# Patient Record
Sex: FEMALE | Race: WHITE | NOT HISPANIC OR LATINO | Employment: OTHER | URBAN - METROPOLITAN AREA
[De-identification: names, ages, dates, MRNs, and addresses within clinical notes are randomized per-mention and may not be internally consistent; named-entity substitution may affect disease eponyms.]

---

## 2017-03-28 ENCOUNTER — ALLSCRIPTS OFFICE VISIT (OUTPATIENT)
Dept: OTHER | Facility: OTHER | Age: 72
End: 2017-03-28

## 2017-05-01 DIAGNOSIS — Z13.820 ENCOUNTER FOR SCREENING FOR OSTEOPOROSIS: ICD-10-CM

## 2017-05-26 ENCOUNTER — HOSPITAL ENCOUNTER (OUTPATIENT)
Dept: RADIOLOGY | Age: 72
Discharge: HOME | End: 2017-05-26
Payer: MEDICARE

## 2017-05-26 DIAGNOSIS — Z13.820 ENCOUNTER FOR SCREENING FOR OSTEOPOROSIS: ICD-10-CM

## 2017-06-02 ENCOUNTER — HOSPITAL ENCOUNTER (OUTPATIENT)
Dept: RADIOLOGY | Age: 72
Discharge: HOME | End: 2017-06-02
Payer: MEDICARE

## 2017-06-02 PROCEDURE — 77080 DXA BONE DENSITY AXIAL: CPT

## 2017-08-24 ENCOUNTER — ANESTHESIA EVENT (OUTPATIENT)
Dept: GASTROENTEROLOGY | Facility: HOSPITAL | Age: 72
End: 2017-08-24
Payer: MEDICARE

## 2017-08-25 ENCOUNTER — HOSPITAL ENCOUNTER (OUTPATIENT)
Facility: HOSPITAL | Age: 72
Setting detail: OUTPATIENT SURGERY
Discharge: HOME | End: 2017-08-25
Attending: COLON & RECTAL SURGERY | Admitting: COLON & RECTAL SURGERY
Payer: MEDICARE

## 2017-08-25 ENCOUNTER — ANESTHESIA (OUTPATIENT)
Dept: GASTROENTEROLOGY | Facility: HOSPITAL | Age: 72
End: 2017-08-25
Payer: MEDICARE

## 2017-08-25 VITALS
DIASTOLIC BLOOD PRESSURE: 60 MMHG | RESPIRATION RATE: 18 BRPM | OXYGEN SATURATION: 99 % | SYSTOLIC BLOOD PRESSURE: 119 MMHG | WEIGHT: 136 LBS | HEIGHT: 64 IN | BODY MASS INDEX: 23.22 KG/M2 | TEMPERATURE: 97.1 F | HEART RATE: 62 BPM

## 2017-08-25 DIAGNOSIS — Z86.010 HISTORY OF COLONIC POLYPS: ICD-10-CM

## 2017-08-25 PROCEDURE — 88305 TISSUE EXAM BY PATHOLOGIST: CPT | Performed by: COLON & RECTAL SURGERY

## 2017-08-25 RX ORDER — OMEPRAZOLE 20 MG/1
20 CAPSULE, DELAYED RELEASE ORAL 2 TIMES DAILY
COMMUNITY

## 2017-08-25 RX ORDER — ASPIRIN 81 MG/1
81 TABLET, CHEWABLE ORAL DAILY
COMMUNITY

## 2017-08-25 RX ORDER — SODIUM CHLORIDE, SODIUM LACTATE, POTASSIUM CHLORIDE, CALCIUM CHLORIDE 600; 310; 30; 20 MG/100ML; MG/100ML; MG/100ML; MG/100ML
100 INJECTION, SOLUTION INTRAVENOUS CONTINUOUS
Status: DISCONTINUED | OUTPATIENT
Start: 2017-08-25 | End: 2017-08-25 | Stop reason: HOSPADM

## 2017-08-25 RX ORDER — LIDOCAINE HYDROCHLORIDE 10 MG/ML
INJECTION, SOLUTION EPIDURAL; INFILTRATION; INTRACAUDAL; PERINEURAL AS NEEDED
Status: DISCONTINUED | OUTPATIENT
Start: 2017-08-25 | End: 2017-08-25 | Stop reason: SURG

## 2017-08-25 RX ORDER — PROPOFOL 10 MG/ML
INJECTION, EMULSION INTRAVENOUS AS NEEDED
Status: DISCONTINUED | OUTPATIENT
Start: 2017-08-25 | End: 2017-08-25 | Stop reason: SURG

## 2017-08-25 RX ADMIN — PROPOFOL 20 MG: 10 INJECTION, EMULSION INTRAVENOUS at 09:13

## 2017-08-25 RX ADMIN — PROPOFOL 20 MG: 10 INJECTION, EMULSION INTRAVENOUS at 09:08

## 2017-08-25 RX ADMIN — SODIUM CHLORIDE, POTASSIUM CHLORIDE, SODIUM LACTATE AND CALCIUM CHLORIDE: 600; 310; 30; 20 INJECTION, SOLUTION INTRAVENOUS at 08:58

## 2017-08-25 RX ADMIN — PROPOFOL 20 MG: 10 INJECTION, EMULSION INTRAVENOUS at 09:17

## 2017-08-25 RX ADMIN — PROPOFOL 20 MG: 10 INJECTION, EMULSION INTRAVENOUS at 09:15

## 2017-08-25 RX ADMIN — PROPOFOL 80 MG: 10 INJECTION, EMULSION INTRAVENOUS at 09:04

## 2017-08-25 RX ADMIN — LIDOCAINE HYDROCHLORIDE 50 MG: 10 INJECTION, SOLUTION EPIDURAL; INFILTRATION; INTRACAUDAL; PERINEURAL at 09:04

## 2017-08-25 RX ADMIN — PROPOFOL 20 MG: 10 INJECTION, EMULSION INTRAVENOUS at 09:06

## 2017-08-25 RX ADMIN — SODIUM CHLORIDE, POTASSIUM CHLORIDE, SODIUM LACTATE AND CALCIUM CHLORIDE 100 ML/HR: 600; 310; 30; 20 INJECTION, SOLUTION INTRAVENOUS at 08:00

## 2017-08-25 RX ADMIN — PROPOFOL 20 MG: 10 INJECTION, EMULSION INTRAVENOUS at 09:22

## 2017-08-25 RX ADMIN — PROPOFOL 20 MG: 10 INJECTION, EMULSION INTRAVENOUS at 09:20

## 2017-08-25 RX ADMIN — PROPOFOL 20 MG: 10 INJECTION, EMULSION INTRAVENOUS at 09:11

## 2017-09-29 ENCOUNTER — ALLSCRIPTS OFFICE VISIT (OUTPATIENT)
Dept: OTHER | Facility: OTHER | Age: 72
End: 2017-09-29

## 2017-09-29 DIAGNOSIS — M81.8 OTHER OSTEOPOROSIS WITHOUT CURRENT PATHOLOGICAL FRACTURE: ICD-10-CM

## 2017-09-29 DIAGNOSIS — K21.9 GASTRO-ESOPHAGEAL REFLUX DISEASE WITHOUT ESOPHAGITIS: ICD-10-CM

## 2017-10-10 ENCOUNTER — LAB REQUISITION (OUTPATIENT)
Dept: LAB | Facility: HOSPITAL | Age: 72
End: 2017-10-10
Payer: MEDICARE

## 2017-10-10 ENCOUNTER — ALLSCRIPTS OFFICE VISIT (OUTPATIENT)
Dept: OTHER | Facility: OTHER | Age: 72
End: 2017-10-10

## 2017-10-10 DIAGNOSIS — Z01.419 ENCOUNTER FOR GYNECOLOGICAL EXAMINATION WITHOUT ABNORMAL FINDING: ICD-10-CM

## 2017-10-10 PROCEDURE — G0145 SCR C/V CYTO,THINLAYER,RESCR: HCPCS | Performed by: OBSTETRICS & GYNECOLOGY

## 2017-10-11 NOTE — PROGRESS NOTES
Assessment  1  Encounter for routine gynecological examination (V72 31) (Z01 419)   2  Pap smear, as part of routine gynecological examination (V76 2) (Z01 419)   3  Encounter for screening mammogram for breast cancer (V76 12) (Z12 31)    Plan  Encounter for routine gynecological examination    · Follow-up visit in 1 year Evaluation and Treatment  Follow-up  Status: Hold For -  Scheduling  Requested for: 51UBQ1720   Ordered; For: Encounter for routine gynecological examination; Ordered By: Kathy Hoff Performed:  Due: 84MJL1475  Pap smear, as part of routine gynecological examination    · (1) THIN PREP PAP WITH IMAGING; Status: In Progress - Specimen/Data  Collected,Retrospective By Protocol Authorization;   Done: 16HKR2531   Perform:St  [address] [facility] Collection; Order Comments:endocervical, cervical; Due:75Xac8664; Last Updated Leon Malik; 10/10/2017 10:30:21 AM;Ordered; For:Pap smear, as part of routine gynecological examination; Ordered By:Meredith Jurado; Maturation index required? : No  : PM  HPV? : Not Requested    Discussion/Summary  healthy adult female Currently, she eats a healthy diet  cervical cancer screening is current Pap test was done today Breast cancer screening: monthly self breast exam was advised  Colorectal cancer screening: colorectal cancer screening is current  Osteoporosis screening: bone mineral density testing is current  Advice and education were given regarding nutrition, aerobic exercise, calcium supplements, vitamin D supplements and sunscreen use  Normal GYN Exam  Stressed  mammogram needed  SMear Done  Medical visit n 1 year  if any problems develop during the interim  The patient has the current Goals: [address]  The patent has the current Barriers: None  Patient is able to Self-Care  PATIENT EDUCATION RECORD She was given the following educational materials:  Ideas for a Healthy Life Style     The treatment plan was reviewed with the patient/guardian  The patient/guardian understands and agrees with the treatment plan     Self Referrals: No      Chief Complaint  ANNUAL EXAM has no problem or concerns      History of Present Illness  HPI: Followed for OSTEOPOROSIS    Normal Bowel & Bladder habits    Denies any pelvic or abdominal pain      GYN , Adult Female [facility]: The patient is being seen for a gynecology evaluation  The last health maintenance visit was 1 year(s) ago  General Health: The patient's health since the last visit is described as good  She has regular dental visits -She denies vision problems -She denies hearing loss  Lifestyle:  She consumes a diverse and healthy diet -She does not have any weight concerns -She exercises regularly -She does not use tobacco -She denies drug use  Reproductive health: the patient is postmenopausal    Screening: cancer screening reviewed and current  metabolic screening reviewed and current  risk screening reviewed and current  Review of Systems    Constitutional: No fever, no chills, feels well, no tiredness, no recent weight gain or loss  ENT: no ear ache, no loss of hearing, no nosebleeds or nasal discharge, no sore throat or hoarseness  Cardiovascular: no complaints of slow or fast heart rate, no chest pain, no palpitations, no leg claudication or lower extremity edema  Respiratory: no complaints of shortness of breath, no wheezing, no dyspnea on exertion, no orthopnea or PND  Breasts: no complaints of breast pain, breast lump or nipple discharge  Gastrointestinal: no complaints of abdominal pain, no constipation, no nausea or diarrhea, no vomiting, no bloody stools  Genitourinary: no complaints of dysuria, no incontinence, no pelvic pain, no dysmenorrhea, no vaginal discharge or abnormal vaginal bleeding  Musculoskeletal: OSTEOPOROSIS, but-no complaints of arthralgia, no myalgia, no joint swelling or stiffness, no limb pain or swelling     Integumentary: no complaints of skin rash or lesion, no itching or dry skin, no skin wounds  Neurological: no complaints of headache, no confusion, no numbness or tingling, no dizziness or fainting  Active Problems  1  Cataract (366 9) (H26 9)   2  Chronic pain syndrome (338 4) (G89 4)   3  Generalized osteoarthritis (715 00) (M15 9)   4  GERD without esophagitis (530 81) (K21 9)   5  Idiopathic osteoporosis of other site without pathological fracture (733 02) (M81 8)   6  Low back pain (724 2) (M54 5)   7  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) (M19 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)   8   Sacroiliitis (720 2) (M46 1)    Past Medical History   · History of Arthritis (V13 4)   · History of Birth History   · History of Encounter for cervical Pap smear with pelvic exam (V76 2,V72 31) (Z01 419)   · History of Encounter for routine gynecological examination (V72 31) (Z01 419)   · History of Encounter for screening for osteoporosis (V82 81) (Z13 820)   · History of Fibrocystic breast disease, unspecified laterality (610 1) (N60 19)   · History of cataract (V12 49) (Z86 69)   · History of osteoporosis (V13 59) (Z87 39)   · History of Lipodermatosclerosis (729 39)   · History of Moderate dysplasia of cervix (DAMARIS II) (622 12) (N87 1)   · History of Pap smear, as part of routine gynecological examination (V76 2) (Z01 419)   · History of Reported Pap Smear   · History of Superficial thrombophlebitis of leg, unspecified laterality   · History of Visit for screening mammogram (V76 12) (Z12 31)    Surgical History   · History of Breast Reconstruction With Implant Prosthesis   · History of Breast Surgery Mastectomy   · History of Cataract Surgery   · History of Tonsillectomy    Family History  Mother    · Family history of carotid artery stenosis (V17 49) (Z82 49)   · Family history of Stroke Syndrome (V17 1)  Father    · Family history of Emphysema   · Family history of Myocardial Infarction Arrhythmias  Brother    · Family history of liver cancer (V16 0) (Z80 0)   · Family history of pancreatic cancer (V16 0) (Z80 0)  Maternal Grandmother    · Family history of Osteoporosis (V17 81)  Uncle    · Family history of diabetes mellitus (V18 0) (Z83 3)  Maternal Uncle    · Family history of Diabetes Mellitus (V18 0)  Family History    · Denied: Family history of Crohn's disease   · Denied: Family history of psoriasis   · Denied: Family history of rheumatoid arthritis   · Denied: Family history of systemic lupus erythematosus   · Denied: Family history of ulcerative colitis    Social History   · Being A Social Drinker   · Rare   · Former smoker (V15 82) (S10 099)   · quit in 2012; 1-1 5 PPD on and on for many years   · No drug use   · Working Part-time    Current Meds   1  Aleve 220 MG Oral Tablet; TAKE 1 TABLET Daily PRN pain; Therapy: (Recorded:28Mar2017) to Recorded   2  Aspirin 81 MG TABS; TAKE 1 TABLET DAILY; Therapy: (Recorded:09Jun2015) to Recorded   3  Calcium Citrate + Oral Tablet; Take 1 tablet twice daily; Therapy: (Recorded:28Mar2017) to Recorded   4  Centrum Specialist Heart Oral Tablet; TAKE 1 TABLET DAILY; Therapy: (DDIKZKLN:54LBZ5444) to Recorded   5  Glucosamine Chondroitin TABS; Take 1 tablet twice daily; Therapy: (TTKVSOKQ:41TGL0753) to Recorded   6  Omeprazole 20 MG Oral Capsule Delayed Release; Take 1 capsule twice daily; Therapy: (MKGRKQFL:69VAV5222) to Recorded   7  Prolia 60 MG/ML Subcutaneous Solution; INJECT SUBCUTANEOUSLY  60 MG / 1 ML   EVERY 6 MONTHS; Therapy: 59CSU9532 to (Last Rx:02Jun2016) Ordered   8  Vitamin B Complex TABS; TAKE 1 TABLET DAILY; Therapy: (WFSWOWQO:06WPI3719) to Recorded   9  Vitamin D3 1000 UNIT Oral Tablet; TAKE 1 TABLET DAILY; Therapy: (Recorded:09Jun2015) to Recorded    Allergies  1  No Known Drug Allergies  2  No Known Environmental Allergies   3   No Known Food Allergies    Vitals   Recorded: 15NGX1855 98:60VS   Systolic 557   Diastolic 60   Height 5 ft 4 in   Weight 133 lb    BMI Calculated 22 83   BSA Calculated 1 64     Physical Exam    Constitutional   General appearance: No acute distress, well appearing and well nourished  Neck   Neck: Normal, supple, trachea midline, no masses  Thyroid: Normal, no thyromegaly  Pulmonary   Respiratory effort: No increased work of breathing or signs of respiratory distress  Auscultation of lungs: Clear to auscultation  Cardiovascular   Auscultation of heart: Normal rate and rhythm, normal S1 and S2, no murmurs  Peripheral vascular exam: Normal pulses Throughout  Genitourinary   External genitalia: Normal and no lesions appreciated  Vagina: Normal, no lesions or dryness appreciated  Urethra: Normal     Urethral meatus: Normal     Bladder: Normal, soft, non-tender and no prolapse or masses appreciated  Cervix: Normal, no palpable masses  Uterus: Normal, non-tender, not enlarged, and no palpable masses  Adnexa/parametria: Normal, non-tender and no fullness or masses appreciated  Anus, perineum, and rectum: Normal sphincter tone, no masses, and no prolapse  Chest   Breasts: Normal and no dimpling or skin changes noted  -Implants STABLE  Abdomen   Abdomen: Normal, non-tender, and no organomegaly noted  Liver and spleen: No hepatomegaly or splenomegaly  Examination for hernias: No hernias appreciated  Stool sample for occult blood: Negative  Lymphatic   Palpation of lymph nodes in neck, axillae, groin and/or other locations: No lymphadenopathy or masses noted  Skin   Skin and subcutaneous tissue: Normal skin turgor and no rashes      Palpation of skin and subcutaneous tissue: Normal     Psychiatric   Orientation to person, place, and time: Normal     Mood and affect: Normal        Provider Comments  Provider Comments:   Patient has 1 granddaughter      Future Appointments    Date/Time Provider Specialty Site   03/30/2018 10:00 AM Melissa Osullivan, [facility] [address]   Electronically signed by Newell Cranker, M D ; Oct 10 2017 10:59AM EST                       (Author)

## 2017-10-24 LAB
LAB AP GYN PRIMARY INTERPRETATION: NORMAL
Lab: NORMAL

## 2018-01-12 NOTE — MISCELLANEOUS
Message   Recorded as Task   Date: 07/01/2016 09:38 AM, Created By: Huma Junior   Task Name: Follow Up   Assigned To: [facility] [address] Procedure,Team   Regarding Patient: Candy Brown, Status: Active   Comment:    Cecily Cordero - 01 Jul 2016 9:38 AM     TASK CREATED  L/m to return call  S/ p LEFT SIJ INJ dopne 6/24/16 by Cecily Field - 08 Jul 2016 10:33 AM     TASK EDITED  Patient reports she received 50% relief zamzam her procedure  She still get [pain when she is walking  Patient is aware that she has to give it a 2wks to see if her pain improves  Her pain level today is a 4  S/p LEFT SIJ INJ done 6/24/16 by Gunjan Humphreys - 08 Jul 2016 12:45 PM     TASK REPLIED TO: Previously Assigned To Gunjan Ray  Please check in with patient next week  Cecily Cordero - 13 Jul 2016 1:52 PM     TASK EDITED  Patient reports she is still at 50% relief from her procedure  Her pain level today is a 5  She would like to discuss other treatment option with you at her f/u appt  on 7/22/16  S/p LEFT SIJ INJ done 6/24/16 by Dr Dinah Powell  [address] - 13 Jul 2016 2:21 PM     TASK REPLIED TO: Previously Assigned To [place]  Thanks  Active Problems    1  Cataract (366 9) (H26 9)   2  Chronic pain syndrome (338 4) (G89 4)   3  Encounter for routine gynecological examination (V72 31) (Z01 419)   4  Generalized osteoarthritis (715 00) (M15 9)   5  GERD without esophagitis (530 81) (K21 9)   6  Idiopathic osteoporosis of other site without pathological fracture (733 02) (M81 8)   7  Lipodermatosclerosis (729 39)   8  Low back pain (724 2) (M54 5)   9  Moderate dysplasia of cervix (DAMARIS II) (622 12) (N87 1)   10  Osteoporosis (733 00) (M81 0)   11  Pap smear, as part of routine gynecological examination (V76 2) (Z01 419)   12  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) (M19 049)   13  Sacroiliitis (720 2) (M46 1)    Current Meds   1  Aspirin 81 MG TABS; TAKE 1 TABLET DAILY;    Therapy: (VOBYVMPD:57ZHS7320) to Recorded   2  Calcium Citrate + Oral Tablet; TAKE 1 TABLET 3 times daily; Therapy: (Recorded:09Jun2015) to Recorded   3  Centrum Specialist Heart Oral Tablet; TAKE 1 TABLET DAILY; Therapy: (UKVQUYCE:02TYE3773) to Recorded   4  Glucosamine Chondroitin TABS; Take 1 tablet twice daily; Therapy: (RAEUFMBV:41ZPQ0799) to Recorded   5  Omeprazole 20 MG Oral Capsule Delayed Release; Take 1 capsule twice daily; Therapy: (WOYZFWHV:64ZAK7352) to Recorded   6  Prolia 60 MG/ML Subcutaneous Solution; INJECT SUBCUTANEOUSLY  60 MG / 1 ML   EVERY 6 MONTHS; Therapy: 13KHR5588 to (Last Rx:02Jun2016) Ordered   7  Vitamin B Complex TABS; TAKE 1 TABLET DAILY; Therapy: (FLVZMFAA:97VBR6473) to Recorded   8  Vitamin D3 1000 UNIT Oral Tablet; TAKE 1 TABLET DAILY; Therapy: (Recorded:09Jun2015) to Recorded    Allergies    1  No Known Drug Allergies    2  No Known Environmental Allergies   3   No Known Food Allergies    Signatures   Electronically signed by : Britney Guerra MA; Jul 13 2016  3:19PM EST                       (Author)

## 2018-01-12 NOTE — PROGRESS NOTES
Assessment    1  Idiopathic osteoporosis of other site without pathological fracture (733 02) (M81 8)   2  Generalized osteoarthritis (715 00) (M15 9)   3  Sacroiliitis (720 2) (M46 1)   4  GERD without esophagitis (530 81) (K21 9)    Plan    1  Follow-up visit in 6 months Evaluation and Treatment  Follow-up  Status: Hold For -   Scheduling  Requested for: 65HBG0770    2  Prolia 60 MG/ML Subcutaneous Solution   3  Call [phone] if: The pain seems worse ; Status:Complete;   Done: 23AAZ4942   4  Call [phone] if: You have questions or concerns about your problem ;   Status:Complete;   Done: 31FRT8562    Discussion/Summary    Ms Luz Gipson reports that she underwent 2 sacroiliac injections since her last evaluation which did help greatly with her left sacroiliac pain  She does report that her pain is now beginning to return but it is tolerable at this time  She does occasionally utilize Aleve at night for pain with good relief  She denies any other significant joint pain or any obvious joint swelling  She denies any morning stiffness  She does report difficulty sleeping on occasion due to pain  She denies any nonrestorative sleep or fatigue  She does complain of some numbness in her right lateral thigh and was diagnosed with meralgia paresthetica by her primary care physician  On exam, there is no active synovitis  She does have osteoarthritic changes of the bilateral hands, with squaring of the bilateral first CMC joints  There is crepitus of the bilateral knees  There is mild tenderness to palpation of the left sacroiliac joint  There is no objective muscular weakness  Review of laboratory studies revealed a normal vitamin D from February 2017  At this time, her osteoporosis appears relatively stable with the use of Prolia 60 mg every 6 months  I did administer her next dose of Prolia 60 mg subcutaneously today in the right upper extremity without any complications  She tolerated the procedure well   She will be due for an updated DEXA scan in May of this year, and she does state that she was provided with a slip to obtain this study by her primary care physician  I did request that we receive a copy of this test once it is completed  We will plan to readminister the Prolia in 6 months time  She will continue her calcium and vitamin D supplementation  She will call in the interim if there are any questions or concerns  Patient is able to Self-Care  Counseling  Rheumatology Counseling Documentation: The patient was counseled regarding diagnostic results, instructions for management and impressions  Chief Complaint  F/U OP   Patient is here today for follow up of chronic conditions described in HPI  History of Present Illness  Pt  returns for F/U for OP  Had 2 injections in L SI joint which did help greatly  Pain is now beginning to return  Pain is tolerable at this time  Occasionally takes Aleve at night for pain with good relief  No other significant joint pain  No obvious joint swelling  No AM stiffness  + difficulty sleeping on occasion due to pain  No non-restorative sleep  No fatigue  c/o numbness in R lateral thigh -> Dx'ed with meralgia paresthetica by PCP  RAPID3: 0/30      Review of Systems    Constitutional: no fever, no recent weight gain, fatigue, no chills, no recent weight loss and no anorexia  HEENT: not feeling congested and no sore throat  Cardiovascular: no chest pain or pressure, no dyspnea on exertion and no swelling in the arms or legs  Respiratory: no unusual or persistent cough, no shortness of breath and no pleurisy  Gastrointestinal: no abdominal pain, no nausea, no vomiting, no heartburn, no diarrhea, no constipation, no melena and no BRBPR  Genitourinary: no dysuria and no hematuria  Musculoskeletal: as noted in HPI  Integumentary no rash  Endocrine no polyuria and no polydipsia     Hematologic/Lymphatic: no unusual bleeding and no tendency for easy bruising  Neurological: tingling, but no headache, no vertigo or dizziness and no weakness  Psychiatric: insomnia, but no non-restorative sleep  Active Problems    1  Cataract (366 9) (H26 9)   2  Chronic pain syndrome (338 4) (G89 4)   3  Generalized osteoarthritis (715 00) (M15 9)   4  GERD without esophagitis (530 81) (K21 9)   5  Idiopathic osteoporosis of other site without pathological fracture (733 02) (M81 8)   6  Low back pain (724 2) (M54 5)   7  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) (M19 049)   8  Sacroiliitis (720 2) (M46 1)    Past Medical History    1  History of Arthritis (V13 4)   2  History of Birth History   3  History of Encounter for cervical Pap smear with pelvic exam (V76 2,V72 31) (Z01 419)   4  History of Encounter for routine gynecological examination (V72 31) (Z01 419)   5  History of Encounter for routine gynecological examination (V72 31) (Z01 419)   6  History of Encounter for screening for osteoporosis (V82 81) (Z13 820)   7  History of Fibrocystic breast disease, unspecified laterality (610 1) (N60 19)   8  History of cataract (V12 49) (Z86 69)   9  History of osteoporosis (V13 59) (Z87 39)   10  History of Lipodermatosclerosis (729 39)   11  History of Moderate dysplasia of cervix (DAMARIS II) (622 12) (N87 1)   12  History of Pap smear, as part of routine gynecological examination (V76 2) (Z01 419)   13  History of Pap smear, as part of routine gynecological examination (V76 2) (Z01 419)   14  History of Reported Pap Smear   15  History of Superficial thrombophlebitis of leg, unspecified laterality   16  History of Visit for screening mammogram (V76 12) (Z12 31)    The active problems and past medical history were reviewed and updated today  Surgical History    1  History of Breast Reconstruction With Implant Prosthesis   2  History of Breast Surgery Mastectomy   3  History of Cataract Surgery   4   History of Tonsillectomy    The surgical history was reviewed and updated today  Family History  Mother    1  Family history of carotid artery stenosis (V17 49) (Z82 49)   2  Family history of Stroke Syndrome (V17 1)  Father    3  Family history of Emphysema   4  Family history of Myocardial Infarction Arrhythmias  Brother    5  Family history of liver cancer (V16 0) (Z80 0)   6  Family history of pancreatic cancer (V16 0) (Z80 0)  Maternal Grandmother    7  Family history of Osteoporosis (V17 81)  Uncle    8  Family history of diabetes mellitus (V18 0) (Z83 3)  Maternal Uncle    9  Family history of Diabetes Mellitus (V18 0)  Family History    10  Denied: Family history of Crohn's disease   6  Denied: Family history of psoriasis   12  Denied: Family history of rheumatoid arthritis   13  Denied: Family history of systemic lupus erythematosus   14  Denied: Family history of ulcerative colitis    The family history was reviewed and updated today  Social History    · Being A Social Drinker   · Former smoker (Z11 67) (X39 352)   · No drug use   · Working Part-time  The social history was reviewed and updated today  The social history was reviewed and is unchanged  Current Meds   1  Aleve 220 MG Oral Tablet; TAKE 1 TABLET Daily PRN pain; Therapy: (Recorded:28Mar2017) to Recorded   2  Aspirin 81 MG TABS; TAKE 1 TABLET DAILY; Therapy: (Recorded:09Jun2015) to Recorded   3  Calcium Citrate + Oral Tablet; Take 1 tablet twice daily; Therapy: (Recorded:28Mar2017) to Recorded   4  Centrum Specialist Heart Oral Tablet; TAKE 1 TABLET DAILY; Therapy: (CEONYFWA:06WFO4432) to Recorded   5  Glucosamine Chondroitin TABS; Take 1 tablet twice daily; Therapy: (JVAXDGMJ:62QLX5570) to Recorded   6  Omeprazole 20 MG Oral Capsule Delayed Release; Take 1 capsule twice daily; Therapy: ([facility]YR[facility]:43FDF5826) to Recorded   7  Prolia 60 MG/ML Subcutaneous Solution; INJECT SUBCUTANEOUSLY  60 MG / 1 ML   EVERY 6 MONTHS; Therapy: 36OZF1306 to (Last Rx:02Jun2016) Ordered   8   Vitamin B Complex TABS; TAKE 1 TABLET DAILY; Therapy: (WWJKGWTP:92ORT0910) to Recorded   9  Vitamin D3 1000 UNIT Oral Tablet; TAKE 1 TABLET DAILY; Therapy: ([phone]) to Recorded    The medication list was reviewed and updated today  Allergies    1  No Known Drug Allergies    2  No Known Environmental Allergies   3  No Known Food Allergies    Vitals  Signs   Recorded: 28Mar2017 10:07AM   Heart Rate: 80  Systolic: 271  Diastolic: 78  Weight: 815 lb   BMI Calculated: 24 37  BSA Calculated: 1 69    Physical Exam    Constitutional   General appearance: No acute distress, well appearing and well nourished  Eyes   Conjunctiva and lids: No swelling, erythema or discharge  Pupils and irises: Equal, round and reactive to light  Ears, Nose, Mouth, and Throat   External inspection of ears and nose: Normal     Oropharynx: Normal with no erythema, edema, exudate lesions, or ulcers  Pulmonary   Respiratory effort: No increased work of breathing or signs of respiratory distress  Auscultation of lungs: Clear to auscultation  Cardiovascular   Auscultation of heart: Normal rate and rhythm, normal S1 and S2, without murmurs  Examination of extremities for edema and/or varicosities: Normal     Lymphatic   Palpation of lymph nodes in neck: No lymphadenopathy  Psychiatric   Orientation to person, place, and time: Normal     Mood and affect: Normal         Right Upper Extremity: Right Hand: Right Hand Appearance: Joe's node at the all PIPs digit, Heberden's nodule at the all DIPs digit and + squaring of 1st CMC  Right Wrist: Right Elbow: Right Shoulder:   Left Upper Extremity: Left Hand: Appearance: all PIPs PIP Joe's node(s), all DIPs digit(s) Heberden's Node(s) and + squaring of 1st CMC  Left Wrist: Left Elbow: Left Shoulder:   Right Lower Extremity: Right Foot:   Great toe deformities include a bunion   Right Ankle: Right Knee: Right Hip:   Left Lower Extremity: Left Foot:   Great toe deformities include a bunion  Left Ankle: Left Knee: Left Hip:   Musculoskeletal - Joints, bones, and muscles: Abnormal  Palpation - left sacral tenderness and bilateral knee crepitus  Muscle strength/tone: Normal  Motor Strength Findings: normal upper extremity strength and normal lower extremity strength  Right upper extremity: shoulder flexion 5/5, shoulder extension 5/5, biceps 5/5, triceps 5/5, but normal hand   Left upper extremity shoulder flexion 5/5, shoulder extension 5/5, biceps 5/5, triceps 5/5, but normal hand   Right lower extremity strength: hip flexion 5/5, hip abduction 5/5, hip adduction 5/5, knee flexion 5/5, knee extension 5/5, ankle dorsiflexion 5/5, ankle plantar flexion 5/5  Left lower extremity strength: hip flexion 5/5, hip abduction 5/5, hip adduction 5/5, knee flexion 5/5, knee extension 5/5, ankle dorsiflexion 5/5, ankle plantar flexion 5/5  Skin - Skin and subcutaneous tissue: Normal    Neurologic - Sensation: Normal       Future Appointments    Date/Time Provider Specialty Site   09/29/2017 10:20 AM Manuel Roe DO Rheumatology [facility]   10/10/2017 10:00 AM RADHA Seaman   Obstetrics/Gynecology [facility]     Signatures   Electronically signed by : Beverly Minaya DO; Mar 28 2017 10:49AM EST                       (Author)

## 2018-01-13 VITALS
HEART RATE: 80 BPM | WEIGHT: 142 LBS | BODY MASS INDEX: 24.37 KG/M2 | SYSTOLIC BLOOD PRESSURE: 120 MMHG | DIASTOLIC BLOOD PRESSURE: 78 MMHG

## 2018-01-13 NOTE — RESULT NOTES
Message   Recorded as Task   Date: 08/04/2016 04:14 PM, Created By: María Avina   Task Name: Follow Up   Assigned To: María Avina   Regarding Patient: Aye Larson, Status:  In Progress   Comment:    Edda Bray - 04 Aug 2016 4:14 PM     TASK CREATED  Received message from patient requesting to schedule repeat injection - patient stating her pain in back - pain is located on the bottom of her buttock and would like to schedule a repeat injection - patient requesting a return call at [phone] or [phone] (cell)    Left message for patient to call back    ***ok to schedule repeat Left SIJ inj as long as same pain, same area***   Edda Bray - 04 Aug 2016 4:14 PM     TASK IN [address] [place] [address] - 05 Aug 2016 3:12 PM     TASK EDITED  S/W patient - patient stated same pain, same area - patient scheduled for Friday, August 19 at [place] [facility] with Dr Dipika Jacksonr - patient advised nothing to eat or drink 1 hour prior to procedure but encouraged to have a light breakfast - patient stated on low dose aspirin, no need to hold for this procedure, patient denies any abx's - patient also advised to arrange for  - patient aware and agreed        Signatures   Electronically signed by : Carl Rivas, ; Aug  5 2016  3:12PM EST                       (Author)

## 2018-01-13 NOTE — RESULT NOTES
Message   Recorded as Task   Date: 07/28/2016 09:04 AM, Created By: Ke Nickerson   Task Name: Follow Up   Assigned To: Jasbir Rodrigo   Regarding Patient: Kaci Whittington, Status: Active   Comment:    GilJully - 28 Jul 2016 9:04 AM     TASK CREATED  Patient reports she received 90% relief from her procedure  She would like to cancel her appt on 7/29/16 for  LEFT SIJ INJ  I spoke with Dr Juan J Mir and her agreed to cancel her procedure and instructed the patient to call if her pain returns  Patient agreed  Her pain level today is a zero S/p LEFT SIJ INJ done 6/24/16 by Dr Juan J CorderoJully - 28 Jul 2016 9:06 AM     TASK EDITED          Can you please cancel her procedure appt for tomorrow and her f/u in August  Please look and previous task  Thanks, Gisela President     Edda Bray - 28 Jul 2016 9:34 AM     TASK EDITED  S/W patient - made patient aware cancelling procedure and f/u office visit and to call if pain returns and would like office visit or repeat injection - patient aware and agreed        Signatures   Electronically signed by : Lexy Hernandez, ; Jul 28 2016  9:34AM EST                       (Author)

## 2018-01-13 NOTE — MISCELLANEOUS
Message   Recorded as Task   Date: 08/25/2016 02:42 PM, Created By: Lori Brady   Task Name: Follow Up   Assigned To: [facility] [address] Procedure,Team   Regarding Patient: Jayden Parikh, Status: Active   Comment:    GilCecily - 25 Aug 2016 2:42 PM     TASK CREATED  L/m to return call  S/p LEFT SIJ INJ # 2 done 8/19/16 by Dr Sarika CorderoCecily - 25 Aug 2016 2:57 PM     TASK EDITED  Patient returned called and stated she received 60% relief from her procedure  Her pain level before the procedure was a 7 and after a 2  Patient is aware of the 2 wk wait  I will f/u next week to see if she gets any further relief  S/p LEFT SIJ INJ # 2 done 8/19/16  [address] - 25 Aug 2016 3:15 PM     TASK REPLIED TO: Previously Assigned To [place]  Active Problems    1  Cataract (366 9) (H26 9)   2  Chronic pain syndrome (338 4) (G89 4)   3  Encounter for routine gynecological examination (V72 31) (Z01 419)   4  Generalized osteoarthritis (715 00) (M15 9)   5  GERD without esophagitis (530 81) (K21 9)   6  Idiopathic osteoporosis of other site without pathological fracture (733 02) (M81 8)   7  Lipodermatosclerosis (729 39)   8  Low back pain (724 2) (M54 5)   9  Moderate dysplasia of cervix (DAMARIS II) (622 12) (N87 1)   10  Osteoporosis (733 00) (M81 0)   11  Pap smear, as part of routine gynecological examination (V76 2) (Z01 419)   12  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) (M19 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)   13  Sacroiliitis (720 2) (M46 1)    Current Meds   1  Aspirin 81 MG TABS; TAKE 1 TABLET DAILY; Therapy: (Recorded:09Jun2015) to Recorded   2  Calcium Citrate + Oral Tablet; TAKE 1 TABLET 3 times daily; Therapy: (Recorded:09Jun2015) to Recorded   3  Centrum Specialist Heart Oral Tablet; TAKE 1 TABLET DAILY; Therapy: (KZSTWPJA:65YJA7256) to Recorded   4  Glucosamine Chondroitin TABS; Take 1 tablet twice daily; Therapy: (ZKOSVDBT:19KCK5773) to Recorded   5  Omeprazole 20 MG Oral Capsule Delayed Release;  Take 1 capsule twice daily; Therapy: (TWCAJTPV:25JAU5396) to Recorded   6  Prolia 60 MG/ML Subcutaneous Solution; INJECT SUBCUTANEOUSLY  60 MG / 1 ML   EVERY 6 MONTHS; Therapy: 65ZNU7415 to (Last Rx:02Jun2016) Ordered   7  Vitamin B Complex TABS; TAKE 1 TABLET DAILY; Therapy: (ANXFZWYP:50IXW8697) to Recorded   8  Vitamin D3 1000 UNIT Oral Tablet; TAKE 1 TABLET DAILY; Therapy: (Recorded:09Jun2015) to Recorded    Allergies    1  No Known Drug Allergies    2  No Known Environmental Allergies   3   No Known Food Allergies    Signatures   Electronically signed by : Sharon Arevalo MA; Aug 25 2016  3:46PM EST                       (Author)

## 2018-01-14 VITALS
HEIGHT: 64 IN | WEIGHT: 133 LBS | DIASTOLIC BLOOD PRESSURE: 60 MMHG | BODY MASS INDEX: 22.71 KG/M2 | SYSTOLIC BLOOD PRESSURE: 102 MMHG

## 2018-01-14 NOTE — PROGRESS NOTES
Assessment   1  Osteoporosis (733 00) (M81 0)  2  Sacroiliitis (720 2) (M46 1)  3  Generalized osteoarthritis (715 00) (M15 9)1   4  GERD without esophagitis (530 81) (K21 9)1      1 Amended By: Senthil Erickson; Jun 02 2016 12:05 PM EST    Plan   1  Start: Prolia 60 MG/ML Subcutaneous Solution; INJECT SUBCUTANEOUSLY  60 MG / 1   ML EVERY 6 MONTHS  2  Follow-up PRN Evaluation and Treatment  Follow-up  Status: Complete  Done:   [id number]  3  Call [phone] if: You have questions or concerns about your problem ;   Status:Complete;   Done: [id number]   4  *4 - [address] [facility] Physician Referral  Consult; History of persistent L   sacroiliitis; Evaluate for fluoro-guided cortisone injection  Status: Active  Requested for:   [id number]  () Care Summary provided  : Yes    Discussion/Summary    Ms Obie Infante returns the office today for reevaluation for left hip pain and osteoporosis  She has not been seen in our office and nearly a year, and has not been on any medications for her osteoporosis since her last visit  She has been taking her calcium and vitamin D supplementation without any problems  She continues to have some left hip pain mainly localized to her buttock area  This pain will occasionally radiate to her groin  She did see Dr Lucas Floyd for evaluation of this pain, and was diagnosed with sacroiliitis  She was placed on meloxicam, which did provide her significant relief for about 2 months, then it stopped working  She also had some increased GI upset while taking the medication  She subsequently stopped it and resume taking Aleve  She denies any other significant joint pain or any obvious joint swelling  She denies any morning stiffness  She does report difficulty sleeping because of her hip pain  She denies any nonrestorative sleep or fatigue  She does report that she has been altering her gait because of the pain   She also reports that she sustained a fall last February, but she did not sustain any fractures with this incident  On exam, there is no active synovitis  She has osteoarthritic changes in the bilateral hands, as well as crepitus of the bilateral knees  There is tenderness to palpation of the left sacroiliac joint  Review of laboratory studies from June 2015 revealed a normal CBC, CMP, TSH, and vitamin D  At this time, her sacroiliitis appears active and uncontrolled despite Aleve  We did discuss several treatment options, and she is interested in pursuing a cortisone injection at this time  Therefore, I will refer her to her colleagues at the [facility] and [address] [place] [address] for a fluoroscopically guided sacroiliac injection  She declined another trial of any other oral NSAID at this time  For her osteoporosis I did recommend that she start on any treatment such as Prolia  I did provide her with information with regards to this, and we will obtain a prior authorization  She will continue the calcium and vitamin D at their current doses  I will not schedule a follow-up until the Prolia is approved and should to our office  She will call in the interim if she has any questions or concerns  Chief Complaint  F/U OP   Patient is here today for follow up of chronic conditions described in HPI  History of Present Illness  Pt  returns for F/U for OP  c/o pain in L hip  Diagnosed with sacroiliitis and treated with Meloxicam which helped for 2 months, but now not helping  Interested in having a sacroiliac injection  No other significant joint pain  Aleve also does not help with pain  + altering gait because of pain  Not currently on anything for OP  Taking Calcium and Vitamin D  Had a fall last February, but no fractures since last visit  No obvious joint swelling  No AM stiffness  + difficulty sleeping due to pain  No non-restorative sleep  No fatigue       RAPID3: 11 2/30      Review of Systems    Constitutional: no fever, no recent weight gain, fatigue, no chills, no recent weight loss and no anorexia  HEENT: not feeling congested and no sore throat  Cardiovascular: swelling in the arms or legs, but no chest pain or pressure and no dyspnea on exertion  Respiratory: no unusual or persistent cough, no shortness of breath and no pleurisy  Gastrointestinal: heartburn, but no abdominal pain, no nausea, no vomiting, no diarrhea, no constipation, no melena and no BRBPR  Genitourinary: no dysuria and no hematuria  Musculoskeletal: as noted in HPI  Integumentary no rash  Endocrine no polyuria and no polydipsia  Hematologic/Lymphatic: no unusual bleeding    The patient presents with complaints of a tendency for easy bruising (2/2 blood thinners)  Neurological: weakness, but no headache, no vertigo or dizziness and no tingling  Psychiatric: insomnia, but no non-restorative sleep  Active Problems   1  Cataract (366 9) (H26 9)  2  Encounter for routine gynecological examination (V72 31) (Z01 419)  3  Generalized osteoarthritis (715 00) (M15 9)  4  GERD (gastroesophageal reflux disease) (530 81) (K21 9)  5  Idiopathic osteoporosis of other site without pathological fracture (733 02) (M81 8)  6  Lipodermatosclerosis (729 39)  7  Low back pain (724 2) (M54 5)  8  Moderate dysplasia of cervix (DAMARIS II) (622 12) (N87 1)  9  Osteoporosis (733 00) (M81 0)  10  Pap smear, as part of routine gynecological examination (V76 2) (Z01 419)  11  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) (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)  12  Sacroiliitis (720 2) (M46 1)    Past Medical History   1  History of Arthritis (V13 4)  2  History of Birth History  3  History of Encounter for cervical Pap smear with pelvic exam (V76 2,V72 31) (Z01 419)  4  History of Encounter for routine gynecological examination (V72 31) (Z01 419)  5  History of Fibrocystic breast disease, unspecified laterality (610 1) (N60 19)  6  History of cataract (V12 49) (Z86 69)  7  History of Pap smear, as part of routine gynecological examination (V76 2) (Z01 419)  8   History of Reported Pap Smear  9  History of Superficial thrombophlebitis of leg, unspecified laterality  10  History of Visit for screening mammogram (V76 12) (Z12 31)    The active problems and past medical history were reviewed and updated today  Surgical History   1  History of Breast Reconstruction With Implant Prosthesis  2  History of Breast Surgery Mastectomy  3  History of Cataract Surgery  4  History of Tonsillectomy    The surgical history was reviewed and updated today  Family History  Mother   1  Family history of carotid artery stenosis (V17 49) (Z82 49)  2  Family history of Stroke Syndrome (V17 1)  Father   3  Family history of Emphysema  4  Family history of Myocardial Infarction Arrhythmias  Brother   5  Family history of liver cancer (V16 0) (Z80 0)  6  Family history of pancreatic cancer (V16 0) (Z80 0)  Maternal Grandmother   7  Family history of Osteoporosis (V17 81)  Uncle   8  Family history of diabetes mellitus (V18 0) (Z83 3)  Maternal Uncle   9  Family history of Diabetes Mellitus (V18 0)  Family History   10  Denied: Family history of Crohn's disease  6  Denied: Family history of psoriasis  12  Denied: Family history of rheumatoid arthritis  13  Denied: Family history of systemic lupus erythematosus  14  Denied: Family history of ulcerative colitis    The family history was reviewed and updated today  Social History    · Being A Social Drinker   · Former smoker (Q29 17) (E45 891)   · No drug use   · Working Part-time  The social history was reviewed and updated today  The social history was reviewed and is unchanged  Current Meds  1  Aspirin 81 MG TABS; TAKE 1 TABLET DAILY; Therapy: (Recorded:09Jun2015) to Recorded  2  Calcium Citrate + Oral Tablet; TAKE 1 TABLET 3 times daily; Therapy: (Recorded:09Jun2015) to Recorded  3  Centrum Specialist Heart Oral Tablet; TAKE 1 TABLET DAILY; Therapy: (GSHMFYDA:82WND7350) to Recorded  4   Glucosamine Chondroitin TABS; Take 1 tablet twice daily; Therapy: (DFHMFEGN:02TAZ9084) to Recorded  5  Omeprazole 20 MG Oral Capsule Delayed Release; Take 1 capsule twice daily; Therapy: ([id number]) to Recorded  6  Vitamin B Complex TABS; TAKE 1 TABLET DAILY; Therapy: (QZVMAFAT:57IUA2783) to Recorded  7  Vitamin D3 1000 UNIT Oral Tablet; TAKE 1 TABLET DAILY; Therapy: ([phone]) to Recorded    The medication list was reviewed and updated today  Allergies   1  No Known Drug Allergies   2  No Known Environmental Allergies  3  No Known Food Allergies    Vitals  Signs [Data Includes: Current Encounter]   Recorded: 2016 10:56AM   Heart Rate: 64  Systolic: 523  Diastolic: 70  Weight: 648 lb 2 oz  BMI Calculated: 25 25  BSA Calculated:  72    Physical Exam    Constitutional   General appearance: No acute distress, well appearing and well nourished  Eyes   Conjunctiva and lids: No swelling, erythema or discharge  Pupils and irises: Equal, round and reactive to light  Ears, Nose, Mouth, and Throat   External inspection of ears and nose: Normal     Oropharynx: Normal with no erythema, edema, exudate lesions, or ulcers  Pulmonary   Respiratory effort: No increased work of breathing or signs of respiratory distress  Auscultation of lungs: Clear to auscultation  Cardiovascular   Auscultation of heart: Normal rate and rhythm, normal S1 and S2, without murmurs  Examination of extremities for edema and/or varicosities: Normal     Lymphatic   Palpation of lymph nodes in neck: No lymphadenopathy  Psychiatric   Orientation to person, place, and time: Normal     Mood and affect: Normal         Right Upper Extremity: Right Hand: Right Hand Appearance: Joe's node at the all PIPs digit, Heberden's nodule at the all DIPs digit and + squaring of 1st [facility]   Right Wrist: Right Elbow: Right Shoulder:   Left Upper Extremity: Left Hand: Appearance: all PIPs PIP Joe's node(s), all DIPs digit(s) Heberden's Node(s) and + squaring of 1st [facility]  Left Wrist: Left Elbow: Left Shoulder:   Right Lower Extremity: Right Foot:   Great toe deformities include a bunion  Right Ankle: Right Knee: Right Hip:   Left Lower Extremity: Left Foot:   Great toe deformities include a bunion  Left Ankle: Left Knee: Left Hip:   Musculoskeletal - Joints, bones, and muscles: Abnormal  Palpation - left sacral tenderness and bilateral knee crepitus  Skin - Skin and subcutaneous tissue: Normal    Neurologic - Sensation: Normal       Future Appointments    Date/Time Provider Specialty Site   06/27/2016 09:10 AM RADHA Mcdonald   Orthopedic Surgery [facility]     Signatures   Electronically signed by : Roberto Altamirano DO; Jun 2 2016 12:04PM EST                       (Author)    Electronically signed by : Roberto Altamirano DO; Jun 2 2016 12:05PM EST                       (Author)

## 2018-01-15 NOTE — PROGRESS NOTES
Assessment    1  Idiopathic osteoporosis of other site without pathological fracture (733 02) (M81 8)    Plan    1  (1) CBC/PLT/DIFF; Status:Active; Requested for:77Gld9868;     2  Prolia 60 MG/ML Subcutaneous Solution   3  (1) COMPREHENSIVE METABOLIC PANEL; Status:Active; Requested for:56Bxn2958;    4  (1) TSH WITH FT4 REFLEX; Status:Active; Requested SEB:03UCE0532;    5  (1) VITAMIN D 25-HYDROXY; Status:Active; Requested EKZ:07JRN6845;    6  Follow-up visit in 6 months Evaluation and Treatment  Follow-up  Status: Hold For -   Scheduling  Requested for: 82KUO5765   3  Call [phone] if: New symptoms occur ; Status:Complete;   Done: 86CPG3645   8  Call [phone] if: The pain seems worse ; Status:Complete;   Done: 59GKC1778   8  Call [phone] if: You have questions or concerns about your problem ;   Status:Complete;   Done: 58QFB8810    81  Prolia 60 MG/ML Subcutaneous Solution; INJECT SUBCUTANEOUSLY  60 MG    / 1 ML EVERY 6 MONTHS    Discussion/Summary    Ms Rony Croft returns today for her next Prolia injection  She was injected with Prolia 60 mg subcutaneously in the right upper extremity today without any complications  She tolerated the procedure well  Review of a DEXA scan from May 26, 2017 revealed a T score of -2 7 at the lumbar spine and -1 8 at the left femoral neck  Her lumbar spine score was unchanged from the prior study in May 2015, her left femoral neck score did show a mild decline  She had only received 2 doses of Prolia prior to this DEXA scan, so we will continue the Prolia at this time  I would like to obtain an updated CBC, CMP, TSH, and vitamin D prior to her next injection  She will return in 6 months for her next injection  She will call in the interim if she has any questions or concerns  Counseling  Rheumatology Counseling Documentation: The patient was counseled regarding diagnostic results, instructions for management and impressions        Chief Complaint  F/U OP Patient is here today for follow up of chronic conditions described in HPI  History of Present Illness  Pt  returns for F/U for OP and next Prolia injection  Active Problems    1  Cataract (366 9) (H26 9)   2  Chronic pain syndrome (338 4) (G89 4)   3  Generalized osteoarthritis (715 00) (M15 9)   4  GERD without esophagitis (530 81) (K21 9)   5  Idiopathic osteoporosis of other site without pathological fracture (733 02) (M81 8)   6  Low back pain (724 2) (M54 5)   7  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) (M19 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)   8  Sacroiliitis (720 2) (M46 1)    Past Medical History    1  History of Arthritis (V13 4)   2  History of Birth History   3  History of Encounter for cervical Pap smear with pelvic exam (V76 2,V72 31) (Z01 419)   4  History of Encounter for routine gynecological examination (V72 31) (Z01 419)   5  History of Encounter for routine gynecological examination (V72 31) (Z01 419)   6  History of Encounter for screening for osteoporosis (V82 81) (Z13 820)   7  History of Fibrocystic breast disease, unspecified laterality (610 1) (N60 19)   8  History of cataract (V12 49) (Z86 69)   9  History of osteoporosis (V13 59) (Z87 39)   10  History of Lipodermatosclerosis (729 39)   11  History of Moderate dysplasia of cervix (DAMARIS II) (622 12) (N87 1)   12  History of Pap smear, as part of routine gynecological examination (V76 2) (Z01 419)   13  History of Pap smear, as part of routine gynecological examination (V76 2) (Z01 419)   14  History of Reported Pap Smear   15  History of Superficial thrombophlebitis of leg, unspecified laterality   16  History of Visit for screening mammogram (V76 12) (Z12 31)    Surgical History    1  History of Breast Reconstruction With Implant Prosthesis   2  History of Breast Surgery Mastectomy   3  History of Cataract Surgery   4  History of Tonsillectomy    Family History  Mother    1  Family history of carotid artery stenosis (V17 49) (Z82 49)   2   Family history of Stroke Syndrome (V17 1)  Father    3  Family history of Emphysema   4  Family history of Myocardial Infarction Arrhythmias  Brother    5  Family history of liver cancer (V16 0) (Z80 0)   6  Family history of pancreatic cancer (V16 0) (Z80 0)  Maternal Grandmother    7  Family history of Osteoporosis (V17 81)  Uncle    8  Family history of diabetes mellitus (V18 0) (Z83 3)  Maternal Uncle    9  Family history of Diabetes Mellitus (V18 0)  Family History    10  Denied: Family history of Crohn's disease   6  Denied: Family history of psoriasis   12  Denied: Family history of rheumatoid arthritis   13  Denied: Family history of systemic lupus erythematosus   14  Denied: Family history of ulcerative colitis    Social History    · Being A Social Drinker   · Former smoker (W77 71) (K16 916)   · No drug use   · Working Part-time    Current Meds   1  Aleve 220 MG Oral Tablet; TAKE 1 TABLET Daily PRN pain; Therapy: (Recorded:2017) to Recorded   2  Aspirin 81 MG TABS; TAKE 1 TABLET DAILY; Therapy: (Recorded:2015) to Recorded   3  Calcium Citrate + Oral Tablet; Take 1 tablet twice daily; Therapy: (Recorded:2017) to Recorded   4  Centrum Specialist Heart Oral Tablet; TAKE 1 TABLET DAILY; Therapy: (SXJMFQAR:95CXM9082) to Recorded   5  Glucosamine Chondroitin TABS; Take 1 tablet twice daily; Therapy: (YFKMOLPA:62QUI3939) to Recorded   6  Omeprazole 20 MG Oral Capsule Delayed Release; Take 1 capsule twice daily; Therapy: (XYMONPPZ:28UYR0802) to Recorded   7  Prolia 60 MG/ML Subcutaneous Solution; INJECT SUBCUTANEOUSLY  60 MG / 1 ML   EVERY 6 MONTHS; Therapy: 71NKM7466 to (Last Rx:2016) Ordered   8  Vitamin B Complex TABS; TAKE 1 TABLET DAILY; Therapy: (ICNHXZY70VYT1295) to Recorded   9  Vitamin D3 1000 UNIT Oral Tablet; TAKE 1 TABLET DAILY; Therapy: (Recorded:2015) to Recorded    Allergies    1  No Known Drug Allergies    2  No Known Environmental Allergies   3   No Known Food Allergies    Vitals  Signs   Recorded: 43EWA0879 10:11AM   Heart Rate: 78  Systolic: 002  Diastolic: 62  Height: 5 ft 4 in  Weight: 137 lb   BMI Calculated: 23 52  BSA Calculated: 1 67    Results/Data  * DXA BONE DENSITY SPINE HIP AND PELVIS 45USS1421 09:43AM Blanca Alvarez Order Number: [id number]    - Patient Instructions: To schedule this appointment, please contact Central Scheduling at [phone]  Test Name Result Flag Reference   DXA BONE DENSITY SPINE HIP AND PELVIS (Report)     CENTRAL DXA SCAN     CLINICAL HISTORY:  73year old post-menopausal  female risk factors include smoking, omeprazole use  Previously treated with raloxifene  TECHNIQUE: Bone densitometry was performed using a Horizon A bone densitometer  Regions of interest appear properly placed  There are no obvious fractures or other confounding variables which could limit the study  COMPARISON: 5/1/2015  RESULTS:    LUMBAR SPINE: L1-L4:   BMD 0 748 gm/cm2   T-score -2 7   Z-score -0 6     LEFT TOTAL HIP:   BMD 0 848 gm/cm2   T-score -0 8   Z-score 0 7     LEFT FEMORAL NECK:   BMD 0 652 gm/cm2   T-score -1 8   Z-score 0 0             IMPRESSION:   1  Based on the [facility] classification, the T-score of -2 7 in the lumbar spine is consistent with osteoporosis  2  Since the prior study, in the left hip, there has been a decrease in BMD of 0 040 gm/cm2 or 4 5%  This decrease does exceed our own least significant change and, therefore, is statistically significant within 95% confidence level  There has been    no significant change in mineral density in the lumbar spine  3  According to the [facility], prescription therapy is recommended with a T-score of -2 5 or less in the spine or hip after appropriate evaluation to exclude secondary causes     4  A daily intake of at least 1200 mg Calcium and 800 to 1000 IU of Vitamin D, as well as weight bearing and muscle strengthening exercise, fall prevention and avoidance of tobacco and excessive alcohol intake as basic preventive measures are    suggested  5  Repeat DXA in 18 - 24 months, on the same machine, as clinically indicated  The 10 year risk of hip fracture is 2 7%, with the 10 year risk of major osteoporotic fracture being 17%, as calculated by the [facility] fracture risk assessment tool (FRAX)  The current NOF guidelines recommend treating patients with FRAX 10 year risk score    of >3% for hip fracture and >20% for major osteoporotic fracture  Fracture risk may be overestimated due to prior raloxifene therapy  WHO CLASSIFICATION:   Normal (a T-score of -1 0 or higher)   Low bone mineral density (a T-score of less than -1 0 but higher than -2 5)   Osteoporosis (a T-score of -2 5 or less)   Severe osteoporosis (a T-score of -2 5 or less with a fragility fracture)             Workstation performed: [id number]     Signed by:   Opal Jack MD   6/2/17       Future Appointments    Date/Time Provider Specialty Site   10/10/2017 10:00 AM RADHA Emerson   Obstetrics/Gynecology [address] [facility]     Signatures   Electronically signed by : Ike Jones DO; Sep 29 2017 10:25AM EST                       (Author)

## 2018-01-17 NOTE — PROGRESS NOTES
Assessment    1  Osteoporosis (733 00) (M81 0)    Plan    1  Prolia 60 MG/ML Subcutaneous Solution    Discussion/Summary    Patient is in the office today for a Prolia injection  Patient tolerated the procedure well and left without complications  The patient was counseled regarding instructions for management, prognosis, patient and family education, risks and benefits of treatment options, importance of compliance with treatment  Chief Complaint  F/U Prolia injection   Patient is here today for follow up of chronic conditions described in HPI  History of Present Illness  Patient is in the office today for Prolia injection  Active Problems    1  Cataract (366 9) (H26 9)   2  Chronic pain syndrome (338 4) (G89 4)   3  Encounter for routine gynecological examination (V72 31) (Z01 419)   4  Generalized osteoarthritis (715 00) (M15 9)   5  GERD without esophagitis (530 81) (K21 9)   6  Idiopathic osteoporosis of other site without pathological fracture (733 02) (M81 8)   7  Lipodermatosclerosis (729 39)   8  Low back pain (724 2) (M54 5)   9  Moderate dysplasia of cervix (DAMARIS II) (622 12) (N87 1)   10  Osteoporosis (733 00) (M81 0)   11  Pap smear, as part of routine gynecological examination (V76 2) (Z01 419)   12  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) (M19 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)   13  Sacroiliitis (720 2) (M46 1)    Past Medical History    1  History of Arthritis (V13 4)   2  History of Birth History   3  History of Encounter for cervical Pap smear with pelvic exam (V76 2,V72 31) (Z01 419)   4  History of Encounter for routine gynecological examination (V72 31) (Z01 419)   5  History of Fibrocystic breast disease, unspecified laterality (610 1) (N60 19)   6  History of cataract (V12 49) (Z86 69)   7  History of Pap smear, as part of routine gynecological examination (V76 2) (Z01 419)   8  History of Reported Pap Smear   9  History of Superficial thrombophlebitis of leg, unspecified laterality   10   History of Visit for screening mammogram (V76 12) (Z12 31)    Surgical History    1  History of Breast Reconstruction With Implant Prosthesis   2  History of Breast Surgery Mastectomy   3  History of Cataract Surgery   4  History of Tonsillectomy    Family History  Mother    1  Family history of carotid artery stenosis (V17 49) (Z82 49)   2  Family history of Stroke Syndrome (V17 1)  Father    3  Family history of Emphysema   4  Family history of Myocardial Infarction Arrhythmias  Brother    5  Family history of liver cancer (V16 0) (Z80 0)   6  Family history of pancreatic cancer (V16 0) (Z80 0)  Maternal Grandmother    7  Family history of Osteoporosis (V17 81)  Uncle    8  Family history of diabetes mellitus (V18 0) (Z83 3)  Maternal Uncle    9  Family history of Diabetes Mellitus (V18 0)  Family History    10  Denied: Family history of Crohn's disease   6  Denied: Family history of psoriasis   12  Denied: Family history of rheumatoid arthritis   13  Denied: Family history of systemic lupus erythematosus   14  Denied: Family history of ulcerative colitis    Social History    · Being A Social Drinker   · Former smoker (X57 20) (S61 117)   · No drug use   · Working Part-time    Current Meds   1  Aspirin 81 MG TABS; TAKE 1 TABLET DAILY; Therapy: (Recorded:09Jun2015) to Recorded   2  Calcium Citrate + Oral Tablet; TAKE 1 TABLET 3 times daily; Therapy: (Recorded:09Jun2015) to Recorded   3  Centrum Specialist Heart Oral Tablet; TAKE 1 TABLET DAILY; Therapy: (FXVMKHGU:62BYC9612) to Recorded   4  Glucosamine Chondroitin TABS; Take 1 tablet twice daily; Therapy: (SZUXUIBC:06LRK4304) to Recorded   5  Omeprazole 20 MG Oral Capsule Delayed Release; Take 1 capsule twice daily; Therapy: (HSSTHVBL:15XET9399) to Recorded   6  Prolia 60 MG/ML Subcutaneous Solution; INJECT SUBCUTANEOUSLY  60 MG / 1 ML   EVERY 6 MONTHS; Therapy: 93FJS0875 to (Last Rx:02Jun2016) Ordered   7  Vitamin B Complex TABS; TAKE 1 TABLET DAILY;    Therapy: (OHEYAYET:75JYZ7955) to Recorded   8  Vitamin D3 1000 UNIT Oral Tablet; TAKE 1 TABLET DAILY; Therapy: (Recorded:09Jun2015) to Recorded    Allergies    1  No Known Drug Allergies    2  No Known Environmental Allergies   3  No Known Food Allergies    Vitals  Signs   Recorded: 76DEF0093 09:90ZL   Systolic: 810  Diastolic: 80  Weight: 043 lb 8 oz  BMI Calculated: 24 8  BSA Calculated: 1 7    Attending Note  Collaborating Physician: I did not interview and examine the patient, I discussed the case with the Advanced Practitioner and reviewed the note and I agree with the Advanced Practitioner note  Future Appointments    Date/Time Provider Specialty Site   03/28/2017 10:20 AM Christy Berry DO Rheumatology [facility]   10/12/2016 09:30 AM RADHA Rivera   Obstetrics/Gynecology [place] [address] [facility]     Signatures   Electronically signed by : MILAN Galaviz; Sep 27 2016 10:11AM EST                       (Author)    Electronically signed by : Justo Diaz DO; Sep 27 2016  2:47PM EST                       (Author)

## 2018-01-22 VITALS
WEIGHT: 137 LBS | DIASTOLIC BLOOD PRESSURE: 62 MMHG | HEIGHT: 64 IN | BODY MASS INDEX: 23.39 KG/M2 | HEART RATE: 78 BPM | SYSTOLIC BLOOD PRESSURE: 120 MMHG

## 2018-02-21 ENCOUNTER — TRANSCRIBE ORDERS (OUTPATIENT)
Dept: ADMINISTRATIVE | Facility: HOSPITAL | Age: 73
End: 2018-02-21

## 2018-02-21 DIAGNOSIS — Z72.0 TOBACCO USE: Primary | ICD-10-CM

## 2018-02-23 ENCOUNTER — APPOINTMENT (EMERGENCY)
Dept: RADIOLOGY | Facility: HOSPITAL | Age: 73
End: 2018-02-23
Payer: MEDICARE

## 2018-02-23 ENCOUNTER — HOSPITAL ENCOUNTER (EMERGENCY)
Facility: HOSPITAL | Age: 73
Discharge: HOME | End: 2018-02-23
Attending: EMERGENCY MEDICINE | Admitting: EMERGENCY MEDICINE
Payer: MEDICARE

## 2018-02-23 VITALS
BODY MASS INDEX: 23.23 KG/M2 | SYSTOLIC BLOOD PRESSURE: 132 MMHG | WEIGHT: 135.36 LBS | DIASTOLIC BLOOD PRESSURE: 78 MMHG | RESPIRATION RATE: 18 BRPM | OXYGEN SATURATION: 99 % | TEMPERATURE: 97.8 F | HEART RATE: 109 BPM

## 2018-02-23 DIAGNOSIS — S80.11XA CONTUSION OF RIGHT LOWER LEG, INITIAL ENCOUNTER: ICD-10-CM

## 2018-02-23 DIAGNOSIS — S80.11XA TRAUMATIC HEMATOMA OF RIGHT LOWER LEG, INITIAL ENCOUNTER: Primary | ICD-10-CM

## 2018-02-23 DIAGNOSIS — S80.811A: ICD-10-CM

## 2018-02-23 PROCEDURE — 90471 IMMUNIZATION ADMIN: CPT

## 2018-02-23 PROCEDURE — 99283 EMERGENCY DEPT VISIT LOW MDM: CPT

## 2018-02-23 PROCEDURE — 90715 TDAP VACCINE 7 YRS/> IM: CPT | Performed by: EMERGENCY MEDICINE

## 2018-02-23 PROCEDURE — 73590 X-RAY EXAM OF LOWER LEG: CPT

## 2018-02-23 RX ORDER — GINSENG 100 MG
1 CAPSULE ORAL ONCE
Status: COMPLETED | OUTPATIENT
Start: 2018-02-23 | End: 2018-02-23

## 2018-02-23 RX ADMIN — TETANUS TOXOID, REDUCED DIPHTHERIA TOXOID AND ACELLULAR PERTUSSIS VACCINE, ADSORBED 0.5 ML: 5; 2.5; 8; 8; 2.5 SUSPENSION INTRAMUSCULAR at 05:23

## 2018-02-23 RX ADMIN — BACITRACIN ZINC 1 LARGE APPLICATION: 500 OINTMENT TOPICAL at 05:23

## 2018-02-23 NOTE — ED PROVIDER NOTES
History  Chief Complaint   Patient presents with    Extremity Laceration     perm pt  she woke up to use the bathroom and she tripped in the dark falling down in the process, pt  denies any passing out or hittng her head, pt  confirms she is on baby aspirin 81mg one tablet daily, and also confirms taking some ibuprofen before coming to the ed    Fall     Patient is a 81year old female who slipped in the dark at about 0030 tonight while trying to go to the bathroom and fell and struck her R lower leg  (+) increased pain and swelling since then  Took ibuprofen for pain  Declines pain medication here  Takes low dose ASA daily  Denies head injury or neck pain and other pain or injury  ? last Td  Was last seen in this ED on 1/18/15 for fall and no Tdap given during that visit  [facility] checked on this patient and no Rx found  History provided by:  Patient and spouse   used: No        Prior to Admission Medications   Prescriptions Last Dose Informant Patient Reported? Taking? Multiple Vitamins-Minerals (CENTRUM CARDIO PO)   Yes Yes   Sig: Take 1 tablet by mouth daily  Pyridoxine HCl (VITAMIN B-6 PO)   Yes Yes   Sig: Take by mouth daily   Vitamin D, Cholecalciferol, 1000 UNITS TABS   Yes Yes   Sig: Take 1 tablet by mouth daily Indications: with aloe  aspirin 81 mg chewable tablet   Yes Yes   Sig: Chew 81 mg daily   b complex vitamins tablet   Yes Yes   Sig: Take 1 tablet by mouth daily  calcium-vitamin D 250-100 MG-UNIT per tablet   Yes Yes   Sig: Take 1 tablet by mouth 3 (three) times a day     omeprazole (PriLOSEC) 20 mg delayed release capsule   Yes Yes   Sig: Take 20 mg by mouth 2 (two) times a day      Facility-Administered Medications: None       Past Medical History:   Diagnosis Date    Cataract of right eye     GERD (gastroesophageal reflux disease)        Past Surgical History:   Procedure Laterality Date    CATARACT EXTRACTION, BILATERAL Bilateral     MASTECTOMY Bilateral     for calcium deposits    OR COLONOSCOPY FLX DX W/COLLJ SPEC WHEN PFRMD N/A 8/25/2017    Procedure: COLONOSCOPY;  Surgeon: Anand Agosto MD;  [facility]; Service: Colorectal    OR REMV CATARACT EXTRACAP,INSERT LENS Right 6/21/2016    Procedure: OD EXTRACTION EXTRACAPSULAR CATARACT PHACO INTRAOCULAR LENS (IOL); Surgeon: Santos Sweeney MD;  [facility]: [facility];  Service: Ophthalmology    TONSILLECTOMY         Family History   Problem Relation Age of Onset    Rectal cancer Brother      I have reviewed and agree with the history as documented  Social History   Substance Use Topics    Smoking status: Former Smoker    Smokeless tobacco: Never Used    Alcohol use No        Review of Systems   Musculoskeletal: Negative for neck pain  Right lower leg pain with swelling   Skin: Positive for wound (abrasions to R lower leg)  Hematological:        Hematoma of R lower leg   All other systems reviewed and are negative  Physical Exam  ED Triage Vitals [02/23/18 0507]   Temperature Pulse Respirations Blood Pressure SpO2   97 8 °F (36 6 °C) (!) 109 18 132/78 99 %      Temp Source Heart Rate Source Patient Position - Orthostatic VS BP Location FiO2 (%)   Oral Monitor Lying Right arm --      Pain Score       --           Orthostatic Vital Signs  Vitals:    02/23/18 0507   BP: 132/78   Pulse: (!) 109   Patient Position - Orthostatic VS: Lying       Physical Exam   Constitutional: She is oriented to person, place, and time  She appears well-developed and well-nourished  She appears distressed (mild)  HENT:   Head: Normocephalic and atraumatic  Mouth/Throat: Oropharynx is clear and moist    Eyes: EOM are normal  Pupils are equal, round, and reactive to light  No scleral icterus  Neck: Normal range of motion  Neck supple  Cardiovascular: Regular rhythm and normal heart sounds  No murmur heard  Tachycardia      Pulmonary/Chest: Effort normal and breath sounds normal  No stridor  No respiratory distress  Abdominal: Soft  Bowel sounds are normal  There is no tenderness  Musculoskeletal: She exhibits tenderness (R anterior proximal lower leg tenderness with swelling and ecchymosis and hematoma  NVI  Rest of R LE and other extremities nontender  )  She exhibits no edema or deformity  Neurological: She is alert and oriented to person, place, and time  Skin: Skin is warm and dry  No rash noted  Linear abrasions noted over hematoma region of R lower leg anteriorly and proximally  No suturable wounds noted  Psychiatric: She has a normal mood and affect  Nursing note and vitals reviewed  ED Medications  Medications   tetanus-diphtheria-acellular pertussis (BOOSTRIX) IM injection 0 5 mL (0 5 mL Intramuscular Given 2/23/18 0523)   bacitracin topical ointment 1 large application (1 large application Topical Given 2/23/18 0523)       Diagnostic Studies  Results Reviewed     None                 XR tibia fibula 2 views RIGHT   ED Interpretation by Arnulfo Marmolejo MD (02/23 0539)   No fx or FB read by me  Procedures  Procedures       Phone Contacts  ED Phone Contact    ED Course  ED Course                                MDM  Number of Diagnoses or Management Options  Diagnosis management comments: DDx including but not limited to: Doubt intracranial injury, cervical injury, intrathoracic injury, intraabdominal injury; extremity injury--fracture, contusion, hematoma, sprain, strain, abrasions          Amount and/or Complexity of Data Reviewed  Tests in the radiology section of CPT®: reviewed and ordered  Decide to obtain previous medical records or to obtain history from someone other than the patient: yes  Review and summarize past medical records: yes  Independent visualization of images, tracings, or specimens: yes      CritCare Time    Disposition  Final diagnoses:   Traumatic hematoma of right lower leg, initial encounter   Lower leg abrasion, right, initial encounter   Contusion of right lower leg, initial encounter     Time reflects when diagnosis was documented in both MDM as applicable and the Disposition within this note     Time User Action Codes Description Comment    2/23/2018  5:41 AM Danny Herrlich Add [S80 11XA] Traumatic hematoma of right lower leg, initial encounter     2/23/2018  5:42 AM Danny Herrlich Add [V66 744K] Lower leg abrasion, right, initial encounter     2/23/2018  5:43 AM Danny Herrlich Add [S80 11XA] Contusion of right lower leg, initial encounter       ED Disposition     ED Disposition Condition Comment    Discharge  [facility] discharge to home/self care  Condition at discharge: Stable        Follow-up Information     Follow up With Specialties Details Why [address], DO Internal Medicine Call in 1 day for recheck in 2-3 days  Ice, elevate  Stop aspirin and ibuprofen for now  Use tylenol for pain  Return sooner if increased pain, swelling, numbness, weakness, fever, redness, red streaks, pus   [facility]  [phone]          Patient's Medications   Discharge Prescriptions    No medications on file     No discharge procedures on file      ED Provider  Electronically Signed by           Gilberto Leyden, MD  02/23/18 3136

## 2018-02-23 NOTE — DISCHARGE INSTRUCTIONS
Abrasion   WHAT YOU NEED TO KNOW:   An abrasion is a scrape on your skin  It happens when your skin rubs against a rough surface  Some examples of an abrasion include rug burn, a skinned elbow, or road rash  Abrasions can be many shapes and sizes  The wound may hurt, bleed, bruise, or swell  DISCHARGE INSTRUCTIONS:   Return to the emergency department if:   · The bleeding does not stop after 10 minutes of firm pressure  · You cannot rinse one or more foreign objects out of your wound  · You have red streaks on your skin coming from your wound  Contact your healthcare provider if:   · You have a fever or chills  · Your abrasion is red, warm, swollen, or draining pus  · You have questions or concerns about your condition or care  Care for your abrasion:   · Wash your hands and dry them with a clean towel  · Press a clean cloth against your wound to stop any bleeding  · Rinse your wound with a lot of clean water  Do not use harsh soap, alcohol, or iodine solutions  · Use a clean, wet cloth to remove any objects, such as small pieces of rocks or dirt  · Rub antibiotic ointment on your wound  This may help prevent infection and help your wound heal     · Cover the wound with a non-stick bandage  Change the bandage daily, and if gets wet or dirty  Follow up with your healthcare provider as directed:  Write down your questions so you remember to ask them during your visits  © 2017 [address] [employer] Information is for End User's use only and may not be sold, redistributed or otherwise used for commercial purposes  All illustrations and images included in CareNotes® are the copyrighted property of [employer] , [employer]  or [employer]  The above information is an  only  It is not intended as medical advice for individual conditions or treatments   Talk to your doctor, nurse or pharmacist before following any medical regimen to see if it is safe and effective for you     Contusion in [facility]:   A contusion is a bruise that appears on your skin after an injury  A bruise happens when small blood vessels tear but skin does not  When blood vessels tear, blood leaks into nearby tissue, such as soft tissue or muscle  DISCHARGE INSTRUCTIONS:   Return to the emergency department if:   · You have new trouble moving the injured area  · You have tingling or numbness in or near the injured area  · Your hand or foot below the bruise gets cold or turns pale  Contact your healthcare provider if:   · You find a new lump in the injured area  · Your symptoms do not improve with treatment after 4 to 5 days  · You have questions or concerns about your condition or care  Medicines: You may need any of the following:  · NSAIDs  help decrease swelling and pain or fever  This medicine is available with or without a doctor's order  NSAIDs can cause stomach bleeding or kidney problems in certain people  If you take blood thinner medicine, always ask your healthcare provider if NSAIDs are safe for you  Always read the medicine label and follow directions  · Prescription pain medicine  may be given  Do not wait until the pain is severe before you take your medicine  · Take your medicine as directed  Contact your healthcare provider if you think your medicine is not helping or if you have side effects  Tell him of her if you are allergic to any medicine  Keep a list of the medicines, vitamins, and herbs you take  Include the amounts, and when and why you take them  Bring the list or the pill bottles to follow-up visits  Carry your medicine list with you in case of an emergency  Follow up with your healthcare provider as directed: You may need to return within a week to check your injury again  Write down your questions so you remember to ask them during your visits    Help a contusion heal:   · Rest the injured area  or use it less than usual  If you bruised your leg or foot, you may need crutches or a cane to help you walk  This will help you keep weight off your injured body part  · Apply ice  to decrease swelling and pain  Ice may also help prevent tissue damage  Use an ice pack, or put crushed ice in a plastic bag  Cover it with a towel and place it on your bruise for 15 to 20 minutes every hour or as directed  · Use compression  to support the area and decrease swelling  Wrap an elastic bandage around the area over the bruised muscle  Make sure the bandage is not too tight  You should be able to fit 1 finger between the bandage and your skin  · Elevate (raise) your injured body part  above the level of your heart to help decrease pain and swelling  Use pillows, blankets, or rolled towels to elevate the area as often as you can  · Do not drink alcohol  as directed  Alcohol may slow healing  · Do not stretch injured muscles  right after your injury  Ask your healthcare provider when and how you may safely stretch after your injury  Gentle stretches can help increase your flexibility  · Do not massage the area or put heating pads  on the bruise right after your injury  Heat and massage may slow healing  Your healthcare provider may tell you to apply heat after several days  At that time, heat will start to help the injury heal   Prevent another contusion:   · Stretch and warm up before you play sports or exercise  · Wear protective gear when you play sports  Examples are shin guards and padding  · If you begin a new physical activity, start slowly to give your body a chance to adjust   © 2017 [address] [employer] Information is for End User's use only and may not be sold, redistributed or otherwise used for commercial purposes  All illustrations and images included in CareNotes® are the copyrighted property of [employer]  or [address]  The above information is an  only   It is not intended as medical advice for individual conditions or treatments  Talk to your doctor, nurse or pharmacist before following any medical regimen to see if it is safe and effective for you  Hematoma   WHAT YOU NEED TO KNOW:   A hematoma is a collection of blood  A bruise is a type of hematoma  A hematoma may form in a muscle or in the tissues just under the skin  A hematoma that forms under the skin will feel like a bump or hard mass  Hematomas can happen anywhere in your body, including in your brain  Your body may break down and absorb a mild hematoma on its own  A more serious hematoma may need treatment  DISCHARGE INSTRUCTIONS:   Medicines: You may need any of the following:  · Prescription pain medicine  may be given  Ask how to take this medicine safely  · NSAIDs , such as ibuprofen, help decrease swelling, pain, and fever  This medicine is available with or without a doctor's order  NSAIDs can cause stomach bleeding or kidney problems in certain people  If you take blood thinner medicine, always ask your healthcare provider if NSAIDs are safe for you  Always read the medicine label and follow directions  · Antibiotics  prevent or treat a bacterial infection  · Take your medicine as directed  Contact your healthcare provider if you think your medicine is not helping or if you have side effects  Tell him of her if you are allergic to any medicine  Keep a list of the medicines, vitamins, and herbs you take  Include the amounts, and when and why you take them  Bring the list or the pill bottles to follow-up visits  Carry your medicine list with you in case of an emergency  Return to the emergency department if:   · You have new or worsening pain, or pain that does not get better with medicine  · You have a fever  · You have trouble moving the body part that has the hematoma  Contact your healthcare provider if:   · You have questions or concerns about your condition or care      Follow up with your healthcare provider as directed: You may need to have surgery if your hematoma is severe  You may also need other tests to make sure there is no other damage that needs to be treated  Write down your questions so you remember to ask them during your visits  Self-care:   · Rest the area  Rest will help your body heal and will also help prevent more damage  · Apply ice as directed  Ice helps reduce swelling  Ice may also help prevent tissue damage  Use an ice pack, or put crushed ice in a bag  Cover it with a towel  Place it on your hematoma for 20 minutes every hour, or as directed  Ask how many times each day to apply ice, and for how many days  · Compress the injury if possible  Lightly wrap the injury with an elastic or soft bandage  This may help control swelling  Ask your healthcare provider how to wrap your injury properly  · Elevate the area as directed  If possible, raise the area above the level of your heart as often as you can  This will help decrease swelling  · Keep the hematoma covered with a bandage  This will help protect the area while it heals  © 2017 [address] [facility] Information is for End User's use only and may not be sold, redistributed or otherwise used for commercial purposes  All illustrations and images included in CareNotes® are the copyrighted property of [employer]  or Christian [employer]  The above information is an  only  It is not intended as medical advice for individual conditions or treatments  Talk to your doctor, nurse or pharmacist before following any medical regimen to see if it is safe and effective for you

## 2018-03-03 ENCOUNTER — HOSPITAL ENCOUNTER (OUTPATIENT)
Dept: CT IMAGING | Facility: HOSPITAL | Age: 73
Discharge: HOME | End: 2018-03-03
Payer: COMMERCIAL

## 2018-03-03 DIAGNOSIS — Z72.0 TOBACCO USE: ICD-10-CM

## 2018-03-30 ENCOUNTER — TRANSCRIBE ORDERS (OUTPATIENT)
Dept: LAB | Facility: CLINIC | Age: 73
End: 2018-03-30

## 2018-03-30 ENCOUNTER — OFFICE VISIT (OUTPATIENT)
Dept: RHEUMATOLOGY | Facility: CLINIC | Age: 73
End: 2018-03-30
Payer: MEDICARE

## 2018-03-30 ENCOUNTER — APPOINTMENT (OUTPATIENT)
Dept: LAB | Facility: CLINIC | Age: 73
End: 2018-03-30
Payer: MEDICARE

## 2018-03-30 VITALS
WEIGHT: 135 LBS | HEIGHT: 64 IN | DIASTOLIC BLOOD PRESSURE: 82 MMHG | HEART RATE: 88 BPM | SYSTOLIC BLOOD PRESSURE: 118 MMHG | BODY MASS INDEX: 23.05 KG/M2

## 2018-03-30 DIAGNOSIS — K21.9 GASTRO-ESOPHAGEAL REFLUX DISEASE WITHOUT ESOPHAGITIS: ICD-10-CM

## 2018-03-30 DIAGNOSIS — M19.042 PRIMARY OSTEOARTHRITIS OF BOTH HANDS: ICD-10-CM

## 2018-03-30 DIAGNOSIS — M19.041 PRIMARY OSTEOARTHRITIS OF BOTH HANDS: ICD-10-CM

## 2018-03-30 DIAGNOSIS — M81.8 IDIOPATHIC OSTEOPOROSIS WITHOUT PATHOLOGICAL FRACTURE: Primary | ICD-10-CM

## 2018-03-30 DIAGNOSIS — M81.8 OTHER OSTEOPOROSIS WITHOUT CURRENT PATHOLOGICAL FRACTURE: ICD-10-CM

## 2018-03-30 DIAGNOSIS — M46.1 SACROILIITIS (HCC): ICD-10-CM

## 2018-03-30 LAB
25(OH)D3 SERPL-MCNC: 47 NG/ML (ref 30–100)
ALBUMIN SERPL BCP-MCNC: 3.8 G/DL (ref 3.5–5)
ALP SERPL-CCNC: 72 U/L (ref 46–116)
ALT SERPL W P-5'-P-CCNC: 33 U/L (ref 12–78)
ANION GAP SERPL CALCULATED.3IONS-SCNC: 10 MMOL/L (ref 4–13)
AST SERPL W P-5'-P-CCNC: 40 U/L (ref 5–45)
BASOPHILS # BLD AUTO: 0.04 THOUSANDS/ΜL (ref 0–0.1)
BASOPHILS NFR BLD AUTO: 1 % (ref 0–1)
BILIRUB SERPL-MCNC: 0.4 MG/DL (ref 0.2–1)
BUN SERPL-MCNC: 11 MG/DL (ref 5–25)
CALCIUM SERPL-MCNC: 9.6 MG/DL (ref 8.3–10.1)
CHLORIDE SERPL-SCNC: 104 MMOL/L (ref 100–108)
CO2 SERPL-SCNC: 27 MMOL/L (ref 21–32)
CREAT SERPL-MCNC: 0.79 MG/DL (ref 0.6–1.3)
EOSINOPHIL # BLD AUTO: 0.12 THOUSAND/ΜL (ref 0–0.61)
EOSINOPHIL NFR BLD AUTO: 2 % (ref 0–6)
ERYTHROCYTE [DISTWIDTH] IN BLOOD BY AUTOMATED COUNT: 13.1 % (ref 11.6–15.1)
GFR SERPL CREATININE-BSD FRML MDRD: 76 ML/MIN/1.73SQ M
GLUCOSE P FAST SERPL-MCNC: 100 MG/DL (ref 65–99)
HCT VFR BLD AUTO: 40.5 % (ref 34.8–46.1)
HGB BLD-MCNC: 13.4 G/DL (ref 11.5–15.4)
LYMPHOCYTES # BLD AUTO: 2.13 THOUSANDS/ΜL (ref 0.6–4.47)
LYMPHOCYTES NFR BLD AUTO: 27 % (ref 14–44)
MCH RBC QN AUTO: 33.8 PG (ref 26.8–34.3)
MCHC RBC AUTO-ENTMCNC: 33.1 G/DL (ref 31.4–37.4)
MCV RBC AUTO: 102 FL (ref 82–98)
MONOCYTES # BLD AUTO: 0.43 THOUSAND/ΜL (ref 0.17–1.22)
MONOCYTES NFR BLD AUTO: 5 % (ref 4–12)
NEUTROPHILS # BLD AUTO: 5.28 THOUSANDS/ΜL (ref 1.85–7.62)
NEUTS SEG NFR BLD AUTO: 65 % (ref 43–75)
PLATELET # BLD AUTO: 272 THOUSANDS/UL (ref 149–390)
PMV BLD AUTO: 9.8 FL (ref 8.9–12.7)
POTASSIUM SERPL-SCNC: 4.5 MMOL/L (ref 3.5–5.3)
PROT SERPL-MCNC: 8.2 G/DL (ref 6.4–8.2)
RBC # BLD AUTO: 3.97 MILLION/UL (ref 3.81–5.12)
SODIUM SERPL-SCNC: 141 MMOL/L (ref 136–145)
TSH SERPL DL<=0.05 MIU/L-ACNC: 1.2 UIU/ML (ref 0.36–3.74)
WBC # BLD AUTO: 8 THOUSAND/UL (ref 4.31–10.16)

## 2018-03-30 PROCEDURE — 96372 THER/PROPH/DIAG INJ SC/IM: CPT | Performed by: PHYSICIAN ASSISTANT

## 2018-03-30 PROCEDURE — 36415 COLL VENOUS BLD VENIPUNCTURE: CPT

## 2018-03-30 PROCEDURE — 82306 VITAMIN D 25 HYDROXY: CPT

## 2018-03-30 PROCEDURE — 85025 COMPLETE CBC W/AUTO DIFF WBC: CPT

## 2018-03-30 PROCEDURE — 84443 ASSAY THYROID STIM HORMONE: CPT

## 2018-03-30 PROCEDURE — 80053 COMPREHEN METABOLIC PANEL: CPT

## 2018-03-30 NOTE — PROGRESS NOTES
Assessment/Plan:    Diagnoses and all orders for this visit:    Idiopathic osteoporosis without pathological fracture  -     denosumab (PROLIA) subcutaneous injection 60 mg; Inject 1 mL (60 mg total) under the skin once     Sacroiliitis (HCC)    Primary osteoarthritis of both hands      Discussion/Summary:      Saurav Acosta is a 81 y o   female with osteoporosis returning for continuing Prolia injection  She was injected with Prolia 60 mg subcutaneously in the right upper extremity today without any complications and tolerated the procedure well  She is not due for another DEXA scan until next year  As she did not have her CBC, CMP, TSH, and Vit D drawn that was ordered 6 months ago, she will go have this done today and we will inform her of any results as appropriate  Recommended weight bearing exercise such as walking with light weights in her hands  Advised her of risks with ongoing Prolia therapy, specifically jaw osteonecrosis and atypical hip fractures, and for her to report any hip or jaw pain to our office and her PCP as well  She will remain on current doses of vitamin d and calcium daily  She will return in 6 months for her next injection  She will call in the interim if she has any questions or concerns  Subjective:     HPI:     Saurav Acosta is a 81 y o   female with osteoporosis who present to the office today for follow-up  She recently had CT scan of her chest to screen for lung cancer which was negative  Doing well since last visit  She denies any fractures, dental or jaw pain since last visit  She has had some soreness occasionally related to her SI joint, has had history of pain with this joint  She reports taking daily Vitamin 2 and Calcium  Did not have her labs that were ordered at her last visit in Sep 2017  Patient denies any other changes to her medication, past medical history and surgical history      RAPID 3 Score: 0 5/30    The following portions of the patient's history were reviewed and updated as appropriate: She  has a past medical history of Cataract of right eye and GERD (gastroesophageal reflux disease)  She   Patient Active Problem List    Diagnosis Date Noted    Cataract of right eye 06/21/2016    Sacroiliitis ([address] ) 12/04/2015    Idiopathic osteoporosis 06/09/2015    Primary osteoarthritis of hand 06/09/2015     She  has a past surgical history that includes pr remv cataract extracap,insert lens (Right, 6/21/2016); Cataract extraction, bilateral (Bilateral); Mastectomy (Bilateral); Tonsillectomy; and pr colonoscopy flx dx w/collj spec when pfrmd (N/A, 8/25/2017)  Her family history includes Rectal cancer in her brother  She  reports that she has quit smoking  She has never used smokeless tobacco  She reports that she does not drink alcohol or use drugs  Current Outpatient Prescriptions   Medication Sig Dispense Refill    aspirin 81 mg chewable tablet Chew 81 mg daily      b complex vitamins tablet Take 1 tablet by mouth daily   calcium-vitamin D 250-100 MG-UNIT per tablet Take 1 tablet by mouth 3 (three) times a day   Multiple Vitamins-Minerals (CENTRUM CARDIO PO) Take 1 tablet by mouth daily   omeprazole (PriLOSEC) 20 mg delayed release capsule Take 20 mg by mouth 2 (two) times a day      Pyridoxine HCl (VITAMIN B-6 PO) Take by mouth daily      Vitamin D, Cholecalciferol, 1000 UNITS TABS Take 1 tablet by mouth daily Indications: with aloe  Current Facility-Administered Medications   Medication Dose Route Frequency Provider Last Rate Last Dose    denosumab (PROLIA) subcutaneous injection 60 mg  60 mg Subcutaneous Once Gloria Coley PA-C         Current Outpatient Prescriptions on File Prior to Visit   Medication Sig    aspirin 81 mg chewable tablet Chew 81 mg daily    b complex vitamins tablet Take 1 tablet by mouth daily   calcium-vitamin D 250-100 MG-UNIT per tablet Take 1 tablet by mouth 3 (three) times a day      Multiple Vitamins-Minerals (CENTRUM CARDIO PO) Take 1 tablet by mouth daily   omeprazole (PriLOSEC) 20 mg delayed release capsule Take 20 mg by mouth 2 (two) times a day    Pyridoxine HCl (VITAMIN B-6 PO) Take by mouth daily    Vitamin D, Cholecalciferol, 1000 UNITS TABS Take 1 tablet by mouth daily Indications: with aloe  No current facility-administered medications on file prior to visit  She has No Known Allergies       Review of Systems   Constitutional: Negative for chills, fatigue and fever  HENT: Negative for congestion, mouth sores and sore throat  Eyes: Negative for pain, redness and visual disturbance  Respiratory: Negative for cough, chest tightness and shortness of breath  Cardiovascular: Negative for chest pain and leg swelling  Gastrointestinal: Negative for abdominal distention, blood in stool, constipation, diarrhea, nausea and vomiting  Endocrine: Negative for polydipsia and polyuria  Genitourinary: Negative for difficulty urinating, dysuria, frequency, hematuria and urgency  Musculoskeletal: Positive for arthralgias (osteoarthritis in left thumb)  Negative for back pain, myalgias and neck pain  Skin: Negative for rash  Neurological: Negative for dizziness, weakness, light-headedness, numbness and headaches  Hematological: Negative for adenopathy  Psychiatric/Behavioral: Negative for behavioral problems and dysphoric mood  The patient is not nervous/anxious  Objective:    Vitals:    03/30/18 1000   BP: 118/82   Pulse: 88       Physical Exam   Constitutional: She is oriented to person, place, and time  She appears well-developed and well-nourished  No distress  HENT:   Head: Normocephalic and atraumatic  No jaw pain  nontender to palpation over TMJ  Eyes: Conjunctivae are normal    Neck: Neck supple  Cardiovascular: Normal rate and regular rhythm  No murmur heard  Pulmonary/Chest: Effort normal and breath sounds normal  No respiratory distress   She has no wheezes  She has no rales  Musculoskeletal: Normal range of motion  She exhibits no edema, tenderness or deformity  + mild OA changes to b/l hands  - CMC grind and mild TTP over CMC joint  Negative Fabers  No pain with IR/ER of b/l hips  Good quad strength  Nontender over SI joint today  Pt states that her SI joint pain has not been bothering her  Lymphadenopathy:     She has no cervical adenopathy  Neurological: She is alert and oriented to person, place, and time  Skin: Skin is warm and dry  She is not diaphoretic  Psychiatric: She has a normal mood and affect         Physical Exam     BERMUDEZ-28 tender joint count: 0  BERMUDEZ-28 swollen joint count: 0  BERMUDEZ-28 score: 0 (Remission)

## 2018-04-02 ENCOUNTER — TRANSCRIBE ORDERS (OUTPATIENT)
Dept: ADMINISTRATIVE | Facility: HOSPITAL | Age: 73
End: 2018-04-02

## 2018-04-02 DIAGNOSIS — R20.1 HYPOESTHESIA: Primary | ICD-10-CM

## 2018-04-22 ENCOUNTER — HOSPITAL ENCOUNTER (OUTPATIENT)
Dept: MRI IMAGING | Facility: HOSPITAL | Age: 73
Discharge: HOME | End: 2018-04-22
Payer: MEDICARE

## 2018-04-22 DIAGNOSIS — R20.1 HYPOESTHESIA: ICD-10-CM

## 2018-04-22 PROCEDURE — 70553 MRI BRAIN STEM W/O & W/DYE: CPT

## 2018-04-22 PROCEDURE — A9585 GADOBUTROL INJECTION: HCPCS | Performed by: INTERNAL MEDICINE

## 2018-04-22 RX ADMIN — GADOBUTROL 6 ML: 604.72 INJECTION INTRAVENOUS at 10:49

## 2018-06-01 ENCOUNTER — TRANSCRIBE ORDERS (OUTPATIENT)
Dept: ADMINISTRATIVE | Facility: HOSPITAL | Age: 73
End: 2018-06-01

## 2018-06-01 ENCOUNTER — TELEPHONE (OUTPATIENT)
Dept: NEUROLOGY | Facility: CLINIC | Age: 73
End: 2018-06-01

## 2018-06-01 DIAGNOSIS — I61.0 INTERNAL CAPSULE HEMORRHAGE (HCC): Primary | ICD-10-CM

## 2018-06-25 ENCOUNTER — OFFICE VISIT (OUTPATIENT)
Dept: NEUROLOGY | Facility: CLINIC | Age: 73
End: 2018-06-25
Payer: MEDICARE

## 2018-06-25 VITALS
BODY MASS INDEX: 23.05 KG/M2 | HEART RATE: 62 BPM | DIASTOLIC BLOOD PRESSURE: 70 MMHG | HEIGHT: 64 IN | SYSTOLIC BLOOD PRESSURE: 122 MMHG | RESPIRATION RATE: 14 BRPM | WEIGHT: 135 LBS

## 2018-06-25 DIAGNOSIS — E78.5 HYPERLIPIDEMIA, UNSPECIFIED HYPERLIPIDEMIA TYPE: ICD-10-CM

## 2018-06-25 DIAGNOSIS — I61.9 BRAIN BLEED (HCC): Primary | ICD-10-CM

## 2018-06-25 PROCEDURE — 99204 OFFICE O/P NEW MOD 45 MIN: CPT | Performed by: PSYCHIATRY & NEUROLOGY

## 2018-06-25 RX ORDER — ATORVASTATIN CALCIUM 10 MG/1
1 TABLET, FILM COATED ORAL DAILY
COMMUNITY

## 2018-06-25 NOTE — PROGRESS NOTES
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- hypertensive etiology is suspected however she tells me no hx HTN and her BP is great today in office as well  Her other vascular risk factors include Age and HLD- which she states is well controlled- discussed bringing me this report next time  CUS, ECHO, 48 hour holter monitor pending  I discussed checking BP at home periodically and logging this in to see if hypertensive periods  MRI brain reviewed in PACS- okay to restart ASA at this time and statin for secondary stroke prevention    PT for balance and stretching  Discussed if any focal symptoms to call 911 immediately as this can be sign of stroke       Diagnoses and all orders for this visit:    Brain bleed [facility])  -     Ambulatory referral to Physical Therapy; Future    Hyperlipidemia, unspecified hyperlipidemia type  -     Lipid Panel with Direct LDL reflex; Future  -     Ambulatory referral to Physical Therapy; Future    Other orders  -     atorvastatin (LIPITOR) 10 mg tablet; Take 1 tablet by mouth Daily           Subjective:    HPI    Ms Alejo Corral is a pleasant 73 yo female, hx of two falls the last two years each time on black ice with no LOC, 2017, and first week of March 2018, with no significant symptoms afterward  States even went back to work afterward  Tells me no weakness or dysesthesias or off balance sensation or confusion afterward  Tells me the next week out of the blue woke up with left sided paresthesias  States no headache, no inciting event  States went to PCP  MRI brain done shows chronic right external capsule hemorrhage  She stopped the ASA 81 for a few weeks and this has since been restarted  Denies HTN, HLD, no blood clots  Denies tobacco use history    Second hand tobacco smoke exposure when younger with both parents  Mom with hx of stroke and b/l severe carotid stenosis and dad with hx of stroke but states both parent were smokers  States saw opthalmology mild peripheral visual field loss worse in the left eye recently and is awaiting to be retested  Denies issues with driving or other daily tasks  States hx b/l cataracts  She is scheduled for CUS, ECHO, and holter monitor July 19th  Denies light headedness, or dizziness, weakness, aphasia , headache, chest pain, heart palpitations, significant memory loss  Denies other falls    The following portions of the patient's history were reviewed and updated as appropriate: allergies, current medications, past family history, past medical history, past social history, past surgical history and problem list          Objective:    Blood pressure 122/70, pulse 62, resp  rate 14, height 5' 4" (1 626 m), weight 61 2 kg (135 lb)  Physical Exam   Constitutional: She is oriented to person, place, and time  She appears well-developed and well-nourished  HENT:   Head: Normocephalic and atraumatic  Eyes: EOM are normal  Pupils are equal, round, and reactive to light  Neck: Normal range of motion  Cardiovascular: Normal rate and regular rhythm  Pulmonary/Chest: Effort normal    Abdominal: Soft  Musculoskeletal: She exhibits no tenderness  Neurological: She is alert and oriented to person, place, and time  She has normal strength and normal reflexes  A sensory deficit is present  Gait and coordination normal        Neurological Exam    Mental Status  The patient is alert and oriented to person, place, time, and situation  Her recent and remote memory are normal  She has no dysarthria  She is able to name object, read and repeat  She has normal attention span and concentration  She follows multi-step commands  She has a normal fund of knowledge      Cranial Nerves    CN II: The patient's visual acuity and visual fields are normal   CN III, IV, VI: The patient's pupils are equally round and reactive to light and ocular movements are normal   CN V: The patient has normal facial sensation  CN VII:  The patient has symmetric facial movement  CN VIII:  The patient's hearing is normal   CN IX, X: The patient has symmetric palate movement and normal gag reflex  CN XI: The patient's shoulder shrug strength is normal   CN XII: The patient's tongue is midline without atrophy or fasciculations  Motor  The patient has normal muscle bulk throughout  Her overall muscle tone is normal throughout  Her strength is 5/5 throughout all four extremities  Sensory  The patient's sensation is normal in all four extremities to light touch, temperature and vibration  She has a dermatomal sensory deficit  She has no right-sided and no left-sided hemispatial neglect  Reduced sensation to PP/vibration/temp      Reflexes  Deep tendon reflexes are 2+ and symmetric in all four extremities with downgoing toes bilaterally  Gait and Coordination  The patient has normal gait and station  She has normal tandem gait  Romberg's sign is sways with eyes closed  She has normal coordination bilaterally  ROS:    Review of Systems   Constitutional: Negative  HENT:        Dry eyes     Eyes:        Blurred vision     Respiratory: Negative  Cardiovascular: Negative  Gastrointestinal: Negative  Endocrine: Negative  Genitourinary: Negative  Musculoskeletal: Positive for back pain  Skin: Negative  Allergic/Immunologic: Negative  Neurological: Positive for tremors and numbness  Balance problems  Falls     Hematological: Negative  Psychiatric/Behavioral: Negative

## 2018-06-25 NOTE — LETTER
June 25, 2018     Martir Conde DO  [address] [facility] [address]    Patient: Amy Felix   YOB: 1945   Date of Visit: 6/25/2018       Dear Dr Sarah Lemus:    Thank you for referring Seferino Pearson to me for evaluation  Below are my notes for this consultation  If you have questions, please do not hesitate to call me  I look forward to following your patient along with you  Sincerely,        Tonya Hyde DO        CC: No Recipients  Tonya Hyde DO  6/25/2018  2:00 PM  Sign at close encounter  Patient ID: Amy Felix is a 72 y o  female  Assessment/Plan:    No problem-specific Assessment & Plan notes found for this encounter  Ms Seferino Pearson is seen in consultation for chronic right external capsule hemorrhage given location- hypertensive etiology is suspected however she tells me no hx HTN and her BP is great today in office as well  Her other vascular risk factors include Age and HLD- which she states is well controlled- discussed bringing me this report next time  CUS, ECHO, 48 hour holter monitor pending  I discussed checking BP at home periodically and logging this in to see if hypertensive periods  MRI brain reviewed in PACS- okay to restart ASA at this time and statin for secondary stroke prevention    PT for balance and stretching  Discussed if any focal symptoms to call 911 immediately as this can be sign of stroke       Diagnoses and all orders for this visit:    Brain bleed [facility])  -     Ambulatory referral to Physical Therapy; Future    Hyperlipidemia, unspecified hyperlipidemia type  -     Lipid Panel with Direct LDL reflex; Future  -     Ambulatory referral to Physical Therapy; Future    Other orders  -     atorvastatin (LIPITOR) 10 mg tablet; Take 1 tablet by mouth Daily           Subjective:    HPI    Ms Seferino Pearson is a pleasant 73 yo female, hx of two falls the last two years each time on black ice with no LOC, 2017, and first week of March 2018, with no significant symptoms afterward  States even went back to work afterward  Tells me no weakness or dysesthesias or off balance sensation or confusion afterward  Tells me the next week out of the blue woke up with left sided paresthesias  States no headache, no inciting event  States went to PCP  MRI brain done shows chronic right external capsule hemorrhage  She stopped the ASA 81 for a few weeks and this has since been restarted  Denies HTN, HLD, no blood clots  Denies tobacco use history  Second hand tobacco smoke exposure when younger with both parents  Mom with hx of stroke and b/l severe carotid stenosis and dad with hx of stroke but states both parent were smokers  States saw opthalmology mild peripheral visual field loss worse in the left eye recently and is awaiting to be retested  Denies issues with driving or other daily tasks  States hx b/l cataracts  She is scheduled for CUS, ECHO, and holter monitor July 19th  Denies light headedness, or dizziness, weakness, aphasia , headache, chest pain, heart palpitations, significant memory loss  Denies other falls    The following portions of the patient's history were reviewed and updated as appropriate: allergies, current medications, past family history, past medical history, past social history, past surgical history and problem list          Objective:    Blood pressure 122/70, pulse 62, resp  rate 14, height 5' 4" (1 626 m), weight 61 2 kg (135 lb)  Physical Exam   Constitutional: She is oriented to person, place, and time  She appears well-developed and well-nourished  HENT:   Head: Normocephalic and atraumatic  Eyes: EOM are normal  Pupils are equal, round, and reactive to light  Neck: Normal range of motion  Cardiovascular: Normal rate and regular rhythm  Pulmonary/Chest: Effort normal    Abdominal: Soft  Musculoskeletal: She exhibits no tenderness     Neurological: She is alert and oriented to person, place, and time  She has normal strength and normal reflexes  A sensory deficit is present  Gait and coordination normal        Neurological Exam    Mental Status  The patient is alert and oriented to person, place, time, and situation  Her recent and remote memory are normal  She has no dysarthria  She is able to name object, read and repeat  She has normal attention span and concentration  She follows multi-step commands  She has a normal fund of knowledge  Cranial Nerves    CN II: The patient's visual acuity and visual fields are normal   CN III, IV, VI: The patient's pupils are equally round and reactive to light and ocular movements are normal   CN V: The patient has normal facial sensation  CN VII:  The patient has symmetric facial movement  CN VIII:  The patient's hearing is normal   CN IX, X: The patient has symmetric palate movement and normal gag reflex  CN XI: The patient's shoulder shrug strength is normal   CN XII: The patient's tongue is midline without atrophy or fasciculations  Motor  The patient has normal muscle bulk throughout  Her overall muscle tone is normal throughout  Her strength is 5/5 throughout all four extremities  Sensory  The patient's sensation is normal in all four extremities to light touch, temperature and vibration  She has a dermatomal sensory deficit  She has no right-sided and no left-sided hemispatial neglect  Reduced sensation to PP/vibration/temp      Reflexes  Deep tendon reflexes are 2+ and symmetric in all four extremities with downgoing toes bilaterally  Gait and Coordination  The patient has normal gait and station  She has normal tandem gait  Romberg's sign is sways with eyes closed  She has normal coordination bilaterally  ROS:    Review of Systems   Constitutional: Negative  HENT:        Dry eyes     Eyes:        Blurred vision     Respiratory: Negative  Cardiovascular: Negative  Gastrointestinal: Negative  Endocrine: Negative  Genitourinary: Negative  Musculoskeletal: Positive for back pain  Skin: Negative  Allergic/Immunologic: Negative  Neurological: Positive for tremors and numbness  Balance problems  Falls     Hematological: Negative  Psychiatric/Behavioral: Negative

## 2018-07-02 ENCOUNTER — TRANSCRIBE ORDERS (OUTPATIENT)
Dept: LAB | Facility: CLINIC | Age: 73
End: 2018-07-02

## 2018-07-02 ENCOUNTER — APPOINTMENT (OUTPATIENT)
Dept: LAB | Facility: CLINIC | Age: 73
End: 2018-07-02
Payer: MEDICARE

## 2018-07-02 DIAGNOSIS — E78.5 HYPERLIPIDEMIA, UNSPECIFIED HYPERLIPIDEMIA TYPE: ICD-10-CM

## 2018-07-02 LAB
CHOLEST SERPL-MCNC: 143 MG/DL (ref 50–200)
HDLC SERPL-MCNC: 71 MG/DL (ref 40–60)
LDLC SERPL CALC-MCNC: 49 MG/DL (ref 0–100)
TRIGL SERPL-MCNC: 114 MG/DL

## 2018-07-02 PROCEDURE — 80061 LIPID PANEL: CPT

## 2018-07-02 PROCEDURE — 36415 COLL VENOUS BLD VENIPUNCTURE: CPT

## 2018-07-05 ENCOUNTER — EVALUATION (OUTPATIENT)
Dept: PHYSICAL THERAPY | Facility: CLINIC | Age: 73
End: 2018-07-05
Payer: MEDICARE

## 2018-07-05 DIAGNOSIS — E78.5 HYPERLIPIDEMIA, UNSPECIFIED HYPERLIPIDEMIA TYPE: ICD-10-CM

## 2018-07-05 DIAGNOSIS — I61.9 BRAIN BLEED (HCC): ICD-10-CM

## 2018-07-05 DIAGNOSIS — R26.9 GAIT ABNORMALITY: Primary | ICD-10-CM

## 2018-07-05 PROCEDURE — G8979 MOBILITY GOAL STATUS: HCPCS | Performed by: PHYSICAL THERAPIST

## 2018-07-05 PROCEDURE — G8980 MOBILITY D/C STATUS: HCPCS | Performed by: PHYSICAL THERAPIST

## 2018-07-05 PROCEDURE — 97161 PT EVAL LOW COMPLEX 20 MIN: CPT | Performed by: PHYSICAL THERAPIST

## 2018-07-05 NOTE — PROGRESS NOTES
PT Evaluation     Today's date: 2018  Patient name: Kvng Robledo  : 1945  MRN: [id number]  Referring provider: Ximena Saldaña DO  Dx:   Encounter Diagnosis     ICD-10-CM    1  Gait abnormality R26 9    2  Hyperlipidemia, unspecified hyperlipidemia type E78 5 Ambulatory referral to Physical Therapy   3  Brain bleed ([address] ) I61 9 Ambulatory referral to Physical Therapy                  Assessment    Assessment details: Pt at this time demonstrates little to nil risk for falls per objective findings and aside from black ice falls, she does not have a history of falling  Pt at this time is likely safe 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 [facility] as not mechanical motions increased/decreased her Skin Sensation symptoms and she demonstrated no abnormalities for strength or range of motion of spine, UE and LE  Thank you very much for this kind referral and if she is to have a decline in any form she is welcome to return as needed  Goals  STG 4:   1  Assess prn  LTG 8:  1  Assess prn      Plan  Duration in weeks: 6  Treatment plan discussed with: patient  Plan details: No need for follow up at this time  Subjective Evaluation    History of Present Illness  Date of onset: 2018  Mechanism of injury: Pt is a 72 yofemale who is RHD presents today stating that she fell in February on [employer] and hit her the back of her head  Pt reports that she felt fine, she indicated that she didn't have any HA's didn't even bother going to the hospital   Pt reports that she developed numbness in her L UE and R UE  Pt reports that there has been no changes since this all began  Pt reports that she contacted her PCP first who ordered MRI which was for a brain Bleed but was able to determine how recent the bleed was  Pt reports that she then went to a Neurologist Dr Kallie eMehan who indicated need for Blood work and she goes for further testing including vascular and heart monitoring on 18    Pt reports that she was also sent for balance assessment with PT intervention which she isn't sure she needs but chose to present anyways  Pt reports that she is still working as a  and also walks 3 times a week on a TM approximately 2 miles  Pt reports that she only has had 2 falls with black ice in the last 2 years and otherwise feels very confident with her balance and mobility  Pt reports that she does have a history of back pain but never radicular symptoms and she states that it is her SI joint and she keeps it check with injections and exercises  Pt reports back to Dr Yazmin Riley for 6 months  Pt denies change in bowel or bladder  She denies pain just an awkward sensation within her L UE and LE  Quality of life: good    Pain  Current pain ratin  At best pain ratin  At worst pain ratin    Social Support  Stairs in house: yes (FF with railing)   Lives in: apartment  Lives with: significant other      Diagnostic Tests  MRI studies: abnormal (Old Bleed)  Treatments  No previous or current treatments        Objective     Ambulation     Comments   Sensation intact to light touch L3,4,5,S1,S2, C4,5,6,7,8,T1  L > sensation than R  L3-4 Patella Reflex L1+, R 2+  Genu Valgum R > L, minimal decreased stance time with L IC vs R    UE/LE Screen strong and painless as well as WFL range B/L  Repeated Motion   CS/LS Range WFL without pain  CS Flex/Ext/SB R/SB L nil increased/decrease L UE symptoms  LS  Flex/Ext/SB R/ SB L Nil Increase decrease L LE symptoms  SLS L 10" no LOBR 10" no lob  Palpation: No reproducible symptom increase/decrease t/o C2-7, L1-5  STs: (-) Sharp Angelita, Alar Lig Intact  Transfers sit <> stand independent, roll B independent, Supine <> Sit independent      TU 5"   Sit to stand x 5 times: 12 37"      Flowsheet Rows      Most Recent Value   PT/OT G-Codes   Assessment Type  Evaluation   G code set  Mobility: Walking & Moving Around   Mobility: Walking and Moving Around Current Status ()  CH   Mobility: Walking and Moving Around Goal Status ()  CH          Precautions:  Falls Hx, Osteoporosis

## 2018-07-19 ENCOUNTER — HOSPITAL ENCOUNTER (OUTPATIENT)
Dept: NON INVASIVE DIAGNOSTICS | Facility: CLINIC | Age: 73
Discharge: HOME | End: 2018-07-19
Payer: MEDICARE

## 2018-07-19 DIAGNOSIS — I61.0 INTERNAL CAPSULE HEMORRHAGE (HCC): ICD-10-CM

## 2018-07-19 PROCEDURE — 93226 XTRNL ECG REC<48 HR SCAN A/R: CPT

## 2018-07-19 PROCEDURE — 93880 EXTRACRANIAL BILAT STUDY: CPT | Performed by: SURGERY

## 2018-07-19 PROCEDURE — 93306 TTE W/DOPPLER COMPLETE: CPT | Performed by: INTERNAL MEDICINE

## 2018-07-19 PROCEDURE — 93306 TTE W/DOPPLER COMPLETE: CPT

## 2018-07-19 PROCEDURE — 93225 XTRNL ECG REC<48 HRS REC: CPT

## 2018-07-19 PROCEDURE — 93880 EXTRACRANIAL BILAT STUDY: CPT

## 2018-07-19 PROCEDURE — 93227 XTRNL ECG REC<48 HR R&I: CPT | Performed by: INTERNAL MEDICINE

## 2018-07-23 NOTE — PROGRESS NOTES
PT Discharge    Today's date: 2018  Patient name: Jason Beach  : 1945  MRN: [id number]  Referring provider: Lolita Helms DO  Dx:   Encounter Diagnosis     ICD-10-CM    1  Gait abnormality R26 9 PT plan of care cert/re-cert   2  Hyperlipidemia, unspecified hyperlipidemia type E78 5 Ambulatory referral to Physical Therapy     PT plan of care cert/re-cert   3  Brain bleed ([address] ) I61 9 Ambulatory referral to Physical Therapy     PT plan of care cert/re-cert       Start Time: 426  Stop Time:   Total time in clinic (min): 40 minutes    Assessment/Plan Pt has not been present since 18  Pt's chart will be DC in compliance of facility policy as all Charts are DC within 30 days of last scheduled visit          Subjective    Objective    Flowsheet Rows      Most Recent Value   PT/OT G-Codes   Current Score  60   Projected Score  69   Assessment Type  Evaluation   G code set  Mobility: Walking & Moving Around   Mobility: Walking and Moving Around Current Status ()  CH   Mobility: Walking and Moving Around Goal Status ()  [address] [place] [address]

## 2018-09-14 ENCOUNTER — OFFICE VISIT (OUTPATIENT)
Dept: CARDIOLOGY CLINIC | Facility: CLINIC | Age: 73
End: 2018-09-14
Payer: MEDICARE

## 2018-09-14 VITALS
DIASTOLIC BLOOD PRESSURE: 80 MMHG | HEART RATE: 84 BPM | HEIGHT: 64 IN | OXYGEN SATURATION: 97 % | WEIGHT: 130 LBS | SYSTOLIC BLOOD PRESSURE: 122 MMHG | BODY MASS INDEX: 22.2 KG/M2

## 2018-09-14 DIAGNOSIS — G45.9 TRANSIENT CEREBRAL ISCHEMIA, UNSPECIFIED TYPE: Primary | ICD-10-CM

## 2018-09-14 DIAGNOSIS — I49.1 PREMATURE ATRIAL BEATS: ICD-10-CM

## 2018-09-14 PROCEDURE — 99205 OFFICE O/P NEW HI 60 MIN: CPT | Performed by: INTERNAL MEDICINE

## 2018-09-14 PROCEDURE — 93000 ELECTROCARDIOGRAM COMPLETE: CPT | Performed by: INTERNAL MEDICINE

## 2018-09-14 NOTE — LETTER
September 14, 2018     Karthikkatarzyna Garcia, DO  [address] [facility] [address]    Patient: Jessie Artis   YOB: 1945   Date of Visit: 9/14/2018       Dear Dr Laisha Finley:    Thank you for referring Tita Vidal to me for evaluation  Below are my notes for this consultation  If you have questions, please do not hesitate to call me  I look forward to following your patient along with you  Sincerely,        Timmy Adame MD        CC: No Recipients  [facility] [address]    Outpatient New Consult    Today's Date: 09/14/18        Patient name: Jessie Artis  YOB: 1945  Sex: female         Chief Complaint: New consult to consider  Loop recoreder per Dr Laisha Finley    ASSESSMENT:  Problem List Items Addressed This Visit     None      Visit Diagnoses     Transient cerebral ischemia, unspecified type    -  Primary    Relevant Orders    POCT ECG        73 yo female  1) TIA w left leg tingling, found to have 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 and small hemorrhage  SHe has no obstruction but 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  No h/o HTN  She is now on lipitor and asa and follows w neurology  2) PAC's rare on holter  NO palpitations  NOrmal echo, normal LA          PLAN:  1  Her TIA was not likely caused by emoblic disease and unrelated to afib  SHe is overall low risk for afib w/o structural heart disease on echo and benign holter just some PAC's  (76 PAC's over 48 hours)  bascially normal holter  IF we did place loop and did find afib, would be difficult to recommend anticoagulation given h/o intracranial hemorrhage so in my opinion loop could even lead to harm and I would recommend against it  2) recommend BP monitoring at home in case she has intermittent HTN and worth treating given pattern on MRI c/w HTN brain disease       Follow up in: prn    Orders Placed This Encounter   Procedures    POCT ECG There are no discontinued medications    HPI/Subjective:   71 yo female  1) TIA w left leg tingling, found to have 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 and small hemorrhage  SHe has no obstruction but 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  No h/o HTN  She is now on lipitor and asa and follows w neurology  2) PAC's rare on holter  NO palpitations  NOrmal echo, normal LA    Please note HPI is listed by problem with with update following it, it is copied again in the assessment above and reflects medical decision making as well  Complete 12 point ROS reviewed and otherwise non pertinent or negative except as per HPI pertinent positives in Cardiovascular and Respiratory emphasized  Please see paper chart for outpatient clinic patients where the patient completed the 12 point ROS survey  Past Medical History:   Diagnosis Date    Cataract of right eye     GERD (gastroesophageal reflux disease)        No Known Allergies  I reviewed the Home Medication list and Allergies in the chart  Scheduled Meds:  Current Outpatient Prescriptions   Medication Sig Dispense Refill    aspirin 81 mg chewable tablet Chew 81 mg daily      atorvastatin (LIPITOR) 10 mg tablet Take 1 tablet by mouth Daily      b complex vitamins tablet Take 1 tablet by mouth daily   calcium-vitamin D 250-100 MG-UNIT per tablet Take 1 tablet by mouth 3 (three) times a day   Multiple Vitamins-Minerals (CENTRUM CARDIO PO) Take 1 tablet by mouth daily   omeprazole (PriLOSEC) 20 mg delayed release capsule Take 20 mg by mouth 2 (two) times a day      Pyridoxine HCl (VITAMIN B-6 PO) Take by mouth daily      Vitamin D, Cholecalciferol, 1000 UNITS TABS Take 1 tablet by mouth daily Indications: with aloe  No current facility-administered medications for this visit        PRN Meds:         Family History   Problem Relation Age of Onset    Rectal cancer Brother     Stroke Mother     Heart attack Father     Arrhythmia Father         possible    Coronary artery disease Father     Sudden death Father         scd    Anuerysm Neg Hx     Clotting disorder Neg Hx     Hypertension Neg Hx     Hyperlipidemia Neg Hx     Heart failure Neg Hx        Social History     Social History    Marital status: /Civil Union     Spouse name: N/A    Number of children: N/A    Years of education: N/A     Occupational History    Not on file  Social History Main Topics    Smoking status: Former Smoker    Smokeless tobacco: Never Used    Alcohol use No    Drug use: No    Sexual activity: Not on file     Other Topics Concern    Not on file     Social History Narrative    No narrative on file         OBJECTIVE:    /80 (BP Location: Left arm, Patient Position: Sitting, Cuff Size: Adult)   Pulse 84   Ht 5' 4" (1 626 m)   Wt 59 kg (130 lb)   SpO2 97%   BMI 22 31 kg/m²   Vitals:    09/14/18 1002   Weight: 59 kg (130 lb)     GEN: No acute distress, Alert and oriented, well appearing  HEENT:Head, neck, ears, oral pharynx: Mucus membranes moist, oral pharynx clear, nares clear  External ears normal  EYES: Pupils equal, sclera anicteric, midline, normal conjuctiva  NECK: No JVD, supple, no obvious masses or thryomegaly or goiter  CARDIOVASCULAR:  RRR, No murmur, rub, gallops S1,S2  LUNGS: Clear To auscultation bilaterally, normal effort, no rales, rhonchi, crackles  ABDOMEN:  nondistended,  without obvious organomegaly or ascites  EXTREMITIES/VASCULAR:  No edema  Radial pulses intact, pedal pulses difficult to palpate, warm an well perfused  PSYCH: Normal Affect, no overt suicidal ideation, linear speech pattern without evidence of psychosis     NEURO: Grossly intact, moving all extremiteis equal, face symmetric, alert and responsive, no obvious focal defecits  GAIT:  Ambulates normally without difficulty  HEME: No bleeding, bruising, petechia, purpura  SKIN: No significant rashes, warm, no diaphoresis or pallor  Lab Results:       LABS:      Chemistry        Component Value Date/Time     2018 1050    K 4 5 2018 1050     2018 1050    CO2 27 2018 1050    BUN 11 2018 1050    CREATININE 0 79 2018 1050        Component Value Date/Time    CALCIUM 9 6 2018 1050    ALKPHOS 72 2018 1050    AST 40 2018 1050    ALT 33 2018 1050            No results found for: CHOL  Lab Results   Component Value Date    HDL 71 (H) 2018     Lab Results   Component Value Date    LDLCALC 49 2018     Lab Results   Component Value Date    TRIG 114 2018     No components found for: CHOLHDL    IMAGING: No results found  Cardiac testing:   Results for orders placed during the hospital encounter of 18   Echo complete with contrast if indicated    Narrative [address] [place] [address], [address] [place] [address]  [phone]    Transthoracic Echocardiogram  2D, M-mode, Doppler, and Color Doppler    Study date:  2018    Patient: Faina Stock  MR number: [id number]  Account number: [de-identified]  : 1945  Age: 72 years  Gender: Female  Status: Outpatient  Location: [facility]  Height: 64 in  Weight: 135 lb  BP: 120/ 64 mmHg    Indications: Internal capsule hemmorhage    Diagnoses: G45 9 - Transient cerebral ischemic attack, unspecified    Sonographer:  Efrem Robledo BS, RDCS  Referring Physician:  Darryn Hendrickson DO  Group:  [facility]  Interpreting Physician:  Gio Sheppard MD    SUMMARY    LEFT VENTRICLE:  Size was normal   Systolic function was normal  Ejection fraction was estimated to be 55 %  Wall thickness was normal   Left ventricular diastolic function parameters were normal for the patient's age      RIGHT VENTRICLE:  The size was normal   Systolic function was normal     MITRAL VALVE:  There was trace regurgitation  TRICUSPID VALVE:  There was trace regurgitation  HISTORY: PRIOR HISTORY: Patient has no history of cardiovascular disease; Breast Implants    PROCEDURE: [facility] [place]  This was a routine study  The transthoracic approach was used  The study included complete 2D imaging, M-mode, complete spectral Doppler, and color Doppler  The  heart rate was 68 bpm, at the start of the study  Images were obtained from the parasternal, apical, subcostal, and suprasternal notch acoustic windows  Echocardiographic views were limited due to poor acoustic window availability and lung  interference  This was a technically difficult study  LEFT VENTRICLE: Size was normal  Systolic function was normal  Ejection fraction was estimated to be 55 %  There were no regional wall motion abnormalities  Wall thickness was normal  DOPPLER: Left ventricular diastolic function parameters  were normal for the patient's age  RIGHT VENTRICLE: The size was normal  Systolic function was normal  Wall thickness was normal     LEFT ATRIUM: Size was normal     RIGHT ATRIUM: Size was normal     MITRAL VALVE: Valve structure was normal  There was normal leaflet separation  DOPPLER: The transmitral velocity was within the normal range  There was no evidence for stenosis  There was trace regurgitation  AORTIC VALVE: The valve was trileaflet  Leaflets exhibited normal thickness and normal cuspal separation  DOPPLER: Transaortic velocity was within the normal range  There was no evidence for stenosis  There was no regurgitation  TRICUSPID VALVE: The valve structure was normal  There was normal leaflet separation  DOPPLER: The transtricuspid velocity was within the normal range  There was no evidence for stenosis  There was trace regurgitation  The tricuspid jet  envelope definition was inadequate for estimation of RV systolic pressure   There are no indirect findings suggestive of moderate or severe pulmonary hypertension  PULMONIC VALVE: Leaflets exhibited normal thickness, no calcification, and normal cuspal separation  DOPPLER: The transpulmonic velocity was within the normal range  There was no regurgitation  PERICARDIUM: There was no pericardial effusion  The pericardium was normal in appearance  AORTA: The root exhibited normal size  SYSTEMIC VEINS: IVC: The inferior vena cava was normal in size and course  Respirophasic changes were normal     SYSTEM MEASUREMENT TABLES    2D  %FS: 42 29 %  AV Diam: 2 7 cm  EDV(Teich): 72 7 ml  EF(Cube): 80 78 %  EF(Teich): 73 82 %  ESV(Cube): 12 9 ml  ESV(Teich): 19 03 ml  IVSd: 0 86 cm  LA Area: 11 29 cm2  LA Diam: 3 53 cm  LVEDV MOD A4C: 64 04 ml  LVEF MOD A4C: 79 03 %  LVESV MOD A4C: 13 43 ml  LVIDd: 4 06 cm  LVIDs: 2 35 cm  LVLd A4C: 6 76 cm  LVLs A4C: 5 3 cm  LVPWd: 0 68 cm  RA Area: 12 46 cm2  RV Diam: 3 cm  SV MOD A4C: 50 61 ml  SV(Cube): 54 23 ml  SV(Teich): 53 67 ml    CW  TR MaxP 66 mmHg  TR Vmax: 2 41 m/s    MM  TAPSE: 2 28 cm    PW  E': 0 07 m/s  E/E': 10 88  MV A Garry: 0 89 m/s  MV Dec Foster: 5 79 m/s2  MV DecT: 137 47 ms  MV E Garry: 0 8 m/s  MV E/A Ratio: 0 9    [facility] Accredited Echocardiography Laboratory    Prepared and electronically signed by    Ronnie Swartz MD  Signed 2018 12:52:09       No results found for this or any previous visit  No results found for this or any previous visit  No results found for this or any previous visit  I reviewed and interpreted the following LABS/EKG/TELE/IMAGING and below is summary of my interpretation (if data available):        Current EKG and Rhythm Strip:NSR, normal EKG     I personally reviewed MRI report and holter  See discussion above  Total time spent 45 min, 25 min face to face

## 2018-09-14 NOTE — PROGRESS NOTES
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  No h/o HTN  She is now on lipitor and asa and follows w neurology  2) PAC's rare on holter  NO palpitations  NOrmal echo, normal LA          PLAN:  1  Her TIA was not likely caused by emoblic disease and unrelated to afib  SHe is overall low risk for afib w/o structural heart disease on echo and benign holter just some PAC's  (76 PAC's over 48 hours)  bascially normal holter  IF we did place loop and did find afib, would be difficult to recommend anticoagulation given h/o intracranial hemorrhage so in my opinion loop could even lead to harm and I would recommend against it  2) recommend BP monitoring at home in case she has intermittent HTN and worth treating given pattern on MRI c/w HTN brain disease  Follow up in: prn    Orders Placed This Encounter   Procedures    POCT ECG     There are no discontinued medications    HPI/Subjective:   71 yo female  1) TIA w left leg tingling, found to have 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 and small hemorrhage  SHe has no obstruction but 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  No h/o HTN  She is now on lipitor and asa and follows w neurology  2) PAC's rare on holter  NO palpitations   NOrmal echo, normal LA    Please note HPI is listed by problem with with update following it, it is copied again in the assessment above and reflects medical decision making as well  Complete 12 point ROS reviewed and otherwise non pertinent or negative except as per HPI pertinent positives in Cardiovascular and Respiratory emphasized  Please see paper chart for outpatient clinic patients where the patient completed the 12 point ROS survey  Past Medical History:   Diagnosis Date    Cataract of right eye     GERD (gastroesophageal reflux disease)        No Known Allergies  I reviewed the Home Medication list and Allergies in the chart  Scheduled Meds:  Current Outpatient Prescriptions   Medication Sig Dispense Refill    aspirin 81 mg chewable tablet Chew 81 mg daily      atorvastatin (LIPITOR) 10 mg tablet Take 1 tablet by mouth Daily      b complex vitamins tablet Take 1 tablet by mouth daily   calcium-vitamin D 250-100 MG-UNIT per tablet Take 1 tablet by mouth 3 (three) times a day   Multiple Vitamins-Minerals (CENTRUM CARDIO PO) Take 1 tablet by mouth daily   omeprazole (PriLOSEC) 20 mg delayed release capsule Take 20 mg by mouth 2 (two) times a day      Pyridoxine HCl (VITAMIN B-6 PO) Take by mouth daily      Vitamin D, Cholecalciferol, 1000 UNITS TABS Take 1 tablet by mouth daily Indications: with aloe  No current facility-administered medications for this visit        PRN Meds:         Family History   Problem Relation Age of Onset    Rectal cancer Brother     Stroke Mother     Heart attack Father     Arrhythmia Father         possible    Coronary artery disease Father     Sudden death Father         scd    Anuerysm Neg Hx     Clotting disorder Neg Hx     Hypertension Neg Hx     Hyperlipidemia Neg Hx     Heart failure Neg Hx        Social History     Social History    Marital status: /Civil Union     Spouse name: N/A    Number of children: N/A    Years of education: N/A Occupational History    Not on file  Social History Main Topics    Smoking status: Former Smoker    Smokeless tobacco: Never Used    Alcohol use No    Drug use: No    Sexual activity: Not on file     Other Topics Concern    Not on file     Social History Narrative    No narrative on file         OBJECTIVE:    /80 (BP Location: Left arm, Patient Position: Sitting, Cuff Size: Adult)   Pulse 84   Ht 5' 4" (1 626 m)   Wt 59 kg (130 lb)   SpO2 97%   BMI 22 31 kg/m²   Vitals:    09/14/18 1002   Weight: 59 kg (130 lb)     GEN: No acute distress, Alert and oriented, well appearing  HEENT:Head, neck, ears, oral pharynx: Mucus membranes moist, oral pharynx clear, nares clear  External ears normal  EYES: Pupils equal, sclera anicteric, midline, normal conjuctiva  NECK: No JVD, supple, no obvious masses or thryomegaly or goiter  CARDIOVASCULAR:  RRR, No murmur, rub, gallops S1,S2  LUNGS: Clear To auscultation bilaterally, normal effort, no rales, rhonchi, crackles  ABDOMEN:  nondistended,  without obvious organomegaly or ascites  EXTREMITIES/VASCULAR:  No edema  Radial pulses intact, pedal pulses difficult to palpate, warm an well perfused  PSYCH: Normal Affect, no overt suicidal ideation, linear speech pattern without evidence of psychosis  NEURO: Grossly intact, moving all extremiteis equal, face symmetric, alert and responsive, no obvious focal defecits  GAIT:  Ambulates normally without difficulty  HEME: No bleeding, bruising, petechia, purpura  SKIN: No significant rashes, warm, no diaphoresis or pallor       Lab Results:       LABS:      Chemistry        Component Value Date/Time     03/30/2018 1050    K 4 5 03/30/2018 1050     03/30/2018 1050    CO2 27 03/30/2018 1050    BUN 11 03/30/2018 1050    CREATININE 0 79 03/30/2018 1050        Component Value Date/Time    CALCIUM 9 6 03/30/2018 1050    ALKPHOS 72 03/30/2018 1050    AST 40 03/30/2018 1050    ALT 33 03/30/2018 1050 No results found for: CHOL  Lab Results   Component Value Date    HDL 71 (H) 2018     Lab Results   Component Value Date    LDLCALC 49 2018     Lab Results   Component Value Date    TRIG 114 2018     No components found for: CHOLHDL    IMAGING: No results found  Cardiac testing:   Results for orders placed during the hospital encounter of 18   Echo complete with contrast if indicated    Narrative [address], [address] [facility]  [phone]    Transthoracic Echocardiogram  2D, M-mode, Doppler, and Color Doppler    Study date:  2018    Patient: Milana Cosby  MR number: [id number]  Account number: [de-identified]  : 1945  Age: 72 years  Gender: Female  Status: Outpatient  Location: [facility] [place]  Height: 64 in  Weight: 135 lb  BP: 120/ 64 mmHg    Indications: Internal capsule hemmorhage    Diagnoses: G45 9 - Transient cerebral ischemic attack, unspecified    Sonographer:  ANGEL Carrillo, RDCS  Referring Physician:  Magalie Joseph DO  Group:  [facility]  Interpreting Physician:  Katie Herndon MD    SUMMARY    LEFT VENTRICLE:  Size was normal   Systolic function was normal  Ejection fraction was estimated to be 55 %  Wall thickness was normal   Left ventricular diastolic function parameters were normal for the patient's age  RIGHT VENTRICLE:  The size was normal   Systolic function was normal     MITRAL VALVE:  There was trace regurgitation  TRICUSPID VALVE:  There was trace regurgitation  HISTORY: PRIOR HISTORY: Patient has no history of cardiovascular disease; Breast Implants    PROCEDURE: The study was performed in the [facility]  This was a routine study  The transthoracic approach was used  The study included complete 2D imaging, M-mode, complete spectral Doppler, and color Doppler  The  heart rate was 68 bpm, at the start of the study   Images were obtained from the parasternal, apical, subcostal, and suprasternal notch acoustic windows  Echocardiographic views were limited due to poor acoustic window availability and lung  interference  This was a technically difficult study  LEFT VENTRICLE: Size was normal  Systolic function was normal  Ejection fraction was estimated to be 55 %  There were no regional wall motion abnormalities  Wall thickness was normal  DOPPLER: Left ventricular diastolic function parameters  were normal for the patient's age  RIGHT VENTRICLE: The size was normal  Systolic function was normal  Wall thickness was normal     LEFT ATRIUM: Size was normal     RIGHT ATRIUM: Size was normal     MITRAL VALVE: Valve structure was normal  There was normal leaflet separation  DOPPLER: The transmitral velocity was within the normal range  There was no evidence for stenosis  There was trace regurgitation  AORTIC VALVE: The valve was trileaflet  Leaflets exhibited normal thickness and normal cuspal separation  DOPPLER: Transaortic velocity was within the normal range  There was no evidence for stenosis  There was no regurgitation  TRICUSPID VALVE: The valve structure was normal  There was normal leaflet separation  DOPPLER: The transtricuspid velocity was within the normal range  There was no evidence for stenosis  There was trace regurgitation  The tricuspid jet  envelope definition was inadequate for estimation of RV systolic pressure  There are no indirect findings suggestive of moderate or severe pulmonary hypertension  PULMONIC VALVE: Leaflets exhibited normal thickness, no calcification, and normal cuspal separation  DOPPLER: The transpulmonic velocity was within the normal range  There was no regurgitation  PERICARDIUM: There was no pericardial effusion  The pericardium was normal in appearance  AORTA: The root exhibited normal size  SYSTEMIC VEINS: IVC: The inferior vena cava was normal in size and course  Respirophasic changes were normal     SYSTEM MEASUREMENT TABLES    2D  %FS: 42 29 %  AV Diam: 2 7 cm  EDV(Teich): 72 7 ml  EF(Cube): 80 78 %  EF(Teich): 73 82 %  ESV(Cube): 12 9 ml  ESV(Teich): 19 03 ml  IVSd: 0 86 cm  LA Area: 11 29 cm2  LA Diam: 3 53 cm  LVEDV MOD A4C: 64 04 ml  LVEF MOD A4C: 79 03 %  LVESV MOD A4C: 13 43 ml  LVIDd: 4 06 cm  LVIDs: 2 35 cm  LVLd A4C: 6 76 cm  LVLs A4C: 5 3 cm  LVPWd: 0 68 cm  RA Area: 12 46 cm2  RV Diam: 3 cm  SV MOD A4C: 50 61 ml  SV(Cube): 54 23 ml  SV(Teich): 53 67 ml    CW  TR MaxP 66 mmHg  TR Vmax: 2 41 m/s    MM  TAPSE: 2 28 cm    PW  E': 0 07 m/s  E/E': 10 88  MV A Garry: 0 89 m/s  MV Dec [place]: 5 79 m/s2  MV DecT: 137 47 ms  MV E Garry: 0 8 m/s  MV E/A Ratio: 0 9    [facility] Accredited Echocardiography Laboratory    Prepared and electronically signed by    Lisbeth Mar MD  Signed 2018 12:52:09       No results found for this or any previous visit  No results found for this or any previous visit  No results found for this or any previous visit  I reviewed and interpreted the following LABS/EKG/TELE/IMAGING and below is summary of my interpretation (if data available):        Current EKG and Rhythm Strip:NSR, normal EKG     I personally reviewed MRI report and holter  See discussion above  Total time spent 45 min, 25 min face to face

## 2018-09-14 NOTE — PROGRESS NOTES
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 and small hemorrhage  SHe has no obstruction but 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  No h/o HTN  She is now on lipitor and asa and follows w neurology  2) PAC's rare on holter  NO palpitations  NOrmal echo, normal LA          PLAN:  1  Her TIA was not likely caused by emoblic disease and unrelated to afib  SHe is overall low risk for afib w/o structural heart disease on echo and benign holter just some PAC's  (76 PAC's over 48 hours)  bascially normal holter  IF we did place loop and did find afib, would be difficult to recommend anticoagulation given h/o intracranial hemorrhage so in my opinion loop could even lead to harm and I would recommend against it  2) recommend BP monitoring at home in case she has intermittent HTN and worth treating given pattern on MRI c/w HTN brain disease  Follow up in: prn    Orders Placed This Encounter   Procedures    POCT ECG     There are no discontinued medications    HPI/Subjective:   71 yo female  1) TIA w left leg tingling, found to have 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 and small hemorrhage  SHe has no obstruction but 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  No h/o HTN  She is now on lipitor and asa and follows w neurology  2) PAC's rare on holter  NO palpitations   NOrmal echo, normal LA    Please note HPI is listed by problem with with update following it, it is copied again in the assessment above and reflects medical decision making as well  Complete 12 point ROS reviewed and otherwise non pertinent or negative except as per HPI pertinent positives in Cardiovascular and Respiratory emphasized  Please see paper chart for outpatient clinic patients where the patient completed the 12 point ROS survey  Past Medical History:   Diagnosis Date    Cataract of right eye     GERD (gastroesophageal reflux disease)        No Known Allergies  I reviewed the Home Medication list and Allergies in the chart  Scheduled Meds:  Current Outpatient Prescriptions   Medication Sig Dispense Refill    aspirin 81 mg chewable tablet Chew 81 mg daily      atorvastatin (LIPITOR) 10 mg tablet Take 1 tablet by mouth Daily      b complex vitamins tablet Take 1 tablet by mouth daily   calcium-vitamin D 250-100 MG-UNIT per tablet Take 1 tablet by mouth 3 (three) times a day   Multiple Vitamins-Minerals (CENTRUM CARDIO PO) Take 1 tablet by mouth daily   omeprazole (PriLOSEC) 20 mg delayed release capsule Take 20 mg by mouth 2 (two) times a day      Pyridoxine HCl (VITAMIN B-6 PO) Take by mouth daily      Vitamin D, Cholecalciferol, 1000 UNITS TABS Take 1 tablet by mouth daily Indications: with aloe  No current facility-administered medications for this visit        PRN Meds:         Family History   Problem Relation Age of Onset    Rectal cancer Brother     Stroke Mother     Heart attack Father     Arrhythmia Father         possible    Coronary artery disease Father     Sudden death Father         scd    Anuerysm Neg Hx     Clotting disorder Neg Hx     Hypertension Neg Hx     Hyperlipidemia Neg Hx     Heart failure Neg Hx        Social History     Social History    Marital status: /Civil Union     Spouse name: N/A    Number of children: N/A    Years of education: N/A Occupational History    Not on file  Social History Main Topics    Smoking status: Former Smoker    Smokeless tobacco: Never Used    Alcohol use No    Drug use: No    Sexual activity: Not on file     Other Topics Concern    Not on file     Social History Narrative    No narrative on file         OBJECTIVE:    /80 (BP Location: Left arm, Patient Position: Sitting, Cuff Size: Adult)   Pulse 84   Ht 5' 4" (1 626 m)   Wt 59 kg (130 lb)   SpO2 97%   BMI 22 31 kg/m²   Vitals:    09/14/18 1002   Weight: 59 kg (130 lb)     GEN: No acute distress, Alert and oriented, well appearing  HEENT:Head, neck, ears, oral pharynx: Mucus membranes moist, oral pharynx clear, nares clear  External ears normal  EYES: Pupils equal, sclera anicteric, midline, normal conjuctiva  NECK: No JVD, supple, no obvious masses or thryomegaly or goiter  CARDIOVASCULAR:  RRR, No murmur, rub, gallops S1,S2  LUNGS: Clear To auscultation bilaterally, normal effort, no rales, rhonchi, crackles  ABDOMEN:  nondistended,  without obvious organomegaly or ascites  EXTREMITIES/VASCULAR:  No edema  Radial pulses intact, pedal pulses difficult to palpate, warm an well perfused  PSYCH: Normal Affect, no overt suicidal ideation, linear speech pattern without evidence of psychosis  NEURO: Grossly intact, moving all extremiteis equal, face symmetric, alert and responsive, no obvious focal defecits  GAIT:  Ambulates normally without difficulty  HEME: No bleeding, bruising, petechia, purpura  SKIN: No significant rashes, warm, no diaphoresis or pallor       Lab Results:       LABS:      Chemistry        Component Value Date/Time     03/30/2018 1050    K 4 5 03/30/2018 1050     03/30/2018 1050    CO2 27 03/30/2018 1050    BUN 11 03/30/2018 1050    CREATININE 0 79 03/30/2018 1050        Component Value Date/Time    CALCIUM 9 6 03/30/2018 1050    ALKPHOS 72 03/30/2018 1050    AST 40 03/30/2018 1050    ALT 33 03/30/2018 1050 No results found for: CHOL  Lab Results   Component Value Date    HDL 71 (H) 2018     Lab Results   Component Value Date    LDLCALC 49 2018     Lab Results   Component Value Date    TRIG 114 2018     No components found for: CHOLHDL    IMAGING: No results found  Cardiac testing:   Results for orders placed during the hospital encounter of 18   Echo complete with contrast if indicated    Narrative [address], [address] [facility]  [phone]    Transthoracic Echocardiogram  2D, M-mode, Doppler, and Color Doppler    Study date:  2018    Patient: Milana Cosby  MR number: [id number]  Account number: [de-identified]  : 1945  Age: 72 years  Gender: Female  Status: Outpatient  Location: [facility] [place]  Height: 64 in  Weight: 135 lb  BP: 120/ 64 mmHg    Indications: Internal capsule hemmorhage    Diagnoses: G45 9 - Transient cerebral ischemic attack, unspecified    Sonographer:  ANGEL Carrillo, RDCS  Referring Physician:  Magalie Joseph DO  Group:  [facility]  Interpreting Physician:  Katie Herndon MD    SUMMARY    LEFT VENTRICLE:  Size was normal   Systolic function was normal  Ejection fraction was estimated to be 55 %  Wall thickness was normal   Left ventricular diastolic function parameters were normal for the patient's age  RIGHT VENTRICLE:  The size was normal   Systolic function was normal     MITRAL VALVE:  There was trace regurgitation  TRICUSPID VALVE:  There was trace regurgitation  HISTORY: PRIOR HISTORY: Patient has no history of cardiovascular disease; Breast Implants    PROCEDURE: The study was performed in the [facility]  This was a routine study  The transthoracic approach was used  The study included complete 2D imaging, M-mode, complete spectral Doppler, and color Doppler  The  heart rate was 68 bpm, at the start of the study   Images were obtained from the parasternal, apical, subcostal, and suprasternal notch acoustic windows  Echocardiographic views were limited due to poor acoustic window availability and lung  interference  This was a technically difficult study  LEFT VENTRICLE: Size was normal  Systolic function was normal  Ejection fraction was estimated to be 55 %  There were no regional wall motion abnormalities  Wall thickness was normal  DOPPLER: Left ventricular diastolic function parameters  were normal for the patient's age  RIGHT VENTRICLE: The size was normal  Systolic function was normal  Wall thickness was normal     LEFT ATRIUM: Size was normal     RIGHT ATRIUM: Size was normal     MITRAL VALVE: Valve structure was normal  There was normal leaflet separation  DOPPLER: The transmitral velocity was within the normal range  There was no evidence for stenosis  There was trace regurgitation  AORTIC VALVE: The valve was trileaflet  Leaflets exhibited normal thickness and normal cuspal separation  DOPPLER: Transaortic velocity was within the normal range  There was no evidence for stenosis  There was no regurgitation  TRICUSPID VALVE: The valve structure was normal  There was normal leaflet separation  DOPPLER: The transtricuspid velocity was within the normal range  There was no evidence for stenosis  There was trace regurgitation  The tricuspid jet  envelope definition was inadequate for estimation of RV systolic pressure  There are no indirect findings suggestive of moderate or severe pulmonary hypertension  PULMONIC VALVE: Leaflets exhibited normal thickness, no calcification, and normal cuspal separation  DOPPLER: The transpulmonic velocity was within the normal range  There was no regurgitation  PERICARDIUM: There was no pericardial effusion  The pericardium was normal in appearance  AORTA: The root exhibited normal size  SYSTEMIC VEINS: IVC: The inferior vena cava was normal in size and course  Respirophasic changes were normal     SYSTEM MEASUREMENT TABLES    2D  %FS: 42 29 %  AV Diam: 2 7 cm  EDV(Teich): 72 7 ml  EF(Cube): 80 78 %  EF(Teich): 73 82 %  ESV(Cube): 12 9 ml  ESV(Teich): 19 03 ml  IVSd: 0 86 cm  LA Area: 11 29 cm2  LA Diam: 3 53 cm  LVEDV MOD A4C: 64 04 ml  LVEF MOD A4C: 79 03 %  LVESV MOD A4C: 13 43 ml  LVIDd: 4 06 cm  LVIDs: 2 35 cm  LVLd A4C: 6 76 cm  LVLs A4C: 5 3 cm  LVPWd: 0 68 cm  RA Area: 12 46 cm2  RV Diam: 3 cm  SV MOD A4C: 50 61 ml  SV(Cube): 54 23 ml  SV(Teich): 53 67 ml    CW  TR MaxP 66 mmHg  TR Vmax: 2 41 m/s    MM  TAPSE: 2 28 cm    PW  E': 0 07 m/s  E/E': 10 88  MV A Garry: 0 89 m/s  MV Dec [place]: 5 79 m/s2  MV DecT: 137 47 ms  MV E Garry: 0 8 m/s  MV E/A Ratio: 0 9    [facility] Accredited Echocardiography Laboratory    Prepared and electronically signed by    Isidoro Samuel MD  Signed 2018 12:52:09       No results found for this or any previous visit  No results found for this or any previous visit  No results found for this or any previous visit  I reviewed and interpreted the following LABS/EKG/TELE/IMAGING and below is summary of my interpretation (if data available):        Current EKG and Rhythm Strip:NSR, normal EKG     I personally reviewed MRI report and holter  See discussion above  Total time spent 45 min, 25 min face to face

## 2018-09-14 NOTE — LETTER
September 14, 2018     Mekhi Saravia   [facility] [address]    Patient: Scooter Zimmer   YOB: 1945   Date of Visit: 9/14/2018       Dear Dr Keyla Paula:    Thank you for referring Emily Barr to me for evaluation  Below are my notes for this consultation  If you have questions, please do not hesitate to call me  I look forward to following your patient along with you  Sincerely,        Mattie Sanchez MD        CC: No Recipients  Mattie Sanchez MD  9/14/2018 10:26 AM  Sign at close encounter  [facility]    Outpatient New Consult  *** Follow-up  Today's Date: 09/14/18        Patient name: Scooter Zimmer  YOB: 1945  Sex: female         Chief Complaint: ***      ASSESSMENT:  Problem List Items Addressed This Visit     None      Visit Diagnoses     Transient cerebral ischemia, unspecified type    -  Primary    Relevant Orders    POCT ECG                PLAN:        Follow up in: ***    Orders Placed This Encounter   Procedures    POCT ECG     There are no discontinued medications    HPI/Subjective: ***    Please note HPI is listed by problem with with update following it, it is copied again in the assessment above and reflects medical decision making as well  Complete 12 point ROS reviewed and otherwise non pertinent or negative except as per HPI pertinent positives in Cardiovascular and Respiratory emphasized  Please see paper chart for outpatient clinic patients where the patient completed the 12 point ROS survey  Past Medical History:   Diagnosis Date    Cataract of right eye     GERD (gastroesophageal reflux disease)        No Known Allergies  I reviewed the Home Medication list and Allergies in the chart     Scheduled Meds:  Current Outpatient Prescriptions   Medication Sig Dispense Refill    aspirin 81 mg chewable tablet Chew 81 mg daily      atorvastatin (LIPITOR) 10 mg tablet Take 1 tablet by mouth Daily      b complex vitamins tablet Take 1 tablet by mouth daily   calcium-vitamin D 250-100 MG-UNIT per tablet Take 1 tablet by mouth 3 (three) times a day   Multiple Vitamins-Minerals (CENTRUM CARDIO PO) Take 1 tablet by mouth daily   omeprazole (PriLOSEC) 20 mg delayed release capsule Take 20 mg by mouth 2 (two) times a day      Pyridoxine HCl (VITAMIN B-6 PO) Take by mouth daily      Vitamin D, Cholecalciferol, 1000 UNITS TABS Take 1 tablet by mouth daily Indications: with aloe  No current facility-administered medications for this visit  PRN Meds:         Family History   Problem Relation Age of Onset    Rectal cancer Brother     Stroke Mother     Heart attack Father     Arrhythmia Father         possible    Coronary artery disease Father     Sudden death Father         scd    Anuerysm Neg Hx     Clotting disorder Neg Hx     Hypertension Neg Hx     Hyperlipidemia Neg Hx     Heart failure Neg Hx      ***  Social History     Social History    Marital status: /Civil Union     Spouse name: N/A    Number of children: N/A    Years of education: N/A     Occupational History    Not on file       Social History Main Topics    Smoking status: Former Smoker    Smokeless tobacco: Never Used    Alcohol use No    Drug use: No    Sexual activity: Not on file     Other Topics Concern    Not on file     Social History Narrative    No narrative on file     ***    OBJECTIVE:    /80 (BP Location: Left arm, Patient Position: Sitting, Cuff Size: Adult)   Pulse 84   Ht 5' 4" (1 626 m)   Wt 59 kg (130 lb)   SpO2 97%   BMI 22 31 kg/m²    Vitals:    09/14/18 1002   Weight: 59 kg (130 lb)     GEN: No acute distress, Alert and oriented, well appearing***  HEENT:Head, neck, ears, oral pharynx: Mucus membranes moist, oral pharynx clear, nares clear  External ears normal  EYES: Pupils equal, sclera anicteric, midline, normal conjuctiva  NECK: No JVD, supple, no obvious masses or thryomegaly or goiter  CARDIOVASCULAR: *** RRR, No murmur, rub, gallops S1,S2  LUNGS: Clear To auscultation bilaterally, normal effort, no rales, rhonchi, crackles  ABDOMEN: *** nondistended,  without obvious organomegaly or ascites  EXTREMITIES/VASCULAR: *** No edema  Radial pulses intact, pedal pulses difficult to palpate, warm an well perfused  PSYCH: Normal Affect, no overt suicidal ideation, linear speech pattern without evidence of psychosis  NEURO: Grossly intact, moving all extremiteis equal, face symmetric, alert and responsive, no obvious focal defecits  GAIT: *** Ambulates normally without difficulty  HEME: No bleeding, bruising, petechia, purpura  SKIN: No significant rashes, warm, no diaphoresis or pallor  Lab Results:       LABS:      Chemistry        Component Value Date/Time     03/30/2018 1050    K 4 5 03/30/2018 1050     03/30/2018 1050    CO2 27 03/30/2018 1050    BUN 11 03/30/2018 1050    CREATININE 0 79 03/30/2018 1050        Component Value Date/Time    CALCIUM 9 6 03/30/2018 1050    ALKPHOS 72 03/30/2018 1050    AST 40 03/30/2018 1050    ALT 33 03/30/2018 1050            No results found for: CHOL  Lab Results   Component Value Date    HDL 71 (H) 07/02/2018     Lab Results   Component Value Date    LDLCALC 49 07/02/2018     Lab Results   Component Value Date    TRIG 114 07/02/2018     No components found for: CHOLHDL    IMAGING: No results found       Cardiac testing:   Results for orders placed during the hospital encounter of 07/19/18   Echo complete with contrast if indicated    Narrative [facility], [address] [facility]  [phone]    Transthoracic Echocardiogram  2D, M-mode, Doppler, and Color Doppler    Study date:  19-Jul-2018    Patient: Kellen Bailey  MR number: [id number]  Account number: [de-identified]  : 1945  Age: 72 years  Gender: Female  Status: Outpatient  Location: [facility]  Height: 64 in  Weight: 135 lb  BP: 120/ 64 mmHg    Indications: Internal capsule hemmorhage    Diagnoses: G45 9 - Transient cerebral ischemic attack, unspecified    Sonographer:  ANGEL Baumann, RDCS  Referring Physician:  Nelly Kent DO  Group:  [facility]  Interpreting Physician:  Marilee Cho MD    SUMMARY    LEFT VENTRICLE:  Size was normal   Systolic function was normal  Ejection fraction was estimated to be 55 %  Wall thickness was normal   Left ventricular diastolic function parameters were normal for the patient's age  RIGHT VENTRICLE:  The size was normal   Systolic function was normal     MITRAL VALVE:  There was trace regurgitation  TRICUSPID VALVE:  There was trace regurgitation  HISTORY: PRIOR HISTORY: Patient has no history of cardiovascular disease; Breast Implants    PROCEDURE: The study was performed in the [address] [place] [address] and [facility]  This was a routine study  The transthoracic approach was used  The study included complete 2D imaging, M-mode, complete spectral Doppler, and color Doppler  The  heart rate was 68 bpm, at the start of the study  Images were obtained from the parasternal, apical, subcostal, and suprasternal notch acoustic windows  Echocardiographic views were limited due to poor acoustic window availability and lung  interference  This was a technically difficult study  LEFT VENTRICLE: Size was normal  Systolic function was normal  Ejection fraction was estimated to be 55 %  There were no regional wall motion abnormalities  Wall thickness was normal  DOPPLER: Left ventricular diastolic function parameters  were normal for the patient's age      RIGHT VENTRICLE: The size was normal  Systolic function was normal  Wall thickness was normal     LEFT ATRIUM: Size was normal     RIGHT ATRIUM: Size was normal     MITRAL VALVE: Valve structure was normal  There was normal leaflet separation  DOPPLER: The transmitral velocity was within the normal range  There was no evidence for stenosis  There was trace regurgitation  AORTIC VALVE: The valve was trileaflet  Leaflets exhibited normal thickness and normal cuspal separation  DOPPLER: Transaortic velocity was within the normal range  There was no evidence for stenosis  There was no regurgitation  TRICUSPID VALVE: The valve structure was normal  There was normal leaflet separation  DOPPLER: The transtricuspid velocity was within the normal range  There was no evidence for stenosis  There was trace regurgitation  The tricuspid jet  envelope definition was inadequate for estimation of RV systolic pressure  There are no indirect findings suggestive of moderate or severe pulmonary hypertension  PULMONIC VALVE: Leaflets exhibited normal thickness, no calcification, and normal cuspal separation  DOPPLER: The transpulmonic velocity was within the normal range  There was no regurgitation  PERICARDIUM: There was no pericardial effusion  The pericardium was normal in appearance  AORTA: The root exhibited normal size  SYSTEMIC VEINS: IVC: The inferior vena cava was normal in size and course   Respirophasic changes were normal     SYSTEM MEASUREMENT TABLES    2D  %FS: 42 29 %  AV Diam: 2 7 cm  EDV(Teich): 72 7 ml  EF(Cube): 80 78 %  EF(Teich): 73 82 %  ESV(Cube): 12 9 ml  ESV(Teich): 19 03 ml  IVSd: 0 86 cm  LA Area: 11 29 cm2  LA Diam: 3 53 cm  LVEDV MOD A4C: 64 04 ml  LVEF MOD A4C: 79 03 %  LVESV MOD A4C: 13 43 ml  LVIDd: 4 06 cm  LVIDs: 2 35 cm  LVLd A4C: 6 76 cm  LVLs A4C: 5 3 cm  LVPWd: 0 68 cm  RA Area: 12 46 cm2  RV Diam: 3 cm  SV MOD A4C: 50 61 ml  SV(Cube): 54 23 ml  SV(Teich): 53 67 ml    CW  TR MaxP 66 mmHg  TR Vmax: 2 41 m/s    MM  TAPSE: 2 28 cm    PW  E': 0 07 m/s  E/E': 10 88  MV A Garry: 0 89 m/s  MV Dec Chaves: 5 79 m/s2  MV DecT: 137 47 ms  MV E Garry: 0 8 m/s  MV E/A Ratio: 0 9    [facility] Accredited Echocardiography Laboratory    Prepared and electronically signed by    Ulises Pena MD  Signed 19-Jul-2018 12:52:09       No results found for this or any previous visit  No results found for this or any previous visit  No results found for this or any previous visit  I reviewed and interpreted the following LABS/EKG/TELE/IMAGING and below is summary of my interpretation (if data available):    LABS:***    Current EKG and Rhythm Strip:***    Past EKGs and RHYTHM strip: ***    HOLTER/EVENT Monitor:***    Interrogation of Pacemaker/Defibrillator/Loop (prior recorded events), etc: ***    I personally did cardiac device interrogation in office today and this was my finding of prior events***    ECHO images reviewed and this is my interpretation: ***            Casey Diego MD  9/14/2018 10:44 AM  Sign at close encounter  [address] [facility]    Outpatient New Consult    Today's Date: 09/14/18        Patient name: Trudy Sigala  YOB: 1945  Sex: female         Chief Complaint: New consult to consider  Loop recoreder per Dr Rehman Reveal    ASSESSMENT:  Problem List Items Addressed This Visit     None      Visit Diagnoses     Transient cerebral ischemia, unspecified type    -  Primary    Relevant Orders    POCT ECG        73 yo female  1) TIA w left leg tingling, found to have 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 and small hemorrhage  SHe has no obstruction but 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  No h/o HTN  She is now on lipitor and asa and follows w neurology  2) PAC's rare on holter  NO palpitations  NOrmal echo, normal LA          PLAN:  1  Her TIA was not likely caused by emoblic disease and unrelated to afib   SHe is overall low risk for afib w/o structural heart disease on echo and benign holter just some PAC's  (76 PAC's over 48 hours)  bascially normal holter  IF we did place loop and did find afib, would be difficult to recommend anticoagulation given h/o intracranial hemorrhage so in my opinion loop could even lead to harm and I would recommend against it  2) recommend BP monitoring at home in case she has intermittent HTN and worth treating given pattern on MRI c/w HTN brain disease  Follow up in: prn    Orders Placed This Encounter   Procedures    POCT ECG     There are no discontinued medications    HPI/Subjective:   73 yo female  1) TIA w left leg tingling, found to have 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 and small hemorrhage  SHe has no obstruction but 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  No h/o HTN  She is now on lipitor and asa and follows w neurology  2) PAC's rare on holter  NO palpitations  NOrmal echo, normal LA    Please note HPI is listed by problem with with update following it, it is copied again in the assessment above and reflects medical decision making as well  Complete 12 point ROS reviewed and otherwise non pertinent or negative except as per HPI pertinent positives in Cardiovascular and Respiratory emphasized  Please see paper chart for outpatient clinic patients where the patient completed the 12 point ROS survey  Past Medical History:   Diagnosis Date    Cataract of right eye     GERD (gastroesophageal reflux disease)        No Known Allergies  I reviewed the Home Medication list and Allergies in the chart  Scheduled Meds:  Current Outpatient Prescriptions   Medication Sig Dispense Refill    aspirin 81 mg chewable tablet Chew 81 mg daily      atorvastatin (LIPITOR) 10 mg tablet Take 1 tablet by mouth Daily      b complex vitamins tablet Take 1 tablet by mouth daily        calcium-vitamin D 250-100 MG-UNIT per tablet Take 1 tablet by mouth 3 (three) times a day       Multiple Vitamins-Minerals (CENTRUM CARDIO PO) Take 1 tablet by mouth daily   omeprazole (PriLOSEC) 20 mg delayed release capsule Take 20 mg by mouth 2 (two) times a day      Pyridoxine HCl (VITAMIN B-6 PO) Take by mouth daily      Vitamin D, Cholecalciferol, 1000 UNITS TABS Take 1 tablet by mouth daily Indications: with aloe  No current facility-administered medications for this visit  PRN Meds:         Family History   Problem Relation Age of Onset    Rectal cancer Brother     Stroke Mother     Heart attack Father     Arrhythmia Father         possible    Coronary artery disease Father     Sudden death Father         scd    Anuerysm Neg Hx     Clotting disorder Neg Hx     Hypertension Neg Hx     Hyperlipidemia Neg Hx     Heart failure Neg Hx        Social History     Social History    Marital status: /Civil Union     Spouse name: N/A    Number of children: N/A    Years of education: N/A     Occupational History    Not on file  Social History Main Topics    Smoking status: Former Smoker    Smokeless tobacco: Never Used    Alcohol use No    Drug use: No    Sexual activity: Not on file     Other Topics Concern    Not on file     Social History Narrative    No narrative on file         OBJECTIVE:    /80 (BP Location: Left arm, Patient Position: Sitting, Cuff Size: Adult)   Pulse 84   Ht 5' 4" (1 626 m)   Wt 59 kg (130 lb)   SpO2 97%   BMI 22 31 kg/m²    Vitals:    09/14/18 1002   Weight: 59 kg (130 lb)     GEN: No acute distress, Alert and oriented, well appearing  HEENT:Head, neck, ears, oral pharynx: Mucus membranes moist, oral pharynx clear, nares clear   External ears normal  EYES: Pupils equal, sclera anicteric, midline, normal conjuctiva  NECK: No JVD, supple, no obvious masses or thryomegaly or goiter  CARDIOVASCULAR:  RRR, No murmur, rub, gallops S1,S2  LUNGS: Clear To auscultation bilaterally, normal effort, no rales, rhonchi, crackles  ABDOMEN:  nondistended,  without obvious organomegaly or ascites  EXTREMITIES/VASCULAR:  No edema  Radial pulses intact, pedal pulses difficult to palpate, warm an well perfused  PSYCH: Normal Affect, no overt suicidal ideation, linear speech pattern without evidence of psychosis  NEURO: Grossly intact, moving all extremiteis equal, face symmetric, alert and responsive, no obvious focal defecits  GAIT:  Ambulates normally without difficulty  HEME: No bleeding, bruising, petechia, purpura  SKIN: No significant rashes, warm, no diaphoresis or pallor  Lab Results:       LABS:      Chemistry        Component Value Date/Time     2018 1050    K 4 5 2018 1050     2018 1050    CO2 27 2018 1050    BUN 11 2018 1050    CREATININE 0 79 2018 1050        Component Value Date/Time    CALCIUM 9 6 2018 1050    ALKPHOS 72 2018 1050    AST 40 2018 1050    ALT 33 2018 1050            No results found for: CHOL  Lab Results   Component Value Date    HDL 71 (H) 2018     Lab Results   Component Value Date    LDLCALC 49 2018     Lab Results   Component Value Date    TRIG 114 2018     No components found for: CHOLHDL    IMAGING: No results found       Cardiac testing:   Results for orders placed during the hospital encounter of 18   Echo complete with contrast if indicated    Narrative [facility], [facility]  [phone]    Transthoracic Echocardiogram  2D, M-mode, Doppler, and Color Doppler    Study date:  2018    Patient: Corey City  MR number: [id number]  Account number: [de-identified]  : 1945  Age: 72 years  Gender: Female  Status: Outpatient  Location: [address] [place] [address] and [facility]  Height: 64 in  Weight: 135 lb  BP: 120/ 64 mmHg    Indications: Internal capsule hemmorhage    Diagnoses: G45 9 - Transient cerebral ischemic attack, unspecified    Sonographer: ANGEL Garcia, RDCS  Referring Physician:  Unruly Mayfield DO  Group:  [facility]  Interpreting Physician:  Prudence Abraham MD    SUMMARY    LEFT VENTRICLE:  Size was normal   Systolic function was normal  Ejection fraction was estimated to be 55 %  Wall thickness was normal   Left ventricular diastolic function parameters were normal for the patient's age  RIGHT VENTRICLE:  The size was normal   Systolic function was normal     MITRAL VALVE:  There was trace regurgitation  TRICUSPID VALVE:  There was trace regurgitation  HISTORY: PRIOR HISTORY: Patient has no history of cardiovascular disease; Breast Implants    PROCEDURE: The study was performed in the [facility]  This was a routine study  The transthoracic approach was used  The study included complete 2D imaging, M-mode, complete spectral Doppler, and color Doppler  The  heart rate was 68 bpm, at the start of the study  Images were obtained from the parasternal, apical, subcostal, and suprasternal notch acoustic windows  Echocardiographic views were limited due to poor acoustic window availability and lung  interference  This was a technically difficult study  LEFT VENTRICLE: Size was normal  Systolic function was normal  Ejection fraction was estimated to be 55 %  There were no regional wall motion abnormalities  Wall thickness was normal  DOPPLER: Left ventricular diastolic function parameters  were normal for the patient's age  RIGHT VENTRICLE: The size was normal  Systolic function was normal  Wall thickness was normal     LEFT ATRIUM: Size was normal     RIGHT ATRIUM: Size was normal     MITRAL VALVE: Valve structure was normal  There was normal leaflet separation  DOPPLER: The transmitral velocity was within the normal range  There was no evidence for stenosis  There was trace regurgitation  AORTIC VALVE: The valve was trileaflet   Leaflets exhibited normal thickness and normal cuspal separation  DOPPLER: Transaortic velocity was within the normal range  There was no evidence for stenosis  There was no regurgitation  TRICUSPID VALVE: The valve structure was normal  There was normal leaflet separation  DOPPLER: The transtricuspid velocity was within the normal range  There was no evidence for stenosis  There was trace regurgitation  The tricuspid jet  envelope definition was inadequate for estimation of RV systolic pressure  There are no indirect findings suggestive of moderate or severe pulmonary hypertension  PULMONIC VALVE: Leaflets exhibited normal thickness, no calcification, and normal cuspal separation  DOPPLER: The transpulmonic velocity was within the normal range  There was no regurgitation  PERICARDIUM: There was no pericardial effusion  The pericardium was normal in appearance  AORTA: The root exhibited normal size  SYSTEMIC VEINS: IVC: The inferior vena cava was normal in size and course  Respirophasic changes were normal     SYSTEM MEASUREMENT TABLES    2D  %FS: 42 29 %  AV Diam: 2 7 cm  EDV(Teich): 72 7 ml  EF(Cube): 80 78 %  EF(Teich): 73 82 %  ESV(Cube): 12 9 ml  ESV(Teich): 19 03 ml  IVSd: 0 86 cm  LA Area: 11 29 cm2  LA Diam: 3 53 cm  LVEDV MOD A4C: 64 04 ml  LVEF MOD A4C: 79 03 %  LVESV MOD A4C: 13 43 ml  LVIDd: 4 06 cm  LVIDs: 2 35 cm  LVLd A4C: 6 76 cm  LVLs A4C: 5 3 cm  LVPWd: 0 68 cm  RA Area: 12 46 cm2  RV Diam: 3 cm  SV MOD A4C: 50 61 ml  SV(Cube): 54 23 ml  SV(Teich): 53 67 ml    CW  TR MaxP 66 mmHg  TR Vmax: 2 41 m/s    MM  TAPSE: 2 28 cm    PW  E': 0 07 m/s  E/E': 10 88  MV A Garry: 0 89 m/s  MV Dec Cannon: 5 79 m/s2  MV DecT: 137 47 ms  MV E Garry: 0 8 m/s  MV E/A Ratio: 0 9    [employer] Accredited Echocardiography Laboratory    Prepared and electronically signed by    Kayli Queen MD  Signed 2018 12:52:09       No results found for this or any previous visit  No results found for this or any previous visit    No results found for this or any previous visit  I reviewed and interpreted the following LABS/EKG/TELE/IMAGING and below is summary of my interpretation (if data available):        Current EKG and Rhythm Strip:NSR, normal EKG     I personally reviewed MRI report and holter  See discussion above  Total time spent 45 min, 25 min face to face

## 2018-09-14 NOTE — LETTER
September 14, 2018     Alex Robertson, DO  [facility] [address]    Patient: Stacey Wilkes   YOB: 1945   Date of Visit: 9/14/2018       Dear Dr Nata Julien:    Thank you for referring Bina Bains to me for evaluation  Below are my notes for this consultation  If you have questions, please do not hesitate to call me  I look forward to following your patient along with you  Sincerely,        Harsha Gerardo MD        CC: No Recipients  Harsha Gerardo MD  9/14/2018 10:26 AM  Sign at close encounter  [address] [facility]    Outpatient New Consult  *** Follow-up  Today's Date: 09/14/18        Patient name: Stacey Wilkes  YOB: 1945  Sex: female         Chief Complaint: ***      ASSESSMENT:  Problem List Items Addressed This Visit     None      Visit Diagnoses     Transient cerebral ischemia, unspecified type    -  Primary    Relevant Orders    POCT ECG                PLAN:        Follow up in: ***    Orders Placed This Encounter   Procedures    POCT ECG     There are no discontinued medications    HPI/Subjective: ***    Please note HPI is listed by problem with with update following it, it is copied again in the assessment above and reflects medical decision making as well  Complete 12 point ROS reviewed and otherwise non pertinent or negative except as per HPI pertinent positives in Cardiovascular and Respiratory emphasized  Please see paper chart for outpatient clinic patients where the patient completed the 12 point ROS survey  Past Medical History:   Diagnosis Date    Cataract of right eye     GERD (gastroesophageal reflux disease)        No Known Allergies  I reviewed the Home Medication list and Allergies in the chart     Scheduled Meds:  Current Outpatient Prescriptions   Medication Sig Dispense Refill    aspirin 81 mg chewable tablet Chew 81 mg daily      atorvastatin (LIPITOR) 10 mg tablet Take 1 tablet by mouth Daily      b complex vitamins tablet Take 1 tablet by mouth daily   calcium-vitamin D 250-100 MG-UNIT per tablet Take 1 tablet by mouth 3 (three) times a day   Multiple Vitamins-Minerals (CENTRUM CARDIO PO) Take 1 tablet by mouth daily   omeprazole (PriLOSEC) 20 mg delayed release capsule Take 20 mg by mouth 2 (two) times a day      Pyridoxine HCl (VITAMIN B-6 PO) Take by mouth daily      Vitamin D, Cholecalciferol, 1000 UNITS TABS Take 1 tablet by mouth daily Indications: with aloe  No current facility-administered medications for this visit  PRN Meds:         Family History   Problem Relation Age of Onset    Rectal cancer Brother     Stroke Mother     Heart attack Father     Arrhythmia Father         possible    Coronary artery disease Father     Sudden death Father         scd    Anuerysm Neg Hx     Clotting disorder Neg Hx     Hypertension Neg Hx     Hyperlipidemia Neg Hx     Heart failure Neg Hx      ***  Social History     Social History    Marital status: /Civil Union     Spouse name: N/A    Number of children: N/A    Years of education: N/A     Occupational History    Not on file       Social History Main Topics    Smoking status: Former Smoker    Smokeless tobacco: Never Used    Alcohol use No    Drug use: No    Sexual activity: Not on file     Other Topics Concern    Not on file     Social History Narrative    No narrative on file     ***    OBJECTIVE:    /80 (BP Location: Left arm, Patient Position: Sitting, Cuff Size: Adult)   Pulse 84   Ht 5' 4" (1 626 m)   Wt 59 kg (130 lb)   SpO2 97%   BMI 22 31 kg/m²    Vitals:    09/14/18 1002   Weight: 59 kg (130 lb)     GEN: No acute distress, Alert and oriented, well appearing***  HEENT:Head, neck, ears, oral pharynx: Mucus membranes moist, oral pharynx clear, nares clear  External ears normal  EYES: Pupils equal, sclera anicteric, midline, normal conjuctiva  NECK: No JVD, supple, no obvious masses or thryomegaly or goiter  CARDIOVASCULAR: *** RRR, No murmur, rub, gallops S1,S2  LUNGS: Clear To auscultation bilaterally, normal effort, no rales, rhonchi, crackles  ABDOMEN: *** nondistended,  without obvious organomegaly or ascites  EXTREMITIES/VASCULAR: *** No edema  Radial pulses intact, pedal pulses difficult to palpate, warm an well perfused  PSYCH: Normal Affect, no overt suicidal ideation, linear speech pattern without evidence of psychosis  NEURO: Grossly intact, moving all extremiteis equal, face symmetric, alert and responsive, no obvious focal defecits  GAIT: *** Ambulates normally without difficulty  HEME: No bleeding, bruising, petechia, purpura  SKIN: No significant rashes, warm, no diaphoresis or pallor  Lab Results:       LABS:      Chemistry        Component Value Date/Time     03/30/2018 1050    K 4 5 03/30/2018 1050     03/30/2018 1050    CO2 27 03/30/2018 1050    BUN 11 03/30/2018 1050    CREATININE 0 79 03/30/2018 1050        Component Value Date/Time    CALCIUM 9 6 03/30/2018 1050    ALKPHOS 72 03/30/2018 1050    AST 40 03/30/2018 1050    ALT 33 03/30/2018 1050            No results found for: CHOL  Lab Results   Component Value Date    HDL 71 (H) 07/02/2018     Lab Results   Component Value Date    LDLCALC 49 07/02/2018     Lab Results   Component Value Date    TRIG 114 07/02/2018     No components found for: CHOLHDL    IMAGING: No results found       Cardiac testing:   Results for orders placed during the hospital encounter of 07/19/18   Echo complete with contrast if indicated    Narrative [facility], [address] [facility]  [phone]    Transthoracic Echocardiogram  2D, M-mode, Doppler, and Color Doppler    Study date:  19-Jul-2018    Patient: Emanuel Amaya  MR number: [id number]  Account number: [de-identified]  : 1945  Age: 72 years  Gender: Female  Status: Outpatient  Location: [facility] [place]  Height: 64 in  Weight: 135 lb  BP: 120/ 64 mmHg    Indications: Internal capsule hemmorhage    Diagnoses: G45 9 - Transient cerebral ischemic attack, unspecified    Sonographer:  ANGEL Phillips, RDCS  Referring Physician:  Misha Rojas DO  Group:  [facility]  Interpreting Physician:  Lu Taveras MD    SUMMARY    LEFT VENTRICLE:  Size was normal   Systolic function was normal  Ejection fraction was estimated to be 55 %  Wall thickness was normal   Left ventricular diastolic function parameters were normal for the patient's age  RIGHT VENTRICLE:  The size was normal   Systolic function was normal     MITRAL VALVE:  There was trace regurgitation  TRICUSPID VALVE:  There was trace regurgitation  HISTORY: PRIOR HISTORY: Patient has no history of cardiovascular disease; Breast Implants    PROCEDURE: The study was performed in the [facility] and [facility] [place]  This was a routine study  The transthoracic approach was used  The study included complete 2D imaging, M-mode, complete spectral Doppler, and color Doppler  The  heart rate was 68 bpm, at the start of the study  Images were obtained from the parasternal, apical, subcostal, and suprasternal notch acoustic windows  Echocardiographic views were limited due to poor acoustic window availability and lung  interference  This was a technically difficult study  LEFT VENTRICLE: Size was normal  Systolic function was normal  Ejection fraction was estimated to be 55 %  There were no regional wall motion abnormalities  Wall thickness was normal  DOPPLER: Left ventricular diastolic function parameters  were normal for the patient's age      RIGHT VENTRICLE: The size was normal  Systolic function was normal  Wall thickness was normal     LEFT ATRIUM: Size was normal     RIGHT ATRIUM: Size was normal     MITRAL VALVE: Valve structure was normal  There was normal leaflet separation  DOPPLER: The transmitral velocity was within the normal range  There was no evidence for stenosis  There was trace regurgitation  AORTIC VALVE: The valve was trileaflet  Leaflets exhibited normal thickness and normal cuspal separation  DOPPLER: Transaortic velocity was within the normal range  There was no evidence for stenosis  There was no regurgitation  TRICUSPID VALVE: The valve structure was normal  There was normal leaflet separation  DOPPLER: The transtricuspid velocity was within the normal range  There was no evidence for stenosis  There was trace regurgitation  The tricuspid jet  envelope definition was inadequate for estimation of RV systolic pressure  There are no indirect findings suggestive of moderate or severe pulmonary hypertension  PULMONIC VALVE: Leaflets exhibited normal thickness, no calcification, and normal cuspal separation  DOPPLER: The transpulmonic velocity was within the normal range  There was no regurgitation  PERICARDIUM: There was no pericardial effusion  The pericardium was normal in appearance  AORTA: The root exhibited normal size  SYSTEMIC VEINS: IVC: The inferior vena cava was normal in size and course   Respirophasic changes were normal     SYSTEM MEASUREMENT TABLES    2D  %FS: 42 29 %  AV Diam: 2 7 cm  EDV(Teich): 72 7 ml  EF(Cube): 80 78 %  EF(Teich): 73 82 %  ESV(Cube): 12 9 ml  ESV(Teich): 19 03 ml  IVSd: 0 86 cm  LA Area: 11 29 cm2  LA Diam: 3 53 cm  LVEDV MOD A4C: 64 04 ml  LVEF MOD A4C: 79 03 %  LVESV MOD A4C: 13 43 ml  LVIDd: 4 06 cm  LVIDs: 2 35 cm  LVLd A4C: 6 76 cm  LVLs A4C: 5 3 cm  LVPWd: 0 68 cm  RA Area: 12 46 cm2  RV Diam: 3 cm  SV MOD A4C: 50 61 ml  SV(Cube): 54 23 ml  SV(Teich): 53 67 ml    CW  TR MaxP 66 mmHg  TR Vmax: 2 41 m/s    MM  TAPSE: 2 28 cm    PW  E': 0 07 m/s  E/E': 10 88  MV A Garry: 0 89 m/s  MV Dec [place]: 5 79 m/s2  MV DecT: 137 47 ms  MV E Garry: 0 8 m/s  MV E/A Ratio: 0 9    [facility] Accredited Echocardiography Laboratory    Prepared and electronically signed by    Samara Santaan MD  Signed 19-Jul-2018 12:52:09       No results found for this or any previous visit  No results found for this or any previous visit  No results found for this or any previous visit  I reviewed and interpreted the following LABS/EKG/TELE/IMAGING and below is summary of my interpretation (if data available):    LABS:***    Current EKG and Rhythm Strip:***    Past EKGs and RHYTHM strip: ***    HOLTER/EVENT Monitor:***    Interrogation of Pacemaker/Defibrillator/Loop (prior recorded events), etc: ***    I personally did cardiac device interrogation in office today and this was my finding of prior events***    ECHO images reviewed and this is my interpretation: ***            Tiffany De Jesus MD  9/14/2018 10:42 AM  Sign at close encounter  [address] [facility]    Outpatient New Consult    Today's Date: 09/14/18        Patient name: Josue Martinez  YOB: 1945  Sex: female         Chief Complaint: f/u Afib      ASSESSMENT:  Problem List Items Addressed This Visit     None      Visit Diagnoses     Transient cerebral ischemia, unspecified type    -  Primary    Relevant Orders    POCT ECG        71 yo female  1) TIA w left leg tingling, found to have 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 and small hemorrhage  SHe has no obstruction but 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  No h/o HTN  She is now on lipitor and asa and follows w neurology  2) PAC's rare on holter  NO palpitations  NOrmal echo, normal LA          PLAN:  1  Her TIA was not likely caused by emoblic disease and unrelated to afib  SHe is overall low risk for afib w/o structural heart disease on echo and benign holter just some PAC's  (76 PAC's over 48 hours)   bascially normal holter  IF we did place loop and did find afib, would be difficult to recommend anticoagulation given h/o intracranial hemorrhage so in my opinion loop could even lead to harm and I would recommend against it  2) recommend BP monitoring at home in case she has intermittent HTN and worth treating given pattern on MRI c/w HTN brain disease  Follow up in: prn    Orders Placed This Encounter   Procedures    POCT ECG     There are no discontinued medications    HPI/Subjective:   71 yo female  1) TIA w left leg tingling, found to have 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 and small hemorrhage  SHe has no obstruction but 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  No h/o HTN  She is now on lipitor and asa and follows w neurology  2) PAC's rare on holter  NO palpitations  NOrmal echo, normal LA    Please note HPI is listed by problem with with update following it, it is copied again in the assessment above and reflects medical decision making as well  Complete 12 point ROS reviewed and otherwise non pertinent or negative except as per HPI pertinent positives in Cardiovascular and Respiratory emphasized  Please see paper chart for outpatient clinic patients where the patient completed the 12 point ROS survey  Past Medical History:   Diagnosis Date    Cataract of right eye     GERD (gastroesophageal reflux disease)        No Known Allergies  I reviewed the Home Medication list and Allergies in the chart  Scheduled Meds:  Current Outpatient Prescriptions   Medication Sig Dispense Refill    aspirin 81 mg chewable tablet Chew 81 mg daily      atorvastatin (LIPITOR) 10 mg tablet Take 1 tablet by mouth Daily      b complex vitamins tablet Take 1 tablet by mouth daily   calcium-vitamin D 250-100 MG-UNIT per tablet Take 1 tablet by mouth 3 (three) times a day        Multiple Vitamins-Minerals (CENTRUM CARDIO PO) Take 1 tablet by mouth daily   omeprazole (PriLOSEC) 20 mg delayed release capsule Take 20 mg by mouth 2 (two) times a day      Pyridoxine HCl (VITAMIN B-6 PO) Take by mouth daily      Vitamin D, Cholecalciferol, 1000 UNITS TABS Take 1 tablet by mouth daily Indications: with aloe  No current facility-administered medications for this visit  PRN Meds:         Family History   Problem Relation Age of Onset    Rectal cancer Brother     Stroke Mother     Heart attack Father     Arrhythmia Father         possible    Coronary artery disease Father     Sudden death Father         scd    Anuerysm Neg Hx     Clotting disorder Neg Hx     Hypertension Neg Hx     Hyperlipidemia Neg Hx     Heart failure Neg Hx        Social History     Social History    Marital status: /Civil Union     Spouse name: N/A    Number of children: N/A    Years of education: N/A     Occupational History    Not on file  Social History Main Topics    Smoking status: Former Smoker    Smokeless tobacco: Never Used    Alcohol use No    Drug use: No    Sexual activity: Not on file     Other Topics Concern    Not on file     Social History Narrative    No narrative on file         OBJECTIVE:    /80 (BP Location: Left arm, Patient Position: Sitting, Cuff Size: Adult)   Pulse 84   Ht 5' 4" (1 626 m)   Wt 59 kg (130 lb)   SpO2 97%   BMI 22 31 kg/m²    Vitals:    09/14/18 1002   Weight: 59 kg (130 lb)     GEN: No acute distress, Alert and oriented, well appearing  HEENT:Head, neck, ears, oral pharynx: Mucus membranes moist, oral pharynx clear, nares clear   External ears normal  EYES: Pupils equal, sclera anicteric, midline, normal conjuctiva  NECK: No JVD, supple, no obvious masses or thryomegaly or goiter  CARDIOVASCULAR:  RRR, No murmur, rub, gallops S1,S2  LUNGS: Clear To auscultation bilaterally, normal effort, no rales, rhonchi, crackles  ABDOMEN:  nondistended,  without obvious organomegaly or ascites  EXTREMITIES/VASCULAR:  No edema  Radial pulses intact, pedal pulses difficult to palpate, warm an well perfused  PSYCH: Normal Affect, no overt suicidal ideation, linear speech pattern without evidence of psychosis  NEURO: Grossly intact, moving all extremiteis equal, face symmetric, alert and responsive, no obvious focal defecits  GAIT:  Ambulates normally without difficulty  HEME: No bleeding, bruising, petechia, purpura  SKIN: No significant rashes, warm, no diaphoresis or pallor  Lab Results:       LABS:      Chemistry        Component Value Date/Time     2018 1050    K 4 5 2018 1050     2018 1050    CO2 27 2018 1050    BUN 11 2018 1050    CREATININE 0 79 2018 1050        Component Value Date/Time    CALCIUM 9 6 2018 1050    ALKPHOS 72 2018 1050    AST 40 2018 1050    ALT 33 2018 1050            No results found for: CHOL  Lab Results   Component Value Date    HDL 71 (H) 2018     Lab Results   Component Value Date    LDLCALC 49 2018     Lab Results   Component Value Date    TRIG 114 2018     No components found for: CHOLHDL    IMAGING: No results found       Cardiac testing:   Results for orders placed during the hospital encounter of 18   Echo complete with contrast if indicated    Narrative [place] [address], [address] [place] [address]  [phone]    Transthoracic Echocardiogram  2D, M-mode, Doppler, and Color Doppler    Study date:  2018    Patient: Tommy Amaya  MR number: [id number]  Account number: [de-identified]  : 1945  Age: 72 years  Gender: Female  Status: Outpatient  Location: [facility] [place]  Height: 64 in  Weight: 135 lb  BP: 120/ 64 mmHg    Indications: Internal capsule hemmorhage    Diagnoses: G45 9 - Transient cerebral ischemic attack, unspecified    Sonographer:  ANGEL Mauricio, RDCS  Referring Physician:  Nelly Kent DO  Group:  [facility]  Interpreting Physician:  Marilee Cho MD    SUMMARY    LEFT VENTRICLE:  Size was normal   Systolic function was normal  Ejection fraction was estimated to be 55 %  Wall thickness was normal   Left ventricular diastolic function parameters were normal for the patient's age  RIGHT VENTRICLE:  The size was normal   Systolic function was normal     MITRAL VALVE:  There was trace regurgitation  TRICUSPID VALVE:  There was trace regurgitation  HISTORY: PRIOR HISTORY: Patient has no history of cardiovascular disease; Breast Implants    PROCEDURE: The study was performed in the [address] [place] [address] and [facility]  This was a routine study  The transthoracic approach was used  The study included complete 2D imaging, M-mode, complete spectral Doppler, and color Doppler  The  heart rate was 68 bpm, at the start of the study  Images were obtained from the parasternal, apical, subcostal, and suprasternal notch acoustic windows  Echocardiographic views were limited due to poor acoustic window availability and lung  interference  This was a technically difficult study  LEFT VENTRICLE: Size was normal  Systolic function was normal  Ejection fraction was estimated to be 55 %  There were no regional wall motion abnormalities  Wall thickness was normal  DOPPLER: Left ventricular diastolic function parameters  were normal for the patient's age  RIGHT VENTRICLE: The size was normal  Systolic function was normal  Wall thickness was normal     LEFT ATRIUM: Size was normal     RIGHT ATRIUM: Size was normal     MITRAL VALVE: Valve structure was normal  There was normal leaflet separation  DOPPLER: The transmitral velocity was within the normal range  There was no evidence for stenosis  There was trace regurgitation  AORTIC VALVE: The valve was trileaflet  Leaflets exhibited normal thickness and normal cuspal separation   DOPPLER: Transaortic velocity was within the normal range  There was no evidence for stenosis  There was no regurgitation  TRICUSPID VALVE: The valve structure was normal  There was normal leaflet separation  DOPPLER: The transtricuspid velocity was within the normal range  There was no evidence for stenosis  There was trace regurgitation  The tricuspid jet  envelope definition was inadequate for estimation of RV systolic pressure  There are no indirect findings suggestive of moderate or severe pulmonary hypertension  PULMONIC VALVE: Leaflets exhibited normal thickness, no calcification, and normal cuspal separation  DOPPLER: The transpulmonic velocity was within the normal range  There was no regurgitation  PERICARDIUM: There was no pericardial effusion  The pericardium was normal in appearance  AORTA: The root exhibited normal size  SYSTEMIC VEINS: IVC: The inferior vena cava was normal in size and course  Respirophasic changes were normal     SYSTEM MEASUREMENT TABLES    2D  %FS: 42 29 %  AV Diam: 2 7 cm  EDV(Teich): 72 7 ml  EF(Cube): 80 78 %  EF(Teich): 73 82 %  ESV(Cube): 12 9 ml  ESV(Teich): 19 03 ml  IVSd: 0 86 cm  LA Area: 11 29 cm2  LA Diam: 3 53 cm  LVEDV MOD A4C: 64 04 ml  LVEF MOD A4C: 79 03 %  LVESV MOD A4C: 13 43 ml  LVIDd: 4 06 cm  LVIDs: 2 35 cm  LVLd A4C: 6 76 cm  LVLs A4C: 5 3 cm  LVPWd: 0 68 cm  RA Area: 12 46 cm2  RV Diam: 3 cm  SV MOD A4C: 50 61 ml  SV(Cube): 54 23 ml  SV(Teich): 53 67 ml    CW  TR MaxP 66 mmHg  TR Vmax: 2 41 m/s    MM  TAPSE: 2 28 cm    PW  E': 0 07 m/s  E/E': 10 88  MV A Garry: 0 89 m/s  MV Dec [place]: 5 79 m/s2  MV DecT: 137 47 ms  MV E Garry: 0 8 m/s  MV E/A Ratio: 0 9    [employer] Accredited Echocardiography Laboratory    Prepared and electronically signed by    Prudence Abraham MD  Signed 2018 12:52:09       No results found for this or any previous visit  No results found for this or any previous visit    No results found for this or any previous visit         I reviewed and interpreted the following LABS/EKG/TELE/IMAGING and below is summary of my interpretation (if data available):        Current EKG and Rhythm Strip:NSR, normal EKG     I personally reviewed MRI report and holter  See discussion above  Total time spent 45 min, 25 min face to face

## 2018-09-27 NOTE — PROGRESS NOTES
Assessment and Plan:   ***    Plan:  There are no diagnoses linked to this encounter  Rheumatic Disease Summary:       HARVEY Coy is a 72 y o   female who presents     The following portions of the patient's history were reviewed and updated as appropriate: allergies, current medications, past family history, past medical history, past social history, past surgical history and problem list     Review of Systems:   Review of Systems    Home Medications:     Current Outpatient Prescriptions:     aspirin 81 mg chewable tablet, Chew 81 mg daily, Disp: , Rfl:     atorvastatin (LIPITOR) 10 mg tablet, Take 1 tablet by mouth Daily, Disp: , Rfl:     b complex vitamins tablet, Take 1 tablet by mouth daily  , Disp: , Rfl:     calcium-vitamin D 250-100 MG-UNIT per tablet, Take 1 tablet by mouth 3 (three) times a day , Disp: , Rfl:     Multiple Vitamins-Minerals (CENTRUM CARDIO PO), Take 1 tablet by mouth daily  , Disp: , Rfl:     omeprazole (PriLOSEC) 20 mg delayed release capsule, Take 20 mg by mouth 2 (two) times a day, Disp: , Rfl:     Pyridoxine HCl (VITAMIN B-6 PO), Take by mouth daily, Disp: , Rfl:     Vitamin D, Cholecalciferol, 1000 UNITS TABS, Take 1 tablet by mouth daily Indications: with aloe  , Disp: , Rfl:     Objective: There were no vitals filed for this visit      Physical Exam  Musculoskeletal--Peripheral Joint Exam:  Physical Exam    Disease Activity Monitoring  ***    Imaging:   ***    Labs:   ***

## 2018-09-27 NOTE — PROGRESS NOTES
1  Idiopathic osteoporosis         Patient is in the office today for her next Prolia injection  Prolia 60 mg was injected subcutaneously into the left upper extremity by Salvatore Davis MA  Patient tolerated the procedure and left without complication  She will return to the office in 6 months for her next Prolia injection

## 2018-10-01 ENCOUNTER — OFFICE VISIT (OUTPATIENT)
Dept: RHEUMATOLOGY | Facility: CLINIC | Age: 73
End: 2018-10-01
Payer: MEDICARE

## 2018-10-01 VITALS
BODY MASS INDEX: 22.2 KG/M2 | HEIGHT: 64 IN | HEART RATE: 80 BPM | WEIGHT: 130 LBS | SYSTOLIC BLOOD PRESSURE: 122 MMHG | DIASTOLIC BLOOD PRESSURE: 66 MMHG

## 2018-10-01 DIAGNOSIS — M81.8 IDIOPATHIC OSTEOPOROSIS: Primary | ICD-10-CM

## 2018-10-01 PROCEDURE — 96372 THER/PROPH/DIAG INJ SC/IM: CPT | Performed by: PHYSICIAN ASSISTANT

## 2018-11-26 ENCOUNTER — APPOINTMENT (OUTPATIENT)
Dept: RADIOLOGY | Facility: CLINIC | Age: 73
End: 2018-11-26
Payer: MEDICARE

## 2018-11-26 ENCOUNTER — OFFICE VISIT (OUTPATIENT)
Dept: OBGYN CLINIC | Facility: CLINIC | Age: 73
End: 2018-11-26
Payer: MEDICARE

## 2018-11-26 VITALS
DIASTOLIC BLOOD PRESSURE: 85 MMHG | HEIGHT: 64 IN | SYSTOLIC BLOOD PRESSURE: 150 MMHG | WEIGHT: 132 LBS | HEART RATE: 79 BPM | BODY MASS INDEX: 22.53 KG/M2

## 2018-11-26 DIAGNOSIS — M18.12 ARTHRITIS OF CARPOMETACARPAL (CMC) JOINT OF LEFT THUMB: ICD-10-CM

## 2018-11-26 DIAGNOSIS — M79.641 BILATERAL HAND PAIN: ICD-10-CM

## 2018-11-26 DIAGNOSIS — M18.11 ARTHRITIS OF CARPOMETACARPAL (CMC) JOINT OF RIGHT THUMB: ICD-10-CM

## 2018-11-26 DIAGNOSIS — M79.642 BILATERAL HAND PAIN: ICD-10-CM

## 2018-11-26 DIAGNOSIS — M79.642 BILATERAL HAND PAIN: Primary | ICD-10-CM

## 2018-11-26 DIAGNOSIS — M79.641 BILATERAL HAND PAIN: Primary | ICD-10-CM

## 2018-11-26 PROCEDURE — 73130 X-RAY EXAM OF HAND: CPT

## 2018-11-26 PROCEDURE — 20600 DRAIN/INJ JOINT/BURSA W/O US: CPT | Performed by: SURGERY

## 2018-11-26 PROCEDURE — 99204 OFFICE O/P NEW MOD 45 MIN: CPT | Performed by: SURGERY

## 2018-11-26 RX ORDER — BUPIVACAINE HYDROCHLORIDE 2.5 MG/ML
1 INJECTION, SOLUTION INFILTRATION; PERINEURAL
Status: COMPLETED | OUTPATIENT
Start: 2018-11-26 | End: 2018-11-26

## 2018-11-26 RX ORDER — TRIAMCINOLONE ACETONIDE 40 MG/ML
40 INJECTION, SUSPENSION INTRA-ARTICULAR; INTRAMUSCULAR
Status: COMPLETED | OUTPATIENT
Start: 2018-11-26 | End: 2018-11-26

## 2018-11-26 RX ORDER — A/SINGAPORE/GP1908/2015 IVR-180 (H1N1) (AN A/MICHIGAN/45/2015 (H1N1)PDM09-LIKE VIRUS), A/HONG KONG/4801/2014, NYMC X-263B (H3N2) (AN A/HONG KONG/4801/2014-LIKE VIRUS), AND B/BRISBANE/60/2008, WILD TYPE (A B/BRISBANE/60/2008-LIKE VIRUS) 15; 15; 15 UG/.5ML; UG/.5ML; UG/.5ML
INJECTION, SUSPENSION INTRAMUSCULAR
Refills: 0 | COMMUNITY
Start: 2018-09-08

## 2018-11-26 RX ORDER — LIDOCAINE HYDROCHLORIDE 10 MG/ML
1 INJECTION, SOLUTION INFILTRATION; PERINEURAL
Status: COMPLETED | OUTPATIENT
Start: 2018-11-26 | End: 2018-11-26

## 2018-11-26 RX ORDER — ATORVASTATIN CALCIUM 10 MG/1
TABLET, FILM COATED ORAL
COMMUNITY
End: 2019-04-01

## 2018-11-26 RX ADMIN — BUPIVACAINE HYDROCHLORIDE 1 ML: 2.5 INJECTION, SOLUTION INFILTRATION; PERINEURAL at 08:36

## 2018-11-26 RX ADMIN — TRIAMCINOLONE ACETONIDE 40 MG: 40 INJECTION, SUSPENSION INTRA-ARTICULAR; INTRAMUSCULAR at 08:35

## 2018-11-26 RX ADMIN — LIDOCAINE HYDROCHLORIDE 1 ML: 10 INJECTION, SOLUTION INFILTRATION; PERINEURAL at 08:35

## 2018-11-26 RX ADMIN — LIDOCAINE HYDROCHLORIDE 1 ML: 10 INJECTION, SOLUTION INFILTRATION; PERINEURAL at 08:36

## 2018-11-26 RX ADMIN — BUPIVACAINE HYDROCHLORIDE 1 ML: 2.5 INJECTION, SOLUTION INFILTRATION; PERINEURAL at 08:35

## 2018-11-26 RX ADMIN — TRIAMCINOLONE ACETONIDE 40 MG: 40 INJECTION, SUSPENSION INTRA-ARTICULAR; INTRAMUSCULAR at 08:36

## 2018-11-26 NOTE — PROGRESS NOTES
Shruthi ETIENNE  Attending, Orthopaedic Surgery  Hand, Wrist, and Elbow Surgery  [facility]      ORTHOPAEDIC HAND, WRIST, AND ELBOW OFFICE  VISIT       ASSESSMENT/PLAN:      72 y o  female with bilateral thumb cmc joint arthritis  Bilateral thumb CMC joint injections performed in the office today, without complication  It was discussed that there is no limit to the amount of injections she may receive into her bilateral CMC joints, they do have to be aprox  12 weeks apart at a minimum  She should continue the use of the night time bracing  We will see her back in the office in 6 weeks or on an as needed basis  The patient verbalized understanding of exam findings and treatment plan  We engaged in the shared decision-making process and treatment options were discussed at length with the patient  Surgical and conservative management discussed today along with risks and benefits  Diagnoses and all orders for this visit:    Bilateral hand pain  -     XR hand 3+ vw left; Future  -     XR hand 3+ vw right; Future    Arthritis of carpometacarpal [place]) joint of right thumb    Arthritis of carpometacarpal (CMC) joint of left thumb    Other orders  -     atorvastatin (LIPITOR) 10 mg tablet; atorvastatin 10 mg tablet  -     FLUAD 0 5 ML LÓPEZ; inject 0 5 milliliter intramuscularly  -     Small joint arthrocentesis  -     Small joint arthrocentesis        Follow Up:  Return in about 6 weeks (around 1/7/2019) for Recheck  To Do Next Visit:  Re-evaluation of current issue      General Discussions:  [facility] Arthritis: The anatomy and physiology of carpometacarpal joint arthritis was discussed with the patient today in the office  Deterioration of the articular cartilage eventually leads to hypermobility at the thumb [facility] joint, resulting in joint subluxation, osteophyte formation, cystic changes within the trapezium and base of the first metacarpal, as well as subchondral sclerosis  Eventually, pain, limited mobility, and compensatory hyperextension at the metacarpophalangeal joint may develop  While normal activity and usage of the thumb joint may provide a painful experience to the patient, this typically does not result in damage to the thumb or hand  Treatment options include resting thumb spica splints to decreased joint edema, pain, and inflammation  Therapy exercises to strengthen the thenar musculature may relieve pain, but do not alter the overall continued development of osteoarthritis  Oral medications, topical medications, corticosteroid injections may decrease pain and increase overall function  Eventually, approximately 5% of patients may require surgical intervention  Operative Discussions:  Thumb CMC suspensionplasty:   The anatomy and physiology of carpometacarpal joint arthritis was discussed with the patient today in the office  Deterioration of the articular cartilage eventually leads to hypermobility at the thumb [facility] joint, resulting in joint subluxation, osteophyte formation, cystic changes within the trapezium and base of the first metacarpal, as well as subchondral sclerosis  Eventually, pain, limited mobility, and compensatory hyperextension at the metacarpophalangeal joint may develop  While normal activity and usage of the thumb joint may provide a painful experience to the patient, this typically does not result in damage to the thumb or hand  Treatment options include resting thumb spica splints to decreased joint edema, pain, and inflammation  Therapy exercises to strengthen the thenar musculature may relieve pain, but do not alter the overall continued development of osteoarthritis    Oral medications, topical medications, corticosteroid injections may decrease pain and increase overall function  Eventually, approximately 5% of patients may require surgical intervention  The patient has elected to undergo thumb reconstruction (suspensionplasty)  The operation was explained to the patient and the patient expressed verbal understanding  The risks and benefit of surgery were discussed with the patient  After the procedure, the pain will be decreased, the function improved, and the strength improved  The potential for numbness around the incision site was also discussed  The patient is aware that rehabilitation is a 3 month process divided into 3 stages  The first 4weeks consists of rest to the surgical site - the bulky dressing applied after surgery will be replaced by a removable splint for 4 weeks  This removable splint may be removed for showering, bathing, and daily therapy exercises  At 4 weeks postoperatively, the splint will be discontinued and thumb motion exercises will continue for the next 4 weeks  The following 4 weeks the patient will begin strengthening exercises  The patient is aware that the average total recovery time is approximately 3-4 months  Success rate is approximately 90-95%  The risks and benefits of the procedure were explained to the patient, which include, but are not limited to: Bleeding, infection, recurrence, pain, scar, damage to tendons, damage to nerves, and damage to blood vessels, failure to give desired results and complications related to anesthesia  These risks, along with alternative conservative treatment options, and postoperative protocols were voiced back and understood by the patient  All questions were answered to the patient's satisfaction  The patient agrees to comply with a standard postoperative protocol, and is willing to proceed  Education was provided via written and auditory forms  There were no barriers to learning   Written handouts regarding wound care, incision and scar care, and general preoperative information was provided to the patient  Prior to surgery, the patient may be requested to stop all anti-inflammatory medications  Prophylactic aspirin, Plavix, and Coumadin may be allowed to be continued  Medications including vitamin E , ginkgo, and fish oil are requested to be stopped approximately one week prior to surgery  Hypertensive medications and beta blockers, if taken, should be continued  ____________________________________________________________________________________________________________________________________________      CHIEF COMPLAINT:  Chief Complaint   Patient presents with    Left Thumb - Pain    Right Thumb - Pain       SUBJECTIVE:  Noy Hidalgo is a 72y o  year old RHD female who presents to the office today for bilateral thumb pain  The patient was referred to the office by her family doctor, Dr Tarsha Gould  The patient states that she has been experiencing intermittent thumb pain for aprox  1 year ago, left is worse than right  She states that she is experiencing intermittent pain over her thumb cmc joints bilaterally  Her pain is worse  with use of her thumbs and is better at rest  The patient does wear bilateral thumb spica braces at night, she notes improvement of her symptoms when using the braces  She is not taking anything for pain control  She denies associated numbness and tingling          Pain/symptom timing:  Worse during the day when active  Pain/symptom context:  Worse with activites and work  Pain/symptom modifying factors:  Rest makes better, activities make worse  Pain/symptom associated signs/symptoms: none    Prior treatment   · NSAIDsNo   · Injections No   · Bracing/Orthotics Yes    Physical Therapy No     I have personally reviewed all the relevant PMH, PSH, SH, FH, Medications and allergies      PAST MEDICAL HISTORY:  Past Medical History:   Diagnosis Date    Cataract of right eye     GERD (gastroesophageal reflux disease)        PAST SURGICAL HISTORY:  Past Surgical History:   Procedure Laterality Date    CATARACT EXTRACTION, BILATERAL Bilateral     MASTECTOMY Bilateral     for calcium deposits    IL COLONOSCOPY FLX DX W/COLLJ SPEC WHEN PFRMD N/A 8/25/2017    Procedure: COLONOSCOPY;  Surgeon: Juancho Lobo MD;  Location: [facility]; Service: Colorectal    IL REMV CATARACT EXTRACAP,INSERT LENS Right 6/21/2016    Procedure: OD EXTRACTION EXTRACAPSULAR CATARACT PHACO INTRAOCULAR LENS (IOL); Surgeon: Nicho Frias MD;  Location: [facility];  Service: Ophthalmology    TONSILLECTOMY         FAMILY HISTORY:  Family History   Problem Relation Age of Onset    Rectal cancer Brother     Stroke Mother     Heart attack Father     Arrhythmia Father         possible    Coronary artery disease Father     Sudden death Father         scd    Anuerysm Neg Hx     Clotting disorder Neg Hx     Hypertension Neg Hx     Hyperlipidemia Neg Hx     Heart failure Neg Hx        SOCIAL HISTORY:  Social History   Substance Use Topics    Smoking status: Former Smoker    Smokeless tobacco: Never Used    Alcohol use No       MEDICATIONS:    Current Outpatient Prescriptions:     aspirin 81 mg chewable tablet, Chew 81 mg daily, Disp: , Rfl:     atorvastatin (LIPITOR) 10 mg tablet, Take 1 tablet by mouth Daily, Disp: , Rfl:     atorvastatin (LIPITOR) 10 mg tablet, atorvastatin 10 mg tablet, Disp: , Rfl:     b complex vitamins tablet, Take 1 tablet by mouth daily  , Disp: , Rfl:     calcium-vitamin D 250-100 MG-UNIT per tablet, Take 1 tablet by mouth 3 (three) times a day , Disp: , Rfl:     FLUAD 0 5 ML LÓPEZ, inject 0 5 milliliter intramuscularly, Disp: , Rfl: 0    Multiple Vitamins-Minerals (CENTRUM CARDIO PO), Take 1 tablet by mouth daily  , Disp: , Rfl:     omeprazole (PriLOSEC) 20 mg delayed release capsule, Take 20 mg by mouth 2 (two) times a day, Disp: , Rfl:     Pyridoxine HCl (VITAMIN B-6 PO), Take by mouth daily, Disp: , Rfl:     Vitamin D, Cholecalciferol, 1000 UNITS TABS, Take 1 tablet by mouth daily Indications: with aloe  , Disp: , Rfl:     ALLERGIES:  No Known Allergies        REVIEW OF SYSTEMS:  Review of Systems   Constitutional: Negative for chills, fever and unexpected weight change  HENT: Negative for hearing loss, nosebleeds and sore throat  Eyes: Negative for pain, redness and visual disturbance  Respiratory: Negative for cough, shortness of breath and wheezing  Cardiovascular: Negative for chest pain, palpitations and leg swelling  Gastrointestinal: Negative for abdominal pain, nausea and vomiting  Endocrine: Negative for polydipsia and polyuria  Genitourinary: Negative for difficulty urinating and hematuria  Musculoskeletal: Positive for arthralgias  Negative for joint swelling and myalgias  Skin: Negative for rash and wound  Neurological: Negative for dizziness, numbness and headaches  Psychiatric/Behavioral: Negative for decreased concentration, dysphoric mood and suicidal ideas  The patient is not nervous/anxious          VITALS:  Vitals:    11/26/18 0804   BP: 150/85   Pulse: 79       LABS:  HgA1c: No results found for: HGBA1C  BMP:   Lab Results   Component Value Date    CALCIUM 9 6 03/30/2018    K 4 5 03/30/2018    CO2 27 03/30/2018     03/30/2018    BUN 11 03/30/2018    CREATININE 0 79 03/30/2018       _____________________________________________________  PHYSICAL EXAMINATION:  General: well developed and well nourished, alert, oriented times 3 and appears comfortable  Psychiatric: Normal  HEENT: Normocephalic, Atraumatic Trachea Midline, No torticollis  Pulmonary: No audible wheezing or respiratory distress   Cardiovascular: No pitting edema, 2+ radial pulse   Skin: No masses, erthema, lacerations, fluctation, ulcerations  Neurovascular: Sensation Intact to the Median, Ulnar, Radial Nerve, Motor Intact to the Median, Ulnar, Radial Nerve and Pulses Intact  Musculoskeletal: Normal, except as noted in detailed exam and in HPI  MUSCULOSKELETAL EXAMINATION:    left CMC Exam:  No adduction contracture  45 degree's of hyperextension deformity of MCP joint  Positive localized tenderness over radial and dorsal aspect of thumb (CMC joint)  Grind test is Positive for pain and Positive for crepitus  Metacarpal load shift test positive   No triggering or tenderness over the A1 pulley    right CMC Exam:  No adduction contracture  45 degree's of hyperextension deformity of MCP joint  Positive localized tenderness over radial and dorsal aspect of thumb (CMC joint)  Grind test is Positive for pain and Positive for crepitus  Metacarpal load shift test positive  No triggering or tenderness over the A1 pulley    Able to make a full composite fist bilaterally   Good capillary refill bilaterally   ___________________________________________________  STUDIES REVIEWED:  I have personally reviewed AP lateral and oblique radiographs of the right thumb which demonstrate stage III/IVhe CMC arthritis, noted joint hyperextension MP joint   I have personally reviewed AP lateral oblique radiographs of the left thumb which also demonstrates stage III CMC arthritis with hyperextension of the MP joint        PROCEDURES PERFORMED:  Small joint arthrocentesis  Date/Time: 11/26/2018 8:35 AM  Consent given by: patient  Site marked: site marked  Timeout: Immediately prior to procedure a time out was called to verify the correct patient, procedure, equipment, support staff and site/side marked as required   Supporting Documentation  Indications: pain   Procedure Details  Location: thumb - L thumb CMC  Preparation: Patient was prepped and draped in the usual sterile fashion  Needle size: 25 G  Ultrasound guidance: no  Medications administered: 1 mL bupivacaine 0 25 %; 1 mL lidocaine 1 %; 40 mg triamcinolone acetonide 40 mg/mL    Patient tolerance: patient tolerated the procedure well with no immediate complications  Dressing:  Sterile dressing applied  Small joint arthrocentesis  Date/Time: 11/26/2018 8:36 AM  Consent given by: patient  Site marked: site marked  Timeout: Immediately prior to procedure a time out was called to verify the correct patient, procedure, equipment, support staff and site/side marked as required   Supporting Documentation  Indications: pain   Procedure Details  Location: thumb - R thumb CMC  Needle size: 25 G  Ultrasound guidance: no  Medications administered: 1 mL bupivacaine 0 25 %; 1 mL lidocaine 1 %; 40 mg triamcinolone acetonide 40 mg/mL    Patient tolerance: patient tolerated the procedure well with no immediate complications  Dressing:  Sterile dressing applied         _____________________________________________________      Scribe Attestation    I,:   Bulmaro Agarwal am acting as a scribe while in the presence of the attending physician :        I,:   Raul Adler MD personally performed the services described in this documentation    as scribed in my presence :

## 2018-11-26 NOTE — LETTER
November 26, 2018     Rajani Meraz DO  [address] [facility] [address]    Patient: Tayler Scott   YOB: 1945   Date of Visit: 11/26/2018       Dear Dr Ashley Srivastava:    Thank you for referring Maryann Mcgee to me for evaluation  Below are my notes for this consultation  If you have questions, please do not hesitate to call me  I look forward to following your patient along with you  Sincerely,        Glynn Quintana MD        CC: No Recipients    Glynn ETIENNE  Attending, Orthopaedic Surgery  Hand, Wrist, and Elbow Surgery  [facility]      ORTHOPAEDIC HAND, WRIST, AND ELBOW OFFICE  VISIT       ASSESSMENT/PLAN:      72 y o  female with bilateral thumb cmc joint arthritis  Bilateral thumb CMC joint injections performed in the office today, without complication  It was discussed that there is no limit to the amount of injections she may receive into her bilateral CMC joints, they do have to be aprox  12 weeks apart at a minimum  She should continue the use of the night time bracing  We will see her back in the office in 6 weeks or on an as needed basis  The patient verbalized understanding of exam findings and treatment plan  We engaged in the shared decision-making process and treatment options were discussed at length with the patient  Surgical and conservative management discussed today along with risks and benefits  Diagnoses and all orders for this visit:    Bilateral hand pain  -     XR hand 3+ vw left;  Future  -     XR hand 3+ vw right; Future    Arthritis of carpometacarpal [place]) joint of right thumb    Arthritis of carpometacarpal (CMC) joint of left thumb    Other orders  -     atorvastatin (LIPITOR) 10 mg tablet; atorvastatin 10 mg tablet  -     FLUAD 0 5 ML LÓPEZ; inject 0 5 milliliter intramuscularly  -     Small joint arthrocentesis  -     Small joint arthrocentesis        Follow Up:  Return in about 6 weeks (around 1/7/2019) for Recheck  To Do Next Visit:  Re-evaluation of current issue      General Discussions:  [facility] Arthritis: The anatomy and physiology of carpometacarpal joint arthritis was discussed with the patient today in the office  Deterioration of the articular cartilage eventually leads to hypermobility at the thumb [facility] joint, resulting in joint subluxation, osteophyte formation, cystic changes within the trapezium and base of the first metacarpal, as well as subchondral sclerosis  Eventually, pain, limited mobility, and compensatory hyperextension at the metacarpophalangeal joint may develop  While normal activity and usage of the thumb joint may provide a painful experience to the patient, this typically does not result in damage to the thumb or hand  Treatment options include resting thumb spica splints to decreased joint edema, pain, and inflammation  Therapy exercises to strengthen the thenar musculature may relieve pain, but do not alter the overall continued development of osteoarthritis  Oral medications, topical medications, corticosteroid injections may decrease pain and increase overall function  Eventually, approximately 5% of patients may require surgical intervention  Operative Discussions:  Thumb CMC suspensionplasty:   The anatomy and physiology of carpometacarpal joint arthritis was discussed with the patient today in the office  Deterioration of the articular cartilage eventually leads to hypermobility at the thumb [facility] joint, resulting in joint subluxation, osteophyte formation, cystic changes within the trapezium and base of the first metacarpal, as well as subchondral sclerosis  Eventually, pain, limited mobility, and compensatory hyperextension at the metacarpophalangeal joint may develop    While normal activity and usage of the thumb joint may provide a painful experience to the patient, this typically does not result in damage to the thumb or hand  Treatment options include resting thumb spica splints to decreased joint edema, pain, and inflammation  Therapy exercises to strengthen the thenar musculature may relieve pain, but do not alter the overall continued development of osteoarthritis  Oral medications, topical medications, corticosteroid injections may decrease pain and increase overall function  Eventually, approximately 5% of patients may require surgical intervention  The patient has elected to undergo thumb reconstruction (suspensionplasty)  The operation was explained to the patient and the patient expressed verbal understanding  The risks and benefit of surgery were discussed with the patient  After the procedure, the pain will be decreased, the function improved, and the strength improved  The potential for numbness around the incision site was also discussed  The patient is aware that rehabilitation is a 3 month process divided into 3 stages  The first 4weeks consists of rest to the surgical site - the bulky dressing applied after surgery will be replaced by a removable splint for 4 weeks  This removable splint may be removed for showering, bathing, and daily therapy exercises  At 4 weeks postoperatively, the splint will be discontinued and thumb motion exercises will continue for the next 4 weeks  The following 4 weeks the patient will begin strengthening exercises  The patient is aware that the average total recovery time is approximately 3-4 months  Success rate is approximately 90-95%  The risks and benefits of the procedure were explained to the patient, which include, but are not limited to: Bleeding, infection, recurrence, pain, scar, damage to tendons, damage to nerves, and damage to blood vessels, failure to give desired results and complications related to anesthesia    These risks, along with alternative conservative treatment options, and postoperative protocols were voiced back and understood by the patient  All questions were answered to the patient's satisfaction  The patient agrees to comply with a standard postoperative protocol, and is willing to proceed  Education was provided via written and auditory forms  There were no barriers to learning  Written handouts regarding wound care, incision and scar care, and general preoperative information was provided to the patient  Prior to surgery, the patient may be requested to stop all anti-inflammatory medications  Prophylactic aspirin, Plavix, and Coumadin may be allowed to be continued  Medications including vitamin E , ginkgo, and fish oil are requested to be stopped approximately one week prior to surgery  Hypertensive medications and beta blockers, if taken, should be continued  ____________________________________________________________________________________________________________________________________________      CHIEF COMPLAINT:  Chief Complaint   Patient presents with    Left Thumb - Pain    Right Thumb - Pain       SUBJECTIVE:  Jose Marroquin is a 72y o  year old RHD female who presents to the office today for bilateral thumb pain  The patient was referred to the office by her family doctor, Dr Adrien Cho  The patient states that she has been experiencing intermittent thumb pain for aprox  1 year ago, left is worse than right  She states that she is experiencing intermittent pain over her thumb cmc joints bilaterally  Her pain is worse  with use of her thumbs and is better at rest  The patient does wear bilateral thumb spica braces at night, she notes improvement of her symptoms when using the braces  She is not taking anything for pain control  She denies associated numbness and tingling          Pain/symptom timing:  Worse during the day when active  Pain/symptom context:  Worse with activites and work  Pain/symptom modifying factors:  Rest makes better, activities make worse  Pain/symptom associated signs/symptoms: none    Prior treatment   · NSAIDsNo   · Injections No   · Bracing/Orthotics Yes    Physical Therapy No     I have personally reviewed all the relevant PMH, PSH, SH, FH, Medications and allergies      PAST MEDICAL HISTORY:  Past Medical History:   Diagnosis Date    Cataract of right eye     GERD (gastroesophageal reflux disease)        PAST SURGICAL HISTORY:  Past Surgical History:   Procedure Laterality Date    CATARACT EXTRACTION, BILATERAL Bilateral     MASTECTOMY Bilateral     for calcium deposits    ME COLONOSCOPY FLX DX W/COLLJ SPEC WHEN PFRMD N/A 8/25/2017    Procedure: COLONOSCOPY;  Surgeon: Nancy Lange MD;  [facility]; Service: Colorectal    ME REMV CATARACT EXTRACAP,INSERT LENS Right 6/21/2016    Procedure: OD EXTRACTION EXTRACAPSULAR CATARACT PHACO INTRAOCULAR LENS (IOL); Surgeon: Olvin Delaney MD;  Location: [facility];  Service: Ophthalmology    TONSILLECTOMY         FAMILY HISTORY:  Family History   Problem Relation Age of Onset    Rectal cancer Brother     Stroke Mother     Heart attack Father     Arrhythmia Father         possible    Coronary artery disease Father     Sudden death Father         scd    Anuerysm Neg Hx     Clotting disorder Neg Hx     Hypertension Neg Hx     Hyperlipidemia Neg Hx     Heart failure Neg Hx        SOCIAL HISTORY:  Social History   Substance Use Topics    Smoking status: Former Smoker    Smokeless tobacco: Never Used    Alcohol use No       MEDICATIONS:    Current Outpatient Prescriptions:     aspirin 81 mg chewable tablet, Chew 81 mg daily, Disp: , Rfl:     atorvastatin (LIPITOR) 10 mg tablet, Take 1 tablet by mouth Daily, Disp: , Rfl:     atorvastatin (LIPITOR) 10 mg tablet, atorvastatin 10 mg tablet, Disp: , Rfl:     b complex vitamins tablet, Take 1 tablet by mouth daily  , Disp: , Rfl:    calcium-vitamin D 250-100 MG-UNIT per tablet, Take 1 tablet by mouth 3 (three) times a day , Disp: , Rfl:     FLUAD 0 5 ML LÓPEZ, inject 0 5 milliliter intramuscularly, Disp: , Rfl: 0    Multiple Vitamins-Minerals (CENTRUM CARDIO PO), Take 1 tablet by mouth daily  , Disp: , Rfl:     omeprazole (PriLOSEC) 20 mg delayed release capsule, Take 20 mg by mouth 2 (two) times a day, Disp: , Rfl:     Pyridoxine HCl (VITAMIN B-6 PO), Take by mouth daily, Disp: , Rfl:     Vitamin D, Cholecalciferol, 1000 UNITS TABS, Take 1 tablet by mouth daily Indications: with aloe  , Disp: , Rfl:     ALLERGIES:  No Known Allergies        REVIEW OF SYSTEMS:  Review of Systems   Constitutional: Negative for chills, fever and unexpected weight change  HENT: Negative for hearing loss, nosebleeds and sore throat  Eyes: Negative for pain, redness and visual disturbance  Respiratory: Negative for cough, shortness of breath and wheezing  Cardiovascular: Negative for chest pain, palpitations and leg swelling  Gastrointestinal: Negative for abdominal pain, nausea and vomiting  Endocrine: Negative for polydipsia and polyuria  Genitourinary: Negative for difficulty urinating and hematuria  Musculoskeletal: Positive for arthralgias  Negative for joint swelling and myalgias  Skin: Negative for rash and wound  Neurological: Negative for dizziness, numbness and headaches  Psychiatric/Behavioral: Negative for decreased concentration, dysphoric mood and suicidal ideas  The patient is not nervous/anxious          VITALS:  Vitals:    11/26/18 0804   BP: 150/85   Pulse: 79       LABS:  HgA1c: No results found for: HGBA1C  BMP:   Lab Results   Component Value Date    CALCIUM 9 6 03/30/2018    K 4 5 03/30/2018    CO2 27 03/30/2018     03/30/2018    BUN 11 03/30/2018    CREATININE 0 79 03/30/2018       _____________________________________________________  PHYSICAL EXAMINATION:  General: well developed and well nourished, alert, oriented times 3 and appears comfortable  Psychiatric: Normal  HEENT: Normocephalic, Atraumatic Trachea Midline, No torticollis  Pulmonary: No audible wheezing or respiratory distress   Cardiovascular: No pitting edema, 2+ radial pulse   Skin: No masses, erthema, lacerations, fluctation, ulcerations  Neurovascular: Sensation Intact to the Median, Ulnar, Radial Nerve, Motor Intact to the Median, Ulnar, Radial Nerve and Pulses Intact  Musculoskeletal: Normal, except as noted in detailed exam and in HPI  MUSCULOSKELETAL EXAMINATION:    left CMC Exam:  No adduction contracture  45 degree's of hyperextension deformity of MCP joint  Positive localized tenderness over radial and dorsal aspect of thumb (CMC joint)  Grind test is Positive for pain and Positive for crepitus  Metacarpal load shift test positive   No triggering or tenderness over the A1 pulley    right CMC Exam:  No adduction contracture  45 degree's of hyperextension deformity of MCP joint  Positive localized tenderness over radial and dorsal aspect of thumb (CMC joint)  Grind test is Positive for pain and Positive for crepitus  Metacarpal load shift test positive  No triggering or tenderness over the A1 pulley    Able to make a full composite fist bilaterally   Good capillary refill bilaterally   ___________________________________________________  STUDIES REVIEWED:  I have personally reviewed AP lateral and oblique radiographs of the right thumb which demonstrate stage III/IVhe CMC arthritis, noted joint hyperextension MP joint   I have personally reviewed AP lateral oblique radiographs of the left thumb which also demonstrates stage III CMC arthritis with hyperextension of the MP joint        PROCEDURES PERFORMED:  Small joint arthrocentesis  Date/Time: 11/26/2018 8:35 AM  Consent given by: patient  Site marked: site marked  Timeout: Immediately prior to procedure a time out was called to verify the correct patient, procedure, equipment, support staff and site/side marked as required   Supporting Documentation  Indications: pain   Procedure Details  Location: thumb - L thumb CMC  Preparation: Patient was prepped and draped in the usual sterile fashion  Needle size: 25 G  Ultrasound guidance: no  Medications administered: 1 mL bupivacaine 0 25 %; 1 mL lidocaine 1 %; 40 mg triamcinolone acetonide 40 mg/mL    Patient tolerance: patient tolerated the procedure well with no immediate complications  Dressing:  Sterile dressing applied  Small joint arthrocentesis  Date/Time: 11/26/2018 8:36 AM  Consent given by: patient  Site marked: site marked  Timeout: Immediately prior to procedure a time out was called to verify the correct patient, procedure, equipment, support staff and site/side marked as required   Supporting Documentation  Indications: pain   Procedure Details  Location: thumb - R thumb CMC  Needle size: 25 G  Ultrasound guidance: no  Medications administered: 1 mL bupivacaine 0 25 %; 1 mL lidocaine 1 %; 40 mg triamcinolone acetonide 40 mg/mL    Patient tolerance: patient tolerated the procedure well with no immediate complications  Dressing:  Sterile dressing applied         _____________________________________________________      Scribe Attestation    I,:   Luis Agarwal am acting as a scribe while in the presence of the attending physician :        I,:   Oliva Verdin MD personally performed the services described in this documentation    as scribed in my presence :                    Attestation signed by Oliva Verdin MD at 11/26/2018  8:59 AM:  I saw and examined the patient personally and agree with my physician extender's note and plan   I formulated  the plan and gave  explicit instructions to the patient

## 2018-12-26 ENCOUNTER — OFFICE VISIT (OUTPATIENT)
Dept: NEUROLOGY | Facility: CLINIC | Age: 73
End: 2018-12-26
Payer: MEDICARE

## 2018-12-26 VITALS
SYSTOLIC BLOOD PRESSURE: 122 MMHG | DIASTOLIC BLOOD PRESSURE: 70 MMHG | HEIGHT: 64 IN | RESPIRATION RATE: 14 BRPM | BODY MASS INDEX: 22.53 KG/M2 | HEART RATE: 70 BPM | WEIGHT: 132 LBS

## 2018-12-26 DIAGNOSIS — Z13.1 DIABETES MELLITUS SCREENING: ICD-10-CM

## 2018-12-26 DIAGNOSIS — I61.9 HEMORRHAGIC STROKE (HCC): Primary | ICD-10-CM

## 2018-12-26 DIAGNOSIS — R79.9 ABNORMAL FINDING OF BLOOD CHEMISTRY: ICD-10-CM

## 2018-12-26 PROCEDURE — 99214 OFFICE O/P EST MOD 30 MIN: CPT | Performed by: PSYCHIATRY & NEUROLOGY

## 2018-12-26 NOTE — PROGRESS NOTES
Patient ID: Hammad Davies is a 72 y o  female  Assessment/Plan:    No problem-specific Assessment & Plan notes found for this encounter  Diagnoses and all orders for this visit:    Hemorrhagic stroke [facility])- right external capsule- likely etiology small vessel disease- discussed with her- fall would typically not cause stroke there  Discussed with her this was not a TIA as there are permanent changes on her brain  She has about a 45 pack yr hx of smoking and chronic microangiopathic changes- moderate on MRI that can certainly cause lipohyalinosis and lacunar infarcts or hemorrhage of arterioles  Discussed stroke RF modification  Okay to c/w ASA 81 and statin daily  Comments:    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  Orders:  -     HEMOGLOBIN A1C W/ EAG ESTIMATION; Future  -     VAS carotid complete study; Future    Diabetes mellitus screening  -     HEMOGLOBIN A1C W/ EAG ESTIMATION; Future    Abnormal finding of blood chemistry   -     HEMOGLOBIN A1C W/ EAG ESTIMATION; Future       If sudden onset of one sided weakness, facial droop, speech changes call 911/go to ED as these can be potential signs of acute stroke  She is doing well and can follow up with me in one year's time  She knows to call if any clinical concerns from a neurologic standpoint  Subjective:    HPI    Ms Susanna Barraza is a pleasant  Emmanuel Earl female seen in follow up for right external capsule hemorrhage noted incidentally after work up for fall on black ice with no immediate symptoms 3/2018 but with left sided paresthesias abrupt onset a week later    This resolved with stopping ASA And this has since been restarted with no bleeding/bruising  States her left side is numb/tingly but no new sensory motor speech or vision deficitis    Was a significant smoker for at least 40-45 years- other than second hand smoke exposure from both parents in the past   Does not smoke any longer  Other pertinent work up  ECHO wnl  CUS <50% stenosis b/l  Holter wnl  Lipid panel wnl 7/2018- repeat pending March 2019    The following portions of the patient's history were reviewed and updated as appropriate: allergies, current medications, past family history, past medical history, past social history, past surgical history and problem list          Objective:    Blood pressure 122/70, pulse 70, resp  rate 14, height 5' 4" (1 626 m), weight 59 9 kg (132 lb)  Physical Exam   Constitutional: She is oriented to person, place, and time  She appears well-developed and well-nourished  HENT:   Head: Normocephalic and atraumatic  Eyes: Pupils are equal, round, and reactive to light  Neck: Normal range of motion  Cardiovascular: Normal rate and regular rhythm  Pulmonary/Chest: Effort normal    Abdominal: Soft  Musculoskeletal: Normal range of motion  She exhibits no tenderness  Neurological: She is alert and oriented to person, place, and time  She has normal strength and normal reflexes  Coordination normal    Nursing note and vitals reviewed  Neurological Exam  Mental Status  Alert  Oriented to person, place, time and situation  Recent and remote memory are intact  no dysarthria present  Language is fluent with no aphasia  Attention and concentration are normal     Cranial Nerves  CN II: Visual fields full to confrontation  CN III, IV, VI: Extraocular movements intact bilaterally  Pupils equal round and reactive to light bilaterally  CN V: Facial sensation is normal   CN VII: Full and symmetric facial movement  CN VIII: Hearing is normal   CN IX, X: Palate elevates symmetrically  Normal gag reflex  CN XI: Shoulder shrug strength is normal   CN XII: Tongue midline without atrophy or fasciculations  Motor   Normal muscle tone  The following abnormal movements were seen: Strength is 5/5 throughout all four extremities  Strength is 5/5 in all four extremities except as noted      Sensory  Pinprick abnormality: Temperature abnormality:   Reduced sensation left arm and leg  Reflexes  Deep tendon reflexes are 2+ and symmetric in all four extremities with downgoing toes bilaterally  Coordination  Finger-to-nose, rapid alternating movements and heel-to-shin normal bilaterally without dysmetria  Gait  Casual gait is normal including stance, stride, and arm swing  Tandem gait abnormality: Romberg is absent  ROS:    Review of Systems   Constitutional: Negative  Negative for appetite change and fever  HENT: Negative  Negative for hearing loss, tinnitus, trouble swallowing and voice change  Eyes: Negative  Negative for photophobia and pain  Respiratory: Negative  Negative for shortness of breath  Cardiovascular: Negative  Negative for palpitations  Gastrointestinal: Negative  Negative for nausea and vomiting  Endocrine: Negative  Negative for cold intolerance and heat intolerance  Genitourinary: Negative  Negative for dysuria, frequency and urgency  Musculoskeletal: Negative  Negative for myalgias and neck pain  Skin: Negative  Negative for rash  Neurological: Positive for numbness  Negative for dizziness, tremors, seizures, syncope, facial asymmetry, speech difficulty, weakness, light-headedness and headaches  Tingling     Hematological: Negative  Does not bruise/bleed easily  Psychiatric/Behavioral: Negative  Negative for confusion, hallucinations and sleep disturbance

## 2018-12-26 NOTE — PATIENT INSTRUCTIONS
Neurology    If sudden onset of one sided weakness, facial droop, speech changes call 911/go to ED as these can be potential signs of acute stroke

## 2018-12-26 NOTE — LETTER
January 21, 2019     Marta Rodrigues DO  [facility] [address]    Patient: Kvng Whatley   YOB: 1945   Date of Visit: 12/26/2018       Dear Dr Bert Lane:    Thank you for referring Odette Fernandez to me for evaluation  Below are my notes for this consultation  If you have questions, please do not hesitate to call me  I look forward to following your patient along with you  Sincerely,        Tonya Hyde DO        CC: No Recipients  Tonya Hyde DO  1/21/2019  7:15 PM  Sign at close encounter  Patient ID: Kvng Whatley is a 72 y o  female  Assessment/Plan:    No problem-specific Assessment & Plan notes found for this encounter  Diagnoses and all orders for this visit:    Hemorrhagic stroke [facility])- right external capsule- likely etiology small vessel disease- discussed with her- fall would typically not cause stroke there  Discussed with her this was not a TIA as there are permanent changes on her brain  She has about a 45 pack yr hx of smoking and chronic microangiopathic changes- moderate on MRI that can certainly cause lipohyalinosis and lacunar infarcts or hemorrhage of arterioles  Discussed stroke RF modification  Okay to c/w ASA 81 and statin daily  Comments:    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  Orders:  -     HEMOGLOBIN A1C W/ EAG ESTIMATION; Future  -     VAS carotid complete study; Future    Diabetes mellitus screening  -     HEMOGLOBIN A1C W/ EAG ESTIMATION; Future    Abnormal finding of blood chemistry   -     HEMOGLOBIN A1C W/ EAG ESTIMATION; Future       If sudden onset of one sided weakness, facial droop, speech changes call 911/go to ED as these can be potential signs of acute stroke  She is doing well and can follow up with me in one year's time  She knows to call if any clinical concerns from a neurologic standpoint  Subjective:    HPI    Ms Odette Fernandez is a pleasant  Jacque Cristianes female seen in follow up for right external capsule hemorrhage noted incidentally after work up for fall on black ice with no immediate symptoms 3/2018 but with left sided paresthesias abrupt onset a week later  This resolved with stopping ASA And this has since been restarted with no bleeding/bruising  States her left side is numb/tingly but no new sensory motor speech or vision deficitis    Was a significant smoker for at least 40-45 years- other than second hand smoke exposure from both parents in the past   Does not smoke any longer  Other pertinent work up  ECHO wnl  CUS <50% stenosis b/l  Holter wnl  Lipid panel wnl 7/2018- repeat pending March 2019    The following portions of the patient's history were reviewed and updated as appropriate: allergies, current medications, past family history, past medical history, past social history, past surgical history and problem list          Objective:    Blood pressure 122/70, pulse 70, resp  rate 14, height 5' 4" (1 626 m), weight 59 9 kg (132 lb)  Physical Exam   Constitutional: She is oriented to person, place, and time  She appears well-developed and well-nourished  HENT:   Head: Normocephalic and atraumatic  Eyes: Pupils are equal, round, and reactive to light  Neck: Normal range of motion  Cardiovascular: Normal rate and regular rhythm  Pulmonary/Chest: Effort normal    Abdominal: Soft  Musculoskeletal: Normal range of motion  She exhibits no tenderness  Neurological: She is alert and oriented to person, place, and time  She has normal strength and normal reflexes  Coordination normal    Nursing note and vitals reviewed  Neurological Exam  Mental Status  Alert  Oriented to person, place, time and situation  Recent and remote memory are intact  no dysarthria present  Language is fluent with no aphasia  Attention and concentration are normal     Cranial Nerves  CN II: Visual fields full to confrontation  CN III, IV, VI: Extraocular movements intact bilaterally   Pupils equal round and reactive to light bilaterally  CN V: Facial sensation is normal   CN VII: Full and symmetric facial movement  CN VIII: Hearing is normal   CN IX, X: Palate elevates symmetrically  Normal gag reflex  CN XI: Shoulder shrug strength is normal   CN XII: Tongue midline without atrophy or fasciculations  Motor   Normal muscle tone  The following abnormal movements were seen: Strength is 5/5 throughout all four extremities  Strength is 5/5 in all four extremities except as noted  Sensory  Pinprick abnormality: Temperature abnormality:   Reduced sensation left arm and leg  Reflexes  Deep tendon reflexes are 2+ and symmetric in all four extremities with downgoing toes bilaterally  Coordination  Finger-to-nose, rapid alternating movements and heel-to-shin normal bilaterally without dysmetria  Gait  Casual gait is normal including stance, stride, and arm swing  Tandem gait abnormality: Romberg is absent  ROS:    Review of Systems   Constitutional: Negative  Negative for appetite change and fever  HENT: Negative  Negative for hearing loss, tinnitus, trouble swallowing and voice change  Eyes: Negative  Negative for photophobia and pain  Respiratory: Negative  Negative for shortness of breath  Cardiovascular: Negative  Negative for palpitations  Gastrointestinal: Negative  Negative for nausea and vomiting  Endocrine: Negative  Negative for cold intolerance and heat intolerance  Genitourinary: Negative  Negative for dysuria, frequency and urgency  Musculoskeletal: Negative  Negative for myalgias and neck pain  Skin: Negative  Negative for rash  Neurological: Positive for numbness  Negative for dizziness, tremors, seizures, syncope, facial asymmetry, speech difficulty, weakness, light-headedness and headaches  Tingling     Hematological: Negative  Does not bruise/bleed easily  Psychiatric/Behavioral: Negative    Negative for confusion, hallucinations and sleep disturbance

## 2019-01-10 ENCOUNTER — TELEPHONE (OUTPATIENT)
Dept: NEUROLOGY | Facility: CLINIC | Age: 74
End: 2019-01-10

## 2019-01-23 ENCOUNTER — OFFICE VISIT (OUTPATIENT)
Dept: OBGYN CLINIC | Facility: CLINIC | Age: 74
End: 2019-01-23
Payer: MEDICARE

## 2019-01-23 VITALS
BODY MASS INDEX: 23.9 KG/M2 | WEIGHT: 140 LBS | SYSTOLIC BLOOD PRESSURE: 150 MMHG | DIASTOLIC BLOOD PRESSURE: 81 MMHG | HEART RATE: 73 BPM | HEIGHT: 64 IN

## 2019-01-23 DIAGNOSIS — M18.11 ARTHRITIS OF CARPOMETACARPAL (CMC) JOINT OF RIGHT THUMB: Primary | ICD-10-CM

## 2019-01-23 DIAGNOSIS — M18.12 ARTHRITIS OF CARPOMETACARPAL (CMC) JOINT OF LEFT THUMB: ICD-10-CM

## 2019-01-23 PROCEDURE — 99213 OFFICE O/P EST LOW 20 MIN: CPT | Performed by: SURGERY

## 2019-01-23 PROCEDURE — 20600 DRAIN/INJ JOINT/BURSA W/O US: CPT | Performed by: SURGERY

## 2019-01-23 RX ORDER — BUPIVACAINE HYDROCHLORIDE 2.5 MG/ML
1 INJECTION, SOLUTION INFILTRATION; PERINEURAL
Status: COMPLETED | OUTPATIENT
Start: 2019-01-23 | End: 2019-01-23

## 2019-01-23 RX ORDER — TRIAMCINOLONE ACETONIDE 40 MG/ML
40 INJECTION, SUSPENSION INTRA-ARTICULAR; INTRAMUSCULAR
Status: COMPLETED | OUTPATIENT
Start: 2019-01-23 | End: 2019-01-23

## 2019-01-23 RX ORDER — LIDOCAINE HYDROCHLORIDE 10 MG/ML
1 INJECTION, SOLUTION INFILTRATION; PERINEURAL
Status: COMPLETED | OUTPATIENT
Start: 2019-01-23 | End: 2019-01-23

## 2019-01-23 RX ORDER — BUPIVACAINE HYDROCHLORIDE 2.5 MG/ML
1 INJECTION, SOLUTION EPIDURAL; INFILTRATION; INTRACAUDAL
Status: COMPLETED | OUTPATIENT
Start: 2019-01-23 | End: 2019-01-23

## 2019-01-23 RX ADMIN — TRIAMCINOLONE ACETONIDE 40 MG: 40 INJECTION, SUSPENSION INTRA-ARTICULAR; INTRAMUSCULAR at 10:31

## 2019-01-23 RX ADMIN — TRIAMCINOLONE ACETONIDE 40 MG: 40 INJECTION, SUSPENSION INTRA-ARTICULAR; INTRAMUSCULAR at 10:32

## 2019-01-23 RX ADMIN — BUPIVACAINE HYDROCHLORIDE 1 ML: 2.5 INJECTION, SOLUTION EPIDURAL; INFILTRATION; INTRACAUDAL at 10:31

## 2019-01-23 RX ADMIN — LIDOCAINE HYDROCHLORIDE 1 ML: 10 INJECTION, SOLUTION INFILTRATION; PERINEURAL at 10:32

## 2019-01-23 RX ADMIN — BUPIVACAINE HYDROCHLORIDE 1 ML: 2.5 INJECTION, SOLUTION INFILTRATION; PERINEURAL at 10:32

## 2019-01-23 RX ADMIN — LIDOCAINE HYDROCHLORIDE 1 ML: 10 INJECTION, SOLUTION INFILTRATION; PERINEURAL at 10:31

## 2019-01-23 NOTE — PROGRESS NOTES
ASSESSMENT/PLAN:    Bilateral thumb CMC arthritis     A discussion was had with the patient regarding her bilateral CMC arthritis  Due to the relief she got from the first injections, I think a second injection would benefit her  The patient has agreed to proceed with the injections  We also discussed wearing comfort cool braces during the day  An order for these was placed in the office today  She may follow up with me in 3 months  The patient verbalized understanding of exam findings and treatment plan  We engaged in the shared decision-making process and treatment options were discussed at length with the patient  Surgical and conservative management discussed today along with risks and benefits  Diagnoses and all orders for this visit:    Arthritis of carpometacarpal (CMC) joint of right thumb  -     Thumb Cude comf/Cool  -     Small joint arthrocentesis    Arthritis of carpometacarpal (CMC) joint of left thumb  -     Thumb Cude comf/Cool    Other orders  -     Small joint arthrocentesis        Follow Up:  Return in about 3 months (around 4/23/2019)  To Do Next Visit:  Re-evaluation of current issue    ____________________________________________________________________________________________________________________________________________      CHIEF COMPLAINT:  Chief Complaint   Patient presents with    Right Hand - Follow-up    Left Hand - Follow-up       SUBJECTIVE:  Saurav Acosta is a 72y o  year old RHD female who presents for a follow up regarding her bilateral thumb CMC arthritis  At her last visit she received bilateral CSI which provided her with about 40 % relief  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  She reports the left pain is worse than right  She has been trying to keep her hands warm in the colder months  I have personally reviewed all the relevant PMH, PSH, SH, FH, Medications and allergies       PAST MEDICAL HISTORY:  Past Medical History:   Diagnosis Date    Cataract of right eye     GERD (gastroesophageal reflux disease)        PAST SURGICAL HISTORY:  Past Surgical History:   Procedure Laterality Date    CATARACT EXTRACTION, BILATERAL Bilateral     MASTECTOMY Bilateral     for calcium deposits    [place] COLONOSCOPY FLX DX W/COLLJ SPEC WHEN PFRMD N/A 8/25/2017    Procedure: COLONOSCOPY;  Surgeon: Tony Aleman MD;  [facility]; Service: Colorectal    NH REMV CATARACT EXTRACAP,INSERT LENS Right 6/21/2016    Procedure: OD EXTRACTION EXTRACAPSULAR CATARACT PHACO INTRAOCULAR LENS (IOL); Surgeon: Carrol Sacks, MD;  [facility];  Service: Ophthalmology    TONSILLECTOMY         FAMILY HISTORY:  Family History   Problem Relation Age of Onset    Rectal cancer Brother     Stroke Mother     Heart attack Father     Arrhythmia Father         possible    Coronary artery disease Father     Sudden death Father         scd    Anuerysm Neg Hx     Clotting disorder Neg Hx     Hypertension Neg Hx     Hyperlipidemia Neg Hx     Heart failure Neg Hx        SOCIAL HISTORY:  Social History   Substance Use Topics    Smoking status: Former Smoker    Smokeless tobacco: Never Used    Alcohol use No       MEDICATIONS:    Current Outpatient Prescriptions:     aspirin 81 mg chewable tablet, Chew 81 mg daily, Disp: , Rfl:     atorvastatin (LIPITOR) 10 mg tablet, Take 1 tablet by mouth Daily, Disp: , Rfl:     atorvastatin (LIPITOR) 10 mg tablet, atorvastatin 10 mg tablet, Disp: , Rfl:     b complex vitamins tablet, Take 1 tablet by mouth daily  , Disp: , Rfl:     calcium-vitamin D 250-100 MG-UNIT per tablet, Take 1 tablet by mouth 3 (three) times a day , Disp: , Rfl:     FLUAD 0 5 ML LÓPEZ, inject 0 5 milliliter intramuscularly, Disp: , Rfl: 0    Multiple Vitamins-Minerals (CENTRUM CARDIO PO), Take 1 tablet by mouth daily  , Disp: , Rfl:     omeprazole (PriLOSEC) 20 mg delayed release capsule, Take 20 mg by mouth 2 (two) times a day, Disp: , Rfl:     Pyridoxine HCl (VITAMIN B-6 PO), Take by mouth daily, Disp: , Rfl:     Vitamin D, Cholecalciferol, 1000 UNITS TABS, Take 1 tablet by mouth daily Indications: with aloe  , Disp: , Rfl:     ALLERGIES:  No Known Allergies    REVIEW OF SYSTEMS:  Review of Systems   Constitutional: Negative for chills, fever and unexpected weight change  HENT: Negative for hearing loss, nosebleeds and sore throat  Eyes: Negative for pain, redness and visual disturbance  Respiratory: Negative for cough, shortness of breath and wheezing  Cardiovascular: Negative for chest pain, palpitations and leg swelling  Gastrointestinal: Negative for abdominal pain, nausea and vomiting  Endocrine: Negative for polydipsia and polyuria  Genitourinary: Negative for dyspareunia and hematuria  Musculoskeletal: Positive for arthralgias  Negative for joint swelling and myalgias  Skin: Negative for rash and wound  Neurological: Negative for dizziness, numbness and headaches  Psychiatric/Behavioral: Negative for decreased concentration and suicidal ideas  The patient is not nervous/anxious          VITALS:  Vitals:    01/23/19 1006   BP: 150/81   Pulse: 73       LABS:  HgA1c: No results found for: HGBA1C  BMP:   Lab Results   Component Value Date    CALCIUM 9 6 03/30/2018    K 4 5 03/30/2018    CO2 27 03/30/2018     03/30/2018    BUN 11 03/30/2018    CREATININE 0 79 03/30/2018       _____________________________________________________  PHYSICAL EXAMINATION:  General: well developed and well nourished, alert, oriented times 3 and appears comfortable  Psychiatric: Normal  HEENT: Normocephalic, Atraumatic Trachea Midline, No torticollis  Pulmonary: No audible wheezing or respiratory distress   Cardiovascular: No pitting edema, 2+ radial pulse   Skin: No masses, erthema, lacerations, fluctation, ulcerations  Neurovascular: Sensation Intact to the Median, Ulnar, Radial Nerve, Motor Intact to the Median, Ulnar, Radial Nerve and Pulses Intact  Musculoskeletal: Normal, except as noted in detailed exam and in HPI        MUSCULOSKELETAL EXAMINATION:    left CMC Exam:  No adduction contracture  45 degree's of hyperextension deformity of MCP joint  CMC tenderness   Grind test is Positive for pain and Positive for crepitus  Metacarpal load shift test positive   No triggering or tenderness over the A1 pulley     right CMC Exam:  No adduction contracture  45 degree's of hyperextension deformity of MCP joint  CMC tenderness   Grind test is Positive for pain and Positive for crepitus  Metacarpal load shift test positive  No triggering or tenderness over the A1 pulley    Able to make a full composite fist bilaterally   Good capillary refill bilaterally   ___________________________________________________      ___________________________________________________  STUDIES REVIEWED:  No new images to review       PROCEDURES PERFORMED:  Small joint arthrocentesis  Date/Time: 1/23/2019 10:31 AM  Consent given by: patient  Site marked: site marked  Timeout: Immediately prior to procedure a time out was called to verify the correct patient, procedure, equipment, support staff and site/side marked as required   Supporting Documentation  Indications: pain   Procedure Details  Location: thumb - R thumb CMC  Preparation: Patient was prepped and draped in the usual sterile fashion  Ultrasound guidance: no  Medications administered: 1 mL bupivacaine (PF) 0 25 %; 1 mL lidocaine 1 %; 40 mg triamcinolone acetonide 40 mg/mL    Patient tolerance: patient tolerated the procedure well with no immediate complications  Dressing:  Sterile dressing applied  Small joint arthrocentesis  Date/Time: 1/23/2019 10:32 AM  Consent given by: patient  Site marked: site marked  Timeout: Immediately prior to procedure a time out was called to verify the correct patient, procedure, equipment, support staff and site/side marked as required   Supporting Documentation  Indications: pain   Procedure Details  Location: thumb - L thumb CMC  Preparation: Patient was prepped and draped in the usual sterile fashion  Ultrasound guidance: no  Medications administered: 1 mL bupivacaine 0 25 %; 1 mL lidocaine 1 %; 40 mg triamcinolone acetonide 40 mg/mL    Patient tolerance: patient tolerated the procedure well with no immediate complications  Dressing:  Sterile dressing applied           _____________________________________________________      Scribe Attestation    I,:   Carissa Olmstead MA am acting as a scribe while in the presence of the attending physician :        I,:   Terra Kaur MD personally performed the services described in this documentation    as scribed in my presence :

## 2019-01-29 ENCOUNTER — TRANSCRIBE ORDERS (OUTPATIENT)
Dept: OBGYN CLINIC | Facility: CLINIC | Age: 74
End: 2019-01-29

## 2019-01-29 DIAGNOSIS — Z12.11 SCREEN FOR COLON CANCER: Primary | ICD-10-CM

## 2019-04-01 ENCOUNTER — OFFICE VISIT (OUTPATIENT)
Dept: RHEUMATOLOGY | Facility: CLINIC | Age: 74
End: 2019-04-01
Payer: MEDICARE

## 2019-04-01 VITALS
SYSTOLIC BLOOD PRESSURE: 130 MMHG | WEIGHT: 140 LBS | BODY MASS INDEX: 23.9 KG/M2 | HEIGHT: 64 IN | DIASTOLIC BLOOD PRESSURE: 74 MMHG | HEART RATE: 70 BPM

## 2019-04-01 DIAGNOSIS — M81.8 IDIOPATHIC OSTEOPOROSIS: Primary | ICD-10-CM

## 2019-04-01 PROCEDURE — 96372 THER/PROPH/DIAG INJ SC/IM: CPT | Performed by: PHYSICIAN ASSISTANT

## 2019-04-22 ENCOUNTER — OFFICE VISIT (OUTPATIENT)
Dept: OBGYN CLINIC | Facility: CLINIC | Age: 74
End: 2019-04-22
Payer: MEDICARE

## 2019-04-22 VITALS
HEART RATE: 71 BPM | SYSTOLIC BLOOD PRESSURE: 155 MMHG | BODY MASS INDEX: 23.9 KG/M2 | DIASTOLIC BLOOD PRESSURE: 85 MMHG | HEIGHT: 64 IN | WEIGHT: 140 LBS

## 2019-04-22 DIAGNOSIS — M18.11 ARTHRITIS OF CARPOMETACARPAL (CMC) JOINT OF RIGHT THUMB: Primary | ICD-10-CM

## 2019-04-22 DIAGNOSIS — M18.12 ARTHRITIS OF CARPOMETACARPAL (CMC) JOINT OF LEFT THUMB: ICD-10-CM

## 2019-04-22 PROCEDURE — 99213 OFFICE O/P EST LOW 20 MIN: CPT | Performed by: SURGERY

## 2019-05-03 ENCOUNTER — TELEPHONE (OUTPATIENT)
Dept: NEUROLOGY | Facility: CLINIC | Age: 74
End: 2019-05-03

## 2019-06-11 ENCOUNTER — HOSPITAL ENCOUNTER (OUTPATIENT)
Dept: RADIOLOGY | Age: 74
Discharge: HOME | End: 2019-06-11
Payer: MEDICARE

## 2019-06-11 DIAGNOSIS — Z12.11 SCREEN FOR COLON CANCER: ICD-10-CM

## 2019-06-11 PROCEDURE — 77080 DXA BONE DENSITY AXIAL: CPT

## 2019-06-14 ENCOUNTER — TELEPHONE (OUTPATIENT)
Dept: OBGYN CLINIC | Facility: CLINIC | Age: 74
End: 2019-06-14

## 2019-07-24 ENCOUNTER — OFFICE VISIT (OUTPATIENT)
Dept: OBGYN CLINIC | Facility: CLINIC | Age: 74
End: 2019-07-24
Payer: MEDICARE

## 2019-07-24 VITALS
HEART RATE: 76 BPM | WEIGHT: 140 LBS | HEIGHT: 64 IN | BODY MASS INDEX: 23.9 KG/M2 | DIASTOLIC BLOOD PRESSURE: 85 MMHG | SYSTOLIC BLOOD PRESSURE: 136 MMHG

## 2019-07-24 DIAGNOSIS — M18.12 ARTHRITIS OF CARPOMETACARPAL (CMC) JOINT OF LEFT THUMB: Primary | ICD-10-CM

## 2019-07-24 PROCEDURE — 20600 DRAIN/INJ JOINT/BURSA W/O US: CPT | Performed by: SURGERY

## 2019-07-24 PROCEDURE — 99213 OFFICE O/P EST LOW 20 MIN: CPT | Performed by: SURGERY

## 2019-07-24 RX ORDER — LIDOCAINE HYDROCHLORIDE 10 MG/ML
1 INJECTION, SOLUTION INFILTRATION; PERINEURAL
Status: COMPLETED | OUTPATIENT
Start: 2019-07-24 | End: 2019-07-24

## 2019-07-24 RX ORDER — TRIAMCINOLONE ACETONIDE 40 MG/ML
40 INJECTION, SUSPENSION INTRA-ARTICULAR; INTRAMUSCULAR
Status: COMPLETED | OUTPATIENT
Start: 2019-07-24 | End: 2019-07-24

## 2019-07-24 RX ADMIN — TRIAMCINOLONE ACETONIDE 40 MG: 40 INJECTION, SUSPENSION INTRA-ARTICULAR; INTRAMUSCULAR at 10:13

## 2019-07-24 RX ADMIN — LIDOCAINE HYDROCHLORIDE 1 ML: 10 INJECTION, SOLUTION INFILTRATION; PERINEURAL at 10:13

## 2019-07-24 NOTE — PROGRESS NOTES
ASSESSMENT/PLAN:      72-year-old female with bilateral CMC arthrosis  Right is doing fine largely asymptomatic  Left injected today    Carlos Patten was given a CSI of the left thumb cmc joint  She can continue activity to tolerance once post CSI protocol has been met  She may take NSAIDS for pain control as needed  The patient verbalized understanding of exam findings and treatment plan  We engaged in the shared decision-making process and treatment options were discussed at length with the patient  Surgical and conservative management discussed today along with risks and benefits  Diagnoses and all orders for this visit:    Arthritis of carpometacarpal [place]) joint of left thumb    Other orders  -     Hand/upper extremity injection  -     Small joint arthrocentesis            Follow Up:  Return if symptoms worsen or fail to improve  To Do Next Visit:      ____________________________________________________________________________________________________________________________________________      CHIEF COMPLAINT:  Chief Complaint   Patient presents with    Left Thumb - Follow-up    Right Thumb - Follow-up       SUBJECTIVE:  Jessie Artis is a 69y o  year old RHD female who presents in the office for follow up on bilateral thumb CMC arthritis  She received CSI of bilateral CMC joint thumb 1/23/19 which provided relief of symptoms up until one month ago of the left thumb CMC joint, she states pain level at worse 5/10 and at best 0/10  She complains of no discomfort from the right thumb CMC  I have personally reviewed all the relevant PMH, PSH, SH, FH, Medications and allergies       PAST MEDICAL HISTORY:  Past Medical History:   Diagnosis Date    Cataract of right eye     GERD (gastroesophageal reflux disease)        PAST SURGICAL HISTORY:  Past Surgical History:   Procedure Laterality Date    CATARACT EXTRACTION, BILATERAL Bilateral     MASTECTOMY Bilateral     for calcium deposits    [facility] COLONOSCOPY FLX DX W/COLLJ SPEC WHEN PFRMD N/A 8/25/2017    Procedure: COLONOSCOPY;  Surgeon: Preeti Booth MD;  [facility]; Service: Colorectal    CA REMV CATARACT EXTRACAP,INSERT LENS Right 6/21/2016    Procedure: OD EXTRACTION EXTRACAPSULAR CATARACT PHACO INTRAOCULAR LENS (IOL); Surgeon: Keerthi John MD;  [facility];  Service: Ophthalmology    TONSILLECTOMY         FAMILY HISTORY:  Family History   Problem Relation Age of Onset    Rectal cancer Brother     Stroke Mother     Heart attack Father     Arrhythmia Father         possible    Coronary artery disease Father     Sudden death Father         scd    Anuerysm Neg Hx     Clotting disorder Neg Hx     Hypertension Neg Hx     Hyperlipidemia Neg Hx     Heart failure Neg Hx        SOCIAL HISTORY:  Social History     Tobacco Use    Smoking status: Former Smoker    Smokeless tobacco: Never Used   Substance Use Topics    Alcohol use: No    Drug use: No       MEDICATIONS:    Current Outpatient Medications:     aspirin 81 mg chewable tablet, Chew 81 mg daily, Disp: , Rfl:     atorvastatin (LIPITOR) 10 mg tablet, Take 1 tablet by mouth Daily, Disp: , Rfl:     b complex vitamins tablet, Take 1 tablet by mouth daily  , Disp: , Rfl:     calcium-vitamin D 250-100 MG-UNIT per tablet, Take 1 tablet by mouth 3 (three) times a day , Disp: , Rfl:     FLUAD 0 5 ML LÓPEZ, inject 0 5 milliliter intramuscularly, Disp: , Rfl: 0    Multiple Vitamins-Minerals (CENTRUM CARDIO PO), Take 1 tablet by mouth daily  , Disp: , Rfl:     omeprazole (PriLOSEC) 20 mg delayed release capsule, Take 20 mg by mouth 2 (two) times a day, Disp: , Rfl:     Pyridoxine HCl (VITAMIN B-6 PO), Take by mouth daily, Disp: , Rfl:     Vitamin D, Cholecalciferol, 1000 UNITS TABS, Take 1 tablet by mouth daily Indications: with aloe  , Disp: , Rfl:     ALLERGIES:  No Known Allergies    REVIEW OF SYSTEMS:  Review of Systems   Constitutional: Negative      HENT: Negative  Eyes: Negative  Respiratory: Negative  Cardiovascular: Negative  Gastrointestinal: Negative  Endocrine: Negative  Genitourinary: Negative  Musculoskeletal: Positive for arthralgias  Skin: Negative  Allergic/Immunologic: Negative  Neurological: Negative  Hematological: Negative  Psychiatric/Behavioral: Negative  VITALS:  Vitals:    07/24/19 0930   BP: 136/85   Pulse: 76       LABS:  HgA1c: No results found for: HGBA1C  BMP:   Lab Results   Component Value Date    CALCIUM 9 6 03/30/2018    K 4 5 03/30/2018    CO2 27 03/30/2018     03/30/2018    BUN 11 03/30/2018    CREATININE 0 79 03/30/2018       _____________________________________________________  PHYSICAL EXAMINATION:  General: well developed and well nourished, alert, oriented times 3 and appears comfortable  Psychiatric: Normal  HEENT: Normocephalic, Atraumatic Trachea Midline, No torticollis  Pulmonary: No audible wheezing or respiratory distress   Cardiovascular: No pitting edema, 2+ radial pulse   Skin: No masses, erythema, lacerations, fluctation, ulcerations  Neurovascular: Sensation Intact to the Median, Ulnar, Radial Nerve, Motor Intact to the Median, Ulnar, Radial Nerve and Pulses Intact  Musculoskeletal: Normal, except as noted in detailed exam and in HPI  MUSCULOSKELETAL EXAMINATION:    left CMC Exam:  No adduction contracture  No hyperextension deformity of MCP joint  Positive localized tenderness over radial and dorsal aspect of thumb (CMC joint)  Grind test is Positive for pain and Positive for crepitus  Metacarpal load shift test positive  No triggering or tenderness over the A1 pulley  no pain with Finkelsteins maneuver           ___________________________________________________  STUDIES REVIEWED:  No new studies performed            PROCEDURES PERFORMED:  Small joint arthrocentesis: L thumb CMC  Date/Time: 7/24/2019 10:13 AM  Consent given by: patient  Site marked: site marked  Supporting Documentation  Indications: pain   Procedure Details  Location: thumb - L thumb CMC  Preparation: Patient was prepped and draped in the usual sterile fashion  Needle size: 25 G  Ultrasound guidance: no  Approach: medial  Medications administered: 1 mL lidocaine 1 %; 40 mg triamcinolone acetonide 40 mg/mL    Patient tolerance: patient tolerated the procedure well with no immediate complications  Dressing:  Sterile dressing applied             _____________________________________________________      Scribe Attestation    I,:   Vanessa Cooper am acting as a scribe while in the presence of the attending physician :        I,:   Freddie Becerril MD personally performed the services described in this documentation    as scribed in my presence :

## 2019-10-02 NOTE — PROGRESS NOTES
Assessment and Plan: This is a 51-year-old female presenting today for follow-up for osteoporosis currently utilizing Prolia  Patient did undergo a DEXA scan since her last appointment which was stable and revealed no change in bone density  The patient does continue to supplement with calcium and vitamin-D and does consume at least 2 dairy products a day  In addition, she does continue with weight-bearing exercise on a daily basis  She has no joint complaints today and her exam is not reveal any evidence of an inflammatory arthritis  Given the stability of her DEXA scan and no recent fractures, we will continue with Prolia  Prolia 60 mg was injected subcutaneously into the patient's upper extremity by Mike Garza MA  She tolerated the procedure well and left without complication  She wwas advised that if she continues to routinely consume at least 2 dairy product today, she does not need to supplement with calcium but I would advise that she continue to utilize vitamin-D supplements  She will plan to return to the office in 6 months time for re-evaluation and her next Prolia injection but contact the office if she has any further questions or concerns  Plan:  Diagnoses and all orders for this visit:    Idiopathic osteoporosis  -     denosumab (PROLIA) subcutaneous injection 60 mg        Follow-up plan: F/U in 6 months       Rheumatic Disease Summary:  1  OP  Established care 6/2016- started on Prolia  No previous medication tried for OP  Last DEXA: 6/2019  Next DEXA: 6/2021         HARVEY  Allyson Garcia is a 69 y o   female who presents today for follow up for OP  No new fractures  Taking Calcium 600mg TID  And vitamin D 2000 IUs daily  Consumes at least 2 dairy products daily  Recent DEXA scan 6/2019  [facility] with [facility]  Performs weight bearing exercise           The following portions of the patient's history were reviewed and updated as appropriate: allergies, current medications, past family history, past medical history, past social history, past surgical history and problem list     Review of Systems:   Review of Systems    Home Medications:     Current Outpatient Medications:     aspirin 81 mg chewable tablet, Chew 81 mg daily, Disp: , Rfl:     atorvastatin (LIPITOR) 10 mg tablet, Take 1 tablet by mouth Daily, Disp: , Rfl:     b complex vitamins tablet, Take 1 tablet by mouth daily  , Disp: , Rfl:     calcium-vitamin D 250-100 MG-UNIT per tablet, Take 1 tablet by mouth 3 (three) times a day , Disp: , Rfl:     FLUAD 0 5 ML LÓPEZ, inject 0 5 milliliter intramuscularly, Disp: , Rfl: 0    Multiple Vitamins-Minerals (CENTRUM CARDIO PO), Take 1 tablet by mouth daily  , Disp: , Rfl:     omeprazole (PriLOSEC) 20 mg delayed release capsule, Take 20 mg by mouth 2 (two) times a day, Disp: , Rfl:     Pyridoxine HCl (VITAMIN B-6 PO), Take by mouth daily, Disp: , Rfl:     Vitamin D, Cholecalciferol, 1000 UNITS TABS, Take 1 tablet by mouth daily Indications: with aloe  , Disp: , Rfl:     Objective:    Vitals:    10/03/19 1315   Pulse: 70   Weight: 63 8 kg (140 lb 9 6 oz)   Height: 5' 4" (1 626 m)       Physical Exam   Constitutional: She is oriented to person, place, and time  She appears well-developed and well-nourished  HENT:   Head: Normocephalic  Mouth/Throat: Oropharynx is clear and moist    Eyes: Pupils are equal, round, and reactive to light  Conjunctivae are normal    Neck: Normal range of motion  Neck supple  Cardiovascular: Normal rate, regular rhythm and normal heart sounds  Pulmonary/Chest: Effort normal and breath sounds normal    Musculoskeletal:   No synovitis or joint swelling  Normal passive ROM of the BUE and BLE  Crepitus of the knees  Neurological: She is alert and oriented to person, place, and time  Skin: Skin is warm and dry  Psychiatric: She has a normal mood and affect   Her behavior is normal      Musculoskeletal--Peripheral Joint Exam:  Physical Exam      Imaging:   DEXA Scan 6/11/2019  IMPRESSION:  1  Based on the [facility] classification, the T-score of -2 7 in the lumbar spine is consistent with OSTEOPOROSIS  2  When compared to the prior examination, there has been NO SIGNIFICANT CHANGE in the total bone mineral density of the lumbar spine, when allowing for the least significant change  There has however been a 3% DECREASE in the total bone mineral density   of the left hip        Labs:   Component      Latest Ref Rng & Units 3/30/2018   WBC      4 31 - 10 16 Thousand/uL 8 00   Red Blood Cell Count      3 81 - 5 12 Million/uL 3 97   Hemoglobin      11 5 - 15 4 g/dL 13 4   HCT      34 8 - 46 1 % 40 5   MCV      82 - 98 fL 102 (H)   MCH      26 8 - 34 3 pg 33 8   MCHC      31 4 - 37 4 g/dL 33 1   RDW      11 6 - 15 1 % 13 1   MPV      8 9 - 12 7 fL 9 8   Platelet Count      072 - 390 Thousands/uL 272   Neutrophils %      43 - 75 % 65   Lymphocytes Relative      14 - 44 % 27   Monocytes Relative      4 - 12 % 5   Eosinophils      0 - 6 % 2   Basophils Relative      0 - 1 % 1   Absolute Neutrophils      1 85 - 7 62 Thousands/µL 5 28   Lymphocytes Absolute      0 60 - 4 47 Thousands/µL 2 13   Absolute Monocytes      0 17 - 1 22 Thousand/µL 0 43   Absolute Eosinophils      0 00 - 0 61 Thousand/µL 0 12   Basophils Absolute      0 00 - 0 10 Thousands/µL 0 04   Sodium      136 - 145 mmol/L 141   Potassium      3 5 - 5 3 mmol/L 4 5   Chloride      100 - 108 mmol/L 104   CO2      21 - 32 mmol/L 27   Anion Gap      4 - 13 mmol/L 10   BUN      5 - 25 mg/dL 11   Creatinine      0 60 - 1 30 mg/dL 0 79   GLUCOSE FASTING      65 - 99 mg/dL 100 (H)   Calcium      8 3 - 10 1 mg/dL 9 6   AST      5 - 45 U/L 40   ALT      12 - 78 U/L 33   Alkaline Phosphatase      46 - 116 U/L 72   Total Protein      6 4 - 8 2 g/dL 8 2   Albumin      3 5 - 5 0 g/dL 3 8   TOTAL BILIRUBIN      0 20 - 1 00 mg/dL 0 40   eGFR      ml/min/1 73sq m 76   TSH 3RD GENERATON      0 358 - 3 740 uIU/mL 1 203   Vit D, 25-Hydroxy      30 0 - 100 0 ng/mL 47 0

## 2019-10-03 ENCOUNTER — OFFICE VISIT (OUTPATIENT)
Dept: RHEUMATOLOGY | Facility: CLINIC | Age: 74
End: 2019-10-03
Payer: MEDICARE

## 2019-10-03 VITALS — BODY MASS INDEX: 24.01 KG/M2 | HEIGHT: 64 IN | WEIGHT: 140.6 LBS | HEART RATE: 70 BPM

## 2019-10-03 DIAGNOSIS — M81.8 IDIOPATHIC OSTEOPOROSIS: Primary | ICD-10-CM

## 2019-10-03 PROCEDURE — 96372 THER/PROPH/DIAG INJ SC/IM: CPT | Performed by: PHYSICIAN ASSISTANT

## 2019-10-03 PROCEDURE — 99214 OFFICE O/P EST MOD 30 MIN: CPT | Performed by: PHYSICIAN ASSISTANT

## 2019-10-31 ENCOUNTER — ANNUAL EXAM (OUTPATIENT)
Dept: OBGYN CLINIC | Facility: CLINIC | Age: 74
End: 2019-10-31
Payer: MEDICARE

## 2019-10-31 VITALS — SYSTOLIC BLOOD PRESSURE: 132 MMHG | BODY MASS INDEX: 23 KG/M2 | WEIGHT: 134 LBS | DIASTOLIC BLOOD PRESSURE: 76 MMHG

## 2019-10-31 DIAGNOSIS — Z12.39 BREAST CANCER SCREENING: ICD-10-CM

## 2019-10-31 DIAGNOSIS — Z01.419 ENCOUNTER FOR GYNECOLOGICAL EXAMINATION WITHOUT ABNORMAL FINDING: Primary | ICD-10-CM

## 2019-10-31 PROCEDURE — G0101 CA SCREEN;PELVIC/BREAST EXAM: HCPCS | Performed by: OBSTETRICS & GYNECOLOGY

## 2019-10-31 RX ORDER — ATORVASTATIN CALCIUM 10 MG/1
TABLET, FILM COATED ORAL
COMMUNITY
End: 2020-09-04 | Stop reason: ALTCHOICE

## 2019-10-31 NOTE — PROGRESS NOTES
Assessment/Plan:    No problem-specific Assessment & Plan notes found for this encounter  Normal gynecological physical examination  Self-breast examination stressed  Mammogram ordered  Discussed regular exercise, healthy diet, importance of vitamin D and calcium supplements  Discussed importance of sun block use during periods of prolonged sun exposure  Patient will be seen in 1 year for routine gynecologic and medical examination  Patient will call office for any problems, concerns, or issues which may arise during the interim  Diagnoses and all orders for this visit:    Encounter for gynecological examination without abnormal finding    Breast cancer screening    Other orders  -     atorvastatin (LIPITOR) 10 mg tablet; atorvastatin 10 mg tablet        Subjective:      Patient ID: Shanae Morrow is a 69 y o  female  Patient is a 72-year-old female who presents today for her annual gynecologic and medical examination    Patient denies any vaginal bleeding at this time    She also denies any vasomotor symptoms    Patient reports normal bowel and bladder habits    She denies any significant pelvic or abdominal pain    Patient also denies any chest pain, shortness of breath, fever, shakes or chills    Patient is not being followed for any significant medical problems at this time    Patient's past medical history however was 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 well    Patient is up-to-date with colonoscopy screening    Patient does regular self-breast examinations and is up-to-date with mammography screening as well  Arrangements were made for her annual mammogram in the EMR system at today's visit            The following portions of the patient's history were reviewed and updated as appropriate: allergies, current medications, past family history, past medical history, past social history, past surgical history and problem list     Review of Systems   Constitutional: Negative  Negative for appetite change, diaphoresis, fatigue, fever and unexpected weight change  HENT: Negative  Eyes: Negative  Respiratory: Negative  Cardiovascular: Negative  Followed for cholesterol   Gastrointestinal: Negative  Negative for abdominal pain, blood in stool, constipation, diarrhea, nausea and vomiting  Endocrine: Negative  Negative for cold intolerance and heat intolerance  Genitourinary: Negative  Negative for dysuria, frequency, hematuria, urgency, vaginal bleeding, vaginal discharge and vaginal pain  Musculoskeletal: Negative  Skin: Negative  Allergic/Immunologic: Negative  Neurological: Negative  Hematological: Negative  Negative for adenopathy  Psychiatric/Behavioral: Negative  Objective:      /76   Wt 60 8 kg (134 lb)   BMI 23 00 kg/m²          Physical Exam   Constitutional: She is oriented to person, place, and time  She appears well-developed and well-nourished  HENT:   Head: Normocephalic  Eyes: Pupils are equal, round, and reactive to light  Neck: Normal range of motion  Neck supple  Cardiovascular: Normal rate and regular rhythm  Pulmonary/Chest: Effort normal and breath sounds normal  Right breast exhibits no inverted nipple, no mass, no nipple discharge, no skin change and no tenderness  Left breast exhibits no inverted nipple, no mass, no nipple discharge, no skin change and no tenderness  Breasts are symmetrical    Abdominal: Soft  Normal appearance and bowel sounds are normal    Genitourinary: Rectum normal, vagina normal and uterus normal  Rectal exam shows guaiac negative stool  Pelvic exam was performed with patient supine  There is no rash or lesion on the right labia  There is no rash or lesion on the left labia  Uterus is not enlarged and not tender  Cervix exhibits no discharge and no friability  Right adnexum displays no mass, no tenderness and no fullness   Left adnexum displays no mass, no tenderness and no fullness  No erythema, tenderness or bleeding in the vagina  No vaginal discharge found  Musculoskeletal: Normal range of motion  Lymphadenopathy:     She has no cervical adenopathy  She has no axillary adenopathy  Right: No inguinal and no supraclavicular adenopathy present  Left: No inguinal and no supraclavicular adenopathy present  Neurological: She is alert and oriented to person, place, and time  Skin: Skin is warm, dry and intact  No rash noted  Psychiatric: She has a normal mood and affect   Her speech is normal and behavior is normal  Judgment and thought content normal  Cognition and memory are normal

## 2020-04-06 ENCOUNTER — TELEPHONE (OUTPATIENT)
Dept: NEUROLOGY | Facility: CLINIC | Age: 75
End: 2020-04-06

## 2020-05-06 ENCOUNTER — OFFICE VISIT (OUTPATIENT)
Dept: RHEUMATOLOGY | Facility: CLINIC | Age: 75
End: 2020-05-06
Payer: MEDICARE

## 2020-05-06 DIAGNOSIS — M81.0 AGE-RELATED OSTEOPOROSIS WITHOUT CURRENT PATHOLOGICAL FRACTURE: Primary | ICD-10-CM

## 2020-05-06 PROCEDURE — 96372 THER/PROPH/DIAG INJ SC/IM: CPT | Performed by: INTERNAL MEDICINE

## 2020-05-06 PROCEDURE — 99211 OFF/OP EST MAY X REQ PHY/QHP: CPT | Performed by: INTERNAL MEDICINE

## 2020-08-20 ENCOUNTER — OFFICE VISIT (OUTPATIENT)
Dept: OBGYN CLINIC | Facility: CLINIC | Age: 75
End: 2020-08-20
Payer: MEDICARE

## 2020-08-20 VITALS
HEIGHT: 64 IN | DIASTOLIC BLOOD PRESSURE: 78 MMHG | SYSTOLIC BLOOD PRESSURE: 125 MMHG | BODY MASS INDEX: 22.2 KG/M2 | WEIGHT: 130 LBS | HEART RATE: 90 BPM

## 2020-08-20 DIAGNOSIS — M18.11 ARTHRITIS OF CARPOMETACARPAL (CMC) JOINT OF RIGHT THUMB: ICD-10-CM

## 2020-08-20 DIAGNOSIS — M18.12 ARTHRITIS OF CARPOMETACARPAL (CMC) JOINT OF LEFT THUMB: Primary | ICD-10-CM

## 2020-08-20 PROCEDURE — 20600 DRAIN/INJ JOINT/BURSA W/O US: CPT | Performed by: SURGERY

## 2020-08-20 PROCEDURE — 99213 OFFICE O/P EST LOW 20 MIN: CPT | Performed by: SURGERY

## 2020-08-20 RX ORDER — BUPIVACAINE HYDROCHLORIDE 2.5 MG/ML
0.5 INJECTION, SOLUTION INFILTRATION; PERINEURAL
Status: COMPLETED | OUTPATIENT
Start: 2020-08-20 | End: 2020-08-20

## 2020-08-20 RX ORDER — TRIAMCINOLONE ACETONIDE 40 MG/ML
20 INJECTION, SUSPENSION INTRA-ARTICULAR; INTRAMUSCULAR
Status: COMPLETED | OUTPATIENT
Start: 2020-08-20 | End: 2020-08-20

## 2020-08-20 RX ADMIN — BUPIVACAINE HYDROCHLORIDE 0.5 ML: 2.5 INJECTION, SOLUTION INFILTRATION; PERINEURAL at 11:55

## 2020-08-20 RX ADMIN — TRIAMCINOLONE ACETONIDE 20 MG: 40 INJECTION, SUSPENSION INTRA-ARTICULAR; INTRAMUSCULAR at 11:55

## 2020-08-20 NOTE — PROGRESS NOTES
ASSESSMENT/PLAN:      69 yo female with BL CMC arthritis, left is symptomatic, right is not  CSI done in the left Aia 16 joint, she may get these every 3 months for as long as they continue to help her  As the right is asymptomatic at this time will hold off on any interventions  Continue bracing  Follow-up in 3 months    The patient verbalized understanding of exam findings and treatment plan  We engaged in the shared decision-making process and treatment options were discussed at length with the patient  Surgical and conservative management discussed today along with risks and benefits  Diagnoses and all orders for this visit:    Arthritis of carpometacarpal Greer) joint of left thumb  -     Small joint arthrocentesis    Arthritis of carpometacarpal Greer) joint of right thumb        Follow Up:  Return in about 3 months (around 11/20/2020) for Recheck  To Do Next Visit:  Re-evaluation of current issue    ____________________________________________________________________________________________________________________________________________      CHIEF COMPLAINT:  Chief Complaint   Patient presents with    Left Thumb - Follow-up       SUBJECTIVE:  Chris Krishnamurthy is a 70y o  year old RHD female who presents for evaluation of BL CMC arthritis  Right is largely asymptomatic, left has been bothersome lately  She last received a CSI into the left Aia 16 over a year ago and the joint has been worsening over the last few months  She denies numbness/tingling  She does wear a brace on that side which helps significantly  I have personally reviewed all the relevant PMH, PSH, SH, FH, Medications and allergies       PAST MEDICAL HISTORY:  Past Medical History:   Diagnosis Date    Cataract of right eye     GERD (gastroesophageal reflux disease)        PAST SURGICAL HISTORY:  Past Surgical History:   Procedure Laterality Date    CATARACT EXTRACTION, BILATERAL Bilateral     MASTECTOMY Bilateral     for calcium deposits    AK COLONOSCOPY FLX DX W/COLLJ SPEC WHEN PFRMD N/A 8/25/2017    Procedure: COLONOSCOPY;  Surgeon: Miryam Archibald MD;  Location: [facility]; Service: Colorectal    AK XCAPSL CTRC RMVL INSJ IO LENS PROSTH W/O ECP Right 6/21/2016    Procedure: OD EXTRACTION EXTRACAPSULAR CATARACT PHACO INTRAOCULAR LENS (IOL); Surgeon: Veena Aviles MD;  Location: [facility];  Service: Ophthalmology    TONSILLECTOMY         FAMILY HISTORY:  Family History   Problem Relation Age of Onset    Rectal cancer Brother     Stroke Mother     Heart attack Father     Arrhythmia Father         possible    Coronary artery disease Father     Sudden death Father         scd    Anuerysm Neg Hx     Clotting disorder Neg Hx     Hypertension Neg Hx     Hyperlipidemia Neg Hx     Heart failure Neg Hx        SOCIAL HISTORY:  Social History     Tobacco Use    Smoking status: Former Smoker    Smokeless tobacco: Never Used   Substance Use Topics    Alcohol use: No    Drug use: No       MEDICATIONS:    Current Outpatient Medications:     aspirin 81 mg chewable tablet, Chew 81 mg daily, Disp: , Rfl:     atorvastatin (LIPITOR) 10 mg tablet, Take 1 tablet by mouth Daily, Disp: , Rfl:     atorvastatin (LIPITOR) 10 mg tablet, atorvastatin 10 mg tablet, Disp: , Rfl:     b complex vitamins tablet, Take 1 tablet by mouth daily  , Disp: , Rfl:     calcium-vitamin D 250-100 MG-UNIT per tablet, Take 1 tablet by mouth 3 (three) times a day , Disp: , Rfl:     FLUAD 0 5 ML LÓPEZ, inject 0 5 milliliter intramuscularly, Disp: , Rfl: 0    Multiple Vitamins-Minerals (CENTRUM CARDIO PO), Take 1 tablet by mouth daily  , Disp: , Rfl:     omeprazole (PriLOSEC) 20 mg delayed release capsule, Take 20 mg by mouth 2 (two) times a day, Disp: , Rfl:     Pyridoxine HCl (VITAMIN B-6 PO), Take by mouth daily, Disp: , Rfl:     Vitamin D, Cholecalciferol, 1000 UNITS TABS, Take 1 tablet by mouth daily Indications: with aloe  , Disp: , Rfl: ALLERGIES:  No Known Allergies    REVIEW OF SYSTEMS:  Review of Systems   Constitutional: Negative for chills, diaphoresis, fatigue and fever  HENT: Negative for congestion, facial swelling, hearing loss, sore throat, trouble swallowing and voice change  Respiratory: Negative for apnea, cough, shortness of breath, wheezing and stridor  Cardiovascular: Negative for chest pain, palpitations and leg swelling  Gastrointestinal: Negative for abdominal pain, constipation, nausea and vomiting  Endocrine: Negative for cold intolerance and heat intolerance  Musculoskeletal: Positive for arthralgias  Negative for gait problem, joint swelling and myalgias  Skin: Negative for color change, pallor, rash and wound  Neurological: Negative for tremors, speech difficulty, weakness and numbness  Psychiatric/Behavioral: Negative for agitation, confusion, decreased concentration and hallucinations  The patient is not nervous/anxious  VITALS:  Vitals:    08/20/20 1142   BP: 125/78   Pulse: 90       LABS:  HgA1c: No results found for: HGBA1C  BMP:   Lab Results   Component Value Date    CALCIUM 9 6 03/30/2018    K 4 5 03/30/2018    CO2 27 03/30/2018     03/30/2018    BUN 11 03/30/2018    CREATININE 0 79 03/30/2018       _____________________________________________________  PHYSICAL EXAMINATION:  General: well developed and well nourished, alert, oriented times 3 and appears comfortable  Psychiatric: Normal  HEENT: Normocephalic, Atraumatic Trachea Midline, No torticollis  Pulmonary: No audible wheezing or respiratory distress   Cardiovascular: No pitting edema, 2+ radial pulse   Skin: No masses, erythema, lacerations, fluctation, ulcerations  Neurovascular: Sensation Intact to the Median, Ulnar, Radial Nerve, Motor Intact to the Median, Ulnar, Radial Nerve and Pulses Intact  Musculoskeletal: Normal, except as noted in detailed exam and in HPI        MUSCULOSKELETAL EXAMINATION:  left CMC Exam:  No adduction contracture  No hyperextension deformity of MCP joint  Positive localized tenderness over radial and dorsal aspect of thumb (CMC joint)  Grind test is Positive for pain and Positive for crepitus  Metacarpal load shift test positive  No triggering or tenderness over the A1 pulley  No pain with Finkelsteins maneuver               ___________________________________________________  STUDIES REVIEWED:  No studies to review      PROCEDURES PERFORMED:  Small joint arthrocentesis: L thumb CMC  Date/Time: 8/20/2020 11:55 AM  Consent given by: patient  Site marked: site marked  Timeout: Immediately prior to procedure a time out was called to verify the correct patient, procedure, equipment, support staff and site/side marked as required   Supporting Documentation  Indications: pain   Procedure Details  Location: thumb - L thumb CMC  Needle size: 25 G  Ultrasound guidance: no  Approach: dorsal  Medications administered: 0 5 mL bupivacaine 0 25 %; 20 mg triamcinolone acetonide 40 mg/mL    Patient tolerance: patient tolerated the procedure well with no immediate complications  Dressing:  Sterile dressing applied             _____________________________________________________    NICOLE Yusuf Cosign: I supervised the physician assistant and discussed the case with them  I personally performed history and physical exam  I agree with the assessment and plan of care as documented by the physician assistant

## 2020-08-27 NOTE — TELEPHONE ENCOUNTER
FYI: Pt states she spoke with insurance company who told her they could not guarantee that the A1c would be covered and because of this she will not be getting it drawn 
Patient states she will be stopping into the [facility] office tomorrow to  the A1c lab that Dr Liz Paez ordered for her      (she does not use a  facility for blood work) 
Printed and at the    Thank you
noted
No risk alerts present

## 2020-09-04 ENCOUNTER — OFFICE VISIT (OUTPATIENT)
Dept: NEUROLOGY | Facility: CLINIC | Age: 75
End: 2020-09-04
Payer: MEDICARE

## 2020-09-04 VITALS
DIASTOLIC BLOOD PRESSURE: 63 MMHG | BODY MASS INDEX: 22.02 KG/M2 | SYSTOLIC BLOOD PRESSURE: 137 MMHG | TEMPERATURE: 98 F | HEART RATE: 74 BPM | WEIGHT: 129 LBS | HEIGHT: 64 IN

## 2020-09-04 DIAGNOSIS — G60.9 HEREDITARY AND IDIOPATHIC NEUROPATHY, UNSPECIFIED: ICD-10-CM

## 2020-09-04 DIAGNOSIS — I63.81 LACUNAR STROKE (HCC): ICD-10-CM

## 2020-09-04 DIAGNOSIS — M54.50 CHRONIC LEFT-SIDED LOW BACK PAIN, UNSPECIFIED WHETHER SCIATICA PRESENT: ICD-10-CM

## 2020-09-04 DIAGNOSIS — G31.84 MILD COGNITIVE IMPAIRMENT: ICD-10-CM

## 2020-09-04 DIAGNOSIS — G89.29 CHRONIC LEFT-SIDED LOW BACK PAIN, UNSPECIFIED WHETHER SCIATICA PRESENT: ICD-10-CM

## 2020-09-04 DIAGNOSIS — E53.8 DEFICIENCY OF OTHER SPECIFIED B GROUP VITAMINS: ICD-10-CM

## 2020-09-04 DIAGNOSIS — M21.372 LEFT FOOT DROP: Primary | ICD-10-CM

## 2020-09-04 PROCEDURE — 99214 OFFICE O/P EST MOD 30 MIN: CPT | Performed by: PSYCHIATRY & NEUROLOGY

## 2020-09-04 NOTE — PROGRESS NOTES
Patient ID: Patel Vasquez is a 70 y o  female  Assessment/Plan:    No problem-specific Assessment & Plan notes found for this encounter  Diagnoses and all orders for this visit:    Left foot drop  -     TSH, 3rd generation with Free T4 reflex; Future  -     CBC and differential; Future  -     Vitamin B12; Future  -     Vitamin B6; Future  -     Folate; Future  -     EMG 1 Limb; Future  -     MRI lumbar spine without contrast; Future    Lacunar stroke (HCC)  -     VAS carotid complete study; Future    Chronic left-sided low back pain, unspecified whether sciatica present  -     EMG 1 Limb; Future  -     MRI lumbar spine without contrast; Future    Mild cognitive impairment  -     TSH, 3rd generation with Free T4 reflex; Future  -     CBC and differential; Future  -     Vitamin B12; Future  -     Vitamin B6; Future  -     Folate; Future    Deficiency of other specified B group vitamins   -     Vitamin B12; Future  -     Folate; Future    Hereditary and idiopathic neuropathy, unspecified   -     Vitamin B6; Future       Further recommendations pending above  Follow up in three months time    Subjective:    HPI    Ms Addie Becerra is a pleasant 69 yo female seen in follow up for right external capsule hemorrhagic stroke    States doing well other than new left foot drop  She tells me this is going on for a few months with no inciting event, denies other weakness  States no falls  No tripping  States worse numbness in the left foot superimposed on numb and tingling    Driving with no trouble    Sister reports worse short term memory and not "where she should quite be"   did pass away recently she reports waves of grief but no significant depression or anxiety  States staying active and walking a lot sister agrees  Take care of bills finances and ADLs with no trouble    Needs MOCA next visit    The following portions of the patient's history were reviewed and updated as appropriate: allergies, current medications, past family history, past medical history, past social history, past surgical history and problem list and ROS         Objective:    Blood pressure 137/63, pulse 74, temperature 98 °F (36 7 °C), height 5' 4" (1 626 m), weight 58 5 kg (129 lb)  Physical Exam  Vitals signs and nursing note reviewed  Constitutional:       Appearance: She is well-developed  HENT:      Head: Normocephalic and atraumatic  Eyes:      Extraocular Movements: Extraocular movements intact  Pupils: Pupils are equal, round, and reactive to light  Neck:      Musculoskeletal: Normal range of motion  Cardiovascular:      Rate and Rhythm: Normal rate and regular rhythm  Pulmonary:      Effort: Pulmonary effort is normal    Abdominal:      Palpations: Abdomen is soft  Musculoskeletal:         General: No tenderness  Neurological:      Mental Status: She is alert and oriented to person, place, and time  Cranial Nerves: No dysarthria  Gait: Gait is intact  Deep Tendon Reflexes:      Reflex Scores:       Patellar reflexes are 1+ on the right side and 1+ on the left side  Achilles reflexes are 0 on the left side  Neurological Exam  Mental Status  Alert  Oriented to person, place, time and situation  no dysarthria present  Language is fluent with no aphasia  Attention and concentration are normal     Cranial Nerves  CN II: Visual fields full to confrontation  CN III, IV, VI: Extraocular movements intact bilaterally  Pupils equal round and reactive to light bilaterally  CN V: Facial sensation is normal   CN VII: Full and symmetric facial movement  CN VIII: Hearing is normal   CN IX, X: Palate elevates symmetrically  CN XI: Shoulder shrug strength is normal   CN XII: Tongue midline without atrophy or fasciculations  Motor   Normal muscle tone  Strength is 5/5 in all four extremities except as noted  Left foot dorsiflexion 3-/5      Sensory  Light touch is normal in upper and lower extremities  Pinprick abnormality:  No right-sided hemispatial neglect  No left-sided hemispatial neglect  Reduced sensation left LE distal > proximal      Reflexes                                           Right                      Left  Patellar                                1+                         1+  Achilles                                Tr                         0    Coordination  Right: Finger-to-nose normal   Left: Finger-to-nose normal     Gait  Casual gait: Wide stance  Normal stride length  Normal gait  ROS:    Review of Systems   Constitutional: Negative  Negative for appetite change and fever  HENT: Negative  Negative for hearing loss, tinnitus, trouble swallowing and voice change  Eyes: Negative  Negative for photophobia and pain  Respiratory: Negative  Negative for shortness of breath  Cardiovascular: Negative  Negative for palpitations  Gastrointestinal: Negative  Negative for nausea and vomiting  Endocrine: Negative  Negative for cold intolerance  Genitourinary: Negative  Negative for dysuria, frequency and urgency  Musculoskeletal: Negative for myalgias and neck pain  Since the past couple of months -left foot drop with a sensation of heaviness     Skin: Negative  Negative for rash  Allergic/Immunologic: Negative  Neurological: Negative  Negative for dizziness, tremors, seizures, syncope, facial asymmetry, speech difficulty, weakness, light-headedness, numbness and headaches  Hematological: Negative  Does not bruise/bleed easily  Psychiatric/Behavioral: Negative  Negative for confusion, hallucinations and sleep disturbance

## 2020-09-16 ENCOUNTER — HOSPITAL ENCOUNTER (OUTPATIENT)
Dept: RADIOLOGY | Facility: HOSPITAL | Age: 75
Discharge: HOME | End: 2020-09-16
Attending: PSYCHIATRY & NEUROLOGY
Payer: MEDICARE

## 2020-09-16 DIAGNOSIS — G89.29 CHRONIC LEFT-SIDED LOW BACK PAIN, UNSPECIFIED WHETHER SCIATICA PRESENT: ICD-10-CM

## 2020-09-16 DIAGNOSIS — M54.50 CHRONIC LEFT-SIDED LOW BACK PAIN, UNSPECIFIED WHETHER SCIATICA PRESENT: ICD-10-CM

## 2020-09-16 DIAGNOSIS — M21.372 LEFT FOOT DROP: ICD-10-CM

## 2020-09-16 PROCEDURE — 72148 MRI LUMBAR SPINE W/O DYE: CPT

## 2020-09-16 PROCEDURE — G1004 CDSM NDSC: HCPCS

## 2020-10-01 ENCOUNTER — HOSPITAL ENCOUNTER (OUTPATIENT)
Dept: NON INVASIVE DIAGNOSTICS | Facility: CLINIC | Age: 75
Discharge: HOME | End: 2020-10-01
Payer: MEDICARE

## 2020-10-01 DIAGNOSIS — I63.81 LACUNAR STROKE (HCC): ICD-10-CM

## 2020-10-01 PROCEDURE — 93880 EXTRACRANIAL BILAT STUDY: CPT

## 2020-10-01 PROCEDURE — 93880 EXTRACRANIAL BILAT STUDY: CPT | Performed by: SURGERY

## 2020-10-06 ENCOUNTER — TELEPHONE (OUTPATIENT)
Dept: NEUROLOGY | Facility: CLINIC | Age: 75
End: 2020-10-06

## 2020-10-07 ENCOUNTER — TELEPHONE (OUTPATIENT)
Dept: NEUROLOGY | Facility: CLINIC | Age: 75
End: 2020-10-07

## 2020-10-19 ENCOUNTER — LAB REQUISITION (OUTPATIENT)
Dept: LAB | Facility: HOSPITAL | Age: 75
End: 2020-10-19
Payer: MEDICARE

## 2020-10-19 DIAGNOSIS — K57.30 DIVERTICULOSIS OF LARGE INTESTINE WITHOUT PERFORATION OR ABSCESS WITHOUT BLEEDING: ICD-10-CM

## 2020-10-19 DIAGNOSIS — K63.5 POLYP OF COLON: ICD-10-CM

## 2020-10-19 DIAGNOSIS — Z86.010 PERSONAL HISTORY OF COLONIC POLYPS: ICD-10-CM

## 2020-10-19 PROCEDURE — 88305 TISSUE EXAM BY PATHOLOGIST: CPT | Performed by: PATHOLOGY

## 2020-11-09 ENCOUNTER — OFFICE VISIT (OUTPATIENT)
Dept: RHEUMATOLOGY | Facility: CLINIC | Age: 75
End: 2020-11-09
Payer: MEDICARE

## 2020-11-09 VITALS — WEIGHT: 129 LBS | HEART RATE: 80 BPM | HEIGHT: 64 IN | TEMPERATURE: 98.2 F | BODY MASS INDEX: 22.02 KG/M2

## 2020-11-09 DIAGNOSIS — E55.9 VITAMIN D DEFICIENCY: ICD-10-CM

## 2020-11-09 DIAGNOSIS — Z79.899 LONG TERM USE OF DRUG: ICD-10-CM

## 2020-11-09 DIAGNOSIS — M81.0 AGE-RELATED OSTEOPOROSIS WITHOUT CURRENT PATHOLOGICAL FRACTURE: Primary | ICD-10-CM

## 2020-11-09 PROCEDURE — 99214 OFFICE O/P EST MOD 30 MIN: CPT | Performed by: INTERNAL MEDICINE

## 2020-11-09 PROCEDURE — 96372 THER/PROPH/DIAG INJ SC/IM: CPT | Performed by: INTERNAL MEDICINE

## 2020-11-23 ENCOUNTER — OFFICE VISIT (OUTPATIENT)
Dept: OBGYN CLINIC | Facility: CLINIC | Age: 75
End: 2020-11-23
Payer: MEDICARE

## 2020-11-23 VITALS — HEART RATE: 81 BPM | SYSTOLIC BLOOD PRESSURE: 138 MMHG | DIASTOLIC BLOOD PRESSURE: 78 MMHG

## 2020-11-23 DIAGNOSIS — M18.12 ARTHRITIS OF CARPOMETACARPAL (CMC) JOINT OF LEFT THUMB: Primary | ICD-10-CM

## 2020-11-23 PROCEDURE — 99213 OFFICE O/P EST LOW 20 MIN: CPT | Performed by: SURGERY

## 2020-11-23 PROCEDURE — 20600 DRAIN/INJ JOINT/BURSA W/O US: CPT | Performed by: SURGERY

## 2020-11-23 RX ORDER — LIDOCAINE HYDROCHLORIDE 10 MG/ML
1 INJECTION, SOLUTION INFILTRATION; PERINEURAL
Status: COMPLETED | OUTPATIENT
Start: 2020-11-23 | End: 2020-11-23

## 2020-11-23 RX ORDER — TRIAMCINOLONE ACETONIDE 40 MG/ML
20 INJECTION, SUSPENSION INTRA-ARTICULAR; INTRAMUSCULAR
Status: COMPLETED | OUTPATIENT
Start: 2020-11-23 | End: 2020-11-23

## 2020-11-23 RX ADMIN — TRIAMCINOLONE ACETONIDE 20 MG: 40 INJECTION, SUSPENSION INTRA-ARTICULAR; INTRAMUSCULAR at 11:07

## 2020-11-23 RX ADMIN — LIDOCAINE HYDROCHLORIDE 1 ML: 10 INJECTION, SOLUTION INFILTRATION; PERINEURAL at 11:07

## 2021-01-08 ENCOUNTER — TRANSCRIBE ORDERS (OUTPATIENT)
Dept: ADMINISTRATIVE | Facility: HOSPITAL | Age: 76
End: 2021-01-08

## 2021-01-08 DIAGNOSIS — Z87.891 PERSONAL HISTORY OF TOBACCO USE, PRESENTING HAZARDS TO HEALTH: Primary | ICD-10-CM

## 2021-01-18 ENCOUNTER — OFFICE VISIT (OUTPATIENT)
Dept: NEUROLOGY | Facility: CLINIC | Age: 76
End: 2021-01-18
Payer: MEDICARE

## 2021-01-18 VITALS
DIASTOLIC BLOOD PRESSURE: 60 MMHG | BODY MASS INDEX: 21.65 KG/M2 | HEART RATE: 92 BPM | SYSTOLIC BLOOD PRESSURE: 120 MMHG | WEIGHT: 126.8 LBS | HEIGHT: 64 IN

## 2021-01-18 DIAGNOSIS — I61.9 CEREBRAL HEMORRHAGE (HCC): ICD-10-CM

## 2021-01-18 DIAGNOSIS — M21.372 FOOT DROP, LEFT: Primary | ICD-10-CM

## 2021-01-18 DIAGNOSIS — G31.84 MINIMAL COGNITIVE IMPAIRMENT: ICD-10-CM

## 2021-01-18 PROCEDURE — 99214 OFFICE O/P EST MOD 30 MIN: CPT | Performed by: PSYCHIATRY & NEUROLOGY

## 2021-01-18 NOTE — PROGRESS NOTES
Patient ID: Porfirio Hurst is a 70 y o  female  Assessment/Plan:    No problem-specific Assessment & Plan notes found for this encounter  Diagnoses and all orders for this visit:    Foot drop, left  Comments:  significantly improved  - he is active states she is no longer noting that that foot drives is much  Commended her on her regular exercise  I do not notice a significant weakness today as I did last time  MRI of the lumbar spine showed mild degenerative changes likely noncontributory  EMG pending will follow up  Minimal cognitive impairment     - not suspect neuro degenerative etiology given patient's exam today  She has no trouble with ADLs and driving    Cerebral hemorrhage  - Hx of external capsule hemorrhage  - Suspect HTN etiology which is now well controlled  - Neurologic exam stable    Follow-up EMG and if is unremarkable will see patient on an as-needed basis forward  Patient agreeable  Subjective:    HPI    Ms Jimmy Balderrama is a pleasant 70-year-old female with history of right external capsule hemorrhage seen in follow-up for right external capsule hemorrhagic stroke with new left footdrop noted last visit  Patient at that time a told me that this was slow and gradual with no falls  MRI of the lumbar spine September 2020 showed mild noncompressive degenerative changes at L2-L4 with no discrete nerve impingement    Patient did obtain blood work out of network: TSH, CBC, C34, B6, folic acid- will obtain this  No worse weakness or sensory loss or falls  EMG scheduled 2/2021  She denies any worse memory  States she has a tendency to go forward in her conversation and misses the main point if her girlfriends tell her what she was talking about then she can finish the conversation    No trouble with driving getting lost familiar faces        The following portions of the patient's history were reviewed and updated as appropriate: allergies, current medications, past family history, past medical history, past social history, past surgical history and problem list and ROS         Objective:    Blood pressure 120/60, pulse 92, height 5' 4" (1 626 m), weight 57 5 kg (126 lb 12 8 oz)  Physical Exam    Gen: NAD  HEENT: NCAT, EOMI  Resp: Symmetric chest rise bl  CVS: RRR  Ext: no edema    Neurological Exam    AO X 4, no aphasia or dysarthria noted  Immediate recall 3/3, delayed recall at 10 minutes 1/3  CN 2-12 grossly intact  Motor: 5/5 ext x 4  Sensation: Intact to all modalities x 4 except mildly reduced sensation left dorsum of the foot  Cerebellar: No dysmetria b/l  Gait: normal range    ROS:    Review of Systems   Constitutional: Negative  Negative for appetite change and fever  HENT: Negative  Negative for hearing loss, tinnitus, trouble swallowing and voice change  Eyes: Negative  Negative for photophobia and pain  Respiratory: Negative  Negative for shortness of breath  Cardiovascular: Negative  Negative for palpitations  Gastrointestinal: Negative  Negative for nausea and vomiting  Endocrine: Negative  Negative for cold intolerance  Genitourinary: Negative  Negative for dysuria, frequency and urgency  Musculoskeletal: Negative  Negative for myalgias and neck pain  Skin: Negative  Negative for rash  Neurological: Negative  Negative for dizziness, tremors, seizures, syncope, facial asymmetry, speech difficulty, weakness, light-headedness, numbness and headaches  Hematological: Bruises/bleeds easily (Bruises easily)  Psychiatric/Behavioral: Negative  Negative for confusion, hallucinations and sleep disturbance

## 2021-04-28 ENCOUNTER — HOSPITAL ENCOUNTER (OUTPATIENT)
Dept: RADIOLOGY | Age: 76
Discharge: HOME | End: 2021-04-28
Payer: MEDICARE

## 2021-04-28 DIAGNOSIS — Z87.891 PERSONAL HISTORY OF TOBACCO USE, PRESENTING HAZARDS TO HEALTH: ICD-10-CM

## 2021-04-28 PROCEDURE — 71271 CT THORAX LUNG CANCER SCR C-: CPT

## 2021-06-13 ENCOUNTER — TELEPHONE (OUTPATIENT)
Dept: OTHER | Facility: OTHER | Age: 76
End: 2021-06-13

## 2021-06-13 NOTE — TELEPHONE ENCOUNTER
Patient verifying the location of her 7/9/2021 appt   Informed her as per Epic it will be at the [facility]

## 2021-06-21 ENCOUNTER — HOSPITAL ENCOUNTER (OUTPATIENT)
Dept: NEUROLOGY | Facility: CLINIC | Age: 76
Discharge: HOME | End: 2021-06-21
Payer: MEDICARE

## 2021-06-21 DIAGNOSIS — G89.29 CHRONIC LEFT-SIDED LOW BACK PAIN, UNSPECIFIED WHETHER SCIATICA PRESENT: ICD-10-CM

## 2021-06-21 DIAGNOSIS — M54.50 CHRONIC LEFT-SIDED LOW BACK PAIN, UNSPECIFIED WHETHER SCIATICA PRESENT: ICD-10-CM

## 2021-06-21 DIAGNOSIS — M21.372 LEFT FOOT DROP: ICD-10-CM

## 2021-06-21 PROCEDURE — 95886 MUSC TEST DONE W/N TEST COMP: CPT | Performed by: PSYCHIATRY & NEUROLOGY

## 2021-06-21 PROCEDURE — 95909 NRV CNDJ TST 5-6 STUDIES: CPT | Performed by: PSYCHIATRY & NEUROLOGY

## 2021-07-08 ENCOUNTER — HOSPITAL ENCOUNTER (OUTPATIENT)
Dept: RADIOLOGY | Age: 76
Discharge: HOME | End: 2021-07-08
Payer: MEDICARE

## 2021-07-08 DIAGNOSIS — M81.0 AGE-RELATED OSTEOPOROSIS WITHOUT CURRENT PATHOLOGICAL FRACTURE: ICD-10-CM

## 2021-07-08 PROCEDURE — 77080 DXA BONE DENSITY AXIAL: CPT

## 2021-07-10 NOTE — PROGRESS NOTES
Assessment and Plan:   Patient is a 81-year-old female who presents for rheumatology follow-up for osteoporosis  She has been on Prolia with overall stability over the past few years  We reviewed her updated DEXA scan which shows stable bone mineral density at the hip and lumbar spine  We discussed that she is still technically in the osteoporosis range with her T-score, but given the stable BMD numbers and no significant decline, I would suggest we continue with the Prolia  She was agreeable with that plan  We discussed in the future if we do see a decline in her bone mineral density we may want to consider switching her Prolia to something different but for now we will continue  She will not be due for another DEXA until 2023  She will get routine blood work before her next Prolia injection in 6 months  Plan:  Diagnoses and all orders for this visit:    Age-related osteoporosis without current pathological fracture  -     Vitamin D 25 hydroxy  -     PTH, intact  -     Basic metabolic panel  -     denosumab (PROLIA) subcutaneous injection 60 mg    Long term use of drug  -     Vitamin D 25 hydroxy  -     PTH, intact  -     Basic metabolic panel    Vitamin D deficiency  -     Vitamin D 25 hydroxy  -     PTH, intact  -     Basic metabolic panel        Follow-up plan: 6 months        Rheumatic Disease Summary  1  Osteoporosis   -Established with Dr Ngozi Sears 6/2016 for osteoporosis  -Prior tx with alendronate x5 years, off since around 2010  -Started on Prolia 6/2016, last given 7/12/21, next due around 1/12/22  -DEXA 6/11/2019: T -2 7 lumbar, BMD stable at lumbar and decreased in hip  -Visit 11/9/20: f/u after next DEXA to determine tx plan, prolia given today   -DEXA 7/8/21: T -2 7 lumbar, stable without significant change at lumbar spine and hip   -DEXA due 7/2023  2   Comorbidities: h/o CVA, left foot drop, HLD, GERD     HPI  Jorgito Pardo is a 78 y o   female who presents for rheumatology follow-up for osteoporosis  She had an updated DEXA scan earlier this month  She is due for her Prolia injection today  She reports no significant complete or concerns today  She remains on vitamin-D and calcium  She has not had recent blood work done and so we discussed we will do some before her next Prolia  We reviewed her DEXA scan today  The following portions of the patient's history were reviewed and updated as appropriate: allergies, current medications, past family history, past medical history, past social history, past surgical history and problem list     Review of Systems:   Review of Systems   Constitutional: Negative for fatigue and unexpected weight change  HENT: Negative for mouth sores  Respiratory: Negative for cough and shortness of breath  Gastrointestinal: Negative for constipation and diarrhea  Musculoskeletal: Negative for arthralgias, back pain, joint swelling and myalgias  Skin: Negative for color change and rash  Neurological: Negative for weakness  Psychiatric/Behavioral: Negative for sleep disturbance  Home Medications:    Current Outpatient Medications:     aspirin 81 mg chewable tablet, Chew 81 mg daily, Disp: , Rfl:     atorvastatin (LIPITOR) 10 mg tablet, Take 1 tablet by mouth Daily, Disp: , Rfl:     b complex vitamins tablet, Take 1 tablet by mouth daily  , Disp: , Rfl:     calcium-vitamin D 250-100 MG-UNIT per tablet, Take 1 tablet by mouth 2 (two) times a day , Disp: , Rfl:     FLUAD 0 5 ML LÓPEZ, inject 0 5 milliliter intramuscularly, Disp: , Rfl: 0    Multiple Vitamins-Minerals (CENTRUM CARDIO PO), Take 1 tablet by mouth daily  , Disp: , Rfl:     omeprazole (PriLOSEC) 20 mg delayed release capsule, Take 20 mg by mouth 2 (two) times a day, Disp: , Rfl:     Pyridoxine HCl (VITAMIN B-6 PO), Take by mouth daily, Disp: , Rfl:     Vitamin D, Cholecalciferol, 1000 UNITS TABS, Take 1 tablet by mouth daily Indications: with aloe  , Disp: , Rfl:   No current facility-administered medications for this visit  Objective:    Vitals:    07/12/21 0919   Pulse: 84   Weight: 57 2 kg (126 lb)   Height: 5' 4" (1 626 m)       Physical Exam  Constitutional:       General: She is not in acute distress  Appearance: She is well-developed  HENT:      Head: Normocephalic and atraumatic  Eyes:      General: Lids are normal  No scleral icterus  Conjunctiva/sclera: Conjunctivae normal    Pulmonary:      Effort: Pulmonary effort is normal  No tachypnea, accessory muscle usage or respiratory distress  Musculoskeletal:      Cervical back: Neck supple  Skin:     General: Skin is dry  Findings: No rash  Neurological:      Mental Status: She is alert  Psychiatric:         Behavior: Behavior normal  Behavior is cooperative  Imaging:   DEXA 7/8/21:  COMPARISON: Numerous prior studies, most recent June 11, 2019      RESULTS:   LUMBAR SPINE L1-L4 :   BMD  0 748  gm/cm2   T-score -2 7    These values are artifactually elevated due to degenerative sclerosis and osteophytosis      LEFT  TOTAL HIP:   BMD:  0 812  gm/cm2   T-score:  -1 1     LEFT  FEMORAL NECK:   BMD:  0 648  gm/cm2   T score: -1 8      IMPRESSION:  1   OSTEOPOROSIS  [Based on the lumbar spine]     2  When compared to the prior examination, there has been NO SIGNIFICANT CHANGE in the total bone mineral density of the lumbar spine or hip, when allowing for the least significant change

## 2021-07-12 ENCOUNTER — OFFICE VISIT (OUTPATIENT)
Dept: RHEUMATOLOGY | Facility: CLINIC | Age: 76
End: 2021-07-12
Payer: MEDICARE

## 2021-07-12 VITALS — BODY MASS INDEX: 21.51 KG/M2 | HEART RATE: 84 BPM | WEIGHT: 126 LBS | HEIGHT: 64 IN

## 2021-07-12 DIAGNOSIS — Z79.899 LONG TERM USE OF DRUG: ICD-10-CM

## 2021-07-12 DIAGNOSIS — M81.0 AGE-RELATED OSTEOPOROSIS WITHOUT CURRENT PATHOLOGICAL FRACTURE: Primary | ICD-10-CM

## 2021-07-12 DIAGNOSIS — E55.9 VITAMIN D DEFICIENCY: ICD-10-CM

## 2021-07-12 PROCEDURE — 99214 OFFICE O/P EST MOD 30 MIN: CPT | Performed by: INTERNAL MEDICINE

## 2021-07-12 PROCEDURE — 96372 THER/PROPH/DIAG INJ SC/IM: CPT | Performed by: INTERNAL MEDICINE

## 2021-08-12 ENCOUNTER — OFFICE VISIT (OUTPATIENT)
Dept: OBGYN CLINIC | Facility: CLINIC | Age: 76
End: 2021-08-12
Payer: MEDICARE

## 2021-08-12 VITALS
SYSTOLIC BLOOD PRESSURE: 129 MMHG | DIASTOLIC BLOOD PRESSURE: 83 MMHG | HEIGHT: 64 IN | HEART RATE: 93 BPM | WEIGHT: 126 LBS | BODY MASS INDEX: 21.51 KG/M2

## 2021-08-12 DIAGNOSIS — M18.12 ARTHRITIS OF CARPOMETACARPAL (CMC) JOINT OF LEFT THUMB: Primary | ICD-10-CM

## 2021-08-12 PROCEDURE — 99214 OFFICE O/P EST MOD 30 MIN: CPT | Performed by: SURGERY

## 2021-08-12 PROCEDURE — 20600 DRAIN/INJ JOINT/BURSA W/O US: CPT | Performed by: SURGERY

## 2021-08-12 RX ORDER — TRIAMCINOLONE ACETONIDE 40 MG/ML
20 INJECTION, SUSPENSION INTRA-ARTICULAR; INTRAMUSCULAR
Status: COMPLETED | OUTPATIENT
Start: 2021-08-12 | End: 2021-08-12

## 2021-08-12 RX ORDER — LIDOCAINE HYDROCHLORIDE 10 MG/ML
1 INJECTION, SOLUTION INFILTRATION; PERINEURAL
Status: COMPLETED | OUTPATIENT
Start: 2021-08-12 | End: 2021-08-12

## 2021-08-12 RX ORDER — BUPIVACAINE HYDROCHLORIDE 2.5 MG/ML
0.5 INJECTION, SOLUTION INFILTRATION; PERINEURAL
Status: COMPLETED | OUTPATIENT
Start: 2021-08-12 | End: 2021-08-12

## 2021-08-12 RX ADMIN — TRIAMCINOLONE ACETONIDE 20 MG: 40 INJECTION, SUSPENSION INTRA-ARTICULAR; INTRAMUSCULAR at 11:09

## 2021-08-12 RX ADMIN — LIDOCAINE HYDROCHLORIDE 1 ML: 10 INJECTION, SOLUTION INFILTRATION; PERINEURAL at 11:09

## 2021-08-12 RX ADMIN — BUPIVACAINE HYDROCHLORIDE 0.5 ML: 2.5 INJECTION, SOLUTION INFILTRATION; PERINEURAL at 11:09

## 2021-08-12 NOTE — PROGRESS NOTES
ASSESSMENT/PLAN:      70 y o  female with bilateral thumb CMC arthritis  Due to acute exasperation of lef thumb CMC arthritis, the decision was made to proceed with a repeat CSI  Left thumb CMC CSI was performed in the office without complication  CSI can be continued to be repeated on a 3 months basis if needed  Continue the use of the comfort cool braces on an as needed basis  Follow up as needed if symptoms worsen or fail to improve  The patient verbalized understanding of exam findings and treatment plan  We engaged in the shared decision-making process and treatment options were discussed at length with the patient  Surgical and conservative management discussed today along with risks and benefits  Diagnoses and all orders for this visit:    Arthritis of carpometacarpal Moore) joint of left thumb  -     Small joint arthrocentesis: L thumb CMC      Follow Up:  Return if symptoms worsen or fail to improve  To Do Next Visit:  Re-evaluation of current issue    ____________________________________________________________________________________________________________________________________________      CHIEF COMPLAINT:  Chief Complaint   Patient presents with    Left Thumb - Pain, Follow-up       SUBJECTIVE:  Jason Neighbor is a 78y o  year old RHD female who presents to the office today for a follow up regarding thumb CMC arthritis  Overall Marip[place] Century is doing well  She notes increased pain to her left thumb CMC joint starting less then 1 month ago  She underwent bilateral thumb CMC CSI's aprox  7 months ago, which was beneficial for her  She did have a twinge of pain to the right thumb CMC joint but this has since resolved  She wears bilateral comfort cool braces on an as needed basis  I have personally reviewed all the relevant PMH, PSH, SH, FH, Medications and allergies       PAST MEDICAL HISTORY:  Past Medical History:   Diagnosis Date    Cataract of right eye     GERD (gastroesophageal reflux disease)        PAST SURGICAL HISTORY:  Past Surgical History:   Procedure Laterality Date    CATARACT EXTRACTION, BILATERAL Bilateral     MASTECTOMY Bilateral     for calcium deposits    ME COLONOSCOPY FLX DX W/COLLJ SPEC WHEN PFRMD N/A 8/25/2017    Procedure: COLONOSCOPY;  Surgeon: Macarena Griffith MD;  [facility]; Service: Colorectal    ME XCAPSL CTRC RMVL INSJ IO LENS PROSTH W/O ECP Right 6/21/2016    Procedure: OD EXTRACTION EXTRACAPSULAR CATARACT PHACO INTRAOCULAR LENS (IOL); Surgeon: Flex Huertas MD;  Location: [facility];  Service: Ophthalmology    TONSILLECTOMY         FAMILY HISTORY:  Family History   Problem Relation Age of Onset    Rectal cancer Brother     Stroke Mother     Heart attack Father     Arrhythmia Father         possible    Coronary artery disease Father     Sudden death Father         scd    Anuerysm Neg Hx     Clotting disorder Neg Hx     Hypertension Neg Hx     Hyperlipidemia Neg Hx     Heart failure Neg Hx        SOCIAL HISTORY:  Social History     Tobacco Use    Smoking status: Former Smoker    Smokeless tobacco: Never Used   Vaping Use    Vaping Use: Never used   Substance Use Topics    Alcohol use: Not Currently    Drug use: Never       MEDICATIONS:    Current Outpatient Medications:     aspirin 81 mg chewable tablet, Chew 81 mg daily, Disp: , Rfl:     atorvastatin (LIPITOR) 10 mg tablet, Take 1 tablet by mouth Daily, Disp: , Rfl:     b complex vitamins tablet, Take 1 tablet by mouth daily  , Disp: , Rfl:     calcium-vitamin D 250-100 MG-UNIT per tablet, Take 1 tablet by mouth 2 (two) times a day , Disp: , Rfl:     FLUAD 0 5 ML LÓPEZ, inject 0 5 milliliter intramuscularly, Disp: , Rfl: 0    Multiple Vitamins-Minerals (CENTRUM CARDIO PO), Take 1 tablet by mouth daily  , Disp: , Rfl:     omeprazole (PriLOSEC) 20 mg delayed release capsule, Take 20 mg by mouth 2 (two) times a day, Disp: , Rfl:     Pyridoxine HCl (VITAMIN B-6 PO), Take by mouth daily, Disp: , Rfl:     Vitamin D, Cholecalciferol, 1000 UNITS TABS, Take 1 tablet by mouth daily Indications: with aloe  , Disp: , Rfl:     ALLERGIES:  No Known Allergies    REVIEW OF SYSTEMS:  Review of Systems   Constitutional: Negative for chills, fever and unexpected weight change  HENT: Negative for hearing loss, nosebleeds and sore throat  Eyes: Negative for pain, redness and visual disturbance  Respiratory: Negative for cough, shortness of breath and wheezing  Cardiovascular: Negative for chest pain, palpitations and leg swelling  Gastrointestinal: Negative for abdominal pain, nausea and vomiting  Endocrine: Negative for polydipsia and polyuria  Genitourinary: Negative for difficulty urinating and hematuria  Musculoskeletal: Positive for arthralgias  Negative for joint swelling and myalgias  Skin: Negative for rash and wound  Neurological: Negative for dizziness, numbness and headaches  Psychiatric/Behavioral: Negative for decreased concentration, dysphoric mood and suicidal ideas  The patient is not nervous/anxious          VITALS:  Vitals:    08/12/21 1045   BP: 129/83   Pulse: 93       LABS:  HgA1c: No results found for: HGBA1C  BMP:   Lab Results   Component Value Date    CALCIUM 9 6 03/30/2018    K 4 5 03/30/2018    CO2 27 03/30/2018     03/30/2018    BUN 11 03/30/2018    CREATININE 0 79 03/30/2018       _____________________________________________________  PHYSICAL EXAMINATION:  General: well developed and well nourished, alert, oriented times 3 and appears comfortable  Psychiatric: Normal  HEENT: Normocephalic, Atraumatic Trachea Midline, No torticollis  Pulmonary: No audible wheezing or respiratory distress   Cardiovascular: No pitting edema, 2+ radial pulse   Abdominal/GI: abdomen non tender, non distended   Skin: No masses, erythema, lacerations, fluctation, ulcerations  Neurovascular: Sensation Intact to the Median, Ulnar, Radial Nerve, Motor Intact to the Median, Ulnar, Radial Nerve and Pulses Intact  Musculoskeletal: Normal, except as noted in detailed exam and in HPI  MUSCULOSKELETAL EXAMINATION:    Left CMC Exam:  No adduction contracture  No hyperextension deformity of MCP joint  Positive localized tenderness over radial and dorsal aspect of thumb (CMC joint)  Grind test is Positive for pain and Positive for crepitus  Metacarpal load shift test positive   No triggering or tenderness over the A1 pulley      ___________________________________________________  STUDIES REVIEWED:  No new imaging to review           PROCEDURES PERFORMED:  Small joint arthrocentesis: L thumb CMC  [facility] Protocol:  Consent: Verbal consent obtained  Written consent not obtained  Risks and benefits: risks, benefits and alternatives were discussed  Consent given by: patient  Time out: Immediately prior to procedure a "time out" was called to verify the correct patient, procedure, equipment, support staff and site/side marked as required    Site marked: the operative site was marked  Patient identity confirmed: verbally with patient    Supporting Documentation  Indications: pain   Procedure Details  Location: thumb - L thumb CMC  Preparation: Patient was prepped and draped in the usual sterile fashion  Needle size: 25 G  Ultrasound guidance: no  Medications administered: 0 5 mL bupivacaine 0 25 %; 1 mL lidocaine 1 %; 20 mg triamcinolone acetonide 40 mg/mL    Patient tolerance: patient tolerated the procedure well with no immediate complications  Dressing:  Sterile dressing applied           _____________________________________________________      Scribe Attestation    I,:  Kell Agarwal am acting as a scribe while in the presence of the attending physician :       I,:  Olga Man MD personally performed the services described in this documentation    as scribed in my presence :

## 2021-11-01 ENCOUNTER — ANNUAL EXAM (OUTPATIENT)
Dept: OBGYN CLINIC | Facility: CLINIC | Age: 76
End: 2021-11-01
Payer: MEDICARE

## 2021-11-01 VITALS
BODY MASS INDEX: 21.85 KG/M2 | DIASTOLIC BLOOD PRESSURE: 74 MMHG | WEIGHT: 128 LBS | HEIGHT: 64 IN | SYSTOLIC BLOOD PRESSURE: 148 MMHG

## 2021-11-01 DIAGNOSIS — Z01.419 ENCOUNTER FOR GYNECOLOGICAL EXAMINATION WITHOUT ABNORMAL FINDING: Primary | ICD-10-CM

## 2021-11-01 DIAGNOSIS — Z12.31 ENCOUNTER FOR SCREENING MAMMOGRAM FOR MALIGNANT NEOPLASM OF BREAST: ICD-10-CM

## 2021-11-01 DIAGNOSIS — N95.2 ATROPHIC VAGINITIS: ICD-10-CM

## 2021-11-01 PROCEDURE — G0101 CA SCREEN;PELVIC/BREAST EXAM: HCPCS | Performed by: OBSTETRICS & GYNECOLOGY

## 2022-01-13 ENCOUNTER — OFFICE VISIT (OUTPATIENT)
Dept: RHEUMATOLOGY | Facility: CLINIC | Age: 77
End: 2022-01-13
Payer: MEDICARE

## 2022-01-13 DIAGNOSIS — M81.0 AGE-RELATED OSTEOPOROSIS WITHOUT CURRENT PATHOLOGICAL FRACTURE: Primary | ICD-10-CM

## 2022-01-13 PROCEDURE — 96372 THER/PROPH/DIAG INJ SC/IM: CPT | Performed by: INTERNAL MEDICINE

## 2022-01-13 NOTE — PROGRESS NOTES
Pt presents today for her prolia injection today,i gave it to her in her right arm and she tolerated it well

## 2022-04-04 ENCOUNTER — HOSPITAL ENCOUNTER (OUTPATIENT)
Dept: RADIOLOGY | Facility: HOSPITAL | Age: 77
Discharge: HOME | End: 2022-04-04
Payer: MEDICARE

## 2022-04-04 DIAGNOSIS — R91.1 SOLITARY PULMONARY NODULE: ICD-10-CM

## 2022-04-04 PROCEDURE — G1004 CDSM NDSC: HCPCS

## 2022-04-04 PROCEDURE — 71250 CT THORAX DX C-: CPT

## 2022-04-11 ENCOUNTER — OFFICE VISIT (OUTPATIENT)
Dept: NEUROLOGY | Facility: CLINIC | Age: 77
End: 2022-04-11
Payer: MEDICARE

## 2022-04-11 VITALS
HEART RATE: 91 BPM | TEMPERATURE: 97.4 F | DIASTOLIC BLOOD PRESSURE: 82 MMHG | BODY MASS INDEX: 22.62 KG/M2 | SYSTOLIC BLOOD PRESSURE: 142 MMHG | WEIGHT: 132.5 LBS | HEIGHT: 64 IN

## 2022-04-11 DIAGNOSIS — G25.0 TREMOR, ESSENTIAL: Primary | ICD-10-CM

## 2022-04-11 DIAGNOSIS — G31.84 MILD COGNITIVE IMPAIRMENT: ICD-10-CM

## 2022-04-11 DIAGNOSIS — I61.9 CEREBRAL HEMORRHAGE (HCC): ICD-10-CM

## 2022-04-11 DIAGNOSIS — M62.81 PROXIMAL MUSCLE WEAKNESS: ICD-10-CM

## 2022-04-11 PROCEDURE — 99215 OFFICE O/P EST HI 40 MIN: CPT | Performed by: PSYCHIATRY & NEUROLOGY

## 2022-04-11 RX ORDER — PRIMIDONE 50 MG/1
TABLET ORAL
Qty: 30 TABLET | Refills: 4 | Status: SHIPPED | OUTPATIENT
Start: 2022-04-11

## 2022-04-11 RX ORDER — FAMOTIDINE 40 MG/1
TABLET, FILM COATED ORAL
COMMUNITY
Start: 2022-03-08

## 2022-04-11 NOTE — PROGRESS NOTES
Patient ID: April Yang is a 78 y o  female  Assessment/Plan:    No problem-specific Assessment & Plan notes found for this encounter  Diagnoses and all orders for this visit:    Tremor, essential  -     TSH, 3rd generation with Free T4 reflex; Future  -     Basic metabolic panel; Future  -     primidone (MYSOLINE) 50 mg tablet; Take half tab nightly for one week if needed increase to one tab nightly  - Discussed potential a/e    Consider being weighted utensils and pens and coffee cups for tremor  Let us know if interested in tremor therapy  Consider trial of Coq10 100 mg three times a day with food to see if this can help with muscle weakness  Mild cognitive impairment  -     MRI brain NeuroQuant wo and w contrast; Future  -     TSH, 3rd generation with Free T4 reflex; Future  -     Basic metabolic panel; Future  - recent pulmonary nodule likely benign being followed up by [facility] thus ordered mri with contrast just in case to make sure no neoplastic changes in the CNS  Cerebral hemorrhage ([facility] [address] )  -     MRI brain NeuroQuant wo and w contrast; Future    Proximal muscle weakness  Mild no other MG symptoms  Possibly secondary to DDD L spine more prominent L2-4 vs statin s/e, discussed coq10 100 TID trial  Will monitor    Follow up in six months time with Dr Cabello in [address]           Subjective:    HPI    Ms Nick Smith is pleasant 79 yo female seen in follow up for hx foot drop improved with exercise    States she wishes to discuss tremors worse with action excitement fine finger movements  No hand weakness   Drinking is challenging as her hand shakes  She walks at least 3000 steps a day does her treadmill or outside if the weather is good, no imbalance or falls  She does have right knee pain that limits to some degree  She also reports mild memory loss, she is unsure if progressive  No difficulty with ADLs        The following portions of the patient's history were reviewed and updated as appropriate: allergies, current medications, past family history, past medical history, past social history, past surgical history and problem list and ROS         Objective:    Blood pressure 142/82, pulse 91, temperature (!) 97 4 °F (36 3 °C), temperature source Tympanic, height 5' 4" (1 626 m), weight 60 1 kg (132 lb 8 oz)  Physical Exam    Gen: NAD  HEENT: NCAT, EOMI  Resp: Symmetric chest rise bl  CVS: RRR  Ext: no edema    Neurological Exam    AO X 4, Knows month year date weekday season president   World forwards and backwards  Three step commands intact  Similarities 1/3  Immediate recall 3/3  Delayed recall: 1/3  No dysarthria  Naming intact  CN 2-12 grossly intact  Motor: 5/5 ext x 4 except proximal mm 3/5 bl, mild intention tremor bl, tone wnl  Sensation: Intact to LT and temp  x 4 ext  Cerebellar: No dysmetria b/l  Gait: normal range    ROS:    Review of Systems   Constitutional: Negative  Negative for appetite change and fever  HENT: Negative  Negative for hearing loss, tinnitus, trouble swallowing and voice change  Eyes: Negative  Negative for photophobia and pain  Respiratory: Negative  Negative for shortness of breath  Cardiovascular: Negative  Negative for palpitations  Gastrointestinal: Negative  Negative for nausea and vomiting  Endocrine: Negative  Negative for cold intolerance  Genitourinary: Negative  Negative for dysuria, frequency and urgency  Musculoskeletal: Positive for myalgias (right knee pain)  Negative for neck pain  Skin: Negative  Negative for rash  Neurological: Positive for tremors  Negative for dizziness, seizures, syncope, facial asymmetry, speech difficulty, weakness, light-headedness, numbness and headaches  Hematological: Negative  Does not bruise/bleed easily  Psychiatric/Behavioral: Negative for confusion, hallucinations and sleep disturbance          Memory loss

## 2022-04-11 NOTE — PATIENT INSTRUCTIONS
Consider being weighted utensils and pens and coffee cups for tremor  Let us know if interested in tremor therapy  Consider trial of Coq10 100 mg three times a day with food to see if this can help with muscle weakness

## 2022-04-14 ENCOUNTER — OFFICE VISIT (OUTPATIENT)
Dept: PODIATRY | Facility: CLINIC | Age: 77
End: 2022-04-14
Payer: MEDICARE

## 2022-04-14 ENCOUNTER — TELEPHONE (OUTPATIENT)
Dept: PODIATRY | Facility: CLINIC | Age: 77
End: 2022-04-14

## 2022-04-14 VITALS
BODY MASS INDEX: 22.2 KG/M2 | SYSTOLIC BLOOD PRESSURE: 158 MMHG | WEIGHT: 130 LBS | HEIGHT: 64 IN | HEART RATE: 77 BPM | DIASTOLIC BLOOD PRESSURE: 86 MMHG

## 2022-04-14 DIAGNOSIS — M20.12 VALGUS DEFORMITY OF BOTH GREAT TOES: Primary | ICD-10-CM

## 2022-04-14 DIAGNOSIS — M77.42 METATARSALGIA OF LEFT FOOT: ICD-10-CM

## 2022-04-14 DIAGNOSIS — M20.11 VALGUS DEFORMITY OF BOTH GREAT TOES: Primary | ICD-10-CM

## 2022-04-14 PROCEDURE — 99202 OFFICE O/P NEW SF 15 MIN: CPT | Performed by: PODIATRIST

## 2022-04-14 RX ORDER — MELOXICAM 15 MG/1
15 TABLET ORAL DAILY
Qty: 30 TABLET | Refills: 0 | Status: SHIPPED | OUTPATIENT
Start: 2022-04-14 | End: 2022-05-14

## 2022-04-14 NOTE — PROGRESS NOTES
Assessment/Plan:    Explained the patient that she is dealing with metatarsalgia of the left foot  This is likely due to the hallux valgus deformity which stresses the lesser metatarsals with extended walking  Patient placed on meloxicam 15 mg daily and told to discontinue ibuprofen  She was advised to bring in her orthotics to see if they can be adjusted with a metatarsal bar  No problem-specific Assessment & Plan notes found for this encounter  Diagnoses and all orders for this visit:    Valgus deformity of both great toes    Metatarsalgia of left foot  -     meloxicam (Mobic) 15 mg tablet; Take 1 tablet (15 mg total) by mouth daily          Subjective:      Patient ID: Carl Ball is a 78 y o  female  HPI     Patient, a 81-year-old female presents with discomfort in the ball of her left foot  This discomfort is present with extended walking  No pain in toes related  Patient notes that she had plantar fasciitis years back and had inserts made for her shoes  She is not wearing them at this time as she does not have current plantar fascial pain  Patient has bunions both feet with left worse than right but she states that they are not painful  Patient uses ibuprofen but only infrequently for discomfort  The following portions of the patient's history were reviewed and updated as appropriate: allergies, current medications, past family history, past medical history, past social history, past surgical history and problem list     Review of Systems   Gastrointestinal: Negative  Musculoskeletal: Positive for arthralgias  Neurological: Negative  Objective:      /86   Pulse 77   Ht 5' 4" (1 626 m)   Wt 59 kg (130 lb)   LMP  (LMP Unknown)   BMI 22 31 kg/m²          Physical Exam  Constitutional:       Appearance: Normal appearance  Cardiovascular:      Pulses: Normal pulses  Musculoskeletal:         General: Deformity present  No tenderness        Comments: Hallux valgus deformity bilateral   Left great toe is deviated laterally and is beneath the 2nd toe of the left foot  No pain with palpation metatarsals left foot  Skin:     General: Skin is warm  Neurological:      General: No focal deficit present  Mental Status: She is oriented to person, place, and time  Comments: Hildy Dress sign negative for neuroma 2nd and 3rd metatarsal interspaces left foot

## 2022-04-14 NOTE — TELEPHONE ENCOUNTER
I went into Eva's chart to go over her AVS with her  She could not remember everything from the visit

## 2022-04-23 LAB
BUN SERPL-MCNC: 13 MG/DL (ref 7–25)
BUN/CREAT SERPL: ABNORMAL (CALC) (ref 6–22)
CALCIUM SERPL-MCNC: 9.9 MG/DL (ref 8.6–10.4)
CHLORIDE SERPL-SCNC: 100 MMOL/L (ref 98–110)
CK MB CFR SERPL ELPH: 0.9 NG/ML (ref 0–5)
CK SERPL-CCNC: 72 U/L (ref 29–143)
CO2 SERPL-SCNC: 29 MMOL/L (ref 20–32)
CREAT SERPL-MCNC: 0.75 MG/DL (ref 0.6–0.93)
GLUCOSE SERPL-MCNC: 108 MG/DL (ref 65–99)
POTASSIUM SERPL-SCNC: 4.4 MMOL/L (ref 3.5–5.3)
SL AMB EGFR AFRICAN AMERICAN: 91 ML/MIN/1.73M2
SL AMB EGFR NON AFRICAN AMERICAN: 79 ML/MIN/1.73M2
SODIUM SERPL-SCNC: 137 MMOL/L (ref 135–146)
TSH SERPL-ACNC: 1.53 MIU/L (ref 0.4–4.5)

## 2022-04-27 ENCOUNTER — TELEPHONE (OUTPATIENT)
Dept: NEUROLOGY | Facility: CLINIC | Age: 77
End: 2022-04-27

## 2022-04-27 NOTE — TELEPHONE ENCOUNTER
----- [facility] sent at 4/27/2022  3:01 PM EDT -----  Please let patient know blood work normal other than minimally elevated blood sugar thanks

## 2022-05-24 ENCOUNTER — HOSPITAL ENCOUNTER (OUTPATIENT)
Dept: RADIOLOGY | Age: 77
Discharge: HOME | End: 2022-05-24
Payer: MEDICARE

## 2022-05-24 DIAGNOSIS — G31.84 MILD COGNITIVE IMPAIRMENT: ICD-10-CM

## 2022-05-24 DIAGNOSIS — I61.9 CEREBRAL HEMORRHAGE (HCC): ICD-10-CM

## 2022-05-24 PROCEDURE — A9585 GADOBUTROL INJECTION: HCPCS | Performed by: PSYCHIATRY & NEUROLOGY

## 2022-05-24 PROCEDURE — G1004 CDSM NDSC: HCPCS

## 2022-05-24 PROCEDURE — 70553 MRI BRAIN STEM W/O & W/DYE: CPT

## 2022-05-24 PROCEDURE — 76377 3D RENDER W/INTRP POSTPROCES: CPT

## 2022-05-24 RX ADMIN — GADOBUTROL 5 ML: 604.72 INJECTION INTRAVENOUS at 10:33

## 2022-05-26 ENCOUNTER — OFFICE VISIT (OUTPATIENT)
Dept: PODIATRY | Facility: CLINIC | Age: 77
End: 2022-05-26
Payer: MEDICARE

## 2022-05-26 VITALS
HEIGHT: 64 IN | DIASTOLIC BLOOD PRESSURE: 85 MMHG | WEIGHT: 130 LBS | HEART RATE: 85 BPM | SYSTOLIC BLOOD PRESSURE: 162 MMHG | BODY MASS INDEX: 22.2 KG/M2

## 2022-05-26 DIAGNOSIS — M77.42 METATARSALGIA OF LEFT FOOT: Primary | ICD-10-CM

## 2022-05-26 PROCEDURE — 99212 OFFICE O/P EST SF 10 MIN: CPT | Performed by: PODIATRIST

## 2022-05-26 NOTE — PROGRESS NOTES
Patient presents for assessment of left foot  Patient relates no pain at this time  She states that she purchased a new pair of running shoes and is now comfortable  She cannot find her orthotics but they are not needed at this time  Patient to discontinue meloxicam   Matt mendez n

## 2022-07-08 ENCOUNTER — TELEPHONE (OUTPATIENT)
Dept: PAIN MEDICINE | Facility: CLINIC | Age: 77
End: 2022-07-08

## 2022-07-08 ENCOUNTER — TELEPHONE (OUTPATIENT)
Dept: OBGYN CLINIC | Facility: HOSPITAL | Age: 77
End: 2022-07-08

## 2022-07-08 ENCOUNTER — TELEPHONE (OUTPATIENT)
Dept: OBGYN CLINIC | Facility: CLINIC | Age: 77
End: 2022-07-08

## 2022-07-08 NOTE — TELEPHONE ENCOUNTER
A message was left for the patient to schedule an appointment with Dr Sheffield Leventhal for Wednesday 7/13  Practice Admin phone number left to schedule the appointment

## 2022-07-08 NOTE — TELEPHONE ENCOUNTER
Hello,  Please advise if the following patient can be forced onto the schedule:    Patient: Maine Stoll      : 1945     MRN: [id number]     Call back #: [phone]     Insurance: Medicare     Reason for appointment: Closed fracture of distal end of left tibia DOI 22  Ref PCP     Requested doctor/location: Any @ [facility]      E-mail sent to [facility]

## 2022-07-08 NOTE — TELEPHONE ENCOUNTER
Patient states 7/13 at 1:00 works for her  Patient states please confirm with appt - she could see it in [address]

## 2022-07-11 ENCOUNTER — OFFICE VISIT (OUTPATIENT)
Dept: OBGYN CLINIC | Facility: HOSPITAL | Age: 77
End: 2022-07-11
Payer: MEDICARE

## 2022-07-11 ENCOUNTER — HOSPITAL ENCOUNTER (OUTPATIENT)
Dept: RADIOLOGY | Facility: HOSPITAL | Age: 77
Discharge: HOME | End: 2022-07-11
Attending: ORTHOPAEDIC SURGERY
Payer: MEDICARE

## 2022-07-11 VITALS
DIASTOLIC BLOOD PRESSURE: 81 MMHG | HEART RATE: 96 BPM | WEIGHT: 130 LBS | HEIGHT: 64 IN | BODY MASS INDEX: 22.2 KG/M2 | SYSTOLIC BLOOD PRESSURE: 155 MMHG

## 2022-07-11 DIAGNOSIS — S82.892A CLOSED FRACTURE OF LEFT ANKLE, INITIAL ENCOUNTER: ICD-10-CM

## 2022-07-11 DIAGNOSIS — S82.892A CLOSED FRACTURE OF LEFT ANKLE, INITIAL ENCOUNTER: Primary | ICD-10-CM

## 2022-07-11 PROCEDURE — 99212 OFFICE O/P EST SF 10 MIN: CPT | Performed by: ORTHOPAEDIC SURGERY

## 2022-07-11 PROCEDURE — 73610 X-RAY EXAM OF ANKLE: CPT

## 2022-07-11 NOTE — LETTER
July 11, 2022     Alon[facility], DO  [address] [facility] [address]    Patient: Cindi Juares   YOB: 1945   Date of Visit: 7/11/2022       Dear Dr Jung Airza:    Thank you for referring Teodoropamella Benny to me for evaluation  Below are the relevant portions of my assessment and plan of care  The patient's fracture is non-displaced and progressing appropriately  She was provided with a lace-up ankle brace which can fit into a regular shoe  She can stop using the walking boot and transition into the lace-up brace at this time  She is free to bear weight on th ankle in the lace-up brace  A PT referral was provided for the patient so she may work on strengthening, range of motion, balance, and proprioception of the left ankle  We will see her back in 4 weeks  If you have questions, please do not hesitate to call me  I look forward to following Gisela Dempsey along with you           Sincerely,        Obie Munguia MD        CC: No Recipients

## 2022-07-11 NOTE — PROGRESS NOTES
Assessment:   Diagnosis ICD-10-CM Associated Orders   1  Closed fracture of left ankle, initial encounter  S82 892A XR ankle 3+ vw left     Durable Medical Equipment     Ambulatory Referral to Physical Therapy       Plan:  · The patient's X-rays look good at today's visit  · She was provided with a lace-up brace that can fit into shoe  She may start using this at this time  · A PT referral as provided so that the patient may do ROM and strengthening exercises of the ankle  She may be WBAT  She should also work on balance and proprioception  · Driving is permitted since the patient's non-driving ankle was affected  To do next visit:  Follow-up in 4 weeks to get further X-rays of the ankle and to assess pain and function of the ankle  The above stated was discussed in layman's terms and the patient expressed understanding  All questions were answered to the patient's satisfaction  Scribe Attestation    I,:  May Lomax PA-C am acting as a scribe while in the presence of the attending physician :       I,:  Jo Cardoza MD personally performed the services described in this documentation    as scribed in my presence :           Subjective:   Meghan Bullock is a 78 y o  female who presents to the office today as a new patient with a left lateral malleolus fracture fracture sustained 4 weeks ago on 6/13  At that time she lost her balance and fell and was taken by ambulance to [facility]   She was placed into a walking boot and has been bearing weight in the boot with a walker  The patient has stopped taking acetaminophen for pain and wants to know when she can stop using the walking boot        Review of systems negative unless otherwise specified in HPI    Past Medical History:   Diagnosis Date    Cataract of right eye     GERD (gastroesophageal reflux disease)        Past Surgical History:   Procedure Laterality Date    CATARACT EXTRACTION, BILATERAL Bilateral     MASTECTOMY Bilateral     for calcium deposits    [place] COLONOSCOPY FLX DX W/COLLJ SPEC WHEN PFRMD N/A 8/25/2017    Procedure: COLONOSCOPY;  Surgeon: Chely Morel MD;  [facility]; Service: Colorectal    OH XCAPSL CTRC RMVL INSJ IO LENS PROSTH W/O ECP Right 6/21/2016    Procedure: OD EXTRACTION EXTRACAPSULAR CATARACT PHACO INTRAOCULAR LENS (IOL); Surgeon: Roddy Ventura MD;  [facility];  Service: Ophthalmology    TONSILLECTOMY         Family History   Problem Relation Age of Onset    Rectal cancer Brother     Stroke Mother     Heart attack Father     Arrhythmia Father         possible    Coronary artery disease Father     Sudden death Father         scd    Anuerysm Neg Hx     Clotting disorder Neg Hx     Hypertension Neg Hx     Hyperlipidemia Neg Hx     Heart failure Neg Hx        Social History     Occupational History    Not on file   Tobacco Use    Smoking status: Former Smoker    Smokeless tobacco: Never Used   Vaping Use    Vaping Use: Never used   Substance and Sexual Activity    Alcohol use: Not Currently    Drug use: Never    Sexual activity: Not on file         Current Outpatient Medications:     famotidine (PEPCID) 40 MG tablet, , Disp: , Rfl:     aspirin 81 mg chewable tablet, Chew 81 mg daily, Disp: , Rfl:     atorvastatin (LIPITOR) 10 mg tablet, Take 1 tablet by mouth Daily, Disp: , Rfl:     b complex vitamins tablet, Take 1 tablet by mouth daily  , Disp: , Rfl:     calcium-vitamin D 250-100 MG-UNIT per tablet, Take 1 tablet by mouth 2 (two) times a day , Disp: , Rfl:     FLUAD 0 5 ML LÓPEZ, inject 0 5 milliliter intramuscularly, Disp: , Rfl: 0    meloxicam (Mobic) 15 mg tablet, Take 1 tablet (15 mg total) by mouth daily, Disp: 30 tablet, Rfl: 0    Multiple Vitamins-Minerals (CENTRUM CARDIO PO), Take 1 tablet by mouth daily  , Disp: , Rfl:     omeprazole (PriLOSEC) 20 mg delayed release capsule, Take 20 mg by mouth 2 (two) times a day (Patient not taking: Reported on 7/11/2022), Disp: , Rfl:     primidone (MYSOLINE) 50 mg tablet, Take half tab nightly for one week if needed increase to one tab nightly , Disp: 30 tablet, Rfl: 4    Vitamin D, Cholecalciferol, 1000 UNITS TABS, Take 1 tablet by mouth daily Indications: with aloe  , Disp: , Rfl:     No Known Allergies         Vitals:    07/11/22 1635   BP: 155/81   Pulse: 96       Objective:    General:  Patient is WDWN, alert and oriented, appears stated age, and is in no acute distress  Musculoskeletal:    Left Ankle: Inspection:  The ankle is without erythema, ecchymosis, or significant edema  Range of Motion:  The patient is able to achieve full active dorsiflexion  She is able to achieve 30 degrees of active plantarflexion  Palpation:  There is no tenderness over the medial malleolus  Sensation:  SILT toes  Other:  Toes WWP  Diagnostics, reviewed and taken today if performed as documented: The attending physician has personally reviewed the pertinent films in PACS and interpretation is as follows:  Left Ankle X-ray:  There is a left lateral malleolus fracture which is non-displaced  Procedures, if performed today:    None performed      Portions of the record may have been created with voice recognition software  Occasional wrong word or "sound a like" substitutions may have occurred due to the inherent limitations of voice recognition software  Read the chart carefully and recognize, using context, where substitutions have occurred

## 2022-07-13 ENCOUNTER — TELEPHONE (OUTPATIENT)
Dept: OBGYN CLINIC | Facility: OTHER | Age: 77
End: 2022-07-13

## 2022-07-13 ENCOUNTER — OFFICE VISIT (OUTPATIENT)
Dept: RHEUMATOLOGY | Facility: CLINIC | Age: 77
End: 2022-07-13
Payer: MEDICARE

## 2022-07-13 DIAGNOSIS — M81.0 AGE-RELATED OSTEOPOROSIS WITHOUT CURRENT PATHOLOGICAL FRACTURE: ICD-10-CM

## 2022-07-13 DIAGNOSIS — M19.041 PRIMARY OSTEOARTHRITIS OF BOTH HANDS: Primary | ICD-10-CM

## 2022-07-13 DIAGNOSIS — M19.042 PRIMARY OSTEOARTHRITIS OF BOTH HANDS: Primary | ICD-10-CM

## 2022-07-13 PROCEDURE — 96372 THER/PROPH/DIAG INJ SC/IM: CPT

## 2022-07-13 NOTE — TELEPHONE ENCOUNTER
Patient called in , she wanted to ask if shankar would take a look at her Right Knee when she comes in for her Follow up Left Ankle on August 8th  She said she possibly has some fluid on her knee      C/b # [phone]

## 2022-07-21 DIAGNOSIS — S82.892A CLOSED FRACTURE OF LEFT ANKLE, INITIAL ENCOUNTER: Primary | ICD-10-CM

## 2022-07-25 DIAGNOSIS — M25.561 RIGHT KNEE PAIN, UNSPECIFIED CHRONICITY: Primary | ICD-10-CM

## 2022-08-08 ENCOUNTER — OFFICE VISIT (OUTPATIENT)
Dept: OBGYN CLINIC | Facility: HOSPITAL | Age: 77
End: 2022-08-08
Payer: MEDICARE

## 2022-08-08 ENCOUNTER — HOSPITAL ENCOUNTER (OUTPATIENT)
Dept: RADIOLOGY | Facility: HOSPITAL | Age: 77
Discharge: HOME | End: 2022-08-08
Attending: ORTHOPAEDIC SURGERY
Payer: MEDICARE

## 2022-08-08 VITALS
SYSTOLIC BLOOD PRESSURE: 185 MMHG | WEIGHT: 130 LBS | HEART RATE: 80 BPM | HEIGHT: 64 IN | DIASTOLIC BLOOD PRESSURE: 89 MMHG | BODY MASS INDEX: 22.2 KG/M2

## 2022-08-08 DIAGNOSIS — S82.892A CLOSED FRACTURE OF LEFT ANKLE, INITIAL ENCOUNTER: ICD-10-CM

## 2022-08-08 DIAGNOSIS — M25.561 RIGHT KNEE PAIN, UNSPECIFIED CHRONICITY: ICD-10-CM

## 2022-08-08 DIAGNOSIS — M17.11 PRIMARY OSTEOARTHRITIS OF RIGHT KNEE: ICD-10-CM

## 2022-08-08 DIAGNOSIS — S82.892A CLOSED FRACTURE OF LEFT ANKLE, INITIAL ENCOUNTER: Primary | ICD-10-CM

## 2022-08-08 PROCEDURE — 99213 OFFICE O/P EST LOW 20 MIN: CPT | Performed by: ORTHOPAEDIC SURGERY

## 2022-08-08 PROCEDURE — 73610 X-RAY EXAM OF ANKLE: CPT

## 2022-08-08 PROCEDURE — 73562 X-RAY EXAM OF KNEE 3: CPT

## 2022-08-08 RX ORDER — DIPHENOXYLATE HYDROCHLORIDE AND ATROPINE SULFATE 2.5; .025 MG/1; MG/1
1 TABLET ORAL DAILY
COMMUNITY
End: 2022-10-13

## 2022-08-08 NOTE — PROGRESS NOTES
Assessment:   Diagnosis ICD-10-CM Associated Orders   1  Closed fracture of left ankle, initial encounter  S82 892A    2  Primary osteoarthritis of right knee  M17 11        Plan:    · New x-rays of the left ankle were obtained today and reviewed with patient noting that the fracture is healing nicely  New x-rays of the right knee were obtained today showing moderate arthritic change in the knee joint worse in the patellofemoral compartment  · On exam for the left ankle patient has mild tenderness on the lateral aspect, but overall good functional range of motion and strength  As for the right knee patient has no obvious tenderness today  · A cortisone injection for the right knee was discussed with the patient, but decided that it was not warranted due to only having pain with steps and not all the time  · She can continue physical therapy for the left ankle  Educated the patient that strengthening the quadriceps and VMO is important to her knee health in regards to decreasing her pain going up and down steps  Patient will mention this to her therapist to work on  To do next visit:  Return in about 2 months (around 10/8/2022) for left ankle  The above stated was discussed in layman's terms and the patient expressed understanding  All questions were answered to the patient's satisfaction  Scribe Attestation    I,:  Judy Hamlin am acting as a scribe while in the presence of the attending physician :       I,:  An Pardo MD personally performed the services described in this documentation    as scribed in my presence :             Subjective:   Koki Coy is a 78 y o  female who presents today for 4 week follow-up for her left ankle lateral malleolus fracture that she sustained 06/13/2022  Patient initially lost her balance and fell  At her last visit she was transitioned out of a Cam boot to a lace-up brace    She was referred to physical therapy to work on range of motion, strengthening and proprioception  The left ankle feels tight  She is having right knee pain  She has pain going up steps  She likes to walk for exercise  Review of systems negative unless otherwise specified in HPI  Review of Systems   Constitutional: Negative for chills, fever and unexpected weight change  HENT: Negative for hearing loss, nosebleeds and sore throat  Eyes: Negative for pain, redness and visual disturbance  Respiratory: Negative for cough, shortness of breath and wheezing  Cardiovascular: Negative for chest pain, palpitations and leg swelling  Gastrointestinal: Negative for abdominal pain and nausea  Genitourinary: Negative for dyspareunia, dysuria and frequency  Musculoskeletal: Positive for gait problem  Skin: Negative for rash and wound  Neurological: Negative for dizziness, numbness and headaches  Psychiatric/Behavioral: Negative for decreased concentration and suicidal ideas  The patient is not nervous/anxious  Past Medical History:   Diagnosis Date    Cataract of right eye     GERD (gastroesophageal reflux disease)        Past Surgical History:   Procedure Laterality Date    CATARACT EXTRACTION, BILATERAL Bilateral     MASTECTOMY Bilateral     for calcium deposits    RI COLONOSCOPY FLX DX W/COLLJ SPEC WHEN PFRMD N/A 8/25/2017    Procedure: COLONOSCOPY;  Surgeon: Sissy Fuentes MD;  [facility]; Service: Colorectal    RI XCAPSL CTRC RMVL INSJ IO LENS PROSTH W/O ECP Right 6/21/2016    Procedure: OD EXTRACTION EXTRACAPSULAR CATARACT PHACO INTRAOCULAR LENS (IOL);   Surgeon: Jackie Shepherd MD;  [facility];  Service: Ophthalmology    TONSILLECTOMY         Family History   Problem Relation Age of Onset    Rectal cancer Brother     Stroke Mother     Heart attack Father     Arrhythmia Father         possible    Coronary artery disease Father     Sudden death Father         scd    Anuerysm Neg Hx     Clotting disorder Neg Hx  Hypertension Neg Hx     Hyperlipidemia Neg Hx     Heart failure Neg Hx        Social History     Occupational History    Not on file   Tobacco Use    Smoking status: Former Smoker    Smokeless tobacco: Never Used   Vaping Use    Vaping Use: Never used   Substance and Sexual Activity    Alcohol use: Not Currently    Drug use: Never    Sexual activity: Not on file         Current Outpatient Medications:     aspirin 81 mg chewable tablet, Chew 81 mg daily, Disp: , Rfl:     atorvastatin (LIPITOR) 10 mg tablet, Take 1 tablet by mouth Daily, Disp: , Rfl:     b complex vitamins tablet, Take 1 tablet by mouth daily  , Disp: , Rfl:     famotidine (PEPCID) 40 MG tablet, , Disp: , Rfl:     Multiple Vitamins-Minerals (CENTRUM CARDIO PO), Take 1 tablet by mouth daily  , Disp: , Rfl:     multivitamin (THERAGRAN) TABS, Take 1 tablet by mouth daily, Disp: , Rfl:     primidone (MYSOLINE) 50 mg tablet, Take half tab nightly for one week if needed increase to one tab nightly , Disp: 30 tablet, Rfl: 4    Vitamin D, Cholecalciferol, 1000 UNITS TABS, Take 1 tablet by mouth daily Indications: with aloe  , Disp: , Rfl:     calcium-vitamin D 250-100 MG-UNIT per tablet, Take 1 tablet by mouth 2 (two) times a day  (Patient not taking: Reported on 8/8/2022), Disp: , Rfl:     FLUAD 0 5 ML LÓPEZ, inject 0 5 milliliter intramuscularly (Patient not taking: Reported on 8/8/2022), Disp: , Rfl: 0    meloxicam (Mobic) 15 mg tablet, Take 1 tablet (15 mg total) by mouth daily, Disp: 30 tablet, Rfl: 0    omeprazole (PriLOSEC) 20 mg delayed release capsule, Take 20 mg by mouth 2 (two) times a day (Patient not taking: No sig reported), Disp: , Rfl:     Current Facility-Administered Medications:     denosumab (PROLIA) subcutaneous injection 60 mg, 60 mg, Subcutaneous, Once, Kip Orellana, DO    No Known Allergies         Vitals:    08/08/22 1327   BP: (!) 185/89   Pulse: 80       Objective:                    Left Ankle Exam Tenderness   The patient is experiencing tenderness in the lateral malleolus  Left ankle swelling: pitting edema  Range of Motion   Dorsiflexion: 10   Plantar flexion: 30   Eversion: normal   Inversion: normal     Other   Erythema: absent  Sensation: normal  Pulse: present    Comments:  Calf is soft and non tender      Right Knee Exam     Tenderness   The patient is experiencing no tenderness  Range of Motion   Extension: 0   Flexion: 110     Tests   Varus: negative Valgus: negative    Other   Erythema: absent  Sensation: normal  Pulse: present  Swelling: none  Effusion: no effusion present    Comments:  Pitting edema over the lower extremities bilaterally            Diagnostics, reviewed and taken today if performed as documented: The attending physician has personally reviewed the pertinent films in PACS and interpretation is as follows:  X-rays right knee three views:  Mild joint space narrowing of the medial lateral compartment, osteophyte formation noted on the lateral facet of the patella  X-rays left ankle three views:  Healing fibular shaft fracture    Procedures, if performed today:    Procedures    None performed      Portions of the record may have been created with voice recognition software  Occasional wrong word or "sound a like" substitutions may have occurred due to the inherent limitations of voice recognition software  Read the chart carefully and recognize, using context, where substitutions have occurred

## 2022-09-13 DIAGNOSIS — S82.892A CLOSED FRACTURE OF LEFT ANKLE, INITIAL ENCOUNTER: Primary | ICD-10-CM

## 2022-09-26 DIAGNOSIS — G25.0 TREMOR, ESSENTIAL: ICD-10-CM

## 2022-09-26 RX ORDER — PRIMIDONE 50 MG/1
TABLET ORAL
Qty: 90 TABLET | Refills: 3 | Status: SHIPPED | OUTPATIENT
Start: 2022-09-26

## 2022-09-26 NOTE — TELEPHONE ENCOUNTER
Pt left a vm today at 1:18 requesting refill of primidone be sent to [facility]  Requesting a cb confirming that this refill request was received  Cb [phone]  Called pt and confirmed  Pt states that she takes 1 tab at bedtime  Requesting 90 day supply    Last seen by Dr Moises Miranda on 4/11/22  Pt has upcoming appt w/ Dr Zen Griffiths on 10/20/22    Rx entered   Pls review and sign off

## 2022-10-04 ENCOUNTER — OFFICE VISIT (OUTPATIENT)
Dept: OBGYN CLINIC | Facility: HOSPITAL | Age: 77
End: 2022-10-04
Payer: MEDICARE

## 2022-10-04 ENCOUNTER — HOSPITAL ENCOUNTER (OUTPATIENT)
Dept: RADIOLOGY | Facility: HOSPITAL | Age: 77
Discharge: HOME | End: 2022-10-04
Attending: ORTHOPAEDIC SURGERY
Payer: MEDICARE

## 2022-10-04 VITALS
HEIGHT: 64 IN | WEIGHT: 130 LBS | DIASTOLIC BLOOD PRESSURE: 90 MMHG | HEART RATE: 76 BPM | BODY MASS INDEX: 22.2 KG/M2 | SYSTOLIC BLOOD PRESSURE: 148 MMHG

## 2022-10-04 DIAGNOSIS — S82.892A CLOSED FRACTURE OF LEFT ANKLE, INITIAL ENCOUNTER: ICD-10-CM

## 2022-10-04 DIAGNOSIS — M17.11 PRIMARY OSTEOARTHRITIS OF RIGHT KNEE: ICD-10-CM

## 2022-10-04 DIAGNOSIS — S82.892D CLOSED FRACTURE OF LEFT ANKLE WITH ROUTINE HEALING, SUBSEQUENT ENCOUNTER: Primary | ICD-10-CM

## 2022-10-04 PROCEDURE — 99213 OFFICE O/P EST LOW 20 MIN: CPT | Performed by: ORTHOPAEDIC SURGERY

## 2022-10-04 PROCEDURE — 73610 X-RAY EXAM OF ANKLE: CPT

## 2022-10-04 NOTE — PROGRESS NOTES
Assessment:  1  Closed fracture of left ankle with routine healing, subsequent encounter     2  Primary osteoarthritis of right knee         Plan:  · Left ankle is doing well on exam today  · Continue activities as tolerated  · Right knee OA discussed formal physical therapy for strengthening  Patient declined PT stating she has a HEP  · Hold on CS injection as patient only has pain with stairs    To do next visit:  Return for 2-3 mos, left ankle x-rays  The above stated was discussed in layman's terms and the patient expressed understanding  All questions were answered to the patient's satisfaction  Subjective:   Baudilio Malik is a 78 y o  female who presents today for follow up of a left distal fibula fracture DOI 6/13/22 and right knee OA  Patient declined CS injection at the last visit for the right knee  Patient instructed to continue PT  Today patient states overall she is doing welll  Past Medical History:   Diagnosis Date    Cataract of right eye     GERD (gastroesophageal reflux disease)        Past Surgical History:   Procedure Laterality Date    CATARACT EXTRACTION, BILATERAL Bilateral     MASTECTOMY Bilateral     for calcium deposits    W[place] COLONOSCOPY FLX DX W/COLLJ SPEC WHEN PFRMD N/A 8/25/2017    Procedure: COLONOSCOPY;  Surgeon: Chely Morel MD;  [facility]; Service: Colorectal    W[place] XCAPSL CTRC RMVL INSJ IO LENS PROSTH W/O ECP Right 6/21/2016    Procedure: OD EXTRACTION EXTRACAPSULAR CATARACT PHACO INTRAOCULAR LENS (IOL);   Surgeon: Roddy Ventura MD;  [facility];  Service: Ophthalmology    TONSILLECTOMY         Family History   Problem Relation Age of Onset    Rectal cancer Brother     Stroke Mother     Heart attack Father     Arrhythmia Father         possible    Coronary artery disease Father     Sudden death Father         scd    Anuerysm Neg Hx     Clotting disorder Neg Hx     Hypertension Neg Hx     Hyperlipidemia Neg Hx  Heart failure Neg Hx        Social History     Occupational History    Not on file   Tobacco Use    Smoking status: Former Smoker    Smokeless tobacco: Never Used   Vaping Use    Vaping Use: Never used   Substance and Sexual Activity    Alcohol use: Not Currently    Drug use: Never    Sexual activity: Not on file         Current Outpatient Medications:     aspirin 81 mg chewable tablet, Chew 81 mg daily, Disp: , Rfl:     atorvastatin (LIPITOR) 10 mg tablet, Take 1 tablet by mouth Daily, Disp: , Rfl:     b complex vitamins tablet, Take 1 tablet by mouth daily  , Disp: , Rfl:     famotidine (PEPCID) 40 MG tablet, , Disp: , Rfl:     Multiple Vitamins-Minerals (CENTRUM CARDIO PO), Take 1 tablet by mouth daily  , Disp: , Rfl:     multivitamin (THERAGRAN) TABS, Take 1 tablet by mouth daily, Disp: , Rfl:     primidone (MYSOLINE) 50 mg tablet, Take 1 tab by mouth daily at bedtime, Disp: 90 tablet, Rfl: 3    Vitamin D, Cholecalciferol, 1000 UNITS TABS, Take 1 tablet by mouth daily Indications: with aloe  , Disp: , Rfl:     calcium-vitamin D 250-100 MG-UNIT per tablet, Take 1 tablet by mouth 2 (two) times a day  (Patient not taking: No sig reported), Disp: , Rfl:     FLUAD 0 5 ML LÓPEZ, inject 0 5 milliliter intramuscularly (Patient not taking: No sig reported), Disp: , Rfl: 0    meloxicam (Mobic) 15 mg tablet, Take 1 tablet (15 mg total) by mouth daily, Disp: 30 tablet, Rfl: 0    omeprazole (PriLOSEC) 20 mg delayed release capsule, Take 20 mg by mouth 2 (two) times a day (Patient not taking: No sig reported), Disp: , Rfl:     Current Facility-Administered Medications:     denosumab (PROLIA) subcutaneous injection 60 mg, 60 mg, Subcutaneous, Once, Geovanna Pacheco, DO    No Known Allergies      Objective:  Vitals:    10/04/22 0859   BP: 148/90   Pulse: 76       Review of Systems   Constitutional: Negative for chills and fever  HENT: Negative for drooling and sneezing  Eyes: Negative for redness  Respiratory: Negative for cough and wheezing  Gastrointestinal: Negative for nausea and vomiting  Musculoskeletal:        Please see ortho exam   Psychiatric/Behavioral: Negative for behavioral problems  The patient is not nervous/anxious  Left Ankle Exam     Tenderness   The patient is experiencing no tenderness  Range of Motion   The patient has normal left ankle ROM  Muscle Strength   The patient has normal left ankle strength  Right Knee Exam     Tenderness   The patient is experiencing no tenderness  Range of Motion   The patient has normal right knee ROM  Other   Erythema: absent  Sensation: normal  Pulse: present  Swelling: none  Effusion: no effusion present            Physical Exam  Vitals reviewed  Constitutional:       Appearance: She is well-developed  Eyes:      Pupils: Pupils are equal, round, and reactive to light  Pulmonary:      Effort: Pulmonary effort is normal       Breath sounds: Normal breath sounds  Abdominal:      General: Abdomen is flat  There is no distension  Musculoskeletal:      Right knee: No effusion  Skin:     General: Skin is warm and dry  Neurological:      Mental Status: She is alert and oriented to person, place, and time  Psychiatric:         Behavior: Behavior normal          Thought Content: Thought content normal          Judgment: Judgment normal            Diagnostics, reviewed and taken today if performed as documented: The attending physician has personally reviewed the pertinent films in PACS and interpretation is as follows:  Left ankle x-rays demonstrates: progressive healing distal fibula fracture             Scribe Attestation    I,:  Phi Trinidad am acting as a scribe while in the presence of the attending physician :       I,:  Domenica Garza MD personally performed the services described in this documentation    as scribed in my presence :               Portions of the record may have been created with voice recognition software  Occasional wrong word or "sound a like" substitutions may have occurred due to the inherent limitations of voice recognition software  Read the chart carefully and recognize, using context, where substitutions have occurred

## 2022-10-10 ENCOUNTER — APPOINTMENT (EMERGENCY)
Dept: RADIOLOGY | Facility: HOSPITAL | Age: 77
DRG: 064 | End: 2022-10-10
Payer: MEDICARE

## 2022-10-10 ENCOUNTER — HOSPITAL ENCOUNTER (INPATIENT)
Facility: HOSPITAL | Age: 77
LOS: 3 days | Discharge: NURSING, REHAB OR LONG-TERM CARE | DRG: 064 | End: 2022-10-13
Attending: EMERGENCY MEDICINE | Admitting: EMERGENCY MEDICINE
Payer: MEDICARE

## 2022-10-10 DIAGNOSIS — G89.29 CHRONIC LEFT-SIDED LOW BACK PAIN: ICD-10-CM

## 2022-10-10 DIAGNOSIS — M54.50 CHRONIC LEFT-SIDED LOW BACK PAIN: ICD-10-CM

## 2022-10-10 DIAGNOSIS — I62.9 INTRACRANIAL BLEED (HCC): Primary | ICD-10-CM

## 2022-10-10 PROBLEM — Z86.79 HISTORY OF CEREBRAL HEMORRHAGE: Status: ACTIVE | Noted: 2022-10-10

## 2022-10-10 PROBLEM — I61.0 NONTRAUMATIC SUBCORTICAL HEMORRHAGE OF RIGHT CEREBRAL HEMISPHERE (HCC): Status: ACTIVE | Noted: 2022-10-10

## 2022-10-10 LAB
ANION GAP SERPL CALCULATED.3IONS-SCNC: 10 MMOL/L (ref 4–13)
APTT PPP: 31 SECONDS (ref 23–37)
ATRIAL RATE: 98 BPM
BUN SERPL-MCNC: 15 MG/DL (ref 5–25)
CALCIUM SERPL-MCNC: 9.7 MG/DL (ref 8.3–10.1)
CARDIAC TROPONIN I PNL SERPL HS: <2 NG/L
CARDIAC TROPONIN I PNL SERPL HS: <2 NG/L
CHLORIDE SERPL-SCNC: 106 MMOL/L (ref 96–108)
CO2 SERPL-SCNC: 22 MMOL/L (ref 21–32)
CREAT SERPL-MCNC: 0.93 MG/DL (ref 0.6–1.3)
ERYTHROCYTE [DISTWIDTH] IN BLOOD BY AUTOMATED COUNT: 12.4 % (ref 11.6–15.1)
FLUAV RNA RESP QL NAA+PROBE: NEGATIVE
FLUBV RNA RESP QL NAA+PROBE: NEGATIVE
GFR SERPL CREATININE-BSD FRML MDRD: 60 ML/MIN/1.73SQ M
GLUCOSE SERPL-MCNC: 114 MG/DL (ref 65–140)
GLUCOSE SERPL-MCNC: 125 MG/DL (ref 65–140)
HCT VFR BLD AUTO: 38.2 % (ref 34.8–46.1)
HGB BLD-MCNC: 12.5 G/DL (ref 11.5–15.4)
INR PPP: 0.95 (ref 0.84–1.19)
MCH RBC QN AUTO: 32.8 PG (ref 26.8–34.3)
MCHC RBC AUTO-ENTMCNC: 32.7 G/DL (ref 31.4–37.4)
MCV RBC AUTO: 100 FL (ref 82–98)
P AXIS: 65 DEGREES
PLATELET # BLD AUTO: 272 THOUSANDS/UL (ref 149–390)
PMV BLD AUTO: 9.7 FL (ref 8.9–12.7)
POTASSIUM SERPL-SCNC: 3.8 MMOL/L (ref 3.5–5.3)
PR INTERVAL: 136 MS
PROTHROMBIN TIME: 12.9 SECONDS (ref 11.6–14.5)
QRS AXIS: 72 DEGREES
QRSD INTERVAL: 70 MS
QT INTERVAL: 378 MS
QTC INTERVAL: 482 MS
RBC # BLD AUTO: 3.81 MILLION/UL (ref 3.81–5.12)
RSV RNA RESP QL NAA+PROBE: NEGATIVE
SARS-COV-2 RNA RESP QL NAA+PROBE: NEGATIVE
SODIUM SERPL-SCNC: 138 MMOL/L (ref 135–147)
T WAVE AXIS: 51 DEGREES
VENTRICULAR RATE: 98 BPM
WBC # BLD AUTO: 7.05 THOUSAND/UL (ref 4.31–10.16)

## 2022-10-10 PROCEDURE — 84484 ASSAY OF TROPONIN QUANT: CPT | Performed by: EMERGENCY MEDICINE

## 2022-10-10 PROCEDURE — 99291 CRITICAL CARE FIRST HOUR: CPT | Performed by: EMERGENCY MEDICINE

## 2022-10-10 PROCEDURE — 80048 BASIC METABOLIC PNL TOTAL CA: CPT | Performed by: EMERGENCY MEDICINE

## 2022-10-10 PROCEDURE — 92610 EVALUATE SWALLOWING FUNCTION: CPT

## 2022-10-10 PROCEDURE — 70496 CT ANGIOGRAPHY HEAD: CPT

## 2022-10-10 PROCEDURE — 85730 THROMBOPLASTIN TIME PARTIAL: CPT | Performed by: EMERGENCY MEDICINE

## 2022-10-10 PROCEDURE — 85610 PROTHROMBIN TIME: CPT | Performed by: EMERGENCY MEDICINE

## 2022-10-10 PROCEDURE — 0241U HB NFCT DS VIR RESP RNA 4 TRGT: CPT | Performed by: EMERGENCY MEDICINE

## 2022-10-10 PROCEDURE — 99291 CRITICAL CARE FIRST HOUR: CPT | Performed by: PSYCHIATRY & NEUROLOGY

## 2022-10-10 PROCEDURE — 99285 EMERGENCY DEPT VISIT HI MDM: CPT

## 2022-10-10 PROCEDURE — 93005 ELECTROCARDIOGRAM TRACING: CPT

## 2022-10-10 PROCEDURE — 93010 ELECTROCARDIOGRAM REPORT: CPT | Performed by: INTERNAL MEDICINE

## 2022-10-10 PROCEDURE — 36415 COLL VENOUS BLD VENIPUNCTURE: CPT

## 2022-10-10 PROCEDURE — 70498 CT ANGIOGRAPHY NECK: CPT

## 2022-10-10 PROCEDURE — 85027 COMPLETE CBC AUTOMATED: CPT | Performed by: EMERGENCY MEDICINE

## 2022-10-10 PROCEDURE — 82948 REAGENT STRIP/BLOOD GLUCOSE: CPT

## 2022-10-10 RX ADMIN — IOHEXOL 85 ML: 350 INJECTION, SOLUTION INTRAVENOUS at 07:37

## 2022-10-10 NOTE — ED NOTES
Patient had bowel movement in bedpan  Patient cleaned and new chucks pad/purewick provided   Patient has no complaints at this time     Allie Cartwright  10/10/22 3663

## 2022-10-10 NOTE — CASE MANAGEMENT
Case Management Assessment & Discharge Planning Note    Patient name Rubina Quiles  Location ED 14/ED 14 MRN [id number]  : 1945 Date 10/10/2022       Current Admission Date: 10/10/2022  Current Admission Diagnosis:History of cerebral hemorrhage   Patient Active Problem List    Diagnosis Date Noted   • History of cerebral hemorrhage 10/10/2022   • 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 ([facility] [address] ) 10/10/2022   • Chronic left-sided low back pain    • Transient cerebral ischemia 2018   • Premature atrial beats 2018   • Cataract of right eye 2016   • Sacroiliitis ([facility] [address] ) 2015   • Idiopathic osteoporosis 2015   • Primary osteoarthritis of hand 2015      LOS (days): 0  Geometric Mean LOS (GMLOS) (days):   Days to GMLOS:     OBJECTIVE:        Current admission status: Inpatient  Referral Reason: Stroke    Preferred Pharmacy:   [address] [place] [address]  Phone: [phone] Fax: [phone]    [id number] [address]  [address] [facility] [address]  Phone: [phone] Fax: [phone]    Primary Care Provider: Noam Sow DO    Primary Insurance: MEDICARE  Secondary Insurance: AARP    ASSESSMENT:  Samson 26 Proxies    There are no active Health Care Proxies on file  Readmission Root Cause  30 Day Readmission: No    Patient Information  Admitted from[de-identified] Home  Mental Status: Alert  During Assessment patient was accompanied by: Not accompanied during assessment  Assessment information provided by[de-identified] Patient  Primary Caregiver: Self  Support Systems: Gurinder Guillaume Dr of Residence: [address] [place] [address] do you live in?: Tomas entry access options   Select all that apply : Stairs  Number of steps to enter home : 2  Type of Current Residence: Apartment  Floor Level: 1  Upon entering residence, is there a bedroom on the main floor (no further steps)?: Yes  Upon entering residence, is there a bathroom on the main floor (no further steps)?: Yes  In the last 12 months, was there a time when you were not able to pay the mortgage or rent on time?: No  In the last 12 months, how many places have you lived?: 1  In the last 12 months, was there a time when you did not have a steady place to sleep or slept in a shelter (including now)?: No  Homeless/housing insecurity resource given?: No  Living Arrangements: Lives Alone    Activities of Daily Living Prior to Admission  Functional Status: Independent  Completes ADLs independently?: Yes  Ambulates independently?: Yes  Does patient use assisted devices?: No  Does patient currently own DME?: No  Does patient have a history of Outpatient Therapy (PT/OT)?: Yes  Does the patient have a history of Short-Term Rehab?: Yes [facility] June 2022)  Does patient have a history of HHC?: Yes  Does patient currently have [facility] [facility] ?: No    Patient Information Continued  Income Source: Pension/halfway  Does patient have prescription coverage?: Yes  Within the past 12 months, you worried that your food would run out before you got the money to buy more : Never true  Within the past 12 months, the food you bought just didn't last and you didn't have money to get more : Never true  Food insecurity resource given?: No  Does patient receive dialysis treatments?: No  Does patient have a history of substance abuse?: No  Does patient have a history of Mental Health Diagnosis?: No    Means of Transportation  [facility]  In the past 12 months, has lack of transportation kept you from medical appointments or from getting medications?: No  In the past 12 months, has lack of transportation kept you from meetings, work, or from getting things needed for daily living?: No  Was application for public transport provided?: No      DISCHARGE DETAILS:    Discharge planning discussed with[de-identified] Patient  Freedom of Choice: Yes     CM contacted family/caregiver?: No- see comments (Pt declined call to family stating they are already aware)      Additional Comments: CM met with pt at bedside to complete Open  Pt is SHORT at this time and was able to complete Open without difficulty  Pt states her son is aware she is at the hospital   Pt states she is independent at baseline and lives alone in a first floor apartment  Pt states no needs at this time

## 2022-10-10 NOTE — ED ATTENDING ATTESTATION
10/10/2022  ICathy MD, saw and evaluated the patient  I have discussed the patient with the resident/non-physician practitioner and agree with the resident's/non-physician practitioner's findings, Plan of Care, and MDM as documented in the resident's/non-physician practitioner's note, except where noted  All available labs and Radiology studies were reviewed  I was present for key portions of any procedure(s) performed by the resident/non-physician practitioner and I was immediately available to provide assistance  At this point I agree with the current assessment done in the Emergency Department  I have conducted an independent evaluation of this patient a history and physical is as follows:    ED Course     71-year-old female, seen on arrival   Patient is presenting to the emergency department with chief complaint left-sided weakness  Patient states that she awoke approximately 03:00 with left-sided heaviness in her arm and difficulty moving blanket  Patient states that when she awoke this morning 7 she was unable to ambulate  Patient was last normal at 10 o'clock p m     On examination patient is awake  Patient is verbal and somewhat confused  Pupils are 2 mm bilaterally  Extraocular movements intact  Mild left facial droop  Mild left upper extremity drift  Mild left lower extremity drift  Heart is rate regular      Labs Reviewed   CBC AND PLATELET - Abnormal       Result Value Ref Range Status    WBC 7 05  4 31 - 10 16 Thousand/uL Final    RBC 3 81  3 81 - 5 12 Million/uL Final    Hemoglobin 12 5  11 5 - 15 4 g/dL Final    Hematocrit 38 2  34 8 - 46 1 % Final     (*) 82 - 98 fL Final    MCH 32 8  26 8 - 34 3 pg Final    MCHC 32 7  31 4 - 37 4 g/dL Final    RDW 12 4  11 6 - 15 1 % Final    Platelets 898  452 - 390 Thousands/uL Final    MPV 9 7  8 9 - 12 7 fL Final   POCT GLUCOSE - Normal    POC Glucose 114  65 - 140 mg/dl Final   COVID19, INFLUENZA A/B, RSV PCR, [facility] BASIC METABOLIC PANEL    Sodium 180  135 - 147 mmol/L Final    Potassium 3 8  3 5 - 5 3 mmol/L Final    Chloride 106  96 - 108 mmol/L Final    CO2 22  21 - 32 mmol/L Final    ANION GAP 10  4 - 13 mmol/L Final    BUN 15  5 - 25 mg/dL Final    Creatinine 0 93  0 60 - 1 30 mg/dL Final    Comment: Standardized to IDMS reference method    Glucose 125  65 - 140 mg/dL Final    Comment: If the patient is fasting, the ADA then defines impaired fasting glucose as > 100 mg/dL and diabetes as > or equal to 123 mg/dL  Specimen collection should occur prior to Sulfasalazine administration due to the potential for falsely depressed results  Specimen collection should occur prior to Sulfapyridine administration due to the potential for falsely elevated results  Calcium 9 7  8 3 - 10 1 mg/dL Final    eGFR 60  ml/min/1 73sq m Final    Narrative:     [place] guidelines for Chronic Kidney Disease (CKD):   •  Stage 1 with normal or high GFR (GFR > 90 mL/min/1 73 square meters)  •  Stage 2 Mild CKD (GFR = 60-89 mL/min/1 73 square meters)  •  Stage 3A Moderate CKD (GFR = 45-59 mL/min/1 73 square meters)  •  Stage 3B Moderate CKD (GFR = 30-44 mL/min/1 73 square meters)  •  Stage 4 Severe CKD (GFR = 15-29 mL/min/1 73 square meters)  •  Stage 5 End Stage CKD (GFR <15 mL/min/1 73 square meters)  Note: GFR calculation is accurate only with a steady state creatinine   PROTIME-INR   APTT   HS TROPONIN I 0HR       CT stroke alert brain   Final Result      Small acute hematoma in the right putamen with mild regional mass effect and minimal surrounding parenchymal edema at this time  This is presumably hypertensive in etiology  Moderate cerebral chronic microangiopathic changes and chronic lacunar infarctions in the left basal ganglia        Findings were directly discussed with Shelvy Osler  at 7:57 AM       Workstation performed: [id number]         CTA stroke alert (head/neck)   Final Result      No contrast extravasation or underlying vascular malformation associated with the known right putaminal hemorrhage  No cervical or intracranial large vessel occlusion  Centrilobular emphysema with right greater than left biapical pleural-parenchymal scarring        Findings were directly discussed with Pooja Cárdenas  at 7:57 AM                         Workstation performed: [id number]                 Critical Care Time  CriticalCare Time  Performed by: Gena Patricio MD  Authorized by: Gena Patricio MD     Critical care provider statement:     Critical care time (minutes):  32    Critical care time was exclusive of:  Separately billable procedures and treating other patients and teaching time    Critical care was necessary to treat or prevent imminent or life-threatening deterioration of the following conditions:  CNS failure or compromise    Critical care was time spent personally by me on the following activities:  Obtaining history from patient or surrogate, development of treatment plan with patient or surrogate, evaluation of patient's response to treatment, examination of patient, ordering and performing treatments and interventions, ordering and review of laboratory studies, ordering and review of radiographic studies and review of old charts

## 2022-10-10 NOTE — H&P
H&P Exam - [address] 78 y o  female MRN: [id number]  Unit/Bed#: ED 14 Encounter: [id number]      -------------------------------------------------------------------------------------------------------------  Chief Complaint:  Left-sided weakness    History of Present Illness   HX and PE limited by: DOC Palacios is a 78 y o  female who presents with left-sided weakness  Past medical history significant for prior CVA (right external capsule hemorrhage in 2018), MCI, and tremor  Patient denies any history a hypertension, cardiac history, diabetes  Patient states that she woke approximately 3:00 a m  With left-sided heaviness in her arm and difficulty moving her blanket  Patient states she then awoke again at 7:00 a m  And was and able walk  Patient was last normal at 10:00 p m  NIHSS score 6  On exam patient is A&O x3, extraocular movements intact pupils equal and reactive, patient has left facial droop, left upper extremity and lower extremity weakness  CTH demonstrates small acute hematoma right putamen with mild regional mass effect minimal surrounding parenchymal edema, presumably hypertensive in etiology  CTH also showed moderate cerebral chronic microangiopathic changes and chronic lacunar infarcts in left basal ganglia  CTA did not reveal any contrast extravasation or underlying vascular malformation associated with right putaminal hemorrhage  neurology was consulted in the ED and per Neurology the initial plan is to admit the patient under the stroke pathway, blood pressure goal is less than 140/90, hold off on any anticoagulation or antiplatelet at this time, recommend repeat CT head in the morning and MRI brain with when able  PT/OT when able      History obtained from chart review and the patient   -------------------------------------------------------------------------------------------------------------  Assessment and Plan:    Neuro:   • Diagnosis:  Small acute hematoma in right putamen with mild regional mass effect (left-sided extremity weakness and left facial droop)  o Plan:  Patient min to step-down 1  o Neurology following  - Per neurology:  Keep systolic blood pressure less than 140  - Hold all AP/AC  - Repeat CTH in 24 hours  - MRI brain with without contrast  - Obtain 2D echo  - Check hemoglobin A1c, lipid panel, TSH  - Telemetry  - PT/OT/speech therapy  - Frequent neuro checks  o Q 1 hour neuro checks  o Cam-ICU, delirium precaution  o Stat CT head for changing GCS greater than 2       CV:   • Diagnosis:  No active issues  o Plan:  Continue home statin:  Lipitor 10 mg daily      Pulm:  • Diagnosis:  No active issues  o Plan:  Goal O2 sat greater than 92%, p r n  Oxygen is needed      GI:   • Diagnosis:  No active issues  o Continue home omeprazole 20mg bid      :   • Diagnosis:  No active issues  o Plan:  Monitor I&Os  o Trend BUN and Cr   - Most Recent BUN- 15  - Most recent Cr - 0 93       F/E/N:   • F: None  • E: Replete electrolytes as needed  • N: NPO pending nursing dysarthria eval - advance diet if pass  Consult Speech if fails      Heme/Onc:   • Diagnosis: No active issues  o Plan: Follow Hgb and platelets        Endo:   • Diagnosis: No active issues  o Plan: Goal blood glucose 140-180      ID:   • Diagnosis: No active issues      MSK/Skin:   • Diagnosis: No active issues  o Plan: Frequent turning, pressure offloading  o PT/OT when able      Disposition: Admit to Stepdown Level 1  Code Status: No Order  --------------------------------------------------------------------------------------------------------------  Review of Systems   Constitutional: Negative  HENT: Negative  Eyes: Negative  Respiratory: Negative  Cardiovascular: Negative  Gastrointestinal: Negative  Endocrine: Negative  Genitourinary: Negative  Skin: Negative  Allergic/Immunologic: Negative  Neurological: Positive for facial asymmetry and weakness  Left-sided weakness and numbness extending through face upper extremity and lower extremity  Hematological: Negative  Psychiatric/Behavioral: Negative  A 12-point, complete review of systems was reviewed and negative except as stated above     Physical Exam  Vitals and nursing note reviewed  Constitutional:       Appearance: Normal appearance  She is normal weight  HENT:      Head: Normocephalic and atraumatic  Right Ear: External ear normal       Left Ear: External ear normal       Nose: Nose normal       Mouth/Throat:      Mouth: Mucous membranes are moist       Pharynx: Oropharynx is clear  Eyes:      Extraocular Movements: Extraocular movements intact  Conjunctiva/sclera: Conjunctivae normal       Pupils: Pupils are equal, round, and reactive to light  Cardiovascular:      Rate and Rhythm: Normal rate and regular rhythm  Pulses: Normal pulses  Heart sounds: Normal heart sounds  Pulmonary:      Effort: Pulmonary effort is normal       Breath sounds: Normal breath sounds  Abdominal:      General: Abdomen is flat  Bowel sounds are normal       Palpations: Abdomen is soft  Musculoskeletal:         General: Normal range of motion  Cervical back: Neck supple  Skin:     General: Skin is warm  Capillary Refill: Capillary refill takes less than 2 seconds  Neurological:      Mental Status: She is alert and oriented to person, place, and time  GCS: GCS eye subscore is 4  GCS verbal subscore is 5  GCS motor subscore is 6  Cranial Nerves: Cranial nerve deficit and facial asymmetry present  Sensory: Sensory deficit present  Motor: Weakness present  Comments: Left sided weakness and numbness  Left sided facial droop   Psychiatric:         Mood and Affect: Mood normal          Behavior: Behavior normal          Thought Content:  Thought content normal          Judgment: Judgment normal  --------------------------------------------------------------------------------------------------------------  Vitals:   Vitals:    10/10/22 0745 10/10/22 0800 10/10/22 0815 10/10/22 0830   BP: 142/65 142/65 141/65 149/67   Pulse: 87 88 86 86   Resp: 18 18 17 20   Temp:       TempSrc: Oral      SpO2: 98% 98% 98% 97%   Weight:         Temp  Min: 97 8 °F (36 6 °C)  Max: 97 8 °F (36 6 °C)        Body mass index is 24 24 kg/m²  N/A    Laboratory and Diagnostics:  Results from last 7 days   Lab Units 10/10/22  0742   WBC Thousand/uL 7 05   HEMOGLOBIN g/dL 12 5   HEMATOCRIT % 38 2   PLATELETS Thousands/uL 272     Results from last 7 days   Lab Units 10/10/22  0742   SODIUM mmol/L 138   POTASSIUM mmol/L 3 8   CHLORIDE mmol/L 106   CO2 mmol/L 22   ANION GAP mmol/L 10   BUN mg/dL 15   CREATININE mg/dL 0 93   CALCIUM mg/dL 9 7   GLUCOSE RANDOM mg/dL 125                       ABG:    VBG:          Micro:        EKG: Normal sinus, no significant change when compared to ECG of July 2, 2012  Imaging: I have personally reviewed pertinent reports  Historical Information   Past Medical History:   Diagnosis Date   • Cataract of right eye    • GERD (gastroesophageal reflux disease)      Past Surgical History:   Procedure Laterality Date   • CATARACT EXTRACTION, BILATERAL Bilateral    • MASTECTOMY Bilateral     for calcium deposits   • MS COLONOSCOPY FLX DX W/COLLJ SPEC WHEN PFRMD N/A 8/25/2017    Procedure: COLONOSCOPY;  Surgeon: Tony Aleman MD;  Location: [facility]; Service: Colorectal   • MS XCAPSL CTRC RMVL INSJ IO LENS PROSTH W/O ECP Right 6/21/2016    Procedure: OD EXTRACTION EXTRACAPSULAR CATARACT PHACO INTRAOCULAR LENS (IOL);   Surgeon: Carrol Sacks, MD;  Location: [facility];  Service: Ophthalmology   • TONSILLECTOMY       Social History   Social History     Substance and Sexual Activity   Alcohol Use Not Currently     Social History     Substance and Sexual Activity   Drug Use Never     Social History Tobacco Use   Smoking Status Former Smoker   Smokeless Tobacco Never Used     Family History:   Family History   Problem Relation Age of Onset   • Rectal cancer Brother    • Stroke Mother    • Heart attack Father    • Arrhythmia Father         possible   • Coronary artery disease Father    • Sudden death Father         scd   • Anuerysm Neg Hx    • Clotting disorder Neg Hx    • Hypertension Neg Hx    • Hyperlipidemia Neg Hx    • Heart failure Neg Hx      I have reviewed this patient's family history and commented on sigificant items within the HPI      Medications:  Current Facility-Administered Medications   Medication Dose Route Frequency   • denosumab (PROLIA) subcutaneous injection 60 mg  60 mg Subcutaneous Once     Home medications:  Prior to Admission Medications   Prescriptions Last Dose Informant Patient Reported? Taking? FLUAD 0 5 ML LÓPEZ   Yes No   Sig: inject 0 5 milliliter intramuscularly   Patient not taking: No sig reported   Multiple Vitamins-Minerals (CENTRUM CARDIO PO)  Self Yes No   Sig: Take 1 tablet by mouth daily  Vitamin D, Cholecalciferol, 1000 UNITS TABS  Self Yes No   Sig: Take 1 tablet by mouth daily Indications: with aloe  aspirin 81 mg chewable tablet  Self Yes No   Sig: Chew 81 mg daily   atorvastatin (LIPITOR) 10 mg tablet  Self Yes No   Sig: Take 1 tablet by mouth Daily   b complex vitamins tablet  Self Yes No   Sig: Take 1 tablet by mouth daily     calcium-vitamin D 250-100 MG-UNIT per tablet   Yes No   Sig: Take 1 tablet by mouth 2 (two) times a day    Patient not taking: No sig reported   famotidine (PEPCID) 40 MG tablet   Yes No   meloxicam (Mobic) 15 mg tablet   No No   Sig: Take 1 tablet (15 mg total) by mouth daily   multivitamin (THERAGRAN) TABS   Yes No   Sig: Take 1 tablet by mouth daily   omeprazole (PriLOSEC) 20 mg delayed release capsule   Yes No   Sig: Take 20 mg by mouth 2 (two) times a day   Patient not taking: No sig reported   primidone (MYSOLINE) 50 mg tablet No No   Sig: Take 1 tab by mouth daily at bedtime      Facility-Administered Medications Last Administration Doses Remaining   denosumab (PROLIA) subcutaneous injection 60 mg None recorded 1        Allergies:  No Known Allergies  ------------------------------------------------------------------------------------------------------------  Advance Directive and Living Will:      Power of :    POLST:    ------------------------------------------------------------------------------------------------------------  Anticipated Length of Stay is > 2 midnights    Care Time Delivered:   30 min      Zachary Danielson MD        Portions of the record may have been created with voice recognition software  Occasional wrong word or "sound a like" substitutions may have occurred due to the inherent limitations of voice recognition software    Read the chart carefully and recognize, using context, where substitutions have occurred

## 2022-10-10 NOTE — CONSULTS
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 [facility])  Assessment & Plan  80-year-old female with right external capsule hemorrhage in 2018, MCI, tremor, who presents with left-sided weakness  NIHSS 6  CTH demonstrated small acute hematoma in the right putamen with mild regional mass effect and minimal surrounding parenchymal edema at this time, presumably hypertensive in etiology  Moderate cerebral chronic microangiopathic changes and chronic lacunar infarctions in the left basal ganglia  CTA head/neck demonstrated no flow consequential stenosis, LVO, or aneurysm  Patient is on ASA 81mg daily at baseline  BP in the field per /78  Neurologic exam significant for dysarthria, left facial droop, LUE drift, and left-sided sensory deficit  This is patient's second right subcortical hemorrhage, by location most likely hypertensive in etiology, but again with relatively normal BP  Patient is not on antihypensives at baseline and says her SBP is usually 120-140  Plan:  -Admit to stroke pathway  ED team to discuss with Neurocritical Care re: dispo  -Keep SBP <140   -Hold all AP/AC  -repeat CTH in 24 hours  -MRI brain w/wo contrast   -2D echocardiogram   -Check hemoglobin A1c, lipid panel, TSH   -Telemetry  -PT/OT/speech therapy  -Frequent neuro checks   -Continue to monitor and notify with changes  History of cerebral hemorrhage  Assessment & Plan  Right external hemorrhage in 2018 with residual left-sided paresthesias but no residual weakness  It was felt that given the location, hypertensive hemorrhage was most likely, but patient did not have history of hypertension  Thrombolytic Decision: Patient not a candidate  Hemorrhage on [facility]      Recommendations for outpatient neurological follow up have yet to be determined      History of Present Illness     Reason for Consult / Principal Problem: left-sided weakness  Hx and PE limited by: n/a  Patient last known well: 2611 on 10/9/22  Stroke alert called: Ana Ahumada  Neurology time of arrival: 0728  HPI: Noy Hidalgo is a 78 y o  right handed female with right external capsule hemorrhage in 2018, MCI, tremor, who presents with left-sided weakness  Patient went to bed in her usual state of health last evening at 2230  When she awoke and tried to pull her covers up around 0300 this morning, she noticed some difficulty using her LUE  She tried to get out of bed around 0700 but had difficulty walking, and called EMS  CTH demonstrated small acute hematoma in the right putamen with mild regional mass effect and minimal surrounding parenchymal edema at this time, presumably hypertensive in etiology  Moderate cerebral chronic microangiopathic changes and chronic lacunar infarctions in the left basal ganglia  CTA head/neck demonstrated no flow consequential stenosis, LVO, or aneurysm  Patient is on ASA 81mg daily at baseline  BP on presentation 143/78  Patient has history of right external capsule hemorrhage in 2018, with residual left-sided paresthesias but no residual weakness  It was felt that given the location, hypertensive hemorrhage was most likely, but patient did not have history of hypertension  Neurologic exam significant for dysarthria, left facial droop, LUE drift, and left-sided sensory deficit      Inpatient consult to Neurology  Consult performed by: Brenda Mac PA-C  Consult ordered by: Millicent Arevalo MD          Review of Systems   Unable to perform ROS: Acuity of condition       Historical Information   Past Medical History:   Diagnosis Date   • Cataract of right eye    • GERD (gastroesophageal reflux disease)      Past Surgical History:   Procedure Laterality Date   • CATARACT EXTRACTION, BILATERAL Bilateral    • MASTECTOMY Bilateral     for calcium deposits   • VT COLONOSCOPY FLX DX W/COLLJ SPEC WHEN PFRMD N/A 8/25/2017    Procedure: COLONOSCOPY;  Surgeon: Miryam Archibald MD;  [facility]: [facility]; Service: Colorectal   • VT XCAPSL CTRC RMVL INSJ IO LENS PROSTH W/O ECP Right 6/21/2016    Procedure: OD EXTRACTION EXTRACAPSULAR CATARACT PHACO INTRAOCULAR LENS (IOL); Surgeon: Veena Aviles MD;  Location[facility];  Service: Ophthalmology   • TONSILLECTOMY       Social History   Social History     Substance and Sexual Activity   Alcohol Use Not Currently     Social History     Substance and Sexual Activity   Drug Use Never     E-Cigarette/Vaping   • E-Cigarette Use Never User      E-Cigarette/Vaping Substances   • Nicotine No    • THC No    • CBD No    • Flavoring No    • Other No    • Unknown No      Social History     Tobacco Use   Smoking Status Former Smoker   Smokeless Tobacco Never Used     Family History:   Family History   Problem Relation Age of Onset   • Rectal cancer Brother    • Stroke Mother    • Heart attack Father    • Arrhythmia Father         possible   • Coronary artery disease Father    • Sudden death Father         scd   • Anuerysm Neg Hx    • Clotting disorder Neg Hx    • Hypertension Neg Hx    • Hyperlipidemia Neg Hx    • Heart failure Neg Hx        Review of previous medical records was completed  Meds/Allergies   PTA meds:   Prior to Admission Medications   Prescriptions Last Dose Informant Patient Reported? Taking? FLUAD 0 5 ML LÓPEZ   Yes No   Sig: inject 0 5 milliliter intramuscularly   Patient not taking: No sig reported   Multiple Vitamins-Minerals (CENTRUM CARDIO PO)  Self Yes No   Sig: Take 1 tablet by mouth daily  Vitamin D, Cholecalciferol, 1000 UNITS TABS  Self Yes No   Sig: Take 1 tablet by mouth daily Indications: with aloe     aspirin 81 mg chewable tablet  Self Yes No   Sig: Chew 81 mg daily   atorvastatin (LIPITOR) 10 mg tablet  Self Yes No   Sig: Take 1 tablet by mouth Daily   b complex vitamins tablet  Self Yes No Sig: Take 1 tablet by mouth daily  calcium-vitamin D 250-100 MG-UNIT per tablet   Yes No   Sig: Take 1 tablet by mouth 2 (two) times a day    Patient not taking: No sig reported   famotidine (PEPCID) 40 MG tablet   Yes No   meloxicam (Mobic) 15 mg tablet   No No   Sig: Take 1 tablet (15 mg total) by mouth daily   multivitamin (THERAGRAN) TABS   Yes No   Sig: Take 1 tablet by mouth daily   omeprazole (PriLOSEC) 20 mg delayed release capsule   Yes No   Sig: Take 20 mg by mouth 2 (two) times a day   Patient not taking: No sig reported   primidone (MYSOLINE) 50 mg tablet   No No   Sig: Take 1 tab by mouth daily at bedtime      Facility-Administered Medications Last Administration Doses Remaining   denosumab (PROLIA) subcutaneous injection 60 mg None recorded 1          No Known Allergies    Objective   Vitals:Blood pressure 170/80, pulse 86, temperature 97 8 °F (36 6 °C), temperature source Oral, resp  rate 20, weight 64 kg (141 lb 3 3 oz), SpO2 98 %  ,Body mass index is 24 24 kg/m²  No intake or output data in the 24 hours ending 10/10/22 0916    Invasive Devices: Invasive Devices  Report    Peripheral Intravenous Line  Duration           Peripheral IV 10/10/22 Right Arm <1 day    Peripheral IV 10/10/22 Right Arm <1 day                Physical Exam   General:  Patient is well-developed, well-nourished, and in no acute distress  HEENT:  Head normocephalic  Eyes anicteric  Cardiovascular:  With regular rhythm  Lungs:  Normal effort  Nonlabored breathing  Extremities:  With no significant edema  Skin: No rashes  Neurologic Exam  Neurologic:  Patient is alert, mildly dysarthric but not aphasic  Correctly stated age, at first stated birth month when asked current month but self corrected to November and then October  Gait deferred for safety  Cranial Nerves:   II: Visual fields full to confrontation  Cannot visualize optic fundi  III,IV,VI: extraocular movements intact with no nystagmus     V: Diminished light touch appreciation with dysesthesias noted left face  VII: Left central facial weakness noted  Coordination:  Dysmetria with LUE (likely related to weakness) but accurate on right  Motor testing with LUE drift  LLE bobbing but no drift  No drift RUE or RLE  Sensory testing with diminished light touch appreciation left face, LUE, and LLE  NIHSS:  1a Level of Consciousness: 0 = Alert   1b  LOC Questions: 0 = Answers both correctly   1c  LOC Commands: 0 = Obeys both correctly   2  Best Gaze: 0 = Normal   3  Visual: 0 = No visual field loss   4  Facial Palsy: 2=Partial paralysis (total or near total paralysis of the lower face)   5a  Motor Right Arm: 0=No drift, limb holds 90 (or 45) degrees for full 10 seconds   5b  Motor Left Arm: 1=Drift, limb holds 90 (or 45) degrees but drifts down before full 10 seconds: does not hit bed   6a  Motor Right Le=No drift, limb holds 90 (or 45) degrees for full 10 seconds   6b  Motor Left Le=No drift, limb holds 90 (or 45) degrees for full 10 seconds   7  Limb Ataxia:  1=Present in one limb   8  Sensory: 1=Mild to moderate sensory loss; patient feels pinprick is less sharp or is dull on the affected side; there is a loss of superficial pain with pinprick but patient is aware She is being touched   9  Best Language:  0=No aphasia, normal   10  Dysarthria: 1=Mild to moderate, patient slurs at least some words and at worst, can be understood with some difficulty   11   Extinction and Inattention (formerly Neglect): 0=No abnormality   Total Score: 6     Time NIHSS was completed: 0745    Modified [place] Score:  0 (No baseline symptoms/disability)    Lab Results:   CBC:   Results from last 7 days   Lab Units 10/10/22  0742   WBC Thousand/uL 7 05   RBC Million/uL 3 81   HEMOGLOBIN g/dL 12 5   HEMATOCRIT % 38 2   MCV fL 100*   PLATELETS Thousands/uL 272   , BMP/CMP:   Results from last 7 days   Lab Units 10/10/22  0742   SODIUM mmol/L 138   POTASSIUM mmol/L 3 8   CHLORIDE mmol/L 106   CO2 mmol/L 22   BUN mg/dL 15   CREATININE mg/dL 0 93   CALCIUM mg/dL 9 7   EGFR ml/min/1 73sq m 60     Imaging Studies: I have personally reviewed pertinent reports  and I have personally reviewed pertinent films in PACS [address] [place] [address] h/n  EKG, Pathology, and Other Studies: I have personally reviewed pertinent reports  VTE Prophylaxis: Sequential compression device Rianna Britton)     Code Status: No Order  Advance Directive and Living Will:      Power of :    POLST:      Counseling / Coordination of Care  Total Critical Care time spent 40 minutes excluding procedures, teaching and family updates

## 2022-10-10 NOTE — SPEECH THERAPY NOTE
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 [facility])    Past Medical History  Past Medical History:   Diagnosis Date   • Cataract of right eye    • GERD (gastroesophageal reflux disease)      Past Surgical History  Past Surgical History:   Procedure Laterality Date   • CATARACT EXTRACTION, BILATERAL Bilateral    • MASTECTOMY Bilateral     for calcium deposits   • SD COLONOSCOPY FLX DX W/COLLJ SPEC WHEN PFRMD N/A 8/25/2017    Procedure: COLONOSCOPY;  Surgeon: Gato Keys MD;  [facility]; Service: Colorectal   • SD XCAPSL CTRC RMVL INSJ IO LENS PROSTH W/O ECP Right 6/21/2016    Procedure: OD EXTRACTION EXTRACAPSULAR CATARACT PHACO INTRAOCULAR LENS (IOL); Surgeon: Micheal Padilla MD;  [facility];  Service: Ophthalmology   • TONSILLECTOMY         Summary  Pt presented with s/s suggestive of mild oropharyngeal dysphagia  Symptoms or concerns included slow but functional mastication and bolus formation, as well as suspected pharyngeal swallow delay  No overt s/s aspiration noted with trials offered today  Pt does have significant L facial weakness  ST will f/u for analysis of PO tolerance  Risk/s for Aspiration: mild    Recommended Diet: regular diet and thin liquids   Recommended Form of Meds: whole with liquid   Aspiration precautions and swallowing strategies: upright posture  Other Recommendations: Continue frequent oral care      Current Medical Status per H&P 10/10/22  Tayler Scott is a 78 y o  female who presents with left-sided weakness  Past medical history significant for prior CVA (right external capsule hemorrhage in 2018), MCI, and tremor  Patient denies any history a hypertension, cardiac history, diabetes  Patient states that she woke approximately 3:00 a m   With left-sided heaviness in her arm and difficulty moving her blanket  Patient states she then awoke again at 7:00 a m  And was and able walk  Patient was last normal at 10:00 p m  NIHSS score 6  On exam patient is A&O x3, extraocular movements intact pupils equal and reactive, patient has left facial droop, left upper extremity and lower extremity weakness  CTH demonstrates small acute hematoma right putamen with mild regional mass effect minimal surrounding parenchymal edema, presumably hypertensive in etiology  CTH also showed moderate cerebral chronic microangiopathic changes and chronic lacunar infarcts in left basal ganglia  CTA did not reveal any contrast extravasation or underlying vascular malformation associated with right putaminal hemorrhage  neurology was consulted in the ED and per Neurology the initial plan is to admit the patient under the stroke pathway, blood pressure goal is less than 140/90, hold off on any anticoagulation or antiplatelet at this time, recommend repeat CT head in the morning and MRI brain with when able  PT/OT when able  Special Studies:  CTA 10/10/22   IMPRESSION  No contrast extravasation or underlying vascular malformation associated with the known right putaminal hemorrhage  No cervical or intracranial large vessel occlusion  Centrilobular emphysema with right greater than left biapical pleural-parenchymal scarring    Findings were directly discussed with Deedee Odonnell  at 7:57 AM        Social/Education/Vocational Hx:  Pt lives alone    Swallow Information   Current Risks for Dysphagia & Aspiration: CVA  Current Symptoms/Concerns: L facial weakness  Current Diet: NPO   Baseline Diet: regular diet and thin liquids      Baseline Assessment   Behavior/Cognition: alert  Speech/Language Status: able to participate in conversation and able to follow commands  Patient Positioning: upright in bed  Pain Status/Interventions/Response to Interventions: No report of or nonverbal indications of pain  Swallow Mechanism Exam  Facial: left facial droop  Labial: decreased ROM left side  Lingual: left sided tongue deviation  Velum: symmetrical  Mandible: adequate ROM  Dentition: adequate  Vocal quality:clear/adequate   Volitional Cough: adequate   Respiratory Status: on RA        Consistencies Assessed and Performance   Consistencies Administered: thin liquids, puree and hard solids    Oral Stage: mild, decreased mastication and decreased bolus formation    Pharyngeal Stage: mild and suspected pharyngeal swallow delay  No coughing, throat clearing, change in vocal quality or respiratory status noted today  Esophageal Concerns: none reported      Summary and Recommendations (see above)    Results Reviewed with: patient, RN and MD     Treatment Recommended: brief     Frequency of treatment: 1-2x f/u      Dysphagia LTG  -Patient will demonstrate safe and effective oral intake (without overt s/s significant oral/pharyngeal dysphagia including s/s penetration or aspiration) for the highest appropriate diet level  Short Term Goals:  -Pt will tolerate regular diet and thin liquid with no significant s/s oral or pharyngeal dysphagia across 1-3 diagnostic sessions

## 2022-10-10 NOTE — ASSESSMENT & PLAN NOTE
Right external hemorrhage in 2018 with residual left-sided paresthesias but no residual weakness  It was felt that given the location, hypertensive hemorrhage was most likely, but patient did not have history of hypertension

## 2022-10-10 NOTE — ASSESSMENT & PLAN NOTE
Assessment:  79-year-old female with right external capsule hemorrhage in 2018, MCI, tremor, who presents with left-sided weakness  NIHSS 6 on presentation  CTH demonstrated small acute hematoma in the right putamen with mild regional mass effect and minimal surrounding parenchymal edema and chronic lacunar infarctions in the left basal ganglia  CTA head/neck demonstrated no flow consequential stenosis, LVO, or aneurysm  Patient is on ASA 81mg daily at baseline  BP in the field per /78  Her Neurologic exam was significant for dysarthria, left facial droop, LUE drift, and left-sided sensory deficit  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       Plan:  - Keep SBP in the 140s   - Normothermia, Euglycaemia  - Continue home meds (Lipitor, Primidone, Pepcid)  - Stop ASA 81mg QD until OP neurology appointment where decision will be made to restart  - Loop recorder implantation as outpatient workup for cardio-embolic stroke workup - Order placed   - PT/OT/speech therapy to be continued at rehabilitation

## 2022-10-10 NOTE — ED PROVIDER NOTES
History  Chief Complaint   Patient presents with   • STROKE Alert     Pre hospital stroke alert  Left sided facial droop  Last well known time 2230 on 10/9     77F PMH intracranial bleed 4 years ago presents to emergency department with stroke-like symptoms  Patient reports going to bed around 10:30 p m  last night and feeling at her baseline  She reports around 3:00 a m  she woke up and her left arm felt heavy, she went back to sleep  Around 6:00 a m  she woke up and felt weakness in her left arm and left leg and noticed a left facial droop  She takes 81 mg aspirin and last took it yesterday morning  Patient brought in by EMS as a stroke alert and went directly to CT scan with neurology evaluation  Patient denies other symptoms  Prior to Admission Medications   Prescriptions Last Dose Informant Patient Reported? Taking? FLUAD 0 5 ML LÓPEZ   Yes No   Sig: inject 0 5 milliliter intramuscularly   Patient not taking: No sig reported   Multiple Vitamins-Minerals (CENTRUM CARDIO PO) 10/9/2022 at Unknown time Self Yes Yes   Sig: Take 1 tablet by mouth daily  Vitamin D, Cholecalciferol, 1000 UNITS TABS 10/9/2022 at Unknown time Self Yes Yes   Sig: Take 1 tablet by mouth daily Indications: with aloe  aspirin 81 mg chewable tablet 10/9/2022 at Unknown time Self Yes Yes   Sig: Chew 81 mg daily   atorvastatin (LIPITOR) 10 mg tablet 10/9/2022 at Unknown time Self Yes Yes   Sig: Take 1 tablet by mouth Daily   b complex vitamins tablet 10/9/2022 at Unknown time Self Yes Yes   Sig: Take 1 tablet by mouth daily     calcium-vitamin D 250-100 MG-UNIT per tablet 10/9/2022 at Unknown time  Yes Yes   Sig: Take 1 tablet by mouth 2 (two) times a day   famotidine (PEPCID) 40 MG tablet 10/9/2022 at Unknown time  Yes Yes   meloxicam (Mobic) 15 mg tablet   No No   Sig: Take 1 tablet (15 mg total) by mouth daily   multivitamin (THERAGRAN) TABS Not Taking at Unknown time  Yes No   Sig: Take 1 tablet by mouth daily   Patient not taking: Reported on 10/10/2022   omeprazole (PriLOSEC) 20 mg delayed release capsule Not Taking at Unknown time  Yes No   Sig: Take 20 mg by mouth 2 (two) times a day   Patient not taking: No sig reported   primidone (MYSOLINE) 50 mg tablet 10/9/2022 at Unknown time  No Yes   Sig: Take 1 tab by mouth daily at bedtime      Facility-Administered Medications Last Administration Doses Remaining   denosumab (PROLIA) subcutaneous injection 60 mg None recorded 1          Past Medical History:   Diagnosis Date   • Cataract of right eye    • GERD (gastroesophageal reflux disease)        Past Surgical History:   Procedure Laterality Date   • CATARACT EXTRACTION, BILATERAL Bilateral    • MASTECTOMY Bilateral     for calcium deposits   • MN COLONOSCOPY FLX DX W/COLLJ SPEC WHEN PFRMD N/A 8/25/2017    Procedure: COLONOSCOPY;  Surgeon: Anand Agosto MD;  Location: [facility]; Service: Colorectal   • MN XCAPSL CTRC RMVL INSJ IO LENS PROSTH W/O ECP Right 6/21/2016    Procedure: OD EXTRACTION EXTRACAPSULAR CATARACT PHACO INTRAOCULAR LENS (IOL); Surgeon: Santos Sweeney MD;  Location: [facility];  Service: Ophthalmology   • TONSILLECTOMY         Family History   Problem Relation Age of Onset   • Rectal cancer Brother    • Stroke Mother    • Heart attack Father    • Arrhythmia Father         possible   • Coronary artery disease Father    • Sudden death Father         scd   • Anuerysm Neg Hx    • Clotting disorder Neg Hx    • Hypertension Neg Hx    • Hyperlipidemia Neg Hx    • Heart failure Neg Hx      I have reviewed and agree with the history as documented      E-Cigarette/Vaping   • E-Cigarette Use Never User      E-Cigarette/Vaping Substances   • Nicotine No    • THC No    • CBD No    • Flavoring No    • Other No    • Unknown No      Social History     Tobacco Use   • Smoking status: Former Smoker   • Smokeless tobacco: Never Used   Vaping Use   • Vaping Use: Never used   Substance Use Topics   • Alcohol use: Not Currently   • Drug use: Never        Review of Systems   Constitutional: Negative for fever  Respiratory: Negative for cough and shortness of breath  Cardiovascular: Negative for chest pain  Gastrointestinal: Negative for abdominal pain, nausea and vomiting  Genitourinary: Negative for dysuria  Neurological: Positive for weakness and numbness  Psychiatric/Behavioral: Negative for confusion  All other systems reviewed and are negative  Physical Exam  ED Triage Vitals   Temperature Pulse Respirations Blood Pressure SpO2   10/10/22 0735 10/10/22 0735 10/10/22 0735 10/10/22 0735 10/10/22 0735   97 8 °F (36 6 °C) 83 20 145/65 97 %      Temp Source Heart Rate Source Patient Position - Orthostatic VS BP Location FiO2 (%)   10/10/22 0735 10/10/22 0735 10/10/22 0735 -- --   Oral Monitor Lying        Pain Score       10/10/22 1333       No Pain             Orthostatic Vital Signs  Vitals:    10/10/22 1553 10/10/22 1600 10/10/22 1615 10/10/22 1630   BP: 140/67 126/66 128/73 114/64   Pulse: 80      Patient Position - Orthostatic VS:           Physical Exam  Vitals and nursing note reviewed  Constitutional:       General: She is not in acute distress  HENT:      Head: Normocephalic  Mouth/Throat:      Mouth: Mucous membranes are moist    Eyes:      Pupils: Pupils are equal, round, and reactive to light  Cardiovascular:      Rate and Rhythm: Normal rate and regular rhythm  Heart sounds: No murmur heard  Pulmonary:      Effort: Pulmonary effort is normal  No respiratory distress  Breath sounds: Normal breath sounds  No wheezing, rhonchi or rales  Abdominal:      General: Abdomen is flat  There is no distension  Palpations: Abdomen is soft  Tenderness: There is no abdominal tenderness  There is no guarding or rebound  Skin:     General: Skin is warm and dry  Neurological:      Mental Status: She is alert        Comments: NIHSS 4:  +1 left facial droop  +1 left arm drift  +1 left-sided paresthesias  +1 dysarthria           ED Medications  Medications   iohexol (OMNIPAQUE) 350 MG/ML injection (SINGLE-DOSE) 85 mL (85 mL Intravenous Given 10/10/22 0737)       Diagnostic Studies  Results Reviewed     Procedure Component Value Units Date/Time    APTT [[id number]]  (Normal) Collected: 10/10/22 1007    Lab Status: Final result Specimen: Blood from Arm, Right Updated: 10/10/22 1058     PTT 31 seconds     Narrative:      Specimen was recollected twice and is still hemolyzed  Results may not be accurate  Ayden Chao [[id number]]  (Normal) Collected: 10/10/22 1007    Lab Status: Final result Specimen: Blood from Arm, Right Updated: 10/10/22 1058     Protime 12 9 seconds      INR 0 95    Narrative:      Specimen was recollected twice and is still hemolyzed  Results may not be accurate  HS Troponin I 2hr [[id number]] Collected: 10/10/22 0936    Lab Status: Final result Specimen: Blood from Arm, Left Updated: 10/10/22 1017     hs TnI 2hr <2 ng/L      Delta 2hr hsTnI --    FLU/RSV/COVID - if FLU/RSV clinically relevant [[id number]]  (Normal) Collected: 10/10/22 0830    Lab Status: Final result Specimen: Nares from Nose Updated: 10/10/22 1005     SARS-CoV-2 Negative     INFLUENZA A PCR Negative     INFLUENZA B PCR Negative     RSV PCR Negative    Narrative:      FOR PEDIATRIC PATIENTS - copy/paste COVID Guidelines URL to browser: [email or web address]  [employer]    SARS-CoV-2 assay is a Nucleic Acid Amplification assay intended for the  qualitative detection of nucleic acid from SARS-CoV-2 in nasopharyngeal  swabs  Results are for the presumptive identification of SARS-CoV-2 RNA  Positive results are indicative of infection with SARS-CoV-2, the virus  causing COVID-19, but do not rule out bacterial infection or co-infection  with other viruses   Laboratories within the [place] and its  territories are required to report all positive results to the appropriate  public health authorities  Negative results do not preclude SARS-CoV-2  infection and should not be used as the sole basis for treatment or other  patient management decisions  Negative results must be combined with  clinical observations, patient history, and epidemiological information  This test has not been FDA cleared or approved  This test has been authorized by FDA under an Emergency Use Authorization  (EUA)  This test is only authorized for the duration of time the  declaration that circumstances exist justifying the authorization of the  emergency use of an in vitro diagnostic tests for detection of SARS-CoV-2  virus and/or diagnosis of COVID-19 infection under section 564(b)(1) of  the Act, 21 U  S C  776IMW-0(Z)(4), unless the authorization is terminated  or revoked sooner  The test has been validated but independent review by FDA  and CLIA is pending  Test performed using [employer] Gene[employer]pert: This RT-PCR assay targets N2,  a region unique to SARS-CoV-2  A conserved region in the E-gene was chosen  for pan-Sarbecovirus detection which includes SARS-CoV-2  According to CMS-2020-01-R, this platform meets the definition of high-throughput technology      HS Troponin 0hr (reflex protocol) [[id number]]  (Normal) Collected: 10/10/22 0742    Lab Status: Final result Specimen: Blood from Arm, Right Updated: 10/10/22 0821     hs TnI 0hr <2 ng/L     Basic metabolic panel [[id number]] Collected: 10/10/22 0742    Lab Status: Final result Specimen: Blood from Arm, Right Updated: 10/10/22 0805     Sodium 138 mmol/L      Potassium 3 8 mmol/L      Chloride 106 mmol/L      CO2 22 mmol/L      ANION GAP 10 mmol/L      BUN 15 mg/dL      Creatinine 0 93 mg/dL      Glucose 125 mg/dL      Calcium 9 7 mg/dL      eGFR 60 ml/min/1 73sq m     Narrative:      Ivan guidelines for Chronic Kidney Disease (CKD):   •  Stage 1 with normal or high GFR (GFR > 90 mL/min/1 73 square meters)  • Stage 2 Mild CKD (GFR = 60-89 mL/min/1 73 square meters)  •  Stage 3A Moderate CKD (GFR = 45-59 mL/min/1 73 square meters)  •  Stage 3B Moderate CKD (GFR = 30-44 mL/min/1 73 square meters)  •  Stage 4 Severe CKD (GFR = 15-29 mL/min/1 73 square meters)  •  Stage 5 End Stage CKD (GFR <15 mL/min/1 73 square meters)  Note: GFR calculation is accurate only with a steady state creatinine    CBC and Platelet [[id number]]  (Abnormal) Collected: 10/10/22 0742    Lab Status: Final result Specimen: Blood from Arm, Right Updated: 10/10/22 0758     WBC 7 05 Thousand/uL      RBC 3 81 Million/uL      Hemoglobin 12 5 g/dL      Hematocrit 38 2 %       fL      MCH 32 8 pg      MCHC 32 7 g/dL      RDW 12 4 %      Platelets 444 Thousands/uL      MPV 9 7 fL     Fingerstick Glucose (POCT) [[id number]]  (Normal) Collected: 10/10/22 0750    Lab Status: Final result Updated: 10/10/22 0753     POC Glucose 114 mg/dl                  CT stroke alert brain   Final Result by Gary Aguero MD (10/10 0101)      Small acute hematoma in the right putamen with mild regional mass effect and minimal surrounding parenchymal edema at this time  This is presumably hypertensive in etiology  Moderate cerebral chronic microangiopathic changes and chronic lacunar infarctions in the left basal ganglia  Findings were directly discussed with Ino Bone  at 7:57 AM       Workstation performed: [id number]         CTA stroke alert (head/neck)   Final Result by Gary Aguero MD (10/10 7166)      No contrast extravasation or underlying vascular malformation associated with the known right putaminal hemorrhage  No cervical or intracranial large vessel occlusion  Centrilobular emphysema with right greater than left biapical pleural-parenchymal scarring        Findings were directly discussed with Ino Bone  at 7:57 AM                         Workstation performed: [id number]         MRI inpatient order    (Results Pending) Procedures  Procedures      ED Course                                       MDM  Number of Diagnoses or Management Options  Chronic left-sided low back pain  Intracranial bleed ([facility] [address] )  Diagnosis management comments: 77F PMH intracranial bleed 4 years ago presents to emergency department with stroke-like symptoms  Workup including vital signs, physical exam, labs, CTA head and neck  Patient direct to CT as stroke alert with neurology evaluation, NIHSS 4   CT scan with right intracranial bleed  Case discussed with critical care, plan for admission to neuro critical care  Disposition  Final diagnoses:   Chronic left-sided low back pain   Intracranial bleed ([facility] [address] )     Time reflects when diagnosis was documented in both MDM as applicable and the Disposition within this note     Time User Action Codes Description Comment    10/10/2022  7:29 AM Chan Rivers Add [M54 50,  G89 29] Chronic left-sided low back pain     10/10/2022  8:53 AM Ele Purcell Add [I62 9] Intracranial bleed ([facility] [address] )     10/10/2022  8:53 AM Elizabeth Dusky Modify [M54 50,  G89 29] Chronic left-sided low back pain     10/10/2022  8:53 AM Elizabeth Millie Modify [I62 9] Intracranial bleed [facility])       ED Disposition     ED Disposition   Admit    Condition   Stable    Date/Time   Mon Oct 10, 2022  8:53 AM    Comment   Case was discussed with critical care and the patient's admission status was agreed to be Admission Status: inpatient status to the service of Dr Waddell Estero   Follow-up Information    None         Current Discharge Medication List      CONTINUE these medications which have NOT CHANGED    Details   aspirin 81 mg chewable tablet Chew 81 mg daily      atorvastatin (LIPITOR) 10 mg tablet Take 1 tablet by mouth Daily      b complex vitamins tablet Take 1 tablet by mouth daily        calcium-vitamin D 250-100 MG-UNIT per tablet Take 1 tablet by mouth 2 (two) times a day      famotidine (PEPCID) 40 MG tablet Multiple Vitamins-Minerals (CENTRUM CARDIO PO) Take 1 tablet by mouth daily  primidone (MYSOLINE) 50 mg tablet Take 1 tab by mouth daily at bedtime  Qty: 90 tablet, Refills: 3    Associated Diagnoses: Tremor, essential      Vitamin D, Cholecalciferol, 1000 UNITS TABS Take 1 tablet by mouth daily Indications: with aloe  FLUAD 0 5 ML LÓPEZ inject 0 5 milliliter intramuscularly  Refills: 0      meloxicam (Mobic) 15 mg tablet Take 1 tablet (15 mg total) by mouth daily  Qty: 30 tablet, Refills: 0    Associated Diagnoses: Metatarsalgia of left foot      multivitamin (THERAGRAN) TABS Take 1 tablet by mouth daily      omeprazole (PriLOSEC) 20 mg delayed release capsule Take 20 mg by mouth 2 (two) times a day           No discharge procedures on file  PDMP Review     None           ED Provider  Attending physically available and evaluated Kevin Nam I managed the patient along with the ED Attending      Electronically Signed by         Catarino Malone MD  10/10/22 9455

## 2022-10-11 ENCOUNTER — APPOINTMENT (INPATIENT)
Dept: RADIOLOGY | Facility: HOSPITAL | Age: 77
DRG: 064 | End: 2022-10-11
Payer: MEDICARE

## 2022-10-11 ENCOUNTER — EPISODE CHANGES (OUTPATIENT)
Dept: CASE MANAGEMENT | Facility: OTHER | Age: 77
End: 2022-10-11

## 2022-10-11 ENCOUNTER — APPOINTMENT (INPATIENT)
Dept: NON INVASIVE DIAGNOSTICS | Facility: HOSPITAL | Age: 77
DRG: 064 | End: 2022-10-11
Payer: MEDICARE

## 2022-10-11 ENCOUNTER — APPOINTMENT (OUTPATIENT)
Dept: RADIOLOGY | Facility: HOSPITAL | Age: 77
DRG: 064 | End: 2022-10-11
Payer: MEDICARE

## 2022-10-11 PROBLEM — I61.9 ACUTE CEREBRAL HEMORRHAGE (HCC): Status: ACTIVE | Noted: 2022-10-11

## 2022-10-11 PROBLEM — R19.5 LOOSE STOOLS: Status: ACTIVE | Noted: 2022-10-11

## 2022-10-11 LAB
ANION GAP SERPL CALCULATED.3IONS-SCNC: 8 MMOL/L (ref 4–13)
AORTIC ROOT: 2.5 CM
APICAL FOUR CHAMBER EJECTION FRACTION: 65 %
ASCENDING AORTA: 3 CM
BASOPHILS # BLD AUTO: 0.03 THOUSANDS/ΜL (ref 0–0.1)
BASOPHILS NFR BLD AUTO: 0 % (ref 0–1)
BUN SERPL-MCNC: 15 MG/DL (ref 5–25)
CALCIUM SERPL-MCNC: 9.1 MG/DL (ref 8.3–10.1)
CHLORIDE SERPL-SCNC: 105 MMOL/L (ref 96–108)
CHOLEST SERPL-MCNC: 138 MG/DL
CO2 SERPL-SCNC: 25 MMOL/L (ref 21–32)
CREAT SERPL-MCNC: 0.76 MG/DL (ref 0.6–1.3)
E WAVE DECELERATION TIME: 273 MS
EOSINOPHIL # BLD AUTO: 0.06 THOUSAND/ΜL (ref 0–0.61)
EOSINOPHIL NFR BLD AUTO: 1 % (ref 0–6)
ERYTHROCYTE [DISTWIDTH] IN BLOOD BY AUTOMATED COUNT: 12.7 % (ref 11.6–15.1)
EST. AVERAGE GLUCOSE BLD GHB EST-MCNC: 123 MG/DL
FRACTIONAL SHORTENING: 27 (ref 28–44)
GFR SERPL CREATININE-BSD FRML MDRD: 76 ML/MIN/1.73SQ M
GLUCOSE SERPL-MCNC: 103 MG/DL (ref 65–140)
HBA1C MFR BLD: 5.9 %
HCT VFR BLD AUTO: 36.3 % (ref 34.8–46.1)
HDLC SERPL-MCNC: 80 MG/DL
HGB BLD-MCNC: 12.3 G/DL (ref 11.5–15.4)
IMM GRANULOCYTES # BLD AUTO: 0.03 THOUSAND/UL (ref 0–0.2)
IMM GRANULOCYTES NFR BLD AUTO: 0 % (ref 0–2)
INTERVENTRICULAR SEPTUM IN DIASTOLE (PARASTERNAL SHORT AXIS VIEW): 0.9 CM
INTERVENTRICULAR SEPTUM: 0.9 CM (ref 0.6–1.1)
LAAS-AP2: 12.4 CM2
LAAS-AP4: 7.4 CM2
LDLC SERPL CALC-MCNC: 41 MG/DL (ref 0–100)
LEFT ATRIUM SIZE: 2.8 CM
LEFT INTERNAL DIMENSION IN SYSTOLE: 2.4 CM (ref 2.1–4)
LEFT VENTRICULAR INTERNAL DIMENSION IN DIASTOLE: 3.3 CM (ref 3.5–6)
LEFT VENTRICULAR POSTERIOR WALL IN END DIASTOLE: 0.7 CM
LEFT VENTRICULAR STROKE VOLUME: 25 ML
LVSV (TEICH): 25 ML
LYMPHOCYTES # BLD AUTO: 1.8 THOUSANDS/ΜL (ref 0.6–4.47)
LYMPHOCYTES NFR BLD AUTO: 26 % (ref 14–44)
MAGNESIUM SERPL-MCNC: 1.9 MG/DL (ref 1.6–2.6)
MCH RBC QN AUTO: 33.5 PG (ref 26.8–34.3)
MCHC RBC AUTO-ENTMCNC: 33.9 G/DL (ref 31.4–37.4)
MCV RBC AUTO: 99 FL (ref 82–98)
MONOCYTES # BLD AUTO: 0.66 THOUSAND/ΜL (ref 0.17–1.22)
MONOCYTES NFR BLD AUTO: 9 % (ref 4–12)
MV E'TISSUE VEL-SEP: 8 CM/S
MV PEAK A VEL: 0.74 M/S
MV PEAK E VEL: 50 CM/S
MV STENOSIS PRESSURE HALF TIME: 79 MS
MV VALVE AREA P 1/2 METHOD: 2.78
NEUTROPHILS # BLD AUTO: 4.42 THOUSANDS/ΜL (ref 1.85–7.62)
NEUTS SEG NFR BLD AUTO: 64 % (ref 43–75)
NONHDLC SERPL-MCNC: 58 MG/DL
NRBC BLD AUTO-RTO: 0 /100 WBCS
PHOSPHATE SERPL-MCNC: 3.5 MG/DL (ref 2.3–4.1)
PLATELET # BLD AUTO: 252 THOUSANDS/UL (ref 149–390)
PMV BLD AUTO: 9.5 FL (ref 8.9–12.7)
POTASSIUM SERPL-SCNC: 3.8 MMOL/L (ref 3.5–5.3)
RBC # BLD AUTO: 3.67 MILLION/UL (ref 3.81–5.12)
RIGHT ATRIUM AREA SYSTOLE A4C: 10.1 CM2
RIGHT VENTRICLE ID DIMENSION: 3.1 CM
SL CV LEFT ATRIUM LENGTH A2C: 4.4 CM
SL CV LV EF: 60
SL CV PED ECHO LEFT VENTRICLE DIASTOLIC VOLUME (MOD BIPLANE) 2D: 46 ML
SL CV PED ECHO LEFT VENTRICLE SYSTOLIC VOLUME (MOD BIPLANE) 2D: 20 ML
SODIUM SERPL-SCNC: 138 MMOL/L (ref 135–147)
TRIGL SERPL-MCNC: 86 MG/DL
WBC # BLD AUTO: 7 THOUSAND/UL (ref 4.31–10.16)

## 2022-10-11 PROCEDURE — G1004 CDSM NDSC: HCPCS

## 2022-10-11 PROCEDURE — 70553 MRI BRAIN STEM W/O & W/DYE: CPT

## 2022-10-11 PROCEDURE — 99233 SBSQ HOSP IP/OBS HIGH 50: CPT | Performed by: PSYCHIATRY & NEUROLOGY

## 2022-10-11 PROCEDURE — 99233 SBSQ HOSP IP/OBS HIGH 50: CPT | Performed by: EMERGENCY MEDICINE

## 2022-10-11 PROCEDURE — 97167 OT EVAL HIGH COMPLEX 60 MIN: CPT

## 2022-10-11 PROCEDURE — 80061 LIPID PANEL: CPT

## 2022-10-11 PROCEDURE — NC001 PR NO CHARGE: Performed by: EMERGENCY MEDICINE

## 2022-10-11 PROCEDURE — 70450 CT HEAD/BRAIN W/O DYE: CPT

## 2022-10-11 PROCEDURE — 92522 EVALUATE SPEECH PRODUCTION: CPT

## 2022-10-11 PROCEDURE — 85025 COMPLETE CBC W/AUTO DIFF WBC: CPT

## 2022-10-11 PROCEDURE — 93306 TTE W/DOPPLER COMPLETE: CPT

## 2022-10-11 PROCEDURE — 92526 ORAL FUNCTION THERAPY: CPT

## 2022-10-11 PROCEDURE — 97163 PT EVAL HIGH COMPLEX 45 MIN: CPT

## 2022-10-11 PROCEDURE — 83735 ASSAY OF MAGNESIUM: CPT

## 2022-10-11 PROCEDURE — 97110 THERAPEUTIC EXERCISES: CPT

## 2022-10-11 PROCEDURE — A9585 GADOBUTROL INJECTION: HCPCS

## 2022-10-11 PROCEDURE — 84100 ASSAY OF PHOSPHORUS: CPT

## 2022-10-11 PROCEDURE — 83036 HEMOGLOBIN GLYCOSYLATED A1C: CPT

## 2022-10-11 PROCEDURE — 80048 BASIC METABOLIC PNL TOTAL CA: CPT

## 2022-10-11 PROCEDURE — 93306 TTE W/DOPPLER COMPLETE: CPT | Performed by: INTERNAL MEDICINE

## 2022-10-11 RX ADMIN — GADOBUTROL 6 ML: 604.72 INJECTION INTRAVENOUS at 01:35

## 2022-10-11 NOTE — SPEECH THERAPY NOTE
Speech/Language Pathology Progress Note    Patient Name: Elie Jenkins  UEQPB'N Date: 10/11/2022     Subjective:  Pt awake and alert, sitting upright in bed  Objective:  Pt seen for dysphagia therapy  Current diet is regular with thin liquids  See completed MRI below  Persistent L facial weakness noted with mild L side spillage/bolus loss during meal  Pt was responsive to cues to use lingual sweep to check for pocketing  Pt ordered mostly soft foods for lunch today and had no difficulty with mastication and bolus formation/transfer  No s/s aspiration with thin liquids  Assessment:  MRI completed today 10/11/22: IMPRESSION:  1  Focal right corona radiata acute to subacute infarct extending to the basal ganglia region  2  Stable right putaminal/external capsule intraparenchymal hemorrhage with minimal surrounding vasogenic edema  3  Moderate chronic microangiopathic ischemic changes  Dilated perivascular spaces versus chronic lacunar infarcts within the bilateral sublentiform regions  Plan/Recommendations:  Continue regular diet with thin liquids, brief ST f/u for oral dysphagia   Pt is also experiencing mild dysarthria d/t L labial weakness and would benefit from motor speech eval

## 2022-10-11 NOTE — OCCUPATIONAL THERAPY NOTE
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 [facility])    Acute cerebral hemorrhage [facility])    Past Medical History  Past Medical History:   Diagnosis Date   • Cataract of right eye    • GERD (gastroesophageal reflux disease)      Past Surgical History  Past Surgical History:   Procedure Laterality Date   • CATARACT EXTRACTION, BILATERAL Bilateral    • MASTECTOMY Bilateral     for calcium deposits   • MI COLONOSCOPY FLX DX W/COLLJ SPEC WHEN PFRMD N/A 8/25/2017    Procedure: COLONOSCOPY;  Surgeon: Hollie Lopez MD;  Location: [facility]; Service: Colorectal   • MI XCAPSL CTRC RMVL INSJ IO LENS PROSTH W/O ECP Right 6/21/2016    Procedure: OD EXTRACTION EXTRACAPSULAR CATARACT PHACO INTRAOCULAR LENS (IOL); Surgeon: Fuentes Cedeno MD;  Location[facility];  Service: Ophthalmology   • TONSILLECTOMY        10/11/22 1100   OT Last Visit   OT Visit Date 10/11/22   Note Type   Note type Evaluation   Pain Assessment   Pain Assessment Tool 0-10   Pain Score No Pain   Restrictions/Precautions   Weight Bearing Precautions Per Order No   Other Precautions Bed Alarm;Telemetry; Fall Risk   Home Living   Type of Home Apartment  (I living facility -[facility])   Home Layout One level  (+2STE)   Bathroom Shower/Tub Tub/shower unit   Bathroom Toilet Standard   Bathroom Equipment Grab bars in shower; Shower chair   [address] [facility]   Prior Function   Level of [address] with ADLs   Lives With (S)  Alone   Receives Help From Family  (son works during the day, local sister can assist as needed)   IADLs   (I IaDL's)   Falls in the last 6 months 0   Vocational Retired   Lifestyle   Autonomy I with ADL's/iaDL's, no AD with functional mobility +drives   Reciprocal Relationships family   Service to Others retired   Intrinsic Gratification shopping   ADL   [address] 5  Supervision/Setup   Grooming Assistance 5  Supervision/Setup   [address] 3  Moderate Assistance   LB [address] 2  [address] 3  Moderate Assistance    [address] 2  Maximal Assistance   LB Dressing Deficit Don/doff R sock; Don/doff L sock unable to grasp with left hand to doff   Toileting Assistance  3  Moderate Assistance   Bed Mobility   Supine to Sit 3  Moderate assistance   Additional items Assist x 1   Sit to Supine 3  Moderate assistance   Additional items Assist x 1   Additional Comments pt remained in supine with all needs met and alarm engaged   Transfers   Sit to Stand 3  Moderate assistance   Additional items Assist x 1   Stand to Sit 3  Moderate assistance   Additional items Assist x 1   Additional Comments +RW   Functional Mobility   Functional Mobility 3  Moderate assistance   Additional Comments mod a x 1 wtih RW short distance functional mobility into hallway  Additional items Rolling walker   Balance   Static Sitting Fair   Dynamic Sitting Fair -   Static Standing Poor +   Dynamic Standing Poor   Ambulatory Poor   Activity Tolerance   Activity Tolerance Patient limited by fatigue   Medical Staff Made Aware PT brian lozoyahel due to the patient's co-morbidities, clinically unstable presentation, and present impairments which are a regression from the patient's baseline      Nurse Made Aware Rn cleared pt for therapy   RUE Assessment   RUE Assessment WFL  (Right hand dominant)   LUE Assessment   LUE Assessment (3-/5 shoulder strength, elbow 3-/5,  3+5) pt reports mild residual weakness from past CVA did not interfere with function tasks and now is weaker   Hand Function   Gross Motor Coordination Impaired dysmetria noted weakness contributing   Fine Motor Coordination Impaired +incoordination with finger to thumb seqeuncing   Sensation   Light Touch No apparent deficits -pt reports has tingling in left UE and now worse in left hand -was intact to lt touch  (intact)   Proprioception   Proprioception No apparent deficits   Vision-Basic Assessment   Current Vision Wears glasses for distance only   Vision - Complex Assessment   Acuity Able to read clock/calendar on wall without difficulty   Perception   Inattention/Neglect Appears intact   Cognition   Overall Cognitive Status WFL   Arousal/Participation Responsive; Alert; Cooperative   Attention Within functional limits   Orientation Level Oriented X4   Memory Within functional limits   Following Commands Follows multistep commands    Comments pt pleasantly motivated, wtih good carryover on HEP for left UE, good insight into deficits  Assessment   Limitation Decreased ADL status; Decreased UE strength;Decreased endurance;Decreased self-care trans;Decreased high-level ADLs; Decreased fine motor control   Prognosis Fair   Assessment Pt is a 78 y o  female who was admitted to [facility] on 10/10/2022 with <principal problem not specified> left sided weakness While she was sleeping around 3:00 a m  She went to adjust her covers and knows she might of had some left-sided weakness  When she woke up this morning at 7:00 a m  She had significant weakness/heaviness in her arm and was not able to walk and now here with 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  Pt's problem list also includes PMH of  has a past medical history of Cataract of right eye and GERD (gastroesophageal reflux disease)    At baseline pt was completing I with ADL's/IADL's no AD with functional ambulatino +drives  Pt lives alone in a I living apartment at [facility]  Currently pt requires min/mod a UB, max a LB for overall ADLS and mod a x with RW for functional mobility/transfers   Pt currently presents with impairments in the following categories -steps to enter environment, difficulty performing ADLS and difficulty performing IADLS activity tolerance and endurance  These impairments, as well as pt's fatigue, (L) UE dysmetria , (L) hemiparesis and decreased caregiver support  limit pt's ability to safely engage in all baseline areas of occupation, includingeating, grooming, bathing, dressing, toileting, functional mobility/transfers, community mobility, laundry , driving, house maintenance, medication management, meal prep, cleaning, social participation  and leisure activities  The patient's raw score on the AM-PAC Daily Activity inpatient short form is 14, standardized score is 33 39, less than 39 4  Patients at this level are likely to benefit from discharge to post-acute rehabilitation services  Please refer to the recommendation of the Occupational Therapist for safe discharge planning  From OT standpoint, recommend STR upon D/C  OT will continue to follow to address the below stated goals   Goals   Patient Goals go home   LTG Time Frame 10-14   Long Term Goal #1 see goals below   Plan   Treatment Interventions ADL retraining;Functional transfer training;UE strengthening/ROM; Endurance training;Patient/family training;Equipment evaluation/education;Cognitive reorientation; Compensatory technique education; Energy conservation; Activityengagement   Goal Expiration Date 10/25/22   OT Frequency 3-5x/wk   Recommendation   Recommendation Physiatry Consult   OT Discharge Recommendation Post acute rehabilitation services   Equipment Recommended Bedside commode  (if discharges home)   AM-PAC Daily Activity Inpatient   Lower Body Dressing 2   Bathing 2   Toileting 2   Upper Body Dressing 2   Grooming 3   Eating 3   Daily Activity Raw Score 14   Daily Activity Standardized Score (Calc for Raw Score >=11) 33 39   AM-PAC Applied Cognition Inpatient   Following a Speech/Presentation 4   Understanding Ordinary Conversation 4   Taking Medications 4   Remembering Where Things Are Placed or Put Away 4   Remembering List of 4-5 Errands 4   Taking Care of Complicated Tasks 4   Applied Cognition Raw Score 24   Applied Cognition Standardized Score 62 21   Additional Treatment Session   Start Time 9721   End Time 1218   Treatment Assessment Pt seen for an additional OT treatment session with focus on left UE strength/FMC and GMC and incorporation with functional tasks along with HEP with issued handouts along with a therapy ball  Pt able to perform finger to thumb coordination exercises (except unable to oppose small finger to thumb) and  strengthening therapeutic exercises with therapy ball  Pt receptive to above HEP with good carryover  Pt will continue to benefit from the skilled OT to maximize functional I with ADL's/mobilty tasks  Occupational Therapy Goals:    *Mod I with bed mobility to engage in functional tasks  *Mod I Adl's after setup with use of AE PRN  *Mod I toileting and clothing management   *Mod I functional mobility and transfers to/from all surfaces with Fair + dynamic balance and safety for participation in dynamic adls and iadl tasks   *Demonstrate good carryover with safe use of RW during functional tasks   *Assess DME needs   *Increase activity tolerance to 25-30 minutes for participation in adls and enjoyable activities  *Demonstrate good carryover of pt/family education and training with good tolerance for increased safety and independence with ADL's/ADl's  *Pt will improve standing balance to 2-3 minutes with functional tasks to increase I with toileting/transfers  *Patient will demonstrate 100% carryover of energy conservation techniques t/o functional I/ADL/leisure tasks w/o cues s/p skilled education to increase endurance during functional tasks  *Pt will engage in ongoing visual perceptual assessments, screenings, and activities to improve ADL performance, as well as to assist in safety/d/c planning     *Pt will participate in fine/gross motor coordination/strenthening/dexterity exercises to Good  to left UE in order to increase participation in functional activities  *Pt will improve L UE ROM 1/2 MMT via AROM/AAROM/PROM in all planes as tolerated in order to participate in functional activities      Sammy Green MOT, OTR/L

## 2022-10-11 NOTE — SPEECH THERAPY NOTE
MOTOR SPEECH EVALUATION    Impressions:  Pt presents with mild dysarthria due to L labial weakness  Overall speech intelligibility is not impacted, however articulation of some consonant sounds is mildly imprecise  Pt is alert and oriented, able to participate in assessment and treatment  Will f/u briefly to implement home exercise program       Therapy Prognosis: excellent    Goals:  LONG TERM GOALS:  -The patient will demonstrate precisely articulated speech in all activities of daily living including dynamic conversation  Respiration and Phonation  -Patient will use trained strategies (over articulation, increased oral opening)  to improve speech precision in conversation with 100% accuracy  H&P/Admit info, pertinent provider notes:  Consuelo Solano is a 78 y o  female who presents with left-sided weakness  Past medical history significant for prior CVA (right external capsule hemorrhage in 2018), MCI, and tremor  Patient denies any history a hypertension, cardiac history, diabetes  Patient states that she woke approximately 3:00 a m  With left-sided heaviness in her arm and difficulty moving her blanket  Patient states she then awoke again at 7:00 a m  And was and able walk  Patient was last normal at 10:00 p m  NIHSS score 6  On exam patient is A&O x3, extraocular movements intact pupils equal and reactive, patient has left facial droop, left upper extremity and lower extremity weakness  CTH demonstrates small acute hematoma right putamen with mild regional mass effect minimal surrounding parenchymal edema, presumably hypertensive in etiology  CTH also showed moderate cerebral chronic microangiopathic changes and chronic lacunar infarcts in left basal ganglia  CTA did not reveal any contrast extravasation or underlying vascular malformation associated with right putaminal hemorrhage   neurology was consulted in the ED and per Neurology the initial plan is to admit the patient under the stroke pathway, blood pressure goal is less than 140/90, hold off on any anticoagulation or antiplatelet at this time, recommend repeat CT head in the morning and MRI brain with when able  PT/OT when able  Special Studies:  MRI brain 10/11/22 IMPRESSION:  1  Focal right corona radiata acute to subacute infarct extending to the basal ganglia region  2  Stable right putaminal/external capsule intraparenchymal hemorrhage with minimal surrounding vasogenic edema  3  Moderate chronic microangiopathic ischemic changes  Dilated perivascular spaces versus chronic lacunar infarcts within the bilateral sublentiform regions        Evaluation:    Vowel prolongation: /a/ (Normal 15-20 seconds): 30 seconds    Diadochokinesis:    puh puh puh (normal 3-5 5 repetitions/second): WFL    tuh tuh tuh (normal should exceed 25 reps in 10 seconds): [facility] [employer]    kuh kuh kuh (normal should exceed 25 reps in 10 seconds): WFL    puh tuh kuh (or buttercup)- (normal should exceed 8 reps in 10 seconds): WFL    Articulation:  Single words: [facility] [facility] [facility]    Multisyllabic words: min impairment ( /s/, /sh/)    Count 1-20, then backwards from 20-1: WNL    Oral Motor Skills:  Facial symmetry: L weakness  Labial: Reduced retraction L side  Lingual: L deviation on protrusion  Palatal function: WNL  Gag reflex: not tested  Voice: WNL     Oral apraxia: None    Articulation in connected speech: Mildly reduced    Conversation:  Imprecise articulation (mild)

## 2022-10-11 NOTE — PROGRESS NOTES
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 Plan:  Continue step-down 1  ? Neurology following  § Per neurology:  Keep systolic blood pressure 630-230  § Hold all AP/AC  § Repeat CTH in 24 hours  § MRI brain with without contrast  § Obtain 2D echo  § Check hemoglobin A1c, lipid panel, TSH  § Telemetry  § PT/OT/speech therapy  § Frequent neuro checks - q2 during day and q4 during night  ? Cam-ICU, delirium precaution  ? Stat CT head for changing GCS greater than 2   ? Echo - EF of 65%  ? F/U appt w/ Dr Nicki Marie scheduled for 10/23 for tremor + stroke f/u          Consultants:   Neurology    History of Present Illness:   Moe Spaulding a 78 y  o  female who presents with left-sided weakness   Past medical history significant for prior CVA (right external capsule hemorrhage in 2018), MCI, and tremor   Patient denies any history a hypertension, cardiac history, diabetes   Patient states that she woke approximately 3:00 a m  With left-sided heaviness in her arm and difficulty moving her blanket   Patient states she then awoke again at 7:00 a m  And was and able walk   Patient was last normal at 10:00 p m  Charisse Rubio score 6   On exam patient is A&O x3, extraocular movements intact pupils equal and reactive, patient has left facial droop, left upper extremity and lower extremity weakness  [address] demonstrates small acute hematoma right putamen with mild regional mass effect minimal surrounding parenchymal edema, presumably hypertensive in etiology   CTH also showed moderate cerebral chronic microangiopathic changes and chronic lacunar infarcts in left basal ganglia    CTA did not reveal any contrast extravasation or underlying vascular malformation associated with right putaminal hemorrhage      Summary of clinical course:   10/10 - Pt admitted under stroke pathway (see above)  10/11 - Pt stable  Physical exam unchanged (left sided weakness, sensory deficits, and left facial droop)  Discussed w/ neuro and plan to transfer pt to their service  MRI showed:   1  Focal right corona radiata acute to subacute infarct extending to the basal ganglia region  2  Stable right putaminal/external capsule intraparenchymal hemorrhage with minimal surrounding vasogenic edema  3  Moderate chronic microangiopathic ischemic changes   Dilated perivascular spaces versus chronic lacunar infarcts within the bilateral sublentiform regions  Recent or scheduled procedures:   NA    Outstanding/pending diagnostics:   NA    Cultures:   NA       Nutrition Plan:   Regular diet    Invasive Devices Review  Invasive Devices  Report    Peripheral Intravenous Line  Duration           Peripheral IV 10/10/22 Left Wrist 1 day                Rationale for remaining devices: NA    VTE Pharmacologic Prophylaxis: Pharmacologic VTE Prophylaxis contraindicated due to [address]  VTE Mechanical Prophylaxis: sequential compression device    Discharge Plan:   Patient should be ready for discharge to Neurology      Spoke with Dr Indu Hull  regarding transfer  Please contact critical care via [employer] with any questions or concerns  Portions of the record may have been created with voice recognition software  Occasional wrong word or "sound a like" substitutions may have occurred due to the inherent limitations of voice recognition software  Read the chart carefully and recognize, using context, where substitutions have occurred

## 2022-10-11 NOTE — PROGRESS NOTES
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 [facility])  Assessment & Plan  77-year-old female with right external capsule hemorrhage in 2018, MCI, tremor, who presents with left-sided weakness  NIHSS 6 on presentation  -CTH demonstrated small acute hematoma in the right putamen with mild regional mass effect and minimal surrounding parenchymal edema at this time, presumably hypertensive in etiology  Moderate cerebral chronic microangiopathic changes and chronic lacunar infarctions in the left basal ganglia  -CTA head/neck demonstrated no flow consequential stenosis, LVO, or aneurysm  Patient is on ASA 81mg daily at baseline  -BP in the field per /78   -MRI brain demonstrated: 1  Focal right corona radiata acute to subacute infarct extending to the basal ganglia region  2  Stable right putaminal/external capsule intraparenchymal hemorrhage with minimal surrounding vasogenic edema  3  Moderate chronic microangiopathic ischemic changes  Dilated perivascular spaces versus chronic lacunar infarcts within the bilateral sublentiform regions  -2D echo with EF 60%, normal-sized atria, no MAC  -LDL 41, A1C 5 9  Neurologic exam significant for dysarthria, left facial droop, LUE drift, and left-sided sensory deficit  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  Etiology not entirely clear - may be small vessel but cannot entirely exclude embolic etiology  Plan:  -Keep SBP in the 140s for now   -Hold all AP/AC, plan to restart in 48 hours if hemorrhage stable  -repeat CTH today   -Telemetry  May require loop recorder implantation as outpatient pending workup  -PT/OT/speech therapy  -Frequent neuro checks   -Continue to monitor and notify with changes        History of cerebral hemorrhage  Assessment & Plan  Right external hemorrhage in 2018 with residual left-sided paresthesias but no residual weakness  It was felt that given the location, hypertensive hemorrhage was most likely, but patient did not have history of hypertension  Tony Aldrich will need follow up in in 6 weeks with neurovascular attending  She will not require outpatient neurological testing  May require ILR pending workup  Subjective:   Patient feels essentially the same today, with left facial droop, dysarthria, left-sided weakness  ROS: 12 point review of systems performed and negative except as indicated above  Vitals: Blood pressure 148/84, pulse 89, temperature 98 5 °F (36 9 °C), resp  rate 18, height 5' 4" (1 626 m), weight 62 1 kg (137 lb), SpO2 97 %  ,Body mass index is 23 52 kg/m²  Physical Exam:   General:  Patient is well-developed, well-nourished, and in no acute distress  HEENT:  Head normocephalic  Eyes anicteric  Cardiovascular:  With regular rhythm  Lungs:  Normal effort  Nonlabored breathing  Extremities:  With no significant edema  Skin: No rashes  Neurologic:  Patient is alert, pleasantly interactive, and appropriately conversational  Mildly dysarthric but not aphasic  Fully oriented  Gait deferred for safety  Cranial Nerves:   II: Visual fields full to confrontation  Cannot visualize optic fundi  III,IV,VI: extraocular movements intact with no nystagmus  V: Diminished light touch appreciation left face as compared to right, chronic  VII: Dense left central facial weakness noted  XII: Tongue midline with no atrophy or fasciculations with appropriate movement  Coordination: Ataxic with left FNF; accurate on right  Clumsy with L heel-to-shin; accurate on right  Motor testing with LUE drift, 4/5 left shoulder abduction, but otherwise 5/5 remainder of the LUE  Full strength RUE  BL hip flexor strength 4-/5  Otherwise full LE strength      Sensory testing with diminished light touch left face, LUE and LLE (chronic)  Reflexes 2 RUE, trace to absent LUE, trace right knee, 2+ left knee, absent BL ankles  Toe responses are downgoing on right and mute on left  Lab, Imaging and other studies: MRI   CBC:   Results from last 7 days   Lab Units 10/11/22  0730 10/10/22  0742   WBC Thousand/uL 7 00 7 05   RBC Million/uL 3 67* 3 81   HEMOGLOBIN g/dL 12 3 12 5   HEMATOCRIT % 36 3 38 2   MCV fL 99* 100*   PLATELETS Thousands/uL 252 272   , BMP/CMP:   Results from last 7 days   Lab Units 10/11/22  0730 10/10/22  0742   SODIUM mmol/L 138 138   POTASSIUM mmol/L 3 8 3 8   CHLORIDE mmol/L 105 106   CO2 mmol/L 25 22   BUN mg/dL 15 15   CREATININE mg/dL 0 76 0 93   CALCIUM mg/dL 9 1 9 7   EGFR ml/min/1 73sq m 76 60   , HgBA1C:   Results from last 7 days   Lab Units 10/11/22  0730   HEMOGLOBIN A1C % 5 9*   , Lipid Profile:   Results from last 7 days   Lab Units 10/11/22  0730   HDL mg/dL 80   LDL CALC mg/dL 41   TRIGLYCERIDES mg/dL 86     VTE Prophylaxis: Sequential compression device (Venodyne)     Counseling / Coordination of Care  Total time spent today 35 minutes  Greater than 50% of total time was spent with the patient and / or family counseling and / or coordination of care  A description of the counseling / coordination of care: extensive d/w patient at bedside re: MRI result and plan to start [facility] when stable  D/W pt's son extensively over the phone as welll

## 2022-10-11 NOTE — PHYSICAL THERAPY NOTE
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 [facility])    Acute cerebral hemorrhage (HCC)    Loose stools       Past Medical History  Past Medical History:   Diagnosis Date    Cataract of right eye     GERD (gastroesophageal reflux disease)         Past Surgical History  Past Surgical History:   Procedure Laterality Date    CATARACT EXTRACTION, BILATERAL Bilateral     MASTECTOMY Bilateral     for calcium deposits    IA COLONOSCOPY FLX DX W/COLLJ SPEC WHEN PFRMD N/A 8/25/2017    Procedure: COLONOSCOPY;  Surgeon: Chely Morel MD;  Location: [facility]; Service: Colorectal    IA XCAPSL CTRC RMVL INSJ IO LENS PROSTH W/O ECP Right 6/21/2016    Procedure: OD EXTRACTION EXTRACAPSULAR CATARACT PHACO INTRAOCULAR LENS (IOL); Surgeon: Roddy Ventura MD;  Location: [facility];  Service: Ophthalmology    TONSILLECTOMY          10/11/22 1006   PT Last Visit   PT Visit Date 10/11/22   Note Type   Note type Evaluation   Pain Assessment   Pain Assessment Tool 0-10   Pain Score No Pain   Restrictions/Precautions   Weight Bearing Precautions Per Order No   Other Precautions Chair Alarm; Bed Alarm;Multiple lines;Telemetry; Fall Risk  (L sided weakness)   Home Living   Type of Home House  (Independent living facility)   Home Layout One level;Performs ADLs on one level; Able to live on main level with bedroom/bathroom  (0 vs 2 ESTER)   Bathroom Shower/Tub Tub/shower unit   Bathroom Toilet Standard   Bathroom Equipment Grab bars in shower; Shower chair   [address] [place]   Prior Function   Level of [place] Independent with ADLs; Independent with functional mobility   Lives With (S)  Alone   Receives Help From Family  (Children; pt reports they both work)   [facility] in the last 6 months 0   Vocational Retired   Comments Pt denies use of AD prior to admission   General   Family/Caregiver Present No   Cognition   Overall Cognitive Status WFL   Arousal/Participation Cooperative   Orientation Level Oriented X4   Memory Within functional limits   Following Commands Follows one step commands with increased time or repetition   Subjective   Subjective Pt agreeable to participate in PT   RLE Assessment   RLE Assessment X  (3+/5 functionally overall)   LLE Assessment   LLE Assessment X  (3+/5 functionally overall)   Light Touch   RLE Light Touch Grossly intact   LLE Light Touch Impaired   LLE Light Touch Comments Pt reports feeling a tingling sensation on LLE-baseline s/p prior CVA   Proprioception   RLE Proprioception Grossly intact   LLE Proprioception Grossly Intact   Bed Mobility   Rolling R 3  Moderate assistance   Additional items Assist x 1; Increased time required;Verbal cues;HOB elevated   Rolling L Unable to assess   Supine to Sit 3  Moderate assistance   Additional items Assist x 1; Increased time required;Verbal cues;HOB elevated   Sit to Supine 3  Moderate assistance   Additional items Assist x 1; Increased time required;Verbal cues   Additional Comments Pt left supine on bed with bed alarm on, call bell and all personal needs within functional reach   Transfers   Sit to Stand 3  Moderate assistance  (1x)   Additional items Assist x 1; Increased time required   Stand to Sit 3  Moderate assistance  (1x)   Additional items Assist x 1; Increased time required   Additional Comments at [address] [place] [address]   Ambulation/Elevation   Gait pattern Improper Weight shift;Narrow NIC; Decreased foot clearance;Decreased R stance;Decreased L stance; Short stride; Excessively slow; Step to; Inconsistent marianela;Decreased heel strike;Decreased toe off   Gait Assistance 3  Moderate assist   Additional items Assist x 1   Assistive Device Rolling walker   Distance 10'x2   Balance   Static Sitting Fair   Dynamic Sitting Fair   Static Standing Poor +   Dynamic Standing Poor +   Ambulatory Poor   Endurance Deficit   Endurance Deficit Yes   Endurance Deficit Description Fatigue and weakness; Seated rest break   Activity Tolerance   Activity Tolerance Patient limited by fatigue   Medical Staff Made Aware Maria Elena Dwyer OT; Sanjeev Hartman DPT   Nurse Made Aware Yes   Assessment   Prognosis Good   Problem List Decreased strength;Decreased endurance; Impaired balance;Decreased mobility   Assessment Pt is a 77 y/o female admitted to [facility] on 10/10/22 with a primary diagnosis of L sided weakness  Pt has a PMH of CVA (right external capsule hemorrhage) and tremors all of which affect PT tx  PT was consulted to evaluate pt's functional mobility and discharge needs  Upon evaluation, pt presents as ModAx1 for bed mobility, transfers, and ambulation  Pt presents with the following impairments: decreased strength, decreased endurance, impaired balance, and decreased mobility  The patient is considered a high complexity case d/t Ongoing medical management for primary dx, Increased reliance on more restrictive AD compared to baseline, Decreased activity tolerance compared to baseline, Fall risk, Increased assistance needed from caregiver at current time, Ongoing telemetry monitoring, Trending lab values, Diagnostic imaging pending  The patient's AM-PAC Basic Mobility Inpatient Short Form Raw Score is 11  A Raw score of less than or equal to 16 suggests the patient may benefit from discharge to post-acute rehabilitation services  Please also refer to the recommendation of the Physical Therapist for safe discharge planning  Patient would benefit from skilled PT services in order to address the aforementioned impairments  At the end of tx, the patient was left supine on bed with bed alarm on, call bell and all other personal needs within functional reach  D/c recommendations rehab pending progress     Barriers to Discharge Decreased caregiver support   Goals   Patient Goals Return home to hang out with friends   [facility] Expiration Date 10/25/22   Short Term Goal #1 In 14 days, patient will be able to 1) perform bed mobility with S assistance in order to promote functional independence 2) perform transfers with S assistance in order to decrease caregiver burden 3) ambulate 100 ft with S assistance and LRAD in order to increase functional mobility 4) Improve balance scores by one grade in order to assist with performing ADL's and ambulation  PT Treatment Day 0   Plan   Treatment/Interventions ADL retraining;Functional transfer training;LE strengthening/ROM; Therapeutic exercise; Endurance training;Bed mobility; Equipment eval/education;Patient/family training;Spoke to nursing   PT Frequency 3-5x/wk   Recommendation   PT Discharge Recommendation Post acute rehabilitation services  (PMR)   Equipment Recommended [facility] Recommended Wheeled walker   AM-PAC Basic Mobility Inpatient   Turning in Bed Without Bedrails 2   Lying on Back to Sitting on Edge of Flat Bed 2   Moving Bed to Chair 2   Standing Up From Chair 2   Walk in Room 2   Climb 3-5 Stairs 1   Basic Mobility Inpatient Raw Score 11   Basic Mobility Standardized Score 30 25   Highest Level Of Mobility   -[facility] Goal 4: Move to chair/commode   -HL Achieved 6: Walk 10 steps or more   Modified Schleicher Scale   Modified Schleicher Scale 4   Barthel Index   Feeding 5   Bathing 0   Grooming Score 0   Dressing Score 5   Bladder Score 5   Bowels Score 5   Toilet Use Score 5   Transfers (Bed/Chair) Score 5   Mobility (Level Surface) Score 0   Stairs Score 0   Barthel Index Score 30   [facility], [facility]

## 2022-10-11 NOTE — PROGRESS NOTES
Daily Progress Note - Critical Care   Didi Done 78 y o  female MRN: [id number]  [facility] Encounter: [id number]        ----------------------------------------------------------------------------------------  HPI:Eva Stapleton is a 78 y o  female who presents with left-sided weakness  Past medical history significant for prior CVA (right external capsule hemorrhage in 2018), MCI, and tremor  Patient denies any history a hypertension, cardiac history, diabetes  Patient states that she woke approximately 3:00 a m  With left-sided heaviness in her arm and difficulty moving her blanket  Patient states she then awoke again at 7:00 a m  And was and able walk  Patient was last normal at 10:00 p m  NIHSS score 6  On exam patient is A&O x3, extraocular movements intact pupils equal and reactive, patient has left facial droop, left upper extremity and lower extremity weakness  CTH demonstrates small acute hematoma right putamen with mild regional mass effect minimal surrounding parenchymal edema, presumably hypertensive in etiology  CTH also showed moderate cerebral chronic microangiopathic changes and chronic lacunar infarcts in left basal ganglia  CTA did not reveal any contrast extravasation or underlying vascular malformation associated with right putaminal hemorrhage  neurology was consulted in the ED and per Neurology the initial plan is to admit the patient under the stroke pathway, blood pressure goal is less than 140/90, hold off on any anticoagulation or antiplatelet at this time, recommend repeat CT head in the morning and MRI brain with when able  PT/OT when able      24hr events: NAEO  Passed speech eval yesterday  No changes to exam    MRI - 1  Focal right corona radiata acute to subacute infarct extending to the basal ganglia region  2  Stable right putaminal/external capsule intraparenchymal hemorrhage with minimal surrounding vasogenic edema    3  Moderate chronic microangiopathic ischemic changes  Dilated perivascular spaces versus chronic lacunar infarcts within the bilateral sublentiform regions     ---------------------------------------------------------------------------------------  SUBJECTIVE  Pt assessed at bedside  Pt denies any problems/changes overnight  Pt just frustrated w/ new difficulties from stroke  Review of Systems   Constitutional: Negative  HENT: Negative  Eyes: Negative  Respiratory: Negative  Cardiovascular: Negative  Gastrointestinal: Negative  Endocrine: Negative  Genitourinary: Negative  Musculoskeletal: Negative  Skin: Negative  Allergic/Immunologic: Negative  Neurological: Positive for facial asymmetry and weakness  Hematological: Negative  Psychiatric/Behavioral: Negative  Review of systems was reviewed and negative unless stated above in HPI/24-hour events   ---------------------------------------------------------------------------------------  Assessment and Plan:      Neuro:   · Diagnosis:  Small acute hematoma in right putamen with mild regional mass effect (left-sided extremity weakness and left facial droop)  ? Plan:  Continue step-down 1  ? Neurology following  § Per neurology:  Keep systolic blood pressure [phone]  § Hold all AP/AC  § Repeat CTH in 24 hours  § MRI brain with without contrast  § Obtain 2D echo  § Check hemoglobin A1c, lipid panel, TSH  § Telemetry  § PT/OT/speech therapy  § Frequent neuro checks - q2 during day and q4 during night  ? Cam-ICU, delirium precaution  ? Stat CT head for changing GCS greater than 2   ? Echo - EF of 65%  ? F/U appt w/ Dr Janice Ochoa scheduled for 10/23 for tremor + stroke f/u        CV:   · Diagnosis:  No active issues  ? Plan:  Continue home statin:  Lipitor 10 mg daily        Pulm:  · Diagnosis:  No active issues  ? Plan:  Goal O2 sat greater than 92%, p r n  Oxygen is needed        GI:   · Diagnosis:  No active issues  ?  Continue home omeprazole 20mg bid        :   · Diagnosis:  No active issues  ? Plan:  Monitor I&Os  ? Trend BUN and Cr   § Most Recent BUN- 15  § Most recent Cr - 0 93         F/E/N:   · F: None  · E: Replete electrolytes as needed  · N: Regular diet        Heme/Onc:   · Diagnosis: No active issues  ? Plan: Follow Hgb and platelets           Endo:   · Diagnosis: No active issues  ? Plan: Goal blood glucose 140-180        ID:   · Diagnosis: No active issues        MSK/Skin:   · Diagnosis: No active issues  ? Plan: Frequent turning, pressure offloading  ? PT/OT when able        Disposition: Continue Stepdown Level 1 level of care   Code Status: Level 1 - Full Code  ---------------------------------------------------------------------------------------  ICU CORE MEASURES    Prophylaxis   VTE Pharmacologic Prophylaxis: Pharmacologic VTE Prophylaxis contraindicated due to [address]  VTE Mechanical Prophylaxis: sequential compression device  Stress Ulcer Prophylaxis: Prophylaxis Not Indicated     ABCDE Protocol (if indicated)  Plan to perform spontaneous awakening trial today? Not applicable  Plan to perform spontaneous breathing trial today? Not applicable  Obvious barriers to extubation? Not applicable  CAM-ICU: Negative    Invasive Devices Review  Invasive Devices  Report    Peripheral Intravenous Line  Duration           Peripheral IV 10/10/22 Left Wrist <1 day              Can any invasive devices be discontinued today?  Not applicable  ---------------------------------------------------------------------------------------  OBJECTIVE    Vitals   Vitals:    10/11/22 0402 10/11/22 0553 10/11/22 0554 10/11/22 0723   BP: 115/53   133/64   BP Location:       Pulse: 82   75   Resp:       Temp:    98 6 °F (37 °C)   TempSrc:       SpO2: 95%   94%   Weight:  62 3 kg (137 lb 5 6 oz) 62 3 kg (137 lb 5 6 oz)      Temp (24hrs), Av 3 °F (36 8 °C), Min:98 °F (36 7 °C), Max:98 6 °F (37 °C)  Current: Temperature: 98 6 °F (37 °C)  HR: 82  BP: 115/53  RR: 18  SpO2: 95    Respiratory:  SpO2: SpO2: 94 %       Invasive/non-invasive ventilation settings   Respiratory  Report   Lab Data (Last 4 hours)    None         O2/Vent Data (Last 4 hours)    None                Physical Exam  Vitals and nursing note reviewed  Constitutional:       Appearance: Normal appearance  She is normal weight  HENT:      Head: Normocephalic and atraumatic  Right Ear: External ear normal       Left Ear: External ear normal       Nose: Nose normal       Mouth/Throat:      Mouth: Mucous membranes are moist       Pharynx: Oropharynx is clear  Eyes:      Extraocular Movements: Extraocular movements intact  Conjunctiva/sclera: Conjunctivae normal       Pupils: Pupils are equal, round, and reactive to light  Cardiovascular:      Rate and Rhythm: Normal rate and regular rhythm  Pulses: Normal pulses  Heart sounds: Normal heart sounds  Pulmonary:      Effort: Pulmonary effort is normal       Breath sounds: Normal breath sounds  Abdominal:      General: Abdomen is flat  Bowel sounds are normal       Palpations: Abdomen is soft  Musculoskeletal:         General: Normal range of motion  Cervical back: Normal range of motion and neck supple  Skin:     General: Skin is warm and dry  Capillary Refill: Capillary refill takes less than 2 seconds  Neurological:      Mental Status: She is alert and oriented to person, place, and time  Cranial Nerves: Cranial nerve deficit present  Sensory: Sensory deficit present  Motor: Weakness present  Comments: Left-sided weakness and numbness (4/5)  Difficulty with coordination/grasping objects with the left hand  Left sided facial droop   Psychiatric:         Mood and Affect: Mood normal          Behavior: Behavior normal          Thought Content:  Thought content normal          Judgment: Judgment normal          Laboratory and Diagnostics:  Results from last 7 days   Lab Units 10/11/22  5855 10/10/22  0742   WBC Thousand/uL 7 00 7 05   HEMOGLOBIN g/dL 12 3 12 5   HEMATOCRIT % 36 3 38 2   PLATELETS Thousands/uL 252 272   NEUTROS PCT % 64  --    MONOS PCT % 9  --      Results from last 7 days   Lab Units 10/10/22  0742   SODIUM mmol/L 138   POTASSIUM mmol/L 3 8   CHLORIDE mmol/L 106   CO2 mmol/L 22   ANION GAP mmol/L 10   BUN mg/dL 15   CREATININE mg/dL 0 93   CALCIUM mg/dL 9 7   GLUCOSE RANDOM mg/dL 125          Results from last 7 days   Lab Units 10/10/22  1007   INR  0 95   PTT seconds 31              ABG:    VBG:          Micro        EKG: Normal sinus, no significant change when compared to ECG of July 2, 2012  Imaging:  I have personally reviewed pertinent reports  Intake and Output  I/O       10/09 0701  10/10 0700 10/10 0701  10/11 0700 10/11 0701  10/12 0700           Unmeasured Urine Occurrence  3 x     Unmeasured Stool Occurrence  1 x              Height and Weights         Body mass index is 23 58 kg/m²  Weight (last 2 days)     Date/Time Weight    10/11/22 0554 62 3 (137 35)    10/11/22 0553 62 3 (137 35)    10/10/22 0735 64 (141 2)            Nutrition       Diet Orders   (From admission, onward)             Start     Ordered    10/10/22 1554  Diet Regular; Regular House  Diet effective now        References:    Nutrtion Support Algorithm Enteral vs  Parenteral   Question Answer Comment   Diet Type Regular    Regular Regular House    RD to adjust diet per protocol? Yes        10/10/22 1554                  Active Medications  Scheduled Meds:  Continuous Infusions:  No current facility-administered medications for this encounter      PRN Meds:       Allergies   No Known Allergies  ---------------------------------------------------------------------------------------  Advance Directive and Living Will:      Power of :    POLST:    ---------------------------------------------------------------------------------------  Care Time Delivered:   30 min    Yannick Avina MD      Portions of the record may have been created with voice recognition software  Occasional wrong word or "sound a like" substitutions may have occurred due to the inherent limitations of voice recognition software    Read the chart carefully and recognize, using context, where substitutions have occurred

## 2022-10-11 NOTE — PLAN OF CARE
Problem: OCCUPATIONAL THERAPY ADULT  Goal: Performs self-care activities at highest level of function for planned discharge setting  See evaluation for individualized goals  Description: Treatment Interventions: ADL retraining, Functional transfer training, UE strengthening/ROM, Endurance training, Patient/family training, Equipment evaluation/education, Cognitive reorientation, Compensatory technique education, Energy conservation, Activityengagement  Equipment Recommended: Bedside commode (if discharges home)       See flowsheet documentation for full assessment, interventions and recommendations  Note: Limitation: Decreased ADL status, Decreased UE strength, Decreased endurance, Decreased self-care trans, Decreased high-level ADLs, Decreased fine motor control  Prognosis: Fair  Assessment: Pt is a 78 y o  female who was admitted to [facility] on 10/10/2022 with <principal problem not specified> left sided weakness While she was sleeping around 3:00 a m  She went to adjust her covers and knows she might of had some left-sided weakness  When she woke up this morning at 7:00 a m  She had significant weakness/heaviness in her arm and was not able to walk and now here with 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  Pt's problem list also includes PMH of  has a past medical history of Cataract of right eye and GERD (gastroesophageal reflux disease)    At baseline pt was completing I with ADL's/IADL's no AD with functional ambulatino +drives  Pt lives alone in a I living apartment at [facility]  Currently pt requires min/mod a UB, max a LB for overall ADLS and mod a x with RW for functional mobility/transfers  Pt currently presents with impairments in the following categories -steps to enter environment, difficulty performing ADLS and difficulty performing IADLS  activity tolerance and endurance   These impairments, as well as pt's fatigue, (L) UE dysmetria , (L) hemiparesis and decreased caregiver support  limit pt's ability to safely engage in all baseline areas of occupation, includingeating, grooming, bathing, dressing, toileting, functional mobility/transfers, community mobility, laundry , driving, house maintenance, medication management, meal prep, cleaning, social participation  and leisure activities  The patient's raw score on the AM-PAC Daily Activity inpatient short form is 14, standardized score is 33 39, less than 39 4  Patients at this level are likely to benefit from discharge to post-acute rehabilitation services  Please refer to the recommendation of the Occupational Therapist for safe discharge planning  From OT standpoint, recommend STR upon D/C   OT will continue to follow to address the below stated goals  Recommendation: Physiatry Consult  OT Discharge Recommendation: Post acute rehabilitation services

## 2022-10-11 NOTE — PLAN OF CARE
Problem: MOBILITY - ADULT  Goal: Maintain or return to baseline ADL function  Description: INTERVENTIONS:  -  Assess patient's ability to carry out ADLs; assess patient's baseline for ADL function and identify physical deficits which impact ability to perform ADLs (bathing, care of mouth/teeth, toileting, grooming, dressing, etc )  - Assess/evaluate cause of self-care deficits   - Assess range of motion  - Assess patient's mobility; develop plan if impaired  - Assess patient's need for assistive devices and provide as appropriate  - Encourage maximum independence but intervene and supervise when necessary  - Involve family in performance of ADLs  - Assess for home care needs following discharge   - Consider OT consult to assist with ADL evaluation and planning for discharge  - Provide patient education as appropriate  Outcome: Progressing  Goal: Maintains/Returns to pre admission functional level  Description: INTERVENTIONS:  - Perform BMAT or MOVE assessment daily    - Set and communicate daily mobility goal to care team and patient/family/caregiver  - Collaborate with rehabilitation services on mobility goals if consulted  - Perform Range of Motion times a day  - Reposition patient every  hours    - Dangle patient  times a day  - Stand patient  times a day  - Ambulate patient  times a day  - Out of bed to chair  times a day   - Out of bed for meals  times a day  - Out of bed for toileting  - Record patient progress and toleration of activity level   Outcome: Progressing     Problem: PAIN - ADULT  Goal: Verbalizes/displays adequate comfort level or baseline comfort level  Description: Interventions:  - Encourage patient to monitor pain and request assistance  - Assess pain using appropriate pain scale  - Administer analgesics based on type and severity of pain and evaluate response  - Implement non-pharmacological measures as appropriate and evaluate response  - Consider cultural and social influences on pain and pain management  - Notify physician/advanced practitioner if interventions unsuccessful or patient reports new pain  Outcome: Progressing     Problem: INFECTION - ADULT  Goal: Absence or prevention of progression during hospitalization  Description: INTERVENTIONS:  - Assess and monitor for signs and symptoms of infection  - Monitor lab/diagnostic results  - Monitor all insertion sites, i e  indwelling lines, tubes, and drains  - Monitor endotracheal if appropriate and nasal secretions for changes in amount and color  - [place] appropriate cooling/warming therapies per order  - Administer medications as ordered  - Instruct and encourage patient and family to use good hand hygiene technique  - Identify and instruct in appropriate isolation precautions for identified infection/condition  Outcome: Progressing  Goal: Absence of fever/infection during neutropenic period  Description: INTERVENTIONS:  - Monitor WBC    Outcome: Progressing     Problem: SAFETY ADULT  Goal: Maintain or return to baseline ADL function  Description: INTERVENTIONS:  -  Assess patient's ability to carry out ADLs; assess patient's baseline for ADL function and identify physical deficits which impact ability to perform ADLs (bathing, care of mouth/teeth, toileting, grooming, dressing, etc )  - Assess/evaluate cause of self-care deficits   - Assess range of motion  - Assess patient's mobility; develop plan if impaired  - Assess patient's need for assistive devices and provide as appropriate  - Encourage maximum independence but intervene and supervise when necessary  - Involve family in performance of ADLs  - Assess for home care needs following discharge   - Consider OT consult to assist with ADL evaluation and planning for discharge  - Provide patient education as appropriate  Outcome: Progressing  Goal: Maintains/Returns to pre admission functional level  Description: INTERVENTIONS:  - Perform BMAT or MOVE assessment daily    - Set and communicate daily mobility goal to care team and patient/family/caregiver  - Collaborate with rehabilitation services on mobility goals if consulted  - Perform Range of Motion times a day  - Reposition patient every hours    - Dangle patient  times a day  - Stand patient  times a day  - Ambulate patient times a day  - Out of bed to chair  times a day   - Out of bed for meals  times a day  - Out of bed for toileting  - Record patient progress and toleration of activity level   Outcome: Progressing  Goal: Patient will remain free of falls  Description: INTERVENTIONS:  - Educate patient/family on patient safety including physical limitations  - Instruct patient to call for assistance with activity   - Consult OT/PT to assist with strengthening/mobility   - Keep Call bell within reach  - Keep bed low and locked with side rails adjusted as appropriate  - Keep care items and personal belongings within reach  - Initiate and maintain comfort rounds  - Make Fall Risk Sign visible to staff  - Offer Toileting every  Hours, in advance of need  - Initiate/Maintain alarm  - Obtain necessary fall risk management equipment:   - Apply yellow socks and bracelet for high fall risk patients  - Consider moving patient to room near nurses station  Outcome: Progressing     Problem: DISCHARGE PLANNING  Goal: Discharge to home or other facility with appropriate resources  Description: INTERVENTIONS:  - Identify barriers to discharge w/patient and caregiver  - Arrange for needed discharge resources and transportation as appropriate  - Identify discharge learning needs (meds, wound care, etc )  - Arrange for interpretive services to assist at discharge as needed  - Refer to Case Management Department for coordinating discharge planning if the patient needs post-hospital services based on physician/advanced practitioner order or complex needs related to functional status, cognitive ability, or social support system  Outcome: Progressing Problem: Knowledge Deficit  Goal: Patient/family/caregiver demonstrates understanding of disease process, treatment plan, medications, and discharge instructions  Description: Complete learning assessment and assess knowledge base  Interventions:  - Provide teaching at level of understanding  - Provide teaching via preferred learning methods  Outcome: Progressing     Problem: NEUROSENSORY - ADULT  Goal: Achieves stable or improved neurological status  Description: INTERVENTIONS  - Monitor and report changes in neurological status  - Monitor vital signs such as temperature, blood pressure, glucose, and any other labs ordered   - Initiate measures to prevent increased intracranial pressure  - Monitor for seizure activity and implement precautions if appropriate      Outcome: Progressing  Goal: Remains free of injury related to seizures activity  Description: INTERVENTIONS  - Maintain airway, patient safety  and administer oxygen as ordered  - Monitor patient for seizure activity, document and report duration and description of seizure to physician/advanced practitioner  - If seizure occurs,  ensure patient safety during seizure  - Reorient patient post seizure  - Seizure pads on all 4 side rails  - Instruct patient/family to notify RN of any seizure activity including if an aura is experienced  - Instruct patient/family to call for assistance with activity based on nursing assessment  - Administer anti-seizure medications if ordered    Outcome: Progressing  Goal: Achieves maximal functionality and self care  Description: INTERVENTIONS  - Monitor swallowing and airway patency with patient fatigue and changes in neurological status  - Encourage and assist patient to increase activity and self care     - Encourage visually impaired, hearing impaired and aphasic patients to use assistive/communication devices  Outcome: Progressing     Problem: Potential for Falls  Goal: Patient will remain free of falls  Description: INTERVENTIONS:  - Educate patient/family on patient safety including physical limitations  - Instruct patient to call for assistance with activity   - Consult OT/PT to assist with strengthening/mobility   - Keep Call bell within reach  - Keep bed low and locked with side rails adjusted as appropriate  - Keep care items and personal belongings within reach  - Initiate and maintain comfort rounds  - Make Fall Risk Sign visible to staff  - Offer Toileting every  Hours, in advance of need  - Initiate/Maintain alarm  - Obtain necessary fall risk management equipment:  - Apply yellow socks and bracelet for high fall risk patients  - Consider moving patient to room near nurses station  Outcome: Progressing     Problem: Prexisting or High Potential for Compromised Skin Integrity  Goal: Skin integrity is maintained or improved  Description: INTERVENTIONS:  - Identify patients at risk for skin breakdown  - Assess and monitor skin integrity  - Assess and monitor nutrition and hydration status  - Monitor labs   - Assess for incontinence   - Turn and reposition patient  - Assist with mobility/ambulation  - Relieve pressure over bony prominences  - Avoid friction and shearing  - Provide appropriate hygiene as needed including keeping skin clean and dry  - Evaluate need for skin moisturizer/barrier cream  - Collaborate with interdisciplinary team   - Patient/family teaching  - Consider wound care consult   Outcome: Progressing

## 2022-10-11 NOTE — ASSESSMENT & PLAN NOTE
? Plan:  Continue step-down 1  ? Neurology following  § Per neurology:  Keep systolic blood pressure [id number]  § Hold all AP/AC  § Repeat CTH in 24 hours  § MRI brain with without contrast  § Obtain 2D echo  § Check hemoglobin A1c, lipid panel, TSH  § Telemetry  § PT/OT/speech therapy  § Frequent neuro checks - q2 during day and q4 during night  ? Cam-ICU, delirium precaution  ? Stat CT head for changing GCS greater than 2   ? Echo - EF of 65%  ?  F/U appt w/ Dr Kenisha Alegre scheduled for 10/23 for tremor + stroke f/u

## 2022-10-11 NOTE — PLAN OF CARE
Problem: PHYSICAL THERAPY ADULT  Goal: Performs mobility at highest level of function for planned discharge setting  See evaluation for individualized goals  Description: Treatment/Interventions: ADL retraining, Functional transfer training, LE strengthening/ROM, Therapeutic exercise, Endurance training, Bed mobility, Equipment eval/education, Patient/family training, Spoke to nursing  Equipment Recommended: [employer]ime Quiros       See flowsheet documentation for full assessment, interventions and recommendations  Note: Prognosis: Good  Problem List: Decreased strength, Decreased endurance, Impaired balance, Decreased mobility  Assessment: Pt is a 79 y/o female admitted to [facility] on 10/10/22 with a primary diagnosis of L sided weakness  Pt has a PMH of CVA (right external capsule hemorrhage) and tremors all of which affect PT tx  PT was consulted to evaluate pt's functional mobility and discharge needs  Upon evaluation, pt presents as ModAx1 for bed mobility, transfers, and ambulation  Pt presents with the following impairments: decreased strength, decreased endurance, impaired balance, and decreased mobility  The patient is considered a high complexity case d/t Ongoing medical management for primary dx, Increased reliance on more restrictive AD compared to baseline, Decreased activity tolerance compared to baseline, Fall risk, Increased assistance needed from caregiver at current time, Ongoing telemetry monitoring, Trending lab values, Diagnostic imaging pending  The patient's AM-PAC Basic Mobility Inpatient Short Form Raw Score is 11  A Raw score of less than or equal to 16 suggests the patient may benefit from discharge to post-acute rehabilitation services  Please also refer to the recommendation of the Physical Therapist for safe discharge planning  Patient would benefit from skilled PT services in order to address the aforementioned impairments   At the end of tx, the patient was left supine on bed with bed alarm on, call bell and all other personal needs within functional reach  D/c recommendations rehab pending progress  Barriers to Discharge: Decreased caregiver support     PT Discharge Recommendation: Post acute rehabilitation services (PMR)    See flowsheet documentation for full assessment

## 2022-10-12 ENCOUNTER — PREP FOR PROCEDURE (OUTPATIENT)
Dept: OTHER | Facility: HOSPITAL | Age: 77
End: 2022-10-12

## 2022-10-12 DIAGNOSIS — I63.9 STROKE (CEREBRUM) (HCC): Primary | ICD-10-CM

## 2022-10-12 LAB
FLUAV RNA RESP QL NAA+PROBE: NEGATIVE
FLUBV RNA RESP QL NAA+PROBE: NEGATIVE
RSV RNA RESP QL NAA+PROBE: NEGATIVE
SARS-COV-2 RNA RESP QL NAA+PROBE: NEGATIVE

## 2022-10-12 PROCEDURE — 99232 SBSQ HOSP IP/OBS MODERATE 35: CPT | Performed by: PSYCHIATRY & NEUROLOGY

## 2022-10-12 PROCEDURE — 0241U HB NFCT DS VIR RESP RNA 4 TRGT: CPT

## 2022-10-12 RX ORDER — ATORVASTATIN CALCIUM 10 MG/1
10 TABLET, FILM COATED ORAL
Status: DISCONTINUED | OUTPATIENT
Start: 2022-10-12 | End: 2022-10-13 | Stop reason: HOSPADM

## 2022-10-12 RX ORDER — FAMOTIDINE 20 MG/1
40 TABLET, FILM COATED ORAL DAILY
Status: DISCONTINUED | OUTPATIENT
Start: 2022-10-12 | End: 2022-10-13 | Stop reason: HOSPADM

## 2022-10-12 RX ORDER — PRIMIDONE 50 MG/1
50 TABLET ORAL
Status: DISCONTINUED | OUTPATIENT
Start: 2022-10-12 | End: 2022-10-13 | Stop reason: HOSPADM

## 2022-10-12 RX ADMIN — PRIMIDONE 50 MG: 50 TABLET ORAL at 21:53

## 2022-10-12 RX ADMIN — ATORVASTATIN CALCIUM 10 MG: 10 TABLET, FILM COATED ORAL at 17:15

## 2022-10-12 RX ADMIN — FAMOTIDINE 40 MG: 20 TABLET, FILM COATED ORAL at 17:15

## 2022-10-12 NOTE — PLAN OF CARE
Problem: MOBILITY - ADULT  Goal: Maintain or return to baseline ADL function  Description: INTERVENTIONS:  -  Assess patient's ability to carry out ADLs; assess patient's baseline for ADL function and identify physical deficits which impact ability to perform ADLs (bathing, care of mouth/teeth, toileting, grooming, dressing, etc )  - Assess/evaluate cause of self-care deficits   - Assess range of motion  - Assess patient's mobility; develop plan if impaired  - Assess patient's need for assistive devices and provide as appropriate  - Encourage maximum independence but intervene and supervise when necessary  - Involve family in performance of ADLs  - Assess for home care needs following discharge   - Consider OT consult to assist with ADL evaluation and planning for discharge  - Provide patient education as appropriate  Outcome: Progressing  Goal: Maintains/Returns to pre admission functional level  Description: INTERVENTIONS:  - Perform BMAT or MOVE assessment daily    - Set and communicate daily mobility goal to care team and patient/family/caregiver  - Collaborate with rehabilitation services on mobility goals if consulted  - Perform Range of Motion 2 times a day  - Reposition patient every 2 hours    - Dangle patient 2 times a day  - Stand patient 2 times a day  - Ambulate patient 2 times a day  - Out of bed to chair 2 times a day   - Out of bed for meals 2 times a day  - Out of bed for toileting  - Record patient progress and toleration of activity level   Outcome: Progressing     Problem: PAIN - ADULT  Goal: Verbalizes/displays adequate comfort level or baseline comfort level  Description: Interventions:  - Encourage patient to monitor pain and request assistance  - Assess pain using appropriate pain scale  - Administer analgesics based on type and severity of pain and evaluate response  - Implement non-pharmacological measures as appropriate and evaluate response  - Consider cultural and social influences on pain and pain management  - Notify physician/advanced practitioner if interventions unsuccessful or patient reports new pain  Outcome: Progressing     Problem: INFECTION - ADULT  Goal: Absence or prevention of progression during hospitalization  Description: INTERVENTIONS:  - Assess and monitor for signs and symptoms of infection  - Monitor lab/diagnostic results  - Monitor all insertion sites, i e  indwelling lines, tubes, and drains  - Monitor endotracheal if appropriate and nasal secretions for changes in amount and color  - [place] appropriate cooling/warming therapies per order  - Administer medications as ordered  - Instruct and encourage patient and family to use good hand hygiene technique  - Identify and instruct in appropriate isolation precautions for identified infection/condition  Outcome: Progressing  Goal: Absence of fever/infection during neutropenic period  Description: INTERVENTIONS:  - Monitor WBC    Outcome: Progressing     Problem: SAFETY ADULT  Goal: Maintain or return to baseline ADL function  Description: INTERVENTIONS:  -  Assess patient's ability to carry out ADLs; assess patient's baseline for ADL function and identify physical deficits which impact ability to perform ADLs (bathing, care of mouth/teeth, toileting, grooming, dressing, etc )  - Assess/evaluate cause of self-care deficits   - Assess range of motion  - Assess patient's mobility; develop plan if impaired  - Assess patient's need for assistive devices and provide as appropriate  - Encourage maximum independence but intervene and supervise when necessary  - Involve family in performance of ADLs  - Assess for home care needs following discharge   - Consider OT consult to assist with ADL evaluation and planning for discharge  - Provide patient education as appropriate  Outcome: Progressing  Goal: Maintains/Returns to pre admission functional level  Description: INTERVENTIONS:  - Perform BMAT or MOVE assessment daily    - Set and communicate daily mobility goal to care team and patient/family/caregiver  - Collaborate with rehabilitation services on mobility goals if consulted  - Perform Range of Motion 2 times a day  - Reposition patient every 2 hours    - Dangle patient 2 times a day  - Stand patient 2 times a day  - Ambulate patient 2 times a day  - Out of bed to chair 2 times a day   - Out of bed for meals 2 times a day  - Out of bed for toileting  - Record patient progress and toleration of activity level   Outcome: Progressing  Goal: Patient will remain free of falls  Description: INTERVENTIONS:  - Educate patient/family on patient safety including physical limitations  - Instruct patient to call for assistance with activity   - Consult OT/PT to assist with strengthening/mobility   - Keep Call bell within reach  - Keep bed low and locked with side rails adjusted as appropriate  - Keep care items and personal belongings within reach  - Initiate and maintain comfort rounds  - Make Fall Risk Sign visible to staff  - Offer Toileting every 2 Hours, in advance of need  - Apply yellow socks and bracelet for high fall risk patients  - Consider moving patient to room near nurses station  Outcome: Progressing     Problem: DISCHARGE PLANNING  Goal: Discharge to home or other facility with appropriate resources  Description: INTERVENTIONS:  - Identify barriers to discharge w/patient and caregiver  - Arrange for needed discharge resources and transportation as appropriate  - Identify discharge learning needs (meds, wound care, etc )  - Arrange for interpretive services to assist at discharge as needed  - Refer to Case Management Department for coordinating discharge planning if the patient needs post-hospital services based on physician/advanced practitioner order or complex needs related to functional status, cognitive ability, or social support system  Outcome: Progressing     Problem: Knowledge Deficit  Goal: Patient/family/caregiver demonstrates understanding of disease process, treatment plan, medications, and discharge instructions  Description: Complete learning assessment and assess knowledge base  Interventions:  - Provide teaching at level of understanding  - Provide teaching via preferred learning methods  Outcome: Progressing     Problem: NEUROSENSORY - ADULT  Goal: Achieves stable or improved neurological status  Description: INTERVENTIONS  - Monitor and report changes in neurological status  - Monitor vital signs such as temperature, blood pressure, glucose, and any other labs ordered   - Initiate measures to prevent increased intracranial pressure  - Monitor for seizure activity and implement precautions if appropriate      Outcome: Progressing  Goal: Remains free of injury related to seizures activity  Description: INTERVENTIONS  - Maintain airway, patient safety  and administer oxygen as ordered  - Monitor patient for seizure activity, document and report duration and description of seizure to physician/advanced practitioner  - If seizure occurs,  ensure patient safety during seizure  - Reorient patient post seizure  - Seizure pads on all 4 side rails  - Instruct patient/family to notify RN of any seizure activity including if an aura is experienced  - Instruct patient/family to call for assistance with activity based on nursing assessment  - Administer anti-seizure medications if ordered    Outcome: Progressing  Goal: Achieves maximal functionality and self care  Description: INTERVENTIONS  - Monitor swallowing and airway patency with patient fatigue and changes in neurological status  - Encourage and assist patient to increase activity and self care     - Encourage visually impaired, hearing impaired and aphasic patients to use assistive/communication devices  Outcome: Progressing     Problem: Potential for Falls  Goal: Patient will remain free of falls  Description: INTERVENTIONS:  - Educate patient/family on patient safety including physical limitations  - Instruct patient to call for assistance with activity   - Consult OT/PT to assist with strengthening/mobility   - Keep Call bell within reach  - Keep bed low and locked with side rails adjusted as appropriate  - Keep care items and personal belongings within reach  - Initiate and maintain comfort rounds  - Make Fall Risk Sign visible to staff  - Offer Toileting every 2 Hours, in advance of need  - Apply yellow socks and bracelet for high fall risk patients  - Consider moving patient to room near nurses station  Outcome: Progressing     Problem: Prexisting or High Potential for Compromised Skin Integrity  Goal: Skin integrity is maintained or improved  Description: INTERVENTIONS:  - Identify patients at risk for skin breakdown  - Assess and monitor skin integrity  - Assess and monitor nutrition and hydration status  - Monitor labs   - Assess for incontinence   - Turn and reposition patient  - Assist with mobility/ambulation  - Relieve pressure over bony prominences  - Avoid friction and shearing  - Provide appropriate hygiene as needed including keeping skin clean and dry  - Evaluate need for skin moisturizer/barrier cream  - Collaborate with interdisciplinary team   - Patient/family teaching  - Consider wound care consult   Outcome: Progressing

## 2022-10-12 NOTE — DISCHARGE INSTR - AVS FIRST PAGE
- Please follow up with PCP for continued medical care of your other medical conditions  - Please follow up with neurology in 4-6 weeks for hospital follow up appointment on your stroke  - Please follow up with your Cardiologist in regards to further workup with the loop recorder  - Please STOP taking aspirin (81mg daily) - Outpatient neurology will determine if to restart   - Continue other home medications as per After visit summary   - Loop recorder implantation as outpatient - Scheduled    Please contact your PCP for any new symptoms or questions about your healthcare needs  Please do not hesitate to come to the ED or call 911 for immediate medical attention

## 2022-10-12 NOTE — PROGRESS NOTES
NEUROLOGY RESIDENCY PROGRESS NOTE     Name: Andrew Loza   Age & Sex: 78 y o  female   MRN: [id number]  Unit/Bed#: [facility] 720-01   Encounter: [id number]    Andrew Loza will need follow up in in 4 weeks with neurovascular AP  She will require a CT head without contrast within 1 weeks from initial stroke  Therefore [address] needs to be done 10/13/2022      Pending for discharge: Pending c diff r/u  Discharge 10/13/2022    ASSESSMENT & PLAN     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 [facility])  Assessment & Plan  Assessment:  74-year-old female with right external capsule hemorrhage in 2018, MCI, tremor, who presents with left-sided weakness  NIHSS 6 on presentation  CTH demonstrated small acute hematoma in the right putamen with mild regional mass effect and minimal surrounding parenchymal edema and chronic lacunar infarctions in the left basal ganglia  CTA head/neck demonstrated no flow consequential stenosis, LVO, or aneurysm  Patient is on ASA 81mg daily at baseline  BP in the field per /78  Her Neurologic exam was significant for dysarthria, left facial droop, LUE drift, and left-sided sensory deficit  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  Plan:  - Keep SBP in the 140s   - Normothermia, Euglycaemia  - CTH wo scheduled for 10/13  - Restart   Lipitor 10mg QD   Primidone 50mg QD   Pepcid 40mg QD  - Hold all AP/AC, plan to restart after CTH on 10/13  - Telemetry    - Recommend loop recorder implantation as outpatient pending workup  - Order placed   - PT/OT/speech therapy as able  - Neuro checks  Loose stools  Assessment & Plan  10/11: RN alerted neurology team that the patient had 4 bouts of loose stools and that the patient was asking for an imodium  10/12: Patient reports having had a stolid BM, discharge facility wants confirmation of c diff r/u    Plan:  - C  Diff toxin PCR ordered  - Monitor currently      History of cerebral hemorrhage  Assessment & Plan  Right external hemorrhage in 2018 with residual left-sided paresthesias but no residual weakness  It was felt that given the location, hypertensive hemorrhage was most likely, but patient did not have history of hypertension  SUBJECTIVE     The patient was seen and examined this AM during visitation  She reported that she feels well overall and states that her current symptoms feels "almost the exact same, maybe a bit worse" than her prior stroke symptoms  She reports no HA, CP, SOB, ABD-pain, N/V/D/C/BBI, and no new changes in her sensorium  She reports being able to feed, urinate, defecate, and ambulates with assistance  Review of Systems   Constitutional: Negative for chills, diaphoresis, fatigue and fever  HENT: Negative for ear discharge, ear pain, sinus pressure, sinus pain, sore throat, tinnitus, trouble swallowing and voice change  Eyes: Negative for visual disturbance  Respiratory: Negative for cough, shortness of breath, wheezing and stridor  Cardiovascular: Negative for chest pain and palpitations  Gastrointestinal: Negative for blood in stool, constipation, diarrhea, nausea and vomiting  Genitourinary: Negative for dysuria, enuresis, flank pain, hematuria and urgency  Musculoskeletal: Negative for joint swelling, myalgias, neck pain and neck stiffness  Skin: Negative for pallor  Neurological: Positive for numbness  Negative for dizziness, tremors, seizures, syncope, facial asymmetry, speech difficulty, weakness, light-headedness and headaches  Psychiatric/Behavioral: Negative for agitation, behavioral problems, confusion, decreased concentration, dysphoric mood, hallucinations, self-injury, sleep disturbance and suicidal ideas  The patient is not nervous/anxious and is not hyperactive  OBJECTIVE     Patient ID: Trent Lee is a 78 y o  female      Vitals:    10/11/22 2205 10/11/22 2206 10/12/22 0544 10/12/22 0733   BP: 116/67 116/67 162/83   BP Location:       Pulse: 78 78  94   Resp:  18  17   Temp: 98 5 °F (36 9 °C) 98 5 °F (36 9 °C)  (!) 97 3 °F (36 3 °C)   TempSrc:       SpO2: 97% 96%  95%   Weight:   62 3 kg (137 lb 5 6 oz)    Height:          Temperature:   Temp (24hrs), Av 1 °F (36 7 °C), Min:97 3 °F (36 3 °C), Max:98 5 °F (36 9 °C)    Temperature: (!) 97 3 °F (36 3 °C)    Physical Exam  Vitals and nursing note reviewed  HENT:      Head: Normocephalic and atraumatic  Nose: Nose normal       Mouth/Throat:      Mouth: Mucous membranes are dry  Eyes:      Extraocular Movements: EOM normal       Pupils: Pupils are equal, round, and reactive to light  Cardiovascular:      Rate and Rhythm: Normal rate  Pulses: Normal pulses  Pulmonary:      Breath sounds: Normal breath sounds  Musculoskeletal:         General: Normal range of motion  Skin:     General: Skin is warm  Capillary Refill: Capillary refill takes less than 2 seconds  Neurological:      Mental Status: She is alert and oriented to person, place, and time  Cranial Nerves: Cranial nerve deficit present  Sensory: Sensory deficit present  Motor: Weakness present  Coordination: Coordination abnormal       Deep Tendon Reflexes:      Reflex Scores:       Tricep reflexes are 2+ on the right side and 2+ on the left side  Bicep reflexes are 2+ on the right side and 2+ on the left side  Brachioradialis reflexes are 2+ on the right side and 2+ on the left side  Achilles reflexes are 2+ on the right side and 2+ on the left side  Psychiatric:         Speech: Speech normal         Neurologic Exam     Mental Status   Oriented to person, place, and time  Oriented to person  Oriented to place  Oriented to country  Oriented to year, month and date  Follows 3 step commands  Attention: normal  Concentration: normal    Speech: speech is normal   Level of consciousness: alert  Knowledge: good  Able to name object   Able to repeat  Cranial Nerves     CN II   Right visual field deficit: none  Left visual field deficit: none     CN III, IV, VI   Pupils are equal, round, and reactive to light  Extraocular motions are normal    CN III: no CN III palsy  CN VI: no CN VI palsy  Nystagmus: none     CN V   Left facial sensation deficit: forehead    CN VII   Left facial weakness: central    CN VIII   CN VIII normal      CN IX, X   CN IX normal    CN X normal      CN XI   CN XI normal      CN XII   CN XII normal      Motor Exam   Muscle bulk: normal  Overall muscle tone: normal  Right arm tone: normal  Left arm tone: normal  Right arm pronator drift: absent  Left arm pronator drift: absent  Right leg tone: normal  Left leg tone: normal    Strength   Right neck flexion: 4/5  Left neck flexion: 4/5  Right neck extension: 4/5  Left neck extension: 4/5  Right deltoid: 4/5  Left deltoid: 4/5  Right biceps: 4/5  Left biceps: 4/5  Right triceps: 4/5  Left triceps: 4/5  Right wrist flexion: 4/5  Left wrist flexion: 4/5  Right wrist extension: 4/5  Left wrist extension: 4/5  Right iliopsoas: 4/5  Left iliopsoas: 4/5  Right quadriceps: 4/5  Left quadriceps: 4/5  Right anterior tibial: 4/5  Left anterior tibial: 4/5  Right posterior tibial: 4/5  Left posterior tibial: 4/5  Right gastroc: 4/5  Left gastroc: 4/5    Sensory Exam   Vibration normal    UEs: sensation decreased compared to right  LEs: sensation increased compared to left     Gait, Coordination, and Reflexes     Reflexes   Right brachioradialis: 2+  Left brachioradialis: 2+  Right biceps: 2+  Left biceps: 2+  Right triceps: 2+  Left triceps: 2+  Right achilles: 2+  Left achilles: 2+  Right plantar: equivocal  Left plantar: equivocal  Right ankle clonus: absent  Left ankle clonus: absent  - Gait exam deferred  - LUE and LLE: FTN and HTN unstable, dysmetria present  - RUE and RLE: FTN and HTN intact, no dysmetria      LABORATORY DATA     Labs: I have personally reviewed pertinent reports  Results from last 7 days   Lab Units 10/11/22  0730 10/10/22  0742   WBC Thousand/uL 7 00 7 05   HEMOGLOBIN g/dL 12 3 12 5   HEMATOCRIT % 36 3 38 2   PLATELETS Thousands/uL 252 272   NEUTROS PCT % 64  --    MONOS PCT % 9  --       Results from last 7 days   Lab Units 10/11/22  0730 10/10/22  0742   SODIUM mmol/L 138 138   POTASSIUM mmol/L 3 8 3 8   CHLORIDE mmol/L 105 106   CO2 mmol/L 25 22   BUN mg/dL 15 15   CREATININE mg/dL 0 76 0 93   CALCIUM mg/dL 9 1 9 7     Results from last 7 days   Lab Units 10/11/22  0730   MAGNESIUM mg/dL 1 9     Results from last 7 days   Lab Units 10/11/22  0730   PHOSPHORUS mg/dL 3 5      Results from last 7 days   Lab Units 10/10/22  1007   INR  0 95   PTT seconds 31               IMAGING & DIAGNOSTIC TESTING     Radiology Results: I have personally reviewed pertinent reports  CT head wo contrast   Final Result by Vahe Snider MD (10/12 0200)      Stable acute hemorrhage in the right lentiform nuclei and external capsule  Workstation performed: [id number]         MRI brain w wo contrast   Final Result by Hope Koyanagi, MD (11/87 2053)   1  Focal right corona radiata acute to subacute infarct extending to the basal ganglia region  2  Stable right putaminal/external capsule intraparenchymal hemorrhage with minimal surrounding vasogenic edema  3  Moderate chronic microangiopathic ischemic changes  Dilated perivascular spaces versus chronic lacunar infarcts within the bilateral sublentiform regions  Workstation performed: [id number]         CT stroke alert brain   Final Result by Jorge Rodriguez MD (10/10 9980)      Small acute hematoma in the right putamen with mild regional mass effect and minimal surrounding parenchymal edema at this time  This is presumably hypertensive in etiology  Moderate cerebral chronic microangiopathic changes and chronic lacunar infarctions in the left basal ganglia        Findings were directly discussed with [facility] Lidia  at 7:57 AM       Workstation performed: [id number]         CTA stroke alert (head/neck)   Final Result by Elease Buerger, MD (10/10 5085)      No contrast extravasation or underlying vascular malformation associated with the known right putaminal hemorrhage  No cervical or intracranial large vessel occlusion  Centrilobular emphysema with right greater than left biapical pleural-parenchymal scarring  Findings were directly discussed with Kendy Jones  at 7:57 AM                         Workstation performed: [id number]         CT head wo contrast    (Results Pending)     Other Diagnostic Testing: I have personally reviewed pertinent reports  ACTIVE MEDICATIONS     Current Facility-Administered Medications   Medication Dose Route Frequency   • atorvastatin (LIPITOR) tablet 10 mg  10 mg Oral Daily With Dinner   • famotidine (PEPCID) tablet 40 mg  40 mg Oral Daily   • primidone (MYSOLINE) tablet 50 mg  50 mg Oral HS     Prior to Admission medications    Medication Sig Start Date End Date Taking? Authorizing Provider   aspirin 81 mg chewable tablet Chew 81 mg daily   Yes Historical Provider, MD   atorvastatin (LIPITOR) 10 mg tablet Take 1 tablet by mouth Daily   Yes Historical Provider, MD   b complex vitamins tablet Take 1 tablet by mouth daily  Yes Historical Provider, MD   calcium-vitamin D 250-100 MG-UNIT per tablet Take 1 tablet by mouth 2 (two) times a day   Yes Historical Provider, MD   famotidine (PEPCID) 40 MG tablet  3/8/22  Yes Historical Provider, MD   Multiple Vitamins-Minerals (CENTRUM CARDIO PO) Take 1 tablet by mouth daily  Yes Historical Provider, MD   primidone (MYSOLINE) 50 mg tablet Take 1 tab by mouth daily at bedtime 9/26/22  Yes Jacki Lance MD   Vitamin D, Cholecalciferol, 1000 UNITS TABS Take 1 tablet by mouth daily Indications: with aloe     Yes Historical Provider, MD   FLUAD 0 5 ML LÓPEZ inject 0 5 milliliter intramuscularly  Patient not taking: No sig reported 9/8/18   Historical Provider, MD   meloxicam (Mobic) 15 mg tablet Take 1 tablet (15 mg total) by mouth daily 4/14/22 5/14/22  Chevy Martini DPM   multivitamin [facility]) TABS Take 1 tablet by mouth daily  Patient not taking: Reported on 10/10/2022    Historical Provider, MD   omeprazole (PriLOSEC) 20 mg delayed release capsule Take 20 mg by mouth 2 (two) times a day  Patient not taking: No sig reported    Historical Provider, MD     VTE Pharmacologic Prophylaxis: Pharmacologic VTE Prophylaxis contraindicated due to Haemorrhage, repeat CTH pending 10/13/2022     VTE Mechanical Prophylaxis: sequential compression device    ==  [facility], PGY-2

## 2022-10-12 NOTE — CASE MANAGEMENT
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 ([facility] ) 10/10/2022   • Chronic left-sided low back pain    • Transient cerebral ischemia 2018   • Premature atrial beats 2018   • Cataract of right eye 2016   • Sacroiliitis ([facility] ) 2015   • Idiopathic osteoporosis 2015   • Primary osteoarthritis of hand 2015      LOS (days): 2  Geometric Mean LOS (GMLOS) (days): 4 50  Days to GMLOS:2 4     OBJECTIVE:  Risk of Unplanned Readmission Score: 8 1         Current admission status: Inpatient   Preferred Pharmacy:   [address] [place] [address]  Phone: [phone] Fax: [phone]    [id number] [place] [address] [address]  Phone: [phone] Fax: [phone]    Primary Care Provider: Cyrus Petit DO    Primary Insurance: MEDICARE  Secondary Insurance: [facility]    DISCHARGE DETAILS:    Additional Comments: CM met with pt bedside to discuss str  Pt is in agreement and her first choice is [facility]   CM will follow up once responses are received

## 2022-10-12 NOTE — PLAN OF CARE
Problem: MOBILITY - ADULT  Goal: Maintain or return to baseline ADL function  Description: INTERVENTIONS:  -  Assess patient's ability to carry out ADLs; assess patient's baseline for ADL function and identify physical deficits which impact ability to perform ADLs (bathing, care of mouth/teeth, toileting, grooming, dressing, etc )  - Assess/evaluate cause of self-care deficits   - Assess range of motion  - Assess patient's mobility; develop plan if impaired  - Assess patient's need for assistive devices and provide as appropriate  - Encourage maximum independence but intervene and supervise when necessary  - Involve family in performance of ADLs  - Assess for home care needs following discharge   - Consider OT consult to assist with ADL evaluation and planning for discharge  - Provide patient education as appropriate  Outcome: Progressing  Goal: Maintains/Returns to pre admission functional level  Description: INTERVENTIONS:  - Perform BMAT or MOVE assessment daily    - Set and communicate daily mobility goal to care team and patient/family/caregiver  - Collaborate with rehabilitation services on mobility goals if consulted  - Perform Range of Motion 2 times a day  - Reposition patient every 2 hours    - Dangle patient 2 times a day  - Stand patient 2 times a day  - Ambulate patient 2 times a day  - Out of bed to chair 2 times a day   - Out of bed for meals 2 times a day  - Out of bed for toileting  - Record patient progress and toleration of activity level   Outcome: Progressing     Problem: PAIN - ADULT  Goal: Verbalizes/displays adequate comfort level or baseline comfort level  Description: Interventions:  - Encourage patient to monitor pain and request assistance  - Assess pain using appropriate pain scale  - Administer analgesics based on type and severity of pain and evaluate response  - Implement non-pharmacological measures as appropriate and evaluate response  - Consider cultural and social influences on pain and pain management  - Notify physician/advanced practitioner if interventions unsuccessful or patient reports new pain  Outcome: Progressing     Problem: INFECTION - ADULT  Goal: Absence or prevention of progression during hospitalization  Description: INTERVENTIONS:  - Assess and monitor for signs and symptoms of infection  - Monitor lab/diagnostic results  - Monitor all insertion sites, i e  indwelling lines, tubes, and drains  - Monitor endotracheal if appropriate and nasal secretions for changes in amount and color  - [place] appropriate cooling/warming therapies per order  - Administer medications as ordered  - Instruct and encourage patient and family to use good hand hygiene technique  - Identify and instruct in appropriate isolation precautions for identified infection/condition  Outcome: Progressing  Goal: Absence of fever/infection during neutropenic period  Description: INTERVENTIONS:  - Monitor WBC    Outcome: Progressing     Problem: SAFETY ADULT  Goal: Maintain or return to baseline ADL function  Description: INTERVENTIONS:  -  Assess patient's ability to carry out ADLs; assess patient's baseline for ADL function and identify physical deficits which impact ability to perform ADLs (bathing, care of mouth/teeth, toileting, grooming, dressing, etc )  - Assess/evaluate cause of self-care deficits   - Assess range of motion  - Assess patient's mobility; develop plan if impaired  - Assess patient's need for assistive devices and provide as appropriate  - Encourage maximum independence but intervene and supervise when necessary  - Involve family in performance of ADLs  - Assess for home care needs following discharge   - Consider OT consult to assist with ADL evaluation and planning for discharge  - Provide patient education as appropriate  Outcome: Progressing  Goal: Maintains/Returns to pre admission functional level  Description: INTERVENTIONS:  - Perform BMAT or MOVE assessment daily    - Set and communicate daily mobility goal to care team and patient/family/caregiver  - Collaborate with rehabilitation services on mobility goals if consulted  - Perform Range of Motion 2 times a day  - Reposition patient every 2 hours    - Dangle patient 2 times a day  - Stand patient 2 times a day  - Ambulate patient 2 times a day  - Out of bed to chair 2 times a day   - Out of bed for meals 2 times a day  - Out of bed for toileting  - Record patient progress and toleration of activity level   Outcome: Progressing  Goal: Patient will remain free of falls  Description: INTERVENTIONS:  - Educate patient/family on patient safety including physical limitations  - Instruct patient to call for assistance with activity   - Consult OT/PT to assist with strengthening/mobility   - Keep Call bell within reach  - Keep bed low and locked with side rails adjusted as appropriate  - Keep care items and personal belongings within reach  - Initiate and maintain comfort rounds  - Make Fall Risk Sign visible to staff  - Offer Toileting every 2 Hours, in advance of need  - Initiate/Maintain 2alarm  - Obtain necessary fall risk management equipment: 2  - Apply yellow socks and bracelet for high fall risk patients  - Consider moving patient to room near nurses station  Outcome: Progressing     Problem: DISCHARGE PLANNING  Goal: Discharge to home or other facility with appropriate resources  Description: INTERVENTIONS:  - Identify barriers to discharge w/patient and caregiver  - Arrange for needed discharge resources and transportation as appropriate  - Identify discharge learning needs (meds, wound care, etc )  - Arrange for interpretive services to assist at discharge as needed  - Refer to Case Management Department for coordinating discharge planning if the patient needs post-hospital services based on physician/advanced practitioner order or complex needs related to functional status, cognitive ability, or social support system  Outcome: Progressing     Problem: Knowledge Deficit  Goal: Patient/family/caregiver demonstrates understanding of disease process, treatment plan, medications, and discharge instructions  Description: Complete learning assessment and assess knowledge base  Interventions:  - Provide teaching at level of understanding  - Provide teaching via preferred learning methods  Outcome: Progressing     Problem: NEUROSENSORY - ADULT  Goal: Achieves stable or improved neurological status  Description: INTERVENTIONS  - Monitor and report changes in neurological status  - Monitor vital signs such as temperature, blood pressure, glucose, and any other labs ordered   - Initiate measures to prevent increased intracranial pressure  - Monitor for seizure activity and implement precautions if appropriate      Outcome: Progressing  Goal: Remains free of injury related to seizures activity  Description: INTERVENTIONS  - Maintain airway, patient safety  and administer oxygen as ordered  - Monitor patient for seizure activity, document and report duration and description of seizure to physician/advanced practitioner  - If seizure occurs,  ensure patient safety during seizure  - Reorient patient post seizure  - Seizure pads on all 4 side rails  - Instruct patient/family to notify RN of any seizure activity including if an aura is experienced  - Instruct patient/family to call for assistance with activity based on nursing assessment  - Administer anti-seizure medications if ordered    Outcome: Progressing  Goal: Achieves maximal functionality and self care  Description: INTERVENTIONS  - Monitor swallowing and airway patency with patient fatigue and changes in neurological status  - Encourage and assist patient to increase activity and self care     - Encourage visually impaired, hearing impaired and aphasic patients to use assistive/communication devices  Outcome: Progressing     Problem: Potential for Falls  Goal: Patient will remain free of falls  Description: INTERVENTIONS:  - Educate patient/family on patient safety including physical limitations  - Instruct patient to call for assistance with activity   - Consult OT/PT to assist with strengthening/mobility   - Keep Call bell within reach  - Keep bed low and locked with side rails adjusted as appropriate  - Keep care items and personal belongings within reach  - Initiate and maintain comfort rounds  - Make Fall Risk Sign visible to staff  - Offer Toileting every 2 Hours, in advance of need  - Initiate/Maintain 2alarm  - Obtain necessary fall risk management equipment: 2  - Apply yellow socks and bracelet for high fall risk patients  - Consider moving patient to room near nurses station  Outcome: Progressing     Problem: Prexisting or High Potential for Compromised Skin Integrity  Goal: Skin integrity is maintained or improved  Description: INTERVENTIONS:  - Identify patients at risk for skin breakdown  - Assess and monitor skin integrity  - Assess and monitor nutrition and hydration status  - Monitor labs   - Assess for incontinence   - Turn and reposition patient  - Assist with mobility/ambulation  - Relieve pressure over bony prominences  - Avoid friction and shearing  - Provide appropriate hygiene as needed including keeping skin clean and dry  - Evaluate need for skin moisturizer/barrier cream  - Collaborate with interdisciplinary team   - Patient/family teaching  - Consider wound care consult   Outcome: Progressing     Problem: Nutrition/Hydration-ADULT  Goal: Nutrient/Hydration intake appropriate for improving, restoring or maintaining nutritional needs  Description: Monitor and assess patient's nutrition/hydration status for malnutrition  Collaborate with interdisciplinary team and initiate plan and interventions as ordered  Monitor patient's weight and dietary intake as ordered or per policy  Utilize nutrition screening tool and intervene as necessary   Determine patient's food preferences and provide high-protein, high-caloric foods as appropriate       INTERVENTIONS:  - Monitor oral intake, urinary output, labs, and treatment plans  - Assess nutrition and hydration status and recommend course of action  - Evaluate amount of meals eaten  - Assist patient with eating if necessary   - Allow adequate time for meals  - Recommend/ encourage appropriate diets, oral nutritional supplements, and vitamin/mineral supplements  - Order, calculate, and assess calorie counts as needed  - Recommend, monitor, and adjust tube feedings and TPN/PPN based on assessed needs  - Assess need for intravenous fluids  - Provide specific nutrition/hydration education as appropriate  - Include patient/family/caregiver in decisions related to nutrition  Outcome: Progressing

## 2022-10-12 NOTE — DISCHARGE SUMMARY
NEUROLOGY RESIDENCY - DISCHARGE SUMMARY     Name: Patt Souza   Age & Sex: 78 y o  female   MRN: [id number]  Unit/Bed#: [facility] 709-01   Encounter: [id number]    Discharging Resident Physician: Jamal Olmstead  Attending: Alycia Nelson MD  PCP: Charmaine Faulkner DO  Admission Date: 10/10/2022  Discharge Date: 10/13/22    Patt Souza will need follow up in in 4 weeks with neurovascular AP  She will not require outpatient neurological testing  ASSESSMENT & PLAN     * 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 [facility])  Assessment & Plan  Assessment:  51-year-old female with right external capsule hemorrhage in 2018, MCI, tremor, who presents with left-sided weakness  NIHSS 6 on presentation  CTH demonstrated small acute hematoma in the right putamen with mild regional mass effect and minimal surrounding parenchymal edema and chronic lacunar infarctions in the left basal ganglia  CTA head/neck demonstrated no flow consequential stenosis, LVO, or aneurysm  Patient is on ASA 81mg daily at baseline  BP in the field per /78  Her Neurologic exam was significant for dysarthria, left facial droop, LUE drift, and left-sided sensory deficit  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  Plan:  - Keep SBP in the 140s   - Normothermia, Euglycaemia  - Continue home meds (Lipitor, Primidone, Pepcid)  - Stop ASA 81mg QD until OP neurology appointment where decision will be made to restart  - Loop recorder implantation as outpatient workup for cardio-embolic stroke workup - Order placed   - PT/OT/speech therapy to be continued at rehabilitation    History of cerebral hemorrhage  Assessment & Plan  Right external hemorrhage in 2018 with residual left-sided paresthesias but no residual weakness  It was felt that given the location, hypertensive hemorrhage was most likely, but patient did not have history of hypertension      Loose stools-resolved as of 10/13/2022  Assessment & Plan  RESOLVED    Disposition:   Other: [facility]    Reason for Admission:   Nontraumatic subcortical haemorrhage of right cerebral hemisphere suspected to be haemorrhagic conversion  Consultations During Hospital Stay:  Neurology    Procedures Performed:   CT Stroke alert brain  CTA stroke alert HN  MRI Brain wwo  CTH wo repeat 1  CTH wo repeat 2  Echo    Significant Findings / Test Results:   CTH: Small acute hematoma in the right putamen with mild regional mass effect and minimal surrounding parenchymal edema at this time  This is presumably hypertensive in etiology  MRI Brain wwo: Focal right corona radiata acute to subacute infarct extending to the basal ganglia region  Stable right putaminal/external capsule intraparenchymal hemorrhage with minimal surrounding vasogenic edema  Moderate chronic microangiopathic ischemic changes  Dilated perivascular spaces versus chronic lacunar infarcts within the bilateral sublentiform regions  CTH wo repeat 1: Stable acute hemorrhage in the right lentiform nuclei and external capsule  [facility] wo repeat 2: Evolving infarction inferior right corona radiata extending to the basal ganglia region, similar to October 11, 2022 MRI  No evidence of hemorrhagic conversion involving this area of infarction  Intraparenchymal hemorrhage involving the right putamen appears unchanged compared with October 11, 2022  There is minimal associated surrounding vasogenic edema  Mild to moderate chronic microangiopathic disease unchanged  Echo: Left Ventricle: Left ventricular cavity size is normal  Wall thickness is normal  The left ventricular ejection fraction is 60%  Systolic function is normal  Wall motion is normal  Diastolic function is normal for age  Incidental Findings:   None    Test Results Pending at Discharge (will require follow up):    None     Outpatient Tests Requested:  OP Loop Recorder Placement    Complications:  None    Hospital Course:   Koki Coy is a 72-year-old female with right external capsule hemorrhage in 2018, MCI, tremor, who presented with left-sided weakness  NIHSS 6 on presentation  CTH demonstrated small acute hematoma in the right putamen with mild regional mass effect and minimal surrounding parenchymal edema and chronic lacunar infarctions in the left basal ganglia  CTA head/neck demonstrated no flow consequential stenosis, LVO, or aneurysm  Patient is on ASA 81mg daily at baseline  BP in the field per /78  Her Neurologic exam was significant for dysarthria, left facial droop, LUE drift, and left-sided sensory deficit  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  Initially admitted to the ICU for haemorrhage where she was stable and eventually discharge to the medicine floor under neurology primary service  The patient furter improved and stabilized, and her home medications were restarted piecemeal  She has a bed at [facility] in [facility] [address] where the patient will be undergoing continued therapy and rehabilitation  Both and patient and son are agreeable  Condition at Discharge:   good     Discharge Day Visit / Exam:   Subjective: During visitation this AM the patient was alert and awake in the bedside chair and reports feeling well however a bit tired  She states that she does not have any new complaints nor concerns and she reports no HA, CP, SOB, ABD-pain, N/V/D/C/BBI, or changes in sensorium  She is able to urinate, defecate, feed, and ambulate with assistance  Vitals: Blood Pressure: 136/78 (10/13/22 0730)  Pulse: 90 (10/13/22 0730)  Temperature: 97 6 °F (36 4 °C) (10/12/22 2230)  Temp Source: Oral (10/11/22 1444)  Respirations: 18 (10/13/22 0730)  Height: 5' 4" (162 6 cm) (10/11/22 0802)  Weight - Scale: 63 1 kg (139 lb 1 8 oz) (10/13/22 0541)  SpO2: 96 % (10/13/22 0730)    Exam:   Physical Exam  Vitals and nursing note reviewed  HENT:      Head: Normocephalic and atraumatic  Nose: Nose normal       Mouth/Throat:      Mouth: Mucous membranes are dry  Eyes:      Extraocular Movements: EOM normal       Pupils: Pupils are equal, round, and reactive to light  Cardiovascular:      Rate and Rhythm: Normal rate  Pulses: Normal pulses  Pulmonary:      Breath sounds: Normal breath sounds  Musculoskeletal:         General: Normal range of motion  Skin:     General: Skin is warm  Capillary Refill: Capillary refill takes less than 2 seconds  Neurological:      Mental Status: She is alert and oriented to person, place, and time  Cranial Nerves: Cranial nerve deficit present  Sensory: Sensory deficit present  Motor: Weakness present  Coordination: Coordination abnormal       Deep Tendon Reflexes:      Reflex Scores:       Tricep reflexes are 2+ on the right side and 2+ on the left side  Bicep reflexes are 2+ on the right side and 2+ on the left side  Brachioradialis reflexes are 2+ on the right side and 2+ on the left side  Achilles reflexes are 2+ on the right side and 2+ on the left side  Psychiatric:         Speech: Speech normal        Neurologic Exam     Mental Status   Oriented to person, place, and time  Oriented to person  Oriented to place  Oriented to country  Oriented to year, month and date  Follows 3 step commands  Attention: normal  Concentration: normal    Speech: speech is normal   Level of consciousness: alert  Knowledge: good  Able to name object  Able to repeat  Cranial Nerves     CN II   Right visual field deficit: none  Left visual field deficit: none     CN III, IV, VI   Pupils are equal, round, and reactive to light    Extraocular motions are normal    CN III: no CN III palsy  CN VI: no CN VI palsy  Nystagmus: none     CN V   Left facial sensation deficit: forehead    CN VII   Left facial weakness: central    CN VIII   CN VIII normal      CN IX, X CN IX normal    CN X normal      CN XI   CN XI normal      CN XII   CN XII normal      Motor Exam   Muscle bulk: normal  Overall muscle tone: normal  Right arm tone: normal  Left arm tone: normal  Right arm pronator drift: absent  Left arm pronator drift: absent  Right leg tone: normal  Left leg tone: normal    Strength   Right neck flexion: 4/5  Left neck flexion: 4/5  Right neck extension: 4/5  Left neck extension: 4/5  Right deltoid: 4/5  Left deltoid: 4/5  Right biceps: 4/5  Left biceps: 4/5  Right triceps: 4/5  Left triceps: 4/5  Right wrist flexion: 4/5  Left wrist flexion: 4/5  Right wrist extension: 4/5  Left wrist extension: 4/5  Right iliopsoas: 4/5  Left iliopsoas: 4/5  Right quadriceps: 4/5  Left quadriceps: 4/5  Right anterior tibial: 4/5  Left anterior tibial: 4/5  Right posterior tibial: 4/5  Left posterior tibial: 4/5  Right gastroc: 4/5  Left gastroc: 4/5    Sensory Exam   Vibration normal    UEs: sensation decreased compared to right  LEs: sensation increased compared to left     Gait, Coordination, and Reflexes     Reflexes   Right brachioradialis: 2+  Left brachioradialis: 2+  Right biceps: 2+  Left biceps: 2+  Right triceps: 2+  Left triceps: 2+  Right achilles: 2+  Left achilles: 2+  Right plantar: equivocal  Left plantar: equivocal  Right ankle clonus: absent  Left ankle clonus: absent  - Gait exam deferred  - LUE and LLE: FTN and HTN unstable, dysmetria present  - RUE and RLE: FTN and HTN intact, no dysmetria     Discussion with Family:   Spoke with the son (Victoriano Grace), discharge trajectory was discussed, follow up plans were discussed  All questions and concerns addressed  Discharge instructions/Information to patient and family:   See after visit summary for information provided to patient and family  Provisions for Follow-Up Care:  See after visit summary for information related to follow-up care and any pertinent home health orders  Planned Readmission:   For Outpatient Loop Recorder Placement    Discharge Statement:  Please see attending's attestation  Discharge Medications:  See after visit summary for reconciled discharge medications provided to patient and family        ** Please Note: This note has been constructed using a voice recognition system **    ==  10 [facility] Neurology Residency, PGY-2

## 2022-10-13 ENCOUNTER — APPOINTMENT (INPATIENT)
Dept: RADIOLOGY | Facility: HOSPITAL | Age: 77
DRG: 064 | End: 2022-10-13
Payer: MEDICARE

## 2022-10-13 VITALS
RESPIRATION RATE: 18 BRPM | WEIGHT: 139.11 LBS | HEART RATE: 90 BPM | OXYGEN SATURATION: 96 % | DIASTOLIC BLOOD PRESSURE: 78 MMHG | SYSTOLIC BLOOD PRESSURE: 136 MMHG | BODY MASS INDEX: 23.75 KG/M2 | TEMPERATURE: 97.6 F | HEIGHT: 64 IN

## 2022-10-13 PROBLEM — R19.5 LOOSE STOOLS: Status: RESOLVED | Noted: 2022-10-11 | Resolved: 2022-10-13

## 2022-10-13 PROCEDURE — 70450 CT HEAD/BRAIN W/O DYE: CPT

## 2022-10-13 PROCEDURE — G1004 CDSM NDSC: HCPCS

## 2022-10-13 PROCEDURE — 99238 HOSP IP/OBS DSCHRG MGMT 30/<: CPT | Performed by: PSYCHIATRY & NEUROLOGY

## 2022-10-13 RX ADMIN — FAMOTIDINE 40 MG: 20 TABLET, FILM COATED ORAL at 08:40

## 2022-10-13 NOTE — PLAN OF CARE
Problem: MOBILITY - ADULT  Goal: Maintain or return to baseline ADL function  Description: INTERVENTIONS:  -  Assess patient's ability to carry out ADLs; assess patient's baseline for ADL function and identify physical deficits which impact ability to perform ADLs (bathing, care of mouth/teeth, toileting, grooming, dressing, etc )  - Assess/evaluate cause of self-care deficits   - Assess range of motion  - Assess patient's mobility; develop plan if impaired  - Assess patient's need for assistive devices and provide as appropriate  - Encourage maximum independence but intervene and supervise when necessary  - Involve family in performance of ADLs  - Assess for home care needs following discharge   - Consider OT consult to assist with ADL evaluation and planning for discharge  - Provide patient education as appropriate  Outcome: Progressing  Goal: Maintains/Returns to pre admission functional level  Description: INTERVENTIONS:  - Perform BMAT or MOVE assessment daily    - Set and communicate daily mobility goal to care team and patient/family/caregiver  - Collaborate with rehabilitation services on mobility goals if consulted  - Perform Range of Motion 3 times a day  - Reposition patient every 3 hours    - Dangle patient 3 times a day  - Stand patient 3 times a day  - Ambulate patient 3 times a day  - Out of bed to chair 3 times a day   - Out of bed for meals 3 times a day  - Out of bed for toileting  - Record patient progress and toleration of activity level   Outcome: Progressing     Problem: PAIN - ADULT  Goal: Verbalizes/displays adequate comfort level or baseline comfort level  Description: Interventions:  - Encourage patient to monitor pain and request assistance  - Assess pain using appropriate pain scale  - Administer analgesics based on type and severity of pain and evaluate response  - Implement non-pharmacological measures as appropriate and evaluate response  - Consider cultural and social influences on pain and pain management  - Notify physician/advanced practitioner if interventions unsuccessful or patient reports new pain  Outcome: Progressing     Problem: INFECTION - ADULT  Goal: Absence or prevention of progression during hospitalization  Description: INTERVENTIONS:  - Assess and monitor for signs and symptoms of infection  - Monitor lab/diagnostic results  - Monitor all insertion sites, i e  indwelling lines, tubes, and drains  - Monitor endotracheal if appropriate and nasal secretions for changes in amount and color  - [place] appropriate cooling/warming therapies per order  - Administer medications as ordered  - Instruct and encourage patient and family to use good hand hygiene technique  - Identify and instruct in appropriate isolation precautions for identified infection/condition  Outcome: Progressing  Goal: Absence of fever/infection during neutropenic period  Description: INTERVENTIONS:  - Monitor WBC    Outcome: Progressing     Problem: SAFETY ADULT  Goal: Maintain or return to baseline ADL function  Description: INTERVENTIONS:  -  Assess patient's ability to carry out ADLs; assess patient's baseline for ADL function and identify physical deficits which impact ability to perform ADLs (bathing, care of mouth/teeth, toileting, grooming, dressing, etc )  - Assess/evaluate cause of self-care deficits   - Assess range of motion  - Assess patient's mobility; develop plan if impaired  - Assess patient's need for assistive devices and provide as appropriate  - Encourage maximum independence but intervene and supervise when necessary  - Involve family in performance of ADLs  - Assess for home care needs following discharge   - Consider OT consult to assist with ADL evaluation and planning for discharge  - Provide patient education as appropriate  Outcome: Progressing  Goal: Maintains/Returns to pre admission functional level  Description: INTERVENTIONS:  - Perform BMAT or MOVE assessment daily    - Set and communicate daily mobility goal to care team and patient/family/caregiver  - Collaborate with rehabilitation services on mobility goals if consulted  - Perform Range of Motion 3 times a day  - Reposition patient every 3 hours    - Dangle patient 3 times a day  - Stand patient 3 times a day  - Ambulate patient 3 times a day  - Out of bed to chair 3 times a day   - Out of bed for meals 3 times a day  - Out of bed for toileting  - Record patient progress and toleration of activity level   Outcome: Progressing  Goal: Patient will remain free of falls  Description: INTERVENTIONS:  - Educate patient/family on patient safety including physical limitations  - Instruct patient to call for assistance with activity   - Consult OT/PT to assist with strengthening/mobility   - Keep Call bell within reach  - Keep bed low and locked with side rails adjusted as appropriate  - Keep care items and personal belongings within reach  - Initiate and maintain comfort rounds  - Make Fall Risk Sign visible to staff  - Offer Toileting every 3 Hours, in advance of need  - Initiate/Maintain bed alarm  - Obtain necessary fall risk management equipment: non skid socks walker  - Apply yellow socks and bracelet for high fall risk patients  - Consider moving patient to room near nurses station  Outcome: Progressing

## 2022-10-13 NOTE — CASE MANAGEMENT
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 ([facility] ) 10/10/2022   • Chronic left-sided low back pain    • Transient cerebral ischemia 2018   • Premature atrial beats 2018   • Cataract of right eye 2016   • [facility]ili[facility] 2015   • Idiopathic osteoporosis 2015   • Primary osteoarthritis of hand 2015      LOS (days): 3  Geometric Mean LOS (GMLOS) (days): 4 50  Days to GMLOS:1 5     OBJECTIVE:  Risk of Unplanned Readmission Score: 8 52         Current admission status: Inpatient   Preferred Pharmacy:   [address] [place], [place] [address]  [place] [address]  Phone: [phone] Fax: [phone]    [id number] [place] - [address]  [facility] [place] [address]  Phone: [phone] Fax: [phone]    Primary Care Provider: Marin Dee DO    Primary Insurance: MEDICARE  Secondary Insurance: AARP    DISCHARGE DETAILS:    Discharge planning discussed with[de-identified] patient  Freedom of Choice: Yes  Comments - Freedom of Choice: Discussed FOC pt requested [facility]   Pt accepted at [address] [place] [address] L contacted family/caregiver?: Yes  Were Treatment Team discharge recommendations reviewed with patient/caregiver?: Yes  Did patient/caregiver verbalize understanding of patient care needs?: Yes  Were patient/caregiver advised of the risks associated with not following Treatment Team discharge recommendations?: Yes    Contacts  Patient Contacts: Letitia Pineda Yenni  Relationship to Patient[de-identified] Family (son)  Contact Method: Phone  Phone Number: [phone]  Reason/Outcome: Emergency Contact, Discharge [address]         Is the patient interested in [place] at discharge?: No    DME Referral Provided  Referral made for DME?: No    Other Referral/Resources/Interventions Provided:  Interventions: Short Term Rehab    Would you like to participate in our [address] service program?  : No - Declined    Treatment Team Recommendation: Short Term Rehab  Discharge Destination Plan[de-identified] Short Term Rehab  Transport at Discharge : [facility]  Dispatcher Contacted: Yes  Number/Name of Dispatcher: Pastor Ashraf [phone]  Transported by [employer] and Unit #):  SLETS  ETA of Transport (Date): 10/13/22  ETA of Transport (Time): 1100        Accompanied by: Alone     IMM Given (Date):: 10/13/22  IMM Given to[de-identified] Patient          Accepting Facility Name, Raissa Callejas : [facility]  Receiving Facility/Agency Phone Number: [phone]  Facility/Agency Fax Number: [phone]

## 2022-10-18 ENCOUNTER — PATIENT OUTREACH (OUTPATIENT)
Dept: CASE MANAGEMENT | Facility: HOSPITAL | Age: 77
End: 2022-10-18

## 2022-10-18 ENCOUNTER — TELEPHONE (OUTPATIENT)
Dept: PAIN MEDICINE | Facility: CLINIC | Age: 77
End: 2022-10-18

## 2022-10-20 ENCOUNTER — OFFICE VISIT (OUTPATIENT)
Dept: NEUROLOGY | Facility: CLINIC | Age: 77
End: 2022-10-20
Payer: MEDICARE

## 2022-10-20 VITALS
HEIGHT: 64 IN | SYSTOLIC BLOOD PRESSURE: 126 MMHG | BODY MASS INDEX: 23.73 KG/M2 | WEIGHT: 139 LBS | TEMPERATURE: 95.8 F | DIASTOLIC BLOOD PRESSURE: 64 MMHG

## 2022-10-20 DIAGNOSIS — I61.0 NONTRAUMATIC SUBCORTICAL HEMORRHAGE OF RIGHT CEREBRAL HEMISPHERE (HCC): Primary | ICD-10-CM

## 2022-10-20 DIAGNOSIS — G25.0 TREMOR, ESSENTIAL: ICD-10-CM

## 2022-10-20 PROCEDURE — 99215 OFFICE O/P EST HI 40 MIN: CPT | Performed by: PSYCHIATRY & NEUROLOGY

## 2022-10-20 NOTE — PROGRESS NOTES
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 [facility])  Patient is a 81-year-old female with 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 on aspirin and essential tremor who presents as a hospital follow up for small R putaminal hemorrhage with residual L sided weakness and paraesthesias  Reviewed records from recent hospital admission as well as prior history  No evidence of amyloid, arteriovenous malformation or uncontrolled hypertension  Suspect hemorrhagic stroke caused ischemic damage however unclear cause of primary hemorrhage, especially since this is the second hemorrhage she has had in her life time  Imaging with microangiopathic disease but no evidence of embolic stroke  Plan:  · Continue to hold aspirin given hemorrhage, and low risk factors for ischemia  · Will hold on repeat CTH at this time   · Reviewed case with radiology  Agree difficult to ascertain underlying cause  · Will review case with neurosurgery to see the role of angiogram in determining etiology, refer to attending attestation   · Please record blood pressure twice daily and bring to next office visit for review   · Continue atorvastatin 10 mg   · Continue working closely with PT and OT  · Follow up in 4 months     Tremor, essential  Continue Primidone 50 mg at night, if tremor worsens please call us prior to increasing dose  There are no diagnoses linked to this encounter  Subjective:    HPI      Patient is a 81-year-old female with 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 on aspirin and essential tremor who presents as a hospital follow up  She was admitted on 10/10/22 at [facility] after presenting with L sided weakness  She states that around 4 am she was sleeping and got cold  She went to reach for her covers but had difficulty grasping them   She fell back asleep and woke up at 6 am with a staggering gait and LLE weakness  She pulled the bathroom emergency cord at her living facility and was brought to the ED  On arrival to the ED /78  NIH of 6 with dysarthria, left facial droop, LUE drift, and left-sided sensory deficit  Found to have a small acute hematoma on CTH in the right putamen with mild reigonal mass effect and minimal surrounding parenchymal edema and chronic lacunar infarctions in the L basal ganglia  CTA without flow limiting stenosis, LVO, or aneurysm  She was admitted for stroke workup  Echo with normal atria  LDL 41  Aspirin was discontinued and she was discharged to rehab with recommendations of outpatient loop recorder  Has been participating in PT, OT, speech at rehab where she will remain in until next week  In terms of her past medical history she had a prior stroke in 2018 found on outpatient MRI after she experienced L sided paraesthesias  She was found to have a chronic hemorrhage involving the R external capsule  ROS:  No known personal or family history of aneurysm  No diagnosis of HTN  Average   Prior to current stroke she was treated with atorvastatin 10 mg   Previous smoker, quit in 2012   Mother with history of stroke  No family history of coagulopathies    In terms of her tremor, this has been stable  Meds were stopped during stroke evaluation but now they have restarted it  She reports good control currently       The following portions of the patient's history were reviewed and updated as appropriate: allergies, current medications, past family history, past medical history, past social history, past surgical history and problem list and review of systems  Objective:    Blood pressure 126/64, temperature (!) 95 8 °F (35 4 °C), temperature source Temporal, height 5' 4" (1 626 m), weight 63 kg (139 lb)      Physical Exam  Eyes:      General: Lids are normal       Extraocular Movements: Extraocular movements intact  Pupils: Pupils are equal, round, and reactive to light  Neurological:      Deep Tendon Reflexes:      Reflex Scores:       Bicep reflexes are 2+ on the right side and 2+ on the left side  Brachioradialis reflexes are 2+ on the right side and 2+ on the left side  Patellar reflexes are 2+ on the right side and 2+ on the left side  Psychiatric:         Speech: Speech normal          Neurological Exam  Mental Status  Awake, alert and oriented to person, place and time  Recent and remote memory are intact  Speech is normal  Language is fluent with no aphasia  Attention and concentration are normal  Fund of knowledge is appropriate for level of education  Good historian   Difficulty with serial 7s  Normal speech   Cranial Nerves  CN II: Visual fields full to confrontation  CN III, IV, VI: Extraocular movements intact bilaterally  Normal lids and orbits bilaterally  Pupils equal round and reactive to light bilaterally  CN VIII: Hearing is normal   CN IX, X: Palate elevates symmetrically  CN XI: Shoulder shrug strength is normal   CN XII: Tongue midline without atrophy or fasciculations  Left lower facial droop, smile asymmetry   Decreased sensation to light touch in V1-V3 distribution   Motor   Left pronator drift                                               Right                     Left  Deltoid                                   5                          4   Biceps                                   5                          4   Triceps                                  5                          4   Iliopsoas                               5                          4   Quadriceps                           5                          5-  Ankle dorsiflexor                   5                          5-  Ankle plantar flexor              5                           5-    Sensory  Light touch abnormality: Pinprick abnormality: Temperature abnormality: Vibration abnormality: Left hemisensory loss  Decreased pinprick and light touch sensation in LUE and LLE   Decreased temp sensation in LUE, increased in LLE   Reflexes                                            Right                      Left  Brachioradialis                    2+                         2+  Biceps                                 2+                         2+  Patellar                                2+                         2+  Reflexes slightly more brisk on the L but no hyperreflexia   Coordination  Right: Finger-to-nose normal Left: Finger-to-nose normal     Gait    Wheelchair bound   ROS:    Review of Systems   Constitutional: Negative  Negative for appetite change and fever  HENT: Negative  Negative for hearing loss, tinnitus, trouble swallowing and voice change  Eyes: Negative  Negative for photophobia, pain and visual disturbance  Respiratory: Negative  Negative for shortness of breath  Cardiovascular: Negative  Negative for palpitations  Gastrointestinal: Negative  Negative for nausea and vomiting  Endocrine: Negative  Negative for cold intolerance  Genitourinary: Negative  Negative for dysuria, frequency and urgency  Musculoskeletal: Negative  Negative for gait problem, myalgias and neck pain  Skin: Negative  Negative for rash  Allergic/Immunologic: Negative  Neurological: Positive for tremors and numbness (left side)  Negative for dizziness, seizures, syncope, facial asymmetry, speech difficulty, weakness, light-headedness and headaches  Hematological: Negative  Does not bruise/bleed easily  Psychiatric/Behavioral: Negative  Negative for confusion, hallucinations and sleep disturbance

## 2022-10-20 NOTE — ASSESSMENT & PLAN NOTE
Patient is a 22-year-old female with 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 on aspirin and essential tremor who presents as a hospital follow up for small R putaminal hemorrhage with residual L sided weakness and paraesthesias  Reviewed records from recent hospital admission as well as prior history  No evidence of amyloid, arteriovenous malformation or uncontrolled hypertension  Suspect hemorrhagic stroke caused ischemic damage however unclear cause of primary hemorrhage, especially since this is the second hemorrhage she has had in her life time  Imaging with microangiopathic disease but no evidence of embolic stroke  Plan:  · Continue to hold aspirin given hemorrhage, and low risk factors for ischemia  · Will hold on repeat CTH at this time   · Reviewed case with radiology   Agree difficult to ascertain underlying cause  · Will review case with neurosurgery to see the role of angiogram in determining etiology, refer to attending attestation   · Please record blood pressure twice daily and bring to next office visit for review   · Continue atorvastatin 10 mg   · Continue working closely with PT and OT  · Follow up in 4 months

## 2022-10-20 NOTE — PATIENT INSTRUCTIONS
Reviewed records from recent hospital admission   Suspect hemorrhagic stroke caused ischemic damage however unclear cause of hemorrhage, especially this being the second time   Continue to hold aspirin given hemorrhage   Will hold on repeat CTH at this time   Will review case with radiology as well as neurosurgery to see if angiogram would be a good option for her to determine etiology of underlying hemorrhages   Please record blood pressure twice daily and bring to next office visit for review   Continue atorvastatin 10 mg   Continue Primidone 50 mg at night, if tremor worsens please call us   Follow up in 4 months

## 2022-10-21 ENCOUNTER — TELEPHONE (OUTPATIENT)
Dept: NEUROLOGY | Facility: CLINIC | Age: 77
End: 2022-10-21

## 2022-10-21 NOTE — TELEPHONE ENCOUNTER
Neurovascular Discharge Follow Up  Hospitalization: 10/10/22-10/13/22    During yesterday's office visit with Dr Francisca Haynes, patient requested for the office to call her son Author Seals to provide an update  Reviewed office visit note from yesterday with Author Seals  Advised how the provider will review with neurosurgery to see if an angiogram is recommended  Will send them a message as a reminder  Author Seals reports the patient is doing well after discharge and how she is improving  He reports how [address] plans on discharging her home soon  Patient mobilizes with a walker  Reports that patient is working with PT/OT at [facility]  During this call, we reviewed stroke type, symptoms, personal risk factors and management, medications, and resources  As for risk factors, patient's BP is being monitored at [facility]  She is a non smoker  She recently quit smoking per Author Bozena Seals is a current smoker, he is aware of the risks involved  Encouraged for patient to follow a low salt / low cholesterol / diabetic friendly diet  Physical activity as tolerated  I addressed all his questions  At the conclusion of the conversation, he denies having any further questions or concerns

## 2022-10-25 ENCOUNTER — PATIENT OUTREACH (OUTPATIENT)
Dept: CASE MANAGEMENT | Facility: HOSPITAL | Age: 77
End: 2022-10-25

## 2022-10-25 DIAGNOSIS — Z86.79 HISTORY OF CEREBRAL HEMORRHAGE: Primary | ICD-10-CM

## 2022-10-25 NOTE — TELEPHONE ENCOUNTER
With this appearance the neurosurgeons did not feel it was likely that we would find a clear structural cause  They had a question as to whether this could represent vasculitis considering her lack of small-vessel risk factors and the appearance on MRI however there has not been anything progressive and the findings on MRI, other than the hemorrhage itself, have been stable between the 2 most recent studies  Before considering a conventional angiogram I would like to repeat the MRI 3 months with contrast to give a chance for the blood to reabsorbed and this will us to make sure do not see any clear evidence of a structural or inflammatory lesion in the neighborhood  If we see any progression of changes on the MRI or new or worsening hemorrhage then I would have a low threshold to send her over for the conventional angiogram but we can wait for the 3 month scan to confirm  Thanks

## 2022-10-27 NOTE — TELEPHONE ENCOUNTER
Called Bruno Alva to provide Dr Carlie Houston response prior to calling Son  He did not answer  Left a vm requesting for a call back  Provided the office's phone number

## 2022-11-01 ENCOUNTER — PATIENT OUTREACH (OUTPATIENT)
Dept: CASE MANAGEMENT | Facility: HOSPITAL | Age: 77
End: 2022-11-01

## 2022-11-02 ENCOUNTER — PATIENT OUTREACH (OUTPATIENT)
Dept: CASE MANAGEMENT | Facility: OTHER | Age: 77
End: 2022-11-02

## 2022-11-02 NOTE — TELEPHONE ENCOUNTER
Called patient and notified her of Dr Ben Wood response  She expressed understanding  She wishes to obtain the MRI brain in 3 months  Offered to help schedule the MRI, she agreed  She prefers to be scheduled at [facility] around the end of January around 10-11 am  Will do  She was appreciative

## 2022-11-02 NOTE — TELEPHONE ENCOUNTER
Called patient and notified her of the date/time/location of the MRI  She accepted the appointment  Patient is aware to complete blood work a few days prior to the MRI to check for adequate kidney function for the contrast  Mailed scripts to patient  She was appreciative

## 2022-11-02 NOTE — PROGRESS NOTES
Outreach TC to patient for initial care management assessment  Patient reports that she is feeling well  Patient denies s/sx of CVA including confusion, unilateral weakness,  Sudden changes to vision, speech/swallowing problems, dizziness or syncope  Patient reports that she is independent with ADL's  Patient does need assist with IADLs, especially transportation  Patient resides alone  Patient did not complete her [facility] appointment yet but did complete a f/u with neurology on 10/20/22  I reviewed medications including dose, schedule, purpose & side effects of  Primidone,famotidine, atorvastatin, calcium-vitamin D, B complex and Vitamin D with patient who verbalizes understanding  Reviewed future appointments, s/sx to report and how/when to report symptoms to provider vs  911  Patient verbalizes understanding  Educated on low sodium diet including label reading  Patient verbalizes understanding  Patient educated on role of CM, contact information for CM and BPCI  Agrees to additional calls

## 2022-11-14 ENCOUNTER — TELEPHONE (OUTPATIENT)
Dept: NEUROLOGY | Facility: CLINIC | Age: 77
End: 2022-11-14

## 2022-11-14 NOTE — TELEPHONE ENCOUNTER
Neurovascular Discharge Follow Up  Hospitalization: 10/10/22-10/13/22    Called patient  Since discharge, she denies experiencing any new or worsening stroke-like symptoms  She is ambulating independently as well as preforming her own ADLs  Patient manages her own medications, appointments, and affairs  Reviewed appointments - patient successfully followed up with PCP , (out of network provider) last week per patient  Patient has already followed up with Dr Pedrito Egan on 10/20/22        I reviewed medications with her  She denies any questions or concerns regarding them  During this call, we reviewed stroke type, symptoms, personal risk factors and management, medications, and resources  Patient verbalizes understanding  As for risk factors, patient reports monitoring her BP at home  She reports how her BP is around 120/80  She is a non smoker  Patient reports how she is following a low salt / low cholesterol / diabetic friendly diet  She reports how she will refrain from driving until she is cleared by the provider at the appointment on 3/8/23  I addressed all her questions  At the conclusion of the conversation, patient denies having any further questions or concerns

## 2022-11-16 ENCOUNTER — PATIENT OUTREACH (OUTPATIENT)
Dept: CASE MANAGEMENT | Facility: OTHER | Age: 77
End: 2022-11-16

## 2022-11-16 NOTE — PROGRESS NOTES
Outreach TC to patient for RN care   Patient reports that she is feeling well  Patient denies any new or worsening neurological symptoms  Patient reports that she continues with PT/OT therapy and is awaiting ST  Patient's speech is clear but she does have a facial droop and some memory issues  We did review s/sx to report, future appointments, home medications and how/when to report symptoms to provider vs 911  Patient verbalizes understanding and agrees to additional calls

## 2022-11-18 ENCOUNTER — TELEPHONE (OUTPATIENT)
Dept: NEUROLOGY | Facility: CLINIC | Age: 77
End: 2022-11-18

## 2022-11-18 NOTE — TELEPHONE ENCOUNTER
patient of Dr Randy Chapman, last visit 10/20  office note documents: "Continue to hold aspirin given hemorrhage, and low risk factors for ischemia"    I returned call; reached , left detailed message with documentation of office note to hold ASA

## 2022-11-18 NOTE — TELEPHONE ENCOUNTER
Patient called wants to know from Dr Mely Kenney if she should continue to take baby aspirin or not for stroke she had   Please call patient back at [phone]

## 2022-11-29 DIAGNOSIS — S82.892D CLOSED FRACTURE OF LEFT ANKLE WITH ROUTINE HEALING, SUBSEQUENT ENCOUNTER: Primary | ICD-10-CM

## 2022-12-07 ENCOUNTER — TELEPHONE (OUTPATIENT)
Dept: CARDIOLOGY CLINIC | Facility: CLINIC | Age: 77
End: 2022-12-07

## 2022-12-07 ENCOUNTER — TELEPHONE (OUTPATIENT)
Dept: OBGYN CLINIC | Facility: HOSPITAL | Age: 77
End: 2022-12-07

## 2022-12-07 ENCOUNTER — PATIENT OUTREACH (OUTPATIENT)
Dept: CASE MANAGEMENT | Facility: OTHER | Age: 77
End: 2022-12-07

## 2022-12-07 NOTE — PROGRESS NOTES
Outreach TC to patient for RN care   Patient reports that she is feeling well  Patient denies any new or worsening neurological symptoms  Patient reports that she continues with PT/OT therapy and is awaiting ST  Patient's speech is clear but she does have a facial droop  Patient denies any difficulty with swallowing or with speech  Patient reports being able to ambulate without any assistive device  Patient reports being independent with ADL's and also with IADL's  We did review s/sx to report, future appointments, home medications and how/when to report symptoms to provider vs 911  Patient verbalizes understanding and agrees to additional calls

## 2022-12-07 NOTE — TELEPHONE ENCOUNTER
Called and lmom regarding pt's appt on 12/20  Dr Colt Castaneda is unavailable and we need to reschedule pt to 12/13 or 12/16  Asked that she please cb to reschedule

## 2022-12-07 NOTE — TELEPHONE ENCOUNTER
Called patient to schedule Loop implant  She states she spoke to her PCP and was told she does not need a loop monitor  Patient does not want to schedule and would like to be removed off wait list      Pt removed

## 2022-12-08 ENCOUNTER — VBI (OUTPATIENT)
Dept: ADMINISTRATIVE | Facility: OTHER | Age: 77
End: 2022-12-08

## 2022-12-12 ENCOUNTER — TELEPHONE (OUTPATIENT)
Dept: NEUROLOGY | Facility: CLINIC | Age: 77
End: 2022-12-12

## 2022-12-13 NOTE — TELEPHONE ENCOUNTER
Patient of Dr Gibbs Esters last visit 10/20, s/p ED admission, 10/10 MRI brain showed diffusion restriction beyond ICH size suggestive of hemorrhagic conversion  She  also has hx of Right external hemorrhage in 2018 with residual left-sided paresthesias but no residual weakness  She said she was told not to drive but said her license was never pulled and she does not remember who recommended no driving  Currently in home PT/OT managed by PCP  F/u scheduled 3/8  She is asking for input on returning to drive; she is receptive to fitness to drive if needed  F/u scheduled 3/8    Please provide recommendation, thank yo

## 2022-12-20 NOTE — TELEPHONE ENCOUNTER
Patient aware of recommendation but said PCP already put in referral for visual/cognitive test before driving, which she will schedule

## 2023-01-04 ENCOUNTER — PATIENT OUTREACH (OUTPATIENT)
Dept: CASE MANAGEMENT | Facility: OTHER | Age: 78
End: 2023-01-04

## 2023-01-04 NOTE — PROGRESS NOTES
Outreach TC to patient for RN care   Patient reports that she is feeling well  Patient denies any new or worsening neurological symptoms  Patient reports that she continues with PT/OT therapy and  is awaiting an appointment for visual/cognitive testing  Patient was instructed by PCP to refrain from driving until the testing was complete  Patient denies any long term weakness from the CVA in October 2022  Patient's speech is clear but she does have a facial droop  We did review s/sx to report, future appointments, home medications and how/when to report symptoms to provider vs 911  Patient verbalizes understanding

## 2023-01-11 ENCOUNTER — PATIENT OUTREACH (OUTPATIENT)
Dept: CASE MANAGEMENT | Facility: OTHER | Age: 78
End: 2023-01-11

## 2023-01-11 ENCOUNTER — TELEPHONE (OUTPATIENT)
Dept: NEUROLOGY | Facility: CLINIC | Age: 78
End: 2023-01-11

## 2023-01-11 NOTE — PROGRESS NOTES
BPCI episode closed   I updated the care coordination note and removed the RN CM from the care team

## 2023-01-11 NOTE — TELEPHONE ENCOUNTER
Patient called in about her upcoming MRI and needing blood work     Patient is scheduled on 1/26/23 for MRI Brain w/wo Contrast  Per chart review Bun/ Cr orders are in under 10/25/22    Patient is asking for a CB  # [phone]

## 2023-01-11 NOTE — TELEPHONE ENCOUNTER
I clarified with patient that she does not need scripts for bw prior to MRI; she asked why she needed bw and I explained to check her kidney function as contast is ordered; she will have drawn at a [facility] approx one week prior to MRI  All questions/concerns addressed at this time

## 2023-01-25 ENCOUNTER — OFFICE VISIT (OUTPATIENT)
Dept: OCCUPATIONAL THERAPY | Facility: CLINIC | Age: 78
End: 2023-01-25

## 2023-01-25 DIAGNOSIS — Z91.89 DRIVING SAFETY ISSUE: Primary | ICD-10-CM

## 2023-01-25 NOTE — PROGRESS NOTES
This note was notshared with the patient due to privacy exception: note includes other individuals      Occupational Therapy Fit to Drive Evaluation:      Attempt #1    Today's Date: 2023  Patient Name: Kimberlee Parikh  : 1945  MRN: [id number]  Referring Provider: Jono De,*  Dx: Driving safety issue [Z91 89]      DCAT/FITNESS TO DRIVE OUTCOMES:      PATIENT'S SCORE: 71 %   DRIVING IMPLICATIONS PER DCAT: Cognitive abilities for driving are affected  Indicates a greater probability of failing a NASH than passing a NASH  Pt scored cognitively at an 71% predicted probability of failing a specialized on-road test  Individuals scoring in this range have a much higher than average risk of performing a dangerous error on the NASH that would affect other road users  Pt scoring cognitively at a 48% probability of creating a hazardous situation on a specialized road test         ADDITIONAL THERAPY NEEDS: Occupational Therapy for memory therapy with treatment focused on memory care, memory strategies and patient/family education  Assessment/Plan  Occupational Therapy Skilled Analysis Assessment and Plan of Care:  Pt is a 78 y o  female referred to Occupational Therapy for Fitness to Drive Evaluation to assess pt’s cognitive, visual, and motor abilities to drive safely in community environment  Cognitive abilities for driving are affected  Indicates a greater probability of failing a NASH than passing a NASH  Pt scored cognitively at an 71% predicted probability of failing a specialized on-road test  Individuals scoring in this range have a much higher than average risk of performing a dangerous error on the NASH that would affect other road users   Pt scoring cognitively at a 48% probability of creating a hazardous situation on a specialized road test    Below average scoring was noted in the following areas:  Initialization and reactions  Ability to encode, retrieve and/or respond to stimuli  Judgement of spatial relations  Decisions under time pressure  Guided movement control  Ability to quickly respond to complex information  Impulse control   D/C from OT caseload, D/C OT  MD to discuss results w/ pt  Active Problem List:   Patient Active Problem List   Diagnosis   • Cataract of right eye   • Idiopathic osteoporosis   • Sacroiliitis ([address] )   • Primary osteoarthritis of hand   • Transient cerebral ischemia   • Premature atrial beats   • Chronic left-sided low back pain   • History of cerebral hemorrhage   • 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 [facility])   • Acute cerebral hemorrhage (HCC)   • Tremor, essential     Past Medical Hx:   Past Medical History:   Diagnosis Date   • Cataract of right eye    • GERD (gastroesophageal reflux disease)      Past Surgical Hx:   Past Surgical History:   Procedure Laterality Date   • CATARACT EXTRACTION, BILATERAL Bilateral    • MASTECTOMY Bilateral     for calcium deposits   • [place] COLONOSCOPY FLX DX W/COLLJ SPEC WHEN PFRMD N/A 2017    Procedure: COLONOSCOPY;  Surgeon: Sherry Rashid MD;  [facility]; Service: Colorectal   • [place] XCAPSL CTRC RMVL INSJ IO LENS PROSTH W/O ECP Right 2016    Procedure: OD EXTRACTION EXTRACAPSULAR CATARACT PHACO INTRAOCULAR LENS (IOL); Surgeon: Roberta Rothman MD;  [facility];  Service: Ophthalmology   • TONSILLECTOMY        Pain Levels:   Restin    With Activity:  0      TIME:   OT eval: 4-425  OT DCAT 425-515  Administration, scoring, interpretation > 91 mins    Subjective: "A lot of people forget things as they get older "    Patient Goal: "To start driving again "      History of Present Illness:  Pt is a 78 y o  female seen for OT Fitness to Drive evaluation to assess pt’s cognitive, visual, and motor abilities to drive safely in community environment   Pt referred from [address], Clint Erickson,* from Internal medicine Pt with history of hemorrhagic stroke from 2018  Pt reports she had another stroke in October and following her hospital stay, she spent time in [facility], then transferred to [facility] where she reported she "did a lot of therapy"  Once discharged home where she lives alone, she received home therapy services from [facility]  Per pt's chart review "left-sided weakness  NIHSS 6 on presentation  CTH demonstrated small acute hematoma in the right putamen with mild regional mass effect and minimal surrounding parenchymal edema and chronic lacunar infarctions in the left basal ganglia  CTA head/neck demonstrated no flow consequential stenosis, LVO, or aneurysm  Patient is on ASA 81mg daily at baseline  BP in the field per /78  Her Neurologic exam was significant for dysarthria, left facial droop, LUE drift, and left-sided sensory deficit  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 " Pt has not been driving since stroke, reports no issues with driving since CVA  Below is a summary of patients current or most recent driving history including vehicle type, roads traveled, and recent auto events  Driving History:    Communication Status: X English,     Honduran    Visual History:  last Eye Dr  Appointment August 2022   Glasses/Contacts:   Yes, describe: bifocals   Cataracts:  Yes, describe: b/l cataract   Glaucoma:   No   Hemianopsia:   No       License Status: active    Age of Initial Licensure: 19 or 20    Vehicle Type:     CAR     Car Transfer: indep    Driving History:   GPS Use: No   Recent Accidents:   No   Tickets:   No   DUI:   No    Last Drove: October 2022    Driving Goals:   Local Roads, [employer] and Daytime Driving, night time rarely      Objective      DCAT: (see attached report for further details)   Pt scoring an 71% likelihood on failing on road   Probability of creating a hazardous situation on specialized on-road test: 48%; see attached report for further details       Recommend On Road Assessment?:   Yes, describe: with [employer] [place], if MD agreeable     Recommend to [employer] [place]?: No     Physical findings:    cervical rotation:  R WFL, L WFL   UE and LE AROM:  full  UE/LE : 4+/5 MMT     INTERVENTION COMMENTS:  Diagnosis: Driving safety issue [Z91 89]  Precautions: fall risk, hy of CVA  FOTO: N/A for FTD Evaluations  Insurance: Payor: Akhil Zimmerman / Plan: MEDICARE A AND B / Product Type: Medicare A & B Fee for Service /

## 2023-01-26 ENCOUNTER — HOSPITAL ENCOUNTER (OUTPATIENT)
Dept: RADIOLOGY | Facility: HOSPITAL | Age: 78
Discharge: HOME | End: 2023-01-26
Attending: PSYCHIATRY & NEUROLOGY

## 2023-01-26 DIAGNOSIS — Z86.79 HISTORY OF CEREBRAL HEMORRHAGE: ICD-10-CM

## 2023-01-26 RX ADMIN — GADOBUTROL 6 ML: 604.72 INJECTION INTRAVENOUS at 13:13

## 2023-01-30 NOTE — TELEPHONE ENCOUNTER
Hello,     Patient is calling back regarding her MRI results and what the Results are indicating for her to do  She is saying the MRI results are saying to continue to hold on taking  the Asprin and she would like to confirm with Severo Adams if that's what she should do?     Thank you in advance,     Corry Meyer

## 2023-01-31 NOTE — TELEPHONE ENCOUNTER
Patient verbalized understanding on continuing to hold ASA  She said she got confused and took it for a short time however will stop now  We discussed Last instructions on baby ASA on 11/18/22 were also to hold: patient of Dr Sharonda Gonzalez, last visit 10/20  office note documents: "Continue to hold aspirin given hemorrhage, and low risk factors for ischemia"     I returned call; reached , left detailed message with documentation of office note to hold ASA    Verbalized understanding

## 2023-02-12 NOTE — PROGRESS NOTES
Assessment and Plan:   Ms Magda Menchaca is a 44-year-old female with history significant for postmenopausal osteoporosis who presents for a follow-up  She is due for her denosumab injection today  Abeba Lay presents today for a follow-up of postmenopausal osteoporosis for which she is receiving biannual denosumab injections  She has tolerated this well and reassuringly denies any interim fractures  We will continue with these injections every 6 months  She will continue the daily calcium and vitamin D supplements and try to perform gentle exercises at home  She will update a BMP in the next 2 weeks and is due to update a DEXA scan in July 2023  Plan:  Diagnoses and all orders for this visit:    Age-related osteoporosis without current pathological fracture  -     denosumab (PROLIA) subcutaneous injection 60 mg  -     DXA bone density spine hip and pelvis; Future    Encounter for monitoring denosumab therapy  -     Basic metabolic panel; Future      Activities as tolerated  Exercise: try to maintain a low impact exercise regimen as much as possible  Walk for 30 minutes a day for at least 3 days a week  Continue other medications as prescribed by PCP and other specialists  RTC in 6 months with Dr Farzana Aleman as she prefers the [facility] [place]      Rheumatic Disease Summary  1  Osteoporosis   -Established with Dr Santa Madrid 6/2016 for osteoporosis  -Prior tx with alendronate x5 years, off since around 2010  -Started on Prolia 6/2016, last given 7/12/21, next due around 1/12/22  -DEXA 6/11/2019: T -2 7 lumbar, BMD stable at lumbar and decreased in hip  -Visit 11/9/20: f/u after next DEXA to determine tx plan, prolia given today   -DEXA 7/8/21: T -2 7 lumbar, stable without significant change at lumbar spine and hip   -DEXA due 7/2023  - 2/15/23: continue Prolia, will update DEXA in 7/23  2  Comorbidities: h/o CVA, left foot drop, HLD, GERD       HPI  Ms Magda Menchaca is a 44-year-old female with history significant for postmenopausal osteoporosis who presents for a follow-up  She is due for her denosumab injection today  She denies any interim fractures but did have a spiral fracture of the left distal fibula in June 2022  She is taking daily calcium and vitamin D supplements  No weightbearing exercises but she recently completed physical therapy as she had another stroke in October 2022  She has recovered well from this without any significant residual deficits  No new complaints today but she reports as a result of the stroke and difficulty with driving she would prefer her follow-up appointments at the [address] [place] [address]  The following portions of the patient's history were reviewed and updated as appropriate: allergies, current medications, past family history, past medical history, past social history, past surgical history and problem list       Review of Systems  Constitutional: Negative for weight change, fevers, chills, night sweats, fatigue  ENT/Mouth: Negative for hearing changes, ear pain, nasal congestion, sinus pain, hoarseness, sore throat, rhinorrhea, swallowing difficulty  Eyes: Negative for pain, redness, discharge, vision changes  Cardiovascular: Negative for chest pain, SOB, palpitations  Respiratory: Negative for cough, sputum, wheezing, dyspnea  Gastrointestinal: Negative for nausea, vomiting, diarrhea, constipation, pain, heartburn  Genitourinary: Negative for dysuria, urinary frequency, hematuria  Musculoskeletal: As per HPI  Skin: Negative for skin rash, color changes  Neuro: Negative for weakness, numbness, tingling, loss of consciousness  Psych: Negative for anxiety, depression  Heme/Lymph: Negative for easy bruising, bleeding, lymphadenopathy          Past Medical History:   Diagnosis Date   • Cataract of right eye    • GERD (gastroesophageal reflux disease)        Past Surgical History:   Procedure Laterality Date   • CATARACT EXTRACTION, BILATERAL Bilateral • MASTECTOMY Bilateral     for calcium deposits   • [place] COLONOSCOPY FLX DX W/COLLJ SPEC WHEN PFRMD N/A 8/25/2017    Procedure: COLONOSCOPY;  Surgeon: Peri Paul MD;  [facility]; Service: Colorectal   • [place] XCAPSL CTRC RMVL INSJ IO LENS PROSTH W/O ECP Right 6/21/2016    Procedure: OD EXTRACTION EXTRACAPSULAR CATARACT PHACO INTRAOCULAR LENS (IOL); Surgeon: Aide Henriquez MD;  [facility];  Service: Ophthalmology   • TONSILLECTOMY         Social History     Socioeconomic History   • Marital status:      Spouse name: Not on file   • Number of children: Not on file   • Years of education: Not on file   • Highest education level: Not on file   Occupational History   • Not on file   Tobacco Use   • Smoking status: Former   • Smokeless tobacco: Never   Vaping Use   • Vaping Use: Never used   Substance and Sexual Activity   • Alcohol use: Yes     Comment: pt states "very occassionally"   • Drug use: Never   • Sexual activity: Not on file   Other Topics Concern   • Not on file   Social History Narrative   • Not on file     Social Determinants of Health     Financial Resource Strain: Not on file   Food Insecurity: No Food Insecurity   • Worried About Running Out of Food in the Last Year: Never true   • Ran Out of Food in the Last Year: Never true   Transportation Needs: No Transportation Needs   • Lack of Transportation (Medical): No   • Lack of Transportation (Non-Medical):  No   Physical Activity: Not on file   Stress: Not on file   Social Connections: Not on file   Intimate Partner Violence: Not on file   Housing Stability: Low Risk    • Unable to Pay for Housing in the Last Year: No   • Number of Places Lived in the Last Year: 1   • Unstable Housing in the Last Year: No       Family History   Problem Relation Age of Onset   • Rectal cancer Brother    • Stroke Mother    • Heart attack Father    • Arrhythmia Father         possible   • Coronary artery disease Father    • Sudden death Father         scd   • Anuerysm Neg Hx    • Clotting disorder Neg Hx    • Hypertension Neg Hx    • Hyperlipidemia Neg Hx    • Heart failure Neg Hx        No Known Allergies      Current Outpatient Medications:   •  atorvastatin (LIPITOR) 10 mg tablet, Take 1 tablet by mouth Daily, Disp: , Rfl:   •  b complex vitamins tablet, Take 1 tablet by mouth daily  , Disp: , Rfl:   •  calcium-vitamin D 250-100 MG-UNIT per tablet, Take 1 tablet by mouth 2 (two) times a day, Disp: , Rfl:   •  famotidine (PEPCID) 40 MG tablet, , Disp: , Rfl:   •  Multiple Vitamins-Minerals (CENTRUM CARDIO PO), Take 1 tablet by mouth daily  , Disp: , Rfl:   •  primidone (MYSOLINE) 50 mg tablet, Take 1 tab by mouth daily at bedtime, Disp: 90 tablet, Rfl: 3  •  Vitamin D, Cholecalciferol, 1000 UNITS TABS, Take 1 tablet by mouth daily Indications: with aloe  , Disp: , Rfl:     Current Facility-Administered Medications:   •  denosumab (PROLIA) subcutaneous injection 60 mg, 60 mg, Subcutaneous, Once, Vanesa Zelaya MD      Objective:    Vitals:    02/15/23 0959   BP: 130/86   BP Location: Right arm   Patient Position: Sitting   Cuff Size: Adult   Pulse: 80   SpO2: 99%   Weight: 64 7 kg (142 lb 9 6 oz)   Height: 5' 4" (1 626 m)       Physical Exam  General: Well appearing, well nourished, in no distress  Oriented x 3, normal mood and affect  Ambulating without difficulty  Skin: Good turgor, no rash, unusual bruising or prominent lesions  Hair: Normal texture and distribution  Nails: Normal color, no deformities  HEENT:  Head: Normocephalic, atraumatic  Eyes: Conjunctiva clear, sclera non-icteric, EOM intact  Extremities: No amputations or deformities, cyanosis, edema  Neurologic: Alert and oriented  Psychiatric: Normal mood and affect  RADHA Lowe    Rheumatology

## 2023-02-15 ENCOUNTER — OFFICE VISIT (OUTPATIENT)
Dept: RHEUMATOLOGY | Facility: CLINIC | Age: 78
End: 2023-02-15

## 2023-02-15 VITALS
OXYGEN SATURATION: 99 % | HEIGHT: 64 IN | WEIGHT: 142.6 LBS | HEART RATE: 80 BPM | DIASTOLIC BLOOD PRESSURE: 86 MMHG | SYSTOLIC BLOOD PRESSURE: 130 MMHG | BODY MASS INDEX: 24.34 KG/M2

## 2023-02-15 DIAGNOSIS — Z79.899 ENCOUNTER FOR MONITORING DENOSUMAB THERAPY: ICD-10-CM

## 2023-02-15 DIAGNOSIS — M81.0 AGE-RELATED OSTEOPOROSIS WITHOUT CURRENT PATHOLOGICAL FRACTURE: ICD-10-CM

## 2023-02-15 DIAGNOSIS — Z51.81 ENCOUNTER FOR MONITORING DENOSUMAB THERAPY: ICD-10-CM

## 2023-03-08 ENCOUNTER — OFFICE VISIT (OUTPATIENT)
Dept: NEUROLOGY | Facility: CLINIC | Age: 78
End: 2023-03-08

## 2023-03-08 VITALS
BODY MASS INDEX: 24.59 KG/M2 | TEMPERATURE: 97.3 F | HEART RATE: 70 BPM | DIASTOLIC BLOOD PRESSURE: 70 MMHG | HEIGHT: 64 IN | WEIGHT: 144 LBS | SYSTOLIC BLOOD PRESSURE: 130 MMHG

## 2023-03-08 DIAGNOSIS — Z86.79 HISTORY OF CEREBRAL HEMORRHAGE: Primary | ICD-10-CM

## 2023-03-08 DIAGNOSIS — R25.1 TREMOR: ICD-10-CM

## 2023-03-08 NOTE — ASSESSMENT & PLAN NOTE
· Brain MRI from January reviewed and discussed with pt  · She will remain off Aspirin  · BP initially elevated  She does monitor at home and has not had elevated blood pressures  If she notices an increase in BP she will speak with her PCP  When rechecked BP was 130/70  · A Mediterranean diet was recommended  She does get Meals on Wheels  She was encouraged to continue exercising  · She has been driving to the grocery store only  I have spent a total time of 45 minutes on 03/08/23 in caring for this patient including Diagnostic results, Prognosis, Risks and benefits of tx options, Instructions for management, Patient and family education, Importance of tx compliance, Risk factor reductions, Impressions, Counseling / Coordination of care, Documenting in the medical record and Reviewing / ordering tests, medicine, procedures    She will follow-up in 6 months

## 2023-03-08 NOTE — PROGRESS NOTES
Patient ID: Raymond Alvarez is a 78 y o  female  Assessment/Plan:    History of cerebral hemorrhage  · Brain MRI from January reviewed and discussed with pt  · She will remain off Aspirin  · BP initially elevated  She does monitor at home and has not had elevated blood pressures  If she notices an increase in BP she will speak with her PCP  When rechecked BP was 130/70  · A Mediterranean diet was recommended  She does get Meals on Wheels  She was encouraged to continue exercising  · She has been driving to the grocery store only  I have spent a total time of 45 minutes on 03/08/23 in caring for this patient including Diagnostic results, Prognosis, Risks and benefits of tx options, Instructions for management, Patient and family education, Importance of tx compliance, Risk factor reductions, Impressions, Counseling / Coordination of care, Documenting in the medical record and Reviewing / ordering tests, medicine, procedures    She will follow-up in 6 months  Tremor, essential  · Tremor has been stable  · She will continue primidone 50mg at bedtime  There are no diagnoses linked to this encounter  Subjective:    HPI    Patient is a 65-year-old female, with 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, who follows in the office due to a newer cerebral hemorrhage and essential tremor  She was admitted on 10/10/22 at [facility] after presenting with L sided weakness  She states that around 4 am she was sleeping and got cold  She went to reach for her covers but had difficulty grasping them  She fell back asleep and woke up at 6 am with a staggering gait and LLE weakness  She pulled the bathroom emergency cord at her living facility and was brought to the ED  Head CT revealed a small acute hematoma in the right putamen with mild reigonal mass effect and minimal surrounding parenchymal edema and chronic lacunar infarctions in the L basal ganglia    CTA without flow limiting stenosis, LVO, or aneurysm  She was admitted for stroke workup  Echo with normal atria  LDL 41  Aspirin was discontinued and she was discharged to rehab  Brain MRI in October revealed a focal right corona radiata acute to subacute infarct extending to the basal ganglia region  Stable right putaminal/external capsule intraparenchymal hemorrhage with minimal surrounding vasogenic edema  Moderate chronic microangiopathic ischemic changes  Dilated perivascular spaces versus chronic lacunar infarcts within the bilateral sublentiform regions  She was seen by Dr Mely Kenney and it was decided that the brain MRI would be repeated in January with contrast due to concerns for vasculitis  Brain MRI in January revealed no acute infarction, edema, or pathologic intra-axial enhancement  Right subinsular hemosiderin deposition at the site of the previously noted intraparenchymal hemorrhage  Moderate chronic microangiopathic ischemic changes  MRI was reviewed with the pt and she was advised to remain off ASA and monitor her BP  She states that she is doing well since her last appt  She reports that the Lt sided strength has improved  She does monitor her BP at home and the majority of the readings have been in the 120s/70s  BP was elevated in the office today but improved when rechecked at the end of her appt  She remains on atorvastatin and her total cholesterol is < 200 and LDL < 70  She gets her meals through [employer]  Her sister reports that the meals are more healthy than what she was preparing for herself  She does get regular exercise  She was advised not to drive by her PCP after having [employer] testing  She has a form from [employer] but has not really looked at it  She admits to driving to the grocery store  Her sister does other driving for her  She denies any symptoms to suggest new stroke  She reports the Rt hand tremor has been stable  She is taking Primidone 50mg at bedtime   She is tolerating the medication witout difficulty  The tremor does worsen with stress and anxiety  She has not identified anything that improves the tremor  Her father may have had a tremor  The following portions of the patient's history were reviewed and updated as appropriate: allergies, current medications, past family history, past medical history, past social history, past surgical history and problem list          Objective:    Blood pressure 165/75, pulse 70, temperature (!) 97 3 °F (36 3 °C), temperature source Skin, height 5' 4" (1 626 m), weight 65 3 kg (144 lb)  Physical Exam  Vitals reviewed  Eyes:      General: Lids are normal       Extraocular Movements: Extraocular movements intact  Pupils: Pupils are equal, round, and reactive to light  Neurological:      Motor: Motor strength is normal       Coordination: Coordination is intact  Deep Tendon Reflexes: Reflexes are normal and symmetric  Psychiatric:         Speech: Speech normal          Neurological Exam  Mental Status   Oriented to person, place, time and situation  Recent and remote memory are intact  Speech is normal  Language is fluent with no aphasia  Attention and concentration are normal  Fund of knowledge is appropriate for level of education  Apraxia absent  Cranial Nerves  CN II: Visual acuity is normal  Visual fields full to confrontation  CN III, IV, VI: Extraocular movements intact bilaterally  Normal lids and orbits bilaterally  Pupils equal round and reactive to light bilaterally  CN V: Facial sensation is normal   CN VII: Full and symmetric facial movement  CN VIII: Hearing is normal   CN IX, X: Palate elevates symmetrically  Normal gag reflex  CN XI: Shoulder shrug strength is normal   CN XII: Tongue midline without atrophy or fasciculations  Motor   Strength is 5/5 throughout all four extremities      Sensory  Sensation is intact to light touch, pinprick, vibration and proprioception in all four extremities  Reflexes  Deep tendon reflexes are 2+ and symmetric in all four extremities  Coordination    Finger-to-nose, rapid alternating movements and heel-to-shin normal bilaterally without dysmetria  Gait  Normal casual, toe, heel and tandem gait  ROS:    Review of Systems   Constitutional: Negative  Negative for appetite change and fever  HENT: Negative  Negative for hearing loss, tinnitus, trouble swallowing and voice change  Eyes: Negative  Negative for photophobia, pain and visual disturbance  Respiratory: Negative  Negative for shortness of breath  Cardiovascular: Negative  Negative for palpitations  Gastrointestinal: Negative  Negative for nausea and vomiting  Endocrine: Negative  Negative for cold intolerance  Genitourinary: Negative  Negative for dysuria, frequency and urgency  Musculoskeletal: Negative  Negative for gait problem, myalgias and neck pain  Skin: Negative  Negative for rash  Allergic/Immunologic: Negative  Neurological: Positive for tremors (right hand)  Negative for dizziness, seizures, syncope, facial asymmetry, speech difficulty, weakness, light-headedness, numbness and headaches  Hematological: Negative  Does not bruise/bleed easily  Psychiatric/Behavioral: Negative  Negative for confusion, hallucinations and sleep disturbance  Review of systems personally reviewed

## 2023-03-08 NOTE — PATIENT INSTRUCTIONS
History of cerebral hemorrhage  Brain MRI from January reviewed and discussed with pt  She will remain off Aspirin  BP initially elevated  She does monitor at home and has not had elevated blood pressures  If she notices an increase in BP she will speak with her PCP  When rechecked BP was 130/70  A Mediterranean diet was recommended  She does get Meals on Wheels  She was encouraged to continue exercising  She has been driving to the grocery store only  I have spent a total time of 45 minutes on 03/08/23 in caring for this patient including Diagnostic results, Prognosis, Risks and benefits of tx options, Instructions for management, Patient and family education, Importance of tx compliance, Risk factor reductions, Impressions, Counseling / Coordination of care, Documenting in the medical record and Reviewing / ordering tests, medicine, procedures    She will follow-up in 6 months  Tremor, essential  Tremor has been stable  She will continue primidone 50mg at bedtime

## 2023-03-21 ENCOUNTER — HOSPITAL ENCOUNTER (INPATIENT)
Facility: HOSPITAL | Age: 78
LOS: 2 days | End: 2023-03-24
Attending: EMERGENCY MEDICINE | Admitting: INTERNAL MEDICINE

## 2023-03-21 ENCOUNTER — APPOINTMENT (EMERGENCY)
Dept: RADIOLOGY | Facility: HOSPITAL | Age: 78
End: 2023-03-21

## 2023-03-21 DIAGNOSIS — R26.2 AMBULATORY DYSFUNCTION: ICD-10-CM

## 2023-03-21 DIAGNOSIS — R74.8 ELEVATED CK: ICD-10-CM

## 2023-03-21 DIAGNOSIS — R53.1 GENERALIZED WEAKNESS: Primary | ICD-10-CM

## 2023-03-21 PROBLEM — D75.89 MACROCYTOSIS: Status: ACTIVE | Noted: 2023-03-21

## 2023-03-21 PROBLEM — U07.1 SARS-COV-2 POSITIVE: Status: ACTIVE | Noted: 2023-03-21

## 2023-03-21 LAB
4HR DELTA HS TROPONIN: -1 NG/L
ALBUMIN SERPL BCP-MCNC: 3.6 G/DL (ref 3.5–5)
ALP SERPL-CCNC: 63 U/L (ref 46–116)
ALT SERPL W P-5'-P-CCNC: 54 U/L (ref 12–78)
ANION GAP SERPL CALCULATED.3IONS-SCNC: 9 MMOL/L (ref 4–13)
APTT PPP: 31 SECONDS (ref 23–37)
AST SERPL W P-5'-P-CCNC: 202 U/L (ref 5–45)
ATRIAL RATE: 91 BPM
BACTERIA UR QL AUTO: NORMAL /HPF
BASOPHILS # BLD AUTO: 0.03 THOUSANDS/ÂΜL (ref 0–0.1)
BASOPHILS NFR BLD AUTO: 0 % (ref 0–1)
BILIRUB SERPL-MCNC: 0.44 MG/DL (ref 0.2–1)
BILIRUB UR QL STRIP: NEGATIVE
BUN SERPL-MCNC: 19 MG/DL (ref 5–25)
CALCIUM SERPL-MCNC: 9.2 MG/DL (ref 8.3–10.1)
CARDIAC TROPONIN I PNL SERPL HS: 17 NG/L
CARDIAC TROPONIN I PNL SERPL HS: 18 NG/L
CHLORIDE SERPL-SCNC: 107 MMOL/L (ref 96–108)
CK SERPL-CCNC: 6836 U/L (ref 26–192)
CLARITY UR: CLEAR
CO2 SERPL-SCNC: 21 MMOL/L (ref 21–32)
COLOR UR: ABNORMAL
CREAT SERPL-MCNC: 0.72 MG/DL (ref 0.6–1.3)
EOSINOPHIL # BLD AUTO: 0 THOUSAND/ÂΜL (ref 0–0.61)
EOSINOPHIL NFR BLD AUTO: 0 % (ref 0–6)
ERYTHROCYTE [DISTWIDTH] IN BLOOD BY AUTOMATED COUNT: 12.5 % (ref 11.6–15.1)
FLUAV RNA RESP QL NAA+PROBE: NEGATIVE
FLUBV RNA RESP QL NAA+PROBE: NEGATIVE
GFR SERPL CREATININE-BSD FRML MDRD: 82 ML/MIN/1.73SQ M
GLUCOSE SERPL-MCNC: 101 MG/DL (ref 65–140)
GLUCOSE UR STRIP-MCNC: NEGATIVE MG/DL
HCT VFR BLD AUTO: 37.2 % (ref 34.8–46.1)
HGB BLD-MCNC: 12.6 G/DL (ref 11.5–15.4)
HGB UR QL STRIP.AUTO: NEGATIVE
IMM GRANULOCYTES # BLD AUTO: 0.04 THOUSAND/UL (ref 0–0.2)
IMM GRANULOCYTES NFR BLD AUTO: 1 % (ref 0–2)
INR PPP: 1.01 (ref 0.84–1.19)
KETONES UR STRIP-MCNC: ABNORMAL MG/DL
LACTATE SERPL-SCNC: 1.4 MMOL/L (ref 0.5–2)
LACTATE SERPL-SCNC: 2.1 MMOL/L (ref 0.5–2)
LEUKOCYTE ESTERASE UR QL STRIP: NEGATIVE
LYMPHOCYTES # BLD AUTO: 1.15 THOUSANDS/ÂΜL (ref 0.6–4.47)
LYMPHOCYTES NFR BLD AUTO: 14 % (ref 14–44)
LYMPHOCYTES NFR BLD: 16 % (ref 14–44)
MAGNESIUM SERPL-MCNC: 2.2 MG/DL (ref 1.6–2.6)
MCH RBC QN AUTO: 34.1 PG (ref 26.8–34.3)
MCHC RBC AUTO-ENTMCNC: 33.9 G/DL (ref 31.4–37.4)
MCV RBC AUTO: 101 FL (ref 82–98)
MONOCYTES # BLD AUTO: 0.86 THOUSAND/ÂΜL (ref 0.17–1.22)
MONOCYTES NFR BLD AUTO: 10 % (ref 4–12)
MONOCYTES NFR BLD AUTO: 8 % (ref 4–12)
NEUTROPHILS # BLD AUTO: 6.26 THOUSANDS/ÂΜL (ref 1.85–7.62)
NEUTS BAND NFR BLD MANUAL: 2 THOUSAND/UL
NEUTS SEG NFR BLD AUTO: 74 % (ref 45–77)
NEUTS SEG NFR BLD AUTO: 75 % (ref 43–75)
NITRITE UR QL STRIP: NEGATIVE
NON-SQ EPI CELLS URNS QL MICRO: NORMAL /HPF
NRBC BLD AUTO-RTO: 0 /100 WBCS
PH UR STRIP.AUTO: 6 [PH]
PHOSPHATE SERPL-MCNC: 2.6 MG/DL (ref 2.3–4.1)
PLATELET # BLD AUTO: 158 THOUSANDS/UL (ref 149–390)
PLATELET BLD QL SMEAR: ADEQUATE
PMV BLD AUTO: 10.3 FL (ref 8.9–12.7)
POTASSIUM SERPL-SCNC: 3.7 MMOL/L (ref 3.5–5.3)
PROCALCITONIN SERPL-MCNC: 0.17 NG/ML
PROT SERPL-MCNC: 7.8 G/DL (ref 6.4–8.4)
PROT UR STRIP-MCNC: ABNORMAL MG/DL
PROTHROMBIN TIME: 13.5 SECONDS (ref 11.6–14.5)
QRS AXIS: 76 DEGREES
QRSD INTERVAL: 64 MS
QT INTERVAL: 386 MS
QTC INTERVAL: 472 MS
RBC # BLD AUTO: 3.69 MILLION/UL (ref 3.81–5.12)
RBC #/AREA URNS AUTO: NORMAL /HPF
RBC MORPH BLD: NORMAL
RETICS # AUTO: NORMAL 10*3/UL (ref 14097–95744)
RETICS # CALC: 1.78 % (ref 0.37–1.87)
RSV RNA RESP QL NAA+PROBE: NEGATIVE
SARS-COV-2 RNA RESP QL NAA+PROBE: POSITIVE
SODIUM SERPL-SCNC: 137 MMOL/L (ref 135–147)
SP GR UR STRIP.AUTO: 1.02 (ref 1–1.03)
T WAVE AXIS: 66 DEGREES
TOTAL CELLS COUNTED SPEC: 100
TSH SERPL DL<=0.05 MIU/L-ACNC: 0.65 UIU/ML (ref 0.45–4.5)
UROBILINOGEN UR STRIP-ACNC: <2 MG/DL
VENTRICULAR RATE: 90 BPM
VIT B12 SERPL-MCNC: 1557 PG/ML (ref 100–900)
WBC # BLD AUTO: 8.34 THOUSAND/UL (ref 4.31–10.16)
WBC #/AREA URNS AUTO: NORMAL /HPF

## 2023-03-21 RX ORDER — SODIUM CHLORIDE, SODIUM GLUCONATE, SODIUM ACETATE, POTASSIUM CHLORIDE, MAGNESIUM CHLORIDE, SODIUM PHOSPHATE, DIBASIC, AND POTASSIUM PHOSPHATE .53; .5; .37; .037; .03; .012; .00082 G/100ML; G/100ML; G/100ML; G/100ML; G/100ML; G/100ML; G/100ML
100 INJECTION, SOLUTION INTRAVENOUS CONTINUOUS
Status: DISPENSED | OUTPATIENT
Start: 2023-03-21 | End: 2023-03-23

## 2023-03-21 RX ORDER — ENOXAPARIN SODIUM 100 MG/ML
40 INJECTION SUBCUTANEOUS DAILY
Status: DISCONTINUED | OUTPATIENT
Start: 2023-03-22 | End: 2023-03-24 | Stop reason: HOSPADM

## 2023-03-21 RX ORDER — PRIMIDONE 50 MG/1
50 TABLET ORAL
Status: DISCONTINUED | OUTPATIENT
Start: 2023-03-21 | End: 2023-03-24 | Stop reason: HOSPADM

## 2023-03-21 RX ORDER — ATORVASTATIN CALCIUM 10 MG/1
10 TABLET, FILM COATED ORAL
Status: DISCONTINUED | OUTPATIENT
Start: 2023-03-22 | End: 2023-03-24 | Stop reason: HOSPADM

## 2023-03-21 RX ORDER — FAMOTIDINE 20 MG/1
40 TABLET, FILM COATED ORAL
Status: DISCONTINUED | OUTPATIENT
Start: 2023-03-22 | End: 2023-03-24 | Stop reason: HOSPADM

## 2023-03-21 RX ADMIN — SODIUM CHLORIDE 1000 ML: 0.9 INJECTION, SOLUTION INTRAVENOUS at 14:15

## 2023-03-21 RX ADMIN — SODIUM CHLORIDE 1000 ML: 0.9 INJECTION, SOLUTION INTRAVENOUS at 13:26

## 2023-03-21 RX ADMIN — SODIUM CHLORIDE, SODIUM GLUCONATE, SODIUM ACETATE, POTASSIUM CHLORIDE, MAGNESIUM CHLORIDE, SODIUM PHOSPHATE, DIBASIC, AND POTASSIUM PHOSPHATE 100 ML/HR: .53; .5; .37; .037; .03; .012; .00082 INJECTION, SOLUTION INTRAVENOUS at 18:56

## 2023-03-21 NOTE — ASSESSMENT & PLAN NOTE
Patient presents with elevated CK 6836, in the setting of elevated transaminases  and ALT 54  Without renal dysfunction  She presents with generalized weakness and extended downtime, so there is suspicion for rhabdomyolysis  Urinalysis shows clear light yellow urine without blood  Other etiology is asymptomatic COVID-19 infection  Rhabdomyolysis is non traumatic in nature  CK down to 2000 from 4000  , down from 202 on previous day       · Cont maintenance fluids with isolyte 100 cc/hour

## 2023-03-21 NOTE — H&P
INTERNAL MEDICINE RESIDENCY ADMISSION H&P     Name: Izzy Burrell   Age & Sex: 78 y o  female   MRN: [id number]  Unit/Bed#: CRB   Encounter: [id number]  Primary Care Provider: Agnieszka Ordaz DO    Code Status: Level 1 - Full Code  Admission Status: OBSERVATION  Disposition: Patient requires Med/Surg    Admit to team: SOD Team C     ASSESSMENT/PLAN     Principal Problem:    Ambulatory dysfunction  Active Problems:    History of cerebral hemorrhage    Macrocytosis    Elevated CK    SARS-CoV-2 positive      * Ambulatory dysfunction  Assessment & Plan  44-year-old female with a history of CVA in 2018 with residual left-sided weakness presents 3/21/2023 with worsening lower extremity weakness and ambulatory dysfunction x2 days, in addition to URI symptoms  On admission, vital signs stable and saturating well on room air, afebrile  Labs notable for elevated CK >6k, elevated lactic acid 2 1, and COVID-positive  CT head negative  She has residual left sided weakness without new deficits, and strength intact lower extremities without red flag symptoms  I suspect her symptoms are 2/2 dehydration in the setting of COVID-19 and decreased PO intake  · Management as below  · PT/OT consult    SARS-CoV-2 positive  Assessment & Plan  Patient is COVID-positive on admission  Reports some mild nasal congestion, however otherwise asymptomatic and not requiring oxygen  No clear sick contacts  Chest x-ray on my read in the ED shows no acute disease with normal procalcitonin  · Mild COVID pathway  · Isolation precautions  · Monitor O2 requirement  · No intervention necessary at this time    Elevated CK  Assessment & Plan  Patient presents with elevated CK 6836, in the setting of elevated transaminases  and ALT 54  Without renal dysfunction  She presents with generalized weakness and extended downtime, so there is suspicion for rhabdomyolysis, however patient is without myalgias and denies dark urine   Also urinalysis shows clear light yellow urine without blood  Other etiology is asymptomatic COVID-19 infection  · Start maintenance fluids with isolyte 100 cc/hour  · Follow-up morning CK and liver enzymes    Macrocytosis  Assessment & Plan  MCV on admission 102 with history of macrocytosis since 2018  Otherwise, not anemic  Denies alcohol abuse history  · Obtain B12, peripheral smear, reticulocyte count      History of cerebral hemorrhage  Assessment & Plan  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  She was admitted on 10/10/22 at [facility] after presenting with L sided weakness  She follows with neurology, remains off of aspirin  CT on admission shows no acute infarcts  · Continue to monitor clinically      VTE Pharmacologic Prophylaxis: Enoxaparin  VTE Mechanical Prophylaxis: sequential compression device    CHIEF COMPLAINT     Chief Complaint   Patient presents with   • Weakness - Generalized     Per EMS is complaining of weakness and fatigue, pt had two strokes recently in October, police assisted into her chair last night and was found by EMS in the chair this morning as well       HISTORY OF PRESENT ILLNESS     This is a 78 y o  female with a past medical history of 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 on aspirin, essential tremor, osteoporosis, osteoarthritis of right knee, vitamin D deficiency who presents on 3/21/23 for amatory dysfunction and generalized weakness  She reports to me that since Lars 3/19 she has experienced worsening lower extremity weakness  She reports that she was unable to get herself out of her chair for days due to weakness in her lower extremities  Today she describes a mechanical fall where she "slipped" from a standing height in which she slowly fell to the ground without injuring herself which brought her to the hospital   She denied any prodrome or other precipitating events    She reports being on the ground for a few hours before her neighbor was able to help her up  She is normally able to ambulate on her own at home without any assist devices  She reports a mild, nonproductive cough and associated rhinorrhea that has started over the past few days as well  Denies fevers, chills, chest pain, shortness of breath, nausea, vomiting, diarrhea, constipation, or other systemic complaints  She denies tobacco use, alcohol use, illicit drug use  Does report a history of bilateral mastectomy, otherwise no major surgeries  Primary contact is her son, Costa Funk  I called Costa Funk for further clarification, he states that his mother lives at home on her own is able to normally take care of herself  He does report that ever since her stroke, she has had progressive generalized weakness but this seems to be acutely worse for her over the past weekend  He did not witness her at home over the past few days  On arrival, she is hemodynamically stable, satting well on room air, and afebrile  Labs are notable for total CK 6836, elevated transaminases with , ALT 54, otherwise normal liver enzymes  Lactic acid 2 1 that cleared  She is SARS-CoV-2 positive  Procalcitonin normal at 0 17  Troponin is normal   CBC without leukocytosis of, but does demonstrate macrocytosis with   Chest x-ray shows no acute pulmonary disease on my read  CT head shows no acute intracranial process  EKG shows normal sinus rhythm with PACs  She is status post 1 L normal saline x2 in the ED      REVIEW OF SYSTEMS     Review of Systems  -negative other than stated above in HPI    OBJECTIVE     Vitals:    23 1400 23 1500 23 1700 23 1900   BP: 146/65 149/90 154/71    BP Location:       Pulse: 90 76 88 82   Resp: 16 16 20 20   Temp:       TempSrc:       SpO2: 97% 97% 97% 97%      Temperature:   Temp (24hrs), Av 3 °F (36 3 °C), Min:97 3 °F (36 3 °C), Max:97 3 °F (36 3 °C)    Temperature: [facility] ) 97 3 °F (36 3 °C)  Intake & Output:  I/O     None        Weights: There is no height or weight on file to calculate BMI  Weight (last 2 days)     None        Physical Exam:  General: No apparent distress, resting comfortably   Head: Normocephalic, atraumatic  Eyes: Anicteric, no conjunctival erythema  ENT: External ear normal, no nasal discharge  Neck: Trachea midline, no visible lymphadenopathy or goiter  Respiratory: CTA  Non-labored respirations, symmetric thorax expansion  Cardiovascular: RRR  Extremities appear well-perfused  Abdomen: Non-distended  Extremities: Moves extremities spontaneously, no peripheral edema  Skin: No visible rashes, wounds, or jaundice  Neuro: Left upper extremity and left lower extremity 4/5 strength, rest of strength 5/5 throughout  A&O x3   Cranial nerves grossly intact  PAST MEDICAL HISTORY     Past Medical History:   Diagnosis Date   • Cataract of right eye    • GERD (gastroesophageal reflux disease)      PAST SURGICAL HISTORY     Past Surgical History:   Procedure Laterality Date   • CATARACT EXTRACTION, BILATERAL Bilateral    • MASTECTOMY Bilateral     for calcium deposits   • SD COLONOSCOPY FLX DX W/COLLJ SPEC WHEN PFRMD N/A 8/25/2017    Procedure: COLONOSCOPY;  Surgeon: Alcon Lopez MD;  Location: [facility]; Service: Colorectal   • SD XCAPSL CTRC RMVL INSJ IO LENS PROSTH W/O ECP Right 6/21/2016    Procedure: OD EXTRACTION EXTRACAPSULAR CATARACT PHACO INTRAOCULAR LENS (IOL);   Surgeon: Skip Dallas MD;  Location: [facility];  Service: Ophthalmology   • TONSILLECTOMY       SOCIAL & FAMILY HISTORY     Social History     Substance and Sexual Activity   Alcohol Use Yes    Comment: pt states "very occassionally"     Substance and Sexual Activity   Alcohol Use Yes    Comment: pt states "very occassionally"        Substance and Sexual Activity   Drug Use Never     Social History     Tobacco Use   Smoking Status Former   Smokeless Tobacco Never     Family History   Problem Relation Age of Onset   • Rectal cancer Brother    • Stroke Mother    • Heart attack Father    • Arrhythmia Father         possible   • Coronary artery disease Father    • Sudden death Father         scd   • Anuerysm Neg Hx    • Clotting disorder Neg Hx    • Hypertension Neg Hx    • Hyperlipidemia Neg Hx    • Heart failure Neg Hx      LABORATORY DATA     Labs: I have personally reviewed pertinent reports  Results from last 7 days   Lab Units 03/21/23  1203   WBC Thousand/uL 8 34   HEMOGLOBIN g/dL 12 6   HEMATOCRIT % 37 2   PLATELETS Thousands/uL 158   NEUTROS PCT % 75   MONOS PCT % 10      Results from last 7 days   Lab Units 03/21/23  1203   POTASSIUM mmol/L 3 7   CHLORIDE mmol/L 107   CO2 mmol/L 21   BUN mg/dL 19   CREATININE mg/dL 0 72   CALCIUM mg/dL 9 2   ALK PHOS U/L 63   ALT U/L 54   AST U/L 202*     Results from last 7 days   Lab Units 03/21/23  1203   MAGNESIUM mg/dL 2 2     Results from last 7 days   Lab Units 03/21/23  1203   PHOSPHORUS mg/dL 2 6      Results from last 7 days   Lab Units 03/21/23  1203   INR  1 01   PTT seconds 31     Results from last 7 days   Lab Units 03/21/23  1422   LACTIC ACID mmol/L 1 4         Micro:  Lab Results   Component Value Date    BLOODCX Received in Microbiology Lab  Culture in Progress  03/21/2023    BLOODCX Received in Microbiology Lab  Culture in Progress  03/21/2023     IMAGING & DIAGNOSTIC TESTS     Imaging: I have personally reviewed pertinent reports  CT head without contrast    Result Date: 3/21/2023  Impression: No evidence of acute intracranial process  Chronic microangiopathy  Chronic right subinsular hyperdensity similar to January 2023 attributed to hemosiderin deposition on the prior MRI  Workstation performed: [id number]     EKG, Pathology, and Other Studies: I have personally reviewed pertinent reports       ALLERGIES   No Known Allergies  MEDICATIONS PRIOR TO ARRIVAL     Prior to Admission medications    Medication Sig Start Date End Date Taking? Authorizing Provider   Ascorbic Acid (VITAMIN C PO) Take by mouth   Yes Historical Provider, MD   atorvastatin (LIPITOR) 10 mg tablet Take 1 tablet by mouth Daily   Yes Historical Provider, MD   calcium-vitamin D 250-100 MG-UNIT per tablet Take 1 tablet by mouth 2 (two) times a day   Yes Historical Provider, MD   famotidine (PEPCID) 40 MG tablet  3/8/22  Yes Historical Provider, MD   primidone (MYSOLINE) 50 mg tablet Take 1 tab by mouth daily at bedtime 9/26/22  Yes Arielle Carey MD   Vitamin D, Cholecalciferol, 1000 UNITS TABS Take 1 tablet by mouth daily Indications: with aloe  Yes Historical Provider, MD   b complex vitamins tablet Take 1 tablet by mouth daily  Historical Provider, MD   Multiple Vitamins-Minerals (B COMPLEX PLUS VITAMIN C PO) B Complex Plus Vitamin C    Historical Provider, MD   Multiple Vitamins-Minerals (CENTRUM CARDIO PO) Take 1 tablet by mouth daily  Historical Provider, MD     MEDICATIONS ADMINISTERED IN LAST 24 HOURS     Medication Administration - last 24 hours from 03/20/2023 2052 to 03/21/2023 2052       Date/Time Order Dose Route Action Action by     03/21/2023 1729 EDT sodium chloride 0 9 % bolus 1,000 mL 0 mL Intravenous Stopped Francies Cristela, [place]     03/21/2023 1326 EDT sodium chloride 0 9 % bolus 1,000 mL 1,000 mL Intravenous New Bag [address]     03/21/2023 1900 EDT sodium chloride 0 9 % bolus 1,000 mL 0 mL Intravenous Stopped Atilio Deng     03/21/2023 1415 EDT sodium chloride 0 9 % bolus 1,000 mL 1,000 mL Intravenous [address] Flori Choior, [place]     03/21/2023 1856 EDT multi-electrolyte (PLASMALYTE-A/ISOLYTE-S PH 7 4) IV solution 100 mL/hr Intravenous New Bag Atilio Deng        CURRENT MEDICATIONS     multi-electrolyte, 100 mL/hr, Last Rate: 100 mL/hr (03/21/23 1856)          Admission Time  I spent 30 minutes admitting the patient    This involved direct patient contact where I performed a full history and physical, reviewing previous records, and reviewing laboratory and other diagnostic studies  Portions of the record may have been created with voice recognition software  Occasional wrong word or "sound a like" substitutions may have occurred due to the inherent limitations of voice recognition software  Read the chart carefully and recognize, using context, where substitutions have occurred      ==  Viraj Golden, [address] [facility]  Internal Medicine Residency PGY-2

## 2023-03-21 NOTE — ED PROVIDER NOTES
History  Chief Complaint   Patient presents with   • Weakness - Generalized     Per EMS is complaining of weakness and fatigue, pt had two strokes recently in October, police assisted into her chair last night and was found by EMS in the chair this morning as well      Patient is a 65-year-old female with a significant past medical history of GERD, cerebral hemorrhage, presenting for evaluation of generalized weakness  Patient states that yesterday she had a "slip" from a standing height in which she slowly fell to the ground without injuring herself  She seems to describe this as mechanical in nature without any prodrome  She states that she was feeling weak after this incident, and had some difficulty getting back up, so called for help and had an assistant to her chair  She states that since then, she has been unable to get out of the chair  Is unclear exactly how long she has been in her chair  She indicates that she was somehow able to get into contact with someone who was able to arrange for her to be brought to the hospital   She states that normally she is able to ambulate on her own without any assistance  She describes some generalized weakness, predominantly in her legs without any focal weakness or changes in sensation  She denies any headaches  She does state that she had some blurring of her vision, predominantly in her right eye yesterday that has since completely resolved and only lasted for a short period of time without any eye pain  She is denying any chest pain or difficulty breathing  She does endorse a mild, nonproductive cough  She is denying any fevers or chills  She is denying any dysuria or other urinary symptoms  She has not had any change in bowel movements  She is denying any ill contacts  She is denying any bleeding  Prior to Admission Medications   Prescriptions Last Dose Informant Patient Reported? Taking?    Ascorbic Acid (VITAMIN C PO) Past Week  Yes Yes Sig: Take by mouth   Multiple Vitamins-Minerals (B COMPLEX PLUS VITAMIN C PO)   Yes No   Sig: B Complex Plus Vitamin C   Multiple Vitamins-Minerals (CENTRUM CARDIO PO)   Yes No   Sig: Take 1 tablet by mouth daily  Vitamin D, Cholecalciferol, 1000 UNITS TABS Past Week  Yes Yes   Sig: Take 1 tablet by mouth daily Indications: with aloe  atorvastatin (LIPITOR) 10 mg tablet Past Week  Yes Yes   Sig: Take 1 tablet by mouth Daily   b complex vitamins tablet   Yes No   Sig: Take 1 tablet by mouth daily  calcium-vitamin D 250-100 MG-UNIT per tablet Past Week  Yes Yes   Sig: Take 1 tablet by mouth 2 (two) times a day   famotidine (PEPCID) 40 MG tablet Past Week  Yes Yes   primidone (MYSOLINE) 50 mg tablet Past Week  No Yes   Sig: Take 1 tab by mouth daily at bedtime      Facility-Administered Medications: None       Past Medical History:   Diagnosis Date   • Cataract of right eye    • GERD (gastroesophageal reflux disease)        Past Surgical History:   Procedure Laterality Date   • CATARACT EXTRACTION, BILATERAL Bilateral    • MASTECTOMY Bilateral     for calcium deposits   • HI COLONOSCOPY FLX DX W/COLLJ SPEC WHEN PFRMD N/A 8/25/2017    Procedure: COLONOSCOPY;  Surgeon: Jigna Brady MD;  Location: [facility]; Service: Colorectal   • HI XCAPSL CTRC RMVL INSJ IO LENS PROSTH W/O ECP Right 6/21/2016    Procedure: OD EXTRACTION EXTRACAPSULAR CATARACT PHACO INTRAOCULAR LENS (IOL);   Surgeon: Linnea Lofton MD;  Location: [facility];  Service: Ophthalmology   • TONSILLECTOMY         Family History   Problem Relation Age of Onset   • Rectal cancer Brother    • Stroke Mother    • Heart attack Father    • Arrhythmia Father         possible   • Coronary artery disease Father    • Sudden death Father         scd   • Anuerysm Neg Hx    • Clotting disorder Neg Hx    • Hypertension Neg Hx    • Hyperlipidemia Neg Hx    • Heart failure Neg Hx      I have reviewed and agree with the history as documented  E-Cigarette/Vaping   • E-Cigarette Use Never User      E-Cigarette/Vaping Substances   • Nicotine No    • THC No    • CBD No    • Flavoring No    • Other No    • Unknown No      Social History     Tobacco Use   • Smoking status: Former   • Smokeless tobacco: Never   Vaping Use   • Vaping Use: Never used   Substance Use Topics   • Alcohol use: Yes     Comment: pt states "very occassionally"   • Drug use: Never        Review of Systems   Constitutional: Negative for chills and fever  Eyes: Positive for visual disturbance (resolved)  Respiratory: Positive for cough  Negative for shortness of breath  Cardiovascular: Negative for chest pain  Gastrointestinal: Negative for abdominal pain, constipation, diarrhea, nausea and vomiting  Genitourinary: Negative for dysuria  Musculoskeletal: Positive for gait problem (due to weakness)  Negative for back pain and neck pain  Neurological: Positive for weakness (generalized)  Negative for syncope, light-headedness and headaches  All other systems reviewed and are negative  Physical Exam  ED Triage Vitals [03/21/23 1133]   Temperature Pulse Respirations Blood Pressure SpO2   (!) 97 3 °F (36 3 °C) 92 18 160/78 97 %      Temp Source Heart Rate Source Patient Position - Orthostatic VS BP Location FiO2 (%)   Tympanic Monitor Sitting Right arm --      Pain Score       --             Orthostatic Vital Signs  Vitals:    03/21/23 1400 03/21/23 1500 03/21/23 1700 03/21/23 1900   BP: 146/65 149/90 154/71    Pulse: 90 76 88 82   Patient Position - Orthostatic VS:           Physical Exam  Vitals and nursing note reviewed  Constitutional:       General: She is not in acute distress  Appearance: Normal appearance  She is not ill-appearing  HENT:      Head: Normocephalic and atraumatic  Right Ear: External ear normal       Left Ear: External ear normal       Nose: Nose normal       Mouth/Throat:      Mouth: Mucous membranes are dry     Eyes: General: No visual field deficit or scleral icterus  Right eye: No discharge  Left eye: No discharge  Extraocular Movements: Extraocular movements intact  Conjunctiva/sclera: Conjunctivae normal       Pupils: Pupils are equal, round, and reactive to light  Cardiovascular:      Rate and Rhythm: Normal rate and regular rhythm  Pulses: Normal pulses  Heart sounds: Normal heart sounds  No murmur heard  No friction rub  No gallop  Pulmonary:      Effort: Pulmonary effort is normal  No respiratory distress  Breath sounds: Normal breath sounds  No wheezing, rhonchi or rales  Abdominal:      General: Abdomen is flat  There is no distension  Palpations: Abdomen is soft  There is no mass  Tenderness: There is no abdominal tenderness  Genitourinary:     Comments: Deferred  Musculoskeletal:         General: Normal range of motion  Cervical back: Normal range of motion  No rigidity or tenderness  Right lower leg: No edema  Left lower leg: No edema  Comments: No evidence of trauma  Skin:     General: Skin is warm and dry  Comments: No sacral ulcers  Neurological:      General: No focal deficit present  Mental Status: She is alert and oriented to person, place, and time  GCS: GCS eye subscore is 4  GCS verbal subscore is 5  GCS motor subscore is 6  Cranial Nerves: No cranial nerve deficit, dysarthria or facial asymmetry  Sensory: Sensation is intact  No sensory deficit  Motor: Motor function is intact  No pronator drift  Coordination: Coordination is intact   Finger-Nose-Finger Test and Heel to [facility] Test normal    Psychiatric:         Mood and Affect: Mood normal          ED Medications  Medications   enoxaparin (LOVENOX) subcutaneous injection 40 mg (has no administration in time range)   multi-electrolyte (PLASMALYTE-A/ISOLYTE-S PH 7 4) IV solution (100 mL/hr Intravenous New Bag 3/21/23 0844)   sodium chloride 0 9 % bolus 1,000 mL (0 mL Intravenous Stopped 3/21/23 1729)   sodium chloride 0 9 % bolus 1,000 mL (0 mL Intravenous Stopped 3/21/23 1900)       Diagnostic Studies  Results Reviewed     Procedure Component Value Units Date/Time    FLU/RSV/COVID - if FLU/RSV clinically relevant [[id number]]  (Abnormal) Collected: 03/21/23 1857    Lab Status: Final result Specimen: Nares from Nose Updated: 03/21/23 1949     SARS-CoV-2 Positive     INFLUENZA A PCR Negative     INFLUENZA B PCR Negative     RSV PCR Negative    Narrative:      FOR PEDIATRIC PATIENTS - copy/paste COVID Guidelines URL to browser: [email or web address]  [facility]    SARS-CoV-2 assay is a Nucleic Acid Amplification assay intended for the  qualitative detection of nucleic acid from SARS-CoV-2 in nasopharyngeal  swabs  Results are for the presumptive identification of SARS-CoV-2 RNA  Positive results are indicative of infection with SARS-CoV-2, the virus  causing COVID-19, but do not rule out bacterial infection or co-infection  with other viruses  Laboratories within the [place] and its  territories are required to report all positive results to the appropriate  public health authorities  Negative results do not preclude SARS-CoV-2  infection and should not be used as the sole basis for treatment or other  patient management decisions  Negative results must be combined with  clinical observations, patient history, and epidemiological information  This test has not been FDA cleared or approved  This test has been authorized by FDA under an Emergency Use Authorization  (EUA)  This test is only authorized for the duration of time the  declaration that circumstances exist justifying the authorization of the  emergency use of an in vitro diagnostic tests for detection of SARS-CoV-2  virus and/or diagnosis of COVID-19 infection under section 564(b)(1) of  the Act, 21 U  S C  640EUJ-8(X)(0), unless the authorization is terminated  or revoked sooner  The test has been validated but independent review by FDA  and CLIA is pending  Test performed using [employer] Gene[employer]pert: This RT-PCR assay targets N2,  a region unique to SARS-CoV-2  A conserved region in the E-gene was chosen  for pan-Sarbecovirus detection which includes SARS-CoV-2  According to CMS-2020-01-R, this platform meets the definition of high-throughput technology  TSH, 3rd generation with Free T4 reflex [[id number]]  (Normal) Collected: 03/21/23 1203    Lab Status: Final result Specimen: Blood from Arm, Left Updated: 03/1947     TSH 3RD GENERATON 0 653 uIU/mL     Narrative:      Patients undergoing fluorescein dye angiography may retain small amounts of fluorescein in the body for 48-72 hours post procedure  Samples containing fluorescein can produce falsely depressed TSH values  If the patient had this procedure,a specimen should be resubmitted post fluorescein clearance        Vitamin B12 [[id number]]  (Abnormal) Collected: 03/21/23 1203    Lab Status: Final result Specimen: Blood from Arm, Left Updated: 03/1947     Vitamin B-12 1,557 pg/mL     Phosphorus [[id number]]  (Normal) Collected: 03/21/23 1203    Lab Status: Final result Specimen: Blood from Arm, Left Updated: 03/1947     Phosphorus 2 6 mg/dL     Magnesium [[id number]]  (Normal) Collected: 03/21/23 1203    Lab Status: Final result Specimen: Blood from Arm, Left Updated: 03/1947     Magnesium 2 2 mg/dL     Peripheral Smear [[id number]] Collected: 03/21/23 1203    Lab Status: Final result Specimen: Blood from Arm, Left Updated: 03/21/23 1920     Segmented Neutrophils Manual 74 %      Bands Manual 2 Thousand/uL      Lymphocytes Manual 16 %      Monocytes Manual 8 %      Total Counted 100     RBC Morphology Normal     Platelet Estimate Adequate    Calcium, ionized [[id number]]     Lab Status: No result Specimen: Blood     Retic Count [[id number]]     Lab Status: No result Specimen: Blood     Blood culture #1 [[id number]] Collected: 03/21/23 1321    Lab Status: Preliminary result Specimen: Blood from Arm, Right Updated: 03/21/23 1701     Blood Culture Received in Microbiology Lab  Culture in Progress  Blood culture #2 [[id number]] Collected: 03/21/23 1321    Lab Status: Preliminary result Specimen: Blood from Arm, Right Updated: 03/21/23 1701     Blood Culture Received in Microbiology Lab  Culture in Progress      Urine Microscopic [[id number]]  (Normal) Collected: 03/21/23 1623    Lab Status: Final result Specimen: Urine, Straight Cath Updated: 03/21/23 1656     RBC, UA 1-2 /hpf      WBC, UA 1-2 /hpf      Epithelial Cells None Seen /hpf      Bacteria, UA None Seen /hpf     UA w Reflex to Microscopic w Reflex to Culture [[id number]]  (Abnormal) Collected: 03/21/23 1623    Lab Status: Final result Specimen: Urine, Straight Cath Updated: 03/21/23 1654     Color, UA Light Yellow     Clarity, UA Clear     Specific [place], UA 1 020     pH, UA 6 0     Leukocytes, UA Negative     Nitrite, UA Negative     Protein, UA Trace mg/dl      Glucose, UA Negative mg/dl      Ketones, UA 40 (2+) mg/dl      Urobilinogen, UA <2 0 mg/dl      Bilirubin, UA Negative     Occult Blood, UA Negative    CK [[id number]]  (Abnormal) Collected: 03/21/23 1203    Lab Status: Final result Specimen: Blood from Arm, Left Updated: 03/21/23 1557     Total CK 6,836 U/L     Comprehensive metabolic panel [[id number]]  (Abnormal) Collected: 03/21/23 1203    Lab Status: Final result Specimen: Blood from Arm, Left Updated: 03/21/23 1532     Sodium 137 mmol/L      Potassium 3 7 mmol/L      Chloride 107 mmol/L      CO2 21 mmol/L      ANION GAP 9 mmol/L      BUN 19 mg/dL      Creatinine 0 72 mg/dL      Glucose 101 mg/dL      Calcium 9 2 mg/dL       U/L      ALT 54 U/L      Alkaline Phosphatase 63 U/L      Total Protein 7 8 g/dL      Albumin 3 6 g/dL      Total Bilirubin 0 44 mg/dL      eGFR 82 ml/min/1 73sq m     Narrative: [place] guidelines for Chronic Kidney Disease (CKD):   •  Stage 1 with normal or high GFR (GFR > 90 mL/min/1 73 square meters)  •  Stage 2 Mild CKD (GFR = 60-89 mL/min/1 73 square meters)  •  Stage 3A Moderate CKD (GFR = 45-59 mL/min/1 73 square meters)  •  Stage 3B Moderate CKD (GFR = 30-44 mL/min/1 73 square meters)  •  Stage 4 Severe CKD (GFR = 15-29 mL/min/1 73 square meters)  •  Stage 5 End Stage CKD (GFR <15 mL/min/1 73 square meters)  Note: GFR calculation is accurate only with a steady state creatinine    HS Troponin I 4hr [[id number]]  (Normal) Collected: 03/21/23 1422    Lab Status: Final result Specimen: Blood from Arm, Right Updated: 03/21/23 1507     hs TnI 4hr 17 ng/L      Delta 4hr hsTnI -1 ng/L     Lactic acid 2 Hours [[id number]]  (Normal) Collected: 03/21/23 1422    Lab Status: Final result Specimen: Blood from Arm, Right Updated: 03/21/23 1502     LACTIC ACID 1 4 mmol/L     Narrative:      Result may be elevated if tourniquet was used during collection  Lactic acid [[id number]]  (Abnormal) Collected: 03/21/23 1203    Lab Status: Final result Specimen: Blood from Arm, Left Updated: 03/21/23 1332     LACTIC ACID 2 1 mmol/L     Narrative:      Result may be elevated if tourniquet was used during collection      Procalcitonin [[id number]]  (Normal) Collected: 03/21/23 1203    Lab Status: Final result Specimen: Blood from Arm, Left Updated: 03/21/23 1304     Procalcitonin 0 17 ng/ml     HS Troponin 0hr (reflex protocol) [[id number]]  (Normal) Collected: 03/21/23 1203    Lab Status: Final result Specimen: Blood from Arm, Left Updated: 03/21/23 1301     hs TnI 0hr 18 ng/L     Protime-INR [[id number]]  (Normal) Collected: 03/21/23 1203    Lab Status: Final result Specimen: Blood from Arm, Left Updated: 03/21/23 1251     Protime 13 5 seconds      INR 1 01    APTT [[id number]]  (Normal) Collected: 03/21/23 1203    Lab Status: Final result Specimen: Blood from Arm, Left Updated: 03/21/23 1251     PTT 31 seconds     CBC and differential [[id number]]  (Abnormal) Collected: 03/21/23 1203    Lab Status: Final result Specimen: Blood from Arm, Left Updated: 03/21/23 1249     WBC 8 34 Thousand/uL      RBC 3 69 Million/uL      Hemoglobin 12 6 g/dL      Hematocrit 37 2 %       fL      MCH 34 1 pg      MCHC 33 9 g/dL      RDW 12 5 %      MPV 10 3 fL      Platelets 274 Thousands/uL      nRBC 0 /100 WBCs      Neutrophils Relative 75 %      Immat GRANS % 1 %      Lymphocytes Relative 14 %      Monocytes Relative 10 %      Eosinophils Relative 0 %      Basophils Relative 0 %      Neutrophils Absolute 6 26 Thousands/µL      Immature Grans Absolute 0 04 Thousand/uL      Lymphocytes Absolute 1 15 Thousands/µL      Monocytes Absolute 0 86 Thousand/µL      Eosinophils Absolute 0 00 Thousand/µL      Basophils Absolute 0 03 Thousands/µL                  XR chest 2 views   ED Interpretation by Janice Uriarte DO (03/21 7793)   No acute cardiopulmonary disease      CT head without contrast   Final Result by Raymond Prado MD (03/21 1326)      No evidence of acute intracranial process  Chronic microangiopathy  Chronic right subinsular hyperdensity similar to January 2023 attributed to hemosiderin deposition on the prior MRI                          Workstation performed: [id number]               Procedures  US Guided Peripheral IV    Date/Time: 3/21/2023 1:00 PM  Performed by: Janice Uriarte DO  Authorized by: Janice Uriarte DO     Patient location:  ED  Performed by:  Resident  Other Assisting Provider: No    Indications:     Indications: difficulty obtaining IV access    Procedure details:     Patient evaluated for contraindications to access (i e  fistula, thrombosis, etc): Yes      Standard clean technique used for ultrasound access: Yes      Location:  Right arm    Catheter size:  20 gauge    Number of attempts:  1    Successful placement: yes    Post-procedure details:     Post-procedure:  Dressing applied    Assessment: free fluid flow and no signs of infiltration      Post-procedure complications: none      Patient tolerance of procedure: Tolerated well, no immediate complications          ED Course  ED Course as of 03/21/23 2008   Tue Mar 21, 2023   1335 LACTIC ACID(!!): 2 1  Elevated, at this point the patient has an elevated lactic acidosis but does not have any other SIRS criteria  At this point I doubt that the patient has an infection  I think that it is more likely that the patient's lactic acidosis is secondary to dehydration  No indication for antibiotics at this time  1336 Procedure Note: EKG  Date/Time: 03/21/23 1:36 PM   Interpreted by: Kirsten Meyers   Indications / Diagnosis: weakness  ECG reviewed by me, the ED Provider: yes   The EKG demonstrates:  Rhythm: normal sinus with sinus arrythmia  Intervals: normal intervals  Axis: normal axis  QRS/Blocks: normal QRS  ST Changes: No acute ST Changes, no STD/ESTER  Medical Decision Making  Patient is a 72-year-old female presenting for evaluation of generalized weakness  Based on history and evaluation, differential diagnosis includes was not limited to: Dehydration, deconditioning, viral illness, ambulatory dysfunction, rhabdo, I have low suspicion for but also considered infection, patient not currently meeting SIRS criteria based on current vitals and WBC count  I also considered but think less likely acute intracranial pathology  Plan: Sepsis labs, although I think it is less likely the patient has an acute infection, CK, CT head, chest x-ray, ECG fluids, reassessment    Labs remarkable for a mildly elevated CK, although not consistent with rhabdo  Elevated lactic with resolution on repeat  Labs otherwise largely unremarkable  ECG as independently interpreted by me as outlined in the ED course    Chest x-ray is independently interpreted by me as no acute cardiopulmonary disease  CT head reviewed by me and interpreted by radiologist as no acute intracranial abnormalities  On reassessment, patient does have some symptomatic improvement following fluid administration, however she does continue to have some generalized weakness  Given her new, worsening ambulatory dysfunction as well as the fact that she lives independently, recommended admission for further medical work-up and evaluation  Discussed this with the patient who seems to understand and is agreeable  All questions answered  Patient admitted  I independently reviewed the patient's chart including her most recent office visit from earlier this month  I considered the patient's chronic medical conditions including her history of cerebral hemorrhage in my medical decision making  I obtained history from nursing that was gathered by EMS that was not offered to me by the patient  Amount and/or Complexity of Data Reviewed  Labs: ordered  Decision-making details documented in ED Course  Radiology: ordered and independent interpretation performed  Risk  Decision regarding hospitalization  Disposition  Final diagnoses:   Generalized weakness   Elevated CK   Ambulatory dysfunction     Time reflects when diagnosis was documented in both MDM as applicable and the Disposition within this note     Time User Action Codes Description Comment    3/21/2023  4:39 PM Dewane Devante Add [R53 1] Generalized weakness     3/21/2023  4:39 PM Dewane Devante Add [R74 8] Elevated CK     3/21/2023  4:39 PM Dewane Devante Add [R26 2] Ambulatory dysfunction       ED Disposition     ED Disposition   Admit    Condition   Stable    Date/Time   Tue Mar 21, 2023  5:14 PM    Comment   Case was discussed with GRISELDA and the patient's admission status was agreed to be Admission Status: observation status to the service of Dr Alfred Conde              Follow-up Information    None         Patient's Medications   Discharge Prescriptions    No medications on file     No discharge procedures on file  PDMP Review     None           ED Provider  Attending physically available and evaluated Angelita Chavis  M[place] managed the patient along with the ED Attending      Electronically Signed by         Fredis Rosenthal DO  03/21/23 2008

## 2023-03-21 NOTE — ED ATTENDING ATTESTATION
3/21/2023  Pastor Gia WALLACE, DO, saw and evaluated the patient  I have discussed the patient with the resident/non-physician practitioner and agree with the resident's/non-physician practitioner's findings, Plan of Care, and MDM as documented in the resident's/non-physician practitioner's note, except where noted  All available labs and Radiology studies were reviewed  I was present for key portions of any procedure(s) performed by the resident/non-physician practitioner and I was immediately available to provide assistance  At this point I agree with the current assessment done in the Emergency Department  I have conducted an independent evaluation of this patient a history and physical is as follows:    68-year-old female presents with generalized weakness  Patient has history of strokes  Patient states last night she slid to the floor, did not hit her head but was unable to get up  She was on the floor for a few hours and police arrived and put her into a chair  Patient states she did not lose consciousness  She was unable to get out of the chair  On EMS was called this morning  Patient denies pain, no recent illness  Denies shortness of breath  States she has weakness on both left and right side  Has history of stroke and does have some left-sided facial droop that is unchanged  She does live alone  She is supposed to walk with a walker however often does not do so  On exam-no acute distress, heart regular, no respiratory distress, abdomen soft, cranial nerves intact except slight left facial droop at the mouth  Muscle strength is symmetric bilaterally except slightly weaker in the right lower extremity  Plan- patient with generalized weakness and lives alone, unsafe to be home alone at this time because she would not be able to care for self  Will check septic labs including urine and cardiac labs, CT head and plan for admission      ED Course         Critical Care Time  Procedures

## 2023-03-21 NOTE — ED NOTES
Covid requested by [facility] for admission bed placement     Elodia Layton RN  03/21/23 1836

## 2023-03-22 PROBLEM — R19.7 DIARRHEA: Status: ACTIVE | Noted: 2023-03-22

## 2023-03-22 PROBLEM — M62.82 RHABDOMYOLYSIS: Status: ACTIVE | Noted: 2023-03-21

## 2023-03-22 LAB
ALBUMIN SERPL BCP-MCNC: 2.8 G/DL (ref 3.5–5)
ALP SERPL-CCNC: 52 U/L (ref 46–116)
ALT SERPL W P-5'-P-CCNC: 50 U/L (ref 12–78)
ANION GAP SERPL CALCULATED.3IONS-SCNC: 4 MMOL/L (ref 4–13)
AST SERPL W P-5'-P-CCNC: 155 U/L (ref 5–45)
BASOPHILS # BLD AUTO: 0.02 THOUSANDS/ÂΜL (ref 0–0.1)
BASOPHILS NFR BLD AUTO: 0 % (ref 0–1)
BILIRUB SERPL-MCNC: 0.39 MG/DL (ref 0.2–1)
BUN SERPL-MCNC: 11 MG/DL (ref 5–25)
CALCIUM ALBUM COR SERPL-MCNC: 8.7 MG/DL (ref 8.3–10.1)
CALCIUM SERPL-MCNC: 7.7 MG/DL (ref 8.3–10.1)
CHLORIDE SERPL-SCNC: 110 MMOL/L (ref 96–108)
CK SERPL-CCNC: 4233 U/L (ref 26–192)
CO2 SERPL-SCNC: 22 MMOL/L (ref 21–32)
CREAT SERPL-MCNC: 0.46 MG/DL (ref 0.6–1.3)
EOSINOPHIL # BLD AUTO: 0.19 THOUSAND/ÂΜL (ref 0–0.61)
EOSINOPHIL NFR BLD AUTO: 3 % (ref 0–6)
ERYTHROCYTE [DISTWIDTH] IN BLOOD BY AUTOMATED COUNT: 12.6 % (ref 11.6–15.1)
GFR SERPL CREATININE-BSD FRML MDRD: 97 ML/MIN/1.73SQ M
GLUCOSE SERPL-MCNC: 84 MG/DL (ref 65–140)
HCT VFR BLD AUTO: 33.2 % (ref 34.8–46.1)
HGB BLD-MCNC: 11 G/DL (ref 11.5–15.4)
IMM GRANULOCYTES # BLD AUTO: 0.04 THOUSAND/UL (ref 0–0.2)
IMM GRANULOCYTES NFR BLD AUTO: 1 % (ref 0–2)
INR PPP: 1.05 (ref 0.84–1.19)
LYMPHOCYTES # BLD AUTO: 1 THOUSANDS/ÂΜL (ref 0.6–4.47)
LYMPHOCYTES NFR BLD AUTO: 16 % (ref 14–44)
MCH RBC QN AUTO: 34.3 PG (ref 26.8–34.3)
MCHC RBC AUTO-ENTMCNC: 33.1 G/DL (ref 31.4–37.4)
MCV RBC AUTO: 103 FL (ref 82–98)
MONOCYTES # BLD AUTO: 0.55 THOUSAND/ÂΜL (ref 0.17–1.22)
MONOCYTES NFR BLD AUTO: 9 % (ref 4–12)
NEUTROPHILS # BLD AUTO: 4.46 THOUSANDS/ÂΜL (ref 1.85–7.62)
NEUTS SEG NFR BLD AUTO: 71 % (ref 43–75)
NRBC BLD AUTO-RTO: 0 /100 WBCS
PLATELET # BLD AUTO: 196 THOUSANDS/UL (ref 149–390)
PMV BLD AUTO: 10.2 FL (ref 8.9–12.7)
POTASSIUM SERPL-SCNC: 3.5 MMOL/L (ref 3.5–5.3)
PROT SERPL-MCNC: 6.4 G/DL (ref 6.4–8.4)
PROTHROMBIN TIME: 14 SECONDS (ref 11.6–14.5)
RBC # BLD AUTO: 3.21 MILLION/UL (ref 3.81–5.12)
SODIUM SERPL-SCNC: 136 MMOL/L (ref 135–147)
WBC # BLD AUTO: 6.26 THOUSAND/UL (ref 4.31–10.16)

## 2023-03-22 RX ORDER — FLUTICASONE PROPIONATE 50 MCG
1 SPRAY, SUSPENSION (ML) NASAL DAILY
Status: DISCONTINUED | OUTPATIENT
Start: 2023-03-22 | End: 2023-03-24 | Stop reason: HOSPADM

## 2023-03-22 RX ORDER — LOPERAMIDE HYDROCHLORIDE 2 MG/1
2 CAPSULE ORAL ONCE
Status: DISCONTINUED | OUTPATIENT
Start: 2023-03-22 | End: 2023-03-23

## 2023-03-22 RX ORDER — BENZONATATE 100 MG/1
100 CAPSULE ORAL 3 TIMES DAILY PRN
Status: DISCONTINUED | OUTPATIENT
Start: 2023-03-22 | End: 2023-03-24 | Stop reason: HOSPADM

## 2023-03-22 RX ADMIN — ATORVASTATIN CALCIUM 10 MG: 10 TABLET, FILM COATED ORAL at 16:19

## 2023-03-22 RX ADMIN — PRIMIDONE 50 MG: 50 TABLET ORAL at 21:20

## 2023-03-22 RX ADMIN — FAMOTIDINE 40 MG: 20 TABLET, FILM COATED ORAL at 16:19

## 2023-03-22 RX ADMIN — ENOXAPARIN SODIUM 40 MG: 40 INJECTION SUBCUTANEOUS at 08:46

## 2023-03-22 RX ADMIN — FLUTICASONE PROPIONATE 1 SPRAY: 50 SPRAY, METERED NASAL at 16:19

## 2023-03-22 RX ADMIN — SODIUM CHLORIDE, SODIUM GLUCONATE, SODIUM ACETATE, POTASSIUM CHLORIDE, MAGNESIUM CHLORIDE, SODIUM PHOSPHATE, DIBASIC, AND POTASSIUM PHOSPHATE 100 ML/HR: .53; .5; .37; .037; .03; .012; .00082 INJECTION, SOLUTION INTRAVENOUS at 04:43

## 2023-03-22 RX ADMIN — PRIMIDONE 50 MG: 50 TABLET ORAL at 01:16

## 2023-03-22 RX ADMIN — SODIUM CHLORIDE, SODIUM GLUCONATE, SODIUM ACETATE, POTASSIUM CHLORIDE, MAGNESIUM CHLORIDE, SODIUM PHOSPHATE, DIBASIC, AND POTASSIUM PHOSPHATE 100 ML/HR: .53; .5; .37; .037; .03; .012; .00082 INJECTION, SOLUTION INTRAVENOUS at 14:36

## 2023-03-22 NOTE — ASSESSMENT & PLAN NOTE
MCV on admission 102 with history of macrocytosis since 2018  Otherwise, not anemic  Denies alcohol abuse history    · B12 normal, peripheral smear normal, reticulocyte count normal

## 2023-03-22 NOTE — ASSESSMENT & PLAN NOTE
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

## 2023-03-22 NOTE — ASSESSMENT & PLAN NOTE
Diarrhea started 3/21 while in hospital   Patient notes having 4 episodes of diarrhea since yesterday  COVID vs  C  diff  No recent use of Abx, does not live in communal setting, no history of C  diff  · C   Diff pending

## 2023-03-22 NOTE — PLAN OF CARE
Problem: OCCUPATIONAL THERAPY ADULT  Goal: Performs self-care activities at highest level of function for planned discharge setting  See evaluation for individualized goals  Description: Treatment Interventions: ADL retraining, Visual perceptual retraining, Functional transfer training, UE strengthening/ROM, Endurance training, Cognitive reorientation, Patient/family training, Equipment evaluation/education, Fine motor coordination activities, Continued evaluation          See flowsheet documentation for full assessment, interventions and recommendations  Note: Limitation: Decreased ADL status, Decreased UE ROM, Decreased UE strength, Decreased cognition, Decreased endurance, Decreased fine motor control, Decreased self-care trans, Decreased high-level ADLs  Prognosis: Good  Assessment: Pt is a 78 y o  female admitted to [facility]"" on 3/21/2023 w/ ambulatory dysfunction w/ hx of cerebral hemorrhage 10/10/2022  Pt  has a past medical history of Cataract of right eye and GERD (gastroesophageal reflux disease)  Pt with active OT orders and activity as tolerated orders  Pt precautions include contact/airborne isolation, cognitive, fall risk, bed/chair alarm, multiple lines, telemetry, visual impairment  Pt currently lives alone in a 1 st floor apartment with 2 ESTER  Pt was independent w/  ADLS and IADLS (receives Meals on Wheels), -drives (takes public transportation), and no AD/DME for functional mobility  Patient participated in OT evaluation and answered all the questions while A&Ox4  Patient participated in bed mobility w/ Mod Ax2 supine>sit, sit>supine required Mod Ax1, functional transfers sit<>stand Min Ax1, and functional mobility w/ Min Ax1 w/ RW   Pt is limited at this time 2*: endurance, activity tolerance, functional mobility, balance, functional standing tolerance, unsupportive home environment, decreased I w/ ADLS/IADLS, strength, ROM, visual deficits, cognitive impairments, impaired fine motor skills and coordination deficits  The following Occupational Performance Areas to address include: eating, grooming, bathing/shower, toilet hygiene, dressing, health maintenance, functional mobility, community mobility and clothing management  From OT standpoint, anticipate d/c inpatient rehab   Pt to continue to benefit from acute immediate OT services to address the following goals 2-3x/week to  w/in 10-14 days:  The patient's raw score on the AM-PAC Daily Activity Inpatient Short Form is 16  A raw score of less than 19 suggests the patient may benefit from discharge to post-acute rehabilitation services  Please refer to the recommendation of the Occupational Therapist for safe discharge planning       OT Discharge Recommendation: Post acute rehabilitation services

## 2023-03-22 NOTE — OCCUPATIONAL THERAPY NOTE
Occupational Therapy Evaluation     Patient Name: Ilene Sandifer  FWOCL'E Date: 3/22/2023  Problem List  Principal Problem:    Ambulatory dysfunction  Active Problems:    History of cerebral hemorrhage    Macrocytosis    Rhabdomyolysis    SARS-CoV-2 positive    Diarrhea    Past Medical History  Past Medical History:   Diagnosis Date    Cataract of right eye     GERD (gastroesophageal reflux disease)      Past Surgical History  Past Surgical History:   Procedure Laterality Date    CATARACT EXTRACTION, BILATERAL Bilateral     MASTECTOMY Bilateral     for calcium deposits    OH COLONOSCOPY FLX DX W/COLLJ SPEC WHEN PFRMD N/A 8/25/2017    Procedure: COLONOSCOPY;  Surgeon: Mary Nunez MD;  Location: [facility]; Service: Colorectal    OH XCAPSL CTRC RMVL INSJ IO LENS PROSTH W/O ECP Right 6/21/2016    Procedure: OD EXTRACTION EXTRACAPSULAR CATARACT PHACO INTRAOCULAR LENS (IOL); Surgeon: Wilbert Phillips MD;  Location: [facility];  Service: Ophthalmology    TONSILLECTOMY           03/22/23 1055   OT Last Visit   OT Visit Date 03/22/23   Note Type   Note type Evaluation   Pain Assessment   Pain Assessment Tool 0-10   Pain Score No Pain   Restrictions/Precautions   Other Precautions Contact/isolation; Airborne/isolation;Cognitive; Chair Alarm; Bed Alarm;Multiple lines;Telemetry; Fall Risk;Visual impairment  (+Covid-19, decreased eyesight in R eye)   Home Living   Type of [address] [facility] One level;Stairs to enter with rails; Performs ADLs on one level  (1st floor apartment w/ 2 ESTER)   Bathroom Shower/Tub Tub/shower unit   Bathroom Toilet Standard   Bathroom Equipment Grab bars in shower;Grab bars around toilet   [address]   Prior Function   Level of [place] Independent with ADLs; Independent with functional mobility; Needs assistance with IADLS   Lives With (S)  Alone   Receives Help From Family  (sister lives nearby and can help PRN)   IADLs Family/Friend/Other provides transportation; Independent with medication management; Family/Friend/Other provides meals  (Pt is giving up driving and gets meals on wheels)   Falls in the last 6 months 1 to 4  (1 PTA)   Vocational Retired   Comments Pt has local supportive sister nearby  Pt independent w/ ADLs, IADLs (laundry and cleaning, but recieves Meals on Wheels) PTA, -drives, and no AD/DME use PTA   Lifestyle   Autonomy Pt independent w/ ADLs, IADLs (laundry and cleaning, but recieves Meals on Wheels) PTA, -drives, and no AD/DME use PTA   Reciprocal Relationships Lives alone   Service to Others Retired   [address] Enjoys traveling and watching TV   ADL   Where Assessed Edge of bed   Eating Assistance 5  Supervision/Setup   Grooming Assistance 4  Minimal Assistance   UB Bathing Assistance 4  Minimal Assistance   LB Bathing Assistance 3  Moderate Assistance   UB Dressing Assistance 4  [address] 3  Moderate Assistance   LB Dressing Deficit Don/doff R sock; Don/doff L sock   Toileting Assistance  3  Moderate Assistance   Bed Mobility   Supine to Sit 3  Moderate assistance   Additional items Assist x 2;HOB elevated; Increased time required;LE management;Verbal cues  (VC for step by step directions and hand placement)   Sit to Supine 3  Moderate assistance   Additional items Assist x 1; Increased time required;HOB elevated;LE management;Verbal cues   Additional Comments Pt greeted supine in bed w/ all needs within reach and alarm active  Pt left supine in bed w/ all needs within reach and alarm active  Transfers   Sit to Stand 4  Minimal assistance   Additional items Assist x 1; Increased time required;Verbal cues   Stand to Sit 4  Minimal assistance   Additional items Assist x 1; Increased time required;Verbal cues   Additional Comments c RW   Functional Mobility   Functional Mobility 4  Minimal assistance   Additional Comments Pt performed functional mobility w/ 4 side steps to 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)   Fine Motor Coordination Impaired  (b/l hand Thumb-to-Finger opposition difficulty L>R, but tremors R>L)   Vision-Basic Assessment   Current Vision Wears glasses for distance only  (Pt reports R eye vision has decreased)   Cognition   Overall Cognitive Status Impaired   Arousal/Participation Alert; Responsive; Cooperative   Attention Attends with cues to redirect   Orientation Level Oriented X4   Memory Decreased short term memory;Decreased recall of recent events   Following Commands Follows one step commands with increased time or repetition   Comments Pt pleasant and cooperative to work with  Pt A&Ox4, but decrease insight into deficits, decreased short term memory/recall of recent events, decreased problem solving  Continue to evaluate   Assessment   Limitation Decreased ADL status; Decreased UE ROM; Decreased UE strength;Decreased cognition;Decreased endurance;Decreased fine motor control;Decreased self-care trans;Decreased high-level ADLs   Prognosis Good   Assessment Pt is a 78 y o  female admitted to [facility] on 3/21/2023 w/ ambulatory dysfunction w/ hx of cerebral hemorrhage 10/10/2022  Pt  has a past medical history of Cataract of right eye and GERD (gastroesophageal reflux disease)  Pt with active OT orders and activity as tolerated orders   Pt precautions include contact/airborne isolation, cognitive, fall risk, bed/chair alarm, multiple lines, telemetry, visual impairment  Pt currently lives alone in a 1 st floor apartment with 2 ESTER  Pt was independent w/  ADLS and IADLS (receives Meals on Wheels), -drives (takes public transportation), and no AD/DME for functional mobility  Patient participated in OT evaluation and answered all the questions while A&Ox4  Patient participated in bed mobility w/ Mod Ax2 supine>sit, sit>supine required Mod Ax1, functional transfers sit<>stand Min Ax1, and functional mobility w/ Min Ax1 w/ RW  Pt is limited at this time 2*: endurance, activity tolerance, functional mobility, balance, functional standing tolerance, unsupportive home environment, decreased I w/ ADLS/IADLS, strength, ROM, visual deficits, cognitive impairments, impaired fine motor skills and coordination deficits  The following Occupational Performance Areas to address include: eating, grooming, bathing/shower, toilet hygiene, dressing, health maintenance, functional mobility, community mobility and clothing management  From OT standpoint, anticipate d/c inpatient rehab   Pt to continue to benefit from acute immediate OT services to address the following goals 2-3x/week to  w/in 10-14 days:  The patient's raw score on the AM-PAC Daily Activity Inpatient Short Form is 16  A raw score of less than 19 suggests the patient may benefit from discharge to post-acute rehabilitation services  Please refer to the recommendation of the Occupational Therapist for safe discharge planning  Goals   Patient Goals To go home   LTG Time Frame 10-14   Long Term Goal #1 See below   Plan   Treatment Interventions ADL retraining;Visual perceptual retraining;Functional transfer training;UE strengthening/ROM; Endurance training;Cognitive reorientation;Patient/family training;Equipment evaluation/education; Fine motor coordination activities;Continued evaluation   Goal Expiration Date 23   OT Frequency 2-3x/wk Recommendation   OT Discharge Recommendation Post acute rehabilitation services   AM-PAC Daily Activity Inpatient   Lower Body Dressing 2   Bathing 2   Toileting 2   Upper Body Dressing 3   Grooming 3   Eating 4   Daily Activity Raw Score 16   Daily Activity Standardized Score (Calc for Raw Score >=11) 35 96   AM-PAC Applied Cognition Inpatient   Following a Speech/Presentation 2   Understanding Ordinary Conversation 3   Taking Medications 3   Remembering Where Things Are Placed or Put Away 3   Remembering List of 4-5 Errands 3   Taking Care of Complicated Tasks 2   Applied Cognition Raw Score 16   Applied Cognition Standardized Score 35 03   End of Consult   Patient Position at End of Consult Supine;Bed/Chair alarm activated; All needs within reach   Nurse Communication Nurse aware of consult     Goals:  Patient will participate in self feeding and grooming tasks with Mod I with adaptive devices PRN to engage in functional activities  Patient will participate in UB/ LB, bathing, and dressing activity with Mod I with AD/DME PRN to engage in functional tasks  Patient will participate in toileting activity with Mod I to increase functional safety  Patient will participate in functional transfers with Mod I with AD/DME PRN to increase ADL and IADL safety  Patient will participate in bed mobility with Mod I to engage in functional tasks  Patient will perform functional mobility with Mod I with assistive device PRN and good safety awareness  Patient will participate in balance activities to increase standing balance to Fair to engage in functional tasks  Patient will participate in energy conservation education w/ 100% carryover to increase functional endurance  Patient will engage in 3-4 minute standing tolerance activity to increase independence with light ADL participation      Patient will participate in ongoing formal/functional cognitive assessment with good engagement for safe discharge to home       Saskia Kc, OTS

## 2023-03-22 NOTE — ASSESSMENT & PLAN NOTE
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  She was admitted on 10/10/22 at [facility] after presenting with L sided weakness  She follows with neurology, remains off of aspirin  CT on admission shows no acute infarcts    · Continue to monitor clinically

## 2023-03-22 NOTE — ASSESSMENT & PLAN NOTE
Patient is COVID-positive on admission  Reports some mild nasal congestion, however otherwise asymptomatic and not requiring oxygen  No clear sick contacts  Chest x-ray on my read in the ED shows no acute disease with normal procalcitonin      · Mild COVID pathway  · Isolation precautions  · Monitor O2 requirement  · No intervention necessary at this time

## 2023-03-22 NOTE — PLAN OF CARE
Problem: PAIN - ADULT  Goal: Verbalizes/displays adequate comfort level or baseline comfort level  Description: Interventions:  - Encourage patient to monitor pain and request assistance  - Assess pain using appropriate pain scale  - Administer analgesics based on type and severity of pain and evaluate response  - Implement non-pharmacological measures as appropriate and evaluate response  - Consider cultural and social influences on pain and pain management  - Notify physician/advanced practitioner if interventions unsuccessful or patient reports new pain  Outcome: Progressing     Problem: INFECTION - ADULT  Goal: Absence or prevention of progression during hospitalization  Description: INTERVENTIONS:  - Assess and monitor for signs and symptoms of infection  - Monitor lab/diagnostic results  - Monitor all insertion sites, i e  indwelling lines, tubes, and drains  - Monitor endotracheal if appropriate and nasal secretions for changes in amount and color  - [place] appropriate cooling/warming therapies per order  - Administer medications as ordered  - Instruct and encourage patient and family to use good hand hygiene technique  - Identify and instruct in appropriate isolation precautions for identified infection/condition  Outcome: Progressing  Goal: Absence of fever/infection during neutropenic period  Description: INTERVENTIONS:  - Monitor WBC    Outcome: Progressing     Problem: SAFETY ADULT  Goal: Patient will remain free of falls  Description: INTERVENTIONS:  - Educate patient/family on patient safety including physical limitations  - Instruct patient to call for assistance with activity   - Consult OT/PT to assist with strengthening/mobility   - Keep Call bell within reach  - Keep bed low and locked with side rails adjusted as appropriate  - Keep care items and personal belongings within reach  - Initiate and maintain comfort rounds  - Make Fall Risk Sign visible to staff  - Offer Toileting every    Hours, in advance of need  - Initiate/Maintain   alarm  - Obtain necessary fall risk management equipment:       - Apply yellow socks and bracelet for high fall risk patients  - Consider moving patient to room near nurses station  Outcome: Progressing  Goal: Maintain or return to baseline ADL function  Description: INTERVENTIONS:  -  Assess patient's ability to carry out ADLs; assess patient's baseline for ADL function and identify physical deficits which impact ability to perform ADLs (bathing, care of mouth/teeth, toileting, grooming, dressing, etc )  - Assess/evaluate cause of self-care deficits   - Assess range of motion  - Assess patient's mobility; develop plan if impaired  - Assess patient's need for assistive devices and provide as appropriate  - Encourage maximum independence but intervene and supervise when necessary  - Involve family in performance of ADLs  - Assess for home care needs following discharge   - Consider OT consult to assist with ADL evaluation and planning for discharge  - Provide patient education as appropriate  Outcome: Progressing  Goal: Maintains/Returns to pre admission functional level  Description: INTERVENTIONS:  - Perform BMAT or MOVE assessment daily    - Set and communicate daily mobility goal to care team and patient/family/caregiver  - Collaborate with rehabilitation services on mobility goals if consulted  - Perform Range of Motion    times a day  - Reposition patient every    hours    - Dangle patient    times a day  - Stand patient    times a day  - Ambulate patient  times a day  - Out of bed to chair    times a day   - Out of bed for meals    times a day  - Out of bed for toileting  - Record patient progress and toleration of activity level   Outcome: Progressing     Problem: DISCHARGE PLANNING  Goal: Discharge to home or other facility with appropriate resources  Description: INTERVENTIONS:  - Identify barriers to discharge w/patient and caregiver  - Arrange for needed discharge resources and transportation as appropriate  - Identify discharge learning needs (meds, wound care, etc )  - Arrange for interpretive services to assist at discharge as needed  - Refer to Case Management Department for coordinating discharge planning if the patient needs post-hospital services based on physician/advanced practitioner order or complex needs related to functional status, cognitive ability, or social support system  Outcome: Progressing     Problem: Knowledge Deficit  Goal: Patient/family/caregiver demonstrates understanding of disease process, treatment plan, medications, and discharge instructions  Description: Complete learning assessment and assess knowledge base    Interventions:  - Provide teaching at level of understanding  - Provide teaching via preferred learning methods  Outcome: Progressing

## 2023-03-22 NOTE — PROGRESS NOTES
INTERNAL MEDICINE RESIDENCY PROGRESS NOTE     Name: Erika Meneses   Age & Sex: 78 y o  female   MRN: [id number]  Unit/Bed#: -01   Encounter: [id number]  Team: SOD Team C     PATIENT INFORMATION     Name: Erika Meneses   Age & Sex: 78 y o  female   MRN: [id number]  Hospital Stay Days: 0    ASSESSMENT/PLAN     Principal Problem:    Ambulatory dysfunction  Active Problems:    History of cerebral hemorrhage    Macrocytosis    Rhabdomyolysis    SARS-CoV-2 positive    Diarrhea      Diarrhea  Assessment & Plan  Diarrhea started 3/21 while in hospital   Patient notes having 4 episodes of diarrhea since yesterday  COVID vs  C  diff  No recent use of Abx, does not live in communal setting, no history of C  diff  · Test ordered for C  Diff          SARS-CoV-2 positive  Assessment & Plan  Patient is COVID-positive on admission  Reports some mild nasal congestion, however otherwise asymptomatic and not requiring oxygen  No clear sick contacts  Chest x-ray on my read in the ED shows no acute disease with normal procalcitonin  · Mild COVID pathway  · Isolation precautions  · Monitor O2 requirement  · No intervention necessary at this time    Rhabdomyolysis  Assessment & Plan  Patient presents with elevated CK 6836, in the setting of elevated transaminases  and ALT 54  Without renal dysfunction  She presents with generalized weakness and extended downtime, so there is suspicion for rhabdomyolysis, however patient is without myalgias and denies dark urine  Also urinalysis shows clear light yellow urine without blood  Other etiology is asymptomatic COVID-19 infection  On 3/21 CK down to 4233 from 6836 on previous day  , down from 202 on previous day  · Start maintenance fluids with isolyte 100 cc/hour      Macrocytosis  Assessment & Plan  MCV on admission 102 with history of macrocytosis since 2018  Otherwise, not anemic  Denies alcohol abuse history    · Obtain B12, peripheral smear, reticulocyte count      History of cerebral hemorrhage  Assessment & Plan  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  She was admitted on 10/10/22 at hospitals after presenting with L sided weakness  She follows with neurology, remains off of aspirin  CT on admission shows no acute infarcts  · Continue to monitor clinically    * Ambulatory dysfunction  Assessment & Plan  68-year-old female with a history of CVA in 2018 with residual left-sided weakness presents 3/21/2023 with worsening lower extremity weakness and ambulatory dysfunction x2 days, in addition to URI symptoms  On admission, vital signs stable and saturating well on room air, afebrile  Labs notable for elevated CK >6k, elevated lactic acid 2 1, and COVID-positive  CT head negative  She has residual left sided weakness without new deficits, and strength intact lower extremities without red flag symptoms  I suspect her symptoms are 2/2 dehydration in the setting of COVID-19 and decreased PO intake  · Management as below  · PT/OT consult      Disposition: Remain inpatient for PT/OT eval for ambulation      SUBJECTIVE     Patient seen and examined  Patient notes having 4 episodes of diarrhea since yesterday  She would like to try a medication to alleviate the diarrhea  Patient reports having diarrhea for the past 3 days and burning urination since yesterday  UV was negative for leukocytes and nitrites  Otherwise, no acute events overnight  This morning, patient notes feeling dry with little bit of cough, stuffy nose  She denies SOB, sore throat, abdominal pain, fever, chills, nausea, vomiting, headache        OBJECTIVE     Vitals:    03/21/23 1700 03/21/23 1900 03/21/23 2208 03/22/23 1057   BP: 154/71  127/65 130/70   BP Location:   Right arm    Pulse: 88 82  94   Resp: 20 20     Temp:   98 8 °F (37 1 °C)    TempSrc:   Oral    SpO2: 97% 97% 94% 96%   Weight:   65 3 kg (143 lb 15 4 oz)    Height:   5' 4" (1 626 m)       Temperature:   Temp (24hrs), Av 8 °F (37 1 °C), Min:98 8 °F (37 1 °C), Max:98 8 °F (37 1 °C)    Temperature: 98 8 °F (37 1 °C)  Intake & Output:  I/O        0701   07 0701   0700  0701   0700    IV Piggyback  2000     Total Intake(mL/kg)  2000 (30 6)     Urine (mL/kg/hr)  500     Total Output  500     Net  +1500            Unmeasured Stool Occurrence   3 x        Weights:   IBW (Ideal Body Weight): 54 7 kg    Body mass index is 24 71 kg/m²  Weight (last 2 days)     Date/Time Weight    23 22:08:28 65 3 (143 96)        Physical Exam  Constitutional:       Appearance: She is ill-appearing  HENT:      Right Ear: External ear normal       Left Ear: External ear normal       Nose: Congestion present  Eyes:      General:         Right eye: No discharge  Left eye: No discharge  Cardiovascular:      Rate and Rhythm: Normal rate and regular rhythm  Heart sounds: No murmur heard  Pulmonary:      Effort: Pulmonary effort is normal  No respiratory distress  Breath sounds: No stridor  No wheezing, rhonchi or rales  Abdominal:      General: There is no distension  Palpations: Abdomen is soft  Tenderness: There is no abdominal tenderness  Musculoskeletal:      Right lower leg: No edema  Left lower leg: No edema  Comments: Strength diffusely 5/5 except for left hip flexion  Patient notes pain and weakness with left hip flexion  Skin:     General: Skin is warm and dry  Capillary Refill: Capillary refill takes less than 2 seconds  Neurological:      Mental Status: She is alert  Psychiatric:         Mood and Affect: Mood normal          Behavior: Behavior normal        LABORATORY DATA     Labs: I have personally reviewed pertinent reports    Results from last 7 days   Lab Units 23  0444 23  1203   WBC Thousand/uL 6 26 8 34   HEMOGLOBIN g/dL 11 0* 12 6   HEMATOCRIT % 33 2* 37 2   PLATELETS Thousands/uL 196 158   NEUTROS PCT % 71 75   MONOS PCT % 9 10      Results from last 7 days   Lab Units 03/22/23  0444 03/21/23  1203   POTASSIUM mmol/L 3 5 3 7   CHLORIDE mmol/L 110* 107   CO2 mmol/L 22 21   BUN mg/dL 11 19   CREATININE mg/dL 0 46* 0 72   CALCIUM mg/dL 7 7* 9 2   ALK PHOS U/L 52 63   ALT U/L 50 54   AST U/L 155* 202*     Results from last 7 days   Lab Units 03/21/23  1203   MAGNESIUM mg/dL 2 2     Results from last 7 days   Lab Units 03/21/23  1203   PHOSPHORUS mg/dL 2 6      Results from last 7 days   Lab Units 03/22/23  0444 03/21/23  1203   INR  1 05 1 01   PTT seconds  --  31     Results from last 7 days   Lab Units 03/21/23  1422   LACTIC ACID mmol/L 1 4           IMAGING & DIAGNOSTIC TESTING     Radiology Results: I have personally reviewed pertinent reports  XR chest 2 views    Result Date: 3/22/2023  Impression: No radiographic evidence of acute intrathoracic process  Workstation performed: [id number]     CT head without contrast    Result Date: 3/21/2023  Impression: No evidence of acute intracranial process  Chronic microangiopathy  Chronic right subinsular hyperdensity similar to January 2023 attributed to hemosiderin deposition on the prior MRI  Workstation performed: [id number]     Other Diagnostic Testing: I have personally reviewed pertinent reports      ACTIVE MEDICATIONS     Current Facility-Administered Medications   Medication Dose Route Frequency   • atorvastatin (LIPITOR) tablet 10 mg  10 mg Oral Daily With Dinner   • enoxaparin (LOVENOX) subcutaneous injection 40 mg  40 mg Subcutaneous Daily   • famotidine (PEPCID) tablet 40 mg  40 mg Oral Daily With Dinner   • loperamide (IMODIUM) capsule 2 mg  2 mg Oral Once   • multi-electrolyte (PLASMALYTE-A/ISOLYTE-S PH 7 4) IV solution  100 mL/hr Intravenous Continuous   • primidone (MYSOLINE) tablet 50 mg  50 mg Oral HS       VTE Pharmacologic Prophylaxis: Enoxaparin (Lovenox)  VTE Mechanical Prophylaxis: sequential compression device    Portions of the record may have been created with voice recognition software  Occasional wrong word or "sound a like" substitutions may have occurred due to the inherent limitations of voice recognition software    Read the chart carefully and recognize, using context, where substitutions have occurred   ==  Matthew Cruz MD  [address]  Internal Medicine Residency PGY-1

## 2023-03-22 NOTE — PLAN OF CARE
Problem: PAIN - ADULT  Goal: Verbalizes/displays adequate comfort level or baseline comfort level  Description: Interventions:  - Encourage patient to monitor pain and request assistance  - Assess pain using appropriate pain scale  - Administer analgesics based on type and severity of pain and evaluate response  - Implement non-pharmacological measures as appropriate and evaluate response  - Consider cultural and social influences on pain and pain management  - Notify physician/advanced practitioner if interventions unsuccessful or patient reports new pain  Outcome: Progressing     Problem: INFECTION - ADULT  Goal: Absence or prevention of progression during hospitalization  Description: INTERVENTIONS:  - Assess and monitor for signs and symptoms of infection  - Monitor lab/diagnostic results  - Monitor all insertion sites, i e  indwelling lines, tubes, and drains  - Monitor endotracheal if appropriate and nasal secretions for changes in amount and color  - [place] appropriate cooling/warming therapies per order  - Administer medications as ordered  - Instruct and encourage patient and family to use good hand hygiene technique  - Identify and instruct in appropriate isolation precautions for identified infection/condition  Outcome: Progressing  Goal: Absence of fever/infection during neutropenic period  Description: INTERVENTIONS:  - Monitor WBC    Outcome: Progressing     Problem: SAFETY ADULT  Goal: Patient will remain free of falls  Description: INTERVENTIONS:  - Educate patient/family on patient safety including physical limitations  - Instruct patient to call for assistance with activity   - Consult OT/PT to assist with strengthening/mobility   - Keep Call bell within reach  - Keep bed low and locked with side rails adjusted as appropriate  - Keep care items and personal belongings within reach  - Initiate and maintain comfort rounds  - Make Fall Risk Sign visible to staff  - Apply yellow socks and bracelet for high fall risk patients  - Consider moving patient to room near nurses station  Outcome: Progressing  Goal: Maintain or return to baseline ADL function  Description: INTERVENTIONS:  -  Assess patient's ability to carry out ADLs; assess patient's baseline for ADL function and identify physical deficits which impact ability to perform ADLs (bathing, care of mouth/teeth, toileting, grooming, dressing, etc )  - Assess/evaluate cause of self-care deficits   - Assess range of motion  - Assess patient's mobility; develop plan if impaired  - Assess patient's need for assistive devices and provide as appropriate  - Encourage maximum independence but intervene and supervise when necessary  - Involve family in performance of ADLs  - Assess for home care needs following discharge   - Consider OT consult to assist with ADL evaluation and planning for discharge  - Provide patient education as appropriate  Outcome: Progressing  Goal: Maintains/Returns to pre admission functional level  Description: INTERVENTIONS:  - Perform BMAT or MOVE assessment daily    - Set and communicate daily mobility goal to care team and patient/family/caregiver     - Collaborate with rehabilitation services on mobility goals if consulted  - Out of bed for toileting  - Record patient progress and toleration of activity level   Outcome: Progressing     Problem: DISCHARGE PLANNING  Goal: Discharge to home or other facility with appropriate resources  Description: INTERVENTIONS:  - Identify barriers to discharge w/patient and caregiver  - Arrange for needed discharge resources and transportation as appropriate  - Identify discharge learning needs (meds, wound care, etc )  - Arrange for interpretive services to assist at discharge as needed  - Refer to Case Management Department for coordinating discharge planning if the patient needs post-hospital services based on physician/advanced practitioner order or complex needs related to functional status, cognitive ability, or social support system  Outcome: Progressing     Problem: Knowledge Deficit  Goal: Patient/family/caregiver demonstrates understanding of disease process, treatment plan, medications, and discharge instructions  Description: Complete learning assessment and assess knowledge base    Interventions:  - Provide teaching at level of understanding  - Provide teaching via preferred learning methods  Outcome: Progressing     Problem: Prexisting or High Potential for Compromised Skin Integrity  Goal: Skin integrity is maintained or improved  Description: INTERVENTIONS:  - Identify patients at risk for skin breakdown  - Assess and monitor skin integrity  - Assess and monitor nutrition and hydration status  - Monitor labs   - Assess for incontinence   - Turn and reposition patient  - Assist with mobility/ambulation  - Relieve pressure over bony prominences  - Avoid friction and shearing  - Provide appropriate hygiene as needed including keeping skin clean and dry  - Evaluate need for skin moisturizer/barrier cream  - Collaborate with interdisciplinary team   - Patient/family teaching  - Consider wound care consult   Outcome: Progressing     Problem: MOBILITY - ADULT  Goal: Maintain or return to baseline ADL function  Description: INTERVENTIONS:  -  Assess patient's ability to carry out ADLs; assess patient's baseline for ADL function and identify physical deficits which impact ability to perform ADLs (bathing, care of mouth/teeth, toileting, grooming, dressing, etc )  - Assess/evaluate cause of self-care deficits   - Assess range of motion  - Assess patient's mobility; develop plan if impaired  - Assess patient's need for assistive devices and provide as appropriate  - Encourage maximum independence but intervene and supervise when necessary  - Involve family in performance of ADLs  - Assess for home care needs following discharge   - Consider OT consult to assist with ADL evaluation and planning for discharge  - Provide patient education as appropriate  Outcome: Progressing  Goal: Maintains/Returns to pre admission functional level  Description: INTERVENTIONS:  - Perform BMAT or MOVE assessment daily    - Set and communicate daily mobility goal to care team and patient/family/caregiver     - Collaborate with rehabilitation services on mobility goals if consulted  - Out of bed for toileting  - Record patient progress and toleration of activity level   Outcome: Progressing

## 2023-03-22 NOTE — PLAN OF CARE
Problem: PHYSICAL THERAPY ADULT  Goal: Performs mobility at highest level of function for planned discharge setting  See evaluation for individualized goals  Description: Treatment/Interventions: LE strengthening/ROM, Functional transfer training, Therapeutic exercise, Elevations, Endurance training, Patient/family training, Equipment eval/education, Gait training, Bed mobility          See flowsheet documentation for full assessment, interventions and recommendations  Note: Prognosis: Fair  Problem List: Decreased strength, Decreased endurance, Impaired balance, Decreased mobility, Decreased cognition, Decreased coordination, Decreased safety awareness, Impaired judgement, Pain  Assessment: Pt seen for physical therapy evaluation  Pt is a 78 y o female w/ history/comorbidities of CVAs, cerebral bleed, GERD, cataracts who is now admitted w/ generalized weakness, fatigue, fall  Needed to be assisted into chair, then was unable to get out of chair for unknown duration  Undergoing w/u, but found to be COVID +  Due to acute medical issues, pain, fall risk, note unstable clinical picture  PT consulted to assess mobility, d/c needs  Pt presents w/ decreased functional mob, sitting and standing balance, endurance, B LE strength, barriers at home  Pt will benefit from skilled PT to correct for the above problems  Recommend rehab at d/c        PT Discharge Recommendation: Post acute rehabilitation services    See flowsheet documentation for full assessment

## 2023-03-22 NOTE — PHYSICAL THERAPY NOTE
Physical Therapy Evaluation    Patient's Name: Humble Palacios    Admitting Diagnosis  Weakness [R53 1]  Elevated CK [R74 8]  Generalized weakness [R53 1]  Ambulatory dysfunction [R26 2]    Problem List  Patient Active Problem List   Diagnosis    Cataract of right eye    Idiopathic osteoporosis    Sacroiliitis (HCC)    Primary osteoarthritis of hand    Transient cerebral ischemia    Premature atrial beats    Chronic left-sided low back pain    History of cerebral hemorrhage    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 (HCC)    Acute cerebral hemorrhage (HCC)    Tremor, essential    Macrocytosis    Ambulatory dysfunction    Rhabdomyolysis    SARS-CoV-2 positive    Diarrhea       Past Medical History  Past Medical History:   Diagnosis Date    Cataract of right eye     GERD (gastroesophageal reflux disease)        Past Surgical History  Past Surgical History:   Procedure Laterality Date    CATARACT EXTRACTION, BILATERAL Bilateral     MASTECTOMY Bilateral     for calcium deposits    CO COLONOSCOPY FLX DX W/COLLJ SPEC WHEN PFRMD N/A 8/25/2017    Procedure: COLONOSCOPY;  Surgeon: Jigna Brady MD;  Location: [facility]; Service: Colorectal    CO XCAPSL CTRC RMVL INSJ IO LENS PROSTH W/O ECP Right 6/21/2016    Procedure: OD EXTRACTION EXTRACAPSULAR CATARACT PHACO INTRAOCULAR LENS (IOL); Surgeon: Linnea Lofton MD;  Location: [facility];  Service: Ophthalmology    TONSILLECTOMY            03/22/23 1000   PT Last Visit   PT Visit Date 03/22/23   Note Type   Note type Evaluation   Pain Assessment   Pain Assessment Tool 0-10   Pain Score 5   Pain Location/Orientation Location: Generalized   Patient's Stated Pain Goal No pain   Hospital Pain Intervention(s) Repositioned   Restrictions/Precautions   Weight Bearing Precautions Per Order No   Other Precautions Contact/isolation; Airborne/isolation;Cognitive; Chair Alarm; Bed Alarm;Multiple lines;Telemetry; Fall Risk;Pain  (pt is COVID +)   Home Living Type of Home Apartment   Additional Comments Per pt- resides alone in apt  Normally indep self care, has family and friends that check in   does not drive, has RW that she has not been using   Prior Function   Level of [place] Independent with ADLs; Independent with functional mobility   Falls in the last 6 months 1 to 4  (at least 1 leading to this admit)   General   Family/Caregiver Present No   Cognition   Overall Cognitive Status Impaired   Arousal/Participation Responsive   Orientation Level Oriented X4   Memory Unable to assess   Following Commands Follows one step commands without difficulty   Subjective   Subjective states she feels very weak  "I don't know how this will go"   RLE Assessment   RLE Assessment   (strength grossly 2 to 3-/5)   LLE Assessment   LLE Assessment   (strength grossly 2 to 3-/5)   Coordination   Movements are Fluid and Coordinated 0   Coordination and Movement Description B LE ataxia   Bed Mobility   Supine to Sit 3  Moderate assistance   Additional items Assist x 1   Sit to Supine 3  Moderate assistance   Additional items Assist x 1   Additional Comments sat EOB x several minutes prior to standing, mild posterior LOB  maintains w/ min A   some dizziness   Transfers   Sit to Stand 2  Maximal assistance   Additional items Assist x 1; Increased time required  (B knee buckling)   Stand to Sit 3  Moderate assistance   Additional items Assist x 1; Increased time required  (B knee buckling)   Additional Comments stood x 1 at EOB xapprox 10-15 sec   posterior LOB, B knee buckling    not stable enough to try gait   Balance   Static Sitting Fair -   Dynamic Sitting Poor +   Static Standing Poor   Dynamic Standing Poor -   Endurance Deficit   Endurance Deficit Yes   Endurance Deficit Description fatigue, weakness, pain, cog   Activity Tolerance   Activity Tolerance Patient limited by fatigue;Patient limited by pain;Treatment limited secondary to medical complications (Comment)   Nurse Made Aware yes   Assessment   Prognosis Fair   Problem List Decreased strength;Decreased endurance; Impaired balance;Decreased mobility; Decreased cognition;Decreased coordination;Decreased safety awareness; Impaired judgement;Pain   Assessment Pt seen for physical therapy evaluation  Pt is a 78 y o female w/ history/comorbidities of CVAs, cerebral bleed, GERD, cataracts who is now admitted w/ generalized weakness, fatigue, fall  Needed to be assisted into chair, then was unable to get out of chair for unknown duration  Undergoing w/u, but found to be COVID +  Due to acute medical issues, pain, fall risk, note unstable clinical picture  PT consulted to assess mobility, d/c needs  Pt presents w/ decreased functional mob, sitting and standing balance, endurance, B LE strength, barriers at home  Pt will benefit from skilled PT to correct for the above problems  Recommend rehab at d/c   Goals   Patient Goals to rest   STG Expiration Date 04/05/23   Short Term Goal #1 1-2 wks: bed mob and transfers w/ S, standing balance to good w/ device, ambulate 100-150 ft w/ RW and S, increase B LE strength by 1/2 -1 grade   PT Treatment Day 0   Plan   Treatment/Interventions LE strengthening/ROM; Functional transfer training; Therapeutic exercise;Elevations; Endurance training;Patient/family training;Equipment eval/education;Gait training;Bed mobility   PT Frequency 3-5x/wk   Recommendation   PT Discharge Recommendation Post acute rehabilitation services   AM-PAC Basic Mobility Inpatient   Turning in Flat Bed Without Bedrails 3   Lying on Back to Sitting on Edge of Flat Bed Without Bedrails 2   Moving Bed to Chair 2   Standing Up From Chair Using Arms 2   Walk in Room 1   Climb 3-5 Stairs With Railing 1   Basic Mobility Inpatient Raw Score 11   Basic Mobility Standardized Score 30 25   Highest Level Of Mobility   -HLM Goal 4: Move to chair/commode   -HLM Achieved 3: Sit at edge of bed         Raven Gao PT, DPT, CSRS

## 2023-03-23 LAB
ANION GAP SERPL CALCULATED.3IONS-SCNC: 4 MMOL/L (ref 4–13)
BASOPHILS # BLD AUTO: 0.02 THOUSANDS/ÂΜL (ref 0–0.1)
BASOPHILS NFR BLD AUTO: 0 % (ref 0–1)
BUN SERPL-MCNC: 8 MG/DL (ref 5–25)
C DIFF TOX GENS STL QL NAA+PROBE: NEGATIVE
CALCIUM SERPL-MCNC: 7.5 MG/DL (ref 8.3–10.1)
CHLORIDE SERPL-SCNC: 110 MMOL/L (ref 96–108)
CK SERPL-CCNC: 2187 U/L (ref 26–192)
CO2 SERPL-SCNC: 22 MMOL/L (ref 21–32)
CREAT SERPL-MCNC: 0.43 MG/DL (ref 0.6–1.3)
EOSINOPHIL # BLD AUTO: 0.01 THOUSAND/ÂΜL (ref 0–0.61)
EOSINOPHIL NFR BLD AUTO: 0 % (ref 0–6)
ERYTHROCYTE [DISTWIDTH] IN BLOOD BY AUTOMATED COUNT: 12.6 % (ref 11.6–15.1)
GFR SERPL CREATININE-BSD FRML MDRD: 99 ML/MIN/1.73SQ M
GLUCOSE SERPL-MCNC: 95 MG/DL (ref 65–140)
HCT VFR BLD AUTO: 35 % (ref 34.8–46.1)
HGB BLD-MCNC: 11.8 G/DL (ref 11.5–15.4)
IMM GRANULOCYTES # BLD AUTO: 0.02 THOUSAND/UL (ref 0–0.2)
IMM GRANULOCYTES NFR BLD AUTO: 0 % (ref 0–2)
LYMPHOCYTES # BLD AUTO: 1.35 THOUSANDS/ÂΜL (ref 0.6–4.47)
LYMPHOCYTES NFR BLD AUTO: 23 % (ref 14–44)
MCH RBC QN AUTO: 34.4 PG (ref 26.8–34.3)
MCHC RBC AUTO-ENTMCNC: 33.7 G/DL (ref 31.4–37.4)
MCV RBC AUTO: 102 FL (ref 82–98)
MONOCYTES # BLD AUTO: 0.5 THOUSAND/ÂΜL (ref 0.17–1.22)
MONOCYTES NFR BLD AUTO: 8 % (ref 4–12)
NEUTROPHILS # BLD AUTO: 4.08 THOUSANDS/ÂΜL (ref 1.85–7.62)
NEUTS SEG NFR BLD AUTO: 69 % (ref 43–75)
NRBC BLD AUTO-RTO: 0 /100 WBCS
PLATELET # BLD AUTO: 191 THOUSANDS/UL (ref 149–390)
PMV BLD AUTO: 9.6 FL (ref 8.9–12.7)
POTASSIUM SERPL-SCNC: 3.4 MMOL/L (ref 3.5–5.3)
RBC # BLD AUTO: 3.43 MILLION/UL (ref 3.81–5.12)
SODIUM SERPL-SCNC: 136 MMOL/L (ref 135–147)
WBC # BLD AUTO: 5.98 THOUSAND/UL (ref 4.31–10.16)

## 2023-03-23 RX ORDER — POTASSIUM CHLORIDE 20 MEQ/1
20 TABLET, EXTENDED RELEASE ORAL ONCE
Status: COMPLETED | OUTPATIENT
Start: 2023-03-23 | End: 2023-03-23

## 2023-03-23 RX ADMIN — FAMOTIDINE 40 MG: 20 TABLET, FILM COATED ORAL at 16:41

## 2023-03-23 RX ADMIN — ENOXAPARIN SODIUM 40 MG: 40 INJECTION SUBCUTANEOUS at 08:28

## 2023-03-23 RX ADMIN — PRIMIDONE 50 MG: 50 TABLET ORAL at 21:09

## 2023-03-23 RX ADMIN — FLUTICASONE PROPIONATE 1 SPRAY: 50 SPRAY, METERED NASAL at 08:28

## 2023-03-23 RX ADMIN — POTASSIUM CHLORIDE 20 MEQ: 1500 TABLET, EXTENDED RELEASE ORAL at 08:28

## 2023-03-23 RX ADMIN — SODIUM CHLORIDE, SODIUM GLUCONATE, SODIUM ACETATE, POTASSIUM CHLORIDE, MAGNESIUM CHLORIDE, SODIUM PHOSPHATE, DIBASIC, AND POTASSIUM PHOSPHATE 100 ML/HR: .53; .5; .37; .037; .03; .012; .00082 INJECTION, SOLUTION INTRAVENOUS at 00:42

## 2023-03-23 RX ADMIN — ATORVASTATIN CALCIUM 10 MG: 10 TABLET, FILM COATED ORAL at 16:41

## 2023-03-23 NOTE — PROGRESS NOTES
INTERNAL MEDICINE RESIDENCY PROGRESS NOTE     Name: Kevin Nam   Age & Sex: 78 y o  female   MRN: [id number]  Unit/Bed#: -01   Encounter: [id number]  Team: SOD Team C     PATIENT INFORMATION     Name: Kevin Nam   Age & Sex: 78 y o  female   MRN: [id number]  Hospital Stay Days: 1    ASSESSMENT/PLAN     Principal Problem:    Ambulatory dysfunction  Active Problems:    History of cerebral hemorrhage    Macrocytosis    Rhabdomyolysis    SARS-CoV-2 positive    Diarrhea      Diarrhea  Assessment & Plan  Diarrhea started 3/21 while in hospital   Patient notes having 4 episodes of diarrhea since yesterday  COVID vs  C  diff  No recent use of Abx, does not live in communal setting, no history of C  diff  · C  Diff pending           SARS-CoV-2 positive  Assessment & Plan  Patient is COVID-positive on admission  Reports some mild nasal congestion, however otherwise asymptomatic and not requiring oxygen  No clear sick contacts  Chest x-ray on my read in the ED shows no acute disease with normal procalcitonin  · Mild COVID pathway  · Isolation precautions  · Monitor O2 requirement  · No intervention necessary at this time    Rhabdomyolysis  Assessment & Plan  Patient presents with elevated CK 6836, in the setting of elevated transaminases  and ALT 54  Without renal dysfunction  She presents with generalized weakness and extended downtime, so there is suspicion for rhabdomyolysis, however patient is without myalgias and denies dark urine  Also urinalysis shows clear light yellow urine without blood  Other etiology is asymptomatic COVID-19 infection  CK down to 2000 from 4000  , down from 202 on previous day  · Cont maintenance fluids with isolyte 100 cc/hour      Macrocytosis  Assessment & Plan  MCV on admission 102 with history of macrocytosis since 2018  Otherwise, not anemic  Denies alcohol abuse history    · B12 normal, peripheral smear normal, reticulocyte count normal History of cerebral hemorrhage  Assessment & Plan  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  She was admitted on 10/10/22 at [facility] after presenting with L sided weakness  She follows with neurology, remains off of aspirin  CT on admission shows no acute infarcts  · Continue to monitor clinically    * Ambulatory dysfunction  Assessment & Plan  68-year-old female with a history of CVA in 2018 with residual left-sided weakness presents 3/21/2023 with worsening lower extremity weakness and ambulatory dysfunction x2 days, in addition to URI symptoms  On admission, vital signs stable and saturating well on room air, afebrile  Labs notable for elevated CK >6k, elevated lactic acid 2 1, and COVID-positive  CT head negative  She has residual left sided weakness without new deficits, and strength intact lower extremities without red flag symptoms  I suspect her symptoms are 2/2 dehydration in the setting of COVID-19 and decreased PO intake  · Management as below  · PT/OT recommends post acute rehab, CM aware      Disposition: continue medical management     SUBJECTIVE     Patient seen and examined  No acute events overnight  Patient states she continues to feel weak and understands the need for post acute rehab  She denies continued diarrhea, and has not had a bowel movement since the night previously  She denies chest pain, SOB, abd pain, n/v, fever/chills   Case management aware of need for post acute rehab     OBJECTIVE     Vitals:    23 1057 23 1704 23 2129 23 0900   BP: 130/70 135/69 136/69 138/74   BP Location:    Left arm   Pulse: 94 79 78    Resp:  20     Temp:  98 3 °F (36 8 °C) 97 8 °F (36 6 °C)    TempSrc:       SpO2: 96% 95% 92%    Weight:       Height:          Temperature:   Temp (24hrs), Av 1 °F (36 7 °C), Min:97 8 °F (36 6 °C), Max:98 3 °F (36 8 °C)    Temperature: 97 8 °F (36 6 °C)  Intake & Output:  I/O        0701  03/22 0700 03/22 0701  03/23 0700 03/23 0701  03/24 0700    P  O   540     I V  (mL/kg)  1966 7 (30 1)     IV Piggyback 2000      Total Intake(mL/kg) 2000 (30 6) 2506 7 (38 4)     Urine (mL/kg/hr) 500 1900 (1 2)     Total Output 500 1900     Net +1500 +606 7            Unmeasured Stool Occurrence  3 x         Weights:   IBW (Ideal Body Weight): 54 7 kg    Body mass index is 24 71 kg/m²  Weight (last 2 days)     Date/Time Weight    03/21/23 22:08:28 65 3 (143 96)        Physical Exam  Vitals and nursing note reviewed  Constitutional:       General: She is not in acute distress  Appearance: She is well-developed  HENT:      Head: Normocephalic and atraumatic  Eyes:      Conjunctiva/sclera: Conjunctivae normal    Cardiovascular:      Rate and Rhythm: Normal rate and regular rhythm  Heart sounds: No murmur heard  Pulmonary:      Effort: Pulmonary effort is normal  No respiratory distress  Breath sounds: Normal breath sounds  Abdominal:      Palpations: Abdomen is soft  Tenderness: There is no abdominal tenderness  Musculoskeletal:         General: No swelling  Cervical back: Neck supple  Skin:     General: Skin is warm and dry  Capillary Refill: Capillary refill takes less than 2 seconds  Neurological:      General: No focal deficit present  Mental Status: She is alert and oriented to person, place, and time  Psychiatric:         Mood and Affect: Mood normal        LABORATORY DATA     Labs: I have personally reviewed pertinent reports    Results from last 7 days   Lab Units 03/23/23  0512 03/22/23 0444 03/21/23  1203   WBC Thousand/uL 5 98 6 26 8 34   HEMOGLOBIN g/dL 11 8 11 0* 12 6   HEMATOCRIT % 35 0 33 2* 37 2   PLATELETS Thousands/uL 191 196 158   NEUTROS PCT % 69 71 75   MONOS PCT % 8 9 10      Results from last 7 days   Lab Units 03/23/23  0512 03/22/23  0444 03/21/23  1203   POTASSIUM mmol/L 3 4* 3 5 3 7   CHLORIDE mmol/L 110* 110* 107   CO2 mmol/L 22 22 21 BUN mg/dL 8 11 19   CREATININE mg/dL 0 43* 0 46* 0 72   CALCIUM mg/dL 7 5* 7 7* 9 2   ALK PHOS U/L  --  52 63   ALT U/L  --  50 54   AST U/L  --  155* 202*     Results from last 7 days   Lab Units 03/21/23  1203   MAGNESIUM mg/dL 2 2     Results from last 7 days   Lab Units 03/21/23  1203   PHOSPHORUS mg/dL 2 6      Results from last 7 days   Lab Units 03/22/23  0444 03/21/23  1203   INR  1 05 1 01   PTT seconds  --  31     Results from last 7 days   Lab Units 03/21/23  1422   LACTIC ACID mmol/L 1 4           IMAGING & DIAGNOSTIC TESTING     Radiology Results: I have personally reviewed pertinent reports  XR chest 2 views    Result Date: 3/22/2023  Impression: No radiographic evidence of acute intrathoracic process  Workstation performed: [id number]     CT head without contrast    Result Date: 3/21/2023  Impression: No evidence of acute intracranial process  Chronic microangiopathy  Chronic right subinsular hyperdensity similar to January 2023 attributed to hemosiderin deposition on the prior MRI  Workstation performed: [id number]     Other Diagnostic Testing: I have personally reviewed pertinent reports  ACTIVE MEDICATIONS     Current Facility-Administered Medications   Medication Dose Route Frequency   • atorvastatin (LIPITOR) tablet 10 mg  10 mg Oral Daily With Dinner   • benzonatate (TESSALON PERLES) capsule 100 mg  100 mg Oral TID PRN   • enoxaparin (LOVENOX) subcutaneous injection 40 mg  40 mg Subcutaneous Daily   • famotidine (PEPCID) tablet 40 mg  40 mg Oral Daily With Dinner   • fluticasone (FLONASE) 50 mcg/act nasal spray 1 spray  1 spray Each Nare Daily   • multi-electrolyte (PLASMALYTE-A/ISOLYTE-S PH 7 4) IV solution  100 mL/hr Intravenous Continuous   • primidone (MYSOLINE) tablet 50 mg  50 mg Oral HS       VTE Pharmacologic Prophylaxis: Enoxaparin (Lovenox)  VTE Mechanical Prophylaxis: sequential compression device    Portions of the record may have been created with voice recognition software  Occasional wrong word or "sound a like" substitutions may have occurred due to the inherent limitations of voice recognition software    Read the chart carefully and recognize, using context, where substitutions have occurred   ==  Florencio Melo MD  [address]  Internal Medicine Residency PGY-1

## 2023-03-24 ENCOUNTER — HOSPITAL ENCOUNTER (INPATIENT)
Facility: HOSPITAL | Age: 78
LOS: 16 days | Discharge: HOME HEALTH | End: 2023-04-09
Attending: PHYSICAL MEDICINE & REHABILITATION | Admitting: PHYSICAL MEDICINE & REHABILITATION

## 2023-03-24 VITALS
DIASTOLIC BLOOD PRESSURE: 84 MMHG | TEMPERATURE: 98 F | RESPIRATION RATE: 20 BRPM | OXYGEN SATURATION: 95 % | HEART RATE: 86 BPM | BODY MASS INDEX: 24.58 KG/M2 | HEIGHT: 64 IN | WEIGHT: 143.96 LBS | SYSTOLIC BLOOD PRESSURE: 121 MMHG

## 2023-03-24 DIAGNOSIS — Z86.79 HISTORY OF CEREBRAL HEMORRHAGE: ICD-10-CM

## 2023-03-24 DIAGNOSIS — R73.03 PREDIABETES: ICD-10-CM

## 2023-03-24 DIAGNOSIS — J31.0 RHINITIS: ICD-10-CM

## 2023-03-24 DIAGNOSIS — Q84.5 ENLARGED AND HYPERTROPHIC NAILS: ICD-10-CM

## 2023-03-24 DIAGNOSIS — M25.561 RIGHT KNEE PAIN: ICD-10-CM

## 2023-03-24 DIAGNOSIS — U07.1 SARS-COV-2 POSITIVE: ICD-10-CM

## 2023-03-24 DIAGNOSIS — Z78.9 IMPAIRED MOBILITY AND ADLS: ICD-10-CM

## 2023-03-24 DIAGNOSIS — M62.82 RHABDOMYOLYSIS: Primary | ICD-10-CM

## 2023-03-24 DIAGNOSIS — Z74.09 IMPAIRED MOBILITY AND ADLS: ICD-10-CM

## 2023-03-24 DIAGNOSIS — R52 PAIN: ICD-10-CM

## 2023-03-24 DIAGNOSIS — G25.0 TREMOR, ESSENTIAL: ICD-10-CM

## 2023-03-24 DIAGNOSIS — M46.1 SACROILIITIS (HCC): ICD-10-CM

## 2023-03-24 PROBLEM — K21.9 GERD (GASTROESOPHAGEAL REFLUX DISEASE): Status: ACTIVE | Noted: 2023-03-24

## 2023-03-24 PROBLEM — M25.571 RIGHT ANKLE PAIN: Status: ACTIVE | Noted: 2023-03-24

## 2023-03-24 PROBLEM — S82.892S CLOSED LEFT ANKLE FRACTURE, SEQUELA: Status: ACTIVE | Noted: 2023-03-24

## 2023-03-24 PROBLEM — R74.01 ELEVATED AST (SGOT): Status: ACTIVE | Noted: 2023-03-24

## 2023-03-24 LAB
ALBUMIN SERPL BCP-MCNC: 2.8 G/DL (ref 3.5–5)
ALP SERPL-CCNC: 61 U/L (ref 46–116)
ALT SERPL W P-5'-P-CCNC: 41 U/L (ref 12–78)
ANION GAP SERPL CALCULATED.3IONS-SCNC: 4 MMOL/L (ref 4–13)
AST SERPL W P-5'-P-CCNC: 70 U/L (ref 5–45)
BASOPHILS # BLD AUTO: 0.03 THOUSANDS/ÂΜL (ref 0–0.1)
BASOPHILS NFR BLD AUTO: 1 % (ref 0–1)
BILIRUB SERPL-MCNC: 0.42 MG/DL (ref 0.2–1)
BUN SERPL-MCNC: 9 MG/DL (ref 5–25)
CALCIUM ALBUM COR SERPL-MCNC: 9.2 MG/DL (ref 8.3–10.1)
CALCIUM SERPL-MCNC: 8.2 MG/DL (ref 8.3–10.1)
CHLORIDE SERPL-SCNC: 109 MMOL/L (ref 96–108)
CO2 SERPL-SCNC: 25 MMOL/L (ref 21–32)
CREAT SERPL-MCNC: 0.51 MG/DL (ref 0.6–1.3)
EOSINOPHIL # BLD AUTO: 0.08 THOUSAND/ÂΜL (ref 0–0.61)
EOSINOPHIL NFR BLD AUTO: 1 % (ref 0–6)
ERYTHROCYTE [DISTWIDTH] IN BLOOD BY AUTOMATED COUNT: 12.6 % (ref 11.6–15.1)
GFR SERPL CREATININE-BSD FRML MDRD: 94 ML/MIN/1.73SQ M
GLUCOSE SERPL-MCNC: 98 MG/DL (ref 65–140)
HCT VFR BLD AUTO: 35.6 % (ref 34.8–46.1)
HGB BLD-MCNC: 12.1 G/DL (ref 11.5–15.4)
IMM GRANULOCYTES # BLD AUTO: 0.03 THOUSAND/UL (ref 0–0.2)
IMM GRANULOCYTES NFR BLD AUTO: 1 % (ref 0–2)
LYMPHOCYTES # BLD AUTO: 1.46 THOUSANDS/ÂΜL (ref 0.6–4.47)
LYMPHOCYTES NFR BLD AUTO: 26 % (ref 14–44)
MCH RBC QN AUTO: 34.5 PG (ref 26.8–34.3)
MCHC RBC AUTO-ENTMCNC: 34 G/DL (ref 31.4–37.4)
MCV RBC AUTO: 101 FL (ref 82–98)
MONOCYTES # BLD AUTO: 0.46 THOUSAND/ÂΜL (ref 0.17–1.22)
MONOCYTES NFR BLD AUTO: 8 % (ref 4–12)
NEUTROPHILS # BLD AUTO: 3.63 THOUSANDS/ÂΜL (ref 1.85–7.62)
NEUTS SEG NFR BLD AUTO: 63 % (ref 43–75)
NRBC BLD AUTO-RTO: 0 /100 WBCS
PLATELET # BLD AUTO: 215 THOUSANDS/UL (ref 149–390)
PMV BLD AUTO: 10 FL (ref 8.9–12.7)
POTASSIUM SERPL-SCNC: 3.7 MMOL/L (ref 3.5–5.3)
PROT SERPL-MCNC: 6.8 G/DL (ref 6.4–8.4)
RBC # BLD AUTO: 3.51 MILLION/UL (ref 3.81–5.12)
SODIUM SERPL-SCNC: 138 MMOL/L (ref 135–147)
WBC # BLD AUTO: 5.69 THOUSAND/UL (ref 4.31–10.16)

## 2023-03-24 RX ORDER — ATORVASTATIN CALCIUM 10 MG/1
10 TABLET, FILM COATED ORAL
Status: CANCELLED | OUTPATIENT
Start: 2023-03-24

## 2023-03-24 RX ORDER — FLUTICASONE PROPIONATE 50 MCG
1 SPRAY, SUSPENSION (ML) NASAL DAILY
Status: DISCONTINUED | OUTPATIENT
Start: 2023-03-25 | End: 2023-04-09 | Stop reason: HOSPADM

## 2023-03-24 RX ORDER — ENOXAPARIN SODIUM 100 MG/ML
40 INJECTION SUBCUTANEOUS DAILY
Status: DISCONTINUED | OUTPATIENT
Start: 2023-03-25 | End: 2023-04-09 | Stop reason: HOSPADM

## 2023-03-24 RX ORDER — ATORVASTATIN CALCIUM 10 MG/1
10 TABLET, FILM COATED ORAL
Status: DISCONTINUED | OUTPATIENT
Start: 2023-03-24 | End: 2023-03-24

## 2023-03-24 RX ORDER — FAMOTIDINE 20 MG/1
40 TABLET, FILM COATED ORAL
Status: DISCONTINUED | OUTPATIENT
Start: 2023-03-24 | End: 2023-04-09 | Stop reason: HOSPADM

## 2023-03-24 RX ORDER — ENOXAPARIN SODIUM 100 MG/ML
40 INJECTION SUBCUTANEOUS DAILY
Status: CANCELLED | OUTPATIENT
Start: 2023-03-25

## 2023-03-24 RX ORDER — ECHINACEA PURPUREA EXTRACT 125 MG
1 TABLET ORAL
Status: DISCONTINUED | OUTPATIENT
Start: 2023-03-24 | End: 2023-04-09 | Stop reason: HOSPADM

## 2023-03-24 RX ORDER — FLUTICASONE PROPIONATE 50 MCG
1 SPRAY, SUSPENSION (ML) NASAL DAILY
Status: CANCELLED | OUTPATIENT
Start: 2023-03-25

## 2023-03-24 RX ORDER — ACETAMINOPHEN 325 MG/1
650 TABLET ORAL EVERY 6 HOURS PRN
Status: DISCONTINUED | OUTPATIENT
Start: 2023-03-24 | End: 2023-04-09 | Stop reason: HOSPADM

## 2023-03-24 RX ORDER — BENZONATATE 100 MG/1
100 CAPSULE ORAL 3 TIMES DAILY PRN
Status: DISCONTINUED | OUTPATIENT
Start: 2023-03-24 | End: 2023-04-09 | Stop reason: HOSPADM

## 2023-03-24 RX ORDER — POLYETHYLENE GLYCOL 3350 17 G/17G
17 POWDER, FOR SOLUTION ORAL DAILY PRN
Status: DISCONTINUED | OUTPATIENT
Start: 2023-03-24 | End: 2023-04-09 | Stop reason: HOSPADM

## 2023-03-24 RX ORDER — FAMOTIDINE 20 MG/1
40 TABLET, FILM COATED ORAL
Status: CANCELLED | OUTPATIENT
Start: 2023-03-24

## 2023-03-24 RX ORDER — ONDANSETRON 4 MG/1
4 TABLET, ORALLY DISINTEGRATING ORAL EVERY 6 HOURS PRN
Status: DISCONTINUED | OUTPATIENT
Start: 2023-03-24 | End: 2023-04-09 | Stop reason: HOSPADM

## 2023-03-24 RX ORDER — BISACODYL 10 MG
10 SUPPOSITORY, RECTAL RECTAL DAILY PRN
Status: DISCONTINUED | OUTPATIENT
Start: 2023-03-24 | End: 2023-04-09 | Stop reason: HOSPADM

## 2023-03-24 RX ORDER — BENZONATATE 100 MG/1
100 CAPSULE ORAL 3 TIMES DAILY PRN
Status: CANCELLED | OUTPATIENT
Start: 2023-03-24

## 2023-03-24 RX ORDER — PRIMIDONE 50 MG/1
50 TABLET ORAL
Status: DISCONTINUED | OUTPATIENT
Start: 2023-03-24 | End: 2023-04-09 | Stop reason: HOSPADM

## 2023-03-24 RX ORDER — PRIMIDONE 50 MG/1
50 TABLET ORAL
Status: CANCELLED | OUTPATIENT
Start: 2023-03-24

## 2023-03-24 RX ORDER — ACETAMINOPHEN 325 MG/1
650 TABLET ORAL EVERY 6 HOURS PRN
Status: DISCONTINUED | OUTPATIENT
Start: 2023-03-24 | End: 2023-03-24

## 2023-03-24 RX ADMIN — FAMOTIDINE 40 MG: 20 TABLET, FILM COATED ORAL at 18:00

## 2023-03-24 RX ADMIN — FLUTICASONE PROPIONATE 1 SPRAY: 50 SPRAY, METERED NASAL at 08:06

## 2023-03-24 RX ADMIN — ENOXAPARIN SODIUM 40 MG: 40 INJECTION SUBCUTANEOUS at 08:06

## 2023-03-24 RX ADMIN — PRIMIDONE 50 MG: 50 TABLET ORAL at 21:14

## 2023-03-24 NOTE — CASE MANAGEMENT
Case Management Discharge Planning Note    Patient name Kathe Campa  Location [address] 760/-63 MRN [id number]  : 1945 Date 3/24/2023       Current Admission Date: 3/21/2023  Current Admission Diagnosis:Ambulatory dysfunction   Patient Active Problem List    Diagnosis Date Noted   • Diarrhea 2023   • Macrocytosis 2023   • Ambulatory dysfunction 2023   • Rhabdomyolysis 2023   • SARS-CoV-2 positive 2023   • Tremor, essential 10/20/2022   • Acute cerebral hemorrhage ([facility] [address] ) 10/11/2022   • History of cerebral hemorrhage 10/10/2022   • 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 ([facility] [address] ) 10/10/2022   • Chronic left-sided low back pain    • Transient cerebral ischemia 2018   • Premature atrial beats 2018   • Cataract of right eye 2016   • Sacroiliitis ([facility] [address] ) 2015   • Idiopathic osteoporosis 2015   • Primary osteoarthritis of hand 2015      LOS (days): 2  Geometric Mean LOS (GMLOS) (days): 4 50  Days to GMLOS:2 7     OBJECTIVE:  Risk of Unplanned Readmission Score: 12 47         Current admission status: Inpatient   Preferred Pharmacy:   [address] [place] [address]  [address] [place] [address]  Phone: [phone] Fax: [phone]    [id number] [address] [place] [address]  Phone: [phone] Fax: [phone]    Primary Care Provider: Jose Gentile DO    Primary Insurance: MEDICARE  Secondary Insurance: AARP    DISCHARGE DETAILS:    Discharge planning discussed with[de-identified] Patient  Freedom of Choice: Yes  Comments - Freedom of Choice: [facility] with [facility]  Pt agreed to [address]    CM contacted family/caregiver?: No- see comments (PT declined CM's offer to call her sister or her son)  Were Treatment Team discharge recommendations reviewed with patient/caregiver?: Yes  Did patient/caregiver verbalize understanding of patient care needs?: N/A- going to facility  Were patient/caregiver advised of the risks associated with not following Treatment Team discharge recommendations?: Yes    Contacts  Patient Contacts: Patient    [facility]         Is the patient interested in [place] [address] at discharge?: No    DME Referral Provided  Referral made for DME?: No    Other Referral/Resources/Interventions Provided:  Interventions: Short Term Rehab         Treatment Team Recommendation: Short Term Rehab     Transport at Discharge : [facility]  Dispatcher Contacted: Yes  Number/Name of Dispatcher: Round Trip     ETA of Transport (Date): 03/24/23                 IMM Given (Date):: 03/24/23  IMM Given to[de-identified] Patient     Additional Comments: After CM closed referrals TT from [facility] with [facility] offering a bed at [facility] today  CM called the patient on her room phone and offered the bed  Pt declined the bed  Stated she does not want to go to to to [facility] heart  Pt does not feel she can do 3 hours of therpay per day "I"m too weak"

## 2023-03-24 NOTE — ASSESSMENT & PLAN NOTE
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  MRI in 10/2022 showed small acute hematoma in the R putamen with mild regional mass effect and chronic infarcts in L basal ganglia  Had repeat MRI in 01/2023  Recommended to continue holding ASA at that time per Neurology  Scheduled to follow-up with Neurology (Dr Darryn Lawson) in additional 6 months as outpatient  Continue PT/OT

## 2023-03-24 NOTE — ASSESSMENT & PLAN NOTE
CK was 6836 on admission  Most recent CK 2187 on 3/23  Improved after receiving IV fluids  Encourage PO fluid intake  Hold statin at this time  Consider repeating CK level in a week

## 2023-03-24 NOTE — ASSESSMENT & PLAN NOTE
Improved possible from Covid infection   C  difficile negative  Monitor stools  Encourage oral hydration

## 2023-03-24 NOTE — PLAN OF CARE
Problem: OCCUPATIONAL THERAPY ADULT  Goal: Performs self-care activities at highest level of function for planned discharge setting  See evaluation for individualized goals  Description: Treatment Interventions: ADL retraining, Visual perceptual retraining, Functional transfer training, UE strengthening/ROM, Endurance training, Cognitive reorientation, Patient/family training, Equipment evaluation/education, Fine motor coordination activities, Continued evaluation          See flowsheet documentation for full assessment, interventions and recommendations  Outcome: Progressing  Note: Limitation: Decreased ADL status, Decreased UE ROM, Decreased UE strength, Decreased cognition, Decreased endurance, Decreased fine motor control, Decreased self-care trans, Decreased high-level ADLs  Prognosis: Good  Assessment: Pt seen for participation in Occupational Therapy session with focus on activity tolerance, bed mob, functional transfers/stand pivot to bedside chair sitting balance and tolerance and standing tolerance and balance for pt engagement in UB/LB self-care tasks and energy conservation techniques  Pt cleared by Xenia Gutierrez for pt participated in OT session  Pt presented supine/HOB raised pt awake/alert and agreeable to participate in therapy following pt identifiers confirmed  Pt did not report a therapy goal this session however was pleasant and cooperative with all therapy requests  Pt required assist bed mob, functional transfers and UB/LB self-care 2* decreased strength and pt deconditioning  She was able to tolerate sitting out bed to bedside chair for AM self-care well  Pt will require post acute rehab service to continue to address these above noted pt deficit which currently impair pt ADL and functional mob  Pt set up to bedside chair post session, chair alarm activated and all needs within reach       OT Discharge Recommendation: Post acute rehabilitation services

## 2023-03-24 NOTE — ASSESSMENT & PLAN NOTE
Neuro exam stable with subtle LUE weakness but improved  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   (of note MRI also showed focal R CR acute to subacute infarct as well as moderate chronic microangiopathic changes with dilated periventricular spaces v chronic lacunar infacts within sublentiform regions) - she was seen by neuro OP and recommended to remain off aspirin since   With baseline left hemiparesis and left hemibody impaired sensation  Follows with Dr Joao Huang as an outpatient

## 2023-03-24 NOTE — ASSESSMENT & PLAN NOTE
COVID + on 3/21  Continue isolation precautions until 3/30  Symptomatic care  Currently maintaining on RA

## 2023-03-24 NOTE — ASSESSMENT & PLAN NOTE
COVID-19 infection - resolved, function improved    Treatment with mild pathway initiated and completed   Patient remained stable from a pulmonary standpoint and remained on room air  Had significant diarrhea now improved   Now off iso precautions  Monitor for PASC sequelae  OP PCP follow-up

## 2023-03-24 NOTE — ASSESSMENT & PLAN NOTE
Improving  AST 70 today from 155 previously  Hold Lipitor at this time  Asymptomatic  Repeat CMP on Monday

## 2023-03-24 NOTE — [facility] ADMISSION
Referral received for consideration of patient for inpatient acute rehab  Will review patient's case with [facility] physician and update CM as able  Reviewed patient's case with [facility] physician - patient is approved for [facility]  However, patient is refusing bed offer and prefers another facility, per CM  Please notify with any changes  Notified by CM that patient is now accepting of bed at [facility]  Will work on room # and transport as able  Patient will admit to [facility] room 265 with transport time at 1:00pm  Report can be called to [phone]  CM has been updated

## 2023-03-24 NOTE — CASE MANAGEMENT
Case Management Discharge Planning Note    Patient name Didi Done  Location [address] 760/-68 MRN [id number]  : 1945 Date 3/24/2023       Current Admission Date: 3/21/2023  Current Admission Diagnosis:Ambulatory dysfunction   Patient Active Problem List    Diagnosis Date Noted   • Diarrhea 2023   • Macrocytosis 2023   • Ambulatory dysfunction 2023   • Rhabdomyolysis 2023   • SARS-CoV-2 positive 2023   • Tremor, essential 10/20/2022   • Acute cerebral hemorrhage ([facility] [address] ) 10/11/2022   • History of cerebral hemorrhage 10/10/2022   • 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 ([facility] [address] ) 10/10/2022   • Chronic left-sided low back pain    • Transient cerebral ischemia 2018   • Premature atrial beats 2018   • Cataract of right eye 2016   • Sacroiliitis ([facility] [address] ) 2015   • Idiopathic osteoporosis 2015   • Primary osteoarthritis of hand 2015      LOS (days): 2  Geometric Mean LOS (GMLOS) (days): 4 50  Days to GMLOS:2 6     OBJECTIVE:  Risk of Unplanned Readmission Score: 12 47         Current admission status: Inpatient   Preferred Pharmacy:   [address] [place] [address]  [address] [place] [address]  Phone: [phone] Fax: [phone]    [id number] [place], [place] [id number] [place] [address]  Phone: [phone] Fax: [phone]    Primary Care Provider: Cy Currie DO    Primary Insurance: MEDICARE  Secondary Insurance: AARP    DISCHARGE DETAILS:    Discharge planning discussed with[de-identified] Patient  Freedom of Choice: Yes  Comments - Freedom of Choice: Met with patient again explained [facility] retracted their bed offer due to staffing  CM again offered [facility] or [facility]  Explained to patient that the therapy at [facility] is not 3 hours at one time and it is broken up throughout the course of the day   Pt had a difficult time choosing but chose [facility]  CM called Jennifer at [facility] bed still is avaiable at [address]  Pt agreeable    CM contacted family/caregiver?: Yes  Were Treatment Team discharge recommendations reviewed with patient/caregiver?: Yes  Did patient/caregiver verbalize understanding of patient care needs?: N/A- going to facility                      Other Referral/Resources/Interventions Provided:  Interventions: Acute Rehab         Treatment Team Recommendation: Acute Rehab     Transport at Discharge : [facility]  Dispatcher Contacted: Yes  Number/Name of Dispatcher: Round TRip

## 2023-03-24 NOTE — PLAN OF CARE
Problem: PHYSICAL THERAPY ADULT  Goal: Performs mobility at highest level of function for planned discharge setting  See evaluation for individualized goals  Description: Treatment/Interventions: LE strengthening/ROM, Functional transfer training, Therapeutic exercise, Elevations, Endurance training, Patient/family training, Equipment eval/education, Gait training, Bed mobility          See flowsheet documentation for full assessment, interventions and recommendations  Outcome: Progressing  Note: Prognosis: Fair  Problem List: Decreased strength, Decreased endurance, Impaired balance, Decreased mobility, Decreased cognition, Decreased coordination, Decreased safety awareness, Impaired judgement, Pain  Assessment: Pt seen for PT treatment session this date  Therapy session focused on bed mobility, functional transfers, gait training and endurance trainign in order to improve overall mobility and independence  Pt requires assist of 1 + SBA of another for all mobility performed this date  Pt making progress toward goals as demonstrated by ambulating increased distances and requiring decreased A level compared to previous session  Continues to require increased emotional support/ encouragement 2* fear of falling  Pt was left sitting OOB in recliner with OT present at the end of PT session with all needs in reach  Pt would benefit from continued PT services while in hospital to address remaining limitations  PT to continue to follow pt and recommends rehab  The patient's AM-PAC Basic Mobility Inpatient Short Form Raw Score is 12  A Raw score of less than or equal to 16 suggests the patient may benefit from discharge to post-acute rehabilitation services  Please also refer to the recommendation of the Physical Therapist for safe discharge planning  PT Discharge Recommendation: Post acute rehabilitation services    See flowsheet documentation for full assessment

## 2023-03-24 NOTE — PROGRESS NOTES
PHYSICAL MEDICINE AND REHABILITATION   PREADMISSION ASSESSMENT     Projected [facility] Diagnoses:  Impairment of mobility, safety and Activities of Daily Living (ADLs) due to Debility:  16  Debility (Non-cardiac/Non-pulmonary)  Etiologic Dx: Generalized Weakness  Date of Onset: 3/21/2023   Date of surgery: N/A    PATIENT INFORMATION  Name: Hector Lozano Phone #: [phone] (home)   Address: [address] [address]  YOB: 1945 Age: 78 y o  SS#   Marital Status:   Ethnicity:   Employment Status: retired  Extended Emergency Contact Information  Primary Emergency Contact: Dina Bateman  Mobile Phone: [phone]  Relation: Son  Advance Directive: Level 1 Full Code - unknown advanced directive    INSURANCE/COVERAGE:     Primary Payor: MEDICARE / Plan: MEDICARE A AND B / Product Type: Medicare A & B Fee for Service /   Secondary Payer: [facility]   Payer Contact:  Payer Contact:   Contact Phone:  Contact Phone:     Authorization #:   Coverage Dates:  LCD:   MEDICARE #: [id number]  Medicare Days: 60/30/60  Medical Record #: [id number]    REFERRAL SOURCE:   Referring provider: Trudy Cohen DO  Referring facility: [address] [place] [address]  Room: [facility]/[place] [address]  PCP: Joan Kay DO PCP phone number: [phone]    MEDICAL INFORMATION  HPI: Patient is a 78year old female that presented to [place] [address] on 3/21/2023 for evaluation of generalized weakness  Patient had a "slip" from standing the day prior, and she slowly fell to the floor  She did require police assistance to get up off floor to her chair, and she remained in chair all night  Patient endorsing mild nasal congestion and mild, non-productive cough  COVID test resulted positive, and patient was initiated on mild COVID pathway  No further COVID treatment was required, as patient remained stable on RA   Total CK was elevated at 6836 on presentation, suspected rhabdomyolysis with dehydration component, and was treated with IV fluids  CT of head and CXR was negative  On 3/22, patient developed diarrhea  CDiff PCR resulted negative, and suspected related to COVID infection  Patient is overall hemodynamically stable and medically cleared for discharge to [facility]  PT/OT therapies were consulted, as well as patient's case reviewed with [facility] physician, and they are recommending patient for inpatient Acute Rehab  She has demonstrated that she can tolerate and participate in 3 hours of therapy per day  Fully COVID vaccinated - Pfizer; COVID test resulted POSITIVE on 3/21/2023  Past Medical History:   Past Surgical History: Allergies:     Past Medical History:   Diagnosis Date   • Cataract of right eye    • GERD (gastroesophageal reflux disease)     Past Surgical History:   Procedure Laterality Date   • CATARACT EXTRACTION, BILATERAL Bilateral    • MASTECTOMY Bilateral     for calcium deposits   • WI COLONOSCOPY FLX DX W/COLLJ SPEC WHEN PFRMD N/A 8/25/2017    Procedure: COLONOSCOPY;  Surgeon: Nan Wood MD;  Location: [facility]; Service: Colorectal   • WI XCAPSL CTRC RMVL INSJ IO LENS PROSTH W/O ECP Right 6/21/2016    Procedure: OD EXTRACTION EXTRACAPSULAR CATARACT PHACO INTRAOCULAR LENS (IOL);   Surgeon: Arvind Verma MD;  Location: [facility];  Service: Ophthalmology   • TONSILLECTOMY       No Known Allergies      Medical/functional conditions requiring inpatient rehabilitation: s/p fall, generalized weakness due to COVID-19, dehydration, ambulatory dysfunction, rhabdomyolysis, diarrhea, B/L LE ataxia, impaired mobility and self care    Risk for medical/clinical complications: risk for falls, risk for infection, risk for skin breakdown, risk for DVT/PE    Comorbidities/Surgeries in the last 100 days: GERD, 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    CURRENT VITAL SIGNS:   Temp:  [98 °F (36 7 °C)-99 1 °F (37 3 °C)] 98 °F (36 7 °C)  HR:  [78-86] 86  BP: (121-140)/(70-84) 121/84   Intake/Output Summary (Last 24 hours) at 3/24/2023 1147  Last data filed at 3/24/2023 0500  Gross per 24 hour   Intake 1000 ml   Output 1500 ml   Net -500 ml        LABORATORY RESULTS:      Lab Results   Component Value Date    HGB 12 1 03/24/2023    HCT 35 6 03/24/2023    WBC 5 69 03/24/2023     Lab Results   Component Value Date    BUN 9 03/24/2023    BUN 13 04/20/2022    K 3 7 03/24/2023    K 4 4 04/20/2022     (H) 03/24/2023     04/20/2022    CREATININE 0 51 (L) 03/24/2023     Lab Results   Component Value Date    PROTIME 14 0 03/22/2023    INR 1 05 03/22/2023        DIAGNOSTIC STUDIES:  XR chest 2 views    Result Date: 3/22/2023  Impression: No radiographic evidence of acute intrathoracic process  Workstation performed: [id number]     CT head without contrast    Result Date: 3/21/2023  Impression: No evidence of acute intracranial process  Chronic microangiopathy  Chronic right subinsular hyperdensity similar to January 2023 attributed to hemosiderin deposition on the prior MRI  Workstation performed: [id number]       PRECAUTIONS/SPECIAL NEEDS:  Tobacco:   Social History     Tobacco Use   Smoking Status Former   Smokeless Tobacco Never   , Alcohol:    Social History     Substance and Sexual Activity   Alcohol Use Yes    Comment: pt states "very occassionally"   , Isolation Precautions:  Contact and Airborne, Anticoagulation:  Lovenox SQ, Edema Management, Safety Concerns, Pain Management, Visually Impaired, Language Preference: English and Fall Precautions      MEDICATIONS:     Current Facility-Administered Medications:   •  atorvastatin (LIPITOR) tablet 10 mg, 10 mg, Oral, Daily With Dinner, Choco Ortega DO, 10 mg at 03/23/23 1641  •  benzonatate (TESSALON PERLES) capsule 100 mg, 100 mg, Oral, TID PRN, Emilia Okigbo, DO  •  enoxaparin (LOVENOX) subcutaneous injection 40 mg, 40 mg, Subcutaneous, Daily, Choco Ortega DO, 40 mg at 03/24/23 0068  •  famotidine (PEPCID) tablet 40 mg, 40 mg, Oral, Daily With Sammy Canas Jordan, DO, 40 mg at 23 1641  •  fluticasone (FLONASE) 50 mcg/act nasal spray 1 spray, 1 spray, Each Nare, Daily, Emiliavale Reza DO, 1 spray at 23 0806  •  primidone (MYSOLINE) tablet 50 mg, 50 mg, Oral, HS, Choco Jordan, DO, 50 mg at 23    SKIN INTEGRITY:   erythema - buttock(s) bilateral    PRIOR LEVEL OF FUNCTION:  She lives in [facility] apartment  Angelita Chavis is  and lives alone  Self Care: Independent, Indoor Mobility: Independent, Stairs (in/outdoor): Independent and Cognition: Independent  Prior to patient's admission, patient was fully Independent with ADLS and received assistance with IADLs, including receiving MOWs  Family provides transportation  She was Independent without use of AD for mobility  FALLS IN THE LAST 6 MONTHS: 1 to 4    HOME ENVIRONMENT:  The living area: can live on one level  There are 2 steps to enter the home  The patient will not have 24 hour supervision/physical assistance available upon discharge  Patient has family and friends that check in on patient  Her sister lives nearby and is able to assist as needed  PREVIOUS DME:  Equipment in home (previous DME): Grab Bars and Rolling Walker    FUNCTIONAL STATUS:  Physical Therapy Occupational Therapy Speech Therapy   3/22/2023, per PT    Cognition   Overall Cognitive Status Impaired   Arousal/Participation Responsive   Orientation Level Oriented X4   Memory Unable to assess   Following Commands Follows one step commands without difficulty   Subjective   Subjective states she feels very weak    "I don't know how this will go"   RLE Assessment   RLE Assessment    (strength grossly 2 to 3-/5)   LLE Assessment   LLE Assessment    (strength grossly 2 to 3-/5)   Coordination   Movements are Fluid and Coordinated 0   Coordination and Movement Description B LE ataxia   Bed Mobility   Supine to Sit 3  Moderate assistance Additional items Assist x 1   Sit to Supine 3  Moderate assistance   Additional items Assist x 1   Additional Comments sat EOB x several minutes prior to standing, mild posterior LOB  maintains w/ min A   some dizziness   Transfers   Sit to Stand 2  Maximal assistance   Additional items Assist x 1; Increased time required  (B knee buckling)   Stand to Sit 3  Moderate assistance   Additional items Assist x 1; Increased time required  (B knee buckling)   Additional Comments stood x 1 at EOB xapprox 10-15 sec   posterior LOB, B knee buckling  not stable enough to try gait   Balance   Static Sitting Fair -   Dynamic Sitting Poor +   Static Standing Poor   Dynamic Standing Poor -   Endurance Deficit   Endurance Deficit Yes   Endurance Deficit Description fatigue, weakness, pain, cog   Activity Tolerance   Activity Tolerance Patient limited by fatigue;Patient limited by pain;Treatment limited secondary to medical complications (Comment)   Nurse Made Aware yes   Assessment   Prognosis Fair   Problem List Decreased strength;Decreased endurance; Impaired balance;Decreased mobility; Decreased cognition;Decreased coordination;Decreased safety awareness; Impaired judgement;Pain   Assessment Pt seen for physical therapy evaluation  Pt is a 78 y o female w/ history/comorbidities of CVAs, cerebral bleed, GERD, cataracts who is now admitted w/ generalized weakness, fatigue, fall  Needed to be assisted into chair, then was unable to get out of chair for unknown duration  Undergoing w/u, but found to be COVID +  Due to acute medical issues, pain, fall risk, note unstable clinical picture  PT consulted to assess mobility, d/c needs  Pt presents w/ decreased functional mob, sitting and standing balance, endurance, B LE strength, barriers at home  Pt will benefit from skilled PT to correct for the above problems    Recommend rehab at d/c      3/22/2023, per OT    ADL   Where Assessed Edge of bed   Eating Assistance 5 Supervision/Setup   Grooming Assistance 4  Minimal Assistance   UB Bathing Assistance 4  Minimal Assistance   LB Bathing Assistance 3  Moderate Assistance   UB Dressing Assistance 4  Minimal Assistance   LB Dressing Assistance 3  Moderate Assistance   LB Dressing Deficit Don/doff R sock; Don/doff L sock   Toileting Assistance  3  Moderate Assistance   Bed Mobility   Supine to Sit 3  Moderate assistance   Additional items Assist x 2;HOB elevated; Increased time required;LE management;Verbal cues  (VC for step by step directions and hand placement)   Sit to Supine 3  Moderate assistance   Additional items Assist x 1; Increased time required;HOB elevated;LE management;Verbal cues   Additional Comments Pt greeted supine in bed w/ all needs within reach and alarm active  Pt left supine in bed w/ all needs within reach and alarm active  Transfers   Sit to Stand 4  Minimal assistance   Additional items Assist x 1; Increased time required;Verbal cues   Stand to Sit 4  Minimal assistance   Additional items Assist x 1; Increased time required;Verbal cues   Additional Comments c RW   Functional Mobility   Functional Mobility 4  Minimal assistance   Additional Comments Pt performed functional mobility w/ 4 side steps to [facility] w/ RW and Min Ax1   Additional items Rolling walker   Balance   Static Sitting Fair   Dynamic Sitting Fair   Static Standing Fair -   Dynamic Standing Fair -   Ambulatory Poor +   Activity Tolerance   Activity Tolerance Patient limited by fatigue   Nurse Made Aware RN ok w/ eval   RUE Assessment   RUE Assessment WFL   LUE Assessment   LUE Assessment X  (Shoulder flexion: 3+/5, elbow flexion/extension: 4+/5, wrist flexion/extension: 4+/5,  4+/5)   Hand Function   Gross Motor Coordination Impaired  (Difficulty reaching for/gripping/don/doff sock   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)   Fine Motor Coordination Impaired  (b/l hand Thumb-to-Finger opposition difficulty L>R, but tremors R>L)   Vision-Basic Assessment   Current Vision Wears glasses for distance only  (Pt reports R eye vision has decreased)   Cognition   Overall Cognitive Status Impaired   Arousal/Participation Alert; Responsive; Cooperative   Attention Attends with cues to redirect   Orientation Level Oriented X4   Memory Decreased short term memory;Decreased recall of recent events   Following Commands Follows one step commands with increased time or repetition   Comments Pt pleasant and cooperative to work with  Pt A&Ox4, but decrease insight into deficits, decreased short term memory/recall of recent events, decreased problem solving  Continue to evaluate   Assessment   Limitation Decreased ADL status; Decreased UE ROM; Decreased UE strength;Decreased cognition;Decreased endurance;Decreased fine motor control;Decreased self-care trans;Decreased high-level ADLs   Prognosis Good   Assessment Pt is a 78 y o  female admitted [facility] on [facility]/21/[facility]23 w/ ambulatory dysfunction w/ hx of cerebral hemorrhage 10/10/2022  Pt  has a past medical history of Cataract of right eye and GERD (gastroesophageal reflux disease)  Pt with active OT orders and activity as tolerated orders  Pt precautions include contact/airborne isolation, cognitive, fall risk, bed/chair alarm, multiple lines, telemetry, visual impairment  Pt currently lives alone in a 1 st floor apartment with 2 ESTER  Pt was independent w/  ADLS and IADLS (receives Meals on Wheels), -drives (takes public transportation), and no AD/DME for functional mobility          Patient participated in OT evaluation and answered all the questions while A&Ox4  Patient participated in bed mobility w/ Mod Ax2 supine>sit, sit>supine required Mod Ax1, functional transfers sit<>stand Min Ax1, and functional mobility w/ Min Ax1 w/ RW   Pt is limited at this time 2*: endurance, activity tolerance, functional mobility, balance, functional standing tolerance, unsupportive home environment, decreased I w/ ADLS/IADLS, strength, ROM, visual deficits, cognitive impairments, impaired fine motor skills and coordination deficits  The following Occupational Performance Areas to address include: eating, grooming, bathing/shower, toilet hygiene, dressing, health maintenance, functional mobility, community mobility and clothing management           From OT standpoint, anticipate d/c inpatient rehab   Pt to continue to benefit from acute immediate OT services to address the following goals 2-3x/week to  w/in 10-14 days:          The patient's raw score on the -PAC Daily Activity Inpatient Short Form is 16  A raw score of less than 19 suggests the patient may benefit from discharge to post-acute rehabilitation services  Please refer to the recommendation of the Occupational Therapist for safe discharge planning      N/A     CARE SCORES:  Self Care:  Eatin: Supervision or touching  assistance  Oral hygiene: 04: Supervision or touching  assistance  Toilet hygiene: 03: Partial/moderate assistance  Shower/bathing self: 03: Partial/moderate assistance  Upper body dressin: Supervision or touching  assistance  Lower body dressin: Partial/moderate assistance  Putting on/taking off footwear: 03: Partial/moderate assistance  Transfers:  Roll left and right: 09: Not applicable  Sit to lyin: Partial/moderate assistance  Lying to sitting on side of bed: 03: Partial/moderate assistance  Sit to stand: 02: Substantial/maximal assistance  Chair/bed to chair transfer: 03: Partial/moderate assistance  Toilet transfer: 09: Not applicable  Mobility:  Walk 10 ft: 88: Not attempted due to medical conditions or safety concerns  Walk 50 ft with two turns: 10: Not attempted due to environmental limitations  Walk 150ft: 88: Not attempted due to medical conditions or safety concerns    CURRENT GAP IN FUNCTION  Prior to Admission: Functional Status: Patient was independent with mobility/ambulation, transfers, ADL's, IADL's      Expected functional outcomes: It is expected that with skilled acute rehabilitation services the patient will progress to Independent for self care and Independent for mobility     Estimated length of stay: 10 to 14 days    Anticipated Post-Discharge Disposition/Treatment  Disposition: Return to previous home/apartment  Outpatient Services: Physical Therapy (PT) and Occupational Therapy (OT)    BARRIERS TO DISCHARGE  Lovenox, Weakness, Pain, Balance Difficulty, Fatigue, Home Accessibility, Caregiver Accessibility, Financial Resources, Equipment Needs, Resource Availability and Lives Alone    INTERVENTIONS FOR DISCHARGE  Adaptive equipment, Patient/Family/Caregiver Education, Community Resources, Financial Assistance, Arrange DME needs, Medication Changes as per MD recommendations, Therapy exercises, Center of balance support  and Energy conservation education     REQUIRED THERAPY:  Patient will require PT and OT 90 minutes each per day, five days per week to achieve rehab goals  REQUIRED FUNCTIONAL AND MEDICAL MANAGEMENT FOR INPATIENT REHABILITATION:  Skin:  There are no pressure sores currently, buttocks erythema, Pain Management: Overall pain is well controlled, Deep Vein Thrombosis (DVT) Prophylaxis:  Lovenox SQ, further IM management of additional medical conditions while on ARC, she needs PT/OT intervention, patient/family education and training, possible Neuropsych consult and any other needed consults prn, nursing medication review and management of bowel/bladder function  RECOMMENDED LEVEL OF CARE:   Patient is a 78year old female that presented to [place] [address] on 3/21/2023 for evaluation of generalized weakness  Patient had a "slip" from standing the day prior, and she slowly fell to the floor  She did require police assistance to get up off floor to her chair, and she remained in chair all night  Patient endorsing mild nasal congestion and mild, non-productive cough   COVID test resulted positive, and patient was initiated on mild COVID pathway  No further COVID treatment was required, as patient remained stable on RA  Total CK was elevated at 6836 on presentation, suspected rhabdomyolysis with dehydration component, and was treated with IV fluids  CT of head and CXR was negative  On 3/22, patient developed diarrhea  CDiff PCR resulted negative, and suspected related to COVID infection  Prior to patient's admission, patient was fully Independent with ADLS and received assistance with IADLs, including receiving MOWs  Family provides transportation  She was Independent without use of AD for mobility  Currently, patient is Mod/Max assist with transfers and bed mobility, and Min assist with UB ADLs, Mod assist with LB ADLs  Close medical management and PM&R management is recommended at this time while patient is on the [facility]  Inpatient acute rehab is recommended for patient to maximize overall strength and mobility upon discharge to home with support of family and friends

## 2023-03-24 NOTE — CONSULTS
[facility]  Consult  Name: Jose Francisco Nicole  MRN: [id number]  Unit/Bed#: -01 I Date of Admission: 3/24/2023   Date of Service: 3/24/2023 I Hospital Day: 0    Inpatient consult to Internal Medicine  Consult performed by: LORETO Queen  Consult ordered by: Amanda Comer MD          Assessment/Plan   GERD (gastroesophageal reflux disease)  Assessment & Plan  Stable  Continue home Pepcid 40mg daily  Closed left ankle fracture, sequela  Assessment & Plan  Hx of L ankle fracture in 06/2022  Overdue for Orthopedics follow-up  Follow-up with Orthopedics on discharge  Continue with PT/OT  Considerations during therapy  Prediabetes  Assessment & Plan  A1c 5 9 on 10/11/22  Monitor fasting BG  Follow-up with PCP as outpatient  Elevated AST (SGOT)  Assessment & Plan  Improving  AST 70 today from 155 previously  Hold Lipitor at this time  Asymptomatic  Repeat CMP on Monday  Diarrhea  Assessment & Plan  Had been experiencing diarrhea in acute setting  Starting to resolve  C-diff negative on 3/22  Likely viral in nature due to recent illness  Continue with symptom management  SARS-CoV-2 positive  Assessment & Plan  COVID + on 3/21  Continue isolation precautions until 3/30  Symptomatic care  Currently maintaining on RA  Rhabdomyolysis  Assessment & Plan  CK was 6836 on admission  Most recent CK 2187 on 3/23  Improved after receiving IV fluids  Encourage PO fluid intake  Hold statin at this time  Consider repeating CK level in a week  Tremor, essential  Assessment & Plan  Continue home primidone 50mg at HS  Follows with Dr Blaine Romo (Neurology) as outpatient  History of cerebral hemorrhage  Assessment & Plan  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  MRI in 10/2022 showed small acute hematoma in the R putamen with mild regional mass effect and chronic infarcts in L basal ganglia  Had repeat MRI in 01/2023    Recommended to continue holding ASA at that time per Neurology  Scheduled to follow-up with Neurology (Dr Mitchell Gonzalez) in additional 6 months as outpatient  Continue PT/OT  Idiopathic osteoporosis  Assessment & Plan  Last DXA in 2019 showed osteoporosis  Receives Prolia injections as outpatient  Continue Vitamin D and calcium supplementation  Follows with Dr Lulu Booth (Rheumatology) as outpatient  * Ambulatory dysfunction  Assessment & Plan  3/21 - mechanical fall with no injuries; generalized weakness  Had been experiencing URI symptoms - COVID + on 3/21  CK 6838 on admission  Hx of CVA with L sided deficits  Primary team following  Continue PT/OT  History of Present Illness:    New Lange is a 78 y o  female with a PMH of cerebral hemorrhage, osteoporosis, prediabetes, and osteoarthritis who originally presented to [facility] on 3/21/2023 after experiencing a mechanical fall from her chair to the floor without any injuries  Patient experienced generalized weakness and difficulty getting off of the floor  Hx of cerebral hemorrhage with L sided deficits, most recent hemorrhage in 10/2022  CTH showed chronic R sub insular hyper density similar to January of 2023  Found to be COVID + on admission  Also noted to have elevated CK level of 6836 and was given IV fluids with improvement  During admission, patient had experienced diarrhea which had improved on its own  C-diff was negative on 3/22  Patient had been experiencing ambulatory dysfunction and was recommended for acute rehab by PT/OT in acute setting  Admitted to [address] [place] [address] on 3/24/2023; we are consulted for medical clearance  Pt examined while pt sitting in bed in pt room  States that she assister herself to the ground while trying to put pajama pants on due to both her legs feeling weak    Had then been laying on the ground for about 4 hours until she could assist herself to her recliner which she stayed in until the following day  States that she developed a sore throat about 2 days prior  Denies further sore throat  Does have a non-productive cough, states that it is worse in the morning  Complaints of sinus congestion which improves with Flonase  Denies any lightheadedness, dizziness, fevers, chills, SOB, palpitations, or CP  Denies any abdominal pain  Had been experiencing diarrhea but states that this is improving  Stool is still loose but has a little bit more consistency  Denies any urinary complaints  Has pain to her R foot with palpation - will obtain XR  Appears to have slight pink discoloration  Has abrasions on her chin and R knee from being on the floor  Lives at home by herself  Has a son that currently works but may be able to assist her at times  Review of Systems:    Review of Systems   Constitutional: Positive for appetite change (poor food intake but states that she has good fluid intake)  Negative for chills, fatigue and fever  HENT: Positive for congestion (sinus congestion that started the day of admission)  Negative for postnasal drip, rhinorrhea, sinus pressure, sinus pain, sneezing, sore throat and trouble swallowing  Eyes: Negative for visual disturbance  Respiratory: Positive for cough (non-productive cough, worse in the morning)  Negative for chest tightness, shortness of breath, wheezing and stridor  Cardiovascular: Negative for chest pain, palpitations and leg swelling  Gastrointestinal: Positive for diarrhea  Negative for abdominal distention, abdominal pain, blood in stool, constipation, nausea and vomiting  LBM 3/24 - loose but becoming more formed, diarrhea improving overall   Genitourinary: Negative for decreased urine volume, difficulty urinating, dysuria, frequency and urgency  Musculoskeletal: Positive for arthralgias (tenderness to R foot on palpation)  Negative for back pain and gait problem     Skin: Positive for color change (erythema to "her chin and R foot, abrasion on R knee)  Neurological: Positive for weakness (L sided weakness, chronic from stroke) and numbness (L sided numbness, chronic from stroke)  Negative for dizziness, light-headedness and headaches  Psychiatric/Behavioral: Negative for dysphoric mood, self-injury, sleep disturbance and suicidal ideas  The patient is not nervous/anxious  All other systems reviewed and are negative  Past Medical and Surgical History:     Past Medical History:   Diagnosis Date   • Cataract of right eye    • GERD (gastroesophageal reflux disease)        Past Surgical History:   Procedure Laterality Date   • CATARACT EXTRACTION, BILATERAL Bilateral    • MASTECTOMY Bilateral     for calcium deposits   • CO COLONOSCOPY FLX DX W/COLLJ SPEC WHEN PFRMD N/A 8/25/2017    Procedure: COLONOSCOPY;  Surgeon: Sharmila Brandon MD;  Location: [facility]; Service: Colorectal   • CO XCAPSL CTRC RMVL INSJ IO LENS PROSTH W/O ECP Right 6/21/2016    Procedure: OD EXTRACTION EXTRACAPSULAR CATARACT PHACO INTRAOCULAR LENS (IOL); Surgeon: Angeles Nunez MD;  Location: [facility];  Service: Ophthalmology   • TONSILLECTOMY         Meds/Allergies:    all medications and allergies reviewed    Allergies: No Known Allergies    Social History:     Marital Status:     Substance Use History:   Social History     Substance and Sexual Activity   Alcohol Use Yes    Comment: pt states \"very occassionally\"     Social History     Tobacco Use   Smoking Status Former   Smokeless Tobacco Never     Social History     Substance and Sexual Activity   Drug Use Never       Family History:  Reviewed    Physical Exam:     Vitals:        Physical Exam  Vitals and nursing note reviewed  Constitutional:       General: She is not in acute distress  Appearance: Normal appearance  She is not ill-appearing  HENT:      Head: Normocephalic and atraumatic  Cardiovascular:      Rate and Rhythm: Normal rate and regular rhythm        " Pulses: Normal pulses  Heart sounds: Normal heart sounds  No murmur heard  No friction rub  Pulmonary:      Effort: Pulmonary effort is normal  No respiratory distress  Breath sounds: Normal breath sounds  No wheezing or rhonchi  Abdominal:      General: Abdomen is flat  Bowel sounds are normal  There is no distension  Palpations: Abdomen is soft  There is no mass  Tenderness: There is no abdominal tenderness  There is no guarding or rebound  Hernia: No hernia is present  Musculoskeletal:         General: Tenderness (Tenderness to dorsal aspect of R foot) present  Cervical back: Normal range of motion and neck supple  No tenderness  Right lower leg: No edema  Left lower leg: No edema  Skin:     General: Skin is warm and dry  Capillary Refill: Capillary refill takes less than 2 seconds  Findings: Erythema (Chin, dorsal R foot) present  Neurological:      Mental Status: She is alert and oriented to person, place, and time  Motor: Weakness (LUE strength 4/5, LLE strength 4/5) present  Psychiatric:         Mood and Affect: Mood normal          Behavior: Behavior normal            Additional Data:     Labs and imaging reviewed  EKG Reviewed - last EKG on 3/21/21 showed NSR with PACs, QTc 472  M*Nonpareil software was used to dictate this note  It may contain errors with dictating incorrect words or incorrect spelling  Please contact the provider directly with any questions

## 2023-03-24 NOTE — ASSESSMENT & PLAN NOTE
Patient sustained a slip onto the floor from her bed and was unable to get up for a prolonged period of time  CK 6836  Treated with IV fluids acute care  Trended down  Continue PT, OT

## 2023-03-24 NOTE — ASSESSMENT & PLAN NOTE
Remains improved  On admission had right ankle pain - x-ray completed and showing Minimal soft tissue thickening medial to the first metatarsal head and lateral to the fifth metatarsal head attributed to capsulobursal thickening  · Over the weekend at [facility] also had medial bilateral foot pain along plantar fascia  · Possible plantar fasciitis and also has evidence of bursitis    · Limiting her participation in therapies 3/27/23  · Prednisone 40mg x5 day per IM thru 3/31 completed,, and topical Voltaren gel helpful   · As needed Tylenol

## 2023-03-24 NOTE — ASSESSMENT & PLAN NOTE
3/21 - mechanical fall with no injuries; generalized weakness  Had been experiencing URI symptoms - COVID + on 3/21  CK 6838 on admission  Hx of CVA with L sided deficits  Primary team following  Continue PT/OT

## 2023-03-24 NOTE — ASSESSMENT & PLAN NOTE
Last DXA in 2019 showed osteoporosis  Receives Prolia injections as outpatient  Continue Vitamin D and calcium supplementation  Follows with Dr Hung Fung (Rheumatology) as outpatient

## 2023-03-24 NOTE — OCCUPATIONAL THERAPY NOTE
Occupational Therapy Treatment Note     03/24/23 1215   OT Last Visit   OT Visit Date 03/24/23   Note Type   Note Type Treatment   Pain Assessment   Pain Assessment Tool 0-10   Pain Score No Pain   Restrictions/Precautions   Weight Bearing Precautions Per Order No   Other Precautions Contact/isolation;Cognitive; Chair Alarm;Multiple lines;Telemetry; Visual impairment   Lifestyle   Autonomy Pt independent w/ ADLs, IADLs (laundry and cleaning, but recieves Meals on Wheels) PTA, -drives, and no AD/DME use PTA   Reciprocal Relationships Lives alone   Service to Others Retired   [address] Enjoys traveling and watching TV   ADL   Where Assessed Chair   Grooming Assistance 5  Supervision/Setup   Grooming Deficit Wash/dry face; Wash/dry hands; Teeth care;Verbal cueing;Setup; Increased time to complete   LB Dressing Assistance 2  Maximal Assistance   LB Dressing Deficit Thread RLE into pants; Thread LLE into pants;Pull up over hips   Bed Mobility   Supine to Sit 3  Moderate assistance   Additional items Assist x 1   Transfers   Sit to Stand 3  Moderate assistance   Additional items Assist x 1   Stand to Sit 4  Minimal assistance   Additional items Assist x 1   Cognition   Overall Cognitive Status Impaired   Arousal/Participation Alert; Responsive; Cooperative   Attention Attends with cues to redirect   Orientation Level Oriented X4   Memory Decreased short term memory;Decreased recall of recent events   Following Commands Follows one step commands with increased time or repetition   Activity Tolerance   Activity Tolerance Patient tolerated treatment well   Assessment   Assessment Pt seen for participation in Occupational Therapy session with focus on activity tolerance, bed mob, functional transfers/stand pivot to bedside chair sitting balance and tolerance and standing tolerance and balance for pt engagement in UB/LB self-care tasks and energy conservation techniques   Pt cleared by Zaheer Krishnamurthy for pt participated in OT session  Pt presented supine/HOB raised pt awake/alert and agreeable to participate in therapy following pt identifiers confirmed  Pt did not report a therapy goal this session however was pleasant and cooperative with all therapy requests  Pt required assist bed mob, functional transfers and UB/LB self-care 2* decreased strength and pt deconditioning  She was able to tolerate sitting out bed to bedside chair for AM self-care well  Pt will require post acute rehab service to continue to address these above noted pt deficit which currently impair pt ADL and functional mob  Pt set up to bedside chair post session, chair alarm activated and all needs within reach     Plan   Treatment Interventions ADL retraining   Goal Expiration Date 04/05/23   OT Treatment Day 1   OT Frequency 2-3x/wk   Recommendation   OT Discharge Recommendation Post acute rehabilitation services   AM-PAC Daily Activity Inpatient   Lower Body Dressing 2   Bathing 2   Toileting 2   Upper Body Dressing 3   Grooming 3   Eating 4   Daily Activity Raw Score 16   Daily Activity Standardized Score (Calc for Raw Score >=11) 35 96   AM-PAC Applied Cognition Inpatient   Following a Speech/Presentation 2   Understanding Ordinary Conversation 3   Taking Medications 3   Remembering Where Things Are Placed or Put Away 3   Remembering List of 4-5 Errands 3   Taking Care of Complicated Tasks 2   Applied Cognition Raw Score 16   Applied Cognition Standardized Score 35 03   Barthel Index   Grooming Score 0   Dressing Score 5   Toilet Use Score 5   Transfers (Bed/Chair) Score 5   Mobility (Level Surface) Score 0         Lola GUEVARA/GLADYS

## 2023-03-24 NOTE — ASSESSMENT & PLAN NOTE
Managed on home dose primidone 50 mg nightly  Adequately controlled  Located in hands bilaterally  Follows with Dr Mario Marquez as an outpatient

## 2023-03-24 NOTE — ASSESSMENT & PLAN NOTE
Hx of L ankle fracture in 06/2022  Overdue for Orthopedics follow-up  Follow-up with Orthopedics on discharge  Continue with PT/OT  Considerations during therapy

## 2023-03-24 NOTE — H&P
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, essential tremor, osteoarthritis, osteoporosis, vitamin D deficiency, impaired memory who presented to the [address] on 3/21/2023 for generalized weakness  She had a slide from her bed at home and she was unable to get up  He was found to have some abrasions on her right knee and chin  Upon admission was found to have COVID-19 infection  Also found to have rhabdomyolysis with CK elevated at 6836  Additionally patient found to have transaminitis  Lipitor placed on hold  Patient initiated on IV fluids and treatment protocol with mild pathway for COVID-19 infection  Patient did not require supplemental oxygen  Rhabdomyolysis and LFTs improved with hydration  Medically, patient also developed diarrhea  C  difficile toxin negative  Diarrhea improved  After medical stabilization, patient was found to have acute functional deficits in mobility and self-care, for admitted to [facility] for acute inpatient rehabilitation  Plan:     Rehabilitation  • Functional deficits: Baseline left hemiparesis, impaired sensation on left hemibody, proximal musculature weakness, essential tremor, impaired mobility, self care  • Begin PT/OT  Rehabilitation goals are to achieve a modified independent level with mobility and self care  Prognosis is fair-good  ELOS is 2 weeks  Estimated discharge is home       DVT prophylaxis  • Managed on Lovenox 40 mg subcutaneous daily    Bladder plan  • Continent    Bowel plan  • Continent  • Has been having diarrhea since admission, this has improved  • No bowel regimen scheduled ordered    Code Status  • Full code      SARS-CoV-2 positive  Assessment & Plan  Presented 3/21/2023 with weakness  COVID-19 infection identified  Treatment with mild pathway initiated  Patient remained stable from a pulmonary standpoint and remained on room air  Continue supportive care with Tessalon Perles, nasal spray  Monitor respiratory status    Right ankle pain  Assessment & Plan  On exam appears to have pain at the anterior talofibular ligament - possible mild sprain  No swelling  Overall active range of motion nonprovocative of pain  Obtain x-ray  Supportive care with ice compression elevation    GERD (gastroesophageal reflux disease)  Assessment & Plan  Managed on Pepcid 40 mg daily home dose    Closed left ankle fracture, sequela  Assessment & Plan  Sustained June 2022  Weightbearing as tolerated  Need outpatient orthopedic follow-up    Elevated AST (SGOT)  Assessment & Plan  LFTs trending down  Lipitor currently on hold  Trend  Restart Lipitor when able    Diarrhea  Assessment & Plan  C  difficile negative  Monitor stools  Encourage oral hydration    Rhabdomyolysis  Assessment & Plan  Patient stained a slip onto the floor from her bed and was unable to get up for a prolonged period of time  CK 6836  Treated with IV fluids acute care  Trending down      Tremor, essential  Assessment & Plan  Managed on home dose primidone 50 mg nightly  Stable  Located in hands bilaterally  Follows with Dr Raj Price as an outpatient    History of cerebral hemorrhage  Assessment & Plan  Patient with history of hemorrhagic CVA  With baseline left hemiparesis and left hemibody impaired sensation  Follows with Dr Raj Price as an outpatient    Idiopathic osteoporosis  Assessment & Plan  Managed on Prolia as outpatient  Continue supplementation with vitamin D and calcium        Subjective:   Patient seen face-to-face upon arrival   She is able to recall events of her hospitalization  She does report diarrhea has improved significantly with only 1 loose stool today  Denies cough shortness of breath or chest pain  Some nasal congestion which was improved with Flonase  Continues to feel weak hoping she can get stronger to get home  On exam, patient is complaining of right ankle pain  Review of Systems   Constitutional: Negative  HENT: Negative  Eyes: Negative  Respiratory: Negative  Cardiovascular: Negative  Gastrointestinal: Negative  Endocrine: Negative  Genitourinary: Negative  Musculoskeletal: Negative  Skin: Negative  Allergic/Immunologic: Negative  Neurological: Negative  Hematological: Negative  Psychiatric/Behavioral: Negative  Function:  Prior level of function and living situation:  PRIOR LEVEL OF FUNCTION:  She lives in [facility]  [place] is  and lives alone  Self Care: Independent, Indoor Mobility: Independent, Stairs (in/outdoor): Independent and Cognition: Independent  Prior to patient's admission, patient was fully Independent with ADLS and received assistance with IADLs, including receiving MOWs  Family provides transportation  She was Independent without use of AD for mobility      HOME ENVIRONMENT:  The living area: can live on one level  There are 2 steps to enter the home  The patient will not have 24 hour supervision/physical assistance available upon discharge  Patient has family and friends that check in on patient  Her sister lives nearby and is able to assist as needed  Current level of function:  Physical Therapy:  Moderate assist with bed mobility, mod to max assist for transfers with noted knee buckling, moderate assist with ambulation 4 feet with rolling walker  Occupational Therapy: Supervision with grooming, max assist for lower body ADLs, min assist for upper body ADLs, moderate assist for toileting    Physical Exam:  LMP  (LMP Unknown)      No intake or output data in the 24 hours ending 03/24/23 6799    There is no height or weight on file to calculate BMI  Physical Exam  Vitals and nursing note reviewed  Constitutional:       General: She is not in acute distress  HENT:      Head: Normocephalic and atraumatic  Nose: Nose normal       Mouth/Throat:      Mouth: Mucous membranes are moist    Eyes:      Extraocular Movements: Extraocular movements intact  Conjunctiva/sclera: Conjunctivae normal       Pupils: Pupils are equal, round, and reactive to light  Cardiovascular:      Rate and Rhythm: Normal rate and regular rhythm  Pulses: Normal pulses  Heart sounds: Normal heart sounds  Pulmonary:      Effort: Pulmonary effort is normal       Breath sounds: Wheezing (exp wheeze) present  No rales  Abdominal:      General: Bowel sounds are normal  There is no distension  Palpations: Abdomen is soft  Tenderness: There is no abdominal tenderness  Musculoskeletal:         General: Tenderness present  No swelling  Cervical back: Neck supple  Right lower leg: No edema  Comments: Right ankle pain to palpation along the anterior halo fibular ligament  Active range of motion appears intact  Passive range of motion provocative of pain   Skin:     General: Skin is warm  Comments: Abrasion on chin, right knee   Neurological:      Mental Status: She is alert and oriented to person, place, and time  Sensory: Sensory deficit present  Motor: Weakness present  Comments: Speech is fluent  Left-sided hemibody impaired sensation  Left hemiparesis  + Pronator drift on left  Overall left hemibody strength graded as a 3/5 throughout proximally, 4 -/5 distally  Right upper extremity and right lower extremity graded as a 4 -/5 throughout   Psychiatric:         Mood and Affect: Mood normal             Labs, medications, and imaging personally reviewed      Laboratory:    Lab Results   Component Value Date    SODIUM 138 03/24/2023    K 3 7 03/24/2023     (H) 03/24/2023    CO2 25 03/24/2023    BUN 9 03/24/2023    CREATININE 0 51 (L) 03/24/2023    GLUC 98 03/24/2023    CALCIUM 8 2 (L) 03/24/2023     Lab Results   Component Value Date    WBC 5 69 03/24/2023    HGB 12 1 03/24/2023    HCT 35 6 03/24/2023     (H) 03/24/2023     03/24/2023     Lab Results   Component Value Date    INR 1 05 03/22/2023    INR 1 01 03/21/2023    INR 0 95 10/10/2022    PROTIME 14 0 03/22/2023    PROTIME 13 5 03/21/2023    PROTIME 12 9 10/10/2022         Current Facility-Administered Medications:   •  acetaminophen (TYLENOL) tablet 650 mg, 650 mg, Oral, Q6H PRN, LORETO Palomino  •  benzonatate (TESSALON PERLES) capsule 100 mg, 100 mg, Oral, TID PRN, Manuela Saldivar MD  •  bisacodyl (DULCOLAX) rectal suppository 10 mg, 10 mg, Rectal, Daily PRN, Manuela Saldivar MD  •  [START ON 3/25/2023] enoxaparin (LOVENOX) subcutaneous injection 40 mg, 40 mg, Subcutaneous, Daily, Manuela Saldivar MD  •  famotidine (PEPCID) tablet 40 mg, 40 mg, Oral, Daily With Alisha Rice MD  •  [START ON 3/25/2023] fluticasone (FLONASE) 50 mcg/act nasal spray 1 spray, 1 spray, Each Nare, Daily, Manuela Saldivar MD  •  ondansetron (ZOFRAN-ODT) dispersible tablet 4 mg, 4 mg, Oral, Q6H PRN, Manuela Saldivar MD  •  polyethylene glycol (MIRALAX) packet 17 g, 17 g, Oral, Daily PRN, Manuela Saldivar MD  •  primidone (MYSOLINE) tablet 50 mg, 50 mg, Oral, HS, Manuela Saldivar MD  •  sodium chloride (OCEAN) 0 65 % nasal spray 1 spray, 1 spray, Each Nare, Q1H PRN, LORETO Palomino    No Known Allergies     Patient Active Problem List    Diagnosis Date Noted   • SARS-CoV-2 positive 03/21/2023   • Elevated AST (SGOT) 03/24/2023   • Prediabetes 03/24/2023   • Closed left ankle fracture, sequela 03/24/2023   • GERD (gastroesophageal reflux disease) 03/24/2023   • Right ankle pain 03/24/2023   • "Diarrhea 03/22/2023   • Macrocytosis 03/21/2023   • Ambulatory dysfunction 03/21/2023   • Rhabdomyolysis 03/21/2023   • Tremor, essential 10/20/2022   • Acute cerebral hemorrhage ([facility] [address] ) 10/11/2022   • History of cerebral hemorrhage 10/10/2022   • 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 ([facility] [address] ) 10/10/2022   • Chronic left-sided low back pain    • Transient cerebral ischemia 09/14/2018   • Premature atrial beats 09/14/2018   • Cataract of right eye 06/21/2016   • Sacroiliitis ([facility] [address] ) 12/04/2015   • Idiopathic osteoporosis 06/09/2015   • Primary osteoarthritis of hand 06/09/2015     Past Medical History:   Diagnosis Date   • Cataract of right eye    • GERD (gastroesophageal reflux disease)      Past Surgical History:   Procedure Laterality Date   • CATARACT EXTRACTION, BILATERAL Bilateral    • MASTECTOMY Bilateral     for calcium deposits   • UT COLONOSCOPY FLX DX W/COLLJ SPEC WHEN PFRMD N/A 8/25/2017    Procedure: COLONOSCOPY;  Surgeon: Caro Del Real MD;  Location: [facility]; Service: Colorectal   • UT XCAPSL CTRC RMVL INSJ IO LENS PROSTH W/O ECP Right 6/21/2016    Procedure: OD EXTRACTION EXTRACAPSULAR CATARACT PHACO INTRAOCULAR LENS (IOL); Surgeon: Hazel Villegas MD;  Location: [facility];  Service: Ophthalmology   • TONSILLECTOMY       Social History     Socioeconomic History   • Marital status:       Spouse name: Not on file   • Number of children: Not on file   • Years of education: Not on file   • Highest education level: Not on file   Occupational History   • Not on file   Tobacco Use   • Smoking status: Former   • Smokeless tobacco: Never   Vaping Use   • Vaping Use: Never used   Substance and Sexual Activity   • Alcohol use: Yes     Comment: pt states \"very occassionally\"   • Drug use: Never   • Sexual activity: Not on file   Other Topics Concern   • Not on file   Social History Narrative   • Not on file     Social Determinants of Health     Financial Resource Strain: Not " "on file   Food Insecurity: No Food Insecurity   • Worried About [address] in the Last Year: Never true   • Ran Out of Food in the Last Year: Never true   Transportation Needs: No Transportation Needs   • Lack of Transportation (Medical): No   • Lack of Transportation (Non-Medical): No   Physical Activity: Not on file   Stress: Not on file   Social Connections: Not on file   Intimate Partner Violence: Not on file   Housing Stability: Low Risk    • Unable to Pay for Housing in the Last Year: No   • Number of Places Lived in the Last Year: 1   • Unstable Housing in the Last Year: No     Social History     Tobacco Use   Smoking Status Former   Smokeless Tobacco Never     Social History     Substance and Sexual Activity   Alcohol Use Yes    Comment: pt states \"very occassionally\"     Family History   Problem Relation Age of Onset   • Rectal cancer Brother    • Stroke Mother    • Heart attack Father    • Arrhythmia Father         possible   • Coronary artery disease Father    • Sudden death Father         scd   • Anuerysm Neg Hx    • Clotting disorder Neg Hx    • Hypertension Neg Hx    • Hyperlipidemia Neg Hx    • Heart failure Neg Hx          Medical Necessity Criteria for [facility] Admission: Bowel/Bladder Management and Rhabdomyolysis, respiratory status, cough, COVID-19 symptoms, diarrhea, fluid status  In addition, the preadmission screen, post-admission physical evaluation, overall plan of care and admissions order demonstrate a reasonable expectation that the following criteria were met at the time of admission to the [facility]  1  The patient requires active and ongoing therapeutic intervention of multiple therapy disciplines (physical therapy, occupational therapy, speech-language pathology, or prosthetics/orthotics), one of which is physical or occupational therapy      2  Patient requires an intensive rehabilitation therapy program, as defined in Chapter 1, section 110 2 2 of the CMS Medicare Policy Manual  This " intensive rehabilitation therapy program will consist of at least 3 hours of therapy per day at least 5 days per week or at least 15 hours of intensive rehabilitation therapy within a 7 consecutive day period, beginning with the date of admission to the [facility]  3  The patient is reasonably expected to actively participate in, and benefit significantly from, the intensive rehabilitation therapy program as defined in Chapter 1, section 110 2 2 of the CMS Medicare Policy Manual at this time of admission to the [facility]  She can reasonably be expected to make measurable improvement (that will be of practical value to improve the patient’s functional capacity or adaptation to impairments) as a result of the rehabilitation treatment, as defined in section 110 3, and such improvement can be expected to be made within the prescribed period of time  As noted in the CMS Medicare Policy Manual, the patient need not be expected to achieve complete independence in the domain of self-care nor be expected to return to his or her prior level of functioning in order to meet this standard  4  The patient must require physician supervision by a rehabilitation physician  As such, a rehabilitation physician will conduct face-to-face visits with the patient at least 3 days per week throughout the patient’s stay in the [facility] to assess the patient both medically and functionally, as well as to modify the course of treatment as needed to maximize the patient’s capacity to benefit from the rehabilitation process    5  The patient requires an intensive and coordinated interdisciplinary approach to providing rehabilitation, as defined in Chapter 1, section 110 2 5 of the CMS Medicare Policy Manual  This will be achieved through periodic team conferences, conducted at least once in a 7-day period, and comprising of an interdisciplinary team of medical professionals consisting of: a rehabilitation physician, registered nurse,  and/or case manager, and a licensed/certified therapist from each therapy discipline involved in treating the patient  Changes Since Pre-admission Assessment: None -This patient's participation in rehab continues to be reasonable, necessary and appropriate  CMS Required Post-Admission Physician Evaluation Elements  History and Physical, including medical history, functional history and active comorbidities as in above text  Post-Admission Physician Evaluation:  The patient has the potential to make improvement and is in need of physical, occupational, and/or therapy services  The patient may also need nutritional services  Given the patient's complex medical condition and risk of further medical complications, rehabilitative services cannot be safely provided at a lower level of care, such as a skilled nursing facility  I have reviewed the patient's functional and medical status at the time of the preadmission screening and they are the same as on the day of this admission  I acknowledge that I have personally performed a full physical examination on this patient within 24 hours of admission  The patient and/or family demonstrated understanding the rehabilitation program and the discharge process after we discussed them  Agree in entirety: yes  Minor adaptions: none    Major changes: none    Carlos James MD    ** Please Note: Fluency Direct voice to text software may have been used in the creation of this document  **      I have spent a total time of 85 minutes on 03/24/23 in caring for this patient including Impressions, Counseling / Coordination of care, Documenting in the medical record, Reviewing / ordering tests, medicine, procedures  , Obtaining or reviewing history   and Communicating with other healthcare professionals

## 2023-03-24 NOTE — TREATMENT PLAN
Individualized Plan of Eboni Mccrary [age] y o  female MRN: [id number]  Unit/Bed#: -01 Encounter: [id number]     PATIENT INFORMATION  ADMISSION DATE: 3/24/2023  1:41 PM REECE CATEGORY:Debility due to COVID-19 virus   ADMISSION DIAGNOSIS: Debility  EXPECTED LOS: 2 weeks     MEDICAL/FUNCTIONAL PROGNOSIS  Based on my assessment of the patient's medical conditions and current functional status, the prognosis for attaining medical and functional goals or the IRF stay is:  Fair    Medical Goals: Patient will be medically stable for discharge to [facility] upon completion of rehab program and Patient will be able to manage medical conditions and comorbid conditions with medications and follow up upon completion of rehab program    [address]: Home - alone    ANTICIPATED FOLLOW-UP SERVICE:   Home Health Services: PT, OT and Nursing    DISCIPLINE SPECIFIC PLANS:  Required Disciplines & Services: Rehabillitation Nursing, Case Management, Dietay/Nutrition and Psychology    REQUIRED THERAPY:  Therapy Hours per Day Days per Week Total Days   Physical Therapy 1 5 5-6 7   Occupational Therapy 1 5 5-6 7   Speech/Language Therapy 0 0 0   NOTE: Additional therapy time(s) may be added as appropriate to meet patient needs and to achieve functional goals      ANTICIPATED FUNCTIONAL OUTCOMES:  ADL:  Modified independent level with least restrictive assistive device   Bladder/Bowel: Patient will return to premorbid level for bladder/bowel management upon completion of rehab program   Transfers:   Modified independent level with least restrictive assistive device   Locomotion:   Modified independent level with least restrictive assistive device   Cognitive:       DISCHARGE PLANNING NEEDS  Equipment needs: Discharge needs to be reviewed with team      REHAB ANTICIPATED PARTICIPATION RESTRICTIONS:  None

## 2023-03-24 NOTE — DISCHARGE SUMMARY
INTERNAL MEDICINE RESIDENCY DISCHARGE SUMMARY     Saurav Acosta   78 y o  female  MRN: [id number]  Room/Bed: /MS [address]    Encounter: [id number]    Principal Problem:    Ambulatory dysfunction  Active Problems:    History of cerebral hemorrhage    Macrocytosis    Rhabdomyolysis    SARS-CoV-2 positive    Diarrhea      Diarrhea  Assessment & Plan  Diarrhea started 3/21 while in hospital   Patient notes having 4 episodes of diarrhea since yesterday  COVID vs  C  diff  No recent use of Abx, does not live in communal setting, no history of C  diff  · C  Diff pending           SARS-CoV-2 positive  Assessment & Plan  Patient is COVID-positive on admission  Reports some mild nasal congestion, however otherwise asymptomatic and not requiring oxygen  No clear sick contacts  Chest x-ray on my read in the ED shows no acute disease with normal procalcitonin  · Mild COVID pathway  · Isolation precautions  · Monitor O2 requirement  · No intervention necessary at this time    Rhabdomyolysis  Assessment & Plan  Patient presents with elevated CK 6836, in the setting of elevated transaminases  and ALT 54  Without renal dysfunction  She presents with generalized weakness and extended downtime, so there is suspicion for rhabdomyolysis  Urinalysis shows clear light yellow urine without blood  Other etiology is asymptomatic COVID-19 infection  Rhabdomyolysis is non traumatic in nature  CK down to 2000 from 4000  , down from 202 on previous day  · Cont maintenance fluids with isolyte 100 cc/hour      Macrocytosis  Assessment & Plan  MCV on admission 102 with history of macrocytosis since 2018  Otherwise, not anemic  Denies alcohol abuse history    · B12 normal, peripheral smear normal, reticulocyte count normal       History of cerebral hemorrhage  Assessment & Plan  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  She was admitted on 10/10/22 at [facility] after presenting with L sided weakness  She follows with neurology, remains off of aspirin  CT on admission shows no acute infarcts  · Continue to monitor clinically    * Ambulatory dysfunction  Assessment & Plan  53-year-old female with a history of CVA in 2018 with residual left-sided weakness presents 3/21/2023 with worsening lower extremity weakness and ambulatory dysfunction x2 days, in addition to URI symptoms  On admission, vital signs stable and saturating well on room air, afebrile  Labs notable for elevated CK >6k, elevated lactic acid 2 1, and COVID-positive  CT head negative  She has residual left sided weakness without new deficits, and strength intact lower extremities without red flag symptoms  I suspect her symptoms are 2/2 dehydration in the setting of COVID-19 and decreased PO intake  · Management as below  · PT/OT recommends post acute rehab, CM aware      Physical Exam  Constitutional:       Appearance: Normal appearance  HENT:      Head: Normocephalic and atraumatic  Mouth/Throat:      Mouth: Mucous membranes are moist       Pharynx: Oropharynx is clear  Eyes:      Extraocular Movements: Extraocular movements intact  Conjunctiva/sclera: Conjunctivae normal       Pupils: Pupils are equal, round, and reactive to light  Cardiovascular:      Rate and Rhythm: Normal rate and regular rhythm  Pulses: Normal pulses  Pulmonary:      Effort: Pulmonary effort is normal       Breath sounds: Normal breath sounds  Abdominal:      General: Abdomen is flat  Bowel sounds are normal       Palpations: Abdomen is soft  Musculoskeletal:         General: Normal range of motion  Cervical back: Normal range of motion  Skin:     General: Skin is warm  Capillary Refill: Capillary refill takes less than 2 seconds  Neurological:      General: No focal deficit present        Mental Status: She is alert and oriented to person, place, and time  [address] [place] [address] Ave     Per Dr Winston Williamson "78 y o  female with a past medical history of 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 on aspirin, essential tremor, osteoporosis, osteoarthritis of right knee, vitamin D deficiency who presents on 3/21/23 for amatory dysfunction and generalized weakness  She reports to me that since Lars 3/19 she has experienced worsening lower extremity weakness  She reports that she was unable to get herself out of her chair for days due to weakness in her lower extremities  Today she describes a mechanical fall where she "slipped" from a standing height in which she slowly fell to the ground without injuring herself which brought her to the hospital   She denied any prodrome or other precipitating events  She reports being on the ground for a few hours before her neighbor was able to help her up  She is normally able to ambulate on her own at home without any assist devices  She reports a mild, nonproductive cough and associated rhinorrhea that has started over the past few days as well  Denies fevers, chills, chest pain, shortness of breath, nausea, vomiting, diarrhea, constipation, or other systemic complaints  She denies tobacco use, alcohol use, illicit drug use  Does report a history of bilateral mastectomy, otherwise no major surgeries  Primary contact is her son, Destiney Armenta  I called Destiney Armenta for further clarification, he states that his mother lives at home on her own is able to normally take care of herself  He does report that ever since her stroke, she has had progressive generalized weakness but this seems to be acutely worse for her over the past weekend  He did not witness her at home over the past few days      On arrival, she is hemodynamically stable, satting well on room air, and afebrile    Labs are notable for total CK 6836, elevated transaminases with , ALT 54, otherwise normal liver enzymes  Lactic acid 2 1 that cleared  She is SARS-CoV-2 positive  Procalcitonin normal at 0 17  Troponin is normal   CBC without leukocytosis of, but does demonstrate macrocytosis with   Chest x-ray shows no acute pulmonary disease on my read  CT head shows no acute intracranial process  EKG shows normal sinus rhythm with PACs  She is status post 1 L normal saline x2 in the ED "     Patient was then admitted to [facility]  She was started on IV fluids and throughout her hospital stay her TIM resolved, AST and ALT normalized and CK decreased  Patient was evaluated for PT and deemed to require post acute rehab  Rehab bed was found at [facility] in [place] and patient was discharged     DISCHARGE INFORMATION     PCP at [address] [place] [address]      Admitting Provider: Bhumika Solorzano DO  Admission Date: 3/21/2023    Discharge Provider: No att  providers found  Discharge Date: 3/24/23    Discharge Disposition: [facility]  Discharge Condition: good  Discharge with Lines: no    Discharge Diet: regular diet  Activity Restrictions: none  Test Results Pending at Discharge: none    Discharge Diagnoses:  Principal Problem:    Ambulatory dysfunction  Active Problems:    History of cerebral hemorrhage    Macrocytosis    Rhabdomyolysis    SARS-CoV-2 positive    Diarrhea  Resolved Problems:    * No resolved hospital problems  *      Consulting Providers:      Diagnostic & Therapeutic Procedures Performed:  XR chest 2 views    Result Date: 3/22/2023  Impression: No radiographic evidence of acute intrathoracic process  Workstation performed: [id number]     CT head without contrast    Result Date: 3/21/2023  Impression: No evidence of acute intracranial process  Chronic microangiopathy  Chronic right subinsular hyperdensity similar to January 2023 attributed to hemosiderin deposition on the prior MRI   Workstation performed: [id number]       Code Status: Level 1 - Full Code  Advance Directive & Living Will: <no information>  Power of :    POLST:      Medications:  Discharge Medication List as of 3/24/2023 12:22 PM        Discharge Medication List as of 3/24/2023 12:22 PM        Discharge Medication List as of 3/24/2023 12:22 PM      CONTINUE these medications which have NOT CHANGED    Details   Ascorbic Acid (VITAMIN C PO) Take by mouth, Historical Med      atorvastatin (LIPITOR) 10 mg tablet Take 1 tablet by mouth Daily, Historical Med      b complex vitamins tablet Take 1 tablet by mouth daily  , Historical Med      calcium-vitamin D 250-100 MG-UNIT per tablet Take 1 tablet by mouth 2 (two) times a day, Historical Med      famotidine (PEPCID) 40 MG tablet Starting Tue 3/8/2022, Historical Med      !! Multiple Vitamins-Minerals (B COMPLEX PLUS VITAMIN C PO) B Complex Plus Vitamin C, Historical Med      !! Multiple Vitamins-Minerals (CENTRUM CARDIO PO) Take 1 tablet by mouth daily  , Historical Med      primidone (MYSOLINE) 50 mg tablet Take 1 tab by mouth daily at bedtime, Normal      Vitamin D, Cholecalciferol, 1000 UNITS TABS Take 1 tablet by mouth daily Indications: with aloe , Historical Med       !! - Potential duplicate medications found  Please discuss with provider  Allergies:  No Known Allergies    FOLLOW-UP     PCP Outpatient Follow-up:  yes      Follow up: with PCP   Follow up within next: one week     Consulting Providers Follow-up:  none required     Active Issues Requiring Follow-up:   none    Discharge Statement:   I spent 30 minutes minutes discharging the patient  This time was spent on the day of discharge  I had direct contact with the patient on the day of discharge  Additional documentation is required if more than 30 minutes were spent on discharge  Portions of the record may have been created with voice recognition software  Occasional wrong word or "sound a like" substitutions may have occurred due to the inherent limitations of voice recognition software    Read the chart carefully and recognize, using context, where substitutions have occurred     ==  Matthew Cruz MD  [address]  Internal Medicine Resident PGY-1

## 2023-03-24 NOTE — ASSESSMENT & PLAN NOTE
Had been experiencing diarrhea in acute setting  Starting to resolve  C-diff negative on 3/22  Likely viral in nature due to recent illness  Continue with symptom management

## 2023-03-25 ENCOUNTER — APPOINTMENT (OUTPATIENT)
Dept: RADIOLOGY | Facility: HOSPITAL | Age: 78
End: 2023-03-25

## 2023-03-25 RX ADMIN — FAMOTIDINE 40 MG: 20 TABLET, FILM COATED ORAL at 15:50

## 2023-03-25 RX ADMIN — ENOXAPARIN SODIUM 40 MG: 40 INJECTION SUBCUTANEOUS at 08:35

## 2023-03-25 RX ADMIN — ACETAMINOPHEN 650 MG: 325 TABLET ORAL at 08:35

## 2023-03-25 RX ADMIN — ACETAMINOPHEN 650 MG: 325 TABLET ORAL at 15:50

## 2023-03-25 RX ADMIN — PRIMIDONE 50 MG: 50 TABLET ORAL at 22:18

## 2023-03-25 RX ADMIN — FLUTICASONE PROPIONATE 1 SPRAY: 50 SPRAY, METERED NASAL at 08:35

## 2023-03-25 NOTE — PROGRESS NOTES
[facility] PT GOALS     03/25/23 1300   Rehab Team Goals   Transfer Team Goal Patient will be independent with transfers with least restrictive device upon completion of rehab program   Locomotion Team Goal Patient will be independent with locomotion with least restrictive device upon completion of rehab program   Rehab Team Interventions   PT Interventions Gait Training; Therapeutic Exercise;Neuromuscualr Reeducation;Transfer Training;Bed Mobility;Modalities; Patient/Family Education   PT Transfer Goal   Roll left and right Goal 06  Independent - Patient completes the activity by him/herself with no assistance from a helper  Sit to lying Goal 06  Independent - Patient completes the activity by him/herself with no assistance from a helper  Lying to sitting on side of bed Goal 06  Independent - Patient completes the activity by him/herself with no assistance from a helper  Sit to stand Goal 06  Independent - Patient completes the activity by him/herself with no assistance from a helper  Chair/bed-to-chair transfer Goal 06  Independent - Patient completes the activity by him/herself with no assistance from a helper  Car Transfer Goal 04  Supervision or touching assistance- [place] provides VERBAL CUES or supervision throughout activity  Assistive Device   (LRAD)   Environment Level Surface; Well Lit; Tile Floor   Status Ongoing; Target goal - two weeks   Locomotion Goal   Primary discharge mode of locomotion Walking   Target Walk Distance 150 ft   Assist Device   (LRAD)   Gait Pattern Improvement Inconsistant [place]; Improper weight shift; Antalgic;Narrow NIC; Forward Flexion;Decreased foot clearance;Decreased R stance;Decreased L stance   Environment Well Lit; Tile Floor;Level Surface   Walk 10 feet Goal 06  Independent - Patient completes the activity by him/herself with no assistance from a helper  Walk 50 feet with 2 turns Goal 06   Independent - Patient completes the activity by him/herself with no assistance from a helper  Walk 150 feet Goal 06  Independent - Patient completes the activity by him/herself with no assistance from a helper  Walking 10 feet on uneven surface 04  Supervision or touching assistance- [place] provides VERBAL CUES or supervision throughout activity  Walking Goal Status Ongoing; Target goal - two weeks   Wheel 50 feet with 2 turns Goal 09  Not applicable   Wheel 185 feet Goal 09  Not applicable   Stairs Goal   1 step or curb goal 04  Supervision or touching assistance- [place] provides VERBAL CUES or supervision throughout activity  (not needed for DC to apt but goal for community re-entry)   4 steps Goal 09  Not applicable   12 steps Goal 09  Not applicable   Technique Curb Step   Status Ongoing; Target goal - two weeks   Object Retrieval Goal   Picking up object Goal 06  Independent - Patient completes the activity by him/herself with no assistance from a helper     Assistive Device  Reacher   Small Object Picked Up marker

## 2023-03-25 NOTE — PLAN OF CARE
Problem: NEUROSENSORY - ADULT  Goal: Achieves stable or improved neurological status  Description: INTERVENTIONS  - Monitor and report changes in neurological status  - Monitor vital signs such as temperature, blood pressure, glucose, and any other labs ordered   - Initiate measures to prevent increased intracranial pressure  - Monitor for seizure activity and implement precautions if appropriate      Outcome: Progressing  Goal: Remains free of injury related to seizures activity  Description: INTERVENTIONS  - Maintain airway, patient safety  and administer oxygen as ordered  - Monitor patient for seizure activity, document and report duration and description of seizure to physician/advanced practitioner  - If seizure occurs,  ensure patient safety during seizure  - Reorient patient post seizure  - Seizure pads on all 4 side rails  - Instruct patient/family to notify RN of any seizure activity including if an aura is experienced  - Instruct patient/family to call for assistance with activity based on nursing assessment  - Administer anti-seizure medications if ordered    Outcome: Progressing  Goal: Achieves maximal functionality and self care  Description: INTERVENTIONS  - Monitor swallowing and airway patency with patient fatigue and changes in neurological status  - Encourage and assist patient to increase activity and self care     - Encourage visually impaired, hearing impaired and aphasic patients to use assistive/communication devices  Outcome: Progressing     Problem: CARDIOVASCULAR - ADULT  Goal: Maintains optimal cardiac output and hemodynamic stability  Description: INTERVENTIONS:  - Monitor I/O, vital signs and rhythm  - Monitor for S/S and trends of decreased cardiac output  - Administer and titrate ordered vasoactive medications to optimize hemodynamic stability  - Assess quality of pulses, skin color and temperature  - Assess for signs of decreased coronary artery perfusion  - Instruct patient to report change in severity of symptoms  Outcome: Progressing  Goal: Absence of cardiac dysrhythmias or at baseline rhythm  Description: INTERVENTIONS:  - Continuous cardiac monitoring, vital signs, obtain 12 lead EKG if ordered  - Administer antiarrhythmic and heart rate control medications as ordered  - Monitor electrolytes and administer replacement therapy as ordered  Outcome: Progressing     Problem: RESPIRATORY - ADULT  Goal: Achieves optimal ventilation and oxygenation  Description: INTERVENTIONS:  - Assess for changes in respiratory status  - Assess for changes in mentation and behavior  - Position to facilitate oxygenation and minimize respiratory effort  - Oxygen administered by appropriate delivery if ordered  - Initiate smoking cessation education as indicated  - Encourage broncho-pulmonary hygiene including cough, deep breathe, Incentive Spirometry  - Assess the need for suctioning and aspirate as needed  - Assess and instruct to report SOB or any respiratory difficulty  - Respiratory Therapy support as indicated  Outcome: Progressing     Problem: GASTROINTESTINAL - ADULT  Goal: Minimal or absence of nausea and/or vomiting  Description: INTERVENTIONS:  - Administer IV fluids if ordered to ensure adequate hydration  - Maintain NPO status until nausea and vomiting are resolved  - Nasogastric tube if ordered  - Administer ordered antiemetic medications as needed  - Provide nonpharmacologic comfort measures as appropriate  - Advance diet as tolerated, if ordered  - Consider nutrition services referral to assist patient with adequate nutrition and appropriate food choices  Outcome: Progressing  Goal: Maintains or returns to baseline bowel function  Description: INTERVENTIONS:  - Assess bowel function  - Encourage oral fluids to ensure adequate hydration  - Administer IV fluids if ordered to ensure adequate hydration  - Administer ordered medications as needed  - Encourage mobilization and activity  - Consider nutritional services referral to assist patient with adequate nutrition and appropriate food choices  Outcome: Progressing  Goal: Maintains adequate nutritional intake  Description: INTERVENTIONS:  - Monitor percentage of each meal consumed  - Identify factors contributing to decreased intake, treat as appropriate  - Assist with meals as needed  - Monitor I&O, weight, and lab values if indicated  - Obtain nutrition services referral as needed  Outcome: Progressing  Goal: Establish and maintain optimal ostomy function  Description: INTERVENTIONS:  - Assess bowel function  - Encourage oral fluids to ensure adequate hydration  - Administer IV fluids if ordered to ensure adequate hydration   - Administer ordered medications as needed  - Encourage mobilization and activity  - Nutrition services referral to assist patient with appropriate food choices  - Assess stoma site  - Consider wound care consult   Outcome: Progressing  Goal: Oral mucous membranes remain intact  Description: INTERVENTIONS  - Assess oral mucosa and hygiene practices  - Implement preventative oral hygiene regimen  - Implement oral medicated treatments as ordered  - Initiate Nutrition services referral as needed  Outcome: Progressing     Problem: GENITOURINARY - ADULT  Goal: Maintains or returns to baseline urinary function  Description: INTERVENTIONS:  - Assess urinary function  - Encourage oral fluids to ensure adequate hydration if ordered  - Administer IV fluids as ordered to ensure adequate hydration  - Administer ordered medications as needed  - Offer frequent toileting  - Follow urinary retention protocol if ordered  Outcome: Progressing  Goal: Absence of urinary retention  Description: INTERVENTIONS:  - Assess patient’s ability to void and empty bladder  - Monitor I/O  - Bladder scan as needed  - Discuss with physician/AP medications to alleviate retention as needed  - Discuss catheterization for long term situations as appropriate  Outcome: Progressing  Goal: Urinary catheter remains patent  Description: INTERVENTIONS:  - Assess patency of urinary catheter  - If patient has a chronic boucher, consider changing catheter if non-functioning  - Follow guidelines for intermittent irrigation of non-functioning urinary catheter  Outcome: Progressing     Problem: METABOLIC, FLUID AND ELECTROLYTES - ADULT  Goal: Electrolytes maintained within normal limits  Description: INTERVENTIONS:  - Monitor labs and assess patient for signs and symptoms of electrolyte imbalances  - Administer electrolyte replacement as ordered  - Monitor response to electrolyte replacements, including repeat lab results as appropriate  - Instruct patient on fluid and nutrition as appropriate  Outcome: Progressing  Goal: Fluid balance maintained  Description: INTERVENTIONS:  - Monitor labs   - Monitor I/O and WT  - Instruct patient on fluid and nutrition as appropriate  - Assess for signs & symptoms of volume excess or deficit  Outcome: Progressing  Goal: Glucose maintained within target range  Description: INTERVENTIONS:  - Monitor Blood Glucose as ordered  - Assess for signs and symptoms of hyperglycemia and hypoglycemia  - Administer ordered medications to maintain glucose within target range  - Assess nutritional intake and initiate nutrition service referral as needed  Outcome: Progressing     Problem: METABOLIC, FLUID AND ELECTROLYTES - ADULT  Goal: Electrolytes maintained within normal limits  Description: INTERVENTIONS:  - Monitor labs and assess patient for signs and symptoms of electrolyte imbalances  - Administer electrolyte replacement as ordered  - Monitor response to electrolyte replacements, including repeat lab results as appropriate  - Instruct patient on fluid and nutrition as appropriate  Outcome: Progressing  Goal: Fluid balance maintained  Description: INTERVENTIONS:  - Monitor labs   - Monitor I/O and WT  - Instruct patient on fluid and nutrition as appropriate  - Assess for signs & symptoms of volume excess or deficit  Outcome: Progressing  Goal: Glucose maintained within target range  Description: INTERVENTIONS:  - Monitor Blood Glucose as ordered  - Assess for signs and symptoms of hyperglycemia and hypoglycemia  - Administer ordered medications to maintain glucose within target range  - Assess nutritional intake and initiate nutrition service referral as needed  Outcome: Progressing     Problem: SKIN/TISSUE INTEGRITY - ADULT  Goal: Skin Integrity remains intact(Skin Breakdown Prevention)  Description: Assess:  -Perform Jame assessment every q shift  -Clean and moisturize skin every q shift  -Inspect skin when repositioning, toileting, and assisting with ADLS  -Assess extremities for adequate circulation and sensation     Bed Management:  -Have minimal linens on bed & keep smooth, unwrinkled  -Change linens as needed when moist or perspiring  -Avoid sitting or lying in one position for more than 2 hours while in bed  -Keep HOB at 30 degrees     Toileting:  -Offer bedside commode  -Assess for incontinence every q 2 hrs      Activity:  -Mobilize patient 2  times a day  -Encourage activity and walks on unit  -Encourage or provide ROM exercises   -Turn and reposition patient every  2 Hours  -Use appropriate equipment to lift or move patient in bed  -Instruct/ Assist with weight shifting every 2 when out of bed in chair  -Consider limitation of chair time 2 hour intervals    Skin Care:  -Avoid use of baby powder, tape, friction and shearing, hot water or constrictive clothing  -Relieve pressure over bony prominences using cushion  -Do not massage red bony areas    Next Steps:    Outcome: Progressing  Goal: Incision(s), wounds(s) or drain site(s) healing without S/S of infection  Description: INTERVENTIONS  - Assess and document dressing, incision, wound bed, drain sites and surrounding tissue  - Provide patient and family education  - Perform skin care/dressing changes every shift  Outcome: Progressing  Goal: Pressure injury heals and does not worsen  Description: Interventions:  - Implement low air loss mattress or specialty surface (Criteria met)  - Apply silicone foam dressing  - Instruct/assist with weight shifting every 2 minutes when in chair   - Limit chair time to 2 hour intervals  - Use special pressure reducing interventions such as cushion when in chair   - Apply fecal or urinary incontinence containment device   - Perform passive or active ROM every shift  - Turn and reposition patient & offload bony prominences every 2 hours   - Utilize friction reducing device or surface for transfers     - Consider nutrition services referral as needed  Outcome: Progressing     Problem: HEMATOLOGIC - ADULT  Goal: Maintains hematologic stability  Description: INTERVENTIONS  - Assess for signs and symptoms of bleeding or hemorrhage  - Monitor labs  - Administer supportive blood products/factors as ordered and appropriate  Outcome: Progressing     Problem: MUSCULOSKELETAL - ADULT  Goal: Maintain or return mobility to safest level of function  Description: INTERVENTIONS:  - Assess patient's ability to carry out ADLs; assess patient's baseline for ADL function and identify physical deficits which impact ability to perform ADLs (bathing, care of mouth/teeth, toileting, grooming, dressing, etc )  - Assess/evaluate cause of self-care deficits   - Assess range of motion  - Assess patient's mobility  - Assess patient's need for assistive devices and provide as appropriate  - Encourage maximum independence but intervene and supervise when necessary  - Involve family in performance of ADLs  - Assess for home care needs following discharge   - Consider OT consult to assist with ADL evaluation and planning for discharge  - Provide patient education as appropriate  Outcome: Progressing  Goal: Maintain proper alignment of affected body part  Description: INTERVENTIONS:  - Support, maintain and protect limb and body alignment  - Provide patient/ family with appropriate education  Outcome: Progressing     Problem: Prexisting or High Potential for Compromised Skin Integrity  Goal: Skin integrity is maintained or improved  Description: INTERVENTIONS:  - Identify patients at risk for skin breakdown  - Assess and monitor skin integrity  - Assess and monitor nutrition and hydration status  - Monitor labs   - Assess for incontinence   - Turn and reposition patient  - Assist with mobility/ambulation  - Relieve pressure over bony prominences  - Avoid friction and shearing  - Provide appropriate hygiene as needed including keeping skin clean and dry  - Evaluate need for skin moisturizer/barrier cream  - Collaborate with interdisciplinary team   - Patient/family teaching  - Consider wound care consult   Outcome: Progressing     Problem: MOBILITY - ADULT  Goal: Maintain or return to baseline ADL function  Description: INTERVENTIONS:  -  Assess patient's ability to carry out ADLs; assess patient's baseline for ADL function and identify physical deficits which impact ability to perform ADLs (bathing, care of mouth/teeth, toileting, grooming, dressing, etc )  - Assess/evaluate cause of self-care deficits   - Assess range of motion  - Assess patient's mobility; develop plan if impaired  - Assess patient's need for assistive devices and provide as appropriate  - Encourage maximum independence but intervene and supervise when necessary  - Involve family in performance of ADLs  - Assess for home care needs following discharge   - Consider OT consult to assist with ADL evaluation and planning for discharge  - Provide patient education as appropriate  Outcome: Progressing  Goal: Maintains/Returns to pre admission functional level  Description: INTERVENTIONS:  - Perform BMAT or MOVE assessment daily    - Set and communicate daily mobility goal to care team and patient/family/caregiver     - Collaborate with rehabilitation services on mobility goals if consulted  - Perform Range of Motion 2 times a day  - Reposition patient every 2 hours    - Dangle patient 2 times a day  - Stand patient 2 times a day  - Ambulate patient 2 times a day  - Out of bed to chair 3  times a day   - Out of bed for meals 3 times a day  - Out of bed for toileting  - Record patient progress and toleration of activity level   Outcome: Progressing

## 2023-03-25 NOTE — PROGRESS NOTES
[facility] FACILITY ARC OT EVAL   03/25/23 1100   Patient Data   Rehab Impairment Impairment of mobility, safety and Activities of Daily Living (ADLs) due to Debility:  16  Debility (Non-cardiac/Non-pulmonary)   Etiologic Diagnosis Generalized Weakness   Date of Onset 03/21/23   Support System   Name Pt lives by herself in [facility] [address]  Her sister lives close by and appears is able to assist intermittently/check in w/ pt  Home Setup   Type of Home Apartment  ([facility] living apartment)   Number of Stairs 0   Number of Stairs in Home 0   In [address]   (Hand rails in hallway)   First Floor Bathroom Accessibility Grab bars by toilet;Grab bars in [address] Available Yes   Home Modification Comment pending progress   Available Equipment Roller Walker;Single [employer]   Prior IADL Participation   Money Management   (PTA IND)   Meal Preparation Meals on Wheels  (use of micorwave)   Laundry   (PTA IND)   Home Cleaning   (PTA IND)   Prior Level of Function   Self-Care 3  Independent - Patient completed the activities by him/herself, with or without an assistive device, with no assistance from a helper  Indoor-Mobility (Ambulation) 3  Independent - Patient completed the activities by him/herself, with or without an assistive device, with no assistance from a helper  Functional Cognition 3  Independent - Patient completed the activities by him/herself, with or without an assistive device, with no assistance from a helper  Prior Assistance Needed for Meal Preparation; Shopping   Prior Device Used Z  None of the above   Falls in the Last Year   Number of falls in the past 12 months   (Pt reports at least 1)   Patient Preference   Nancye (Patient Preference) Kacey [place]   Psychosocial   Psychosocial (WDL) X   Patient Behaviors/Mood Anxious; Cooperative   Pain Assessment   Pain Assessment Tool 0-10   Pain Score 8   Pain Location/Orientation Orientation: Bilateral;Location: Foot   Eating Assessment   Comment Did not observe pt w/ tray, anticipate set up assist required at most d/t LUE deficits  Oral Hygiene   Type of Assistance Needed Physical assistance   Physical Assistance Level Total assistance   Comment Ax2 to attempt at PLOF in stance, d/t B feet pain w/ WB, not appropriate to complete in stance, completes seated at sink in WC sup and set up   Oral Hygiene CARE Score 1   Grooming   Findings Ax2 to attempt at PLOF in stance, d/t B feet pain w/ WB, not appropriate to complete in stance, completes seated at sink in WC sup and set up   Tub/Shower Transfer   Findings Not safe to trial at [facility] which is step into tub shower w/ GB  MaxA today for sit pivot to shower chair in WIS w/ GB   Shower/Bathe Self   Type of Assistance Needed Physical assistance   Physical Assistance Level Total assistance   Comment Pt requries Ax2 at PLOF in stance w/ GB assist, but d/t high pain in B feet w/ WB and dec endurance/strength not safe to trial  Pt requires MaxA to complete shower seated on shower chair for bathing  Shower/Bathe Self CARE Score 1   Bathing   Assessed Bath Style Shower   Anticipated D/C Bath Style Shower   Limitations Noted in Balance; Coordination; Endurance;Problem Solving;ROM;Safety;Strength;Timeliness   Dressing/Undressing Clothing   Type of Assistance Needed Physical assistance   Physical Assistance Level 26%-50%   Comment Partial assist d/t LUE weak and overall dec endurance   Upper Body Dressing CARE Score 3   Type of Assistance Needed Physical assistance   Physical Assistance Level Total assistance   Comment Ax2 in stance for CM over hips and assist required for threading BLE  donned pull up and scrub pants     Lower Body Dressing CARE Score 1   Putting On/Taking Off Footwear   Type of Assistance Needed Physical assistance   Physical Assistance Level 76% or more   Comment pt able to partially assists w/ doffing B socks via cross leg technique but requires TA to don clean socks   Putting On/Taking Off Footwear CARE Score 2   Toileting Hygiene   Type of Assistance Needed Physical assistance   Physical Assistance Level Total assistance   Comment pt partially assist w/ hygiene while seated  MaxA overall d/t assist for CM over hips by helper while OT provides balance support  Rec use of BSC at bedside for now Ax2 w/ nursing staff  Toileting Hygiene CARE Score 1   Toilet Transfer   Surface Assessed Standard Toilet   Transfer Bed/Chair/Wheelchair   Type of Assistance Needed Physical assistance   Physical Assistance Level 76% or more   Comment MaxA sit pivot d/t high pain B feet w/ WB   Chair/Bed-to-Chair Transfer CARE Score 2   Lying to Sitting on Side of Bed   Type of Assistance Needed Physical assistance   Physical Assistance Level 76% or more   Comment w/ HOB flat and no bed rails   Lying to Sitting on Side of Bed CARE Score 2   Sit to Stand   Type of Assistance Needed Physical assistance   Physical Assistance Level 76% or more   Comment MaxA at times, also demo'ed ModA depending on pain level in B feet w/ WB   Sit to Stand CARE Score 2   Ambulation   Findings not trailed d/t high pain in B feet w/ WB and overall fatigue   Walk 10 Feet   Reason if not Attempted Safety concerns   Walk 10 Feet CARE Score 88   Comprehension   QI: Comprehension 4  Undestands: Clear comprehension without cues or repetitions   Expression   QI: Expression 4  Express complex messages without difficulty and with speech that is clear and easy to understan   RUE Assessment   RUE Assessment WFL   LUE Assessment   LUE Assessment X  (Dec AROM at shoulder d/t weakness  Hx L charito from prior CVA)   Coordination   Movements are Fluid and Coordinated 0   Coordination and Movement Description antalgic and ataxic   Sensation   Light Touch No apparent deficits   Cognition   Overall Cognitive Status Impaired   Arousal/Participation Alert; Cooperative   Attention Attends with cues to redirect   Orientation Level Oriented X4   Memory Decreased recall of precautions;Decreased "recall of recent events;Decreased short term memory   Following Commands Follows one step commands with increased time or repetition   Comments pleasant and cooperative, but also anxious, one instance of tearfullness when stating, \"if my  were still here he would help me  \" Consider neuropsych as pt appears to be experiencing difficulties w/ adjustment to current situation  Objective Measure   OT Measure(s) TBA as appropriate   Discharge Information   Vocational Plan Retired/not working   Patient's Discharge Plan return home w/ ongoing therapy and intermittent family support   Patient's Rehab Expectations \"I want to be able to do things for myself and not hurt so much\"   Barriers to Discharge Home Limited Family Support;Decreased Cognitive Function;Decreased Strength;Decreased Endurance;Pain; Safety Considerations   Impressions Patient is a 78year old female that [facility]  on 3/21/2023 for evaluation of generalized weakness  Patient had a \"slip\" from standing the day prior, and she slowly fell to the floor  She did require police assistance to get up off floor to her chair, and she remained in chair all night  Patient endorsing mild nasal congestion and mild, non-productive cough  COVID test resulted positive, and patient was initiated on mild COVID pathway  CK was elevated at 6836 on presentation, suspected rhabdomyolysis with dehydration component, and was treated with IV fluids  CT of head and CXR was negative  On 3/22, patient developed diarrhea  CDiff PCR resulted negative, and suspected related to COVID infection  Pt presents to [address] [place] [address] for IPOT on 3/25/23  Pt currently require up to Ax2 for basic ADLs at [facility] and following OT intervention d/t pain and safety concerns  Pt most limited this date by high pain in B feet w/ any WB activity  Pt also experiencing back pain, dec strength, endurance, dec fxnl cog  See flowsheet for home set up details   Pt reports PLOF as IND, pt " exeperiencing significant dec in fxnl status at this time  Skilled IPOT services required to address deficits and pt  Anticipate 2 wks LOS w/ Mod I-sup goals for basic ADLs, and intermittent assist from family as needed for IADLs     OT Therapy Minutes   OT Time In 0930   OT Time Out 1100   OT Total Time (minutes) 90   OT Mode of treatment - Individual (minutes) 90   OT Mode of treatment - Concurrent (minutes) 0   OT Mode of treatment - Group (minutes) 0   OT Mode of treatment - Co-treat (minutes) 0   OT Mode of Treatment - Total time(minutes) 90 minutes   OT Cumulative Minutes 90   Cumulative Minutes   Cumulative therapy minutes 90

## 2023-03-25 NOTE — PLAN OF CARE
Problem: SKIN/TISSUE INTEGRITY - ADULT  Goal: Skin Integrity remains intact(Skin Breakdown Prevention)  Description: Assess:  -Perform Jame assessment every shift  -Clean and moisturize skin every incont episode  -Inspect skin when repositioning, toileting, and assisting with ADLS  -Assess under medical devices such as braces or splints every 3-4 hours  -Assess extremities for adequate circulation and sensation     Bed Management:  -Have minimal linens on bed & keep smooth, unwrinkled  -Change linens as needed when moist or perspiring  -Avoid sitting or lying in one position for more than 2 hours while in bed  -Keep HOB at 30 degrees     Toileting:  -Offer bedside commode  -Assess for incontinence every 3-4 hours  -Use incontinent care products after each incontinent episode   Activity:  -Mobilize patient 3 times a day  -Encourage activity and walks on unit  -Encourage or provide ROM exercises   -Turn and reposition patient every 2 Hours  -Use appropriate equipment to lift or move patient in bed  -Instruct/ Assist with weight shifting every 2 when out of bed in chair  -Consider limitation of chair time 1-2 hour intervals    Skin Care:  -Avoid use of baby powder, tape, friction and shearing, hot water or constrictive clothing  -Relieve pressure over bony prominences using pillows  -Do not massage red bony areas    Next Steps:  -Teach patient strategies to minimize risks   -Consider consults to  interdisciplinary teams   Outcome: Progressing

## 2023-03-25 NOTE — PROGRESS NOTES
[address] [facility] OT GOALS   03/25/23 0930   Rehab Team Goals   ADL Team Goal Patient will require supervision with ADLs with least restrictive device upon completion of rehab program  (MOD I-SUP for basic ADLs)   Rehab Team Interventions   OT Interventions Self Care;Home Management; Therapeutic Exercise;Community Reintegration;Cognitive Reintegration;Cognitive Retraining;Energy Conservation;Patient/Family Education   Eating Goal   Eating Goal 06  Independent - Patient completes the activity by him/herself with no assistance from a helper  Status Ongoing; Target goal - two weeks   Interventions Compensation Strategies; Dysphagia Education; Optimal Position   Grooming Goal   Oral Hygiene Goal 06  Independent - Patient completes the activity by him/herself with no assistance from a helper  Status Ongoing; Target goal - two weeks   Intervention Assistive Device;Balance Work; Therapeutic Exercise; Tolerance Work;Neuromuscular Education   Tub/Shower Transfer Goal   Method Tub Shower  (CS)   Status Ongoing; Target goal - two weeks   Interventions ADL Training;Assistive Device; Neuromuscular Education   Bathing Goal   Shower/bathe self Goal 05  Setup or clean-up assistance - [place] SETS UP or CLEANS UP, patient completes activity  [place] assists only prior to or following the activity  Environment Tub; Shower   Status Ongoing; Target goal - two weeks   Intervention ADL Training;Assistive Device; Neuromuscular Education; Therapeutic Exercise   Upper Body Dressing Goal   Upper body dressing Goal 06  Independent - Patient completes the activity by him/herself with no assistance from a helper  Status Ongoing; Target goal - two weeks   Intervention Assistive Device;Balance Work;Neuromuscular Education; Therapeutic Exercise; Tolerance Work   Lower Body Dressing Goal   Lower body dressing Goal 06  Independent - Patient completes the activity by him/herself with no assistance from a helper  Putting on/taking off footwear Goal 06   Independent - Patient completes the activity by him/herself with no assistance from a helper  Status Ongoing; Target goal - two weeks   Intervention Assistive Device;Balance Work;Neuromuscular Education; Therapeutic Exercise; Tolerance Work   Toileting Transfer Goal   Toilet transfer Goal 06  Independent - Patient completes the activity by him/herself with no assistance from a helper  Status Ongoing; Target goal - two weeks   Intervention ADL Training;Balance Work;Assistive Device   Toileting Goal   Toileting hygiene Goal 06  Independent - Patient completes the activity by him/herself with no assistance from a helper  Status Ongoing; Target goal - two weeks   Intervention ADL Training;Balance Work;Assistive Device   Comprehension Goal   Comprehension Assist Level Moderate [place]   Status Ongoing; Target goal - two weeks   Expression Goal   Expression Assist Level Moderate [place]   Status Ongoing; Target goal - two weeks   Community Reintegration Goal   Goal CS   Status Ongoing; Target - two weeks   Interventions Activity Tolerance;Community Outing;Leisure Activity   Meal Prep and Kitchen Mobility   Assist Level Independent  (w/ meals on wheels)   Status Ongoing; Target goal - two weeks   Medication Management   Assist Level Supervision   Status Ongoing; Target goal - two weeks

## 2023-03-25 NOTE — PROGRESS NOTES
"[facility] PT EVAL   03/25/23 1300   Patient Data   Rehab Impairment Impairment of mobility, safety and Activities of Daily Living (ADLs) due to Debility:  16  Debility (Non-cardiac/Non-pulmonary)   Etiologic Diagnosis Generalized Weakness   Date of Onset 03/21/23   Support System   Name pt lives alone in a first floor apt in a senior building ([address])  Her son lives in [place] but unable to provide much assistance  Sister lives nearby and may be able to intermittently assist pt   Able to provide 24 hour supervision No   Able to provide physical help? No   Home Setup   Type of Home Apartment   Number of Stairs 0   Number of Stairs in Home 0   In [address]   (railings in hallway)   First Floor Bathroom Full   First Floor Bathroom Accessibility Grab bars by toilet;Grab bars in tub/shower   First Floor Setup Available Yes   Available Equipment Roller Walker;Single Point Cane   Baseline Information   Vocation   (retired)   Transportation Other (comment)  (pt was (I)  but states she is in the process of giving up license, selling her car, and plans to take [place] bus )   Prior Device(s) Used   (none  \"sometimes I would use the handrails in the hallway\")   Prior Level of Function   Self-Care 3  Independent - Patient completed the activities by him/herself, with or without an assistive device, with no assistance from a helper  Indoor-Mobility (Ambulation) 3  Independent - Patient completed the activities by him/herself, with or without an assistive device, with no assistance from a helper  Stairs 9  Not Applicable  (pt states she has not negotiated stairs in a long time due to hx of CVA and OA)   Functional Cognition 3  Independent - Patient completed the activities by him/herself, with or without an assistive device, with no assistance from a helper  Prior Assistance Needed for Shopping;Meal Preparation  (meals on wheels)   Prior Device Used Z   None of the above   Falls in the Last Year   Number of falls " in the past 12 months 1   Type of Injury Associated with Fall Injury  (abrasions on right knee and chin)   Patient Preference   Nickname (Patient Preference) Chloe Martin   Psychosocial   Psychosocial (WDL) WDL   Patient Behaviors/Mood Cooperative   Restrictions/Precautions   Precautions Fall Risk;Pain;Supervision on toilet/commode; Airborne/isolation  ((+) covid-19)   Weight Bearing Restrictions No   Pain Assessment   Pain Assessment Tool 0-10   Pain Score 9  (with weightbearing only  pt reports 0/10 pain at rest)   Pain Location/Orientation Orientation: Bilateral;Location: Foot  (medial aspect of both feet, distal to medial malleolus)   Pain Onset/Description Onset: Gradual;Descriptor: Nathanel Shank; Descriptor: Aching   Effect of Pain on Daily Activities increases with standing   Patient's Stated Pain Goal No pain   Hospital Pain Intervention(s) Repositioned;Elevated; Rest  (notified RN)   Toileting Hygiene   Type of Assistance Needed Physical assistance   Physical Assistance Level Total assistance   Comment pt standing holding grab bar, PT did all of hygeine   Toileting Hygiene CARE Score 1   Toilet Transfer   Surface Assessed Standard Commode   Transfer Bed/Chair/Wheelchair   Limitations Noted In Confidence;Balance; Endurance;Pain Management;LE Strength   Adaptive Equipment Roller Walker   Type of Assistance Needed Physical assistance   Physical Assistance Level Total assistance   Comment PLOF pt did not use AD, total A without AD due to pain in BLEs   provided pt with RW and pt performed with mod A   Chair/Bed-to-Chair Transfer CARE Score 1   Roll Left and Right   Type of Assistance Needed Physical assistance   Physical Assistance Level 25% or less   Comment flat bed without rails per home set up   Roll Left and Right CARE Score 3   Sit to Lying   Type of Assistance Needed Physical assistance   Physical Assistance Level 25% or less   Comment flat bed without rails per home set up, increased time required and min A for RLE management   Sit to Lying CARE Score 3   Lying to Sitting on Side of Bed   Type of Assistance Needed Physical assistance   Physical Assistance Level 76% or more   Comment flat bed without rails per home set up   Lying to Sitting on Side of Bed CARE Score 2   Sit to Stand   Type of Assistance Needed Physical assistance   Physical Assistance Level 76% or more   Comment max A without AD per PLOF, able to perform with RW and grab bars with mod A  limited by pain in B feet   Sit to Stand CARE Score 2   Picking Up Object   Reason if not Attempted Safety concerns   Picking Up Object CARE Score 88   Car Transfer   Reason if not Attempted Safety concerns   Car Transfer CARE Score 88   Ambulation   Primary Mode of Locomotion Prior to Admission Walk   Distance Walked (feet) 5 ft   Assist Device Roller Walker   Gait Pattern Antalgic; Forward Flexion;Narrow NIC;Shuffle;Improper weight shift   Limitations Noted In Balance; Endurance;Posture; Safety;Strength   Provided Assistance with: Balance;Direction;Weight Shift;Trunk Support   Walk Assist Level Moderate Assist   Walk 10 Feet   Comment unable to perform with or without RW  pt only able to ambulate up to 5 ft with RW due to pain BLE feet     Reason if not Attempted Safety concerns   Walk 10 Feet CARE Score 88   Walk 50 Feet with Two Turns   Reason if not Attempted Safety concerns   Walk 50 Feet with Two Turns CARE Score 88   Walk 150 Feet   Reason if not Attempted Safety concerns   Walk 150 Feet CARE Score 88   Walking 10 Feet on Uneven Surfaces   Reason if not Attempted Safety concerns   Walking 10 Feet on Uneven Surfaces CARE Score 88   Wheel 50 Feet with Two Turns   Reason if not Attempted Activity not applicable   Wheel 50 Feet with Two Turns CARE Score 9   Wheel 150 Feet   Reason if not Attempted Activity not applicable   Wheel 323 Feet CARE Score 9   Curb or Single Stair   Reason if not Attempted Safety concerns   1 Step (Curb) CARE Score 88   4 Steps   Comment not part of PLOF   Reason if not Attempted Activity not applicable   4 Steps CARE Score 9   12 Steps   Comment not part of PLOF   Reason if not Attempted Activity not applicable   12 Steps CARE Score 9   Comprehension   QI: Comprehension 4  Undestands: Clear comprehension without cues or repetitions   Expression   QI: Expression 4  Express complex messages without difficulty and with speech that is clear and easy to [facility]   Strength RLE   R Hip Flexion 3+/5   R Hip ABduction 4-/5   R Hip ADduction 4-/5   R Knee Flexion 3+/5   R Knee Extension 4-/5   R Ankle Dorsiflexion 4-/5   R Ankle Plantar Flexion 3+/5  (pain affected)   Strength LLE   L Hip Flexion 3+/5   L Hip ABduction 3+/5   L Hip ADduction 3+/5   L Knee Flexion 3+/5   L Knee Extension 3+/5   L Ankle Dorsiflexion 3+/5   L Ankle Plantar Flexion 3+/5   Coordination   Movements are Fluid and Coordinated 0   Coordination and Movement Description antalgic, min ataxia   Sensation   Light Touch No apparent deficits  (pt reports altered sensation on L side from previous CVA but no deficits when formally tested)   Cognition   Arousal/Participation Alert; Cooperative   Attention Attends with cues to redirect   Orientation Level Oriented X4   Memory Decreased recall of recent events;Decreased short term memory   Following Commands Follows one step commands with increased time or repetition   Vision   Vision Comments pt reports she wears glasses but they were leftat home   Objective Measure   PT Measure(s) 30 second sit to stand: only able to complete one rep with RW and mod A   PT Findings pt has pain on bilateral feet, medial side distal to malleolus  PT noticed redness in described areas that are tender to touch  educated pt on proper positioning to avoid contact with bony prominences  Notified RN of findings as well   Therapeutic Exercise   Therapeutic Exercise/Activity supine: straight leg raises, bridges 2 sets of 10 BLEs   seated: ankle pf/df, knee ext, hip flexion, hip add "pillow squeezes all 3 sets of 10 with rest breaks provided prn   Discharge Information   Vocational Plan Retired/not working   Patient's Discharge Plan return home with home PT and intermittent family support   Patient's Rehab Expectations \"I want to walk without this pain\"   Barriers to Discharge Home Limited Family Support;Decreased Strength;Decreased Endurance;Pain; Safety Considerations   Impressions Pt is 78 y o  female seen for PT evaluation s/p admit to [address] [place] [address] on 3/24/2023 w/ Ambulatory dysfunction  Pt initially admitted to \[facility]\"" on 3/21/23 with dx of Covid-19, rhabdomyolysis, and transaminiti  following incident where she slipped from her recliner onto the floor, was down for about ~4 hours  Pt reports progressive weakness leading up to this event  PT consulted to assess pt's functional mobility and d/c needs  Comorbidities affecting pt's physical performance at time of assessment include: 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, essential tremor, OA, osteoporosis, and vitamin D deficiency  PTA, pt was independent w/ all functional mobility w/ out an AD and lives alone in single level senior apt building  Personal factors affecting pt at time of IE include: ambulating w/ assistive device, inability to ambulate household distances and limited home support  Please find objective findings from PT assessment regarding body systems outlined above with impairments and limitations including weakness, decreased ROM, impaired balance, decreased endurance, gait deviations, pain, decreased activity tolerance, decreased functional mobility tolerance and fall risk  Pt is mod to max A level for functional transfers and very limited ambulation with RW, with primarily limitation being bilateral foot pain  Pt to benefit from continued PT tx to address deficits as defined above and maximize level of functional independent mobility and consistency   From PT/mobility standpoint, " recommendation at time of d/c would be home with home health rehabilitation pending progress in order to facilitate return to PLOF  Level of family support recommended is pending pt progress     PT Therapy Minutes   PT Time In 1300   PT Time Out 1430   PT Total Time (minutes) 90   PT Mode of treatment - Individual (minutes) 90   PT Mode of treatment - Concurrent (minutes) 0   PT Mode of treatment - Group (minutes) 0   PT Mode of treatment - Co-treat (minutes) 0   PT Mode of Treatment - Total time(minutes) 90 minutes   PT Cumulative Minutes 90   Cumulative Minutes   Cumulative therapy minutes 180

## 2023-03-26 LAB
BACTERIA BLD CULT: NORMAL
BACTERIA BLD CULT: NORMAL

## 2023-03-26 RX ADMIN — PRIMIDONE 50 MG: 50 TABLET ORAL at 21:30

## 2023-03-26 RX ADMIN — FAMOTIDINE 40 MG: 20 TABLET, FILM COATED ORAL at 15:52

## 2023-03-26 RX ADMIN — FLUTICASONE PROPIONATE 1 SPRAY: 50 SPRAY, METERED NASAL at 08:21

## 2023-03-26 RX ADMIN — ENOXAPARIN SODIUM 40 MG: 40 INJECTION SUBCUTANEOUS at 08:21

## 2023-03-26 RX ADMIN — ACETAMINOPHEN 650 MG: 325 TABLET ORAL at 20:16

## 2023-03-26 NOTE — PROGRESS NOTES
"PM&R Coverage Progress Note:    Rehabilitation Diagnosis: Impairment of mobility, safety and Activities of Daily Living (ADLs) due to Debility:  16  Debility (Non-cardiac/Non-pulmonary) related to COVID-19 virus    ASSESSMENT: Stable      PLAN:    Rehabilitation  • Continue current rehabilitation plan of care to maximize function  • Last BM 3/25  • Limited appetite but consuming approximately 50% of her meals at times    Medical issues  • Reviewed case with the nursing staff  No pain at this time however is having limited to poor appetite and incontinent of bowel and bladder which has been since prior to admission  However it is documented that she is not incontinent which is apparently an error  • Continue current medical plan of care  Appreciate IM consultants co-management  SUBJECTIVE: Patient not seen face-to-face however discussed with the nursing staff      OBJECTIVE:   /70 (BP Location: Right arm)   Pulse 98   Temp (!) 97 2 °F (36 2 °C) (Axillary)   Resp 20   Ht 5' 4\" (1 626 m)   LMP  (LMP Unknown)   SpO2 97%   BMI 24 71 kg/m²     Physical Exam  Vitals and nursing note reviewed  Patient otherwise not seen in person however reviewed the case with the nursing staff as well as reviewing the chart in depth which included vital sign review and recent laboratory findings      Personally reviewed on 03/26/23:   Lab Results   Component Value Date    WBC 5 69 03/24/2023    HGB 12 1 03/24/2023    HCT 35 6 03/24/2023     (H) 03/24/2023     03/24/2023     Lab Results   Component Value Date    SODIUM 138 03/24/2023    K 3 7 03/24/2023     (H) 03/24/2023    CO2 25 03/24/2023    BUN 9 03/24/2023    CREATININE 0 51 (L) 03/24/2023    GLUC 98 03/24/2023    CALCIUM 8 2 (L) 03/24/2023     Lab Results   Component Value Date    INR 1 05 03/22/2023    INR 1 01 03/21/2023    INR 0 95 10/10/2022    PROTIME 14 0 03/22/2023    PROTIME 13 5 03/21/2023    PROTIME 12 9 10/10/2022 " Current Facility-Administered Medications:   •  acetaminophen (TYLENOL) tablet 650 mg, 650 mg, Oral, Q6H PRN, LORETO Restrepo, 650 mg at 03/25/23 1550  •  benzonatate (TESSALON PERLES) capsule 100 mg, 100 mg, Oral, TID PRN, Kurtis Mckinney MD  •  bisacodyl (DULCOLAX) rectal suppository 10 mg, 10 mg, Rectal, Daily PRN, Kurtis Mckinney MD  •  enoxaparin (LOVENOX) subcutaneous injection 40 mg, 40 mg, Subcutaneous, Daily, Kurtis Mckinney MD, 40 mg at 03/26/23 5311  •  famotidine (PEPCID) tablet 40 mg, 40 mg, Oral, Daily With Preston Caceres MD, 40 mg at 03/25/23 1550  •  fluticasone (FLONASE) 50 mcg/act nasal spray 1 spray, 1 spray, Each Nare, Daily, Kurtis Mckinney MD, 1 spray at 03/26/23 2215  •  ondansetron (ZOFRAN-ODT) dispersible tablet 4 mg, 4 mg, Oral, Q6H PRN, Kurtis Mckinney MD  •  polyethylene glycol (MIRALAX) packet 17 g, 17 g, Oral, Daily PRN, Kurtis Mckinney MD  •  primidone (MYSOLINE) tablet 50 mg, 50 mg, Oral, HS, Kurtis Mckinney MD, 50 mg at 03/25/23 2218  •  sodium chloride (OCEAN) 0 65 % nasal spray 1 spray, 1 spray, Each Nare, Q1H PRN, LORETO Restrepo    Past Medical History:   Diagnosis Date   • Cataract of right eye    • GERD (gastroesophageal reflux disease)        Patient Active Problem List    Diagnosis Date Noted   • Elevated AST (SGOT) 03/24/2023   • Prediabetes 03/24/2023   • Closed left ankle fracture, sequela 03/24/2023   • GERD (gastroesophageal reflux disease) 03/24/2023   • Right ankle pain 03/24/2023   • Diarrhea 03/22/2023   • Macrocytosis 03/21/2023   • Ambulatory dysfunction 03/21/2023   • Rhabdomyolysis 03/21/2023   • SARS-CoV-2 positive 03/21/2023   • Tremor, essential 10/20/2022   • Acute cerebral hemorrhage ([facility] [address] ) 10/11/2022   • History of cerebral hemorrhage 10/10/2022   • 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 ([facility] [address] ) 10/10/2022   • Chronic left-sided low back pain    • Transient cerebral ischemia 09/14/2018   • Premature atrial beats 09/14/2018   • Cataract of right eye 06/21/2016   • Sacroiliitis ([facility] [address] ) 12/04/2015   • Idiopathic osteoporosis 06/09/2015   • Primary osteoarthritis of hand 06/09/2015        Richard Hernadez DO  Physical Medicine and [facility]    I have spent a total time of 15 minutes on 03/26/23 in caring for this patient including Documenting in the medical record and Communicating with other healthcare professionals

## 2023-03-26 NOTE — PROGRESS NOTES
"   03/26/23 1000   Pain Assessment   Pain Assessment Tool 0-10   Pain Score 7   Pain Location/Orientation Orientation: Bilateral;Location: Foot   Pain Onset/Description Other (Comment)  (only during weight bearing- improves slightly throughout session)   Hospital Pain Intervention(s) Rest;Relaxation technique   Restrictions/Precautions   Precautions Bed/chair alarms; Fall Risk;Contact/isolation; Airborne/isolation;Droplet precautions;Supervision on toilet/commode  (COVID+)   Weight Bearing Restrictions No   Lifestyle   Autonomy \"I am just so tired  \"   Lower Body Dressing   Type of Assistance Needed Physical assistance   Physical Assistance Level Total assistance   Comment Pt Dep for LB dressing with inability to assist with threading due to decreased forward reach, in addition to inability to perform any CM secondary to need for UE support in stance  Lower Body Dressing CARE Score 1   Lying to Sitting on Side of Bed   Type of Assistance Needed Physical assistance   Physical Assistance Level 76% or more   Comment Max A with verbal cues for hand placement and technique for transitional movements  Lying to Sitting on Side of Bed CARE Score 2   Sit to Stand   Type of Assistance Needed Physical assistance   Physical Assistance Level 76% or more   Comment Max A with RW   Sit to Stand CARE Score 2   Bed-Chair Transfer   Type of Assistance Needed Physical assistance   Physical Assistance Level 76% or more   Comment Max A with RW; did improve throughout OT session with pt able to complete some transfers at Mod A level with RW however more consistently Max A x1 with RW  Chair/Bed-to-Chair Transfer CARE Score 2   Toileting Hygiene   Type of Assistance Needed Physical assistance   Physical Assistance Level Total assistance   Comment Dep seated on bedside commode  Weight shifting to allow for person to wipe  Max A to stand with RW with pt requiring UE support to maintain standing with pt Dep for CM tasks     Toileting Hygiene " CARE Score 1   Toilet Transfer   Type of Assistance Needed Physical assistance   Physical Assistance Level 76% or more   Comment Max A with RW   Toilet Transfer CARE Score 2   Toilet Transfer   Surface Assessed Standard Commode   Transfer Technique Stand Pivot  (with RW)   Cognition   Overall Cognitive Status Impaired   Arousal/Participation Alert; Cooperative   Attention Attends with cues to redirect   Orientation Level Oriented X4   Memory Decreased recall of recent events;Decreased short term memory   Following Commands Follows one step commands with increased time or repetition   Comments possibly due to fatigue   Assessment   Treatment Assessment Pt participated in 90 min OT session with fair activity tolerance with focus on ADL training and therapeutic exercises  Pt seated supported in bed upon therapist entering room  Pt c/o fatigue and required increased timeliness throughout OT session to complete simple daily tasks  Pt required Max A for bed mobility tasks with decreased UE strength and core strength noted with assistance needed to support pt upright initially at EOB  Pt reporting bilateral LE foot pain upon initially placing feet on floor  Pt reported improved bilateral foot pain once on feet however 7/10 pain when weight bearing reported consistently  Pt participated in SPT from bed to bedside commode at Max A level with RW  Pt required multiple attempts to stand however able to complete with increased timeliness and multipe attempts  Pt Dep for CM tasks, and hygiene tasks associated with toileting at this time  Pt educated on weight shift technique when on bedside commode to allow for improved hygiene with continued education needed for carryover  Pt participated in 3 additional transfers with pt progressing to Mod A level with RW during OT session with continued improved bilateral foot pain to 6/10  Pt seated in bedside recliner following multipe transfers/transfer training   Pt seated participating in bilateral UE strengthening exercises with yellow theraband 2x10 reps with increased timeliness needed inbetween each exercise due to poor endurance noted  Pt participated in pull downs, chest pulls, and bicep curls however pt reporting moderate fatigue after each set of 10  Pt seated in bedside recliner with chair alarm on and all needs within reach at end of therapy session  Pt limited by decreaesd strength, decreased endurance, poor activity tolerance, decreased functional reach, decreased overall IND in ADL/functional transfers, and decreased standing balance/tolerance limiting pt's ability to safely participate in needed daily tasks  Pt would benefit from continued OT services to progress pt through [address] to meet established OT goals  Continue with established POC at this time  Prognosis Fair   Problem List Decreased strength;Decreased endurance; Impaired balance;Decreased mobility; Decreased safety awareness;Decreased cognition;Pain   Barriers to Discharge Inaccessible home environment;Decreased caregiver support   Plan   Treatment/Interventions ADL retraining;Functional transfer training;LE strengthening/ROM; Therapeutic exercise; Endurance training;Cognitive reorientation;Patient/family training;Equipment eval/education; Bed mobility; Compensatory technique education   OT Therapy Minutes   OT Time In 1000   OT Time Out 1130   OT Total Time (minutes) 90   OT Mode of treatment - Individual (minutes) 90   OT Mode of treatment - Concurrent (minutes) 0   OT Mode of treatment - Group (minutes) 0   OT Mode of treatment - Co-treat (minutes) 0   OT Mode of Treatment - Total time(minutes) 90 minutes   OT Cumulative Minutes 180   Therapy Time missed   Time missed?  No

## 2023-03-26 NOTE — PLAN OF CARE
Problem: NEUROSENSORY - ADULT  Goal: Achieves stable or improved neurological status  Description: INTERVENTIONS  - Monitor and report changes in neurological status  - Monitor vital signs such as temperature, blood pressure, glucose, and any other labs ordered   - Initiate measures to prevent increased intracranial pressure  - Monitor for seizure activity and implement precautions if appropriate      Outcome: Progressing  Goal: Remains free of injury related to seizures activity  Description: INTERVENTIONS  - Maintain airway, patient safety  and administer oxygen as ordered  - Monitor patient for seizure activity, document and report duration and description of seizure to physician/advanced practitioner  - If seizure occurs,  ensure patient safety during seizure  - Reorient patient post seizure  - Seizure pads on all 4 side rails  - Instruct patient/family to notify RN of any seizure activity including if an aura is experienced  - Instruct patient/family to call for assistance with activity based on nursing assessment  - Administer anti-seizure medications if ordered    Outcome: Progressing  Goal: Achieves maximal functionality and self care  Description: INTERVENTIONS  - Monitor swallowing and airway patency with patient fatigue and changes in neurological status  - Encourage and assist patient to increase activity and self care     - Encourage visually impaired, hearing impaired and aphasic patients to use assistive/communication devices  Outcome: Progressing     Problem: CARDIOVASCULAR - ADULT  Goal: Maintains optimal cardiac output and hemodynamic stability  Description: INTERVENTIONS:  - Monitor I/O, vital signs and rhythm  - Monitor for S/S and trends of decreased cardiac output  - Administer and titrate ordered vasoactive medications to optimize hemodynamic stability  - Assess quality of pulses, skin color and temperature  - Assess for signs of decreased coronary artery perfusion  - Instruct patient to report change in severity of symptoms  Outcome: Progressing  Goal: Absence of cardiac dysrhythmias or at baseline rhythm  Description: INTERVENTIONS:  - Continuous cardiac monitoring, vital signs, obtain 12 lead EKG if ordered  - Administer antiarrhythmic and heart rate control medications as ordered  - Monitor electrolytes and administer replacement therapy as ordered  Outcome: Progressing     Problem: RESPIRATORY - ADULT  Goal: Achieves optimal ventilation and oxygenation  Description: INTERVENTIONS:  - Assess for changes in respiratory status  - Assess for changes in mentation and behavior  - Position to facilitate oxygenation and minimize respiratory effort  - Oxygen administered by appropriate delivery if ordered  - Initiate smoking cessation education as indicated  - Encourage broncho-pulmonary hygiene including cough, deep breathe, Incentive Spirometry  - Assess the need for suctioning and aspirate as needed  - Assess and instruct to report SOB or any respiratory difficulty  - Respiratory Therapy support as indicated  Outcome: Progressing     Problem: GASTROINTESTINAL - ADULT  Goal: Minimal or absence of nausea and/or vomiting  Description: INTERVENTIONS:  - Administer IV fluids if ordered to ensure adequate hydration  - Maintain NPO status until nausea and vomiting are resolved  - Nasogastric tube if ordered  - Administer ordered antiemetic medications as needed  - Provide nonpharmacologic comfort measures as appropriate  - Advance diet as tolerated, if ordered  - Consider nutrition services referral to assist patient with adequate nutrition and appropriate food choices  Outcome: Progressing  Goal: Maintains or returns to baseline bowel function  Description: INTERVENTIONS:  - Assess bowel function  - Encourage oral fluids to ensure adequate hydration  - Administer IV fluids if ordered to ensure adequate hydration  - Administer ordered medications as needed  - Encourage mobilization and activity  - Consider nutritional services referral to assist patient with adequate nutrition and appropriate food choices  Outcome: Progressing  Goal: Maintains adequate nutritional intake  Description: INTERVENTIONS:  - Monitor percentage of each meal consumed  - Identify factors contributing to decreased intake, treat as appropriate  - Assist with meals as needed  - Monitor I&O, weight, and lab values if indicated  - Obtain nutrition services referral as needed  Outcome: Progressing  Goal: Establish and maintain optimal ostomy function  Description: INTERVENTIONS:  - Assess bowel function  - Encourage oral fluids to ensure adequate hydration  - Administer IV fluids if ordered to ensure adequate hydration   - Administer ordered medications as needed  - Encourage mobilization and activity  - Nutrition services referral to assist patient with appropriate food choices  - Assess stoma site  - Consider wound care consult   Outcome: Progressing  Goal: Oral mucous membranes remain intact  Description: INTERVENTIONS  - Assess oral mucosa and hygiene practices  - Implement preventative oral hygiene regimen  - Implement oral medicated treatments as ordered  - Initiate Nutrition services referral as needed  Outcome: Progressing     Problem: GENITOURINARY - ADULT  Goal: Maintains or returns to baseline urinary function  Description: INTERVENTIONS:  - Assess urinary function  - Encourage oral fluids to ensure adequate hydration if ordered  - Administer IV fluids as ordered to ensure adequate hydration  - Administer ordered medications as needed  - Offer frequent toileting  - Follow urinary retention protocol if ordered  Outcome: Progressing  Goal: Absence of urinary retention  Description: INTERVENTIONS:  - Assess patient’s ability to void and empty bladder  - Monitor I/O  - Bladder scan as needed  - Discuss with physician/AP medications to alleviate retention as needed  - Discuss catheterization for long term situations as appropriate  Outcome: Progressing  Goal: Urinary catheter remains patent  Description: INTERVENTIONS:  - Assess patency of urinary catheter  - If patient has a chronic boucher, consider changing catheter if non-functioning  - Follow guidelines for intermittent irrigation of non-functioning urinary catheter  Outcome: Progressing     Problem: METABOLIC, FLUID AND ELECTROLYTES - ADULT  Goal: Electrolytes maintained within normal limits  Description: INTERVENTIONS:  - Monitor labs and assess patient for signs and symptoms of electrolyte imbalances  - Administer electrolyte replacement as ordered  - Monitor response to electrolyte replacements, including repeat lab results as appropriate  - Instruct patient on fluid and nutrition as appropriate  Outcome: Progressing  Goal: Fluid balance maintained  Description: INTERVENTIONS:  - Monitor labs   - Monitor I/O and WT  - Instruct patient on fluid and nutrition as appropriate  - Assess for signs & symptoms of volume excess or deficit  Outcome: Progressing  Goal: Glucose maintained within target range  Description: INTERVENTIONS:  - Monitor Blood Glucose as ordered  - Assess for signs and symptoms of hyperglycemia and hypoglycemia  - Administer ordered medications to maintain glucose within target range  - Assess nutritional intake and initiate nutrition service referral as needed  Outcome: Progressing     Problem: SKIN/TISSUE INTEGRITY - ADULT  Goal: Skin Integrity remains intact(Skin Breakdown Prevention)  Description: Assess:    -Assess extremities for adequate circulation and sensation     Bed Management:  -Have minimal linens on bed & keep smooth, unwrinkled  -Change linens as needed when moist or perspiring      Toileting:  -Offer bedside commode      Skin Care:  -Avoid use of baby powder, tape, friction and shearing, hot water or constrictive clothing    Outcome: Progressing  Goal: Incision(s), wounds(s) or drain site(s) healing without S/S of infection  Description: INTERVENTIONS  - Assess and document dressing, incision, wound bed, drain sites and surrounding tissue  - Provide patient and family education    Outcome: Progressing  Goal: Pressure injury heals and does not worsen  Description: Interventions:  - Implement low air loss mattress or specialty surface (Criteria met)      - Consider nutrition services referral as needed  Outcome: Progressing     Problem: HEMATOLOGIC - ADULT  Goal: Maintains hematologic stability  Description: INTERVENTIONS  - Assess for signs and symptoms of bleeding or hemorrhage  - Monitor labs  - Administer supportive blood products/factors as ordered and appropriate  Outcome: Progressing     Problem: MUSCULOSKELETAL - ADULT  Goal: Maintain or return mobility to safest level of function  Description: INTERVENTIONS:  - Assess patient's ability to carry out ADLs; assess patient's baseline for ADL function and identify physical deficits which impact ability to perform ADLs (bathing, care of mouth/teeth, toileting, grooming, dressing, etc )  - Assess/evaluate cause of self-care deficits   - Assess range of motion  - Assess patient's mobility  - Assess patient's need for assistive devices and provide as appropriate  - Encourage maximum independence but intervene and supervise when necessary  - Involve family in performance of ADLs  - Assess for home care needs following discharge   - Consider OT consult to assist with ADL evaluation and planning for discharge  - Provide patient education as appropriate  Outcome: Progressing  Goal: Maintain proper alignment of affected body part  Description: INTERVENTIONS:  - Support, maintain and protect limb and body alignment  - Provide patient/ family with appropriate education  Outcome: Progressing     Problem: Prexisting or High Potential for Compromised Skin Integrity  Goal: Skin integrity is maintained or improved  Description: INTERVENTIONS:  - Identify patients at risk for skin breakdown  - Assess and monitor skin integrity  - Assess and monitor nutrition and hydration status  - Monitor labs   - Assess for incontinence   - Turn and reposition patient  - Assist with mobility/ambulation  - Relieve pressure over bony prominences  - Avoid friction and shearing  - Provide appropriate hygiene as needed including keeping skin clean and dry  - Evaluate need for skin moisturizer/barrier cream  - Collaborate with interdisciplinary team   - Patient/family teaching  - Consider wound care consult   Outcome: Progressing     Problem: MOBILITY - ADULT  Goal: Maintain or return to baseline ADL function  Description: INTERVENTIONS:  -  Assess patient's ability to carry out ADLs; assess patient's baseline for ADL function and identify physical deficits which impact ability to perform ADLs (bathing, care of mouth/teeth, toileting, grooming, dressing, etc )  - Assess/evaluate cause of self-care deficits   - Assess range of motion  - Assess patient's mobility; develop plan if impaired  - Assess patient's need for assistive devices and provide as appropriate  - Encourage maximum independence but intervene and supervise when necessary  - Involve family in performance of ADLs  - Assess for home care needs following discharge   - Consider OT consult to assist with ADL evaluation and planning for discharge  - Provide patient education as appropriate  Outcome: Progressing  Goal: Maintains/Returns to pre admission functional level  Description: INTERVENTIONS:  - Perform BMAT or MOVE assessment daily    - Set and communicate daily mobility goal to care team and patient/family/caregiver     - Collaborate with rehabilitation services on mobility goals if consulted    - Out of bed for toileting  - Record patient progress and toleration of activity level   Outcome: Progressing

## 2023-03-27 ENCOUNTER — APPOINTMENT (OUTPATIENT)
Dept: RADIOLOGY | Facility: HOSPITAL | Age: 78
End: 2023-03-27

## 2023-03-27 PROBLEM — M85.80 OSTEOPENIA: Status: ACTIVE | Noted: 2023-03-27

## 2023-03-27 PROBLEM — M79.671 BILATERAL PAIN OF LEG AND FOOT: Status: ACTIVE | Noted: 2023-03-24

## 2023-03-27 PROBLEM — M79.605 BILATERAL PAIN OF LEG AND FOOT: Status: ACTIVE | Noted: 2023-03-24

## 2023-03-27 PROBLEM — M79.604 BILATERAL PAIN OF LEG AND FOOT: Status: ACTIVE | Noted: 2023-03-24

## 2023-03-27 PROBLEM — M25.561 RIGHT KNEE PAIN: Status: ACTIVE | Noted: 2023-03-27

## 2023-03-27 PROBLEM — M79.672 BILATERAL PAIN OF LEG AND FOOT: Status: ACTIVE | Noted: 2023-03-24

## 2023-03-27 LAB
ALBUMIN SERPL BCP-MCNC: 3.6 G/DL (ref 3.5–5)
ALP SERPL-CCNC: 57 U/L (ref 34–104)
ALT SERPL W P-5'-P-CCNC: 21 U/L (ref 7–52)
ANION GAP SERPL CALCULATED.3IONS-SCNC: 10 MMOL/L (ref 4–13)
AST SERPL W P-5'-P-CCNC: 23 U/L (ref 13–39)
BASOPHILS # BLD AUTO: 0.03 THOUSANDS/ΜL (ref 0–0.1)
BASOPHILS NFR BLD AUTO: 0 % (ref 0–1)
BILIRUB SERPL-MCNC: 0.52 MG/DL (ref 0.2–1)
BUN SERPL-MCNC: 18 MG/DL (ref 5–25)
CALCIUM SERPL-MCNC: 8.9 MG/DL (ref 8.4–10.2)
CHLORIDE SERPL-SCNC: 102 MMOL/L (ref 96–108)
CK SERPL-CCNC: 94 U/L (ref 26–192)
CO2 SERPL-SCNC: 24 MMOL/L (ref 21–32)
CREAT SERPL-MCNC: 0.58 MG/DL (ref 0.6–1.3)
EOSINOPHIL # BLD AUTO: 0.06 THOUSAND/ΜL (ref 0–0.61)
EOSINOPHIL NFR BLD AUTO: 1 % (ref 0–6)
ERYTHROCYTE [DISTWIDTH] IN BLOOD BY AUTOMATED COUNT: 12.3 % (ref 11.6–15.1)
GFR SERPL CREATININE-BSD FRML MDRD: 90 ML/MIN/1.73SQ M
GLUCOSE P FAST SERPL-MCNC: 115 MG/DL (ref 65–99)
GLUCOSE SERPL-MCNC: 115 MG/DL (ref 65–140)
HCT VFR BLD AUTO: 35.4 % (ref 34.8–46.1)
HGB BLD-MCNC: 11.7 G/DL (ref 11.5–15.4)
IMM GRANULOCYTES # BLD AUTO: 0.08 THOUSAND/UL (ref 0–0.2)
IMM GRANULOCYTES NFR BLD AUTO: 1 % (ref 0–2)
LYMPHOCYTES # BLD AUTO: 1.74 THOUSANDS/ΜL (ref 0.6–4.47)
LYMPHOCYTES NFR BLD AUTO: 20 % (ref 14–44)
MCH RBC QN AUTO: 33.2 PG (ref 26.8–34.3)
MCHC RBC AUTO-ENTMCNC: 33.1 G/DL (ref 31.4–37.4)
MCV RBC AUTO: 101 FL (ref 82–98)
MONOCYTES # BLD AUTO: 0.87 THOUSAND/ΜL (ref 0.17–1.22)
MONOCYTES NFR BLD AUTO: 10 % (ref 4–12)
NEUTROPHILS # BLD AUTO: 6.03 THOUSANDS/ΜL (ref 1.85–7.62)
NEUTS SEG NFR BLD AUTO: 68 % (ref 43–75)
NRBC BLD AUTO-RTO: 0 /100 WBCS
PLATELET # BLD AUTO: 280 THOUSANDS/UL (ref 149–390)
PMV BLD AUTO: 10.5 FL (ref 8.9–12.7)
POTASSIUM SERPL-SCNC: 3.7 MMOL/L (ref 3.5–5.3)
PROT SERPL-MCNC: 7 G/DL (ref 6.4–8.4)
RBC # BLD AUTO: 3.52 MILLION/UL (ref 3.81–5.12)
SODIUM SERPL-SCNC: 136 MMOL/L (ref 135–147)
WBC # BLD AUTO: 8.81 THOUSAND/UL (ref 4.31–10.16)

## 2023-03-27 RX ORDER — MELATONIN
1000 DAILY
Status: DISCONTINUED | OUTPATIENT
Start: 2023-03-27 | End: 2023-04-09 | Stop reason: HOSPADM

## 2023-03-27 RX ORDER — LANOLIN ALCOHOL/MO/W.PET/CERES
1 CREAM (GRAM) TOPICAL
Status: DISCONTINUED | OUTPATIENT
Start: 2023-03-28 | End: 2023-04-09 | Stop reason: HOSPADM

## 2023-03-27 RX ORDER — ATORVASTATIN CALCIUM 10 MG/1
10 TABLET, FILM COATED ORAL
Status: DISCONTINUED | OUTPATIENT
Start: 2023-03-27 | End: 2023-04-09 | Stop reason: HOSPADM

## 2023-03-27 RX ORDER — GABAPENTIN 100 MG/1
100 CAPSULE ORAL 2 TIMES DAILY
Status: DISCONTINUED | OUTPATIENT
Start: 2023-03-27 | End: 2023-04-07

## 2023-03-27 RX ORDER — PREDNISONE 20 MG/1
40 TABLET ORAL DAILY
Status: COMPLETED | OUTPATIENT
Start: 2023-03-27 | End: 2023-03-31

## 2023-03-27 RX ADMIN — PREDNISONE 40 MG: 20 TABLET ORAL at 09:18

## 2023-03-27 RX ADMIN — DICLOFENAC SODIUM 2 G: 10 GEL TOPICAL at 21:33

## 2023-03-27 RX ADMIN — FLUTICASONE PROPIONATE 1 SPRAY: 50 SPRAY, METERED NASAL at 09:16

## 2023-03-27 RX ADMIN — PRIMIDONE 50 MG: 50 TABLET ORAL at 21:36

## 2023-03-27 RX ADMIN — FAMOTIDINE 40 MG: 20 TABLET, FILM COATED ORAL at 16:47

## 2023-03-27 RX ADMIN — ACETAMINOPHEN 650 MG: 325 TABLET ORAL at 09:16

## 2023-03-27 RX ADMIN — GABAPENTIN 100 MG: 100 CAPSULE ORAL at 17:10

## 2023-03-27 RX ADMIN — ACETAMINOPHEN 650 MG: 325 TABLET ORAL at 16:52

## 2023-03-27 RX ADMIN — ENOXAPARIN SODIUM 40 MG: 40 INJECTION SUBCUTANEOUS at 09:16

## 2023-03-27 RX ADMIN — CHOLECALCIFEROL TAB 25 MCG (1000 UNIT) 1000 UNITS: 25 TAB at 16:48

## 2023-03-27 RX ADMIN — DICLOFENAC SODIUM 2 G: 10 GEL TOPICAL at 12:33

## 2023-03-27 RX ADMIN — ATORVASTATIN CALCIUM 10 MG: 10 TABLET, FILM COATED ORAL at 16:47

## 2023-03-27 RX ADMIN — GABAPENTIN 100 MG: 100 CAPSULE ORAL at 09:16

## 2023-03-27 RX ADMIN — DICLOFENAC SODIUM 2 G: 10 GEL TOPICAL at 17:10

## 2023-03-27 NOTE — CASE MANAGEMENT
Case Management Update:  Cm met with pt at bedside to introduce role and complete cm open:    Pt lives alone in apartment, [address]  Prior to admission, pt was independent with ADLs, pt reports having r/w and SPC in home  Pt uses meal on wheels service  Pt reports hx of [address] 80, and [facility] June 2022  PCP Sergio Smoker, preferred pharmacy is [facility] in [place]  The patient was educated on the rehabilitation process including therapy program, the interdisciplinary team, and weekly team meetings  Estimated length of stay was reviewed with the patient as well as expectations of discussions of discharge planning  The role of the  was reviewed including providing care coordination, discharge planning and discharge facilitation  IMM was reviewed with the patient and a copy was provided for their reference  The patient verbalized understanding of the information provided and denied any further questions at this time  CM will continue to follow and assist the patient throughout their rehabilitation stay

## 2023-03-27 NOTE — ASSESSMENT & PLAN NOTE
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

## 2023-03-27 NOTE — PROGRESS NOTES
PM&R PROGRESS NOTE:  Alisia Du 78 y o  female MRN: [id number]  Unit/Bed#: -01 Encounter: [id number]        Rehabilitation Diagnosis: Impairment of mobility, safety and Activities of Daily Living (ADLs) due to Debility:  16  Debility (Non-cardiac/Non-pulmonary) related to COVID-19 virus    SUBJECTIVE: Patient seen face to face  Having bilateral foot pain located medially  Limiting her ability to walk  Also seen with pelvis weakness  Patient report no fevers, chills, no SOB  No cough  ASSESSMENT: Stable, progressing      PLAN:    Rehabilitation  • Functional deficits: Functional deficits: Baseline left hemiparesis, impaired sensation on left hemibody, proximal musculature weakness, essential tremor, impaired mobility, self care  • Continue current rehabilitation plan of care to maximize function  • Functional update:   o Total assist with slide board with transfers  o Max A with ADLs  • Estimated Discharge: TBD    DVT prophylaxis  • Lovenox 40 mg daily     Bladder plan  • Continent    Bowel plan  • Continent      SARS-CoV-2 positive  Assessment & Plan  Presented 3/21/2023 with weakness  COVID-19 infection identified  Treatment with mild pathway initiated  Patient remained stable from a pulmonary standpoint and remained on room air  Continue supportive care with Tessalon Perles, nasal spray  Monitor respiratory status  Off isolation 3/30/23    Right knee pain  Assessment & Plan  Right knee pain and swelling 3/27/23  Likely an arthritis flare   · X-ray showing large joint effusion  · Ortho consulted to consider arthrocentesis  · Compression with ace wrap/ICE    Bilateral pain of leg and foot  Assessment & Plan  On admission had right ankle pain - x-ray completed and showing Minimal soft tissue thickening medial to the first metatarsal head and lateral to the fifth metatarsal head attributed to capsulobursal thickening      Over the weekend at [facility] also had medial bilateral foot pain along plantar "fascia  Possible plantar fasciitis and also has evidence of bursitis  Limiting her participation in therapies    Start Prednisone taper, gabapentin 100 mg BID, and topical Voltaren gel today    GERD (gastroesophageal reflux disease)  Assessment & Plan  Managed on Pepcid 40 mg daily home dose    Closed left ankle fracture, sequela  Assessment & Plan  Sustained June 2022  Weightbearing as tolerated  Need outpatient orthopedic follow-up    Elevated AST (SGOT)  Assessment & Plan  LFTs now WNL  Lipitor restarted 3/27/23    Diarrhea  Assessment & Plan  C  difficile negative  Monitor stools  Encourage oral hydration    Rhabdomyolysis  Assessment & Plan  Patient stained a slip onto the floor from her bed and was unable to get up for a prolonged period of time  CK 6836  Treated with IV fluids acute care  Trending down      Tremor, essential  Assessment & Plan  Managed on home dose primidone 50 mg nightly  Stable  Located in hands bilaterally  Follows with Dr Yehuda Oviedo as an outpatient    History of cerebral hemorrhage  Assessment & Plan  Patient with history of hemorrhagic CVA  With baseline left hemiparesis and left hemibody impaired sensation  Follows with Dr Yehuda Oviedo as an outpatient    Idiopathic osteoporosis  Assessment & Plan  Managed on Prolia as outpatient  Continue supplementation with vitamin D and calcium        Appreciate [facility] medical co-management  Labs, medications, and imaging personally reviewed  ROS:  A ten point review of systems was completed on 03/27/23 and pertinent positives are listed in subjective section  All other systems reviewed were negative  OBJECTIVE:   /68 (BP Location: Right arm)   Pulse 83   Temp 97 9 °F (36 6 °C) (Tympanic)   Resp 18   Ht 5' 4\" (1 626 m)   LMP  (LMP Unknown)   SpO2 91%   BMI 24 71 kg/m²     Physical Exam  Vitals and nursing note reviewed  Constitutional:       General: She is not in acute distress    HENT:      Head: Normocephalic " and atraumatic  Nose: Nose normal       Mouth/Throat:      Mouth: Mucous membranes are moist    Eyes:      Conjunctiva/sclera: Conjunctivae normal    Cardiovascular:      Rate and Rhythm: Normal rate and regular rhythm  Pulses: Normal pulses  Pulmonary:      Effort: Pulmonary effort is normal       Breath sounds: Normal breath sounds  No wheezing or rales  Abdominal:      General: Bowel sounds are normal  There is no distension  Palpations: Abdomen is soft  Tenderness: There is no abdominal tenderness  Musculoskeletal:         General: No swelling  Cervical back: Neck supple  Comments: Bilateral feet examined  Warm and appear WNL   No swelling  Some mild redness on plantar surface   Skin:     General: Skin is warm  Neurological:      Mental Status: She is alert and oriented to person, place, and time  Motor: Weakness (pelvis weakness) present     Psychiatric:         Mood and Affect: Mood normal           Lab Results   Component Value Date    WBC 8 81 03/27/2023    HGB 11 7 03/27/2023    HCT 35 4 03/27/2023     (H) 03/27/2023     03/27/2023     Lab Results   Component Value Date    SODIUM 136 03/27/2023    K 3 7 03/27/2023     03/27/2023    CO2 24 03/27/2023    BUN 18 03/27/2023    CREATININE 0 58 (L) 03/27/2023    GLUC 115 03/27/2023    CALCIUM 8 9 03/27/2023     Lab Results   Component Value Date    INR 1 05 03/22/2023    INR 1 01 03/21/2023    INR 0 95 10/10/2022    PROTIME 14 0 03/22/2023    PROTIME 13 5 03/21/2023    PROTIME 12 9 10/10/2022           Current Facility-Administered Medications:   •  acetaminophen (TYLENOL) tablet 650 mg, 650 mg, Oral, Q6H PRN, LORETO Micntosh, 650 mg at 03/27/23 6923  •  atorvastatin (LIPITOR) tablet 10 mg, 10 mg, Oral, Daily With LORETO Richardson  •  benzonatate (TESSALON PERLES) capsule 100 mg, 100 mg, Oral, TID PRN, Mario José MD  •  bisacodyl (DULCOLAX) rectal suppository 10 mg, 10 mg, Rectal, Daily PRN, Haroon Zaidi MD  •  Diclofenac Sodium (VOLTAREN) 1 % topical gel 2 g, 2 g, Topical, 4x Daily, Haroon Zaidi MD, 2 g at 03/27/23 1233  •  enoxaparin (LOVENOX) subcutaneous injection 40 mg, 40 mg, Subcutaneous, Daily, Haroon Zaidi MD, 40 mg at 03/27/23 0916  •  famotidine (PEPCID) tablet 40 mg, 40 mg, Oral, Daily With Johana Pennington MD, 40 mg at 03/26/23 1552  •  fluticasone (FLONASE) 50 mcg/act nasal spray 1 spray, 1 spray, Each Nare, Daily, Haroon Zaidi MD, 1 spray at 03/27/23 0916  •  gabapentin (NEURONTIN) capsule 100 mg, 100 mg, Oral, BID, Haroon Zaidi MD, 100 mg at 03/27/23 0916  •  ondansetron (ZOFRAN-ODT) dispersible tablet 4 mg, 4 mg, Oral, Q6H PRN, Haroon Zaidi MD  •  polyethylene glycol (MIRALAX) packet 17 g, 17 g, Oral, Daily PRN, Haroon Zaidi MD  •  predniSONE tablet 40 mg, 40 mg, Oral, Daily, LORETO Lobo, 40 mg at 03/27/23 4817  •  primidone (MYSOLINE) tablet 50 mg, 50 mg, Oral, HS, Haroon Zaidi MD, 50 mg at 03/26/23 2130  •  sodium chloride (OCEAN) 0 65 % nasal spray 1 spray, 1 spray, Each Nare, Q1H PRN, LORETO Lobo    Past Medical History:   Diagnosis Date   • Cataract of right eye    • GERD (gastroesophageal reflux disease)        Patient Active Problem List    Diagnosis Date Noted   • SARS-CoV-2 positive 03/21/2023   • Osteopenia 03/27/2023   • Right knee pain 03/27/2023   • Elevated AST (SGOT) 03/24/2023   • Prediabetes 03/24/2023   • Closed left ankle fracture, sequela 03/24/2023   • GERD (gastroesophageal reflux disease) 03/24/2023   • Bilateral pain of leg and foot 03/24/2023   • Diarrhea 03/22/2023   • Macrocytosis 03/21/2023   • Ambulatory dysfunction 03/21/2023   • Rhabdomyolysis 03/21/2023   • Tremor, essential 10/20/2022   • Acute cerebral hemorrhage ([facility] [address] ) 10/11/2022   • History of cerebral hemorrhage 10/10/2022   • 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 [facility]) 10/10/2022   • Chronic left-sided low back pain    • Transient cerebral ischemia 09/14/2018   • Premature atrial beats 09/14/2018   • Cataract of right eye 06/21/2016   • Sacroiliitis ([facility] [address] ) 12/04/2015   • Idiopathic osteoporosis 06/09/2015   • Primary osteoarthritis of hand 06/09/2015          Mario José MD    I have spent a total time of 45 minutes on 03/27/23 in caring for this patient including Risk factor reductions, Counseling / Coordination of care, Documenting in the medical record, Reviewing / ordering tests, medicine, procedures  , Obtaining or reviewing history   and Communicating with other healthcare professionals   ** Please Note:  voice to text software may have been used in the creation of this document   Although proof errors in transcription or interpretation are a potential of such software** no

## 2023-03-27 NOTE — PROGRESS NOTES
"   03/27/23 1230   Pain Assessment   Pain Assessment Tool 0-10   Pain Score 5   Pain Location/Orientation Orientation: Bilateral;Location: Leg   Pain Onset/Description Onset: Ongoing   Effect of Pain on Daily Activities Tolerated with rest breaks   Patient's Stated Pain Goal No pain   Hospital Pain Intervention(s) Repositioned;Elevated;Rest;Emotional support   Restrictions/Precautions   Precautions Bed/chair alarms; Fall Risk;Contact/isolation; Airborne/isolation;Droplet precautions;Pain;Supervision on toilet/commode  (COVID+)   Weight Bearing Restrictions No   ROM Restrictions No   Lifestyle   Autonomy \"I feel a little better than earlier  \"   Lying to Sitting on Side of Bed   Type of Assistance Needed Physical assistance   Physical Assistance Level 76% or more   Comment Assist to manage BLE's and achieve upright position   Lying to Sitting on Side of Bed CARE Score 2   Sit to Stand   Type of Assistance Needed Physical assistance; Adaptive equipment;Verbal cues   Physical Assistance Level 76% or more   Comment maxA STS to RW x3 with vc's for proper hand placement and to achieve upright position in stance   Sit to Stand CARE Score 2   Bed-Chair Transfer   Type of Assistance Needed Physical assistance; Adaptive equipment;Verbal cues   Physical Assistance Level Total assistance   Comment A x2 SBT EOB - W/C with vc's for proper hand placement   Chair/Bed-to-Chair Transfer CARE Score 1   Toileting Hygiene   Type of Assistance Needed Physical assistance   Physical Assistance Level Total assistance   Comment TA in stance at RW for CM task; Hygiene completed seated with pt performing fwd wt shift req TA for hygiene task  Toileting Hygiene CARE Score 1   Toilet Transfer   Type of Assistance Needed Physical assistance; Adaptive equipment   Physical Assistance Level Total assistance   Comment Initial maxA SPT with RW recliner - BSC; A x2 with RW SPT BSC - recliner 2* to increased fatigue   Toilet Transfer CARE Score 1   Cognition " Overall Cognitive Status Impaired   Arousal/Participation Alert; Cooperative   Attention Attends with cues to redirect   Orientation Level Oriented X4   Memory Decreased short term memory;Decreased recall of recent events   Following Commands Follows one step commands with increased time or repetition   Activity Tolerance   Activity Tolerance Patient limited by fatigue;Patient limited by pain   Assessment   Treatment Assessment Pt participated in 85 min skilled OT Tx session with focus on OOB transfer, STS transfer's to RW, and ADL of toileting  See above for further Tx details  Upon entering pt's room for OT session, pt reporting dec pain in BLE's and agreeable to trial transferring OOB  Pt required A x2 for SBT OOB to recliner, and A x2 for ADL of toileting, 2* to dec activity tolerance, dec fxnl standing balance, and pain  Pt repositioned into recliner at end of session with chair alarm engaged and call bell within reach  Pt would continue to benefit from skilled OT services to improve fxnl transfer's, improve standing tolerance/balance, improve activity tolerance, and overall increase IND with ADL/IADL performance in order to progress towards OT goals and dec caregiver burden upon D/C  Prognosis Fair   Problem List Decreased strength;Decreased range of motion;Decreased endurance; Impaired balance;Decreased mobility; Decreased coordination;Decreased cognition;Pain   Barriers to Discharge Decreased caregiver support   Plan   Treatment/Interventions ADL retraining;Functional transfer training; Therapeutic exercise; Endurance training   Progress Progressing toward goals   Recommendation   OT Discharge Recommendation   (Pending progress)   Equipment Recommended Bedside commode   Commode Type   (type of BSC TBD - Pending pt progress)   OT Therapy Minutes   OT Time In 1230   OT Time Out 1355   OT Total Time (minutes) 85   OT Mode of treatment - Individual (minutes) 85   OT Mode of treatment - Concurrent (minutes) 0   OT Mode of treatment - Group (minutes) 0   OT Mode of treatment - Co-treat (minutes) 0   OT Mode of Treatment - Total time(minutes) 85 minutes   OT Cumulative Minutes 265   Therapy Time missed   Time missed?  No

## 2023-03-27 NOTE — ASSESSMENT & PLAN NOTE
Improved, CK 94 today  CK was 6836 on admission  Improved after receiving IV fluids  Encourage PO fluid intake  Restart statin tonight

## 2023-03-27 NOTE — PLAN OF CARE
Problem: NEUROSENSORY - ADULT  Goal: Achieves stable or improved neurological status  Description: INTERVENTIONS  - Monitor and report changes in neurological status  - Monitor vital signs such as temperature, blood pressure, glucose, and any other labs ordered   - Initiate measures to prevent increased intracranial pressure  - Monitor for seizure activity and implement precautions if appropriate      Outcome: Progressing  Goal: Remains free of injury related to seizures activity  Description: INTERVENTIONS  - Maintain airway, patient safety  and administer oxygen as ordered  - Monitor patient for seizure activity, document and report duration and description of seizure to physician/advanced practitioner  - If seizure occurs,  ensure patient safety during seizure  - Reorient patient post seizure  - Seizure pads on all 4 side rails  - Instruct patient/family to notify RN of any seizure activity including if an aura is experienced  - Instruct patient/family to call for assistance with activity based on nursing assessment  - Administer anti-seizure medications if ordered    Outcome: Progressing  Goal: Achieves maximal functionality and self care  Description: INTERVENTIONS  - Monitor swallowing and airway patency with patient fatigue and changes in neurological status  - Encourage and assist patient to increase activity and self care     - Encourage visually impaired, hearing impaired and aphasic patients to use assistive/communication devices  Outcome: Progressing     Problem: CARDIOVASCULAR - ADULT  Goal: Maintains optimal cardiac output and hemodynamic stability  Description: INTERVENTIONS:  - Monitor I/O, vital signs and rhythm  - Monitor for S/S and trends of decreased cardiac output  - Administer and titrate ordered vasoactive medications to optimize hemodynamic stability  - Assess quality of pulses, skin color and temperature  - Assess for signs of decreased coronary artery perfusion  - Instruct patient to report change in severity of symptoms  Outcome: Progressing  Goal: Absence of cardiac dysrhythmias or at baseline rhythm  Description: INTERVENTIONS:  - Continuous cardiac monitoring, vital signs, obtain 12 lead EKG if ordered  - Administer antiarrhythmic and heart rate control medications as ordered  - Monitor electrolytes and administer replacement therapy as ordered  Outcome: Progressing     Problem: RESPIRATORY - ADULT  Goal: Achieves optimal ventilation and oxygenation  Description: INTERVENTIONS:  - Assess for changes in respiratory status  - Assess for changes in mentation and behavior  - Position to facilitate oxygenation and minimize respiratory effort  - Oxygen administered by appropriate delivery if ordered  - Initiate smoking cessation education as indicated  - Encourage broncho-pulmonary hygiene including cough, deep breathe, Incentive Spirometry  - Assess the need for suctioning and aspirate as needed  - Assess and instruct to report SOB or any respiratory difficulty  - Respiratory Therapy support as indicated  Outcome: Progressing     Problem: GASTROINTESTINAL - ADULT  Goal: Minimal or absence of nausea and/or vomiting  Description: INTERVENTIONS:  - Administer IV fluids if ordered to ensure adequate hydration  - Maintain NPO status until nausea and vomiting are resolved  - Nasogastric tube if ordered  - Administer ordered antiemetic medications as needed  - Provide nonpharmacologic comfort measures as appropriate  - Advance diet as tolerated, if ordered  - Consider nutrition services referral to assist patient with adequate nutrition and appropriate food choices  Outcome: Progressing  Goal: Maintains or returns to baseline bowel function  Description: INTERVENTIONS:  - Assess bowel function  - Encourage oral fluids to ensure adequate hydration  - Administer IV fluids if ordered to ensure adequate hydration  - Administer ordered medications as needed  - Encourage mobilization and activity  - Consider nutritional services referral to assist patient with adequate nutrition and appropriate food choices  Outcome: Progressing  Goal: Maintains adequate nutritional intake  Description: INTERVENTIONS:  - Monitor percentage of each meal consumed  - Identify factors contributing to decreased intake, treat as appropriate  - Assist with meals as needed  - Monitor I&O, weight, and lab values if indicated  - Obtain nutrition services referral as needed  Outcome: Progressing  Goal: Establish and maintain optimal ostomy function  Description: INTERVENTIONS:  - Assess bowel function  - Encourage oral fluids to ensure adequate hydration  - Administer IV fluids if ordered to ensure adequate hydration   - Administer ordered medications as needed  - Encourage mobilization and activity  - Nutrition services referral to assist patient with appropriate food choices  - Assess stoma site  - Consider wound care consult   Outcome: Progressing  Goal: Oral mucous membranes remain intact  Description: INTERVENTIONS  - Assess oral mucosa and hygiene practices  - Implement preventative oral hygiene regimen  - Implement oral medicated treatments as ordered  - Initiate Nutrition services referral as needed  Outcome: Progressing     Problem: GENITOURINARY - ADULT  Goal: Maintains or returns to baseline urinary function  Description: INTERVENTIONS:  - Assess urinary function  - Encourage oral fluids to ensure adequate hydration if ordered  - Administer IV fluids as ordered to ensure adequate hydration  - Administer ordered medications as needed  - Offer frequent toileting  - Follow urinary retention protocol if ordered  Outcome: Progressing  Goal: Absence of urinary retention  Description: INTERVENTIONS:  - Assess patient’s ability to void and empty bladder  - Monitor I/O  - Bladder scan as needed  - Discuss with physician/AP medications to alleviate retention as needed  - Discuss catheterization for long term situations as appropriate  Outcome: Progressing  Goal: Urinary catheter remains patent  Description: INTERVENTIONS:  - Assess patency of urinary catheter  - If patient has a chronic boucher, consider changing catheter if non-functioning  - Follow guidelines for intermittent irrigation of non-functioning urinary catheter  Outcome: Progressing     Problem: METABOLIC, FLUID AND ELECTROLYTES - ADULT  Goal: Electrolytes maintained within normal limits  Description: INTERVENTIONS:  - Monitor labs and assess patient for signs and symptoms of electrolyte imbalances  - Administer electrolyte replacement as ordered  - Monitor response to electrolyte replacements, including repeat lab results as appropriate  - Instruct patient on fluid and nutrition as appropriate  Outcome: Progressing  Goal: Fluid balance maintained  Description: INTERVENTIONS:  - Monitor labs   - Monitor I/O and WT  - Instruct patient on fluid and nutrition as appropriate  - Assess for signs & symptoms of volume excess or deficit  Outcome: Progressing  Goal: Glucose maintained within target range  Description: INTERVENTIONS:  - Monitor Blood Glucose as ordered  - Assess for signs and symptoms of hyperglycemia and hypoglycemia  - Administer ordered medications to maintain glucose within target range  - Assess nutritional intake and initiate nutrition service referral as needed  Outcome: Progressing     Problem: SKIN/TISSUE INTEGRITY - ADULT  Goal: Skin Integrity remains intact(Skin Breakdown Prevention)  Description: Assess:    -Assess extremities for adequate circulation and sensation     Bed Management:  -Have minimal linens on bed & keep smooth, unwrinkled  -Change linens as needed when moist or perspiring       Toileting:  -Offer bedside commode      Skin Care:  -Avoid use of baby powder, tape, friction and shearing, hot water or constrictive   -Do not massage red bony areas    Next Steps:    Outcome: Progressing  Goal: Incision(s), wounds(s) or drain site(s) healing without S/S of infection  Description: INTERVENTIONS  - Assess and document dressing, incision, wound bed, drain sites and surrounding tissue  - Provide patient and family education    Outcome: Progressing  Goal: Pressure injury heals and does not worsen  Description: Interventions:  - Implement low air loss mattress or specialty surface (Criteria met)  - Apply silicone foam dressing    - Consider nutrition services referral as needed  Outcome: Progressing     Problem: HEMATOLOGIC - ADULT  Goal: Maintains hematologic stability  Description: INTERVENTIONS  - Assess for signs and symptoms of bleeding or hemorrhage  - Monitor labs  - Administer supportive blood products/factors as ordered and appropriate  Outcome: Progressing     Problem: MUSCULOSKELETAL - ADULT  Goal: Maintain or return mobility to safest level of function  Description: INTERVENTIONS:  - Assess patient's ability to carry out ADLs; assess patient's baseline for ADL function and identify physical deficits which impact ability to perform ADLs (bathing, care of mouth/teeth, toileting, grooming, dressing, etc )  - Assess/evaluate cause of self-care deficits   - Assess range of motion  - Assess patient's mobility  - Assess patient's need for assistive devices and provide as appropriate  - Encourage maximum independence but intervene and supervise when necessary  - Involve family in performance of ADLs  - Assess for home care needs following discharge   - Consider OT consult to assist with ADL evaluation and planning for discharge  - Provide patient education as appropriate  Outcome: Progressing  Goal: Maintain proper alignment of affected body part  Description: INTERVENTIONS:  - Support, maintain and protect limb and body alignment  - Provide patient/ family with appropriate education  Outcome: Progressing     Problem: Prexisting or High Potential for Compromised Skin Integrity  Goal: Skin integrity is maintained or improved  Description: INTERVENTIONS:  - Identify patients at risk for skin breakdown  - Assess and monitor skin integrity  - Assess and monitor nutrition and hydration status  - Monitor labs   - Assess for incontinence   - Turn and reposition patient  - Assist with mobility/ambulation  - Relieve pressure over bony prominences  - Avoid friction and shearing  - Provide appropriate hygiene as needed including keeping skin clean and dry  - Evaluate need for skin moisturizer/barrier cream  - Collaborate with interdisciplinary team   - Patient/family teaching  - Consider wound care consult   Outcome: Progressing     Problem: MOBILITY - ADULT  Goal: Maintain or return to baseline ADL function  Description: INTERVENTIONS:  -  Assess patient's ability to carry out ADLs; assess patient's baseline for ADL function and identify physical deficits which impact ability to perform ADLs (bathing, care of mouth/teeth, toileting, grooming, dressing, etc )  - Assess/evaluate cause of self-care deficits   - Assess range of motion  - Assess patient's mobility; develop plan if impaired  - Assess patient's need for assistive devices and provide as appropriate  - Encourage maximum independence but intervene and supervise when necessary  - Involve family in performance of ADLs  - Assess for home care needs following discharge   - Consider OT consult to assist with ADL evaluation and planning for discharge  - Provide patient education as appropriate  Outcome: Progressing  Goal: Maintains/Returns to pre admission functional level  Description: INTERVENTIONS:  - Perform BMAT or MOVE assessment daily    - Set and communicate daily mobility goal to care team and patient/family/caregiver       - Out of bed for toileting  - Record patient progress and toleration of activity level   Outcome: Progressing

## 2023-03-27 NOTE — PROGRESS NOTES
03/27/23 1000   Pain Assessment   Pain Assessment Tool 0-10   Pain Score 8   Pain Location/Orientation Orientation: Bilateral;Location: Foot;Orientation: Right;Location: Knee   Restrictions/Precautions   Precautions Fall Risk; Airborne/isolation;Pain   Weight Bearing Restrictions No   General   Change In Medical/Functional Status Great increase in bilat foot pain-  Attempted to stand 1 time but pt barely had any initiation due to pain so changed xfer to slide board until meds help foot pain   Sit to Lying   Type of Assistance Needed Physical assistance   Physical Assistance Level 26%-50%   Comment BLE lift   Sit to Lying CARE Score 3   Sit to Stand   Comment Unable to achieve full stand   Reason if not Attempted Medical concerns   Sit to Stand CARE Score 88   Bed-Chair Transfer   Type of Assistance Needed Physical assistance   Physical Assistance Level Total assistance   Comment Mod A x2 slide board  from [facility] to bed   Chair/Bed-to-Chair Transfer CARE Score 1   Walk 10 Feet   Reason if not Attempted Medical concerns   Walk 10 Feet CARE Score 88   Walk 50 Feet with Two Turns   Reason if not Attempted Medical concerns   Walk 50 Feet with Two Turns CARE Score 88   Walk 150 Feet   Reason if not Attempted Medical concerns   Walk 150 Feet CARE Score 88   Wheel 50 Feet with Two Turns   Reason if not Attempted Activity not applicable   Wheel 50 Feet with Two Turns CARE Score 9   Wheel 150 Feet   Reason if not Attempted Activity not applicable   Wheel 616 Feet CARE Score 9   Therapeutic Interventions   Strengthening Seated March AAROM on R , AROM on L,  Hip add isometric,  Supine SAQ arom, glute sets, Hip abd AROM with therapist holding heel  10x2   Equipment Use   NuStep Pt would benefit when able   Assessment   Treatment Assessment 45 min session of PT started with talking to pt about new onset of foot pain- MD did start with 3 new meds to address,  attempted to stand 1 time but pt barely helped with transfer and was in a lot of pain - at current status seems more detrimental to continue standing so after performing a few seated TE performed slide board to get back into bed did performed with 1 person partial back and forth across board but pt with minimal initiation so opted to have 2nd person  Once in bed performed ROM in bed but pt very fatigued so had pt rest for OT session after lunch  Barriers to Discharge Decreased caregiver support   PT Barriers   Physical Impairment Decreased strength;Decreased range of motion;Decreased endurance; Impaired balance;Decreased mobility;Pain   PT Therapy Minutes   PT Time In 1000   PT Time Out 1045   PT Total Time (minutes) 45   PT Mode of treatment - Individual (minutes) 45   PT Mode of treatment - Concurrent (minutes) 0   PT Mode of treatment - Group (minutes) 0   PT Mode of treatment - Co-treat (minutes) 0   PT Mode of Treatment - Total time(minutes) 45 minutes   PT Cumulative Minutes 135   Therapy Time missed   Time missed?  No

## 2023-03-27 NOTE — ASSESSMENT & PLAN NOTE
Last DXA in 2019 showed osteoporosis  Receives Prolia injections as outpatient  Continue Vitamin D and calcium supplementation  Follows with Dr Rochelle Sommer (Rheumatology) as outpatient

## 2023-03-27 NOTE — QUICK NOTE
Called patient's son Valeriy Rojasr to provide a functional and medical update today with his understanding and appreciation      Asmita Vu MD  PM&R

## 2023-03-27 NOTE — ASSESSMENT & PLAN NOTE
Developed R knee pain over the weekend  R knee appears edematous with fluid  Obtaining XRs today  Consider consulting Ortho pending results

## 2023-03-27 NOTE — ASSESSMENT & PLAN NOTE
Remains much improved, continue PT  Right knee pain and swelling 3/27/23  Likely an arthritis flare   · X-ray showing large joint effusion  · Ortho consulted and performed arthrocentesis with 25 cc fluid removed   · Per ortho 3/31 - Right knee osteoarthritis with osteoarthritic flare status post bedside right knee aspiration now with significant subjective and clinical improvement  No concern for septic or crystal arthropathy at this time  She may follow up outpatient with Dr Alanis Pi for her right knee pain as needed     · Compression with ace wrap/ICE

## 2023-03-27 NOTE — PROGRESS NOTES
[facility]  Progress Note  Name: Rigo Aguilera  MRN: [id number]  Unit/Bed#: [facility] 265-01 I Date of Admission: 3/24/2023   Date of Service: 3/27/2023 I Hospital Day: 3    Assessment/Plan   Right knee pain  Assessment & Plan  Developed R knee pain over the weekend  R knee appears edematous with fluid  Obtaining XRs today  Consider consulting Ortho pending results  Bilateral pain of leg and foot  Assessment & Plan  Presented to [facility] on 3/24 with R foot pain  R foot XR: No acute osseous abnormality  Does also have soft tissue thickening and capsulobursal thickening on XR  Start on Prednisone 40mg daily x5 days  Continue stretching with PT/OT  Proper foot wear  GERD (gastroesophageal reflux disease)  Assessment & Plan  Stable  Continue home Pepcid 40mg daily  Closed left ankle fracture, sequela  Assessment & Plan  Hx of L ankle fracture in 06/2022  Overdue for Orthopedics follow-up  Follow-up with Orthopedics on discharge  Continue with PT/OT  Considerations during therapy  Prediabetes  Assessment & Plan  A1c 5 9 on 10/11/22  Monitor fasting BG  Follow-up with PCP as outpatient  Elevated AST (SGOT)  Assessment & Plan  Improved  AST 23 from 70  Restart Lipitor tonight  Asymptomatic  Continue to monitor routine CMP  Diarrhea  Assessment & Plan  Had been experiencing diarrhea in acute setting  Starting to resolve  C-diff negative on 3/22  Likely viral in nature due to recent illness  Continue with symptom management  SARS-CoV-2 positive  Assessment & Plan  COVID + on 3/21  Continue isolation precautions until 3/30  Symptomatic care  Currently maintaining on RA  Rhabdomyolysis  Assessment & Plan  Improved, CK 94 today  CK was 6836 on admission  Improved after receiving IV fluids  Encourage PO fluid intake  Restart statin tonight  Tremor, essential  Assessment & Plan  Continue home primidone 50mg at HS    Follows with Dr Adam Schwartz (Neurology) as outpatient  History of cerebral hemorrhage  Assessment & Plan  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  MRI in 10/2022 showed small acute hematoma in the R putamen with mild regional mass effect and chronic infarcts in L basal ganglia  Had repeat MRI in 01/2023  Recommended to continue holding ASA at that time per Neurology  Scheduled to follow-up with Neurology (Dr Lesa Dyson) in additional 6 months as outpatient  Continue PT/OT  Idiopathic osteoporosis  Assessment & Plan  Last DXA in 2019 showed osteoporosis  Receives Prolia injections as outpatient  Continue Vitamin D and calcium supplementation  Follows with Dr Linda Jessica (Rheumatology) as outpatient  * Ambulatory dysfunction  Assessment & Plan  3/21 - mechanical fall with no injuries; generalized weakness  Had been experiencing URI symptoms - COVID + on 3/21  CK 6838 on admission  Hx of CVA with L sided deficits  Primary team following  Continue PT/OT  VTE Pharmacologic Prophylaxis:   Pharmacologic: Enoxaparin (Lovenox)  Mechanical VTE Prophylaxis in Place: Yes - sequential compression devices  Current Length of Stay: 3 day(s)    Current Patient Status: Inpatient Rehab     Discharge Plan: As per primary team     Code Status: Level 1 - Full Code    Subjective:   Pt examined while pt sitting in recliner in pt room  Complaints of R knee pain, aching, can be 10/10 when putting weight into her leg, started over the weekend  Appears to be edematous and fluid filled  Will obtain XRs at this time  Complaints of B/L feet pain when ambulating  Started on steroids this morning for bursitis seen on XR, patient feels that pain is already improving this afternoon  Still has nasal congestion and dry cough in the mornings but states that it is very minimal and improving each day  Denies any fevers, chills, SOB, palpitations, CP, or abdominal pain  Had a BM today but states that it was small    Denies any further diarrhea  Objective:     Vitals:   Temp (24hrs), Av 1 °F (37 3 °C), Min:98 1 °F (36 7 °C), Max:99 9 °F (37 7 °C)    Temp:  [98 1 °F (36 7 °C)-99 9 °F (37 7 °C)] 99 9 °F (37 7 °C)  HR:  [] 86  Resp:  [18-20] 18  BP: (130-138)/(70-80) 138/78  SpO2:  [95 %-98 %] 98 %  Body mass index is 24 71 kg/m²  Review of Systems   Constitutional: Positive for appetite change (poor appetite since being hospitalized)  Negative for chills, fatigue and fever  HENT: Positive for congestion (nasal congestion, improving each day)  Negative for postnasal drip, rhinorrhea, sinus pressure, sinus pain, sneezing, sore throat and trouble swallowing  Eyes: Negative for visual disturbance  Respiratory: Positive for cough (dry cough, improving each day)  Negative for chest tightness, shortness of breath, wheezing and stridor  Cardiovascular: Negative for chest pain, palpitations and leg swelling  Gastrointestinal: Negative for abdominal distention, abdominal pain, constipation, diarrhea, nausea and vomiting  LBM 3/27   Genitourinary: Negative for difficulty urinating  Musculoskeletal: Positive for arthralgias (R knee pain, 10/10 with ambulation, started over the weekend  B/L feet pain while ambulating, improving since steroid) and joint swelling (R knee swollen)  Negative for back pain and gait problem  Neurological: Negative for dizziness, weakness, light-headedness, numbness and headaches  Psychiatric/Behavioral: Negative for dysphoric mood and sleep disturbance  The patient is not nervous/anxious  All other systems reviewed and are negative  Input and Output Summary (last 24 hours): Intake/Output Summary (Last 24 hours) at 3/27/2023 1445  Last data filed at 3/27/2023 1001  Gross per 24 hour   Intake 840 ml   Output --   Net 840 ml       Physical Exam:     Physical Exam  Vitals and nursing note reviewed  Constitutional:       General: She is not in acute distress  Appearance: Normal appearance  She is not ill-appearing  HENT:      Head: Normocephalic and atraumatic  Cardiovascular:      Rate and Rhythm: Normal rate and regular rhythm  Pulses: Normal pulses  Heart sounds: Normal heart sounds  No murmur heard  No friction rub  Pulmonary:      Effort: Pulmonary effort is normal  No respiratory distress  Breath sounds: Normal breath sounds  No wheezing or rhonchi  Abdominal:      General: Abdomen is flat  Bowel sounds are normal  There is no distension  Palpations: Abdomen is soft  There is no mass  Tenderness: There is no abdominal tenderness  There is no guarding or rebound  Hernia: No hernia is present  Musculoskeletal:         General: Swelling (R knee moderately edematous, fluid filled) present  Cervical back: Normal range of motion and neck supple  No tenderness  Right lower leg: No edema  Left lower leg: No edema  Skin:     General: Skin is warm and dry  Findings: Erythema (Erythema on dorsal aspect of R foot) present  Neurological:      Mental Status: She is alert and oriented to person, place, and time  Motor: Weakness (LUE strength 4/5, LLE strength 4/5) present     Psychiatric:         Mood and Affect: Mood normal          Behavior: Behavior normal          Additional Data:     Labs:    Results from last 7 days   Lab Units 03/27/23  0613   WBC Thousand/uL 8 81   HEMOGLOBIN g/dL 11 7   HEMATOCRIT % 35 4   PLATELETS Thousands/uL 280   NEUTROS PCT % 68   LYMPHS PCT % 20   MONOS PCT % 10   EOS PCT % 1     Results from last 7 days   Lab Units 03/27/23  0613   SODIUM mmol/L 136   POTASSIUM mmol/L 3 7   CHLORIDE mmol/L 102   CO2 mmol/L 24   BUN mg/dL 18   CREATININE mg/dL 0 58*   ANION GAP mmol/L 10   CALCIUM mg/dL 8 9   ALBUMIN g/dL 3 6   TOTAL BILIRUBIN mg/dL 0 52   ALK PHOS U/L 57   ALT U/L 21   AST U/L 23   GLUCOSE RANDOM mg/dL 115     Results from last 7 days   Lab Units 03/22/23  0444   INR  1 05 Results from last 7 days   Lab Units 03/21/23  1422 03/21/23  1203   LACTIC ACID mmol/L 1 4 2 1*   PROCALCITONIN ng/ml  --  0 17       Labs reviewed    Imaging:    Imaging reviewed    Recent Cultures (last 7 days):     Results from last 7 days   Lab Units 03/22/23  1215 03/21/23  1321   BLOOD CULTURE   --  No Growth After 5 Days  No Growth After 5 Days  C DIFF TOXIN B BY PCR  Negative  --        Last 24 Hours Medication List:   Current Facility-Administered Medications   Medication Dose Route Frequency Provider Last Rate   • acetaminophen  650 mg Oral Q6H PRN Bethea Cancel, CRNP     • atorvastatin  10 mg Oral Daily With [address], CRNP     • benzonatate  100 mg Oral TID PRN Mello Cedillo MD     • bisacodyl  10 mg Rectal Daily PRN Mello Cedillo MD     • Diclofenac Sodium  2 g Topical 4x Daily Mello Cedillo MD     • enoxaparin  40 mg Subcutaneous Daily Mello Cedillo MD     • famotidine  40 mg Oral Daily With Abhijit Nelson MD     • fluticasone  1 spray Each Nare Daily Mello Cedillo MD     • gabapentin  100 mg Oral BID Mello Cedillo MD     • ondansetron  4 mg Oral Q6H PRN Mello Cedillo MD     • polyethylene glycol  17 g Oral Daily PRN Mello Cedillo MD     • predniSONE  40 mg Oral Daily Bethea Cancel, LORETO     • primidone  50 mg Oral HS Krystyna Leahy MD     • sodium chloride  1 spray Each Nare Q1H PRN [place] CancelLORETO          M*[employer] software was used to dictate this note  It may contain errors with dictating incorrect words or incorrect spelling  Please contact the provider directly with any questions

## 2023-03-27 NOTE — PCC NURSING
Pt is a 78 y o  female with PMH of 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, essential tremor, osteoarthritis, osteoporosis, vitamin D deficiency, impaired memory who presented to the [facility] on 3/21/2023 for R knee and chin  Upon admission was found to have COVID-19 infection, also found to have rhabdomyolysis with CK elevated at 6836 and found to have transaminitis  Lipitor placed on hold  Patient initiated on IV fluids and treatment protocol with mild pathway for COVID-19 infection  Patient did not require supplemental oxygen  Rhabdomyolysis and LFTs improved with hydration  Patient also developed diarrhea  C  difficile toxin negative  Diarrhea improved  After medical stabilization, patient was found to have acute functional deficits in mobility and self-care, admitted to [facility] for acute inpatient rehabilitation  Xray of R knee showed large joint effusion, consulted Ortho arthrocentesis done  R foot pain managed with Voltaren gel QID  On Pepcid 40mg daily for GERD  Lipitor 10 mg daily restarted for hyperlipidemia  Essential tremors managed with Primidone 50 mg @ HS  Pt Covid + on 3/21, contact and airborne precaution d/c'd  Lovenox 40 mg daily for DVT ppx  HLD managed with Lipitor 10mg daily  Tylenol 650mg q6h PRN for mild pain  This week we will monitor pt's vital signs & lab results  Pt will have safe transfers with the use of call bell & hourly rounding to prevent falls  Pt will be educated on importance of T/R & off loading weight when in bed/chair to prevent pressure injury  Pt will have adequate pain relief to fully participate in therapies  Pt will be educated on energy conservation & encouraged independence with ADL's

## 2023-03-27 NOTE — ASSESSMENT & PLAN NOTE
Presented to [facility] on 3/24 with R foot pain  R foot XR: No acute osseous abnormality  Does also have soft tissue thickening and capsulobursal thickening on XR  Start on Prednisone 40mg daily x5 days  Continue stretching with PT/OT  Proper foot wear

## 2023-03-27 NOTE — PROGRESS NOTES
03/27/23 0830   Pain Assessment   Pain Assessment Tool 0-10   Pain Score 8   Pain Location/Orientation Orientation: Bilateral;Location: Foot;Orientation: Right;Location: Knee   Pain Onset/Description Onset: Gradual;Descriptor: Aching;Descriptor: Discomfort; Descriptor: Sore   Effect of Pain on Daily Activities increased significantly with weightbearing, also tender to touch B feet   Subjective   Subjective pt agreeable to participate in PT tx but reports feeling very fatigued and increased B feet and R knee pain  in addition to pain, PT noticed increaed redness in B feet, R>L  Notified RN   Roll Left and Right   Type of Assistance Needed Physical assistance   Physical Assistance Level 25% or less   Comment with bed rail   Roll Left and Right CARE Score 3   Lying to Sitting on Side of Bed   Type of Assistance Needed Physical assistance;Verbal cues   Physical Assistance Level 76% or more   Comment increased time required, use of bed rail  and increased physical assist required ocmpared to IE   Lying to Sitting on Side of Bed CARE Score 2   Sit to Stand   Comment attempted x 2 but pt unable to weightbear through BLEs due to pain despite max A with RW   Reason if not Attempted Safety concerns   Sit to Stand CARE Score 88   Bed-Chair Transfer   Type of Assistance Needed Physical assistance;Verbal cues   Physical Assistance Level Total assistance   Comment unable to perform stand pivot today; performed squat pivot from bed > WC with total A   Chair/Bed-to-Chair Transfer CARE Score 1   Therapeutic Interventions   Strengthening supine: ankle pf/df, SAQs (AROM LLE AAROM RLE due to knee pain)  seated: hip add pillow squeezes, hip flexion, knee ext, heel slides  all BLE ther ex 3 sets of 10 with rest breaks provided PRN for pain relief   Assessment   Treatment Assessment Pt participated in 60 minute PT tx session focusing on BLE strengthenign exercises and functional mobility training   Pt limited this session due to increased B foot and R knee pain  Also noted with increased redness to right foot, notified RN  who was able to administer pain meds at end of session  Pt may require use of transfer board for OOB/toilet transfers at this time as she was total A for squat pivot transfer this AM  Pt will continue to benefit from skilled PT interventions to address deficits, reduce fall risk, and maximize functional independence  Problem List Decreased strength;Decreased range of motion;Decreased endurance; Impaired balance;Decreased mobility;Pain   Barriers to Discharge Decreased caregiver support   Plan   Treatment/Interventions Functional transfer training;LE strengthening/ROM; Elevations; Therapeutic exercise; Endurance training;Patient/family training;Gait training;Bed mobility   Progress Progressing toward goals   PT Therapy Minutes   PT Time In 0830   PT Time Out 0930   PT Total Time (minutes) 60   PT Mode of treatment - Individual (minutes) 60   PT Mode of treatment - Concurrent (minutes) 0   PT Mode of treatment - Group (minutes) 0   PT Mode of treatment - Co-treat (minutes) 0   PT Mode of Treatment - Total time(minutes) 60 minutes   PT Cumulative Minutes 150   Therapy Time missed   Time missed?  No

## 2023-03-28 LAB
APPEARANCE FLD: ABNORMAL
COLOR FLD: ABNORMAL
CRYSTALS SNV QL MICRO: NORMAL
HISTIOCYTES NFR SNV MANUAL: 4 %
LYMPHOCYTES # SNV MANUAL: 3 %
MONOCYTES NFR SNV MANUAL: 19 %
NEUTROPHILS NFR SNV MANUAL: 74 %
RBC # SNV MANUAL: 2000 /UL (ref 0–10)
SITE: ABNORMAL
TOTAL CELLS COUNTED SPEC: 100
WBC # FLD MANUAL: ABNORMAL /UL (ref 0–200)

## 2023-03-28 PROCEDURE — 0S9C3ZX DRAINAGE OF RIGHT KNEE JOINT, PERCUTANEOUS APPROACH, DIAGNOSTIC: ICD-10-PCS | Performed by: STUDENT IN AN ORGANIZED HEALTH CARE EDUCATION/TRAINING PROGRAM

## 2023-03-28 RX ADMIN — Medication 1 TABLET: at 08:16

## 2023-03-28 RX ADMIN — CHOLECALCIFEROL TAB 25 MCG (1000 UNIT) 1000 UNITS: 25 TAB at 08:16

## 2023-03-28 RX ADMIN — DICLOFENAC SODIUM 2 G: 10 GEL TOPICAL at 21:18

## 2023-03-28 RX ADMIN — GABAPENTIN 100 MG: 100 CAPSULE ORAL at 08:16

## 2023-03-28 RX ADMIN — FAMOTIDINE 40 MG: 20 TABLET, FILM COATED ORAL at 17:36

## 2023-03-28 RX ADMIN — PRIMIDONE 50 MG: 50 TABLET ORAL at 21:19

## 2023-03-28 RX ADMIN — GABAPENTIN 100 MG: 100 CAPSULE ORAL at 17:36

## 2023-03-28 RX ADMIN — ATORVASTATIN CALCIUM 10 MG: 10 TABLET, FILM COATED ORAL at 17:36

## 2023-03-28 RX ADMIN — ENOXAPARIN SODIUM 40 MG: 40 INJECTION SUBCUTANEOUS at 08:16

## 2023-03-28 RX ADMIN — FLUTICASONE PROPIONATE 1 SPRAY: 50 SPRAY, METERED NASAL at 08:15

## 2023-03-28 RX ADMIN — DICLOFENAC SODIUM 2 G: 10 GEL TOPICAL at 08:15

## 2023-03-28 RX ADMIN — DICLOFENAC SODIUM 2 G: 10 GEL TOPICAL at 17:37

## 2023-03-28 RX ADMIN — PREDNISONE 40 MG: 20 TABLET ORAL at 08:16

## 2023-03-28 NOTE — ASSESSMENT & PLAN NOTE
Resolved  Had been experiencing diarrhea in acute setting  C-diff negative on 3/22  Likely viral in nature due to recent illness  Continue with symptom management

## 2023-03-28 NOTE — ASSESSMENT & PLAN NOTE
Presented to [facility] on 3/24 with R foot pain  R foot XR: No acute osseous abnormality  Does also have soft tissue thickening and capsulobursal thickening on XR  Started on Prednisone 40mg daily x5 days on 3/27  Continue stretching with PT/OT  Proper foot wear

## 2023-03-28 NOTE — PROGRESS NOTES
PM&R PROGRESS NOTE:  Bryan Nicole 78 y o  female MRN: [id number]  Unit/Bed#: -01 Encounter: [id number]        Rehabilitation Diagnosis: Impairment of mobility, safety and Activities of Daily Living (ADLs) due to Debility:  16  Debility (Non-cardiac/Non-pulmonary) related to COVID-19 virus    SUBJECTIVE: Patient seen face-to-face today in her room  She reports bilateral feet pain has improved  Continues to have right knee discomfort  Ace wrap personally applied for support  Otherwise feeling well  No SOB or chest pain  No cough  ASSESSMENT: Stable, progressing      PLAN:    Rehabilitation  • Functional deficits: Functional deficits: Baseline left hemiparesis, impaired sensation on left hemibody, proximal musculature weakness, essential tremor, impaired mobility, self care  • Continue current rehabilitation plan of care to maximize function  • Functional update:   o Total assist with slide board with transfers  o Max A with ADLs  • Estimated Discharge: TBD    DVT prophylaxis  • Lovenox 40 mg daily     Bladder plan  • Continent    Bowel plan  • Continent      SARS-CoV-2 positive  Assessment & Plan  Presented 3/21/2023 with weakness  COVID-19 infection identified  Treatment with mild pathway initiated  Patient remained stable from a pulmonary standpoint and remained on room air  Continue supportive care with Tessalon Perles, nasal spray  Monitor respiratory status  Off isolation 3/30/23    Right knee pain  Assessment & Plan  Right knee pain and swelling 3/27/23  Likely an arthritis flare   · X-ray showing large joint effusion  · Ortho consulted to consider arthrocentesis  · Compression with ace wrap/ICE    Bilateral pain of leg and foot  Assessment & Plan  On admission had right ankle pain - x-ray completed and showing Minimal soft tissue thickening medial to the first metatarsal head and lateral to the fifth metatarsal head attributed to capsulobursal thickening    · Over the weekend at [facility] also "had medial bilateral foot pain along plantar fascia  · Possible plantar fasciitis and also has evidence of bursitis  · Limiting her participation in therapies 3/27/23  · Start Prednisone taper, gabapentin 100 mg BID, and topical Voltaren gel 3/28/23  · If no improvement consult podiatry  · Much improved pain in feet 3/29/23 and participation in rehab    GERD (gastroesophageal reflux disease)  Assessment & Plan  Managed on Pepcid 40 mg daily home dose    Closed left ankle fracture, sequela  Assessment & Plan  Sustained June 2022  Weightbearing as tolerated  Need outpatient orthopedic follow-up    Elevated AST (SGOT)  Assessment & Plan  LFTs now WNL  Lipitor restarted 3/27/23    Diarrhea  Assessment & Plan  C  difficile negative  Monitor stools  Encourage oral hydration    Rhabdomyolysis  Assessment & Plan  Patient stained a slip onto the floor from her bed and was unable to get up for a prolonged period of time  CK 6836  Treated with IV fluids acute care  Trending down      Tremor, essential  Assessment & Plan  Managed on home dose primidone 50 mg nightly  Stable  Located in hands bilaterally  Follows with Dr Mariah Gabriel as an outpatient    History of cerebral hemorrhage  Assessment & Plan  Patient with history of hemorrhagic CVA  With baseline left hemiparesis and left hemibody impaired sensation  Follows with Dr Mariah Gabriel as an outpatient    Idiopathic osteoporosis  Assessment & Plan  Managed on Prolia as outpatient  Continue supplementation with vitamin D and calcium        Appreciate IM consultants medical co-management  Labs, medications, and imaging personally reviewed  ROS:  A ten point review of systems was completed on 03/28/23 and pertinent positives are listed in subjective section  All other systems reviewed were negative         OBJECTIVE:   /70 (BP Location: Right arm)   Pulse 86   Temp 98 °F (36 7 °C) (Oral)   Resp 18   Ht 5' 4\" (1 626 m)   LMP  (LMP Unknown)   SpO2 96%   BMI 24 71 " kg/m²     Physical Exam  Vitals and nursing note reviewed  Constitutional:       General: She is not in acute distress  HENT:      Head: Normocephalic and atraumatic  Nose: Nose normal       Mouth/Throat:      Mouth: Mucous membranes are moist    Eyes:      Conjunctiva/sclera: Conjunctivae normal    Cardiovascular:      Rate and Rhythm: Normal rate and regular rhythm  Pulses: Normal pulses  Pulmonary:      Effort: Pulmonary effort is normal       Breath sounds: Normal breath sounds  No wheezing or rales  Abdominal:      General: Bowel sounds are normal  There is no distension  Palpations: Abdomen is soft  Tenderness: There is no abdominal tenderness  Musculoskeletal:      Cervical back: Neck supple  Comments: Bilateral feet examined  Warm and appear WNL   No swelling  Some mild redness on plantar surface - improving    Large right knee swelling   Active flexion/extension fair   Skin:     General: Skin is warm  Neurological:      Mental Status: She is alert and oriented to person, place, and time  Motor: Weakness (pelvis weakness) present     Psychiatric:         Mood and Affect: Mood normal           Lab Results   Component Value Date    WBC 8 81 03/27/2023    HGB 11 7 03/27/2023    HCT 35 4 03/27/2023     (H) 03/27/2023     03/27/2023     Lab Results   Component Value Date    SODIUM 136 03/27/2023    K 3 7 03/27/2023     03/27/2023    CO2 24 03/27/2023    BUN 18 03/27/2023    CREATININE 0 58 (L) 03/27/2023    GLUC 115 03/27/2023    CALCIUM 8 9 03/27/2023     Lab Results   Component Value Date    INR 1 05 03/22/2023    INR 1 01 03/21/2023    INR 0 95 10/10/2022    PROTIME 14 0 03/22/2023    PROTIME 13 5 03/21/2023    PROTIME 12 9 10/10/2022           Current Facility-Administered Medications:   •  acetaminophen (TYLENOL) tablet 650 mg, 650 mg, Oral, Q6H PRN, LORETO Lobo, 650 mg at 03/27/23 1652  •  atorvastatin (LIPITOR) tablet 10 mg, 10 mg, Oral, Daily With LORETO Hagen, 10 mg at 03/27/23 1647  •  benzonatate (TESSALON PERLES) capsule 100 mg, 100 mg, Oral, TID PRN, Vesta Law MD  •  bisacodyl (DULCOLAX) rectal suppository 10 mg, 10 mg, Rectal, Daily PRN, Vesta Law MD  •  calcium carbonate-vitamin D 500 mg-5 mcg tablet 1 tablet, 1 tablet, Oral, Daily With Breakfast, LORETO Lima, 1 tablet at 03/28/23 0797  •  cholecalciferol (VITAMIN D3) tablet 1,000 Units, 1,000 Units, Oral, Daily, LORETO Lima, 1,000 Units at 03/28/23 0816  •  Diclofenac Sodium (VOLTAREN) 1 % topical gel 2 g, 2 g, Topical, 4x Daily, Vesta Law MD, 2 g at 03/28/23 0815  •  enoxaparin (LOVENOX) subcutaneous injection 40 mg, 40 mg, Subcutaneous, Daily, Vesta Law MD, 40 mg at 03/28/23 0816  •  famotidine (PEPCID) tablet 40 mg, 40 mg, Oral, Daily With Alejo Becker MD, 40 mg at 03/27/23 1647  •  fluticasone (FLONASE) 50 mcg/act nasal spray 1 spray, 1 spray, Each Nare, Daily, Vesta Law MD, 1 spray at 03/28/23 0815  •  gabapentin (NEURONTIN) capsule 100 mg, 100 mg, Oral, BID, Vesta Law MD, 100 mg at 03/28/23 0816  •  ondansetron (ZOFRAN-ODT) dispersible tablet 4 mg, 4 mg, Oral, Q6H PRN, Vesta Law MD  •  polyethylene glycol (MIRALAX) packet 17 g, 17 g, Oral, Daily PRN, Vesta Law MD  •  predniSONE tablet 40 mg, 40 mg, Oral, Daily, LORETO Lima, 40 mg at 03/28/23 1424  •  primidone (MYSOLINE) tablet 50 mg, 50 mg, Oral, HS, Vesta Law MD, 50 mg at 03/27/23 2136  •  sodium chloride (OCEAN) 0 65 % nasal spray 1 spray, 1 spray, Each Nare, Q1H GORGEN, LORETO Lima    Past Medical History:   Diagnosis Date   • Cataract of right eye    • GERD (gastroesophageal reflux disease)        Patient Active Problem List    Diagnosis Date Noted   • SARS-CoV-2 positive 03/21/2023   • Osteopenia 03/27/2023   • Right knee pain 03/27/2023   • Elevated AST (SGOT) 03/24/2023   • Prediabetes 03/24/2023   • Closed left ankle fracture, sequela 03/24/2023   • GERD (gastroesophageal reflux disease) 03/24/2023   • Bilateral pain of leg and foot 03/24/2023   • Diarrhea 03/22/2023   • Macrocytosis 03/21/2023   • Ambulatory dysfunction 03/21/2023   • Rhabdomyolysis 03/21/2023   • Tremor, essential 10/20/2022   • Acute cerebral hemorrhage ([facility] [address] ) 10/11/2022   • History of cerebral hemorrhage 10/10/2022   • 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 ([facility] [address] ) 10/10/2022   • Chronic left-sided low back pain    • Transient cerebral ischemia 09/14/2018   • Premature atrial beats 09/14/2018   • Cataract of right eye 06/21/2016   • Sacroiliitis ([facility] [address] ) 12/04/2015   • Idiopathic osteoporosis 06/09/2015   • Primary osteoarthritis of hand 06/09/2015          Kati Salazar MD    I have spent a total time of 35 minutes on 03/28/23 in caring for this patient including Impressions, Counseling / Coordination of care, Documenting in the medical record and Reviewing / ordering tests, medicine, procedures    ** Please Note:  voice to text software may have been used in the creation of this document   Although proof errors in transcription or interpretation are a potential of such software**

## 2023-03-28 NOTE — PROGRESS NOTES
03/28/23 0830   Pain Assessment   Pain Assessment Tool 0-10   Pain Score 5   Pain Location/Orientation Orientation: Right;Location: Knee   Pain Onset/Description Onset: Ongoing   Effect of Pain on Daily Activities Tolerated   Patient's Stated Pain Goal No pain   Hospital Pain Intervention(s) Rest;Repositioned;Elevated   Multiple Pain Sites No   Restrictions/Precautions   Precautions Bed/chair alarms; Fall Risk;Contact/isolation; Airborne/isolation;Droplet precautions;Pain  (COVID+)   Weight Bearing Restrictions No   ROM Restrictions No   Lifestyle   Autonomy Pt agreeable to OT Tx session  Oral Hygiene   Type of Assistance Needed Supervision   Physical Assistance Level No physical assistance   Comment S seated at sink to perform oral hygiene routine   Oral Hygiene CARE Score 4   Grooming   Able To Initiate Tasks;Comb/Brush Hair;Wash/Dry Hands   Limitation Noted In Coordination  (dec LUE coordination from an old stroke)   Findings S seated at sink top to perform grooming routine  Shower/Bathe Self   Type of Assistance Needed Physical assistance; Adaptive equipment   Physical Assistance Level 51%-75%   Comment Pt performed SB routine seated EOB demonstrating ability to thoroughly bathe 6/10 parts  Trialed use of LHR with wash cloth to bathe LE's to feet  Assistance provided for thoroughness  TA to bathe rm and buttocks while in stance at RW  Shower/Bathe Self CARE Score 2   Bathing   Assessed Bath Style Sponge Bath   Anticipated D/C Bath Style Shower   Able to [place] Rashid No   Able to [employer] Temperature No   Able to Wash/Rinse/Dry (body part) Left Arm;Right Arm;L Upper Leg;R Upper Leg;Chest;Abdomen   Limitations Noted in Balance; Endurance;ROM;Strength;Timeliness   Positioning Seated;Standing   Adaptive Equipment Longhand Reacher   Findings  Increased time for ADL performance 2* to dec activity tolerance and pain in R knee     Tub/Shower Transfer   Reason Not Assessed Sponge Bath;Endurance;Balance Upper Body Dressing   Type of Assistance Needed Physical assistance   Physical Assistance Level 25% or less   Comment Seated EOB req increased time to doff OH shirt and don OH shirt  Assist to fully manage short down posteriorly  Upper Body Dressing CARE Score 3   Lower Body Dressing   Type of Assistance Needed Physical assistance; Adaptive equipment   Physical Assistance Level 51%-75%   Comment Introduction to LHR to assist with donning under garment and pants  Use of LHR to assist with threading LE's into under garment and pants  TA while in stance at [facility] for CM task 2* to dec standing balance  Lower Body Dressing CARE Score 2   Putting On/Taking Off Footwear   Type of Assistance Needed Physical assistance; Adaptive equipment   Physical Assistance Level 26%-50%   Comment Introduction to sock aide  Assist to fully manage socks onto device with pt demonstrating ability to then utilize device and don socks on feet  Putting On/Taking Off Footwear CARE Score 3   Lying to Sitting on Side of Bed   Type of Assistance Needed Physical assistance   Physical Assistance Level 51%-75%   Comment HOB slightly elevated with use of BR   Lying to Sitting on Side of Bed CARE Score 2   Sit to Stand   Type of Assistance Needed Physical assistance; Adaptive equipment   Physical Assistance Level 51%-75%   Comment mod/maxA STS to    Sit to Stand CARE Score 2   Bed-Chair Transfer   Type of Assistance Needed Physical assistance;Verbal cues   Physical Assistance Level 51%-75%   Comment Trialed sit pivot transfer's EOB - W/C, W/C to drop arm recliner with cues for proper technique   Chair/Bed-to-Chair Transfer CARE Score 2   Toileting Hygiene   Type of Assistance Needed Physical assistance   Physical Assistance Level Total assistance   Comment Seated on over the toilet commode req A to complete seated lateral wt shifts to initiate CM down  Trialed hygiene in stance at RW req TA for rear hygiene and CM up over hips     [address] CARE Score 1   Toilet Transfer   Type of Assistance Needed Physical assistance;Verbal cues   Physical Assistance Level 51%-75%   Comment modA sit pivot transfer W/C <> drop arm over the toilet commode; VC's for technique and hand placement   Toilet Transfer CARE Score 2   Therapeutic Excerise-Strength   UE Strength Yes   Right Upper Extremity- Strength   R Shoulder Flexion; Extension; External rotation; Internal rotation   R Elbow Elbow flexion;Elbow extension   R Position Seated   Equipment Dumbbell  (1#)   R Weight/Reps/Sets 2 sets of 15 reps   RUE Strength Comment Engaged in seated UE TE utilizing 1# dumbbell to perform 2 sets of 15 reps of indicated planes  Tolerated well with rest breaks between each set  Dec strength observed in LUE throughout TE, as pt reports weakness from old stroke (10/2022)  Left Upper Extremity-Strength   L Shoulder Flexion; Extension; External rotation; Internal rotation   L Elbow Elbow flexion;Elbow extension   L Position Seated   Equipment Dumbell  (1#)   L Weights/Reps/Sets 2 sets of 15 reps   LUE Strength Comment See above  Cognition   Overall Cognitive Status Impaired   Arousal/Participation Alert; Cooperative   Attention Attends with cues to redirect   Orientation Level Oriented X4   Memory Decreased short term memory;Decreased recall of recent events   Following Commands Follows one step commands with increased time or repetition   Comments Pt pleasant and cooperative throughout session  Activity Tolerance   Activity Tolerance Patient tolerated treatment well   Assessment   Treatment Assessment Pt participated in 120 min skilled OT Tx session with focus on ADL performance, LHAE training, fxnl transfer's, and UE TE  See above for further Tx details  Pt is demonstrating progress towards OT goals, req overall modA for bathing routine, Ayesha UB dressing, and modA LB dressing  Introduction to [facility] for LB ADL's with pt mohinder QUINTERO carryover for ADL routine   Trialed multiple sit pivot transfer's with pt overall req modA  Communicated transfer status to PT (Ed)  No c/o of increased pain in R knee throughout session  Pt remained OOB in recliner with chair alarm engaged and all needs met  Pt would continue to benefit from skilled OT services to improve fxnl standing balance/tolerance, maximize IND with fxnl transfer's, improve activity tolerance, and maximize IND with ADL/IADL performance in order to progress towards OT goals and dec caregiver burden upon D/C  Prognosis Fair   Problem List Decreased strength;Decreased range of motion;Decreased endurance; Impaired balance;Decreased mobility;Pain   Barriers to Discharge Decreased caregiver support   Plan   Treatment/Interventions ADL retraining;Functional transfer training; Therapeutic exercise; Endurance training   Progress Progressing toward goals   Recommendation   OT Discharge Recommendation   (Pending progress)   Equipment Recommended   (Pt would benefit form BSC, hip kit, and shower chair with back for D/C )   OT Therapy Minutes   OT Time In 0830   OT Time Out 1030   OT Total Time (minutes) 120   OT Mode of treatment - Individual (minutes) 120   OT Mode of treatment - Concurrent (minutes) 0   OT Mode of treatment - Group (minutes) 0   OT Mode of treatment - Co-treat (minutes) 0   OT Mode of Treatment - Total time(minutes) 120 minutes   OT Cumulative Minutes 385   Therapy Time missed   Time missed?  No

## 2023-03-28 NOTE — PCC OCCUPATIONAL THERAPY
Pt is demonstrating progress towards OT goals of [place], however, function does fluctuate depending upon level of fatigue  PTA, Pt lives alone in senior apartment, 2 ESTER  Pt uses meal on wheels service  Barriers include dec activity tolerance, fatigue, dec fxnl standing balance, dec fxnl reach, LUE weakness, pain, dec cognition, and dec caregiver support  Per OT notes, Prasanth Mention did speak to son for recommendations for assistance with managing medications, grocery shopping, and intermittent assist with IADL's upon D/C  Pt's son agreeable for pt to D/C to his home for 1-2 weeks prior to transitioning back home  Per discussion today, pt is agreeable  DME needs include standard BSC, folding RW tray, and LHR  Pt has RW and SPC  Pt plans to SB upon D/C rather than purchasing tub transfer bench  Pt provided with handouts to purchase Life Alert  Anticipated plan to set D/C date  Pt would benefit from Home OT services

## 2023-03-28 NOTE — ASSESSMENT & PLAN NOTE
Developed R knee pain over the weekend  R knee appears edematous with fluid  XR on 3/27 showed large joint effusion  Orthopedics consulted for possible arthrocentesis

## 2023-03-28 NOTE — PROGRESS NOTES
[facility]  Progress Note  Name: Randy Celaya  MRN: [id number]  Unit/Bed#: [facility] 265-01 I Date of Admission: 3/24/2023   Date of Service: 3/28/2023 I Hospital Day: 4    Assessment/Plan   Right knee pain  Assessment & Plan  Developed R knee pain over the weekend  R knee appears edematous with fluid  XR on 3/27 showed large joint effusion  Orthopedics consulted for possible arthrocentesis  Bilateral pain of leg and foot  Assessment & Plan  Presented to [facility] on 3/24 with R foot pain  R foot XR: No acute osseous abnormality  Does also have soft tissue thickening and capsulobursal thickening on XR  Started on Prednisone 40mg daily x5 days on 3/27  Continue stretching with PT/OT  Proper foot wear  GERD (gastroesophageal reflux disease)  Assessment & Plan  Stable  Continue home Pepcid 40mg daily  Closed left ankle fracture, sequela  Assessment & Plan  Hx of L ankle fracture in 06/2022  Overdue for Orthopedics follow-up  Follow-up with Orthopedics on discharge  Continue with PT/OT  Considerations during therapy  Prediabetes  Assessment & Plan  A1c 5 9 on 10/11/22  Monitor fasting BG  Follow-up with PCP as outpatient  Elevated AST (SGOT)  Assessment & Plan  Improved  AST 23 from 70  Restarted Lipitor on 3/27  Asymptomatic  Continue to monitor routine CMP  Diarrhea  Assessment & Plan  Resolved  Had been experiencing diarrhea in acute setting  C-diff negative on 3/22  Likely viral in nature due to recent illness  Continue with symptom management  SARS-CoV-2 positive  Assessment & Plan  COVID + on 3/21  Continue isolation precautions until 3/30  Symptomatic care  Currently maintaining on RA  Rhabdomyolysis  Assessment & Plan  Improved, CK 94 on 3/27  CK was 6836 on admission  Improved after receiving IV fluids  Encourage PO fluid intake  Restarted statin  Tremor, essential  Assessment & Plan  Continue home primidone 50mg at HS    Follows with Dr Niharika Mendoza (Neurology) as outpatient  History of cerebral hemorrhage  Assessment & Plan  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  MRI in 10/2022 showed small acute hematoma in the R putamen with mild regional mass effect and chronic infarcts in L basal ganglia  Had repeat MRI in 01/2023  Recommended to continue holding ASA at that time per Neurology  Scheduled to follow-up with Neurology (Dr Niharika Mendoza) in additional 6 months as outpatient  Continue PT/OT  Idiopathic osteoporosis  Assessment & Plan  Last DXA in 2019 showed osteoporosis  Receives Prolia injections as outpatient  Continue Vitamin D and calcium supplementation  Follows with Dr Yajaira Soni (Rheumatology) as outpatient  * Ambulatory dysfunction  Assessment & Plan  3/21 - mechanical fall with no injuries; generalized weakness  Had been experiencing URI symptoms - COVID + on 3/21  CK 6838 on admission  Hx of CVA with L sided deficits  Primary team following  Continue PT/OT  VTE Pharmacologic Prophylaxis:   Pharmacologic: Enoxaparin (Lovenox)  Mechanical VTE Prophylaxis in Place: Yes - sequential compression devices  Current Length of Stay: 4 day(s)    Current Patient Status: Inpatient Rehab     Discharge Plan: As per primary team     Code Status: Level 1 - Full Code    Subjective:   Pt examined while pt sitting in recliner in pt room  Complaints of R knee pain while ambulating  Discussed results of XR and that Orthopedics is consulted for possible arthrocentesis  Patient states that she has had fluid there for months and that her PCP had examined it with no recommendations at that time  States that she has been struggling with stairs for weeks due to the pain  Discussed that the fluid could have become larger due to her recent injuries and fall  Denies any pain in her feet today and feels that the steroid has helped tremendously  Feels that she is participating better in therapy today    Had a BM this morning without any issues  Has an occasional cough in the mornings and still has mild nasal congestion  Has no other concerns or complaints at this time  Objective:     Vitals:   Temp (24hrs), Av 8 °F (36 6 °C), Min:97 4 °F (36 3 °C), Max:98 °F (36 7 °C)    Temp:  [97 4 °F (36 3 °C)-98 °F (36 7 °C)] 98 °F (36 7 °C)  HR:  [83-86] 86  Resp:  [18] 18  BP: (120-140)/(68-80) 120/80  SpO2:  [91 %-98 %] 96 %  Body mass index is 24 71 kg/m²  Review of Systems   Constitutional: Negative for appetite change, chills, fatigue and fever  HENT: Positive for congestion (nasal congestion, minimal, improving with flonase)  Negative for postnasal drip, rhinorrhea, sinus pressure, sinus pain and trouble swallowing  Eyes: Negative for visual disturbance  Respiratory: Positive for cough (dry cough in AM, improves during the day)  Negative for chest tightness, shortness of breath, wheezing and stridor  Cardiovascular: Negative for chest pain, palpitations and leg swelling  Gastrointestinal: Negative for abdominal distention, abdominal pain, constipation, diarrhea, nausea and vomiting  LBM 3/28   Genitourinary: Negative for difficulty urinating  Musculoskeletal: Positive for arthralgias (R knee pain, occurs with ambulation, denies pain at rest, can be 10/10 with ambulation  Has been occurring for weeks but recently has become worse)  Negative for back pain and gait problem  Neurological: Negative for dizziness, weakness, light-headedness, numbness and headaches  Psychiatric/Behavioral: Negative for dysphoric mood and sleep disturbance  The patient is not nervous/anxious  All other systems reviewed and are negative  Input and Output Summary (last 24 hours):        Intake/Output Summary (Last 24 hours) at 3/28/2023 0906  Last data filed at 3/28/2023 0630  Gross per 24 hour   Intake 590 ml   Output 700 ml   Net -110 ml       Physical Exam:     Physical Exam  Vitals and nursing note reviewed  Constitutional:       General: She is not in acute distress  Appearance: Normal appearance  She is not ill-appearing  HENT:      Head: Normocephalic and atraumatic  Cardiovascular:      Rate and Rhythm: Normal rate and regular rhythm  Pulses: Normal pulses  Heart sounds: Normal heart sounds  No murmur heard  No friction rub  Pulmonary:      Effort: Pulmonary effort is normal  No respiratory distress  Breath sounds: Normal breath sounds  No wheezing or rhonchi  Abdominal:      General: Abdomen is flat  Bowel sounds are normal  There is no distension  Palpations: Abdomen is soft  There is no mass  Tenderness: There is no abdominal tenderness  There is no guarding or rebound  Hernia: No hernia is present  Musculoskeletal:         General: Swelling (R knee moderate edema) present  Cervical back: Normal range of motion and neck supple  No tenderness  Right lower leg: No edema  Left lower leg: No edema  Skin:     General: Skin is warm and dry  Capillary Refill: Capillary refill takes less than 2 seconds  Neurological:      Mental Status: She is alert and oriented to person, place, and time     Psychiatric:         Mood and Affect: Mood normal          Behavior: Behavior normal          Additional Data:     Labs:    Results from last 7 days   Lab Units 03/27/23  0613   WBC Thousand/uL 8 81   HEMOGLOBIN g/dL 11 7   HEMATOCRIT % 35 4   PLATELETS Thousands/uL 280   NEUTROS PCT % 68   LYMPHS PCT % 20   MONOS PCT % 10   EOS PCT % 1     Results from last 7 days   Lab Units 03/27/23  0613   SODIUM mmol/L 136   POTASSIUM mmol/L 3 7   CHLORIDE mmol/L 102   CO2 mmol/L 24   BUN mg/dL 18   CREATININE mg/dL 0 58*   ANION GAP mmol/L 10   CALCIUM mg/dL 8 9   ALBUMIN g/dL 3 6   TOTAL BILIRUBIN mg/dL 0 52   ALK PHOS U/L 57   ALT U/L 21   AST U/L 23   GLUCOSE RANDOM mg/dL 115     Results from last 7 days   Lab Units 03/22/23  0444   INR  1 05 Results from last 7 days   Lab Units 03/21/23  1422 03/21/23  1203   LACTIC ACID mmol/L 1 4 2 1*   PROCALCITONIN ng/ml  --  0 17       Labs reviewed    Imaging:    Imaging reviewed    Recent Cultures (last 7 days):     Results from last 7 days   Lab Units 03/22/23  1215 03/21/23  1321   BLOOD CULTURE   --  No Growth After 5 Days  No Growth After 5 Days  C DIFF TOXIN B BY PCR  Negative  --        Last 24 Hours Medication List:   Current Facility-Administered Medications   Medication Dose Route Frequency Provider Last Rate   • acetaminophen  650 mg Oral Q6H PRN LORETO Elizondo     • atorvastatin  10 mg Oral Daily With [address]     • benzonatate  100 mg Oral TID PRN Cony Olguin MD     • bisacodyl  10 mg Rectal Daily PRN Cony Olguin MD     • calcium carbonate-vitamin D  1 tablet Oral Daily With Breakfast LORETO Elizondo     • cholecalciferol  1,000 Units Oral Daily LORETO Elizondo     • Diclofenac Sodium  2 g Topical 4x Daily Cony Olguin MD     • enoxaparin  40 mg Subcutaneous Daily Cony Olguin MD     • famotidine  40 mg Oral Daily With Risa Flynn MD     • fluticasone  1 spray Each Nare Daily Cony Olguin MD     • gabapentin  100 mg Oral BID Cony Olguin MD     • ondansetron  4 mg Oral Q6H PRN Cony Olguin MD     • polyethylene glycol  17 g Oral Daily PRN Cony Olguin MD     • predniSONE  40 mg Oral Daily LORETO Elizondo     • primidone  50 mg Oral HS Krystyna Mejias MD     • sodium chloride  1 spray Each Nare Q1H PRN LORETO Elizondo          [employer] software was used to dictate this note  It may contain errors with dictating incorrect words or incorrect spelling  Please contact the provider directly with any questions

## 2023-03-28 NOTE — PLAN OF CARE
Problem: RESPIRATORY - ADULT  Goal: Achieves optimal ventilation and oxygenation  Description: INTERVENTIONS:  - Assess for changes in respiratory status  - Assess for changes in mentation and behavior  - Position to facilitate oxygenation and minimize respiratory effort  - Oxygen administered by appropriate delivery if ordered  - Initiate smoking cessation education as indicated  - Encourage broncho-pulmonary hygiene including cough, deep breathe, Incentive Spirometry  - Assess the need for suctioning and aspirate as needed  - Assess and instruct to report SOB or any respiratory difficulty  - Respiratory Therapy support as indicated  Outcome: Progressing     Problem: GASTROINTESTINAL - ADULT  Goal: Maintains or returns to baseline bowel function  Description: INTERVENTIONS:  - Assess bowel function  - Encourage oral fluids to ensure adequate hydration  - Administer IV fluids if ordered to ensure adequate hydration  - Administer ordered medications as needed  - Encourage mobilization and activity  - Consider nutritional services referral to assist patient with adequate nutrition and appropriate food choices  Outcome: Progressing     Problem: GENITOURINARY - ADULT  Goal: Maintains or returns to baseline urinary function  Description: INTERVENTIONS:  - Assess urinary function  - Encourage oral fluids to ensure adequate hydration if ordered  - Administer IV fluids as ordered to ensure adequate hydration  - Administer ordered medications as needed  - Offer frequent toileting  - Follow urinary retention protocol if ordered  Outcome: Progressing

## 2023-03-28 NOTE — CONSULTS
Orthopedics   [place] 78 y o  female MRN: [id number]  Unit/Bed#: [facility] 265-01      Chief Complaint:   right knee pain    HPI:   78 y  o female community ambulator complaining of right knee pain that has worsened over the last few days while she has been admitted to [facility] under the rehab facility after a fall, where she struck her right knee  She reports a history of right knee pain that only exists when she uses stairs  She most recently saw Dr Juan Carlos Clifton in October of 2022 where an corticosteroid injection of the right knee was offered but she declined due to the lack of severity she felt required to receive an injection  Today she reports her knee pain has somewhat improved after ACE bandage application  She reports some difficulty with walking due to the pain  She denies fevers or other joint pain  She denies a history of gout or inflammatory arthropathy         Review Of Systems:   · Skin: Normal  · Neuro: See HPI  · Musculoskeletal: See HPI  · 14 point review of systems negative except as stated above     Past Medical History:   Past Medical History:   Diagnosis Date   • Cataract of right eye    • GERD (gastroesophageal reflux disease)        Past Surgical History:   Past Surgical History:   Procedure Laterality Date   • CATARACT EXTRACTION, BILATERAL Bilateral    • MASTECTOMY Bilateral     for calcium deposits   • FL COLONOSCOPY FLX DX W/COLLJ SPEC WHEN PFRMD N/A 8/25/2017    Procedure: COLONOSCOPY;  Surgeon: Kalina Tanner MD;  Location: [facility]; Service: Colorectal   • FL XCAPSL CTRC RMVL INSJ IO LENS PROSTH W/O ECP Right 6/21/2016    Procedure: OD EXTRACTION EXTRACAPSULAR CATARACT PHACO INTRAOCULAR LENS (IOL);   Surgeon: Fani Beaver MD;  Location: [facility];  Service: Ophthalmology   • TONSILLECTOMY         Family History:  Family history reviewed and non-contributory  Family History   Problem Relation Age of Onset   • Rectal cancer Brother    • Stroke Mother    • Heart attack Father    • "Arrhythmia Father         possible   • Coronary artery disease Father    • Sudden death Father         scd   • Anuerysm Neg Hx    • Clotting disorder Neg Hx    • Hypertension Neg Hx    • Hyperlipidemia Neg Hx    • Heart failure Neg Hx        Social History:  Social History     Socioeconomic History   • Marital status:      Spouse name: Not on file   • Number of children: Not on file   • Years of education: Not on file   • Highest education level: Not on file   Occupational History   • Not on file   Tobacco Use   • Smoking status: Former   • Smokeless tobacco: Never   Vaping Use   • Vaping Use: Never used   Substance and Sexual Activity   • Alcohol use: Yes     Comment: pt states \"very occassionally\"   • Drug use: Never   • Sexual activity: Not on file   Other Topics Concern   • Not on file   Social History Narrative   • Not on file     Social Determinants of Health     Financial Resource Strain: Not on file   Food Insecurity: No Food Insecurity   • Worried About Running Out of Food in the Last Year: Never true   • Ran Out of Food in the Last Year: Never true   Transportation Needs: No Transportation Needs   • Lack of Transportation (Medical): No   • Lack of Transportation (Non-Medical):  No   Physical Activity: Not on file   Stress: Not on file   Social Connections: Not on file   Intimate Partner Violence: Not on file   Housing Stability: Low Risk    • Unable to Pay for Housing in the Last Year: No   • Number of Places Lived in the Last Year: 1   • Unstable Housing in the Last Year: No       Allergies:   No Known Allergies        Labs:  0   Lab Value Date/Time    HCT 35 4 03/27/2023 0613    HCT 35 6 03/24/2023 0623    HCT 35 0 03/23/2023 0512    HGB 11 7 03/27/2023 0613    HGB 12 1 03/24/2023 0623    HGB 11 8 03/23/2023 0512    INR 1 05 03/22/2023 0444    WBC 8 81 03/27/2023 0613    WBC 5 69 03/24/2023 0623    WBC 5 98 03/23/2023 0512       Meds:    Current Facility-Administered Medications:   •  acetaminophen " (TYLENOL) tablet 650 mg, 650 mg, Oral, Q6H PRN, LORETO Márquez, 650 mg at 03/27/23 1652  •  atorvastatin (LIPITOR) tablet 10 mg, 10 mg, Oral, Daily With LORETO Rose, 10 mg at 03/27/23 1647  •  benzonatate (TESSALON PERLES) capsule 100 mg, 100 mg, Oral, TID PRN, Konstantin Newell MD  •  bisacodyl (DULCOLAX) rectal suppository 10 mg, 10 mg, Rectal, Daily PRN, Konstantin Newell MD  •  calcium carbonate-vitamin D 500 mg-5 mcg tablet 1 tablet, 1 tablet, Oral, Daily With Breakfast, LORETO Márquez, 1 tablet at 03/28/23 1102  •  cholecalciferol (VITAMIN D3) tablet 1,000 Units, 1,000 Units, Oral, Daily, LORETO Márquez, 1,000 Units at 03/28/23 0816  •  Diclofenac Sodium (VOLTAREN) 1 % topical gel 2 g, 2 g, Topical, 4x Daily, Konstantin Newell MD, 2 g at 03/28/23 0815  •  enoxaparin (LOVENOX) subcutaneous injection 40 mg, 40 mg, Subcutaneous, Daily, Konstantin Newell MD, 40 mg at 03/28/23 0816  •  famotidine (PEPCID) tablet 40 mg, 40 mg, Oral, Daily With [address], MD, 40 mg at 03/27/23 1647  •  fluticasone (FLONASE) 50 mcg/act nasal spray 1 spray, 1 spray, Each Nare, Daily, Konstantin Newell MD, 1 spray at 03/28/23 0815  •  gabapentin (NEURONTIN) capsule 100 mg, 100 mg, Oral, BID, Konstantin Newell MD, 100 mg at 03/28/23 0816  •  ondansetron (ZOFRAN-ODT) dispersible tablet 4 mg, 4 mg, Oral, Q6H PRN, Konstantin Newell MD  •  polyethylene glycol (MIRALAX) packet 17 g, 17 g, Oral, Daily PRN, Konstantin Newell MD  •  predniSONE tablet 40 mg, 40 mg, Oral, Daily, LORETO Márquez, 40 mg at 03/28/23 2475  •  primidone (MYSOLINE) tablet 50 mg, 50 mg, Oral, HS, Konstantin Newell MD, 50 mg at 03/27/23 2136  •  sodium chloride (OCEAN) 0 65 % nasal spray 1 spray, 1 spray, Each Nare, Q1H PRN, LORETO Márquez    Blood Culture:   Lab Results   Component Value Date    BLOODCX No Growth After 5 Days  03/21/2023    BLOODCX No Growth After 5 Days  03/21/2023       Wound Culture:    No "results found for: WOUNDCULT    Ins and Outs:  I/O last 24 hours: In: 0 [P O :590]  Out: 700 [Urine:700]          Physical Exam:   /70 (BP Location: Right arm)   Pulse 86   Temp 98 °F (36 7 °C) (Oral)   Resp 18   Ht 5' 4\" (1 626 m)   LMP  (LMP Unknown)   SpO2 96%   BMI 24 71 kg/m²   Gen: Alert and oriented to person, place, time  HEENT: EOMI, eyes clear, moist mucus membranes, hearing intact  Respiratory: Bilateral chest rise  No audible wheezing found  Cardiovascular: Regular Rate and Rhythm  Abdomen: soft nontender/nondistended  Musculoskeletal: right lower extremity  · Skin intact with mild warmth to palpation  There is a small abrasion without active bleeding over the medial anterior knee  · Tender to palpation over superior knee  · Mild Knee effusion present   · AROM to 90 degrees flexion and full extension   · Passive ROM to 90 degrees flexion and full extension  · Can perform straight leg raise  · Stable to varus/valgus stress, negative lachmans, posterior draw  · Sensation intact L3-S1  · 5/5 strength to hip flexion/extension, knee flexion/extension, ankle dorsi/plantar flexion, EHL/FHL  · 2+ DP/PT pulse  · Leg lengths equal      Radiology:   I personally reviewed the films  X-rays AP/Lateral views right knee show no acute fracture or dislocation  No bony lesion is noted  No bony lesions are noted  There is osteophyte formation and mild joint space narrowing       Procedure- Orthopedics   Janey Lutz 78 y o  female MRN: [id number]  Unit/Bed#: [facility] 265-01    Procedure: right knee aspiration    After sterile preparation of the skin overlying the knee, an 18 gauge needle was then  inserted via a superior lateral portal   Approx 25cc of straw fluid was aspirated and sent for gram stain, culture, synovial WBC/RBC, and crystals  Sterile dressing was then applied  Pt tolerated the procedure well and was neurovascularly intact both pre and post procedure       [facility], " MD    _*_*_*_*_*_*_*_*_*_*_*_*_*_*_*_*_*_*_*_*_*_*_*_*_*_*_*_*_*_*_*_*_*_*_*_*_*_*_*_*_*    Assessment:  78 y o  female with right knee effusion with a history of recent trauma and known osteoarthritis of the right knee status post bedside aspiration  Clinical exam demonstrates low likelihood of septic arthritis  Her right knee pain is likely secondary to her osteoarthritis and recent trauma  However, crystal versus inflammatory arthropathy can not be ruled out until knee aspiration labs are returned  Aspiration labs have been obtained and sent for analysis and will be reviewed once results are returned      Plan:   · Weight bearing as tolerated  right lower extremity  · PT  · Pain control  · Consider lidoderm patch for comfort   · ACE wrap for compression  ICE and elevation    · Medical management for OA   · Dispo: Ortho will follow       Ama Cervantes MD

## 2023-03-28 NOTE — PLAN OF CARE
Problem: MOBILITY - ADULT  Goal: Maintain or return to baseline ADL function  Description: INTERVENTIONS:  -  Assess patient's ability to carry out ADLs; assess patient's baseline for ADL function and identify physical deficits which impact ability to perform ADLs (bathing, care of mouth/teeth, toileting, grooming, dressing, etc )  - Assess/evaluate cause of self-care deficits   - Assess range of motion  - Assess patient's mobility; develop plan if impaired  - Assess patient's need for assistive devices and provide as appropriate  - Encourage maximum independence but intervene and supervise when necessary  - Involve family in performance of ADLs  - Assess for home care needs following discharge   - Consider OT consult to assist with ADL evaluation and planning for discharge  - Provide patient education as appropriate  Outcome: Progressing     Problem: PAIN - ADULT  Goal: Verbalizes/displays adequate comfort level or baseline comfort level  Description: Interventions:  - Encourage patient to monitor pain and request assistance  - Assess pain using appropriate pain scale  - Administer analgesics based on type and severity of pain and evaluate response  - Implement non-pharmacological measures as appropriate and evaluate response  - Consider cultural and social influences on pain and pain management  - Notify physician/advanced practitioner if interventions unsuccessful or patient reports new pain  Outcome: Progressing     Problem: SAFETY ADULT  Goal: Patient will remain free of falls  Description: INTERVENTIONS:  - Educate patient/family on patient safety including physical limitations  - Instruct patient to call for assistance with activity   - Consult OT/PT to assist with strengthening/mobility   - Keep Call bell within reach  - Keep bed low and locked with side rails adjusted as appropriate  - Keep care items and personal belongings within reach  - Initiate and maintain comfort rounds  - Make Fall Risk Sign visible to staff  - Offer Toileting every 2 Hours, in advance of need  - Initiate/Maintain bed/chair alarm  - Obtain necessary fall risk management equipment: slide board  - Apply yellow socks and bracelet for high fall risk patients  - Consider moving patient to room near nurses station  Outcome: Progressing     Problem: DISCHARGE PLANNING  Goal: Discharge to home or other facility with appropriate resources  Description: INTERVENTIONS:  - Identify barriers to discharge w/patient and caregiver  - Arrange for needed discharge resources and transportation as appropriate  - Identify discharge learning needs (meds, wound care, etc )  - Arrange for interpretive services to assist at discharge as needed  - Refer to Case Management Department for coordinating discharge planning if the patient needs post-hospital services based on physician/advanced practitioner order or complex needs related to functional status, cognitive ability, or social support system  Outcome: Progressing

## 2023-03-28 NOTE — ASSESSMENT & PLAN NOTE
Last DXA in 2019 showed osteoporosis  Receives Prolia injections as outpatient  Continue Vitamin D and calcium supplementation  Follows with Dr Porfirio Sifuentes (Rheumatology) as outpatient

## 2023-03-28 NOTE — PROGRESS NOTES
"   03/28/23 8768   Pain Assessment   Pain Assessment Tool 0-10   Pain Score 4   Pain Location/Orientation Orientation: Right;Location: Knee   Effect of Pain on Daily Activities her ability to stand and walk   Hospital Pain Intervention(s) Repositioned;Rest;Elevated   Restrictions/Precautions   Precautions Contact/isolation; Fall Risk;Cognitive;Bed/chair alarms;Supervision on toilet/commode;Droplet precautions   Cognition   Overall Cognitive Status Impaired   Memory Decreased short term memory   Following Commands Follows one step commands with increased time or repetition   Subjective   Subjective reports no longer has been in her feet, just her right knee  C/o at start of session of her \"buttock being sore from sitting\"   Sit to Lying   Type of Assistance Needed Physical assistance   Physical Assistance Level 25% or less   Comment A R LE only   Sit to Lying CARE Score 3   Sit to Stand   Type of Assistance Needed Physical assistance   Physical Assistance Level 51%-75%   Comment Pt favoring R LE due to knee pain & weakness  IMproved with Practice to MOD A   Sit to Stand CARE Score 2   Bed-Chair Transfer   Type of Assistance Needed Physical assistance; Adaptive equipment;Verbal cues   Physical Assistance Level 51%-75%   Comment Worked on SPT w/o device & then with RW  Initially poor ability to tolerate wt on R LE, once educate don use of RW to offload with UE support, transfers improved  Pt retropulsive in standing, A for RW movement   Will benefit from continued practice   Chair/Bed-to-Chair Transfer CARE Score 2   Car Transfer   Reason if not Attempted Safety concerns   Car Transfer CARE Score 88   Walk 10 Feet   Reason if not Attempted Safety concerns   Walk 10 Feet CARE Score 88   Walk 50 Feet with Two Turns   Reason if not Attempted Safety concerns   Walk 50 Feet with Two Turns CARE Score 88   Walk 150 Feet   Reason if not Attempted Safety concerns   Walk 150 Feet CARE Score 88   Walking 10 Feet on Uneven Surfaces " Reason if not Attempted Safety concerns   Walking 10 Feet on Uneven Surfaces CARE Score 88   Ambulation   Primary Mode of Locomotion Prior to Admission Walk   Distance Walked (feet) 4 ft  (4ft x 2)   Assist Device Roller Walker   Wheelchair mobility   Findings confined to room at this time for isolation   Therapeutic Interventions   Strengthening STS x 5 with static standing to tolerance  Supine Ther ex; bridges, SLR, resisted Leg presson Left only due to right knee pain   Assessment   Family/Caregiver Present Pt remains on isolation on room  Participating well for 60 min session with no c/o incresead pain, SOB or feeling lightheaded  Focused on standing to improve transfers and for presure releif of bottom  Pt very fearful initially and retropulsive  Needed education on use of RW  Standing balance and transfers improving with practice  Recommend work in [email or web address] once out of room and [employer] for general conditioning  Sit pivots safest for consistency and safety at this time  PT Therapy Minutes   PT Time In 1345   PT Time Out 1445   PT Total Time (minutes) 60   PT Mode of treatment - Individual (minutes) 60   PT Mode of treatment - Concurrent (minutes) 0   PT Mode of treatment - Group (minutes) 0   PT Mode of treatment - Co-treat (minutes) 0   PT Mode of Treatment - Total time(minutes) 60 minutes   PT Cumulative Minutes 255   Therapy Time missed   Time missed?  No

## 2023-03-28 NOTE — ASSESSMENT & PLAN NOTE
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

## 2023-03-28 NOTE — ASSESSMENT & PLAN NOTE
Improved, CK 94 on 3/27  CK was 6836 on admission  Improved after receiving IV fluids  Encourage PO fluid intake  Restarted statin

## 2023-03-29 RX ORDER — LIDOCAINE 50 MG/G
1 PATCH TOPICAL DAILY
Status: DISCONTINUED | OUTPATIENT
Start: 2023-03-29 | End: 2023-04-02

## 2023-03-29 RX ADMIN — ATORVASTATIN CALCIUM 10 MG: 10 TABLET, FILM COATED ORAL at 17:27

## 2023-03-29 RX ADMIN — LIDOCAINE 1 PATCH: 50 PATCH CUTANEOUS at 09:32

## 2023-03-29 RX ADMIN — GABAPENTIN 100 MG: 100 CAPSULE ORAL at 08:16

## 2023-03-29 RX ADMIN — ENOXAPARIN SODIUM 40 MG: 40 INJECTION SUBCUTANEOUS at 08:16

## 2023-03-29 RX ADMIN — Medication 1 TABLET: at 08:17

## 2023-03-29 RX ADMIN — DICLOFENAC SODIUM 2 G: 10 GEL TOPICAL at 21:17

## 2023-03-29 RX ADMIN — PRIMIDONE 50 MG: 50 TABLET ORAL at 21:16

## 2023-03-29 RX ADMIN — CHOLECALCIFEROL TAB 25 MCG (1000 UNIT) 1000 UNITS: 25 TAB at 08:17

## 2023-03-29 RX ADMIN — FAMOTIDINE 40 MG: 20 TABLET, FILM COATED ORAL at 17:27

## 2023-03-29 RX ADMIN — PREDNISONE 40 MG: 20 TABLET ORAL at 08:17

## 2023-03-29 RX ADMIN — ACETAMINOPHEN 650 MG: 325 TABLET ORAL at 08:17

## 2023-03-29 RX ADMIN — GABAPENTIN 100 MG: 100 CAPSULE ORAL at 17:27

## 2023-03-29 RX ADMIN — DICLOFENAC SODIUM 2 G: 10 GEL TOPICAL at 08:17

## 2023-03-29 RX ADMIN — FLUTICASONE PROPIONATE 1 SPRAY: 50 SPRAY, METERED NASAL at 08:16

## 2023-03-29 NOTE — ASSESSMENT & PLAN NOTE
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

## 2023-03-29 NOTE — PLAN OF CARE
Problem: MOBILITY - ADULT  Goal: Maintain or return to baseline ADL function  Description: INTERVENTIONS:  -  Assess patient's ability to carry out ADLs; assess patient's baseline for ADL function and identify physical deficits which impact ability to perform ADLs (bathing, care of mouth/teeth, toileting, grooming, dressing, etc )  - Assess/evaluate cause of self-care deficits   - Assess range of motion  - Assess patient's mobility; develop plan if impaired  - Assess patient's need for assistive devices and provide as appropriate  - Encourage maximum independence but intervene and supervise when necessary  - Involve family in performance of ADLs  - Assess for home care needs following discharge   - Consider OT consult to assist with ADL evaluation and planning for discharge  - Provide patient education as appropriate  Outcome: Progressing     Problem: PAIN - ADULT  Goal: Verbalizes/displays adequate comfort level or baseline comfort level  Description: Interventions:  - Encourage patient to monitor pain and request assistance  - Assess pain using appropriate pain scale  - Administer analgesics based on type and severity of pain and evaluate response  - Implement non-pharmacological measures as appropriate and evaluate response  - Consider cultural and social influences on pain and pain management  - Notify physician/advanced practitioner if interventions unsuccessful or patient reports new pain  Outcome: Progressing     Problem: SAFETY ADULT  Goal: Patient will remain free of falls  Description: INTERVENTIONS:  - Educate patient/family on patient safety including physical limitations  - Instruct patient to call for assistance with activity   - Consult OT/PT to assist with strengthening/mobility   - Keep Call bell within reach  - Keep bed low and locked with side rails adjusted as appropriate  - Keep care items and personal belongings within reach  - Initiate and maintain comfort rounds  - Make Fall Risk Sign visible to staff  - Obtain necessary fall risk management equipment: slide board  - Apply yellow socks and bracelet for high fall risk patients  - Consider moving patient to room near nurses station  Outcome: Progressing     Problem: DISCHARGE PLANNING  Goal: Discharge to home or other facility with appropriate resources  Description: INTERVENTIONS:  - Identify barriers to discharge w/patient and caregiver  - Arrange for needed discharge resources and transportation as appropriate  - Identify discharge learning needs (meds, wound care, etc )  - Arrange for interpretive services to assist at discharge as needed  - Refer to Case Management Department for coordinating discharge planning if the patient needs post-hospital services based on physician/advanced practitioner order or complex needs related to functional status, cognitive ability, or social support system  Outcome: Progressing

## 2023-03-29 NOTE — ASSESSMENT & PLAN NOTE
Last DXA in 2019 showed osteoporosis  Receives Prolia injections as outpatient  Continue Vitamin D and calcium supplementation  Follows with Dr José Manuel Rojas (Rheumatology) as outpatient

## 2023-03-29 NOTE — ASSESSMENT & PLAN NOTE
Presented to [facility] on 3/24 with R foot pain  R foot XR: No acute osseous abnormality  Does also have soft tissue thickening and capsulobursal thickening on XR  Started on Prednisone 40mg daily x5 days on 3/27  Continue stretching with PT/OT  Proper foot wear

## 2023-03-29 NOTE — PROGRESS NOTES
[facility]  Progress Note  Name: Finn Wilkerson  MRN: [id number]  Unit/Bed#: [facility] 265-01 I Date of Admission: 3/24/2023   Date of Service: 3/29/2023 I Hospital Day: 5    Assessment/Plan   Right knee pain  Assessment & Plan  Developed R knee pain over the weekend  R knee appears edematous with fluid  XR on 3/27 showed large joint effusion  Orthopedics consulted for arthrocentesis on 3/28  Fluid samples negative for bacteria/crystals  Bilateral pain of leg and foot  Assessment & Plan  Presented to [facility] on 3/24 with R foot pain  R foot XR: No acute osseous abnormality  Does also have soft tissue thickening and capsulobursal thickening on XR  Started on Prednisone 40mg daily x5 days on 3/27  Continue stretching with PT/OT  Proper foot wear  GERD (gastroesophageal reflux disease)  Assessment & Plan  Stable  Continue home Pepcid 40mg daily  Closed left ankle fracture, sequela  Assessment & Plan  Hx of L ankle fracture in 06/2022  Overdue for Orthopedics follow-up  Follow-up with Orthopedics on discharge  Continue with PT/OT  Considerations during therapy  Prediabetes  Assessment & Plan  A1c 5 9 on 10/11/22  Monitor fasting BG  Follow-up with PCP as outpatient  Elevated AST (SGOT)  Assessment & Plan  Improved  AST 23 from 70  Restarted Lipitor on 3/27  Asymptomatic  Continue to monitor routine CMP  Diarrhea  Assessment & Plan  Resolved  Had been experiencing diarrhea in acute setting  C-diff negative on 3/22  Likely viral in nature due to recent illness  Continue with symptom management  SARS-CoV-2 positive  Assessment & Plan  COVID + on 3/21  Continue isolation precautions until 3/30  Symptomatic care  Currently maintaining on RA  Rhabdomyolysis  Assessment & Plan  Improved, CK 94 on 3/27  CK was 6836 on admission  Improved after receiving IV fluids  Encourage PO fluid intake  Restarted statin      Tremor, essential  Assessment & Plan  Continue home primidone 50mg at HS  Follows with Dr Stefany Harrison (Neurology) as outpatient  History of cerebral hemorrhage  Assessment & Plan  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  MRI in 10/2022 showed small acute hematoma in the R putamen with mild regional mass effect and chronic infarcts in L basal ganglia  Had repeat MRI in 2023  Recommended to continue holding ASA at that time per Neurology  Scheduled to follow-up with Neurology (Dr Stefany Harrison) in additional 6 months as outpatient  Continue PT/OT  Idiopathic osteoporosis  Assessment & Plan  Last DXA in 2019 showed osteoporosis  Receives Prolia injections as outpatient  Continue Vitamin D and calcium supplementation  Follows with Dr Kaitlynn Linares (Rheumatology) as outpatient  * Ambulatory dysfunction  Assessment & Plan  3/21 - mechanical fall with no injuries; generalized weakness  Had been experiencing URI symptoms - COVID + on 3/21  CK 6838 on admission  Hx of CVA with L sided deficits  Primary team following  Continue PT/OT  VTE Pharmacologic Prophylaxis:   Pharmacologic: Enoxaparin (Lovenox)  Mechanical VTE Prophylaxis in Place: Yes - sequential compression devices  Current Length of Stay: 5 day(s)    Current Patient Status: Inpatient Rehab     Discharge Plan: As per primary team     Code Status: Level 1 - Full Code    Subjective:   Pt examined while pt sitting in WC in pt room  Currently denies any R knee pain and feels that she is moving much better since the arthrocentesis yesterday  Denies any pain in her feet this morning  Denies any lightheadedness, dizziness, SOB, palpitations, or fevers  Denies any further cough and nasal congestion continues to improve  Slept well last night  Has no other concerns or complaints at this time      Objective:     Vitals:   Temp (24hrs), Av 6 °F (36 4 °C), Min:97 4 °F (36 3 °C), Max:97 8 °F (36 6 °C)    Temp:  [97 4 °F (36 3 °C)-97 8 °F (36 6 °C)] 97 6 °F (36 4 °C)  HR:  [71-97] 71  Resp:  [18] 18  BP: (120-142)/(80-82) 130/80  SpO2:  [95 %-98 %] 95 %  Body mass index is 24 71 kg/m²  Review of Systems   Constitutional: Positive for appetite change (decreased appetite since admission, slowly starting to eat more at each meal, enjoys ensure)  Negative for chills, fatigue and fever  HENT: Positive for congestion (nasal congestion, improving each day)  Negative for postnasal drip, rhinorrhea, sinus pressure, sinus pain, sneezing, sore throat and trouble swallowing  Eyes: Negative for visual disturbance  Respiratory: Negative for cough, chest tightness, shortness of breath, wheezing and stridor  Cardiovascular: Negative for chest pain, palpitations and leg swelling  Gastrointestinal: Negative for abdominal distention, abdominal pain, constipation, diarrhea, nausea and vomiting  LBM 3/28   Genitourinary: Negative for difficulty urinating  Musculoskeletal: Negative for arthralgias, back pain and gait problem  Neurological: Positive for weakness (LUE weakness, chronic)  Negative for dizziness, light-headedness, numbness and headaches  Psychiatric/Behavioral: Negative for dysphoric mood and sleep disturbance  The patient is not nervous/anxious  All other systems reviewed and are negative  Input and Output Summary (last 24 hours): Intake/Output Summary (Last 24 hours) at 3/29/2023 0919  Last data filed at 3/29/2023 0601  Gross per 24 hour   Intake 430 ml   Output 350 ml   Net 80 ml       Physical Exam:     Physical Exam  Vitals and nursing note reviewed  Constitutional:       General: She is not in acute distress  Appearance: Normal appearance  She is not ill-appearing  HENT:      Head: Normocephalic and atraumatic  Cardiovascular:      Rate and Rhythm: Normal rate and regular rhythm  Pulses: Normal pulses  Heart sounds: Normal heart sounds  No murmur heard  No friction rub     Pulmonary:      Effort: Pulmonary effort is normal  No respiratory distress  Breath sounds: Normal breath sounds  No wheezing or rhonchi  Abdominal:      General: Abdomen is flat  Bowel sounds are normal  There is no distension  Palpations: Abdomen is soft  There is no mass  Tenderness: There is no abdominal tenderness  There is no guarding or rebound  Hernia: No hernia is present  Musculoskeletal:      Cervical back: Normal range of motion and neck supple  No tenderness  Right lower leg: No edema  Left lower leg: No edema  Skin:     General: Skin is warm and dry  Capillary Refill: Capillary refill takes less than 2 seconds  Neurological:      Mental Status: She is alert and oriented to person, place, and time  Motor: Weakness (LUE strength 4/5, LLE strength 4/5) present     Psychiatric:         Mood and Affect: Mood normal          Behavior: Behavior normal          Additional Data:     Labs:    Results from last 7 days   Lab Units 03/27/23  0613   WBC Thousand/uL 8 81   HEMOGLOBIN g/dL 11 7   HEMATOCRIT % 35 4   PLATELETS Thousands/uL 280   NEUTROS PCT % 68   LYMPHS PCT % 20   MONOS PCT % 10   EOS PCT % 1     Results from last 7 days   Lab Units 03/27/23  0613   SODIUM mmol/L 136   POTASSIUM mmol/L 3 7   CHLORIDE mmol/L 102   CO2 mmol/L 24   BUN mg/dL 18   CREATININE mg/dL 0 58*   ANION GAP mmol/L 10   CALCIUM mg/dL 8 9   ALBUMIN g/dL 3 6   TOTAL BILIRUBIN mg/dL 0 52   ALK PHOS U/L 57   ALT U/L 21   AST U/L 23   GLUCOSE RANDOM mg/dL 115                       Labs reviewed    Imaging:    Imaging reviewed    Recent Cultures (last 7 days):     Results from last 7 days   Lab Units 03/28/23  1550 03/22/23  1215   GRAM STAIN RESULT  2+ Polys  No bacteria seen  --    C DIFF TOXIN B BY PCR   --  Negative       Last 24 Hours Medication List:   Current Facility-Administered Medications   Medication Dose Route Frequency Provider Last Rate   • acetaminophen  650 mg Oral Q6H PRN LORETO Li     • atorvastatin  10 mg Oral Daily With LORETO Knight     • benzonatate  100 mg Oral TID PRN Konstantin Newell MD     • bisacodyl  10 mg Rectal Daily PRN Konstantin Newell MD     • calcium carbonate-vitamin D  1 tablet Oral Daily With Breakfast LORETO Márquez     • cholecalciferol  1,000 Units Oral Daily LORETO Márquez     • Diclofenac Sodium  2 g Topical 4x Daily Konstantin Newell MD     • enoxaparin  40 mg Subcutaneous Daily Konstantin Newell MD     • famotidine  40 mg Oral Daily With [address], MD     • fluticasone  1 spray Each Nare Daily Konstantin Newell MD     • gabapentin  100 mg Oral BID Konstantin Newell MD     • lidocaine  1 patch Topical Daily LORETO Márquez     • ondansetron  4 mg Oral Q6H PRN Konstantin Newell MD     • polyethylene glycol  17 g Oral Daily PRN Konstantin Newell MD     • predniSONE  40 mg Oral Daily LORETO Márquez     • primidone  50 mg Oral HS Krystyna Botello MD     • sodium chloride  1 spray Each Nare Q1H PRN LORETO Márquez          M*Modal software was used to dictate this note  It may contain errors with dictating incorrect words or incorrect spelling  Please contact the provider directly with any questions

## 2023-03-29 NOTE — PROGRESS NOTES
03/29/23 0830 03/29/23 0930   Pain Assessment   Pain Assessment Tool  --  0-10   Pain Score  --  3   Pain Location/Orientation  --  Orientation: Right;Location: Knee   Effect of Pain on Daily Activities  --  pain with standing activities only   Hospital Pain Intervention(s)  --  Rest   Restrictions/Precautions   Precautions  --  Contact/isolation;Droplet precautions; Fall Risk;Bed/chair alarms;Supervision on toilet/commode;Pain   Sit to Stand   Type of Assistance Needed Physical assistance Physical assistance;Verbal cues   Physical Assistance Level 76% or more 25% or less   Comment  --  Good carryover from previous session 30 min ago but still needs PRN cueing   Sit to Stand CARE Score 2 3   Bed-Chair Transfer   Type of Assistance Needed Physical assistance;Verbal cues; Adaptive equipment Physical assistance;Verbal cues; Adaptive equipment   Physical Assistance Level 26%-50% 26%-50%   Comment  --  performed SPT with RW x 5, narrow NIC with turns and retropulsive   Chair/Bed-to-Chair Transfer CARE Score 3 3   Walk 10 Feet   Comment  --  improving but only 6ft   Reason if not Attempted  --  Safety concerns   Walk 10 Feet CARE Score  --  88   Ambulation   Primary Mode of Locomotion Prior to Admission  --  Walk   Distance Walked (feet)  --  6 ft  (6ft x 6)   Assist Device  --  Roller Walker   Gait Pattern  --  Step to; Improper weight shift;Decreased R stance   Limitations Noted In  --  Balance;Device Management; Endurance;Strength   Provided Assistance with:  --  Balance   Findings  --  improved with cueing to use RW more for support, decreased step on left   Does the patient walk?  --  2   Yes   Therapeutic Interventions   Strengthening  --  Seated Hip Flex, LAQ, Standing hip flexion all performed throughout session when pt antonio seated break from transfers /walking   Flexibility  --  Passive stretch B HS 7 Calves   Equipment Use   NuStep  --  recommend once off [facility]   Assessment   Treatment Assessment  --  Pt continues to participate well and make improvements with strength and transfers  Balance deficits remain a problem, especially when first attempting to get up from bed or chair after being sedentary fo a bit  Right knee less painful with activity today  Cognition, safety and overall activty tolerance may be concern for being alone at home at this time     PT Therapy Minutes   PT Time In 0830 0930   PT Time Out 0900 1030   PT Total Time (minutes) 30 60   PT Mode of treatment - Individual (minutes) 30 60   PT Mode of treatment - Concurrent (minutes) 0 0   PT Mode of treatment - Group (minutes) 0 0   PT Mode of treatment - Co-treat (minutes) 0 0   PT Mode of Treatment - Total time(minutes) 30 minutes 60 minutes   PT Cumulative Minutes 285 345   Therapy Time missed   Time missed?  --  No

## 2023-03-29 NOTE — PROGRESS NOTES
"   03/29/23 0830   Pain Assessment   Pain Assessment Tool 0-10   Pain Score 3   Pain Location/Orientation Orientation: Right;Location: Knee   Restrictions/Precautions   Precautions Contact/isolation;Droplet precautions; Fall Risk;Bed/chair alarms;Cognitive;Pain;Supervision on toilet/commode   General   Change In Medical/Functional Status (S)  had right knee drained yesterday  Progressed to performing SPT with RW   Cognition   Overall Cognitive Status Impaired   Attention Attends with cues to redirect   Memory Decreased short term memory   Following Commands Follows one step commands with increased time or repetition   Subjective   Subjective right knee \"not as sore\"   Lying to Sitting on Side of Bed   Type of Assistance Needed Physical assistance;Verbal cues   Physical Assistance Level 25% or less   Comment no bedrail, given extra time and cueing   Lying to Sitting on Side of Bed CARE Score 3   Sit to Stand   Type of Assistance Needed Physical assistance   Physical Assistance Level 76% or more   Comment initial stand very retropulsive, improved with practice x 5 reps to Min/CGA   Sit to Stand CARE Score 2   Bed-Chair Transfer   Type of Assistance Needed Physical assistance;Verbal cues; Adaptive equipment   Physical Assistance Level 26%-50%   Comment SPT with RW   Chair/Bed-to-Chair Transfer CARE Score 3   Transfer Bed/Chair/Wheelchair   Findings pt doing well with talking herself through sequence of STS transfers, using B UE t ush up and cueing to keep head down and fwd for balance   Assessment   Treatment Assessment Pt seen for 30 min session, focusing on bed mobility, transfers and standing  Cueing required for sequencing PRN  Initial STS requires Max A due to severe retropulsion, improved with practice to MiN A  Able to complete SPT with RW again today and will progress moving fwd, no longer using slide board  Remained up in recliner  Educated on need for pressure relief to ring bell and ask for help to stand   " Communicated with RN and team as well  PT Barriers   Physical Impairment Impaired balance;Decreased mobility; Decreased endurance;Decreased strength;Decreased cognition;Decreased coordination;Decreased safety awareness; Impaired judgement;Pain   Functional Limitation Transfers; Walking;Standing;Ramp negotiation;Car transfers   Plan   Treatment/Interventions Therapeutic exercise; Functional transfer training;ADL retraining;Cognitive reorientation;Patient/family training; Endurance training;Bed mobility;Gait training   Progress Progressing toward goals   PT Therapy Minutes   PT Time In 0830   PT Time Out 0900   PT Total Time (minutes) 30   PT Mode of treatment - Individual (minutes) 30   PT Mode of treatment - Concurrent (minutes) 0   PT Mode of treatment - Group (minutes) 0   PT Mode of treatment - Co-treat (minutes) 0   PT Mode of Treatment - Total time(minutes) 30 minutes   PT Cumulative Minutes 285

## 2023-03-29 NOTE — ASSESSMENT & PLAN NOTE
Developed R knee pain over the weekend  R knee appears edematous with fluid  XR on 3/27 showed large joint effusion  Orthopedics consulted for arthrocentesis on 3/28  Fluid samples negative for bacteria/crystals

## 2023-03-29 NOTE — PROGRESS NOTES
"   03/29/23 1230   Pain Assessment   Pain Assessment Tool 0-10   Pain Score No Pain   Restrictions/Precautions   Precautions Contact/isolation; Fall Risk;Cognitive;Bed/chair alarms;Supervision on toilet/commode; Airborne/isolation   Lifestyle   Autonomy \"I have decreased cognitive functioning  \"   Sit to Stand   Type of Assistance Needed Physical assistance   Physical Assistance Level 51%-75%   Comment Fluctuates throughout session, initially requiring up to mod/maxA due to retropulsion and mod/max VC's, however, with repetition A faded to only Ayesha with G carryover of technique via teachback  Sit to Stand CARE Score 2   Bed-Chair Transfer   Type of Assistance Needed Physical assistance; Adaptive equipment   Physical Assistance Level 51%-75%   Comment SPT with RW, cues for anterior weight shift, improved carryover by end of session  Chair/Bed-to-Chair Transfer CARE Score 2   Toilet Transfer   Comment Discussed importance of weaning off of use of purewick/bedpan to promote baseline level of function  Educated on SPTs to [facility] for overnight and urgency issues at this time  Pt receptive to same  Functional Standing Tolerance   Time 5 minutes x 3   Activity Pt engaged in word search activity while in stance at tabletop with focus on unilateral UE release to promote improved standing tolerance/balance and endurance for inc independence with ADL tasks  PT overall Ayesha with task once in stance  Exercise Tools   Other Exercise Tool 1 Pt engaged in self ROM utilizng RUE as functional A for LUE shoulder flexion to achieve inc range  Pt then progressed to completed chest press and bicep curls completing 2 x 10 each with 1# weight  All exercises complete to inc ROM and strength for inc ADL performance  Cognition   Overall Cognitive Status Impaired   Arousal/Participation Alert; Cooperative   Attention Attends with cues to redirect   Orientation Level Oriented X4   Memory Decreased short term memory   Following " "Commands Follows one step commands with increased time or repetition   Activity Tolerance   Activity Tolerance Patient tolerated treatment well   Assessment   Treatment Assessment Pt participated in skilled OT services with focus on standing tolerance, functional txfers, act china, UB ROM/strengthening  Pt continues to endorse dec STM/recall, \"fogginess\"  She additionally remains very retropulsive upon initial sit to stands but improves as session progresses with repetition  She remains limited by weakness and general debility  She is insightful into these deficits  Education on fall prevention strategies and general safety  Discussed life alert and various options, would benefit from handout in future sessions  Began to discuss medication management but did not tangibly complete this session  Was able to verbalize majority of her baseline medications and purposes states she had a weekly organizer  Pt additionally reports she has decided to give up driving and is already in the process of applying for [place] services  SPO2 on RA remaind >94% throughout duration of session  BP's 150/70  Pt will continue to benefit from skilled OT services following POC with focus on medication management, light microwave meal prep as appropriate to simulate home routine with meals on wheels  Prognosis Fair   Problem List Decreased strength;Decreased range of motion;Decreased endurance; Impaired balance;Decreased mobility;Pain;Decreased cognition   Plan   Treatment/Interventions ADL retraining;Functional transfer training; Therapeutic exercise; Endurance training;Patient/family training;Cognitive reorientation;Equipment eval/education; Bed mobility; Compensatory technique education   Progress Progressing toward goals   Recommendation   OT Discharge Recommendation   (pending progress)   OT Therapy Minutes   OT Time In 1230   OT Time Out 1400   OT Total Time (minutes) 90   OT Mode of treatment - Individual (minutes) 90   OT Mode of treatment " - Concurrent (minutes) 0   OT Mode of treatment - Group (minutes) 0   OT Mode of treatment - Co-treat (minutes) 0   OT Mode of Treatment - Total time(minutes) 90 minutes   OT Cumulative Minutes 475   Therapy Time missed   Time missed?  No

## 2023-03-30 PROBLEM — Z91.89 AT RISK FOR VENOUS THROMBOEMBOLISM (VTE): Status: ACTIVE | Noted: 2023-03-30

## 2023-03-30 PROBLEM — R52 PAIN: Status: ACTIVE | Noted: 2023-03-30

## 2023-03-30 PROBLEM — Q84.5 ENLARGED AND HYPERTROPHIC NAILS: Status: ACTIVE | Noted: 2023-03-30

## 2023-03-30 LAB
ANION GAP SERPL CALCULATED.3IONS-SCNC: 11 MMOL/L (ref 4–13)
BASOPHILS # BLD AUTO: 0.03 THOUSANDS/ΜL (ref 0–0.1)
BASOPHILS NFR BLD AUTO: 0 % (ref 0–1)
BUN SERPL-MCNC: 24 MG/DL (ref 5–25)
CALCIUM SERPL-MCNC: 9.4 MG/DL (ref 8.4–10.2)
CHLORIDE SERPL-SCNC: 104 MMOL/L (ref 96–108)
CO2 SERPL-SCNC: 25 MMOL/L (ref 21–32)
CREAT SERPL-MCNC: 0.59 MG/DL (ref 0.6–1.3)
EOSINOPHIL # BLD AUTO: 0.02 THOUSAND/ΜL (ref 0–0.61)
EOSINOPHIL NFR BLD AUTO: 0 % (ref 0–6)
ERYTHROCYTE [DISTWIDTH] IN BLOOD BY AUTOMATED COUNT: 12 % (ref 11.6–15.1)
GFR SERPL CREATININE-BSD FRML MDRD: 89 ML/MIN/1.73SQ M
GLUCOSE P FAST SERPL-MCNC: 98 MG/DL (ref 65–99)
GLUCOSE SERPL-MCNC: 98 MG/DL (ref 65–140)
HCT VFR BLD AUTO: 33.9 % (ref 34.8–46.1)
HGB BLD-MCNC: 10.7 G/DL (ref 11.5–15.4)
IMM GRANULOCYTES # BLD AUTO: 0.07 THOUSAND/UL (ref 0–0.2)
IMM GRANULOCYTES NFR BLD AUTO: 1 % (ref 0–2)
LYMPHOCYTES # BLD AUTO: 2.46 THOUSANDS/ΜL (ref 0.6–4.47)
LYMPHOCYTES NFR BLD AUTO: 34 % (ref 14–44)
MCH RBC QN AUTO: 32.4 PG (ref 26.8–34.3)
MCHC RBC AUTO-ENTMCNC: 31.6 G/DL (ref 31.4–37.4)
MCV RBC AUTO: 103 FL (ref 82–98)
MONOCYTES # BLD AUTO: 0.55 THOUSAND/ΜL (ref 0.17–1.22)
MONOCYTES NFR BLD AUTO: 8 % (ref 4–12)
NEUTROPHILS # BLD AUTO: 4.08 THOUSANDS/ΜL (ref 1.85–7.62)
NEUTS SEG NFR BLD AUTO: 57 % (ref 43–75)
NRBC BLD AUTO-RTO: 0 /100 WBCS
PLATELET # BLD AUTO: 351 THOUSANDS/UL (ref 149–390)
PMV BLD AUTO: 10.3 FL (ref 8.9–12.7)
POTASSIUM SERPL-SCNC: 3.7 MMOL/L (ref 3.5–5.3)
RBC # BLD AUTO: 3.3 MILLION/UL (ref 3.81–5.12)
SODIUM SERPL-SCNC: 140 MMOL/L (ref 135–147)
WBC # BLD AUTO: 7.21 THOUSAND/UL (ref 4.31–10.16)

## 2023-03-30 PROCEDURE — 0HBRXZZ EXCISION OF TOE NAIL, EXTERNAL APPROACH: ICD-10-PCS | Performed by: PODIATRIST

## 2023-03-30 RX ADMIN — PRIMIDONE 50 MG: 50 TABLET ORAL at 21:06

## 2023-03-30 RX ADMIN — PREDNISONE 40 MG: 20 TABLET ORAL at 09:14

## 2023-03-30 RX ADMIN — GABAPENTIN 100 MG: 100 CAPSULE ORAL at 17:10

## 2023-03-30 RX ADMIN — DICLOFENAC SODIUM 2 G: 10 GEL TOPICAL at 09:17

## 2023-03-30 RX ADMIN — GABAPENTIN 100 MG: 100 CAPSULE ORAL at 09:14

## 2023-03-30 RX ADMIN — CHOLECALCIFEROL TAB 25 MCG (1000 UNIT) 1000 UNITS: 25 TAB at 09:14

## 2023-03-30 RX ADMIN — Medication 1 TABLET: at 09:14

## 2023-03-30 RX ADMIN — LIDOCAINE 1 PATCH: 50 PATCH CUTANEOUS at 09:14

## 2023-03-30 RX ADMIN — FLUTICASONE PROPIONATE 1 SPRAY: 50 SPRAY, METERED NASAL at 09:16

## 2023-03-30 RX ADMIN — FAMOTIDINE 40 MG: 20 TABLET, FILM COATED ORAL at 17:10

## 2023-03-30 RX ADMIN — ATORVASTATIN CALCIUM 10 MG: 10 TABLET, FILM COATED ORAL at 17:10

## 2023-03-30 RX ADMIN — ENOXAPARIN SODIUM 40 MG: 40 INJECTION SUBCUTANEOUS at 09:14

## 2023-03-30 NOTE — PROGRESS NOTES
"   03/30/23 0700   Pain Assessment   Pain Assessment Tool 0-10   Pain Score 2   Pain Location/Orientation Orientation: Right;Location: Knee   Pain Onset/Description Onset: Gradual   Effect of Pain on Daily Activities Tolerates   Patient's Stated Pain Goal No pain   Hospital Pain Intervention(s) Repositioned; Shower/Bath   Multiple Pain Sites No   Restrictions/Precautions   Precautions Bed/chair alarms; Fall Risk;Contact/isolation; Airborne/isolation;Supervision on toilet/commode   Weight Bearing Restrictions No   ROM Restrictions No   Lifestyle   Autonomy \"I barely have pain in that right knee  \"   Eating   Type of Assistance Needed Set-up / clean-up   Physical Assistance Level No physical assistance   Comment Assist to open milk item 2* to dec coordination and strength in LUE  Eating CARE Score 5   Oral Hygiene   Type of Assistance Needed Set-up / clean-up   Physical Assistance Level No physical assistance   Comment Completed seated at sink 2* to inc fatigue from ADL routine  Oral Hygiene CARE Score 5   Grooming   Able To Initiate Tasks;Comb/Brush Hair;Wash/Dry Hands   Limitation Noted In Coordination   Findings S/U seated at sink 2* to inc fatigue following ADL routine  Shower/Bathe Self   Type of Assistance Needed Physical assistance; Adaptive equipment   Physical Assistance Level 26%-50%   Comment Completed shower ADL mainly remaining seated on shower chair with back and demonstrating ability to bathe 10/10 parts  Utilized LHR with wash cloth to bathe to LE's to feet  Trialed unilateral release while in stance at grab bar to bathe rm area  Req assist to bathe buttocks  Shower/Bathe Self CARE Score 3   Bathing   Assessed Bath Style Shower   Anticipated D/C Bath Style Shower; Tub   Able to Gather/Transport No   Able to [employer] Temperature No   Able to Wash/Rinse/Dry (body part) Left Arm;Right Arm;L Upper Leg;L Lower Leg/Foot;R Upper Leg;R Lower Leg/Foot;Chest;Abdomen;Perineal Area; Buttocks   Limitations " Noted in Balance; Coordination; Endurance;ROM; Timeliness;Strength   Positioning Seated;Standing   Adaptive Equipment Long[employer] Reacher; Shower Akvo; Shower Seat;[employer]  Increased time 2* to fatigue  Tub/Shower Transfer   Findings (S)  Plan to complete dry tub xfer during upcoming Tx session, as pt reports having tub at home w/o a shower seat  Trial tub bench with sit and swivel technique  Upper Body Dressing   Type of Assistance Needed Physical assistance   Physical Assistance Level 25% or less   Comment Seated req assist to fully manage shirt down posteriorly   Upper Body Dressing CARE Score 3   Lower Body Dressing   Type of Assistance Needed Physical assistance; Adaptive equipment   Physical Assistance Level 26%-50%   Comment Initial fxnl reach to thread LLE into pull up and pants  Use of LHR to assist with managing RLE  Ayesha in stance at sink top for steadying and to fully manage pants up above buttocks and L hip  Lower Body Dressing CARE Score 3   Putting On/Taking Off Footwear   Type of Assistance Needed Supervision   Physical Assistance Level No physical assistance   Comment Xleg technique to don slipper socks   Putting On/Taking Off Footwear CARE Score 4   Lying to Sitting on Side of Bed   Type of Assistance Needed Supervision; Adaptive equipment   Physical Assistance Level No physical assistance   Comment HOB flat with use of BR; Increased time   Lying to Sitting on Side of Bed CARE Score 4   Sit to Stand   Type of Assistance Needed Physical assistance; Adaptive equipment   Physical Assistance Level 26%-50%   Comment Ayesha STS to RW   Sit to Stand CARE Score 3   Bed-Chair Transfer   Type of Assistance Needed Physical assistance; Adaptive equipment   Physical Assistance Level 26%-50%   Comment Ayesha SPT with RW   Chair/Bed-to-Chair Transfer CARE Score 3   Toileting Hygiene   Type of Assistance Needed Physical assistance   Physical Assistance Level 51%-75%   Comment Trialed unilateral release while in stance at [facility] for rear and rm hygiene  Assist for thoroughness and to fully manuever pants up above buttocks  Toileting Hygiene CARE Score 2   Toilet Transfer   Type of Assistance Needed Physical assistance; Adaptive equipment   Physical Assistance Level 26%-50%   Comment Ayesha SPT with RW to over-the-toilet commode   Toilet Transfer CARE Score 3   Cognition   Overall Cognitive Status Impaired   Arousal/Participation Alert; Cooperative   Attention Attends with cues to redirect   Orientation Level Oriented X4   Memory Decreased short term memory   Following Commands Follows one step commands with increased time or repetition   Additional Activities   Additional Activities Comments Administered  Strength Testing to LUE, as pt reports increased weakness since hospital admission  Pt has Hx of old stroke from 10/2023  See below for score details  Activity Tolerance   Activity Tolerance Patient tolerated treatment well   Other Comments   Assessment /68; O2 remained >98% on RA throughout Tx session  Assessment   Treatment Assessment Pt participated in 90 min skilled OT Tx session with focus on ADL performance, fxnl transfer's, and assessing LUE  strength  See above for further Tx details  Pt continues to demonstrate progress with ADL performance, req overall Ayesha for fxnl SPT's with RW and ADL of bathing/dressing  Improvement noted with fxnl standing balance with unilateral release throughout ADL performance  Administered  Strength Testing to LUE, as pt reports increased weakness since hospital admission  See below for score details  Handout provided for pt to [facility] purchase Life Alert for D/C, as pt lives alone and reports being on flr for long period of time following most recent fall  Dec pain reported in R knee throughout Tx session, remaining at 2/10 pain score  OT plan to administer 9HPT and Box and Blocks Testing during upcoming Tx session   Pt would also benefit from trialing dry tub xfer,a s pt reports tub S/U at home  Continue POC  Prognosis Fair   Problem List Decreased strength;Decreased range of motion;Decreased endurance; Impaired balance;Decreased mobility; Decreased cognition   Barriers to Discharge Decreased caregiver support   Plan   Treatment/Interventions ADL retraining;Functional transfer training; Therapeutic exercise; Endurance training   Progress Progressing toward goals   Recommendation   OT Discharge Recommendation   (Pending progress)   Equipment Recommended Bedside commode   Commode Type Standard   OT Therapy Minutes   OT Time In 0700   OT Time Out 0830   OT Total Time (minutes) 90   OT Mode of treatment - Individual (minutes) 90   OT Mode of treatment - Concurrent (minutes) 0   OT Mode of treatment - Group (minutes) 0   OT Mode of treatment - Co-treat (minutes) 0   OT Mode of Treatment - Total time(minutes) 90 minutes   OT Cumulative Minutes 565   Therapy Time missed   Time missed?  No      STRENGTH  Right: 53#, 51#, 49# (Avg  51#)  Left:  11#, 20#, 11# (Avg  14#)

## 2023-03-30 NOTE — PLAN OF CARE
Problem: GENITOURINARY - ADULT  Goal: Maintains or returns to baseline urinary function  Description: INTERVENTIONS:  - Assess urinary function  - Encourage oral fluids to ensure adequate hydration if ordered  - Administer IV fluids as ordered to ensure adequate hydration  - Administer ordered medications as needed  - Offer frequent toileting  - Follow urinary retention protocol if ordered  Outcome: Progressing  Goal: Absence of urinary retention  Description: INTERVENTIONS:  - Assess patient’s ability to void and empty bladder  - Monitor I/O  - Bladder scan as needed  - Discuss with physician/AP medications to alleviate retention as needed  - Discuss catheterization for long term situations as appropriate  Outcome: Progressing

## 2023-03-30 NOTE — PROGRESS NOTES
Physical Medicine and Rehabilitation Progress Note  Holden Life 78 y o  female MRN: [id number]  Unit/Bed#: [facility] 265-01 Encounter: [id number]      Assessment & Plan:     Decline in ADLs and mobility: Functional assessment - improving         FIM  Care Score  Admit Score Recent Score    Total assist  1-100% or 2p    Tot  most ADLs    Max assist 2-51-75%    Sub   To hygiene   Mod assist 3- 26-50%  Par  upper body dressing Bathing, LBD   Min assist 3- 25% or < Par  UBD   CG assist 4  TA     Sup/Setup 4-5 Sup     Mod-I/Indep 6 MI      Transfers   significant assist  Min-mod assist     Ambulation    total assist 10 ft min assist     Stairs   Total assist/NT      Goal: Largely modified independent with ADLs and mobility  Major barriers: Impaired balance, strength, fatigue, pain  Dispo: Home with estimate length of stay of 2 and half weeks from admission     SARS-CoV-2 positive  Assessment & Plan  3/30 - function improving; ambulation improving;   - Continue PT/OT  Presented 3/21/2023 with weakness and function decline  COVID-19 infection identified  Treatment with mild pathway initiated and completed   Patient remained stable from a pulmonary standpoint and remained on room air  Had significant diarrhea now improved   Continue supportive care with Tessalon Perles, nasal spray  Monitor respiratory status  Off isolation 3/30/23 per prior rec    Right knee pain  Assessment & Plan  Much improved; continue PT  Right knee pain and swelling 3/27/23  Likely an arthritis flare   · X-ray showing large joint effusion  · Ortho consulted and performed arthrocentesis with 25 cc fluid removed   · Per ortho 3/31 - Right knee osteoarthritis with osteoarthritic flare status post bedside right knee aspiration now with significant subjective and clinical improvement  No concern for septic or crystal arthropathy at this time  She may follow up outpatient with Dr Faizan Miller for her right knee pain as needed     · Compression with ace wrap/ICE    Bilateral pain of leg and foot  Assessment & Plan  Continues to improve - Improving R foot pain; Improving knee pain s/p tap and on steroids per IM   Function improving   On admission had right ankle pain - x-ray completed and showing Minimal soft tissue thickening medial to the first metatarsal head and lateral to the fifth metatarsal head attributed to capsulobursal thickening  · [facility] the weekend at [facility] also had medial bilateral foot pain along plantar fascia  · Possible plantar fasciitis and also has evidence of bursitis    · Limiting her participation in therapies 3/27/23  · Prednisone 40mg x5 day per IM thru 3/31, gabapentin 100 mg BID, and topical Voltaren gel 3/28/23  · Much improved pain in feet 3/29/23 and participation in rehab    Pain  Assessment & Plan  Prednisone per IM  S/P knee arthrocentesis   APAP PRN  Gabapentin 100mg BID   LD patch    History of cerebral hemorrhage  Assessment & Plan  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   (of note MRI also showed focal R CR acute to subacute infarct as well as moderate chronic microangiopathic changes with dilated periventricular spaces v chronic lacunar infacts within sublentiform regions) - she was seen by neuro OP and recommended to remain off aspirin since   With baseline left hemiparesis and left hemibody impaired sensation  Follows with Dr Paula Philippe as an outpatient    Rhabdomyolysis  Assessment & Plan  Patient stained a slip onto the floor from her bed and was unable to get up for a prolonged period of time  CK 6836  Treated with IV fluids acute care  Trended down  Continue PT, OT       Diarrhea  Assessment & Plan  Improved possible from Covid infection   C  difficile negative  Monitor stools  Encourage oral hydration    Enlarged and hypertrophic nails  Assessment & Plan  Podiatry consult status postdebridement    At risk for venous thromboembolism (VTE)  Assessment & Plan  SCDs, ambulation, and lovenox       GERD (gastroesophageal reflux disease)  Assessment & Plan  Managed on Pepcid 40 mg daily home dose    Closed left ankle fracture, sequela  Assessment & Plan  Sustained June 2022  Weightbearing as tolerated  Need outpatient orthopedic follow-up    Elevated AST (SGOT)  Assessment & Plan  LFTs now WNL  Lipitor restarted 3/27/23    Tremor, essential  Assessment & Plan  Managed on home dose primidone 50 mg nightly  Stable  Located in hands bilaterally  Follows with Dr Yehuda Oviedo as an outpatient    Idiopathic osteoporosis  Assessment & Plan  Managed on Prolia as outpatient  Continue supplementation with vitamin D and calcium      Other Medical Issues:  • None    Follow-up providers and other issues to be followed up after discharge  • PCP  • Neurology  • Orthopedics    CODE: Level 1: Full Code    Restrictions include: Fall precautions    Objective: Allergies per EMR  Diagnostic Studies: Reviewed, no new imaging  XR knee 4+ vw right injury   Final Result by Erna Canales MD (03/27 1455)      No acute osseous abnormality  Large joint effusion  Workstation performed: [id number]         XR foot 3+ vw right   Final Result by Nevaeh Jhaveri MD (03/27 0809)      No acute osseous abnormality        Workstation performed: [id number]           See above as well    Laboratory: Labs reviewed  Results from last 7 days   Lab Units 03/30/23  0511 03/27/23  0613 03/24/23  0623   HEMOGLOBIN g/dL 10 7* 11 7 12 1   HEMATOCRIT % 33 9* 35 4 35 6   WBC Thousand/uL 7 21 8 81 5 69     Results from last 7 days   Lab Units 03/30/23  0511 03/27/23  0613 03/24/23  0623   BUN mg/dL 24 18 9   POTASSIUM mmol/L 3 7 3 7 3 7   CHLORIDE mmol/L 104 102 109*   CREATININE mg/dL 0 59* 0 58* 0 51*   AST U/L  --  23 70*   ALT U/L  --  21 41              Drug regimen reviewed, all potential adverse effects identified and addressed:    Scheduled Meds:  Current Facility-Administered Medications   Medication Dose Route Frequency Provider Last Rate   • acetaminophen  650 mg Oral Q6H PRN LORETO Holcomb     • atorvastatin  10 mg Oral Daily With LORETO Andres     • benzonatate  100 mg Oral TID PRN Farheen Garcia MD     • bisacodyl  10 mg Rectal Daily PRN Farheen Garica MD     • calcium carbonate-vitamin D  1 tablet Oral Daily With Breakfast LORETO Holcomb     • cholecalciferol  1,000 Units Oral Daily LORETO Holcomb     • Diclofenac Sodium  2 g Topical 4x Daily Farheen Garcia MD     • enoxaparin  40 mg Subcutaneous Daily Farheen Garcia MD     • famotidine  40 mg Oral Daily With [address], MD     • fluticasone  1 spray Each Nare Daily Farheen Garcia MD     • gabapentin  100 mg Oral BID Farheen Garcia MD     • lidocaine  1 patch Topical Daily LORETO Holcomb     • ondansetron  4 mg Oral Q6H PRN Farheen Garcia MD     • polyethylene glycol  17 g Oral Daily PRN Farheen Garcia MD     • predniSONE  40 mg Oral Daily LORETO Holcomb     • primidone  50 mg Oral HS Krystyna Ramirez MD     • sodium chloride  1 spray Each Nare Q1H PRN LORETO Holcomb         Chief Complaints:  Rehab follow-up     Subjective: On eval, patient reports left arm still feels a little bit weaker than it did before her COVID infection but not acutely worsening  She reports still some slight right knee pain but definitely better since the orthopedic procedure  She reports foot pain improving 2  She denies lightheadedness, fever, chills, sweats, shortness of breath or other new complaints  ROS: A 10 point ROS was performed; negative except as noted above         Physical Exam:  Vitals:    03/30/23 1456   BP: 123/63   Pulse: 76   Resp: 18   Temp: 98 2 °F (36 8 °C)   SpO2: 93%     GEN:  Sitting in NAD   HEENT/NECK: MMM  CARDIAC: Regular rate rhythm, no murmers, no rubs, no gallops  LUNGS:  clear to auscultation, no wheezes, rales, or rhonchi  ABDOMEN: Soft, non-tender, non-distended, normal active bowel sounds  EXTREMITIES/SKIN:  no calf edema, no calf tenderness to palpation and R knee with stable mild edema without increased warmth or redness  NEURO:   MENTAL STATUS:  Appropriate wakefulness and interaction , CN II-XII: grossly intact  and Strength/MMT:  LUE shoulder abduction 3/5, L EF/EF 4/5, L distal UE 4/5, LLE 4+/5; R intact; FTN subtle off on L -stable  PSYCH:  Affect:  Euthymic      ** Please Note: [employer] voice to text software may have been used in the creation of this document  **    50 minutes or greater spent for this encounter which included a combination of face-to-face time with Zenda Gottron and non-face-to-face time which in part specifically includes management of Post-covid functional decline, pain  Face-to-face time included extended discussion with patient regarding current condition, medical history, mood, medical/rehabilitation management, and disposition  Non face-to-face time included coordination of care with patient's co-managing AP and/or physician(s) thru communication and review of their recent documentation as well as reviewing vitals, bowel/bladder function, recent labs, and notes from therapy, CM, and nursing

## 2023-03-30 NOTE — PROGRESS NOTES
[facility]  Progress Note  Name: Jose Francisco Nicole  MRN: [id number]  Unit/Bed#: [facility] 265-01 I Date of Admission: 3/24/2023   Date of Service: 3/30/2023 I Hospital Day: 6    Assessment/Plan   Right knee pain  Assessment & Plan  Developed R knee pain over the weekend  R knee appears edematous with fluid  XR on 3/27 showed large joint effusion  Orthopedics consulted for arthrocentesis on 3/28  Fluid samples negative for bacteria/crystals  Bilateral pain of leg and foot  Assessment & Plan  Presented to [facility] on 3/24 with R foot pain  R foot XR: No acute osseous abnormality  Does also have soft tissue thickening and capsulobursal thickening on XR  Started on Prednisone 40mg daily x5 days on 3/27  Continue stretching with PT/OT  Proper foot wear  GERD (gastroesophageal reflux disease)  Assessment & Plan  Stable  Continue home Pepcid 40mg daily  Closed left ankle fracture, sequela  Assessment & Plan  Hx of L ankle fracture in 06/2022  Overdue for Orthopedics follow-up  Follow-up with Orthopedics on discharge  Continue with PT/OT  Considerations during therapy  Prediabetes  Assessment & Plan  A1c 5 9 on 10/11/22  Monitor fasting BG  Follow-up with PCP as outpatient  Elevated AST (SGOT)  Assessment & Plan  Improved  AST 23 from 70  Restarted Lipitor on 3/27  Asymptomatic  Continue to monitor routine CMP  Diarrhea  Assessment & Plan  Resolved  Had been experiencing diarrhea in acute setting  C-diff negative on 3/22  Likely viral in nature due to recent illness  Continue with symptom management  SARS-CoV-2 positive  Assessment & Plan  COVID + on 3/21  Continue isolation precautions until 3/30  Symptomatic care  Currently maintaining on RA  Rhabdomyolysis  Assessment & Plan  Improved, CK 94 on 3/27  CK was 6836 on admission  Improved after receiving IV fluids  Encourage PO fluid intake  Restarted statin      Tremor, essential  Assessment & Plan  Continue home primidone 50mg at HS  Follows with Dr Marsia White (Neurology) as outpatient  History of cerebral hemorrhage  Assessment & Plan  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  MRI in 10/2022 showed small acute hematoma in the R putamen with mild regional mass effect and chronic infarcts in L basal ganglia  Had repeat MRI in 2023  Recommended to continue holding ASA at that time per Neurology  Scheduled to follow-up with Neurology (Dr Marisa White) in additional 6 months as outpatient  Continue PT/OT  Idiopathic osteoporosis  Assessment & Plan  Last DXA in 2019 showed osteoporosis  Receives Prolia injections as outpatient  Continue Vitamin D and calcium supplementation  Follows with Dr Charlotte Johnson (Rheumatology) as outpatient  * Ambulatory dysfunction  Assessment & Plan  3/21 - mechanical fall with no injuries; generalized weakness  Had been experiencing URI symptoms - COVID + on 3/21  CK 6838 on admission  Hx of CVA with L sided deficits  Primary team following  Continue PT/OT  VTE Pharmacologic Prophylaxis:   Pharmacologic: Enoxaparin (Lovenox)  Mechanical VTE Prophylaxis in Place: Yes - sequential compression devices  Current Length of Stay: 6 day(s)    Current Patient Status: Inpatient Rehab     Discharge Plan: As per primary team     Code Status: Level 1 - Full Code    Subjective:   Pt examined while pt sitting in recliner in pt room  Currently denies pain in her feet  Very minimal pain in her R knee but states that it feels much better overall and that she is now mobilizing better  Denies any lightheadedness, dizziness, SOB, palpitations, or fevers  Denies any further cough or congestion  Had a BM today without any issues  Has no other concerns or complaints at this time      Objective:     Vitals:   Temp (24hrs), Av 4 °F (36 3 °C), Min:97 °F (36 1 °C), Max:97 6 °F (36 4 °C)    Temp:  [97 °F (36 1 °C)-97 6 °F (36 4 °C)] 97 6 °F (36 4 °C)  HR:  [74-95] 74  Resp:  [16-18] 16  BP: (128-150)/(68-80) 128/68  SpO2:  [94 %-100 %] 96 %  Body mass index is 24 71 kg/m²  Review of Systems   Constitutional: Negative for appetite change, chills, fatigue and fever  HENT: Negative for congestion, postnasal drip, rhinorrhea, sinus pressure, sinus pain, sneezing and trouble swallowing  Eyes: Negative for visual disturbance  Respiratory: Negative for cough, chest tightness, shortness of breath, wheezing and stridor  Cardiovascular: Negative for chest pain, palpitations and leg swelling  Gastrointestinal: Negative for abdominal distention, abdominal pain, constipation, diarrhea, nausea and vomiting  LBM 3/30   Genitourinary: Negative for difficulty urinating  Musculoskeletal: Positive for arthralgias (Minimal pain to R knee, has improved greatly since arthrocentesis and mobilizing lower extremity more than previous)  Negative for back pain and gait problem  Neurological: Negative for dizziness, weakness, light-headedness, numbness and headaches  Psychiatric/Behavioral: Negative for dysphoric mood and sleep disturbance  The patient is not nervous/anxious  All other systems reviewed and are negative  Input and Output Summary (last 24 hours): Intake/Output Summary (Last 24 hours) at 3/30/2023 0958  Last data filed at 3/30/2023 0630  Gross per 24 hour   Intake --   Output 400 ml   Net -400 ml       Physical Exam:     Physical Exam  Vitals and nursing note reviewed  Constitutional:       General: She is not in acute distress  Appearance: Normal appearance  She is not ill-appearing  HENT:      Head: Normocephalic and atraumatic  Cardiovascular:      Rate and Rhythm: Normal rate and regular rhythm  Pulses: Normal pulses  Heart sounds: Normal heart sounds  No murmur heard  No friction rub  Pulmonary:      Effort: Pulmonary effort is normal  No respiratory distress        Breath sounds: Normal breath sounds  No wheezing or rhonchi  Abdominal:      General: Abdomen is flat  Bowel sounds are normal  There is no distension  Palpations: Abdomen is soft  There is no mass  Tenderness: There is no abdominal tenderness  There is no guarding or rebound  Hernia: No hernia is present  Musculoskeletal:      Cervical back: Normal range of motion and neck supple  Right lower leg: No edema  Left lower leg: No edema  Skin:     General: Skin is warm and dry  Capillary Refill: Capillary refill takes less than 2 seconds  Neurological:      Mental Status: She is alert and oriented to person, place, and time  Motor: Weakness (L sided weakness) present     Psychiatric:         Mood and Affect: Mood normal          Behavior: Behavior normal          Additional Data:     Labs:    Results from last 7 days   Lab Units 03/30/23  0511   WBC Thousand/uL 7 21   HEMOGLOBIN g/dL 10 7*   HEMATOCRIT % 33 9*   PLATELETS Thousands/uL 351   NEUTROS PCT % 57   LYMPHS PCT % 34   MONOS PCT % 8   EOS PCT % 0     Results from last 7 days   Lab Units 03/30/23  0511 03/27/23  0613   SODIUM mmol/L 140 136   POTASSIUM mmol/L 3 7 3 7   CHLORIDE mmol/L 104 102   CO2 mmol/L 25 24   BUN mg/dL 24 18   CREATININE mg/dL 0 59* 0 58*   ANION GAP mmol/L 11 10   CALCIUM mg/dL 9 4 8 9   ALBUMIN g/dL  --  3 6   TOTAL BILIRUBIN mg/dL  --  0 52   ALK PHOS U/L  --  57   ALT U/L  --  21   AST U/L  --  23   GLUCOSE RANDOM mg/dL 98 115                       Labs reviewed    Imaging:    Imaging reviewed    Recent Cultures (last 7 days):     Results from last 7 days   Lab Units 03/28/23  1550   GRAM STAIN RESULT  2+ Polys  No bacteria seen   BODY FLUID CULTURE, STERILE  No growth       Last 24 Hours Medication List:   Current Facility-Administered Medications   Medication Dose Route Frequency Provider Last Rate   • acetaminophen  650 mg Oral Q6H PRN LORETO Marsh     • atorvastatin  10 mg Oral Daily With [address] • benzonatate  100 mg Oral TID PRN Kati Salazar MD     • bisacodyl  10 mg Rectal Daily PRN Kati Salazar MD     • calcium carbonate-vitamin D  1 tablet Oral Daily With Breakfast LORETO Cole     • cholecalciferol  1,000 Units Oral Daily LORETO Cole     • Diclofenac Sodium  2 g Topical 4x Daily Kati Salazar MD     • enoxaparin  40 mg Subcutaneous Daily Kati Salazar MD     • famotidine  40 mg Oral Daily With [address], MD     • fluticasone  1 spray Each Nare Daily Kati Salazar MD     • gabapentin  100 mg Oral BID Kati Salazar MD     • lidocaine  1 patch Topical Daily LORETO Cole     • ondansetron  4 mg Oral Q6H PRN Kati Salazar MD     • polyethylene glycol  17 g Oral Daily PRN Kati Salazar MD     • predniSONE  40 mg Oral Daily LORETO Cole     • primidone  50 mg Oral HS Krystyna Reyes MD     • sodium chloride  1 spray Each Nare Q1H PRN LORETO Cole          M*Modal software was used to dictate this note  It may contain errors with dictating incorrect words or incorrect spelling  Please contact the provider directly with any questions

## 2023-03-30 NOTE — PROGRESS NOTES
03/30/23 1230   Restrictions/Precautions   Precautions Bed/chair alarms;Cognitive; Fall Risk   Sit to Lying   Type of Assistance Needed Physical assistance   Physical Assistance Level 25% or less   Comment Needed A with LLE   Sit to Lying CARE Score 3   Sit to Stand   Type of Assistance Needed Physical assistance   Physical Assistance Level 25% or less   Comment Min A for balance   Sit to Stand CARE Score 3   Bed-Chair Transfer   Type of Assistance Needed Physical assistance   Physical Assistance Level 25% or less   Comment Min A with RW   Chair/Bed-to-Chair Transfer CARE Score 3   Walk 10 Feet   Type of Assistance Needed Physical assistance   Physical Assistance Level 25% or less   Comment Min A with RW   Walk 10 Feet CARE Score 3   Walk 150 Feet   Reason if not Attempted Safety concerns   Walk 150 Feet CARE Score 88   Walking 10 Feet on Uneven Surfaces   Reason if not Attempted Safety concerns   Walking 10 Feet on Uneven Surfaces CARE Score 88   Ambulation   Primary Mode of Locomotion Prior to Admission Walk   Distance Walked (feet) 20 ft  (x2)   Assist Device Roller Walker   Walk Assist Level Minimum Assist   Findings Had pt amb 2 times during this session to assess of fatigue,  pt was able to amb the distance so tomorrow will progress to walk with [facility] staff to bathroom   Does the patient walk? 2  Yes   Wheel 50 Feet with Two Turns   Reason if not Attempted Activity not applicable   Wheel 50 Feet with Two Turns CARE Score 9   Wheel 150 Feet   Reason if not Attempted Activity not applicable   Wheel 999 Feet CARE Score 9   Therapeutic Interventions   Strengthening LAQ 2#,  Hip abd MRE min resisted 10x3   Assessment   Treatment Assessment 30 min skilled PT session first started with assisting pt on / off commode-  had pt attempt to perform pants and hygiene but pt needed assistance due to L hand weakness and decrease balance when letting go of walker    Pt performed 2 more amb bouts and pt is is showing consistancy today of amb 20 feet distance so progress tomorrow for walking with [facility] staff  Cont skilled PT toward LTGs  Due to balance and cognition some concern on reaching independent level  PT Barriers   Physical Impairment Decreased strength;Decreased endurance; Impaired balance;Pain;Decreased cognition;Decreased mobility   Plan   Progress Progressing toward goals   PT Therapy Minutes   PT Time In 1230   PT Time Out 1300   PT Total Time (minutes) 30   PT Mode of treatment - Individual (minutes) 30   PT Mode of treatment - Concurrent (minutes) 0   PT Mode of treatment - Group (minutes) 0   PT Mode of treatment - Co-treat (minutes) 0   PT Mode of Treatment - Total time(minutes) 30 minutes   PT Cumulative Minutes 435   Therapy Time missed   Time missed?  No

## 2023-03-30 NOTE — PLAN OF CARE
Problem: RESPIRATORY - ADULT  Goal: Achieves optimal ventilation and oxygenation  Description: INTERVENTIONS:  - Assess for changes in respiratory status  - Assess for changes in mentation and behavior  - Position to facilitate oxygenation and minimize respiratory effort  - Oxygen administered by appropriate delivery if ordered  - Initiate smoking cessation education as indicated  - Encourage broncho-pulmonary hygiene including cough, deep breathe, Incentive Spirometry  - Assess the need for suctioning and aspirate as needed  - Assess and instruct to report SOB or any respiratory difficulty  - Respiratory Therapy support as indicated  Outcome: Progressing     Problem: GASTROINTESTINAL - ADULT  Goal: Maintains or returns to baseline bowel function  Description: INTERVENTIONS:  - Assess bowel function  - Encourage oral fluids to ensure adequate hydration  - Administer IV fluids if ordered to ensure adequate hydration  - Administer ordered medications as needed  - Encourage mobilization and activity  - Consider nutritional services referral to assist patient with adequate nutrition and appropriate food choices  Outcome: Progressing  Goal: Maintains adequate nutritional intake  Description: INTERVENTIONS:  - Monitor percentage of each meal consumed  - Identify factors contributing to decreased intake, treat as appropriate  - Assist with meals as needed  - Monitor I&O, weight, and lab values if indicated  - Obtain nutrition services referral as needed  Outcome: Progressing     Problem: GENITOURINARY - ADULT  Goal: Maintains or returns to baseline urinary function  Description: INTERVENTIONS:  - Assess urinary function  - Encourage oral fluids to ensure adequate hydration if ordered  - Administer IV fluids as ordered to ensure adequate hydration  - Administer ordered medications as needed  - Offer frequent toileting  - Follow urinary retention protocol if ordered  Outcome: Progressing

## 2023-03-30 NOTE — TEAM CONFERENCE
Acute RehabilitationTeam Conference Note  Date: 3/30/2023   Time: 11:02 AM       Patient Name:  Fay Moore       Medical Record Number: [id number]   YOB: 1945  Sex: Female          Room/Bed:  [facility] 265/[facility] 265-01  Payor Info:  Payor: Lissette Sheridan / Plan: MEDICARE A AND B / Product Type: Medicare A & B Fee for Service /      Admitting Diagnosis: Other osteoporosis without current pathological fracture [M81 8]  Difficulty in walking, not elsewhere classified [R26 2]  Essential tremor [G25 0]   Admit Date/Time:  3/24/2023  1:41 PM  Admission Comments: No comment available     Primary Diagnosis:  Ambulatory dysfunction  Principal Problem: Ambulatory dysfunction    Patient Active Problem List    Diagnosis Date Noted   • Osteopenia 03/27/2023   • Right knee pain 03/27/2023   • Elevated AST (SGOT) 03/24/2023   • Prediabetes 03/24/2023   • Closed left ankle fracture, sequela 03/24/2023   • GERD (gastroesophageal reflux disease) 03/24/2023   • Bilateral pain of leg and foot 03/24/2023   • Diarrhea 03/22/2023   • Macrocytosis 03/21/2023   • Ambulatory dysfunction 03/21/2023   • Rhabdomyolysis 03/21/2023   • SARS-CoV-2 positive 03/21/2023   • Tremor, essential 10/20/2022   • Acute cerebral hemorrhage ([facility] [address] ) 10/11/2022   • History of cerebral hemorrhage 10/10/2022   • 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 ([facility] [address] ) 10/10/2022   • Chronic left-sided low back pain    • Transient cerebral ischemia 09/14/2018   • Premature atrial beats 09/14/2018   • Cataract of right eye 06/21/2016   • Sacroiliitis ([facility] [address] ) 12/04/2015   • Idiopathic osteoporosis 06/09/2015   • Primary osteoarthritis of hand 06/09/2015       Physical Therapy:    Weight Bearing Status: Full Weight Bearing  Transfers: Maximum Assistance  Bed Mobility: Moderate Assistance  Amulation Distance (ft): 6 feet  Ambulation: Moderate Assistance  Assistive Device for Ambulation: Roller Walker  Wheelchair Mobility:  (isloated to room, unable to "attempt)  Stair Assistance:  (isloated to room, unable to attempt)  Ramp:  (isloated to room, unable to attempt)  Discharge Recommendations:  (TBD based on functional progress and safety)    3/30/23  Pt admitted 3/24 for decline in function, balance and gait deficits after fall at home, lying on floor for hours  Positive for Rhabdomyolysis and Covid 19  Pt has been on isolation precautions, confined to her room since admission, therefore limiting some functional mobility  Pt had c/o B foot pain along with R knee pain/instability for first 2-3 days, affecting her ability to stand  Much improved the past two days, and she is now participating well  Still very limited by decreased activity tolerance, balance deficits and left side weakness(hx of CVA)  Pt retropulsive with transfers with narrow NIC and delayed righting reactions  Pt was not using a device for mobility prior, but does have RW at home  She does not have stairs to deal with at home  She does live alone, which is a concern at this time for her safety  Recommend resources for \"life alert\" type system  Good Potential to make gains, but anticipate will require longer recovery time due to deconditioning to achieve Mod I level for safe return home  Occupational Therapy:  Eating: Independent  Grooming: Supervision  Bathing: Moderate Assistance  Bathing: Moderate Assistance  Upper Body Dressing: Minimal Assistance  Lower Body Dressing: Moderate Assistance  Toileting: Total Assistance  Tub/Shower Transfer:  (SB at this time)  Toilet Transfer: Moderate Assistance  Cognition: Within Defined Limits  Orientation: Person, Place, Time, Situation  Discharge Recommendations: Home with:  [address] with[de-identified] Home Occupational Therapy (Pending progress)       Pt is demonstrating progress towards OT goals of [place]  Pt lives alone in senior apartment, 2 ESTER  PTA, pt was independent with ADLs, pt reports having RW and SPC in home  Pt uses meal on wheels service    Barriers " include dec activity tolerance, fatigue, dec fxnl standing balance, dec fxnl reach, pain, and dec caregiver report  DME needs include shower chair with back, BSC, and possible hip kit (TBD pending progress  Plan to re-team with OT focus on addressing above noted barriers in order to progress towards OT goals for D/C  Speech Therapy:           No notes on file    Nursing Notes:  Appetite: Good  Diet Type: Regular/House, Thin Liquids                                       Incision 06/21/16 Eye Right (Active)     Wound 02/23/18 Laceration Leg Inner swelling, with scant bleeding (Active)                             Pain Location/Orientation: Orientation: Right, Location: Knee  Pain Score: 0                       Hospital Pain Intervention(s): Repositioned, Shower/Bath          Pt is a 78 y o  female with PMH of 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, essential tremor, osteoarthritis, osteoporosis, vitamin D deficiency, impaired memory who presented to the [address] on 3/21/2023 for R knee and chin  Upon admission was found to have COVID-19 infection, also found to have rhabdomyolysis with CK elevated at 6836 and found to have transaminitis  Lipitor placed on hold  Patient initiated on IV fluids and treatment protocol with mild pathway for COVID-19 infection  Patient did not require supplemental oxygen  Rhabdomyolysis and LFTs improved with hydration  Patient also developed diarrhea  C  difficile toxin negative  Diarrhea improved  After medical stabilization, patient was found to have acute functional deficits in mobility and self-care, admitted to [facility] for acute inpatient rehabilitation  Xray of R knee showed large joint effusion, consulted Ortho arthrocentesis done  Specimen sent to lab for culture  Ace wrap, ice and elevation for R knee  Lidoderm daily for R knee pain  R foot pain managed with Voltaren gel QID  On Pepcid 40mg daily for GERD   Lipitor 10 mg daily restarted for hyperlipidemia  Essential tremors managed with Primidone 50 mg @ HS  Pt Covid + on 3/21, on contact and airborne precaution  Lovenox 40 mg daily for DVT ppx  HLD managed with Lipitor 10mg daily  Tylenol 650mg q6h PRN for mild pain  This week we will monitor pt's vital signs & lab results  Pt will have safe transfers with the use of call bell & hourly rounding to prevent falls  Pt will be educated on importance of T/R & off loading weight when in bed/chair to prevent pressure injury  Pt will have adequate pain relief to fully participate in therapies  Pt will be educated on energy conservation & encouraged independence with ADL's  Case Management:     Discharge Planning  Living Arrangements: Lives Alone  Support Systems: Family members, Friends/neighbors  Assistance Needed: to be determined  Type of Current Residence: Other (Comment) (apartment)  Current Home Care Services: No  3/29- Pt lives alone in apartment, 2 ESTER  Prior to admission, pt was independent with ADLs, pt reports having r/w and SPC in home  Pt uses meal on wheels service  Pt reports hx of [facility], and [facility] June 2022  PCP Luiz Hernandez, preferred pharmacy is [facility] in [place]  Is the patient actively participating in therapies? yes  List any modifications to the treatment plan: None    Barriers Interventions   COVID Off precautions 3/30   Knee pain/swelling Orthostentesis, med mangement   Back pain Pain meds, topicals, volterin to feet   Impaired cog Repetiton   Left sided weakness Neuro sultana   Retropulsive/balance Education, balance training   Lives alone Resources given   Poor activity tolerance TE             Is the patient making expected progress toward goals?  yes  List any update or changes to goals: None    Medical Goals: Patient will be medically stable for discharge to [facility] upon completion of rehab program and Patient will be able to manage medical conditions and comorbid conditions with medications and follow up upon completion of rehab program    Weekly Team Goals:   Rehab Team Goals  ADL Team Goal: Patient will require supervision with ADLs with least restrictive device upon completion of rehab program (MOD I-SUP for basic ADLs)  Bowel/Bladder Team Goal: Patient will return to premorbid level for bladder/bowel management upon completion of rehab program  Transfer Team Goal: Patient will be independent with transfers with least restrictive device upon completion of rehab program  Locomotion Team Goal: Patient will be independent with locomotion with least restrictive device upon completion of rehab program    Discussion: Pt participating in therapies and making progress in rehab program  Pt mod for bed mobility, max for transfers due to balance, walking mod assist less then 10 ft  Min/mod to ADLs  Team recommending reteam to focus on functional and mobility goals, to achieve mod I goals  Anticipated Discharge Date:  reteam  [facility] Team Members Present: The following team members are supervising care for this patient and were present during this Weekly Team Conference      Physician: Dr Sadiq Heredia MD  : YESSICA Fagan  Registered Nurse: Ny Wallace RN  Physical Therapist: NYASIA Sierra  Occupational Therapist: Jersey Palacio MS, OTR/L  Speech Therapist:

## 2023-03-30 NOTE — PROGRESS NOTES
03/30/23 1030   Restrictions/Precautions   Precautions Bed/chair alarms;Cognitive; Fall Risk;Supervision on toilet/commode   Sit to Stand   Type of Assistance Needed Physical assistance   Physical Assistance Level 25% or less   Comment Started at [employer] A due to post listing -  Ed pt technique of trunk flex and hand postion and pt improved, performed a few at  but then with fatigue needed [facility] again   Sit to Stand CARE Score 3   Bed-Chair Transfer   Type of Assistance Needed Physical assistance   Physical Assistance Level 25% or less   Comment RW   Chair/Bed-to-Chair Transfer CARE Score 3   Walk 10 Feet   Type of Assistance Needed Physical assistance   Physical Assistance Level 25% or less   Comment Min A with RW   Walk 10 Feet CARE Score 3   Walk 50 Feet with Two Turns   Type of Assistance Needed Physical assistance   Physical Assistance Level Total assistance   Comment Min A with chair follow   Walk 50 Feet with Two Turns CARE Score 1   Walk 150 Feet   Reason if not Attempted Safety concerns   Walk 150 Feet CARE Score 88   Walking 10 Feet on Uneven Surfaces   Reason if not Attempted Safety concerns   Walking 10 Feet on Uneven Surfaces CARE Score 88   Ambulation   Primary Mode of Locomotion Prior to Admission Walk   Distance Walked (feet) 75 ft  (15x1  20x2  75x1 with chair follow)   Assist Device Roller Walker   Provided Assistance with: Balance   Walk Assist Level Minimum Assist;Chair Follow   Findings inc marianela and less antalgic,  narrow NIC, cues to slow with turns,  some dec motor planning with chair approach   Does the patient walk? 2   Yes   12 Steps   Reason if not Attempted Activity not applicable   12 Steps CARE Score 9   Therapeutic Interventions   Strengthening Seated bilat Hip flex march AROM, Hip add isometric ,  Hip ext MRE min resisted 10x3   Balance Standing trials with letting go of walker unsupported and reaching all directions and head turns (occasional post LOB)   Assessment   Treatment Assessment 60 min session focused on transfers, short distance amb, balance, and 1 longer distance amb bout  Pt is showing much improvement with less pain and better transfers but still has tendency of post LOB and fatigues quickly  Pt will benefit from cont balance and transfer training to reduce LOB  Also progress ambulation distances as able  Cont LE strengthening to reach MI level as pt lives alone  Barriers to Discharge Decreased caregiver support   PT Barriers   Physical Impairment Decreased strength;Decreased endurance; Impaired balance;Pain;Decreased cognition;Decreased mobility   Plan   Progress Progressing toward goals   PT Therapy Minutes   PT Time In 1030   PT Time Out 1130   PT Total Time (minutes) 60   PT Mode of treatment - Individual (minutes) 60   PT Mode of treatment - Concurrent (minutes) 0   PT Mode of treatment - Group (minutes) 0   PT Mode of treatment - Co-treat (minutes) 0   PT Mode of Treatment - Total time(minutes) 60 minutes   PT Cumulative Minutes 405   Therapy Time missed   Time missed?  No

## 2023-03-30 NOTE — CASE MANAGEMENT
Case Management Update:  Cm met with pt at bedside to review team update  Cm spoke with pt about her progress in rehab thus far, pt feels she needs to gain more strength in her legs  Pt agreeable to team recommendation of further time spent in rehab to focus on functional and mobility goals, modified independent goals

## 2023-03-30 NOTE — ASSESSMENT & PLAN NOTE
Last DXA in 2019 showed osteoporosis  Receives Prolia injections as outpatient  Continue Vitamin D and calcium supplementation  Follows with Dr Charlie Dupont (Rheumatology) as outpatient

## 2023-03-30 NOTE — PROGRESS NOTES
Patient seen and examined for follow up of her right knee aspiration  Right knee aspirate WBC was 16,588  Patient reports feeling significantly improved since aspiration  She reports no pain at this time  Physical examination of right knee demonstrates marked improvement in swelling  No effusion is present  No erythema or TTP  Stable on stress exam  Patient able to actively flex knee 110 degrees of flexion  Assessment and plan:    77yoF with known right knee osteoarthritis with osteoarthritic flare status post bedside right knee aspiration now with significant subjective and clinical improvement  No concern for septic or crystal arthropathy at this time  She may follow up outpatient with Dr Donna Conti for her right knee pain as needed  Ortho signing off at this time

## 2023-03-30 NOTE — ASSESSMENT & PLAN NOTE
Presented to [facility] on 3/24 with R foot pain  R foot XR: No acute osseous abnormality  Does also have soft tissue thickening and capsulobursal thickening on XR  Started on Prednisone 40mg daily x5 days on 3/27  Continue stretching with PT/OT  Proper foot wear

## 2023-03-30 NOTE — PCC PHYSICAL THERAPY
"3/30/23  Pt admitted 3/24 for decline in function, balance and gait deficits after fall at home, lying on floor for hours  Positive for Rhabdomyolysis and Covid 19  Pt has been on isolation precautions, confined to her room since admission, therefore limiting some functional mobility  Pt had c/o B foot pain along with R knee pain/instability for first 2-3 days, affecting her ability to stand  Much improved the past two days, and she is now participating well  Still very limited by decreased activity tolerance, balance deficits and left side weakness(hx of CVA)  Pt retropulsive with transfers with narrow NIC and delayed righting reactions  Pt was not using a device for mobility prior, but does have RW at home  She does not have stairs to deal with at home  She does live alone, which is a concern at this time for her safety  Recommend resources for \"life alert\" type system  Good Potential to make gains, but anticipate will require longer recovery time due to deconditioning to achieve Mod I level for safe return home  4/5/23  Pt has improved with overall functional mobility and balance with minimal LOB over past couple sessions and demonstrating good progress towards mod I goals  Challenging pt with activities without use of AD  Continues with narrow NIC when ambulating however step length and step through pattern has improved  Barriers to DC include continued balance deficits and decreased endurance  Possible option to DC to son's house  Son is willing to assist with IADLs as well  If unable, pt will need to be Mod I with mobility to DC home alone  Pt does not need any DME at this time  Recommend continue skilled PT to maximize functional balance and endurance with anticipated DC mid next week  Recommending OP PT or home PT pending DC location       "

## 2023-03-30 NOTE — PROGRESS NOTES
Physical Medicine and Rehabilitation Progress Note  Js Houston 78 y o  female MRN: [id number]  Unit/Bed#: [facility] 265-01 Encounter: [id number]      Assessment & Plan:     Decline in ADLs and mobility: Functional assessment - improving         FIM  Care Score  Admit Score Recent Score    Total assist  1-100% or 2p    Tot  most ADLs    Max assist 2-51-75%    Sub   To hygiene   Mod assist 3- 26-50%  Par  upper body dressing Bathing, LBD   Min assist 3- 25% or < Par  UBD   CG assist 4  TA     Sup/Setup 4-5 Sup     Mod-I/Indep 6 MI      Transfers   significant assist  Min-mod assist     Ambulation    total assist  50 ft min assist     Stairs   Total assist/NT      Goal: Largely modified independent with ADLs and mobility  Major barriers: Impaired balance, strength, fatigue, pain  Dispo: Home with estimate length of stay of 2 and half weeks from admission     SARS-CoV-2 positive  Assessment & Plan  Functioning, activity tolerance improving  - Continue PT/OT  Presented 3/21/2023 with weakness and function decline  COVID-19 infection identified  Treatment with mild pathway initiated and completed   Patient remained stable from a pulmonary standpoint and remained on room air  Had significant diarrhea now improved   Continue supportive care with Tessalon Perles, nasal spray  Monitor respiratory status  Off isolation 3/30/23 per prior rec    Right knee pain  Assessment & Plan  Remains much improved, continue PT  Right knee pain and swelling 3/27/23  Likely an arthritis flare   · X-ray showing large joint effusion  · Ortho consulted and performed arthrocentesis with 25 cc fluid removed   · Per ortho 3/31 - Right knee osteoarthritis with osteoarthritic flare status post bedside right knee aspiration now with significant subjective and clinical improvement  No concern for septic or crystal arthropathy at this time  She may follow up outpatient with Dr Jose Carlos Spence for her right knee pain as needed     · Compression with ace wrap/ICE    Bilateral pain of leg and foot  Assessment & Plan  Remains improved  On admission had right ankle pain - x-ray completed and showing Minimal soft tissue thickening medial to the first metatarsal head and lateral to the fifth metatarsal head attributed to capsulobursal thickening  · Over the weekend at [facility] also had medial bilateral foot pain along plantar fascia  · Possible plantar fasciitis and also has evidence of bursitis    · Limiting her participation in therapies 3/27/23  · Prednisone 40mg x5 day per IM thru 3/31, gabapentin 100 mg BID, and topical Voltaren gel 3/28/23  · As needed Tylenol    Pain  Assessment & Plan  Prednisone per IM completed 3/31   S/P knee arthrocentesis   APAP PRN  Gabapentin 100mg BID   LD patch    History of cerebral hemorrhage  Assessment & Plan  Neuro exam seems to be improving with only subtle left-sided weakness  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   (of note MRI also showed focal R CR acute to subacute infarct as well as moderate chronic microangiopathic changes with dilated periventricular spaces v chronic lacunar infacts within sublentiform regions) - she was seen by neuro OP and recommended to remain off aspirin since   With baseline left hemiparesis and left hemibody impaired sensation  Follows with Dr Mario Marquez as an outpatient    Rhabdomyolysis  Assessment & Plan  Patient stained a slip onto the floor from her bed and was unable to get up for a prolonged period of time  CK [id number]  Treated with IV fluids acute care  Trended down  Continue PT, OT       Diarrhea  Assessment & Plan  Improved possible from Covid infection   C  difficile negative  Monitor stools  Encourage oral hydration    Enlarged and hypertrophic nails  Assessment & Plan  Podiatry consult status postdebridement    At risk for venous thromboembolism (VTE)  Assessment & Plan  SCDs, ambulation, and lovenox       GERD (gastroesophageal reflux disease)  Assessment & Plan  Managed on Pepcid 40 mg daily home dose    Closed left ankle fracture, sequela  Assessment & Plan  Sustained June 2022  Weightbearing as tolerated  Need outpatient orthopedic follow-up    Elevated AST (SGOT)  Assessment & Plan  LFTs now WNL  Lipitor restarted 3/27/23    Tremor, essential  Assessment & Plan  Managed on home dose primidone 50 mg nightly  Adequately controlled  Located in hands bilaterally  Follows with Dr Margot Thacker as an outpatient    Idiopathic osteoporosis  Assessment & Plan  Managed on Prolia as outpatient  Continue supplementation with vitamin D and calcium      Other Medical Issues:  • None    Follow-up providers and other issues to be followed up after discharge  • PCP  • Neurology  • Orthopedics    CODE: Level 1: Full Code    Restrictions include: Fall precautions    Objective: Allergies per EMR  Diagnostic Studies: Reviewed, no new imaging  XR knee 4+ vw right injury   Final Result by Taco Ta MD (03/27 1455)      No acute osseous abnormality  Large joint effusion  Workstation performed: [id number]         XR foot 3+ vw right   Final Result by Judy Aceves MD (03/27 0809)      No acute osseous abnormality        Workstation performed: [id number]           See above as well    Laboratory: Labs reviewed  Results from last 7 days   Lab Units 03/30/23  0511 03/27/23  0613   HEMOGLOBIN g/dL 10 7* 11 7   HEMATOCRIT % 33 9* 35 4   WBC Thousand/uL 7 21 8 81     Results from last 7 days   Lab Units 03/30/23  0511 03/27/23  0613   BUN mg/dL 24 18   POTASSIUM mmol/L 3 7 3 7   CHLORIDE mmol/L 104 102   CREATININE mg/dL 0 59* 0 58*   AST U/L  --  23   ALT U/L  --  21              Drug regimen reviewed, all potential adverse effects identified and addressed:    Scheduled Meds:  Current Facility-Administered Medications   Medication Dose Route Frequency Provider Last Rate   • acetaminophen  650 mg Oral Q6H PRN LORETO Quesada     • atorvastatin  10 mg Oral Daily With [address], LORETO     • benzonatate  100 mg Oral TID PRN Gisela Ty MD     • bisacodyl  10 mg Rectal Daily PRN Gisela Ty MD     • calcium carbonate-vitamin D  1 tablet Oral Daily With Breakfast LORETO Blum     • cholecalciferol  1,000 Units Oral Daily Ida IllJUDYNP     • Diclofenac Sodium  2 g Topical 4x Daily Gisela Ty MD     • enoxaparin  40 mg Subcutaneous Daily Gisela Ty MD     • famotidine  40 mg Oral Daily With Lilo Lay MD     • fluticasone  1 spray Each Nare Daily Gisela Ty MD     • gabapentin  100 mg Oral BID Gisela Ty MD     • lidocaine  1 patch Topical Daily LORETO Blum     • ondansetron  4 mg Oral Q6H PRN Gisela Ty MD     • polyethylene glycol  17 g Oral Daily PRN Gisela Ty MD     • primidone  50 mg Oral HS Krystyna Shoemaker MD     • sodium chloride  1 spray Each Nare Q1H PRN LORETO Blum         Chief Complaints:  Rehab follow-up     Subjective: On eval, patient reports right knee continues to feel better  She denies significant pain  She does note left arm still feels slightly weaker than prior to her COVID infection but somewhat improving  Patient denies lightheadedness, shortness of breath, fever, chills, sweats, or other new complaints  ROS: A 10 point ROS was performed; negative except as noted above         Physical Exam:  Vitals:    03/31/23 0553   BP: 135/61   Pulse: 62   Resp: 16   Temp: 97 5 °F (36 4 °C)   SpO2: 97%     GEN:  Sitting in NAD   HEENT/NECK: MMM  CARDIAC: Regular rate rhythm, no murmers, no rubs, no gallops  LUNGS:  clear to auscultation, no wheezes, rales, or rhonchi  ABDOMEN: Soft, non-tender, non-distended, normal active bowel sounds  EXTREMITIES/SKIN:  no calf edema, no calf tenderness to palpation and Not applicable  NEURO:   MENTAL STATUS:  Appropriate wakefulness and interaction , CN II-XII: grossly intact  and Strength/MMT:  JEREMY shoulder abduction 4/5, L EF/EF 4+/5, L distal UE 4+/5, LLE 5-/5; R intact; FTN subtle off on L -improving  PSYCH:  Affect:  Euthymic      ** Please Note: Fluency Direct voice to text software may have been used in the creation of this document   **

## 2023-03-30 NOTE — CONSULTS
Consult Note- Podiatry   Roberto Martinez 78 y o  female MRN: [id number]  Unit/Bed#: -01 Encounter: [id number]    Assessment/Plan     Assessment:  1  Onychomycosis x 10  2  PVD  3  Pain toes b/l     Plan:  - -pt eval and managed    - Number and complexity of problems addressed:  1 undiagnosed new problem with uncertain prognosis   as shown    - Amount/complexity of data reviewed and analyzed:     Category 1: prior patient notes were analyzed today before evaluating and managing patient  All PMH were discussed with pt today  - Risk of complications: moderate risk of morbidity from additional testing or treatment involved with this patient, which includes but not limited to:    - discussed anatomy, condition, treatment plan and options  They were instructed on proper foot care  The patient was seen today for greater than total of  45-59 minutes     This is total time spent today involving both face-to-face time and non face-to-face time  This time spent includes  reviewing their past medical history  , performing a medically appropriate examination and evaluation of the patient, counseling and educating the patient,  documenting all findings in EMR, and independently interpreting results and communicating results with  patient     and discussing their condition and treatment options, risks, and potential complications  I have discussed the findings of this examination with the patient  The discussion included a complete verbal explanation of the examination results, diagnosis and planned treatment(s)  A schedule for future care needs was explained  The patient has verbalized the understanding of these instructions at this time  If any questions should arise after returning home I have encouraged the patient to feel free to call the office     - d/w pt that discomfort is secondary to toe nail thickening  - Hallucal mycotic nails x2 debrided decreasing thickness by 1 mm   Mycotic toe nails 2-5 b/l were trimmed, "decreasing length, without incidence utilizing a sharp nail nipper  - All questions and concerns addressed  - Podiatry signing off, thank you for the consult  History of Present Illness     HPI:  Adrienne Romero is a 78 y o  female who presents with painful, elongated toenails  They state that they see no Podiatrist outpatient  They have difficulty applying their socks and shoes due to the elongation of the nails  The pressure within their shoe gear is painful and they have been unable to cut their nails adequately  Patient states pain is 1/10 in shoe gear  Pain with pressure  Requires at risk foot care  Inpatient consult to Podiatry  Consult performed by: Benson Meyer DPM  Consult ordered by: Chayo John MD        Review of Systems   Constitutional: Negative  HENT: Negative  Eyes: Negative  Respiratory: Negative  Cardiovascular: Negative  Gastrointestinal: Negative  Musculoskeletal: Negative   Skin: elongated thickened toenails   Neurological: Negative  Historical Information   Past Medical History:   Diagnosis Date   • Cataract of right eye    • GERD (gastroesophageal reflux disease)      Past Surgical History:   Procedure Laterality Date   • CATARACT EXTRACTION, BILATERAL Bilateral    • MASTECTOMY Bilateral     for calcium deposits   • NE COLONOSCOPY FLX DX W/COLLJ SPEC WHEN PFRMD N/A 8/25/2017    Procedure: COLONOSCOPY;  Surgeon: Edna Espinoza MD;  Location: [facility]; Service: Colorectal   • NE XCAPSL CTRC RMVL INSJ IO LENS PROSTH W/O ECP Right 6/21/2016    Procedure: OD EXTRACTION EXTRACAPSULAR CATARACT PHACO INTRAOCULAR LENS (IOL);   Surgeon: Danny Jean MD;  Location: [facility];  Service: Ophthalmology   • TONSILLECTOMY       Social History   Social History     Substance and Sexual Activity   Alcohol Use Yes    Comment: pt states \"very occassionally\"     Social History     Substance and Sexual Activity   Drug Use Never     Social History " "    Tobacco Use   Smoking Status Former   Smokeless Tobacco Never     Family History:   Family History   Problem Relation Age of Onset   • Rectal cancer Brother    • Stroke Mother    • Heart attack Father    • Arrhythmia Father         possible   • Coronary artery disease Father    • Sudden death Father         scd   • Anuerysm Neg Hx    • Clotting disorder Neg Hx    • Hypertension Neg Hx    • Hyperlipidemia Neg Hx    • Heart failure Neg Hx        Meds/Allergies   Medications Prior to Admission   Medication   • famotidine (PEPCID) 40 MG tablet   • primidone (MYSOLINE) 50 mg tablet   • Ascorbic Acid (VITAMIN C PO)   • atorvastatin (LIPITOR) 10 mg tablet   • b complex vitamins tablet   • calcium-vitamin D 250-100 MG-UNIT per tablet   • Multiple Vitamins-Minerals (B COMPLEX PLUS VITAMIN C PO)   • Multiple Vitamins-Minerals (CENTRUM CARDIO PO)   • Vitamin D, Cholecalciferol, 1000 UNITS TABS     No Known Allergies    Objective   First Vitals:   Blood Pressure: 130/80 (03/24/23 1600)  Pulse: 80 (03/24/23 1600)  Temperature: 99 1 °F (37 3 °C) (03/24/23 1600)  Temp Source: Oral (03/24/23 1600)  Respirations: 20 (03/24/23 1600)  Height: 5' 4\" (162 6 cm) (03/24/23 1600)  SpO2: 95 % (03/25/23 1430)    Current Vitals:   Blood Pressure: 128/68 (03/30/23 0705)  Pulse: 74 (03/30/23 0632)  Temperature: 97 6 °F (36 4 °C) (03/30/23 0632)  Temp Source: Oral (03/30/23 9033)  Respirations: 16 (03/30/23 0720)  Height: 5' 4\" (162 6 cm) (03/24/23 1600)  SpO2: 96 % (03/30/23 0632)    /68 (BP Location: Right arm)   Pulse 74   Temp 97 6 °F (36 4 °C) (Oral)   Resp 16   Ht 5' 4\" (1 626 m)   LMP  (LMP Unknown)   SpO2 96%   BMI 24 71 kg/m²     General Appearance:    Alert, cooperative, no distress   Head:    Normocephalic, without obvious abnormality, atraumatic   Eyes:    PERRL, conjunctiva/corneas clear, EOM's intact            Nose:   Moist mucous membranes, no drainage or sinus tenderness   Throat:   No tenderness, no exudates " Neck:   Supple, symmetrical, trachea midline, no JVD   Back:     Symmetric, no CVA tenderness   Lungs:     Clear to auscultation bilaterally, respirations unlabored   Chest wall:    No tenderness or deformity   Heart:    Regular rate and rhythm, S1 and S2 normal, no murmur, rub   or gallop   Abdomen:     Soft, non-tender, bowel sounds active all four quadrants,     no masses, no organomegaly     Extremities:   MMT is 4/5 to all compartments of the LE, +1/4 edema B/L, Digital ROM is intact,    Pulses:   R DP is +1/4, R PT is +0/4, L DP is +1/4, L PT is +0/4, CFT< 3sec to all digits  Thin/shiny skin noted to the B/L LE, pigmentary changes to B/L LE  Absent digital hair growth b/l  Nail thickening b/l  Skin:   Nails are yellow discolored, thickened, elongated, with notable subungual debris and > 2 mm thickness noted to toenails 1-5 B/L  No open Lesions  Neurologic:   CNII-XII intact  Normal strength, sensation and reflexes       Throughout  Gross sensation is intact   Protective sensation is diminished       Lab Results:   Admission on 03/24/2023   Component Date Value   • Sodium 03/27/2023 136    • Potassium 03/27/2023 3 7    • Chloride 03/27/2023 102    • CO2 03/27/2023 24    • ANION GAP 03/27/2023 10    • BUN 03/27/2023 18    • Creatinine 03/27/2023 0 58 (L)    • Glucose 03/27/2023 115    • Glucose, Fasting 03/27/2023 115 (H)    • Calcium 03/27/2023 8 9    • AST 03/27/2023 23    • ALT 03/27/2023 21    • Alkaline Phosphatase 03/27/2023 57    • Total Protein 03/27/2023 7 0    • Albumin 03/27/2023 3 6    • Total Bilirubin 03/27/2023 0 52    • eGFR 03/27/2023 90    • WBC 03/27/2023 8 81    • RBC 03/27/2023 3 52 (L)    • Hemoglobin 03/27/2023 11 7    • Hematocrit 03/27/2023 35 4    • MCV 03/27/2023 101 (H)    • MCH 03/27/2023 33 2    • MCHC 03/27/2023 33 1    • RDW 03/27/2023 12 3    • MPV 03/27/2023 10 5    • Platelets 67/14/1600 280    • nRBC 03/27/2023 0    • Neutrophils Relative 03/27/2023 68    • Immat GRANS % 03/27/2023 1    • Lymphocytes Relative 03/27/2023 20    • Monocytes Relative 03/27/2023 10    • Eosinophils Relative 03/27/2023 1    • Basophils Relative 03/27/2023 0    • Neutrophils Absolute 03/27/2023 6 03    • Immature Grans Absolute 03/27/2023 0 08    • Lymphocytes Absolute 03/27/2023 1 74    • Monocytes Absolute 03/27/2023 0 87    • Eosinophils Absolute 03/27/2023 0 06    • Basophils Absolute 03/27/2023 0 03    • Total CK 03/27/2023 94    • Site 03/28/2023 synovial, right knee    • Color, Fluid 03/28/2023 Straw    • Clarity, Fluid 03/28/2023 Cloudy (A)    • WBC, Fluid 03/28/2023 16,588 (H)    • Crystals, Synovial Fluid 03/28/2023 No Crystals Seen    • Body Fluid Culture, Ster* 03/28/2023 No growth    • Gram Stain Result 03/28/2023 2+ Polys    • Gram Stain Result 03/28/2023 No bacteria seen    • RBC, SYNOVIAL 03/28/2023 2,000 (H)    • Total Counted 03/28/2023 100    • Neutrophil % Synovial 03/28/2023 74    • Lymph % Synovial 03/28/2023 3    • Monocyte % Synovial 03/28/2023 19    • Histiocyte % Synovial 03/28/2023 4    • Sodium 03/30/2023 140    • Potassium 03/30/2023 3 7    • Chloride 03/30/2023 104    • CO2 03/30/2023 25    • ANION GAP 03/30/2023 11    • BUN 03/30/2023 24    • Creatinine 03/30/2023 0 59 (L)    • Glucose 03/30/2023 98    • Glucose, Fasting 03/30/2023 98    • Calcium 03/30/2023 9 4    • eGFR 03/30/2023 89    • WBC 03/30/2023 7 21    • RBC 03/30/2023 3 30 (L)    • Hemoglobin 03/30/2023 10 7 (L)    • Hematocrit 03/30/2023 33 9 (L)    • MCV 03/30/2023 103 (H)    • MCH 03/30/2023 32 4    • MCHC 03/30/2023 31 6    • RDW 03/30/2023 12 0    • MPV 03/30/2023 10 3    • Platelets 07/42/8456 351    • nRBC 03/30/2023 0    • Neutrophils Relative 03/30/2023 57    • Immat GRANS % 03/30/2023 1    • Lymphocytes Relative 03/30/2023 34    • Monocytes Relative 03/30/2023 8    • Eosinophils Relative 03/30/2023 0    • Basophils Relative 03/30/2023 0    • Neutrophils Absolute 03/30/2023 4 08    • Immature Grans Absolute 03/30/2023 0 07    • Lymphocytes Absolute 03/30/2023 2 46    • Monocytes Absolute 03/30/2023 0 55    • Eosinophils Absolute 03/30/2023 0 02    • Basophils Absolute 03/30/2023 0 03      Imaging: I have personally reviewed pertinent films in PACS  EKG, Pathology, and Other Studies: I have personally reviewed pertinent reports        Code Status: Level 1 - Full Code  Advance Directive and Living Will:      Power of :    POLST:

## 2023-03-30 NOTE — ASSESSMENT & PLAN NOTE
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

## 2023-03-31 LAB
BACTERIA SPEC BFLD CULT: NO GROWTH
GRAM STN SPEC: NORMAL
GRAM STN SPEC: NORMAL

## 2023-03-31 RX ADMIN — ENOXAPARIN SODIUM 40 MG: 40 INJECTION SUBCUTANEOUS at 08:10

## 2023-03-31 RX ADMIN — DICLOFENAC SODIUM 2 G: 10 GEL TOPICAL at 08:12

## 2023-03-31 RX ADMIN — FLUTICASONE PROPIONATE 1 SPRAY: 50 SPRAY, METERED NASAL at 08:12

## 2023-03-31 RX ADMIN — PREDNISONE 40 MG: 20 TABLET ORAL at 08:09

## 2023-03-31 RX ADMIN — GABAPENTIN 100 MG: 100 CAPSULE ORAL at 17:29

## 2023-03-31 RX ADMIN — Medication 1 TABLET: at 08:10

## 2023-03-31 RX ADMIN — CHOLECALCIFEROL TAB 25 MCG (1000 UNIT) 1000 UNITS: 25 TAB at 08:09

## 2023-03-31 RX ADMIN — DICLOFENAC SODIUM 2 G: 10 GEL TOPICAL at 21:25

## 2023-03-31 RX ADMIN — PRIMIDONE 50 MG: 50 TABLET ORAL at 21:25

## 2023-03-31 RX ADMIN — LIDOCAINE 1 PATCH: 50 PATCH CUTANEOUS at 08:10

## 2023-03-31 RX ADMIN — ATORVASTATIN CALCIUM 10 MG: 10 TABLET, FILM COATED ORAL at 17:29

## 2023-03-31 RX ADMIN — FAMOTIDINE 40 MG: 20 TABLET, FILM COATED ORAL at 17:29

## 2023-03-31 RX ADMIN — DICLOFENAC SODIUM 2 G: 10 GEL TOPICAL at 17:29

## 2023-03-31 RX ADMIN — GABAPENTIN 100 MG: 100 CAPSULE ORAL at 08:09

## 2023-03-31 NOTE — ASSESSMENT & PLAN NOTE
Developed R knee pain over the weekend  R knee appears edematous with fluid  XR on 3/27 showed large joint effusion  Orthopedics consulted for arthrocentesis on 3/28  Fluid samples negative for bacteria/crystals  Follow-up with Dr Brendon Paz as OP as needed

## 2023-03-31 NOTE — PLAN OF CARE
Problem: SKIN/TISSUE INTEGRITY - ADULT  Goal: Skin Integrity remains intact(Skin Breakdown Prevention)  Description: Assess:  -Perform Jame assessment every shift  -Clean and moisturize skin every   -Inspect skin when repositioning, toileting, and assisting with ADLS  -Assess under medical devices such as  every   -Assess extremities for adequate circulation and sensation     Bed Management:  -Have minimal linens on bed & keep smooth, unwrinkled  -Change linens as needed when moist or perspiring  -Avoid sitting or lying in one position for more than  hours while in bed  -Keep HOB at degrees     Toileting:  -Offer bedside commode  -Assess for incontinence every   -Use incontinent care products after each incontinent episode such as     Activity:  -Mobilize patient  times a day  -Encourage activity and walks on unit  -Encourage or provide ROM exercises   -Turn and reposition patient every  Hours  -Use appropriate equipment to lift or move patient in bed  -Instruct/ Assist with weight shifting every  when out of bed in chair  -Consider limitation of chair time  hour intervals    Skin Care:  -Avoid use of baby powder, tape, friction and shearing, hot water or constrictive clothing  -Relieve pressure over bony prominences using   -Do not massage red bony areas    Next Steps:  -Teach patient strategies to minimize risks such as    -Consider consults to  interdisciplinary teams such as   Outcome: Progressing     Problem: MUSCULOSKELETAL - ADULT  Goal: Maintain or return mobility to safest level of function  Description: INTERVENTIONS:  - Assess patient's ability to carry out ADLs; assess patient's baseline for ADL function and identify physical deficits which impact ability to perform ADLs (bathing, care of mouth/teeth, toileting, grooming, dressing, etc )  - Assess/evaluate cause of self-care deficits   - Assess range of motion  - Assess patient's mobility  - Assess patient's need for assistive devices and provide as appropriate  - Encourage maximum independence but intervene and supervise when necessary  - Involve family in performance of ADLs  - Assess for home care needs following discharge   - Consider OT consult to assist with ADL evaluation and planning for discharge  - Provide patient education as appropriate  Outcome: Progressing

## 2023-03-31 NOTE — ASSESSMENT & PLAN NOTE
COVID + on 3/21  Isolation precautions discontinued on 3/30  Symptomatic care  Currently maintaining on RA

## 2023-03-31 NOTE — PROGRESS NOTES
03/31/23 0930   Restrictions/Precautions   Precautions Fall Risk;Cognitive;Bed/chair alarms   Sit to Stand   Type of Assistance Needed Physical assistance   Physical Assistance Level 25% or less   Comment CG / Min A for balance   Sit to Stand CARE Score 3   Bed-Chair Transfer   Type of Assistance Needed Physical assistance   Physical Assistance Level 25% or less   Comment Min A with RW   Chair/Bed-to-Chair Transfer CARE Score 3   Car Transfer   Type of Assistance Needed Incidental touching   Comment CG and educated pt on technique   Car Transfer CARE Score 4   Walk 10 Feet   Type of Assistance Needed Physical assistance   Physical Assistance Level 25% or less   Comment Min A with RW   Walk 10 Feet CARE Score 3   Walk 50 Feet with Two Turns   Type of Assistance Needed Physical assistance   Physical Assistance Level 25% or less   Comment Min A with RW   Walk 50 Feet with Two Turns CARE Score 3   Ambulation   Primary Mode of Locomotion Prior to Admission Walk   Distance Walked (feet) 100 ft  (100 with chair follow,   15x4)   Assist Device Roller Walker   Walk Assist Level Minimum Assist   Findings Pt has consistantly been amb 20 feet so upgraded white board to amb with NSG into bathroom with walker-  Pt still has episodes of post listing more with sit-stands   Does the patient walk? 2  Yes   Therapeutic Interventions   Balance Pt performed TUG and SAUNDERS balance assessment ,  Pt took 39 5 seconds on TUG which indicates high fall risk,  and scored 22 which indicates fall risk and pt should be using RW   Equipment Use   NuStep For ROM performed 5mins for warm up   Assessment   Treatment Assessment 60 min session focused on amb bouts, transfers, SAUNDERS and TUG  Pt is showing very significant balance deficits with challenges without walker  Will cont strengthening and balance to try and reach independent level for D/C home  PT Barriers   Physical Impairment Decreased strength;Decreased endurance; Impaired balance;Pain;Decreased cognition;Decreased mobility   Plan   Progress Progressing toward goals   PT Therapy Minutes   PT Time In 0930   PT Time Out 1030   PT Total Time (minutes) 60   PT Mode of treatment - Individual (minutes) 60   PT Mode of treatment - Concurrent (minutes) 0   PT Mode of treatment - Group (minutes) 0   PT Mode of treatment - Co-treat (minutes) 0   PT Mode of Treatment - Total time(minutes) 60 minutes   PT Cumulative Minutes 495   Therapy Time missed   Time missed?  No

## 2023-03-31 NOTE — PROGRESS NOTES
[facility]  Progress Note  Name: Keely Pearson  MRN: [id number]  Unit/Bed#: [facility] 265-01 I Date of Admission: 3/24/2023   Date of Service: 3/31/2023 I Hospital Day: 7    Assessment/Plan   Right knee pain  Assessment & Plan  Developed R knee pain over the weekend  R knee appears edematous with fluid  XR on 3/27 showed large joint effusion  Orthopedics consulted for arthrocentesis on 3/28  Fluid samples negative for bacteria/crystals  Follow-up with Dr Faye Garcia as OP as needed  Bilateral pain of leg and foot  Assessment & Plan  Presented to [facility] on 3/24 with R foot pain  R foot XR: No acute osseous abnormality  Does also have soft tissue thickening and capsulobursal thickening on XR  Started on Prednisone 40mg daily x5 days on 3/27  Last dose today  Continue stretching with PT/OT  Proper foot wear  GERD (gastroesophageal reflux disease)  Assessment & Plan  Stable  Continue home Pepcid 40mg daily  Closed left ankle fracture, sequela  Assessment & Plan  Hx of L ankle fracture in 06/2022  Overdue for Orthopedics follow-up  Follow-up with Orthopedics on discharge  Continue with PT/OT  Considerations during therapy  Prediabetes  Assessment & Plan  A1c 5 9 on 10/11/22  Monitor fasting BG  Follow-up with PCP as outpatient  Elevated AST (SGOT)  Assessment & Plan  Improved  AST 23 from 70  Restarted Lipitor on 3/27  Asymptomatic  Continue to monitor routine CMP  Diarrhea  Assessment & Plan  Resolved  Had been experiencing diarrhea in acute setting  C-diff negative on 3/22  Likely viral in nature due to recent illness  Continue with symptom management  SARS-CoV-2 positive  Assessment & Plan  COVID + on 3/21  Isolation precautions discontinued on 3/30  Symptomatic care  Currently maintaining on RA  Rhabdomyolysis  Assessment & Plan  Improved, CK 94 on 3/27  CK was 6836 on admission  Improved after receiving IV fluids    Encourage PO fluid intake  Restarted statin  Tremor, essential  Assessment & Plan  Continue home primidone 50mg at HS  Follows with Dr Jose Fournier (Neurology) as outpatient  History of cerebral hemorrhage  Assessment & Plan  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  MRI in 10/2022 showed small acute hematoma in the R putamen with mild regional mass effect and chronic infarcts in L basal ganglia  Had repeat MRI in 01/2023  Recommended to continue holding ASA at that time per Neurology  Scheduled to follow-up with Neurology (Dr Jose Fournier) in additional 6 months as outpatient  Continue PT/OT  Idiopathic osteoporosis  Assessment & Plan  Last DXA in 2019 showed osteoporosis  Receives Prolia injections as outpatient  Continue Vitamin D and calcium supplementation  Follows with Dr Kelli Kidd (Rheumatology) as outpatient  * Ambulatory dysfunction  Assessment & Plan  3/21 - mechanical fall with no injuries; generalized weakness  Had been experiencing URI symptoms - COVID + on 3/21  CK 6838 on admission  Hx of CVA with L sided deficits  Primary team following  Continue PT/OT  VTE Pharmacologic Prophylaxis:   Pharmacologic: Enoxaparin (Lovenox)  Mechanical VTE Prophylaxis in Place: Yes - sequential compression devices  Current Length of Stay: 7 day(s)    Current Patient Status: Inpatient Rehab     Discharge Plan: As per primary team     Code Status: Level 1 - Full Code    Subjective:   Pt examined while pt sitting in recliner in pt room  Currently denies any pain  States that her R knee and her feet are feeling great right now  Did require Voltaren gel this morning for her feet when she first woke up  Denies any fevers, congestion, cough, or sore throat  Complaints of LUE weakness which she states is worse since having COVID  Concerned that she may not be able to discharge from home  Encouraged to continue on with current therapy regimen      Objective:     Vitals:   Temp (24hrs), Av 7 °F (36 5 °C), Min:97 4 °F (36 3 °C), Max:98 2 °F (36 8 °C)    Temp:  [97 4 °F (36 3 °C)-98 2 °F (36 8 °C)] 97 5 °F (36 4 °C)  HR:  [62-76] 62  Resp:  [16-18] 16  BP: (123-135)/(61-63) 135/61  SpO2:  [93 %-97 %] 97 %  Body mass index is 24 71 kg/m²  Review of Systems   Constitutional: Positive for appetite change (weight loss of 6 pounds during admission, feels that her appetite is finally starting to slowly improve)  Negative for chills, fatigue and fever  HENT: Negative for congestion, postnasal drip, rhinorrhea, sinus pressure, sinus pain, sneezing, sore throat and trouble swallowing  Eyes: Negative for visual disturbance  Respiratory: Negative for cough, chest tightness, shortness of breath, wheezing and stridor  Cardiovascular: Negative for chest pain, palpitations and leg swelling  Gastrointestinal: Negative for abdominal distention, abdominal pain, constipation, diarrhea, nausea and vomiting  LBM 3/31   Genitourinary: Negative for difficulty urinating  Musculoskeletal: Positive for arthralgias (B/L foot pain when she first woke up - improved with Voltaren gel)  Negative for back pain and gait problem  Neurological: Positive for weakness (LUE weakness, worse since having COVID)  Negative for dizziness, speech difficulty, light-headedness, numbness and headaches  Psychiatric/Behavioral: Negative for confusion, dysphoric mood and sleep disturbance  The patient is not nervous/anxious  All other systems reviewed and are negative  Input and Output Summary (last 24 hours): Intake/Output Summary (Last 24 hours) at 3/31/2023 1026  Last data filed at 3/31/2023 0630  Gross per 24 hour   Intake --   Output 600 ml   Net -600 ml       Physical Exam:     Physical Exam  Vitals and nursing note reviewed  Constitutional:       General: She is not in acute distress  Appearance: Normal appearance  She is not ill-appearing  HENT:      Head: Normocephalic and atraumatic  Cardiovascular:      Rate and Rhythm: Normal rate and regular rhythm  Pulses: Normal pulses  Heart sounds: Normal heart sounds  No murmur heard  No friction rub  Pulmonary:      Effort: Pulmonary effort is normal  No respiratory distress  Breath sounds: Normal breath sounds  No wheezing or rhonchi  Abdominal:      General: Abdomen is flat  Bowel sounds are normal  There is no distension  Palpations: Abdomen is soft  There is no mass  Tenderness: There is no abdominal tenderness  There is no guarding or rebound  Hernia: No hernia is present  Musculoskeletal:      Cervical back: Normal range of motion and neck supple  No tenderness  Right lower leg: No edema  Left lower leg: No edema  Skin:     General: Skin is warm and dry  Capillary Refill: Capillary refill takes less than 2 seconds  Neurological:      Mental Status: She is alert and oriented to person, place, and time  Motor: Weakness (LUE strength 4/5, LLE strength 4/5) present     Psychiatric:         Mood and Affect: Mood normal          Behavior: Behavior normal          Additional Data:     Labs:    Results from last 7 days   Lab Units 03/30/23  0511   WBC Thousand/uL 7 21   HEMOGLOBIN g/dL 10 7*   HEMATOCRIT % 33 9*   PLATELETS Thousands/uL 351   NEUTROS PCT % 57   LYMPHS PCT % 34   MONOS PCT % 8   EOS PCT % 0     Results from last 7 days   Lab Units 03/30/23  0511 03/27/23  0613   SODIUM mmol/L 140 136   POTASSIUM mmol/L 3 7 3 7   CHLORIDE mmol/L 104 102   CO2 mmol/L 25 24   BUN mg/dL 24 18   CREATININE mg/dL 0 59* 0 58*   ANION GAP mmol/L 11 10   CALCIUM mg/dL 9 4 8 9   ALBUMIN g/dL  --  3 6   TOTAL BILIRUBIN mg/dL  --  0 52   ALK PHOS U/L  --  57   ALT U/L  --  21   AST U/L  --  23   GLUCOSE RANDOM mg/dL 98 115                       Labs reviewed    Imaging:    Imaging reviewed    Recent Cultures (last 7 days):     Results from last 7 days   Lab Units 03/28/23  1550   GRAM STAIN RESULT  2+ Polys  No bacteria seen   BODY FLUID CULTURE, STERILE  No growth       Last 24 Hours Medication List:   Current Facility-Administered Medications   Medication Dose Route Frequency Provider Last Rate   • acetaminophen  650 mg Oral Q6H PRN LORETO Tejada     • atorvastatin  10 mg Oral Daily With [address], LORETO     • benzonatate  100 mg Oral TID PRN Clyde Gomez MD     • bisacodyl  10 mg Rectal Daily PRN Clyde Gomez MD     • calcium carbonate-vitamin D  1 tablet Oral Daily With Breakfast LORETO Tejada     • cholecalciferol  1,000 Units Oral Daily LORETO Tejada     • Diclofenac Sodium  2 g Topical 4x Daily Clyde Gomez MD     • enoxaparin  40 mg Subcutaneous Daily Clyde Gomez MD     • famotidine  40 mg Oral Daily With Franca Duckworth MD     • fluticasone  1 spray Each Nare Daily Clyde Gomez MD     • gabapentin  100 mg Oral BID Clyde Gomez MD     • lidocaine  1 patch Topical Daily LORETO Tejada     • ondansetron  4 mg Oral Q6H PRN Clyde Gomez MD     • polyethylene glycol  17 g Oral Daily PRN Clyde Gomez MD     • primidone  50 mg Oral HS Krystyna Abdullahi MD     • sodium chloride  1 spray Each Nare Q1H PRN LORETO Tejada          M*Modal software was used to dictate this note  It may contain errors with dictating incorrect words or incorrect spelling  Please contact the provider directly with any questions

## 2023-03-31 NOTE — ASSESSMENT & PLAN NOTE
Last DXA in 2019 showed osteoporosis  Receives Prolia injections as outpatient  Continue Vitamin D and calcium supplementation  Follows with Dr Lulu Booth (Rheumatology) as outpatient

## 2023-03-31 NOTE — PROGRESS NOTES
03/31/23 0830   Pain Assessment   Pain Assessment Tool 0-10   Pain Score 2   Pain Location/Orientation Orientation: Right;Location: Knee   Pain Onset/Description Onset: Gradual   Effect of Pain on Daily Activities Tolerates   Patient's Stated Pain Goal No pain   Hospital Pain Intervention(s) Repositioned;Elevated   Multiple Pain Sites No   Restrictions/Precautions   Precautions Fall Risk;Cognitive;Bed/chair alarms;Supervision on toilet/commode   Weight Bearing Restrictions No   ROM Restrictions No   Lifestyle   Autonomy Pt agreeable to OT Tx session  Tub/Shower Transfer   Findings (S)  Discussed dry tub xfer with simulated demonstration of sit and swivel technique when utilizing tub transfer bench  Pt did not trial transfer this session 2* to tub transfer bench being unavailable  OT plan to trial dry tub transfer with pt during upcoming Tx session  Sit to Stand   Type of Assistance Needed Physical assistance; Adaptive equipment   Physical Assistance Level 25% or less   Comment Ayesha/CGA with RW   Sit to Stand CARE Score 3   Bed-Chair Transfer   Type of Assistance Needed Physical assistance; Adaptive equipment   Physical Assistance Level 25% or less   Comment Ayesha with RW   Chair/Bed-to-Chair Transfer CARE Score 3   Kitchen Mobility   Kitchen-Mobility Level Walker  (Folding RW tray)   Kitchen Activity Retrieve items;Transport items   Kitchen Mobility Comments Introduction to folding RW tray with EDU provide don benefit of trialing item for safe item transport while manuvering RW  Pt utilized RW and folding RW tray to retrieve a micorwave dinner from freezer environment, placing item on folding RW tray to transfer meal to microwave enironment  Pt then demonstrated ability to remove item from microwave environment and transport item to counter top utilizing RW tray  Pt did req some cues for safe positioning prior to performing fxnl reach for item retrieval, as pt observed to over reach at times   Pt reports "receiving MOW's PTA and mainly completes microwave meal prep  Cognition   Overall Cognitive Status Impaired   Arousal/Participation Alert; Cooperative   Attention Attends with cues to redirect   Orientation Level Oriented X4   Memory Decreased short term memory;Decreased recall of recent events;Decreased recall of precautions   Following Commands Follows one step commands with increased time or repetition   Additional Activities   Additional Activities Other (Comment)   Additional Activities Comments Engaged in fxnl standing balance activity req pt to perform repetitive fxnl reach with LUE to retrieve various items from [facility] kitchen cabinet  Increased difficulty with performing LUE fxnl reach, with pt communicating \"heaviness\", however, pt able to retrieve light items from [address] ith increased time  No LOB with pt overall req Ayesha/CGA for steadying  Focus of activity to improve fxnl standing balance and improve fnxl use of LUE, as pt reports increased weakness in LUE since hospital admission  Activity Tolerance   Activity Tolerance Patient tolerated treatment well   Other Comments   Assessment Administered 9HPT and Box and Blocks Testing this session with focus on assessing dexteriry and coordination of LUE  See below for score details  Assessment   Treatment Assessment Pt engaged in 60 min skilled OT Tx session with focus on fxnl standing balance, kitchen mobility, discussion on dry tub xfer, promoting fxnl use of LUE, and administering 9HPT and Box and Blocks Testing  See above for further Tx details  Pt continues to report weakness in LUE during fxnl tasks, however, demonstrates G ability to perform LUE fxnl reach to retrieve items from [facility] cabinets, req increased time 2* to LUE weakness  Improvement noted with fxnl standing balance while performing unilateral release  Pt overall req Ayesha/CGA for fxnl kitchen mobility with RW and folding RW tray   Pt would continue from further IADL engagement with folding " RW tray as pt lives alone and will need to achieve Homer level for ADL/IADL performance  OT plan to engage pt dry tub xfer utilizing tub xfer bench during upcoming Tx session  Continue POC  Prognosis Fair   Problem List Decreased strength;Decreased range of motion;Decreased endurance; Impaired balance;Decreased mobility; Decreased coordination;Decreased cognition;Pain   Barriers to Discharge Decreased caregiver support   Plan   Treatment/Interventions ADL retraining;Functional transfer training; Therapeutic exercise; Endurance training   Progress Progressing toward goals   Recommendation   OT Discharge Recommendation   (Pending progress)   Equipment Recommended Bedside commode  (folding RW tray and tub xfer bench - TBD)   Commode Type Standard   OT Therapy Minutes   OT Time In 0830   OT Time Out 0930   OT Total Time (minutes) 60   OT Mode of treatment - Individual (minutes) 60   OT Mode of treatment - Concurrent (minutes) 0   OT Mode of treatment - Group (minutes) 0   OT Mode of treatment - Co-treat (minutes) 0   OT Mode of Treatment - Total time(minutes) 60 minutes   OT Cumulative Minutes 625   Therapy Time missed   Time missed? No     9 HOLE PEG TEST  Right: 34 48 seconds  Left: 1 min 41 seconds    Box and Block Test  The Box and Block Test (BBT) is a measure of manual dexterity that requires repeatedly moving 1-inch blocks from one side of a box to another in 60 seconds  It is commonly used in the stroke population to determine manual dexterity  It measures unilateral function, not bilateral function  Box & block administered with the following results: R= 40 blocks, L= 18 blocks, demonstrating impairments B/L, L>R  Pt is R hand dominant

## 2023-03-31 NOTE — PROGRESS NOTES
03/31/23 1415   Restrictions/Precautions   Precautions Fall Risk;Cognitive;Bed/chair alarms   Sit to Stand   Type of Assistance Needed Incidental touching   Comment Needs cues for forward trunk lean   Sit to Stand CARE Score 4   Bed-Chair Transfer   Type of Assistance Needed Incidental touching   Comment CG with RW   Chair/Bed-to-Chair Transfer CARE Score 4   Walk 10 Feet   Type of Assistance Needed Incidental touching   Comment CG with RW   Walk 10 Feet CARE Score 4   Ambulation   Primary Mode of Locomotion Prior to Admission Walk   Distance Walked (feet) 100 ft   Assist Device Roller Walker   Walk Assist Level Contact Guard   Findings CG with RW- narrow NIC,  noteable excessive pelvic sway (needs hip strengthening   Does the patient walk? 2  Yes   Therapeutic Interventions   Balance Standing unsupported ball toss/ catch (intermittent min A),   Std unsupported balloon tap (min A),  HHA alt toe tap to 2inch box (needed Min A),   Repeated sit-stands without walker in front of pt and not having pt brace legs on chair to challenge rising up with balance   Assessment   Treatment Assessment 30 min session focused on standing balance and sit-stand xfers  Pt showing some carry over with trunk flex but inconsistant  Pt has much LOB ant/ Post most likely from old strokes  Pt also had difficulty with single limb stance balance because of much pelvic weakness  Pt needs to continue to work on balance and pelvic/ hip / leg strengthening  Did have small discussion about concern with pt being independent and possibly would be safer for assissted living  This needs to be further discussed as a team    PT Barriers   Physical Impairment Decreased strength;Decreased endurance; Impaired balance;Pain;Decreased cognition;Decreased mobility   Plan   Progress Progressing toward goals   PT Therapy Minutes   PT Time In 1415   PT Time Out 1445   PT Total Time (minutes) 30   PT Mode of treatment - Individual (minutes) 30   PT Mode of treatment - Concurrent (minutes) 0   PT Mode of treatment - Group (minutes) 0   PT Mode of treatment - Co-treat (minutes) 0   PT Mode of Treatment - Total time(minutes) 30 minutes   PT Cumulative Minutes 525   Therapy Time missed   Time missed?  No

## 2023-03-31 NOTE — ASSESSMENT & PLAN NOTE
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

## 2023-03-31 NOTE — ASSESSMENT & PLAN NOTE
Presented to [facility] on 3/24 with R foot pain  R foot XR: No acute osseous abnormality  Does also have soft tissue thickening and capsulobursal thickening on XR  Started on Prednisone 40mg daily x5 days on 3/27  Last dose today  Continue stretching with PT/OT  Proper foot wear

## 2023-03-31 NOTE — PROGRESS NOTES
03/31/23 1230   Pain Assessment   Pain Assessment Tool 0-10   Pain Score No Pain   Restrictions/Precautions   Precautions Fall Risk;Cognitive;Bed/chair alarms   Health Management   Health Management (S)  Pt  reports that she created a list of all of her medications with their purpose and doseage  She reports she sets up her pills in her pill box every sunday  Pt reports she spreads out her medications into AM/PM so she isnt taking all her pills at once  Please assess upon next session  Cognition   Overall Cognitive Status Impaired   Arousal/Participation Alert; Cooperative   Attention Attends with cues to redirect   Orientation Level Oriented X4   Memory Decreased short term memory   Following Commands Follows one step commands without difficulty   Comments Pt given MOCA version 8 1 in september of 2020 and scored a 21/30  Administered version 8 2 today and pt scored a 21/30  Pt noted with deficits in executive function, attention, abstract th inking and delayed recall  Results indicate mild cognitive deficit  Pt reports prior to hopsitlization she had difficulty with short term memory, and dividing her attention  See below for additional details regarding established IADL support  Activity Tolerance   Activity Tolerance Patient tolerated treatment well   Assessment   Treatment Assessment Pt engaged in 45 minute session with focus on MOCA assessment and IADl management  Pt reports she had difficulty with short term memory and divided attention prior to hospitlization  Pt said she can usually remember long term information  As per patient, son has set up a spread sheet for all her bills that includes when they are due, a check off box for once they are paid, and all the account information  Pt reports all of her bills are on auto pay including her rent  The only bill the patient writes out is her 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   Pt overall demonstrates mild cognitive deficits which limits her problem solving in functional situations  Would benefit from further evaluation during higher level IADL tasks and home safety scenarios to determine safe d/c plan/location  Did discuss medical safety and use of lifealert  Pt reports she is looking into purchasing upon d/c  Prognosis Good   Problem List Decreased strength;Decreased endurance; Impaired balance;Decreased mobility; Decreased safety awareness;Decreased cognition; Impaired judgement   Barriers to Discharge Decreased caregiver support  (lives alone)   Plan   Treatment/Interventions Functional transfer training;LE strengthening/ROM; Endurance training;Cognitive reorientation;Patient/family training; Compensatory technique education;Gait training   OT Therapy Minutes   OT Time In 1230   OT Time Out 1315   OT Total Time (minutes) 45   OT Mode of treatment - Individual (minutes) 45   OT Mode of treatment - Concurrent (minutes) 0   OT Mode of treatment - Group (minutes) 0   OT Mode of treatment - Co-treat (minutes) 0   OT Mode of Treatment - Total time(minutes) 45 minutes   OT Cumulative Minutes 670   Therapy Time missed   Time missed? No       Pt completed Dipesh Cognitive Assessment (MOCA) version 8 2  Pt scored overall 21/ 30  indicating a mild cognitive deficit  Visuospatial/executive: 4/5 - Pt unable to complete trailmaking section  Initially patient able to complete correct sequence with increased time, but then forgot the sequence, and began to connect the line to numbers then letters  Pt able to copy 2D figure correctly and able to complete clock drawing correctly  Naming: 3/3 - Able to name all animals correctly  Memory:    (worth no points) - Upon first and second trial patient only able to recall 4 words  Pt unable to recall the last word on each trial    Attention:  3/ 6 - Able to repeat numbers backward correctly but not forward  Patient attempted serial 7 subtraction, subtracting correctly for first trial but then began to try subtracting by 10   Pt "able to identify this was incorrect but was unable to correct  Language: 3/ 3 - Patient able to name 12 words in 60 seconds  Abstraction: 1/ 2 - Patient unable to identify a category for telephone and letter  Delayed recall: 1/ 5 - Patient only able to recall word \"nylon\"  Pt unable to recall any additional words when given categorical cue  When given multiple choice patient able to recall 4 additional words  Pt MIS score was 7/15  Orientation: 5/ 6 - Patient able to identify place, day, month, date, year  Pt stated city was \"[place]\" (not [place])  +1 - point for only/less than high school education  Patient only completed high school  Total score 21/30, indicating a mild cognitive deficit       "

## 2023-04-01 RX ADMIN — DICLOFENAC SODIUM 2 G: 10 GEL TOPICAL at 08:59

## 2023-04-01 RX ADMIN — FAMOTIDINE 40 MG: 20 TABLET, FILM COATED ORAL at 17:30

## 2023-04-01 RX ADMIN — DICLOFENAC SODIUM 2 G: 10 GEL TOPICAL at 21:42

## 2023-04-01 RX ADMIN — ENOXAPARIN SODIUM 40 MG: 40 INJECTION SUBCUTANEOUS at 08:59

## 2023-04-01 RX ADMIN — ATORVASTATIN CALCIUM 10 MG: 10 TABLET, FILM COATED ORAL at 17:30

## 2023-04-01 RX ADMIN — DICLOFENAC SODIUM 2 G: 10 GEL TOPICAL at 17:30

## 2023-04-01 RX ADMIN — Medication 1 TABLET: at 08:59

## 2023-04-01 RX ADMIN — FLUTICASONE PROPIONATE 1 SPRAY: 50 SPRAY, METERED NASAL at 08:59

## 2023-04-01 RX ADMIN — CHOLECALCIFEROL TAB 25 MCG (1000 UNIT) 1000 UNITS: 25 TAB at 08:59

## 2023-04-01 RX ADMIN — PRIMIDONE 50 MG: 50 TABLET ORAL at 21:42

## 2023-04-01 RX ADMIN — GABAPENTIN 100 MG: 100 CAPSULE ORAL at 17:30

## 2023-04-01 RX ADMIN — GABAPENTIN 100 MG: 100 CAPSULE ORAL at 08:59

## 2023-04-01 NOTE — NURSING NOTE
Pt phone in room not working-another new phone was plugged into the phone werner but no dial tone detected  RN called IT and they will be working on the problem

## 2023-04-02 RX ADMIN — FAMOTIDINE 40 MG: 20 TABLET, FILM COATED ORAL at 17:14

## 2023-04-02 RX ADMIN — FLUTICASONE PROPIONATE 1 SPRAY: 50 SPRAY, METERED NASAL at 08:18

## 2023-04-02 RX ADMIN — DICLOFENAC SODIUM 2 G: 10 GEL TOPICAL at 08:19

## 2023-04-02 RX ADMIN — ENOXAPARIN SODIUM 40 MG: 40 INJECTION SUBCUTANEOUS at 08:19

## 2023-04-02 RX ADMIN — GABAPENTIN 100 MG: 100 CAPSULE ORAL at 08:18

## 2023-04-02 RX ADMIN — Medication 1 TABLET: at 08:18

## 2023-04-02 RX ADMIN — GABAPENTIN 100 MG: 100 CAPSULE ORAL at 17:15

## 2023-04-02 RX ADMIN — ATORVASTATIN CALCIUM 10 MG: 10 TABLET, FILM COATED ORAL at 17:15

## 2023-04-02 RX ADMIN — CHOLECALCIFEROL TAB 25 MCG (1000 UNIT) 1000 UNITS: 25 TAB at 08:18

## 2023-04-02 RX ADMIN — PRIMIDONE 50 MG: 50 TABLET ORAL at 21:27

## 2023-04-02 NOTE — PLAN OF CARE
Problem: GASTROINTESTINAL - ADULT  Goal: Maintains or returns to baseline bowel function  Description: INTERVENTIONS:  - Assess bowel function  - Encourage oral fluids to ensure adequate hydration  - Administer IV fluids if ordered to ensure adequate hydration  - Administer ordered medications as needed  - Encourage mobilization and activity  - Consider nutritional services referral to assist patient with adequate nutrition and appropriate food choices  Outcome: Progressing     Problem: GENITOURINARY - ADULT  Goal: Maintains or returns to baseline urinary function  Description: INTERVENTIONS:  - Assess urinary function  - Encourage oral fluids to ensure adequate hydration if ordered  - Administer IV fluids as ordered to ensure adequate hydration  - Administer ordered medications as needed  - Offer frequent toileting  - Follow urinary retention protocol if ordered  Outcome: Progressing  Goal: Absence of urinary retention  Description: INTERVENTIONS:  - Assess patient’s ability to void and empty bladder  - Monitor I/O  - Bladder scan as needed  - Discuss with physician/AP medications to alleviate retention as needed  - Discuss catheterization for long term situations as appropriate  Outcome: Progressing     Problem: METABOLIC, FLUID AND ELECTROLYTES - ADULT  Goal: Fluid balance maintained  Description: INTERVENTIONS:  - Monitor labs   - Monitor I/O and WT  - Instruct patient on fluid and nutrition as appropriate  - Assess for signs & symptoms of volume excess or deficit  Outcome: Progressing     Problem: SKIN/TISSUE INTEGRITY - ADULT  Goal: Skin Integrity remains intact(Skin Breakdown Prevention)  Description: Assess:  -Perform Jame assessment every shift  -Clean and moisturize skin every day  -Inspect skin when repositioning, toileting, and assisting with ADLS  -Assess extremities for adequate circulation and sensation     Bed Management:  -Have minimal linens on bed & keep smooth, unwrinkled  -Change linens as needed when moist or perspiring    Toileting:  -Offer bedside commode  -Assess for incontinence every shift  -Use incontinent care products after each incontinent episode such as pull-up    Activity:  -Encourage activity and walks on unit  -Encourage or provide ROM exercises   -Use appropriate equipment to lift or move patient in bed    Skin Care:  -Avoid use of baby powder, tape, friction and shearing, hot water or constrictive clothing  -Do not massage red bony areas  Outcome: Progressing     Problem: HEMATOLOGIC - ADULT  Goal: Maintains hematologic stability  Description: INTERVENTIONS  - Assess for signs and symptoms of bleeding or hemorrhage  - Monitor labs  - Administer supportive blood products/factors as ordered and appropriate  Outcome: Progressing

## 2023-04-02 NOTE — PROGRESS NOTES
04/02/23 1230   Pain Assessment   Pain Assessment Tool 0-10   Pain Score No Pain   Restrictions/Precautions   Precautions Fall Risk;Supervision on toilet/commode   Weight Bearing Restrictions No   ROM Restrictions No   Cognition   Arousal/Participation Cooperative   Subjective   Subjective Pt reports that she is very weak today, specifically in her right thigh and is afraid her knees are going to buckle  Sit to Stand   Type of Assistance Needed Incidental touching   Comment CGA for STS from recliner chair chair although multiple attempts required initially although pt progressed throughout session to CGA-SBA with reduced time to complete task  Pt required about 10% cues to push up from arm rests instead of pulling from RW to perform STS  Sit to Stand CARE Score 4   Bed-Chair Transfer   Type of Assistance Needed Incidental touching; Adaptive equipment   Comment RW   Chair/Bed-to-Chair Transfer CARE Score 4   Transfer Bed/Chair/Wheelchair   Limitations Noted In Balance;Confidence; Endurance;LE Strength   Adaptive Equipment Roller Walker   Stand Pivot Contact Guard   Sit to [employer] 10 Feet   Type of Assistance Needed Incidental touching; Adaptive equipment   Comment RW   Walk 10 Feet CARE Score 4   Walk 50 Feet with Two Turns   Type of Assistance Needed Incidental touching; Adaptive equipment   Walk 50 Feet with Two Turns CARE Score 4   Walk 150 Feet   Type of Assistance Needed Incidental touching; Adaptive equipment   Walk 150 Feet CARE Score 4   Walking 10 Feet on Uneven Surfaces   Type of Assistance Needed Incidental touching; Adaptive equipment   Comment uneven mat surface 10ftx2 with RW with no LOB   Walking 10 Feet on Uneven Surfaces CARE Score 4   Ambulation   Primary Mode of Locomotion Prior to Admission Walk   Distance Walked (feet) 180 ft  (30, 125)   Assist Device Roller Walker   Gait Pattern Antalgic; Inconsistant Camryn; Slow Camryn;L foot drag;Narrow NIC;Shuffle;Step to;Decreased R "stance   Limitations Noted In Balance; Endurance; Sequencing;Speed;Strength   Walk Assist Level Contact Guard   Findings Pt initially ambulating at RW 30ft with very slow gait speed, antalgic gait with decreased weightbearing on R LE and shortened step to shuffling gait pattern on L with heightened fear of R knee buckling  pt confidence improved throughout session, and pt able to ambulate with step through gait pattern but required continuous verbal cues to promote even weightbearing and step through gait pattern   Does the patient walk? 2  Yes   Wheelchair mobility   Does the patient use a wheelchair? 0  No   Picking Up Object   Type of Assistance Needed Incidental touching   Comment Pt able to reach down and p/u small objectx2 from ground without UE support or LOB with CGA   Picking Up Object CARE Score 4   Therapeutic Interventions   Balance Standing unsupported at RW to perform the following static/dynamic standing balance: eyes closed x15\", narrow NIC x15\", modified tandem stance x15\" b/l, fwd reaching x10b/l, trunk twists with head turns x10 b/l (bwd LOB x3)   Equipment Use   NuStep 10 min lvl 1   Other Comments   Comments TUG testin\", 35\", 33\" (avg of 35\")  modified 5x STS with b/l UE support = 58\"   Assessment   Treatment Assessment Pt pleasant and participative in 90 min PT session  Pt initially lacking confidence in her LE strength and demonstrated significant reduction in R LE weightbearing with decreased L step length during gait as she was reporting onset of weakness and was fearful of her R knee buckling  With CGA and verbal cues, pt more confident and able to progress to step through gait pattern with no knee buckling and improved gait speed and ambulatory distance throughout session  TUG testing avg of 35\" with RW indicating high fall risk due to reduced gait speed although safe gait pattern   Pt losing balance posteriorly during unsupported static and dynamic tasks, most notably during trunk " twists with head turn to look behind shoulder  With cues to reduce speed of movement and anteriorly weight shift, pt able to perform this task without posterior LOB  Pt would benefit from additional balance training to reduce fall risk  Pt is progressing towards goals  Problem List Decreased strength;Decreased endurance; Impaired balance   Barriers to Discharge Inaccessible home environment;Decreased caregiver support   PT Barriers   Physical Impairment Decreased strength;Decreased endurance; Impaired balance;Decreased safety awareness   Functional Limitation Car transfers;Stair negotiation;Standing;Transfers; Walking   Plan   Treatment/Interventions Functional transfer training;LE strengthening/ROM; Elevations; Therapeutic exercise; Endurance training;Bed mobility;Gait training   Progress Progressing toward goals   PT Therapy Minutes   PT Time In 1230   PT Time Out 1400   PT Total Time (minutes) 90   PT Mode of treatment - Individual (minutes) 90   PT Mode of treatment - Concurrent (minutes) 0   PT Mode of treatment - Group (minutes) 0   PT Mode of treatment - Co-treat (minutes) 0   PT Mode of Treatment - Total time(minutes) 90 minutes   PT Cumulative Minutes 615   Therapy Time missed   Time missed?  No

## 2023-04-02 NOTE — PROGRESS NOTES
04/02/23 0830   Pain Assessment   Pain Assessment Tool 0-10   Pain Score 5   Pain Location/Orientation Location: Generalized   Hospital Pain Intervention(s) Shower/Bath   Restrictions/Precautions   Precautions Fall Risk;Bed/chair alarms   Weight Bearing Restrictions No   Oral Hygiene   Type of Assistance Needed Incidental touching   Comment pt completed in stance at sink, brief periods of rest to manage low back pain and LE weakness  Pt required CGA when in stance with UE release 2* balance deficits  Oral Hygiene CARE Score 4   Shower/Bathe Self   Type of Assistance Needed Incidental touching   Comment Pt completed bathing routine in walk in shower seated on chair for majority of bathign tasks, in stance for bottom and rm hygiene only  Pt req CGA for standing balance with UE release, required use of grab bars for steadying when in stance  Pt reports NO shower chair or bars in place at home environment   Shower/Bathe Self CARE Score 4   Tub/Shower Transfer   Limitations Noted In Balance; Endurance;UE Strength;LE Strength   Adaptive Equipment Grab Bars;Seat with Back   Findings CGA with use of grab bars and RW   Upper Body Dressing   Type of Assistance Needed Incidental touching   Comment pt req CGA to retrieve clothing with RW   Upper Body Dressing CARE Score 4   Lower Body Dressing   Type of Assistance Needed Physical assistance   Physical Assistance Level 25% or less   Comment Assist for adjustment of pants over R hip 2* balane and endurance deficits with UE release  Pt able to thread b/l LEs with AE prn however preferred crossed leg tech   Pt provided edu on EC and body mechanics/joint protection to inc tolerance for ADL routine   Lower Body Dressing CARE Score 3   Putting On/Taking Off Footwear   Type of Assistance Needed Set-up / clean-up   Putting On/Taking Off Footwear CARE Score 5   Sit to Stand   Type of Assistance Needed Supervision   Sit to Stand CARE Score 4   Bed-Chair Transfer   Type of Assistance "Needed Incidental touching   Comment cues today to inc step clearance of b/l LEs when turning to approach surface for sitting  Pt able to self correct for adjustment of RW and safe hand placement indep   Chair/Bed-to-Chair Transfer CARE Score 4   Toileting Hygiene   Type of [address] CARE Score 4   Toileting   Able to [address] down yes, up yes  Able to Manage Clothing Closures Yes   Toilet Transfer   Type of Assistance Needed Incidental touching   Toilet Transfer CARE Score 4   Health Management   Health Management At end of ADL tx, brief discussion and education on medication management,  Pt able to verbalize appropriate locations for placement of medicated creams and able to apply appropriately following RN instruction/supervision  Pt will require next session trial pill box management   Assessment   Treatment Assessment Pt engaged in skilled OT tx session with focus on ADL fxn, medication management, d/c planning/education, and DME recommendations  see above for further details  Pt today overall Min A for ADLs and xfers varying from supervision to CGA pending fatigue/endurance  Pt overall reports \"she does not feel confident\" in her capabilities regarding balance, strength, and act tolerance when discussing discharge plan  Pt has no local support and states she lives alone  Pt today demo ability to potentially achieve mod I level of function with inc therapy focusing on standing tolerance, standing balance with UE release, overall endurance/act tolerance and UB endurance/strengthening to compensate for LE weakness and assist with transfers  anticipate pt will require RW, BSC, LHAE  and tub bench vs shower chair   OT to continue to address   Prognosis Good   OT Therapy Minutes   OT Time In 0830   OT Time Out 1000   OT Total Time (minutes) 90   OT Mode of treatment - Individual (minutes) 90   OT Mode of treatment - Concurrent (minutes) 0   OT Mode of treatment - Group " (minutes) 0   OT Mode of treatment - Co-treat (minutes) 0   OT Mode of Treatment - Total time(minutes) 90 minutes   OT Cumulative Minutes 760   Therapy Time missed   Time missed?  No

## 2023-04-03 LAB
ALBUMIN SERPL BCP-MCNC: 3.4 G/DL (ref 3.5–5)
ALP SERPL-CCNC: 62 U/L (ref 34–104)
ALT SERPL W P-5'-P-CCNC: 46 U/L (ref 7–52)
ANION GAP SERPL CALCULATED.3IONS-SCNC: 10 MMOL/L (ref 4–13)
AST SERPL W P-5'-P-CCNC: 31 U/L (ref 13–39)
BASOPHILS # BLD AUTO: 0.05 THOUSANDS/ΜL (ref 0–0.1)
BASOPHILS NFR BLD AUTO: 1 % (ref 0–1)
BILIRUB SERPL-MCNC: 0.37 MG/DL (ref 0.2–1)
BUN SERPL-MCNC: 18 MG/DL (ref 5–25)
CALCIUM ALBUM COR SERPL-MCNC: 9.4 MG/DL (ref 8.3–10.1)
CALCIUM SERPL-MCNC: 8.9 MG/DL (ref 8.4–10.2)
CHLORIDE SERPL-SCNC: 100 MMOL/L (ref 96–108)
CO2 SERPL-SCNC: 26 MMOL/L (ref 21–32)
CREAT SERPL-MCNC: 0.67 MG/DL (ref 0.6–1.3)
EOSINOPHIL # BLD AUTO: 0.16 THOUSAND/ΜL (ref 0–0.61)
EOSINOPHIL NFR BLD AUTO: 2 % (ref 0–6)
ERYTHROCYTE [DISTWIDTH] IN BLOOD BY AUTOMATED COUNT: 12.2 % (ref 11.6–15.1)
GFR SERPL CREATININE-BSD FRML MDRD: 86 ML/MIN/1.73SQ M
GLUCOSE P FAST SERPL-MCNC: 102 MG/DL (ref 65–99)
GLUCOSE SERPL-MCNC: 102 MG/DL (ref 65–140)
HCT VFR BLD AUTO: 36.2 % (ref 34.8–46.1)
HGB BLD-MCNC: 11.7 G/DL (ref 11.5–15.4)
IMM GRANULOCYTES # BLD AUTO: 0.13 THOUSAND/UL (ref 0–0.2)
IMM GRANULOCYTES NFR BLD AUTO: 2 % (ref 0–2)
LYMPHOCYTES # BLD AUTO: 2.64 THOUSANDS/ΜL (ref 0.6–4.47)
LYMPHOCYTES NFR BLD AUTO: 31 % (ref 14–44)
MCH RBC QN AUTO: 33.2 PG (ref 26.8–34.3)
MCHC RBC AUTO-ENTMCNC: 32.3 G/DL (ref 31.4–37.4)
MCV RBC AUTO: 103 FL (ref 82–98)
MONOCYTES # BLD AUTO: 0.58 THOUSAND/ΜL (ref 0.17–1.22)
MONOCYTES NFR BLD AUTO: 7 % (ref 4–12)
NEUTROPHILS # BLD AUTO: 4.92 THOUSANDS/ΜL (ref 1.85–7.62)
NEUTS SEG NFR BLD AUTO: 57 % (ref 43–75)
NRBC BLD AUTO-RTO: 0 /100 WBCS
PLATELET # BLD AUTO: 432 THOUSANDS/UL (ref 149–390)
PMV BLD AUTO: 10.2 FL (ref 8.9–12.7)
POTASSIUM SERPL-SCNC: 4 MMOL/L (ref 3.5–5.3)
PROT SERPL-MCNC: 6.6 G/DL (ref 6.4–8.4)
RBC # BLD AUTO: 3.52 MILLION/UL (ref 3.81–5.12)
SODIUM SERPL-SCNC: 136 MMOL/L (ref 135–147)
WBC # BLD AUTO: 8.48 THOUSAND/UL (ref 4.31–10.16)

## 2023-04-03 RX ADMIN — FLUTICASONE PROPIONATE 1 SPRAY: 50 SPRAY, METERED NASAL at 08:05

## 2023-04-03 RX ADMIN — ATORVASTATIN CALCIUM 10 MG: 10 TABLET, FILM COATED ORAL at 17:09

## 2023-04-03 RX ADMIN — Medication 1 TABLET: at 08:06

## 2023-04-03 RX ADMIN — DICLOFENAC SODIUM 2 G: 10 GEL TOPICAL at 08:05

## 2023-04-03 RX ADMIN — GABAPENTIN 100 MG: 100 CAPSULE ORAL at 17:09

## 2023-04-03 RX ADMIN — FAMOTIDINE 40 MG: 20 TABLET, FILM COATED ORAL at 17:09

## 2023-04-03 RX ADMIN — PRIMIDONE 50 MG: 50 TABLET ORAL at 21:32

## 2023-04-03 RX ADMIN — CHOLECALCIFEROL TAB 25 MCG (1000 UNIT) 1000 UNITS: 25 TAB at 08:06

## 2023-04-03 RX ADMIN — GABAPENTIN 100 MG: 100 CAPSULE ORAL at 08:06

## 2023-04-03 RX ADMIN — ENOXAPARIN SODIUM 40 MG: 40 INJECTION SUBCUTANEOUS at 08:05

## 2023-04-03 NOTE — ASSESSMENT & PLAN NOTE
Improved  Presented to [facility] on 3/24 with R foot pain  R foot XR: No acute osseous abnormality  Does also have soft tissue thickening and capsulobursal thickening on XR  Started on Prednisone 40mg daily x5 days on 3/27  Completed on 3/31  Continue stretching with PT/OT  Proper foot wear

## 2023-04-03 NOTE — PROGRESS NOTES
"   04/03/23 1230   Pain Assessment   Pain Assessment Tool 0-10   Pain Score No Pain   Restrictions/Precautions   Precautions Bed/chair alarms;Cognitive; Fall Risk;Supervision on toilet/commode   Weight Bearing Restrictions No   ROM Restrictions No   Lifestyle   Autonomy \"I would sort the evening meds at the end of the pill organizer  \"   Sit to Stand   Type of Assistance Needed Physical assistance; Adaptive equipment;Verbal cues   Physical Assistance Level 25% or less   Comment Ayesha STS to RW with vc's for technique; Retropulsive with STS's   Sit to Stand CARE Score 3   Bed-Chair Transfer   Type of Assistance Needed Physical assistance; Adaptive equipment;Verbal cues   Physical Assistance Level 25% or less   Comment min/CGA with RW; Vc's for safe RW management   Chair/Bed-to-Chair Transfer CARE Score 3   Toileting Hygiene   Type of Assistance Needed Physical assistance; Adaptive equipment   Physical Assistance Level 25% or less   Comment Initial Ayesha while in stance for CM/Hyg; Retropulsive req A to maintain balance   Toileting Hygiene CARE Score 3   Toilet Transfer   Type of Assistance Needed Physical assistance; Adaptive equipment   Physical Assistance Level 25% or less   Comment Ayesha/CGA SPT with RW to over-the-toilet; Retropulsive   Toilet Transfer CARE Score 3   Health Management   Health Management (S)  Extended period of time spent on medication management task  Pt provided with current list of scheduled medications with brief review of each medication  Pt has list of medications taken PTA  OT compared 2 medication lists with new medications since hospital admission including Gabapentin and Lovenox injection  Medication reference sheet created for pt to easily identify each medications purpose/dosage/frequency  Pt reports utilizing a single slot pill organizer PTA  Pt provided with single slot organizer to simulate med management routine at home   Pt demonstrates difficulty dmeonstrating home routine, stating \"I " "would put all of the medications into this organizer on Sunday  \" When OT asked pt how she would differentiate between morning/evening/bedtime pt initially stating \"I would put the evening meds towards the end of the box\" with pt pointing toward Wednesdaya nd Thursday slot of pill organizer  OT again confirmed that pt is utilizing the same pill organizer at home  Pt able to confirm \"Yes, it is Sunday - Saturday  \" Pt unable to further explain how she is able to correctly identify morning vs evening vs bed time meds each morning, at one point stating \"I guess I take them all at once maybe? \" Alternative options discussed with introduction to AM/PM pill organizer and EDU on blister packs from local Pharmacy  Pt did not appear receptive to trialing AM/PM pill organizer  At this time, pt is anticipated to need assistance with managing medications upon D/C  Cognition   Overall Cognitive Status Impaired   Arousal/Participation Alert; Cooperative   Attention Attends with cues to redirect   Orientation Level Oriented X4   Memory Decreased short term memory   Following Commands Follows one step commands with increased time or repetition   Activity Tolerance   Activity Tolerance Patient tolerated treatment well   Assessment   Treatment Assessment Pt participated in 90 min skilled OT Tx session with focus on ADL of toileting, IADL of medication management, and improving overall activity tolerance  See above for further Tx details  Upon beginning Tx session, OTR present and discussing alternative D/C plans  Pt reports limited caregiver support, as her son works night shift and her sister is experiencing her own medical issues  Pt did appear agreeable to transitioning to STR from [facility] prior to D/C if appropriate  Required overall Ayesha to maintain balance during ADL of toileting and fxnl transfer's as pt observed retropulsive throughout Tx session   Increased difficulty with simulated medication management task as pt was unable to " demonstrate technique to sort and differentiate between morning vs evening vs bedtime pills in single slot pill organizer  Anticipated that pt will req assist with managing meds upon D/C  May benefit from blister packs, as pt has limited family support  Pt would continue to benefit from skilled OT services to improve fxnl standing balance, improve activity tolerance, IADL engagement, and increase IND with ADL transfer's and performance in order to progress towards OT goals and dec caregiver burden upon D/C  Prognosis Good   Problem List Decreased strength;Decreased endurance; Impaired balance;Decreased mobility; Decreased coordination;Decreased cognition   Barriers to Discharge Decreased caregiver support   Plan   Treatment/Interventions ADL retraining;Functional transfer training; Therapeutic exercise; Endurance training   Recommendation   OT Discharge Recommendation   (Pending progress)   Equipment Recommended Bedside commode   Commode Type Standard   OT Therapy Minutes   OT Time In 1230   OT Time Out 1400   OT Total Time (minutes) 90   OT Mode of treatment - Individual (minutes) 90   OT Mode of treatment - Concurrent (minutes) 0   OT Mode of treatment - Group (minutes) 0   OT Mode of treatment - Co-treat (minutes) 0   OT Mode of Treatment - Total time(minutes) 90 minutes   OT Cumulative Minutes 850   Therapy Time missed   Time missed?  No

## 2023-04-03 NOTE — ASSESSMENT & PLAN NOTE
Last DXA in 2019 showed osteoporosis  Receives Prolia injections as outpatient  Continue Vitamin D and calcium supplementation  Follows with Dr Serge Forman (Rheumatology) as outpatient

## 2023-04-03 NOTE — PROGRESS NOTES
[address] [facility]  Progress Note  Name: Neil Gómez  MRN: [id number]  Unit/Bed#: -01 I Date of Admission: 3/24/2023   Date of Service: 4/3/2023 I Hospital Day: 10    Assessment/Plan   Right knee pain  Assessment & Plan  Improved  R knee pain on 3/26 and edematous on 3/27  XR on 3/27 showed large joint effusion  Orthopedics consulted for arthrocentesis on 3/28  Fluid samples negative for bacteria/crystals  Follow-up with Dr Liseth Murrieta as OP as needed  Bilateral pain of leg and foot  Assessment & Plan  Improved  Presented to [facility] on 3/24 with R foot pain  R foot XR: No acute osseous abnormality  Does also have soft tissue thickening and capsulobursal thickening on XR  Started on Prednisone 40mg daily x5 days on 3/27  Completed on 3/31  Continue stretching with PT/OT  Proper foot wear  GERD (gastroesophageal reflux disease)  Assessment & Plan  Stable  Continue home Pepcid 40mg daily  Closed left ankle fracture, sequela  Assessment & Plan  Hx of L ankle fracture in 06/2022  Overdue for Orthopedics follow-up  Follow-up with Orthopedics on discharge  Continue with PT/OT  Considerations during therapy  Prediabetes  Assessment & Plan  A1c 5 9 on 10/11/22  Monitor fasting BG  Follow-up with PCP as outpatient  Elevated AST (SGOT)  Assessment & Plan  Improved  AST 23 from 70  Restarted Lipitor on 3/27  Asymptomatic  Continue to monitor routine CMP  Diarrhea  Assessment & Plan  Resolved  Had been experiencing diarrhea in acute setting  C-diff negative on 3/22  Likely viral in nature due to recent illness  Continue with symptom management  SARS-CoV-2 positive  Assessment & Plan  COVID + on 3/21  Isolation precautions discontinued on 3/30  Symptomatic care  Currently maintaining on RA  Rhabdomyolysis  Assessment & Plan  Improved, CK 94 on 3/27  CK was 6836 on admission  Improved after receiving IV fluids  Encourage PO fluid intake    Restarted statin  Tremor, essential  Assessment & Plan  Continue home primidone 50mg at HS  Follows with Dr Ralph Ivey (Neurology) as outpatient  History of cerebral hemorrhage  Assessment & Plan  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  MRI in 10/2022 showed small acute hematoma in the R putamen with mild regional mass effect and chronic infarcts in L basal ganglia  Had repeat MRI in 2023  Recommended to continue holding ASA at that time per Neurology  Scheduled to follow-up with Neurology (Dr Ralph Ivey) in additional 6 months as outpatient  Continue PT/OT  Idiopathic osteoporosis  Assessment & Plan  Last DXA in 2019 showed osteoporosis  Receives Prolia injections as outpatient  Continue Vitamin D and calcium supplementation  Follows with Dr Aby Gonzalez (Rheumatology) as outpatient  * Ambulatory dysfunction  Assessment & Plan  3/21 - mechanical fall with no injuries; generalized weakness  Had been experiencing URI symptoms - COVID + on 3/21  CK 6838 on admission  Hx of CVA with L sided deficits  Primary team following  Continue PT/OT  VTE Pharmacologic Prophylaxis:   Pharmacologic: Enoxaparin (Lovenox)  Mechanical VTE Prophylaxis in Place: Yes - sequential compression devices  Current Length of Stay: 10 day(s)    Current Patient Status: Inpatient Rehab     Discharge Plan: As per primary team     Code Status: Level 1 - Full Code    Subjective:   Pt examined while pt sitting in recliner in pt room  Currently denies any pain in her R knee or her B/L feet  Still has weakness in her LUE but feels that it is improving slightly with therapy  Appetite is starting to improve  Denies any congestion, cough, fevers, lightheadedness, dizziness, SOB, or palpitations  Had a BM today without any issues  Denies any diarrhea      Objective:     Vitals:   Temp (24hrs), Av 7 °F (36 5 °C), Min:97 2 °F (36 2 °C), Max:98 1 °F (36 7 °C)    Temp:  [97 2 °F (36 2 °C)-98 1 °F (36 7 °C)] 97 9 °F (36 6 °C)  HR:  [68-85] 68  Resp:  [18] 18  BP: (122-138)/(57-61) 128/57  SpO2:  [92 %-97 %] 92 %  Body mass index is 23 36 kg/m²  Review of Systems   Constitutional: Positive for appetite change (starting to improve since COVID)  Negative for chills, fatigue and fever  HENT: Negative for trouble swallowing  Eyes: Negative for visual disturbance  Respiratory: Negative for cough, chest tightness, shortness of breath, wheezing and stridor  Cardiovascular: Negative for chest pain, palpitations and leg swelling  Gastrointestinal: Negative for abdominal distention, abdominal pain, constipation, diarrhea, nausea and vomiting  LBM 4/3   Genitourinary: Negative for difficulty urinating  Musculoskeletal: Negative for arthralgias, back pain and gait problem  Neurological: Positive for weakness (LUE weakness, chronic, worsened post COVID but starting to improve)  Negative for dizziness, light-headedness, numbness and headaches  Psychiatric/Behavioral: Negative for dysphoric mood and sleep disturbance  The patient is not nervous/anxious  All other systems reviewed and are negative  Input and Output Summary (last 24 hours): Intake/Output Summary (Last 24 hours) at 4/3/2023 1051  Last data filed at 4/3/2023 0830  Gross per 24 hour   Intake 320 ml   Output 400 ml   Net -80 ml       Physical Exam:     Physical Exam  Vitals and nursing note reviewed  Constitutional:       General: She is not in acute distress  Appearance: Normal appearance  She is not ill-appearing  HENT:      Head: Normocephalic and atraumatic  Cardiovascular:      Rate and Rhythm: Normal rate and regular rhythm  Pulses: Normal pulses  Heart sounds: Normal heart sounds  No murmur heard  No friction rub  Pulmonary:      Effort: Pulmonary effort is normal  No respiratory distress  Breath sounds: Normal breath sounds  No wheezing or rhonchi     Abdominal:      General: Abdomen is flat  Bowel sounds are normal  There is no distension  Palpations: Abdomen is soft  There is no mass  Tenderness: There is no abdominal tenderness  There is no guarding or rebound  Hernia: No hernia is present  Musculoskeletal:      Cervical back: Normal range of motion and neck supple  No tenderness  Right lower leg: No edema  Left lower leg: No edema  Skin:     General: Skin is warm and dry  Neurological:      Mental Status: She is alert and oriented to person, place, and time  Motor: Weakness (LUE strength 4/5) present     Psychiatric:         Mood and Affect: Mood normal          Behavior: Behavior normal          Additional Data:     Labs:    Results from last 7 days   Lab Units 04/03/23  0503   WBC Thousand/uL 8 48   HEMOGLOBIN g/dL 11 7   HEMATOCRIT % 36 2   PLATELETS Thousands/uL 432*   NEUTROS PCT % 57   LYMPHS PCT % 31   MONOS PCT % 7   EOS PCT % 2     Results from last 7 days   Lab Units 04/03/23  0503   SODIUM mmol/L 136   POTASSIUM mmol/L 4 0   CHLORIDE mmol/L 100   CO2 mmol/L 26   BUN mg/dL 18   CREATININE mg/dL 0 67   ANION GAP mmol/L 10   CALCIUM mg/dL 8 9   ALBUMIN g/dL 3 4*   TOTAL BILIRUBIN mg/dL 0 37   ALK PHOS U/L 62   ALT U/L 46   AST U/L 31   GLUCOSE RANDOM mg/dL 102                       Labs reviewed    Imaging:    Imaging reviewed    Recent Cultures (last 7 days):     Results from last 7 days   Lab Units 03/28/23  1550   GRAM STAIN RESULT  2+ Polys  No bacteria seen   BODY FLUID CULTURE, STERILE  No growth       Last 24 Hours Medication List:   Current Facility-Administered Medications   Medication Dose Route Frequency Provider Last Rate   • acetaminophen  650 mg Oral Q6H PRN LORETO Oliveros     • atorvastatin  10 mg Oral Daily With [address]     • benzonatate  100 mg Oral TID PRN Nani Rocha MD     • bisacodyl  10 mg Rectal Daily PRN Nani Rocha MD     • calcium carbonate-vitamin D  1 tablet Oral Daily With Breakfast LORETO Marsh     • cholecalciferol  1,000 Units Oral Daily LORETO Marsh     • Diclofenac Sodium  2 g Topical 4x Daily Purvi Hernandez MD     • enoxaparin  40 mg Subcutaneous Daily Purvi Hernandez MD     • famotidine  40 mg Oral Daily With Zen Gonzalez MD     • fluticasone  1 spray Each Nare Daily Purvi Hernandez MD     • gabapentin  100 mg Oral BID Purvi Hernandez MD     • ondansetron  4 mg Oral Q6H PRN Purvi Hernandez MD     • polyethylene glycol  17 g Oral Daily PRN Purvi Hernandez MD     • primidone  50 mg Oral HS Krystyna Padilla MD     • sodium chloride  1 spray Each Nare Q1H PRN LORETO Marsh          M*Modal software was used to dictate this note  It may contain errors with dictating incorrect words or incorrect spelling  Please contact the provider directly with any questions

## 2023-04-03 NOTE — PROGRESS NOTES
04/03/23 0830   Pain Assessment   Pain Assessment Tool 0-10   Pain Score No Pain   Restrictions/Precautions   Precautions Fall Risk;Supervision on toilet/commode   Weight Bearing Restrictions No   ROM Restrictions No   Cognition   Overall Cognitive Status Impaired   Arousal/Participation Alert; Cooperative   Attention Attends with cues to redirect   Orientation Level Oriented X4   Memory Decreased short term memory   Following Commands Follows multistep commands with increased time or repetition   Subjective   Subjective Pt reports feeling okay today   Sit to Stand   Type of Assistance Needed Physical assistance   Physical Assistance Level 25% or less   Comment RW, fluctuating between CGA and min A due to occasional LOB posteriorly at end of sit to stand transfer, cues to push from arms of WC and keep weight shift forward   Sit to Stand CARE Score 3   Bed-Chair Transfer   Type of Assistance Needed Incidental touching   Physical Assistance Level No physical assistance   Comment RW, stand pivot, cues to take large steps to pivot as pt tends to take many small steps to turn   Chair/Bed-to-Chair Transfer CARE Score 4   Walk 10 Feet   Type of Assistance Needed Incidental touching   Physical Assistance Level No physical assistance   Comment RW   Walk 10 Feet CARE Score 4   Walk 50 Feet with Two Turns   Type of Assistance Needed Incidental touching   Physical Assistance Level No physical assistance   Comment RW   Walk 50 Feet with Two Turns CARE Score 4   Walking 10 Feet on Uneven Surfaces   Type of Assistance Needed Incidental touching   Physical Assistance Level No physical assistance   Comment 10 ft hallway ramp ascend/descend, cues to control descend, +CF for safety   Walking 10 Feet on Uneven Surfaces CARE Score 4   Ambulation   Primary Mode of Locomotion Prior to Admission Walk   Distance Walked (feet) 120 ft  (x2)   Assist Device Roller Walker   Gait Pattern Antalgic; Inconsistant Camryn; Slow Camryn; Step to;Shuffle;Narrow NIC; Improper weight shift   Limitations Noted In Balance; Endurance; Heel Strike;Speed;Strength;Swing   Provided Assistance with: Balance   Walk Assist Level Contact Guard   Findings pt req cues to take large steps to execute step through gait pattern due to dec L swing, pt also req cues to keep RW close to her as pt tends to allow it to drift to R, pt req standing rest break during 120 ft bout due to fatigue, +CF for safety   Does the patient walk? 2  Yes   Wheel 50 Feet with Two Turns   Reason if not Attempted Activity not applicable   Wheel 50 Feet with Two Turns CARE Score 9   Wheel 150 Feet   Reason if not Attempted Activity not applicable   Wheel 818 Feet CARE Score 9   Wheelchair mobility   Does the patient use a wheelchair? 0  No   Toilet Transfer   Type of Assistance Needed Incidental touching   Physical Assistance Level No physical assistance   Comment RW, Stand pivot to [facility] over toilet, CGA for balance   Toilet Transfer CARE Score 4   Therapeutic Interventions   Strengthening seated 2x15 BLE: LAQ 2#, hamstring curls grn tband, hip adduction w/ ball, hip abduction grn tband; standing 3x10 BLE: marching with RW   Other repeated stand pivot from WC to chair with cues to take large steps to turn and practice standing without post LOB x4   Assessment   Treatment Assessment Pt participated in 90 min skilled PT with focus on strengthening, ambulation, and functional transfers  Pt reports that her biggest limitation is weakness, especially on the L side  Pt ambulating to and from therapy gym with cues to take large steps to achieve step through pattern  Pt is limited by dec endurance during all activities and req therapeutic rest breaks t/o session to successfully complete  Pt with occasional LOB posteriorly during STS req min A for righting and sometimes multiple attempts to stand   Discussed possibility of needing additional A when being DC from [facility] however pt questionably receptive to idea of assisted living  Pt will need to be mod I to DC home as she lives alone and has limited additional  support  Plan to continue challenging balance and working on functional transfers to allow pt to be as independent as possible  Problem List Decreased strength;Decreased endurance; Impaired balance;Decreased mobility   Barriers to Discharge Decreased caregiver support   PT Barriers   Physical Impairment Decreased strength;Decreased endurance; Impaired balance;Decreased mobility   Functional Limitation Car transfers; Ramp negotiation;Stair negotiation;Standing;Transfers; Walking   Plan   Treatment/Interventions Functional transfer training;LE strengthening/ROM; Elevations; Therapeutic exercise; Endurance training;Patient/family training;Equipment eval/education; Bed mobility;Gait training   Progress Progressing toward goals   PT Therapy Minutes   PT Time In 0830   PT Time Out 1000   PT Total Time (minutes) 90   PT Mode of treatment - Individual (minutes) 90   PT Mode of treatment - Concurrent (minutes) 0   PT Mode of treatment - Group (minutes) 0   PT Mode of treatment - Co-treat (minutes) 0   PT Mode of Treatment - Total time(minutes) 90 minutes   PT Cumulative Minutes 705   Therapy Time missed   Time missed?  No

## 2023-04-03 NOTE — ASSESSMENT & PLAN NOTE
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  MRI in 10/2022 showed small acute hematoma in the R putamen with mild regional mass effect and chronic infarcts in L basal ganglia  Had repeat MRI in 01/2023  Recommended to continue holding ASA at that time per Neurology  Scheduled to follow-up with Neurology (Dr Arturo Stover) in additional 6 months as outpatient  Continue PT/OT

## 2023-04-03 NOTE — PROGRESS NOTES
Physical Medicine and Rehabilitation Progress Note  Cary Gong 78 y o  female MRN: [id number]  Unit/Bed#: -01 Encounter: [id number]      Assessment & Plan:     Decline in ADLs and mobility: Functional assessment - improving         FIM  Care Score  Admit Score Recent Score    Total assist  1-100% or 2p    Tot  most ADLs    Max assist 2-51-75%    Sub     Mod assist 3- 26-50%  Par  upper body dressing    Min assist 3- 25% or < Par   lower body dressing   CG assist 4  TA   bathing, upper body dressing   Sup/Setup 4-5 Sup   toileting hygiene   Mod-I/Indep 6 MI      Transfers   significant assist  contact-guard assist    Ambulation    total assist  150 feet contact-guard assist    Stairs   Total assist/NT      Goal: Largely modified independent with ADLs and mobility  Major barriers: Impaired balance, strength, fatigue, pain  Dispo: Home with estimate length of stay of 2 and half weeks from admission     SARS-CoV-2 positive  Assessment & Plan  Functioning, activity tolerance improving  - Continue PT/OT  Presented 3/21/2023 with weakness and function decline  COVID-19 infection identified  Treatment with mild pathway initiated and completed   Patient remained stable from a pulmonary standpoint and remained on room air  Had significant diarrhea now improved   Continue supportive care with Tessalon Perles, nasal spray  Monitor respiratory status  Off isolation 3/30/23 per prior rec    Right knee pain  Assessment & Plan  Remains much improved, continue PT  Right knee pain and swelling 3/27/23  Likely an arthritis flare   · X-ray showing large joint effusion  · Ortho consulted and performed arthrocentesis with 25 cc fluid removed   · Per ortho 3/31 - Right knee osteoarthritis with osteoarthritic flare status post bedside right knee aspiration now with significant subjective and clinical improvement  No concern for septic or crystal arthropathy at this time   She may follow up outpatient with Dr Sanjuana Cho for her right knee pain as needed  · Compression with ace wrap/ICE    Bilateral pain of leg and foot  Assessment & Plan  Remains improved  On admission had right ankle pain - x-ray completed and showing Minimal soft tissue thickening medial to the first metatarsal head and lateral to the fifth metatarsal head attributed to capsulobursal thickening  · Over the weekend at [facility] also had medial bilateral foot pain along plantar fascia  · Possible plantar fasciitis and also has evidence of bursitis    · Limiting her participation in therapies 3/27/23  · Prednisone 40mg x5 day per IM thru 3/31, gabapentin 100 mg BID, and topical Voltaren gel 3/28/23  · As needed Tylenol    Pain  Assessment & Plan  Remains improved   Prednisone per IM completed 3/31   S/P knee arthrocentesis   APAP PRN  Gabapentin 100mg BID   LD patch    History of cerebral hemorrhage  Assessment & Plan  Neuro exam seems to be improving with only subtle left-sided weakness  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   (of note MRI also showed focal R CR acute to subacute infarct as well as moderate chronic microangiopathic changes with dilated periventricular spaces v chronic lacunar infacts within sublentiform regions) - she was seen by neuro OP and recommended to remain off aspirin since   With baseline left hemiparesis and left hemibody impaired sensation  Follows with Dr Arturo Stover as an outpatient    Rhabdomyolysis  Assessment & Plan  Patient stained a slip onto the floor from her bed and was unable to get up for a prolonged period of time  CK 6836  Treated with IV fluids acute care  Trended down  Continue PT, OT       Diarrhea  Assessment & Plan  Improved possible from Covid infection   C  difficile negative  Monitor stools  Encourage oral hydration    Enlarged and hypertrophic nails  Assessment & Plan  Podiatry consult status postdebridement    At risk for venous thromboembolism (VTE)  Assessment & Plan  SCDs, ambulation, and lovenox       GERD (gastroesophageal reflux disease)  Assessment & Plan  Managed on Pepcid 40 mg daily home dose    Closed left ankle fracture, sequela  Assessment & Plan  Sustained June 2022  Weightbearing as tolerated  Need outpatient orthopedic follow-up    Elevated AST (SGOT)  Assessment & Plan  LFTs now WNL  Lipitor restarted 3/27/23    Tremor, essential  Assessment & Plan  Managed on home dose primidone 50 mg nightly  Adequately controlled  Located in hands bilaterally  Follows with Dr Lesa Dyson as an outpatient    Idiopathic osteoporosis  Assessment & Plan  Managed on Prolia as outpatient  Continue supplementation with vitamin D and calcium      Other Medical Issues:  • None    Follow-up providers and other issues to be followed up after discharge  • PCP  • Neurology  • Orthopedics    CODE: Level 1: Full Code    Restrictions include: Fall precautions    Objective: Allergies per EMR  Diagnostic Studies: Reviewed, no new imaging  XR knee 4+ vw right injury   Final Result by Zoie Ta MD (03/27 1455)      No acute osseous abnormality  Large joint effusion  Workstation performed: [id number]         XR foot 3+ vw right   Final Result by María Villafuerte MD (03/27 0809)      No acute osseous abnormality        Workstation performed: [id number]           See above as well    Laboratory: Labs reviewed  Results from last 7 days   Lab Units 04/03/23  0503 03/30/23  0511   HEMOGLOBIN g/dL 11 7 10 7*   HEMATOCRIT % 36 2 33 9*   WBC Thousand/uL 8 48 7 21     Results from last 7 days   Lab Units 04/03/23  0503 03/30/23  0511   BUN mg/dL 18 24   POTASSIUM mmol/L 4 0 3 7   CHLORIDE mmol/L 100 104   CREATININE mg/dL 0 67 0 59*   AST U/L 31  --    ALT U/L 46  --               Drug regimen reviewed, all potential adverse effects identified and addressed:    Scheduled Meds:  Current Facility-Administered Medications   Medication Dose Route Frequency Provider Last Rate   • acetaminophen  650 mg Oral Q6H PRN LORETO Restrepo     • atorvastatin  10 mg Oral Daily With LORETO Beyer     • benzonatate  100 mg Oral TID PRN Kurtis Mckinney MD     • bisacodyl  10 mg Rectal Daily PRN Kurtis Mckinney MD     • calcium carbonate-vitamin D  1 tablet Oral Daily With Breakfast LORETO Restrepo     • cholecalciferol  1,000 Units Oral Daily LORETO Restrepo     • Diclofenac Sodium  2 g Topical 4x Daily Kurtis Mckinney MD     • enoxaparin  40 mg Subcutaneous Daily Kurtis Mckinney MD     • famotidine  40 mg Oral Daily With Delbert Copeland MD     • fluticasone  1 spray Each Nare Daily Kurtis Mckinney MD     • gabapentin  100 mg Oral BID Kurtis Mckinney MD     • ondansetron  4 mg Oral Q6H PRN Kurtis Mckinney MD     • polyethylene glycol  17 g Oral Daily PRN Kurtis Mckinney MD     • primidone  50 mg Oral HS Krystynasneha Townsend MD     • sodium chloride  1 spray Each Nare Q1H PRN LORETO Restrepo         Chief Complaints:  Rehab follow-up     Subjective: On eval, patient reports still feeling somewhat weak but overall better  Patient denies significant knee pain bilateral feet or ankle pain, worsening strength, sensation, balance, constipation or other new complaints  ROS: A 10 point ROS was performed; negative except as noted above         Physical Exam:  Vitals:    04/03/23 0505   BP: 128/57   Pulse: 68   Resp: 18   Temp: 97 9 °F (36 6 °C)   SpO2: 92%     GEN:  Lying in bed in NAD   HEENT/NECK: MMM  CARDIAC: Regular rate rhythm, no murmers, no rubs, no gallops  LUNGS:  clear to auscultation, no wheezes, rales, or rhonchi  ABDOMEN: Soft, non-tender, non-distended, normal active bowel sounds  EXTREMITIES/SKIN:  no calf edema, no calf tenderness to palpation and No significant right knee edema or bilateral feet or ankle edema  NEURO:   MENTAL STATUS:  Appropriate wakefulness and interaction , CN II-XII: grossly intact  and Strength/MMT:  LUE shoulder abduction 4+/5, L EF/EF 4+/5, L distal UE 4+/5, LLE 5-/5; R intact  PSYCH:  Affect:  Euthymic      ** Please Note: Fluency Direct voice to text software may have been used in the creation of this document   **

## 2023-04-03 NOTE — PLAN OF CARE
Problem: NEUROSENSORY - ADULT  Goal: Remains free of injury related to seizures activity  Description: INTERVENTIONS  - Maintain airway, patient safety  and administer oxygen as ordered  - Monitor patient for seizure activity, document and report duration and description of seizure to physician/advanced practitioner  - If seizure occurs,  ensure patient safety during seizure  - Reorient patient post seizure  - Seizure pads on all 4 side rails  - Instruct patient/family to notify RN of any seizure activity including if an aura is experienced  - Instruct patient/family to call for assistance with activity based on nursing assessment  - Administer anti-seizure medications if ordered    Outcome: Completed     Problem: GASTROINTESTINAL - ADULT  Goal: Maintains or returns to baseline bowel function  Description: INTERVENTIONS:  - Assess bowel function  - Encourage oral fluids to ensure adequate hydration  - Administer IV fluids if ordered to ensure adequate hydration  - Administer ordered medications as needed  - Encourage mobilization and activity  - Consider nutritional services referral to assist patient with adequate nutrition and appropriate food choices  Outcome: Progressing  Goal: Maintains adequate nutritional intake  Description: INTERVENTIONS:  - Monitor percentage of each meal consumed  - Identify factors contributing to decreased intake, treat as appropriate  - Assist with meals as needed  - Monitor I&O, weight, and lab values if indicated  - Obtain nutrition services referral as needed  Outcome: Progressing  Goal: Establish and maintain optimal ostomy function  Description: INTERVENTIONS:  - Assess bowel function  - Encourage oral fluids to ensure adequate hydration  - Administer IV fluids if ordered to ensure adequate hydration   - Administer ordered medications as needed  - Encourage mobilization and activity  - Nutrition services referral to assist patient with appropriate food choices  - Assess stoma site  - Consider wound care consult   Outcome: Completed     Problem: GENITOURINARY - ADULT  Goal: Maintains or returns to baseline urinary function  Description: INTERVENTIONS:  - Assess urinary function  - Encourage oral fluids to ensure adequate hydration if ordered  - Administer IV fluids as ordered to ensure adequate hydration  - Administer ordered medications as needed  - Offer frequent toileting  - Follow urinary retention protocol if ordered  Outcome: Progressing  Goal: Absence of urinary retention  Description: INTERVENTIONS:  - Assess patient’s ability to void and empty bladder  - Monitor I/O  - Bladder scan as needed  - Discuss with physician/AP medications to alleviate retention as needed  - Discuss catheterization for long term situations as appropriate  Outcome: Progressing  Goal: Urinary catheter remains patent  Description: INTERVENTIONS:  - Assess patency of urinary catheter  - If patient has a chronic boucher, consider changing catheter if non-functioning  - Follow guidelines for intermittent irrigation of non-functioning urinary catheter  Outcome: Completed     Problem: METABOLIC, FLUID AND ELECTROLYTES - ADULT  Goal: Electrolytes maintained within normal limits  Description: INTERVENTIONS:  - Monitor labs and assess patient for signs and symptoms of electrolyte imbalances  - Administer electrolyte replacement as ordered  - Monitor response to electrolyte replacements, including repeat lab results as appropriate  - Instruct patient on fluid and nutrition as appropriate  Outcome: Progressing  Goal: Glucose maintained within target range  Description: INTERVENTIONS:  - Monitor Blood Glucose as ordered  - Assess for signs and symptoms of hyperglycemia and hypoglycemia  - Administer ordered medications to maintain glucose within target range  - Assess nutritional intake and initiate nutrition service referral as needed  Outcome: Completed     Problem: SKIN/TISSUE INTEGRITY - ADULT  Goal: Skin Integrity remains intact(Skin Breakdown Prevention)  Description: Assess:  -Perform Jame assessment every shift  -Clean and moisturize skin every shift  -Inspect skin when repositioning, toileting, and assisting with ADLS  -Assess extremities for adequate circulation and sensation     Bed Management:  -Have minimal linens on bed & keep smooth, unwrinkled  -Change linens as needed when moist or perspiring    Toileting:  -Offer bedside commode  -Assess for incontinence every shift  -Use incontinent care products after each incontinent episode such as pull-up    Activity:  -Encourage activity and walks on unit  -Encourage or provide ROM exercises   -Turn and reposition patient every 2 Hours  -Use appropriate equipment to lift or move patient in bed    Skin Care:  -Avoid use of baby powder, tape, friction and shearing, hot water or constrictive clothing  -Relieve pressure over bony prominences using pillow support  -Do not massage red bony areas    Outcome: Progressing  Goal: Incision(s), wounds(s) or drain site(s) healing without S/S of infection  Description: INTERVENTIONS  - Assess and document dressing, incision, wound bed, drain sites and surrounding tissue  - Provide patient and family education  - Perform skin care/dressing changes every shift  Outcome: Completed  Goal: Pressure injury heals and does not worsen  Description: Interventions:  - Implement low air loss mattress or specialty surface (Criteria met)  - Apply silicone foam dressing  - Apply fecal or urinary incontinence containment device   - Utilize friction reducing device or surface for transfers   - Consider nutrition services referral as needed  Outcome: Completed

## 2023-04-03 NOTE — ASSESSMENT & PLAN NOTE
Improved  R knee pain on 3/26 and edematous on 3/27  XR on 3/27 showed large joint effusion  Orthopedics consulted for arthrocentesis on 3/28  Fluid samples negative for bacteria/crystals  Follow-up with Dr Frank Plants as OP as needed

## 2023-04-04 RX ADMIN — GABAPENTIN 100 MG: 100 CAPSULE ORAL at 08:06

## 2023-04-04 RX ADMIN — PRIMIDONE 50 MG: 50 TABLET ORAL at 21:02

## 2023-04-04 RX ADMIN — GABAPENTIN 100 MG: 100 CAPSULE ORAL at 17:11

## 2023-04-04 RX ADMIN — Medication 1 TABLET: at 08:06

## 2023-04-04 RX ADMIN — ENOXAPARIN SODIUM 40 MG: 40 INJECTION SUBCUTANEOUS at 08:05

## 2023-04-04 RX ADMIN — ATORVASTATIN CALCIUM 10 MG: 10 TABLET, FILM COATED ORAL at 17:11

## 2023-04-04 RX ADMIN — DICLOFENAC SODIUM 2 G: 10 GEL TOPICAL at 12:09

## 2023-04-04 RX ADMIN — DICLOFENAC SODIUM 2 G: 10 GEL TOPICAL at 17:12

## 2023-04-04 RX ADMIN — FLUTICASONE PROPIONATE 1 SPRAY: 50 SPRAY, METERED NASAL at 08:05

## 2023-04-04 RX ADMIN — CHOLECALCIFEROL TAB 25 MCG (1000 UNIT) 1000 UNITS: 25 TAB at 08:06

## 2023-04-04 RX ADMIN — DICLOFENAC SODIUM 2 G: 10 GEL TOPICAL at 21:03

## 2023-04-04 RX ADMIN — DICLOFENAC SODIUM 2 G: 10 GEL TOPICAL at 08:06

## 2023-04-04 RX ADMIN — FAMOTIDINE 40 MG: 20 TABLET, FILM COATED ORAL at 17:11

## 2023-04-04 NOTE — ASSESSMENT & PLAN NOTE
Last DXA in 2019 showed osteoporosis  Receives Prolia injections as outpatient  Continue Vitamin D and calcium supplementation  Follows with Dr Aby Gonzalez (Rheumatology) as outpatient

## 2023-04-04 NOTE — PROGRESS NOTES
04/04/23 8535   Pain Assessment   Pain Assessment Tool 0-10   Pain Score 2   Pain Location/Orientation Orientation: Right;Location: Knee   Pain Onset/Description Onset: Ongoing; Onset: Gradual   Hospital Pain Intervention(s) Repositioned;Rest;Ambulation/increased activity   Restrictions/Precautions   Precautions Cognitive; Fall Risk;Pain;Supervision on toilet/commode   Weight Bearing Restrictions No   ROM Restrictions No   Cognition   Overall Cognitive Status Impaired   Arousal/Participation Alert; Cooperative   Attention Attends with cues to redirect   Orientation Level Oriented X4   Memory Decreased short term memory   Following Commands Follows multistep commands with increased time or repetition   Subjective   Subjective pt states she is tired but willing to participate in PT   Sit to Stand   Type of Assistance Needed Physical assistance   Physical Assistance Level 25% or less   Comment min A with RW however inproved to CG t/o session   Sit to Stand CARE Score 3   Bed-Chair Transfer   Type of Assistance Needed Incidental touching;Physical assistance; Adaptive equipment   Physical Assistance Level 25% or less   Comment RW stand pivot, cues to take large steps when turning   Chair/Bed-to-Chair Transfer CARE Score 3   Transfer Bed/Chair/Wheelchair   Limitations Noted In Balance; Endurance;LE Strength   Ambulation   Primary Mode of Locomotion Prior to Admission Walk   Distance Walked (feet) 150 ft  (x5)   Assist Device Hand Hold  (X1-2, gait belt)   Gait Pattern Antalgic; Inconsistant Camryn;Narrow NIC;Step to; Improper weight shift; Step through   Limitations Noted In Balance; Endurance;Posture;Speed;Strength   Provided Assistance with: Balance   Walk Assist Level Contact Guard;Minimum Assist   Findings PT interventions for walking/balance:several 150 ft ambulation trials with B HHA with cues to increase speed to work on dynamic balance and righting reactions,  pt with increased step through and step length with cueing, gait belt and CG/Ayesha x2 provided for balance however believe trialing HHA x1 next session could further challenge pt,   Does the patient walk? 2  Yes   Wheelchair mobility   Does the patient use a wheelchair? 0  No   Curb or Single Stair   Style negotiated Single stair   Type of Assistance Needed Physical assistance; Adaptive equipment   Physical Assistance Level 25% or less   1 Step (Curb) CARE Score 3   4 Steps   Type of Assistance Needed Physical assistance; Adaptive equipment   Physical Assistance Level 25% or less   4 Steps CARE Score 3   Stairs   Type Stairs   # of Steps 8   Assist Devices Bilateral Rail   Findings  steps, mix between reciprocal and nonreciprocal ascend/descend due to decreased trust of R knee, min A hands on hips for safety   Toilet Transfer   Type of Assistance Needed Incidental touching   Comment BSC over toilet, stand pivot with RW, A for clothing mgmt, I with hygiene   Toilet Transfer CARE Score 4   Toilet Transfer   Surface Assessed Raised Toilet   Limitations Noted In Balance;LE Strength   Therapeutic Interventions   Strengthening side step up onto first  step leading with LLE and BUE support on R HR, min A and gait belt, 2x10; sometimes a boost given secondary to LLE weakness; 2x5 sit to stand: 1 round using B arm rests 1 round using only R armrest with L hand on lap; side stepping 25 ft R 25 ft L with B HHA and cues to keep toes pointing forward   Balance 25 ft forward walking 25 ft backward walking x3 B HHA with cues to keep weight shift forward during backward walking to prevent LOB posteriorly; unsupported taking clothes from basket in front of patient and putting them on shelves to R and then from shelf back to basket x6 clothing items with gait belt and CG/Ayesha for balance; walking 40 ft while carrying basket with clothes min a and gait belt for balance   Other gave pt pair of sneakers (size 9) to wear while in [facility] to better simulate walking outside of hospital Other Comments   Comments HR taken after various activities to monitor intensity and are as follows: after second round 5 times STS- 100 bpm, after 8  steps- 120 bpm, after first round side step ups - 112 bpm, after second round side step ups- 108 bpm, after last round backward walking- 96  bpm   Assessment   Treatment Assessment This session focused on dynamic balance and right reactions as well as functional strengthening of pelvic/hip muscles  Pt with improved sit to stands this session with fewer LOB and fewer multiple attempts required to be successful  Pt overall req CG/Ayesha HHA x2 for ambulation with minimal LOB however could benefit from trialing HHA x1 to further challenge balance  Pt tolerated higher intensity exercise well demonstrating increased endurance  Pt to continue to benefit from dynamic balance exercises and unsupported tasks to promote independence  Problem List Decreased strength;Decreased range of motion;Decreased endurance; Impaired balance;Decreased mobility;Pain   Barriers to Discharge Decreased caregiver support   PT Barriers   Functional Limitation Car transfers; Ramp negotiation;Stair negotiation;Standing;Transfers; Walking   Plan   Treatment/Interventions Functional transfer training;LE strengthening/ROM; Elevations; Therapeutic exercise; Endurance training;Patient/family training;Equipment eval/education; Bed mobility;Gait training   Progress Progressing toward goals   PT Therapy Minutes   PT Time In 1345   PT Time Out 1515   PT Total Time (minutes) 90   PT Mode of treatment - Individual (minutes) 90   PT Mode of treatment - Concurrent (minutes) 0   PT Mode of treatment - Group (minutes) 0   PT Mode of treatment - Co-treat (minutes) 0   PT Mode of Treatment - Total time(minutes) 90 minutes   PT Cumulative Minutes 825   Therapy Time missed   Time missed?  No

## 2023-04-04 NOTE — PROGRESS NOTES
04/04/23 1105   Pain Assessment   Pain Score 3   Pain Location/Orientation Orientation: Right;Location: Knee   Pain Onset/Description Onset: Ongoing;Frequency: Intermittent   Hospital Pain Intervention(s) Repositioned; Ambulation/increased activity; Rest   Restrictions/Precautions   Precautions Bed/chair alarms;Cognitive; Fall Risk;Supervision on toilet/commode   Cognition   Arousal/Participation Cooperative   Subjective   Subjective pt reports feeling okay and willing to work on walking and endurance now   Sit to Stand   Type of Assistance Needed Incidental touching; Adaptive equipment   Sit to Stand CARE Score 4   Bed-Chair Transfer   Type of Assistance Needed Physical assistance; Adaptive equipment   Physical Assistance Level 25% or less   Comment CGA - Randolph   Chair/Bed-to-Chair Transfer CARE Score 3   Transfer Bed/Chair/Wheelchair   Limitations Noted In Balance; Endurance;LE Strength;UE Strength   Adaptive Equipment Roller Walker   Walk 10 Feet   Type of Assistance Needed Incidental touching; Adaptive equipment   Walk 10 Feet CARE Score 4   Walk 50 Feet with Two Turns   Type of Assistance Needed Incidental touching; Adaptive equipment   Walk 50 Feet with Two Turns CARE Score 4   Ambulation   Distance Walked (feet) 120 ft x2   Assist Device Roller Walker   Gait Pattern Inconsistant Camryn; Slow Camryn;Trendelenburg; Step through; Step to; Improper weight shift   Limitations Noted In Balance; Endurance; Heel Strike;Speed;Strength;Swing;Posture   Provided Assistance with: Balance   Walk Assist Level Contact Guard   Findings CGA with RW -   Toilet Transfer   Type of Assistance Needed Physical assistance; Adaptive equipment   Physical Assistance Level 25% or less   Comment needed a boost to stand   Toilet Transfer CARE Score 3   Toilet Transfer   Surface Assessed Raised Toilet   Limitations Noted In Balance; Endurance;LE Strength;UE Strength   Adaptive Equipment   (RW)   Equipment Use   NuStep use of RPE scale to help with intensity; worked on getting SPM 40-50, use of BUE  Education that NuStep is being used for cardiovascular endurance so we need the pace to be increased but we also don't wan tto make the R knee pain worse that she has ore difficulty walking  Up to 3/moderate out of 10 on modified RPE scale  10 min total, pulse rate after manually L radial pulse was 76bpm and RPE after 10 min was 4/10  Assessment   Treatment Assessment Pt cont to presnet with limited functional use of L hand / LUE and VC provided for encouragement for her to use it as much as possible; Randolph proivded to clothing management to pull pants up   Gait speed cont to be slow iwht use of RW, but no LOB and she was able to use the RW appropriately this session  Pt will benefit from cont skilled PT to progress activity tolerance, dynamic balance and righting reactions, as well as gait speed and (I) with IADLs  Problem List Decreased strength;Decreased range of motion;Decreased endurance; Impaired balance;Decreased mobility;Pain   Barriers to Discharge Decreased caregiver support   PT Barriers   Functional Limitation Car transfers;Stair negotiation;Standing;Transfers; Walking   Plan   Treatment/Interventions Functional transfer training;LE strengthening/ROM; Elevations; Therapeutic exercise; Endurance training;Patient/family training;Equipment eval/education; Bed mobility;Gait training   Progress Progressing toward goals   Recommendation   PT Discharge Recommendation   (cont rehab)   PT Therapy Minutes   PT Time In 1105   PT Time Out 1135   PT Total Time (minutes) 30   PT Mode of treatment - Individual (minutes) 30   PT Mode of treatment - Concurrent (minutes) 0   PT Mode of treatment - Group (minutes) 0   PT Mode of treatment - Co-treat (minutes) 0   PT Mode of Treatment - Total time(minutes) 30 minutes   PT Cumulative Minutes 735   Therapy Time missed   Time missed?  No

## 2023-04-04 NOTE — ASSESSMENT & PLAN NOTE
Improved  Presented to [facility] on 3/24 with R foot pain  R foot XR: No acute osseous abnormality  Does also have soft tissue thickening and capsulobursal thickening on XR  Started on Prednisone 40mg daily x5 days on 3/27  Completed on 3/31  Continue stretching with PT/OT  Proper foot wear

## 2023-04-04 NOTE — PLAN OF CARE
Problem: GASTROINTESTINAL - ADULT  Goal: Minimal or absence of nausea and/or vomiting  Description: INTERVENTIONS:  - Administer IV fluids if ordered to ensure adequate hydration  - Maintain NPO status until nausea and vomiting are resolved  - Nasogastric tube if ordered  - Administer ordered antiemetic medications as needed  - Provide nonpharmacologic comfort measures as appropriate  - Advance diet as tolerated, if ordered  - Consider nutrition services referral to assist patient with adequate nutrition and appropriate food choices  Outcome: Progressing     Problem: SKIN/TISSUE INTEGRITY - ADULT  Goal: Skin Integrity remains intact(Skin Breakdown Prevention)  Description: Assess:  -Perform Jame assessment every   -Clean and moisturize skin every   -Inspect skin when repositioning, toileting, and assisting with ADLS  -Assess under medical devices such as  every   -Assess extremities for adequate circulation and sensation     Bed Management:  -Have minimal linens on bed & keep smooth, unwrinkled  -Change linens as needed when moist or perspiring  -Avoid sitting or lying in one position for more than  hours while in bed  -Keep HOB at degrees     Toileting:  -Offer bedside commode  -Assess for incontinence every   -Use incontinent care products after each incontinent episode such as     Activity:  -Mobilize patient  times a day  -Encourage activity and walks on unit  -Encourage or provide ROM exercises   -Turn and reposition patient every  Hours  -Use appropriate equipment to lift or move patient in bed  -Instruct/ Assist with weight shifting every  when out of bed in chair  -Consider limitation of chair time  hour intervals    Skin Care:  -Avoid use of baby powder, tape, friction and shearing, hot water or constrictive clothing  -Relieve pressure over bony prominences using   -Do not massage red bony areas    Next Steps:  -Teach patient strategies to minimize risks such as    -Consider consults to interdisciplinary teams such as Outcome: Progressing

## 2023-04-04 NOTE — PROGRESS NOTES
04/04/23 0830   Pain Assessment   Pain Assessment Tool 0-10   Pain Score No Pain   Restrictions/Precautions   Precautions Bed/chair alarms;Cognitive; Fall Risk;Supervision on toilet/commode   Weight Bearing Restrictions No   ROM Restrictions No   Oral Hygiene   Type of Assistance Needed Incidental touching   Physical Assistance Level No physical assistance   Comment CGA/CS in stance at sink top to perform oral hygiene routine   Oral Hygiene CARE Score 4   Grooming   Able To Initiate Tasks; Acquire Items;Comb/Brush Hair   Findings CGA/CS in stance at RW to perform grooming routine  Shower/Bathe Self   Type of Assistance Needed Incidental touching   Physical Assistance Level No physical assistance   Comment Performed shower ADL seated on shower chair with back for majority of shower with pt demonstrating ability to bathe 10/10 parts  CGA while in stance to bathe buttocks maintaining unilateral support on grab bar  Xleg technique to bathe LE's to feet  No LOB throughout shower routine  Shower/Bathe Self CARE Score 4   Bathing   Assessed Bath Style Shower   Anticipated D/C Bath Style (S)  Sponge Bath  (See note below )   Able to [place] Rashid No   Able to [employer] Temperature No   Able to Wash/Rinse/Dry (body part) Left Arm;Right Arm;L Upper Leg;R Upper Leg;L Lower Leg/Foot;R Lower Leg/Foot;Chest;Abdomen;Perineal Area; Buttocks   Limitations Noted in Balance; Endurance;Strength   Positioning Seated;Standing   Adaptive Equipment Shower Bars; Shower Seat;Hand Riojas's   Findings  Pt agreeable to SB upon initial D/C home  Tub/Shower Transfer   Limitations Noted In Balance; Endurance;LE Strength   Adaptive Equipment Grab Bars;Transfer Bench   Assessed Tub/shower combo  (DRY)   Findings (S)  Trialed dry tub xfer utilizing tub transfer bench and sit and swivel technique with pt req increased time to manage LLE in/out of tub environment  Required Ayesha to ascend from tub bench with pt demonstrating inc difficulty  Pt did express concern with water leaking onto flr due to tub bench being positioned outside of tub environment  Demo provided of how to properly position shower curtain to prevent bathroom flr from getting wet  Pt reports preference to SB upon initial D/C rather than purchase and utilize tub bench  Upper Body Dressing   Type of Assistance Needed Supervision   Physical Assistance Level No physical assistance   Comment Seated to doff/don OH shirt with increased time   Upper Body Dressing CARE Score 4   Lower Body Dressing   Type of Assistance Needed Incidental touching;Verbal cues   Physical Assistance Level No physical assistance   Comment Seated to perform fxnl reach to thread LE's into under garment and loose fitting pants  CGA while in stance at [facility] for CM up over hips with increased time  Cue to promote use of LUE, as pt tends to over compensate with RUE during CM task  Lower Body Dressing CARE Score 4   Putting On/Taking Off Footwear   Type of Assistance Needed Supervision   Physical Assistance Level No physical assistance   Comment Seated utilizing xleg technique to doff/don slipper socks   Putting On/Taking Off Footwear CARE Score 4   Sit to Stand   Type of Assistance Needed Incidental touching; Adaptive equipment   Physical Assistance Level No physical assistance   Comment CGA with RW   Sit to Stand CARE Score 4   Bed-Chair Transfer   Type of Assistance Needed Incidental touching; Adaptive equipment   Physical Assistance Level No physical assistance   Comment CGA with RW   Chair/Bed-to-Chair Transfer CARE Score 4   Toileting Hygiene   Type of Assistance Needed Incidental touching; Adaptive equipment;Verbal cues   Physical Assistance Level No physical assistance   Comment CGA in stance at [facility] for CM and hygiene; Increased time to manage pants up above buttocks with cues to promote use of LUE  Toileting Hygiene CARE Score 4   Toilet Transfer   Type of Assistance Needed Incidental touching; Adaptive equipment Physical Assistance Level No physical assistance   Comment CGA with RW to over-the-toilet commode   Toilet Transfer CARE Score 4   Meal Prep   Meal Prep Level Walker  (Folding RW tray)   Meal Prep Level of Assistance Contact guard   Meal Preparation Engaged in light meal prep task of preparing a cup of tea utilizing microwave to heat item  Pt able to retrieve cup and tea bag form OH cabinet, filling cup of water at sink top while in stance  Pt does req vc's for positioning, as she tends to over reach for items at times  Pt utilized folding RW tray to transport cup of water with tea bag to microwave environment  Pt able to operate microwave w/o difficulty, remaining in stance while item heated  Once item was complete, pt able to remove item from microwave environment, placing hot tea on RW tray to transport back to designated area  No LOB or c/o pain throughout activity  Pt s agreeable to therapy ordering folding RW tray for D/C  Cognition   Overall Cognitive Status Impaired   Arousal/Participation Alert; Cooperative   Attention Attends with cues to redirect   Orientation Level Oriented X4   Memory Decreased short term memory   Following Commands Follows one step commands with increased time or repetition   Activity Tolerance   Activity Tolerance Patient tolerated treatment well   Assessment   Treatment Assessment Pt engaged in 90 min skilled OT Tx session with focus on ADL performance, performing a courtney tub xfer, and light meal prep task  See above for further Tx details  Pt is demonstrating improvement with overall ADL performance, req CGA for ADL of bathing and dressing  Improvement noted with fxnl reach for LB ADLs and footwear management, as pt did not utilize Mickiel Booty throughout ADL routine  Pt did express interest in purchasing LHR for D/C in order to safely retrieve items from low surafces  Observed with dec use of LUE, as pt tends to compensate with RUE and req's vc's to promote fxnl use of LUE   Trialed dry tub xfer with pt dmeonstrating difficulty managing LLE in/out of tub environment despite utilizing tub transfer bench and sit/swivel technique  Pt is agreeable to SB upon initial D/C home  Continues to req cues for safe RW management and positioning during light meal prep task,as pt tends to over reach for items at times  No LOB with pt overall req CGA to heat a cup of tea in microwave environment  Pt is agreeable to purchasing folding RW tray for D/C  At this time, anticipated plan for pt to transition to TCF for further rehab prior to D/C home  Communicated pt's interest in TCF to OTR (Regina) and CM (Yaima Duran)  Pt would continue to benefit from skilled OT services to improve fxnl standing balance, promote use of LUE, improve activity to[place]rance, and maximize IND with ADL/IADL performance in order to progress towards OT goals and dec caregiver burden upon D/C  Prognosis Good   Problem List Decreased strength;Decreased range of motion;Decreased endurance; Impaired balance;Decreased mobility; Decreased coordination;Decreased cognition   Barriers to Discharge Decreased caregiver support   Plan   Treatment/Interventions ADL retraining;Functional transfer training; Therapeutic exercise; Endurance training   Progress Progressing toward goals   Recommendation   OT Discharge Recommendation   (Pending progress -Possible transition to TCF for cont'd rehab)   Equipment Recommended Bedside commode;Reacher ($)  (Folding RW tray)   Commode Type Standard   OT Therapy Minutes   OT Time In 0830   OT Time Out 1000   OT Total Time (minutes) 90   OT Mode of treatment - Individual (minutes) 90   OT Mode of treatment - Concurrent (minutes) 0   OT Mode of treatment - Group (minutes) 0   OT Mode of treatment - Co-treat (minutes) 0   OT Mode of Treatment - Total time(minutes) 90 minutes   OT Cumulative Minutes 940   Therapy Time missed   Time missed?  No

## 2023-04-04 NOTE — PROGRESS NOTES
[facility]  Progress Note  Name: Damien Jones  MRN: [id number]  Unit/Bed#: -01 I Date of Admission: 3/24/2023   Date of Service: 4/4/2023 I Hospital Day: 11    Assessment/Plan   Right knee pain  Assessment & Plan  Improved  R knee pain on 3/26 and edematous on 3/27  XR on 3/27 showed large joint effusion  Orthopedics consulted for arthrocentesis on 3/28  Fluid samples negative for bacteria/crystals  Follow-up with Dr Noemí Ruggiero as OP as needed  Bilateral pain of leg and foot  Assessment & Plan  Improved  Presented to [facility] on 3/24 with R foot pain  R foot XR: No acute osseous abnormality  Does also have soft tissue thickening and capsulobursal thickening on XR  Started on Prednisone 40mg daily x5 days on 3/27  Completed on 3/31  Continue stretching with PT/OT  Proper foot wear  GERD (gastroesophageal reflux disease)  Assessment & Plan  Stable  Continue home Pepcid 40mg daily  Closed left ankle fracture, sequela  Assessment & Plan  Hx of L ankle fracture in 06/2022  Overdue for Orthopedics follow-up  Follow-up with Orthopedics on discharge  Continue with PT/OT  Considerations during therapy  Prediabetes  Assessment & Plan  A1c 5 9 on 10/11/22  Monitor fasting BG  Follow-up with PCP as outpatient  Elevated AST (SGOT)  Assessment & Plan  Improved  AST 23 from 70  Restarted Lipitor on 3/27  Asymptomatic  Continue to monitor routine CMP  Diarrhea  Assessment & Plan  Resolved  Had been experiencing diarrhea in acute setting  C-diff negative on 3/22  Likely viral in nature due to recent illness  Continue with symptom management  SARS-CoV-2 positive  Assessment & Plan  COVID + on 3/21  Isolation precautions discontinued on 3/30  Symptomatic care  Currently maintaining on RA  Rhabdomyolysis  Assessment & Plan  Improved, CK 94 on 3/27  CK was 6836 on admission  Improved after receiving IV fluids  Encourage PO fluid intake    Restarted statin  Tremor, essential  Assessment & Plan  Continue home primidone 50mg at HS  Follows with Dr Tomeka Hannah (Neurology) as outpatient  History of cerebral hemorrhage  Assessment & Plan  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  MRI in 10/2022 showed small acute hematoma in the R putamen with mild regional mass effect and chronic infarcts in L basal ganglia  Had repeat MRI in 2023  Recommended to continue holding ASA at that time per Neurology  Scheduled to follow-up with Neurology (Dr Tomeka Hannah) in additional 6 months as outpatient  Continue PT/OT  Idiopathic osteoporosis  Assessment & Plan  Last DXA in 2019 showed osteoporosis  Receives Prolia injections as outpatient  Continue Vitamin D and calcium supplementation  Follows with Dr Quentin Diallo (Rheumatology) as outpatient  * Ambulatory dysfunction  Assessment & Plan  3/21 - mechanical fall with no injuries; generalized weakness  Had been experiencing URI symptoms - COVID + on 3/21  CK 6838 on admission  Hx of CVA with L sided deficits  Primary team following  Continue PT/OT  VTE Pharmacologic Prophylaxis:   Pharmacologic: Enoxaparin (Lovenox)  Mechanical VTE Prophylaxis in Place: Yes - sequential compression devices  Current Length of Stay: 11 day(s)    Current Patient Status: Inpatient Rehab     Discharge Plan: As per primary team     Code Status: Level 1 - Full Code    Subjective:   Pt examined while pt sitting in recliner in pt room  Currently has no complaints of pain  Denies any edema or pain to the R knee or B/L feet  Slept well last night but states that she wakes up every morning due to back pain  Usually resolves after getting out of bed  Not interested in Lidoderm patch at this time  Denies any headaches, lightheadedness, dizziness, SOB, or palpitations  Appetite continues to improve  Had a BM this morning without any issues      Objective:     Vitals:   Temp (24hrs), Av 3 °F (36 3 °C), Min:97 2 °F (36 2 °C), Max:97 4 °F (36 3 °C)    Temp:  [97 2 °F (36 2 °C)-97 4 °F (36 3 °C)] 97 4 °F (36 3 °C)  HR:  [70-89] 70  Resp:  [18] 18  BP: (103-116)/(54-59) 113/54  SpO2:  [92 %-94 %] 92 %  Body mass index is 23 36 kg/m²  Review of Systems   Constitutional: Negative for appetite change, chills, fatigue and fever  HENT: Negative for trouble swallowing  Eyes: Negative for visual disturbance  Respiratory: Negative for cough, chest tightness, shortness of breath, wheezing and stridor  Cardiovascular: Negative for chest pain, palpitations and leg swelling  Gastrointestinal: Negative for abdominal distention, abdominal pain, constipation, diarrhea, nausea and vomiting  LBM 4/4   Genitourinary: Negative for difficulty urinating  Musculoskeletal: Negative for arthralgias, back pain and gait problem  Neurological: Negative for dizziness, weakness, light-headedness, numbness and headaches  Psychiatric/Behavioral: Positive for sleep disturbance (sleeps well during the night but wakes up with lower back pain  Resolves after getting OOB)  Negative for dysphoric mood  The patient is not nervous/anxious  All other systems reviewed and are negative  Input and Output Summary (last 24 hours): Intake/Output Summary (Last 24 hours) at 4/4/2023 0910  Last data filed at 4/4/2023 0630  Gross per 24 hour   Intake 320 ml   Output 200 ml   Net 120 ml       Physical Exam:     Physical Exam  Vitals and nursing note reviewed  Constitutional:       General: She is not in acute distress  Appearance: Normal appearance  She is not ill-appearing  HENT:      Head: Normocephalic and atraumatic  Cardiovascular:      Rate and Rhythm: Normal rate and regular rhythm  Pulses: Normal pulses  Heart sounds: Normal heart sounds  No murmur heard  No friction rub  Pulmonary:      Effort: Pulmonary effort is normal  No respiratory distress        Breath sounds: Normal breath sounds  No wheezing or rhonchi  Abdominal:      General: Abdomen is flat  Bowel sounds are normal  There is no distension  Palpations: Abdomen is soft  There is no mass  Tenderness: There is no abdominal tenderness  There is no guarding or rebound  Hernia: No hernia is present  Musculoskeletal:      Cervical back: Normal range of motion and neck supple  No tenderness  Right lower leg: No edema  Left lower leg: No edema  Skin:     General: Skin is warm and dry  Neurological:      Mental Status: She is alert and oriented to person, place, and time  Motor: Weakness (LUE strength 4/5) present     Psychiatric:         Mood and Affect: Mood normal          Behavior: Behavior normal          Additional Data:     Labs:    Results from last 7 days   Lab Units 04/03/23  0503   WBC Thousand/uL 8 48   HEMOGLOBIN g/dL 11 7   HEMATOCRIT % 36 2   PLATELETS Thousands/uL 432*   NEUTROS PCT % 57   LYMPHS PCT % 31   MONOS PCT % 7   EOS PCT % 2     Results from last 7 days   Lab Units 04/03/23  0503   SODIUM mmol/L 136   POTASSIUM mmol/L 4 0   CHLORIDE mmol/L 100   CO2 mmol/L 26   BUN mg/dL 18   CREATININE mg/dL 0 67   ANION GAP mmol/L 10   CALCIUM mg/dL 8 9   ALBUMIN g/dL 3 4*   TOTAL BILIRUBIN mg/dL 0 37   ALK PHOS U/L 62   ALT U/L 46   AST U/L 31   GLUCOSE RANDOM mg/dL 102                       Labs reviewed    Imaging:    Imaging reviewed    Recent Cultures (last 7 days):     Results from last 7 days   Lab Units 03/28/23  1550   GRAM STAIN RESULT  2+ Polys  No bacteria seen   BODY FLUID CULTURE, STERILE  No growth       Last 24 Hours Medication List:   Current Facility-Administered Medications   Medication Dose Route Frequency Provider Last Rate   • acetaminophen  650 mg Oral Q6H PRN LORETO aFirbanks     • atorvastatin  10 mg Oral Daily With [address]     • benzonatate  100 mg Oral TID PRN Elodia Vigil MD     • bisacodyl  10 mg Rectal Daily PRN Elodia Vigil MD • calcium carbonate-vitamin D  1 tablet Oral Daily With Breakfast LORETO Quesada     • cholecalciferol  1,000 Units Oral Daily LORETO Quesada     • Diclofenac Sodium  2 g Topical 4x Daily Arely Islas MD     • enoxaparin  40 mg Subcutaneous Daily Arely Islas MD     • famotidine  40 mg Oral Daily With Lucian Babinski, MD     • fluticasone  1 spray Each Nare Daily Arely Islas MD     • gabapentin  100 mg Oral BID Arely Islas MD     • ondansetron  4 mg Oral Q6H PRN Arely Islas MD     • polyethylene glycol  17 g Oral Daily PRN Arely Islas MD     • primidone  50 mg Oral HS Krystyna Wright MD     • sodium chloride  1 spray Each Nare Q1H PRN LORETO Quesada          M*Modal software was used to dictate this note  It may contain errors with dictating incorrect words or incorrect spelling  Please contact the provider directly with any questions

## 2023-04-04 NOTE — ASSESSMENT & PLAN NOTE
Improved  R knee pain on 3/26 and edematous on 3/27  XR on 3/27 showed large joint effusion  Orthopedics consulted for arthrocentesis on 3/28  Fluid samples negative for bacteria/crystals  Follow-up with Dr Donna Conti as OP as needed

## 2023-04-04 NOTE — PLAN OF CARE
Problem: MOBILITY - ADULT  Goal: Maintain or return to baseline ADL function  Description: INTERVENTIONS:  -  Assess patient's ability to carry out ADLs; assess patient's baseline for ADL function and identify physical deficits which impact ability to perform ADLs (bathing, care of mouth/teeth, toileting, grooming, dressing, etc )  - Assess/evaluate cause of self-care deficits   - Assess range of motion  - Assess patient's mobility; develop plan if impaired  - Assess patient's need for assistive devices and provide as appropriate  - Encourage maximum independence but intervene and supervise when necessary  - Involve family in performance of ADLs  - Assess for home care needs following discharge   - Consider OT consult to assist with ADL evaluation and planning for discharge  - Provide patient education as appropriate  Outcome: Progressing     Problem: PAIN - ADULT  Goal: Verbalizes/displays adequate comfort level or baseline comfort level  Description: Interventions:  - Encourage patient to monitor pain and request assistance  - Assess pain using appropriate pain scale  - Administer analgesics based on type and severity of pain and evaluate response  - Implement non-pharmacological measures as appropriate and evaluate response  - Consider cultural and social influences on pain and pain management  - Notify physician/advanced practitioner if interventions unsuccessful or patient reports new pain  Outcome: Progressing     Problem: SAFETY ADULT  Goal: Patient will remain free of falls  Description: INTERVENTIONS:  - Educate patient/family on patient safety including physical limitations  - Instruct patient to call for assistance with activity   - Consult OT/PT to assist with strengthening/mobility   - Keep Call bell within reach  - Keep bed low and locked with side rails adjusted as appropriate  - Keep care items and personal belongings within reach  - Initiate and maintain comfort rounds  - Make Fall Risk Sign visible to staff  - Obtain necessary fall risk management equipment: rw  - Apply yellow socks and bracelet for high fall risk patients  - Consider moving patient to room near nurses station  Outcome: Progressing     Problem: DISCHARGE PLANNING  Goal: Discharge to home or other facility with appropriate resources  Description: INTERVENTIONS:  - Identify barriers to discharge w/patient and caregiver  - Arrange for needed discharge resources and transportation as appropriate  - Identify discharge learning needs (meds, wound care, etc )  - Arrange for interpretive services to assist at discharge as needed  - Refer to Case Management Department for coordinating discharge planning if the patient needs post-hospital services based on physician/advanced practitioner order or complex needs related to functional status, cognitive ability, or social support system  Outcome: Progressing

## 2023-04-04 NOTE — ASSESSMENT & PLAN NOTE
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

## 2023-04-05 RX ADMIN — ENOXAPARIN SODIUM 40 MG: 40 INJECTION SUBCUTANEOUS at 08:14

## 2023-04-05 RX ADMIN — PRIMIDONE 50 MG: 50 TABLET ORAL at 21:16

## 2023-04-05 RX ADMIN — ATORVASTATIN CALCIUM 10 MG: 10 TABLET, FILM COATED ORAL at 17:00

## 2023-04-05 RX ADMIN — GABAPENTIN 100 MG: 100 CAPSULE ORAL at 08:14

## 2023-04-05 RX ADMIN — DICLOFENAC SODIUM 2 G: 10 GEL TOPICAL at 08:14

## 2023-04-05 RX ADMIN — CHOLECALCIFEROL TAB 25 MCG (1000 UNIT) 1000 UNITS: 25 TAB at 08:14

## 2023-04-05 RX ADMIN — FAMOTIDINE 40 MG: 20 TABLET, FILM COATED ORAL at 17:00

## 2023-04-05 RX ADMIN — Medication 1 TABLET: at 08:14

## 2023-04-05 RX ADMIN — GABAPENTIN 100 MG: 100 CAPSULE ORAL at 17:00

## 2023-04-05 RX ADMIN — DICLOFENAC SODIUM 2 G: 10 GEL TOPICAL at 17:01

## 2023-04-05 RX ADMIN — ACETAMINOPHEN 650 MG: 325 TABLET ORAL at 03:12

## 2023-04-05 RX ADMIN — FLUTICASONE PROPIONATE 1 SPRAY: 50 SPRAY, METERED NASAL at 08:14

## 2023-04-05 NOTE — PROGRESS NOTES
"   04/05/23 1030   Pain Assessment   Pain Assessment Tool 0-10   Pain Score 2   Pain Location/Orientation   (R achilles)   Restrictions/Precautions   Precautions Cognitive; Fall Risk;Pain   Lifestyle   Autonomy \"I would be open to going to my sons if my friend can continue to watch over my cat  Maddy Denia Maddy Denia Maddy Denia I'm worried about my son's dogs knocking me over\"   Grooming   Findings CS in stance at sink w/ RW to wash hands   Sit to Stand   Type of Assistance Needed Incidental touching   Physical Assistance Level No physical assistance   Comment CGA w/ RW   Sit to Stand CARE Score 4   Toileting Hygiene   Type of Assistance Needed Incidental touching   Physical Assistance Level No physical assistance   Comment CGA in stance w/ RW following voiding   Toileting Hygiene CARE Score 4   Toilet Transfer   Type of Assistance Needed Incidental touching   Physical Assistance Level No physical assistance   Comment CGA w/ RW and BSC over toilet   Toilet Transfer CARE Score 4   Cognition   Overall Cognitive Status Impaired   Arousal/Participation Alert; Cooperative   Attention Attends with cues to redirect   Orientation Level Oriented X4   Memory Decreased short term memory   Following Commands Follows multistep commands with increased time or repetition   Additional Activities   Additional Activities Comments Engaged in fxnl cog and standing tolerance task  Trialed 60pc jigsaw puzzle, pt requiring ModA for problem solving, but with assist able to complete  Started task in stance, compelted 7min stand, then completed remainder of task while seated  Activity Tolerance   Activity Tolerance Patient tolerated treatment well   Assessment   Treatment Assessment OT session focusing on toileting tasks, UE TE, fxnl cog, and fxnl standing tolerance  Pt tolerated well  Noted that pt's son provided pt w/ the option of [address] to his house temporarily   Expectation remains for pt to be Mod I w/ basic ADLs and fxnl mobility at AL, but anticipate pt will require " assist for med management, as well as could benefit from intermittent assist w/ IADLs  Pt somewhat receptive to this option, also dec cog impacting her from complex problem solving  Will benefit from OT continuing to assess w/ problem solving for DC planning  Problem List Decreased strength;Decreased range of motion;Decreased endurance; Impaired balance;Decreased mobility; Decreased cognition; Impaired judgement;Decreased coordination;Pain   Plan   Treatment/Interventions ADL retraining;Functional transfer training; Therapeutic exercise; Endurance training;Cognitive reorientation;Patient/family training;Equipment eval/education; Compensatory technique education;Continued evaluation   Progress Progressing toward goals   OT Therapy Minutes   OT Time In 1040   OT Time Out 1150   OT Total Time (minutes) 70   OT Mode of treatment - Individual (minutes) 70   OT Mode of treatment - Concurrent (minutes) 0   OT Mode of treatment - Group (minutes) 0   OT Mode of treatment - Co-treat (minutes) 0   OT Mode of Treatment - Total time(minutes) 70 minutes   OT Cumulative Minutes 1010   Therapy Time missed   Time missed?  No

## 2023-04-05 NOTE — PROGRESS NOTES
"   04/05/23 5370   Pain Assessment   Pain Assessment Tool 0-10   Pain Score 2   Pain Location/Orientation   (R achilles)   Restrictions/Precautions   Precautions Cognitive; Fall Risk;Pain   Lifestyle   Autonomy \"Can I talk to the girl who talked to my son again? I'm wondering if my son really seemed OK w/ me going to his house, or if he seemed like he wouldn't want me to  \"   Sit to Stand   Type of Assistance Needed Incidental touching   Physical Assistance Level No physical assistance   Comment CS-CGA to RW   Sit to Stand CARE Score 4   Toileting Hygiene   Type of Assistance Needed Incidental touching   Physical Assistance Level No physical assistance   Comment CGA in stance, pt reluctant to use LUE w/ assisting pull up pants on L side   Toileting Hygiene CARE Score 4   Toilet Transfer   Type of Assistance Needed Incidental touching   Physical Assistance Level No physical assistance   Comment CGA w/ RW, posterior LOB when rising from [facility] over toilet, pt catches self by using GB so no physical assistance required  Toilet Transfer CARE Score 4   Light Housekeeping   Light Housekeeping Pt used foling RW tray to collect various clothing items from around PT/OT gym space  Pt then transports clothing to table top where she folded using table top for intermittent balance support  Pt demos appropriate technique w/ RW when approaching targetted items, turning w/ RW, and placing RW w/ tray the side when standing at table  Overall, CGA provided by no LOB w/ task  Pt in stance for over ten minutes  Cognition   Overall Cognitive Status Impaired   Arousal/Participation Cooperative   Memory Decreased short term memory   Additional Activities   Additional Activities Comments Fxnl mobility in hallway pt's room <> OT gym w/ RW and CGA assist  Pt requires extra time, no LOB     Activity Tolerance   Activity Tolerance Patient tolerated treatment well   Assessment   Treatment Assessment OT session focusing on IADL retraining, fxnl " activity tolerance  Pt demo'ing G tolerance for trialed tasks  OT to continue to treat and follow established POC  Pt seemingly more receptive to potential DC to son's house  Problem List Decreased strength;Decreased range of motion;Decreased endurance; Impaired balance;Decreased mobility; Decreased coordination;Decreased cognition; Impaired judgement;Pain   Plan   Treatment/Interventions ADL retraining;Functional transfer training; Therapeutic exercise; Endurance training;Cognitive reorientation;Patient/family training;Equipment eval/education; Compensatory technique education;Continued evaluation   Progress Progressing toward goals   OT Therapy Minutes   OT Time In 1330   OT Time Out 1420   OT Total Time (minutes) 50   OT Mode of treatment - Individual (minutes) 50   OT Mode of treatment - Concurrent (minutes) 0   OT Mode of treatment - Group (minutes) 0   OT Mode of treatment - Co-treat (minutes) 0   OT Mode of Treatment - Total time(minutes) 50 minutes   OT Cumulative Minutes 1060   Therapy Time missed   Time missed?  No

## 2023-04-05 NOTE — PROGRESS NOTES
04/05/23 0830   Pain Assessment   Pain Assessment Tool 0-10   Pain Score 2   Pain Location/Orientation Orientation: Right;Location: Foot  (R achilles)   Pain Onset/Description Frequency: Intermittent; Descriptor: Aching   Restrictions/Precautions   Precautions Cognitive; Fall Risk;Pain   Weight Bearing Restrictions No   ROM Restrictions No   Cognition   Overall Cognitive Status Impaired   Arousal/Participation Alert; Cooperative   Attention Attends with cues to redirect   Orientation Level Oriented X4   Memory Decreased short term memory   Following Commands Follows multistep commands with increased time or repetition   Subjective   Subjective Pt states her R achilles is hurting a little bit today and woke her up last night   Sit to Stand   Type of Assistance Needed Incidental touching   Physical Assistance Level No physical assistance   Comment RW, improved this session with occasional multiple attempts needed however no LOB posteriorly once standing   Sit to Stand CARE Score 4   Bed-Chair Transfer   Type of Assistance Needed Incidental touching   Physical Assistance Level No physical assistance   Comment RW, stand pivot with cues to take large steps   Chair/Bed-to-Chair Transfer CARE Score 4   Walk 10 Feet   Type of Assistance Needed Supervision   Physical Assistance Level No physical assistance   Comment RW   Walk 10 Feet CARE Score 4   Walk 50 Feet with Two Turns   Type of Assistance Needed Supervision   Physical Assistance Level No physical assistance   Comment RW   Walk 50 Feet with Two Turns CARE Score 4   Walk 150 Feet   Type of Assistance Needed Incidental touching  (and gait belt)   Physical Assistance Level No physical assistance   Comment no AD, limitations in endurance noted   Walk 150 Feet CARE Score 4   Ambulation   Primary Mode of Locomotion Prior to Admission Walk   Distance Walked (feet) 150 ft  (x1, 120x2)   Assist Device Roller Walker;Hand Hold   Gait Pattern Antalgic; Slow Camryn;Narrow NIC;Step "to;Step through;Trendelenburg;Decreased L stance; Improper weight shift   Limitations Noted In Balance; Endurance;Speed;Strength;Posture   Provided Assistance with: Balance   Walk Assist Level Supervision;Contact Guard   Findings pt ambulated to and from therapy gym from room with RW and S, trialed 150 ft without AD no LOB with CG and gait belt for balance and safety   Does the patient walk? 2  Yes   Wheelchair mobility   Does the patient use a wheelchair? 0  No   Toilet Transfer   Type of Assistance Needed Supervision  (close supervision)   Physical Assistance Level No physical assistance   Comment BSC over toilet, I with hygiene and clothing mgmt this session   Toilet Transfer CARE Score 4   Therapeutic Interventions   Strengthening 5TSTS with R armrest and L hand on seat with minimal LOB compared to previous sessions   Flexibility passive manual stretch R calf 5x20 sec due to achilles pain with no report of discomfort   Balance walking over canes of varying heights no AD with gait belt 4x12 feet one LOB with min A for correction; cone seek and find 5 cones with RW supervision 5 cones no AD with gait belt CGA one LOB with self-correction; alternating step taps to 6\" step- 10 each leg w/ BHR and supervision, 10 each leg with 1 HR and supervision, 10 each leg unsupported and min A   Assessment   Treatment Assessment Pt participated in 90 min skilled PT session with focus on balance and functional activities  Trialed activities without AD this session due to pt not using PTA as well as to challenge balance which pt tolerated well with minimal LOB noted  Pt limitations still include weakness of L side with decreased confidence with weight shifting onto LLE  Pt would continue to benefit from skilled PT services in order to improve balance and functional mobility to promote independence prior to DC  Problem List Decreased strength;Decreased range of motion;Decreased endurance; Impaired balance;Decreased mobility " Barriers to Discharge Decreased caregiver support   PT Barriers   Physical Impairment Decreased strength;Decreased range of motion;Decreased endurance; Impaired balance;Decreased mobility   Functional Limitation Car transfers; Ramp negotiation;Stair negotiation;Standing;Transfers; Walking   Plan   Treatment/Interventions Functional transfer training;LE strengthening/ROM; Elevations; Therapeutic exercise; Endurance training;Patient/family training;Equipment eval/education; Bed mobility;Gait training   Progress Progressing toward goals   PT Therapy Minutes   PT Time In 0830   PT Time Out 0930   PT Total Time (minutes) 60   PT Mode of treatment - Individual (minutes) 60   PT Mode of treatment - Concurrent (minutes) 0   PT Mode of treatment - Group (minutes) 0   PT Mode of treatment - Co-treat (minutes) 0   PT Mode of Treatment - Total time(minutes) 60 minutes   PT Cumulative Minutes 885   Therapy Time missed   Time missed?  No

## 2023-04-05 NOTE — PROGRESS NOTES
[facility]  Progress Note  Name: Violet Nguyễn  MRN: [id number]  Unit/Bed#: -01 I Date of Admission: 3/24/2023   Date of Service: 4/5/2023 I Hospital Day: 12    Assessment/Plan   Right knee pain  Assessment & Plan  Improved  R knee pain on 3/26 and edematous on 3/27  XR on 3/27 showed large joint effusion  Orthopedics consulted for arthrocentesis on 3/28  Fluid samples negative for bacteria/crystals  Follow-up with Dr Donna Conti as OP as needed  Bilateral pain of leg and foot  Assessment & Plan  Improved  Presented to [facility] on 3/24 with R foot pain  R foot XR: No acute osseous abnormality  Does also have soft tissue thickening and capsulobursal thickening on XR  Started on Prednisone 40mg daily x5 days on 3/27  Completed on 3/31  Continue stretching with PT/OT  Proper foot wear  GERD (gastroesophageal reflux disease)  Assessment & Plan  Stable  Continue home Pepcid 40mg daily  Closed left ankle fracture, sequela  Assessment & Plan  Hx of L ankle fracture in 06/2022  Overdue for Orthopedics follow-up  Follow-up with Orthopedics on discharge  Continue with PT/OT  Considerations during therapy  Prediabetes  Assessment & Plan  A1c 5 9 on 10/11/22  Monitor fasting BG  Follow-up with PCP as outpatient  Elevated AST (SGOT)  Assessment & Plan  Improved  AST 23 from 70  Restarted Lipitor on 3/27  Asymptomatic  Continue to monitor routine CMP  Diarrhea  Assessment & Plan  Resolved  Had been experiencing diarrhea in acute setting  C-diff negative on 3/22  Likely viral in nature due to recent illness  Continue with symptom management  SARS-CoV-2 positive  Assessment & Plan  COVID + on 3/21  Isolation precautions discontinued on 3/30  Symptomatic care  Currently maintaining on RA  Rhabdomyolysis  Assessment & Plan  Improved, CK 94 on 3/27  CK was 6836 on admission  Improved after receiving IV fluids  Encourage PO fluid intake    Restarted statin  Tremor, essential  Assessment & Plan  Continue home primidone 50mg at HS  Follows with Dr Mario Marquez (Neurology) as outpatient  History of cerebral hemorrhage  Assessment & Plan  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  MRI in 10/2022 showed small acute hematoma in the R putamen with mild regional mass effect and chronic infarcts in L basal ganglia  Had repeat MRI in 2023  Recommended to continue holding ASA at that time per Neurology  Scheduled to follow-up with Neurology (Dr Mario Marquez) in additional 6 months as outpatient  Continue PT/OT  Idiopathic osteoporosis  Assessment & Plan  Last DXA in 2019 showed osteoporosis  Receives Prolia injections as outpatient  Continue Vitamin D and calcium supplementation  Follows with Dr Rosibel Villagran (Rheumatology) as outpatient  * Ambulatory dysfunction  Assessment & Plan  3/21 - mechanical fall with no injuries; generalized weakness  Had been experiencing URI symptoms - COVID + on 3/21  CK 6838 on admission  Hx of CVA with L sided deficits  Primary team following  Continue PT/OT  VTE Pharmacologic Prophylaxis:   Pharmacologic: Enoxaparin (Lovenox)  Mechanical VTE Prophylaxis in Place: Yes - sequential compression devices  Current Length of Stay: 12 day(s)    Current Patient Status: Inpatient Rehab     Discharge Plan: As per primary team     Code Status: Level 1 - Full Code    Subjective:   Pt examined while pt sitting in recliner in pt room  Currently denies any pain  Did experience pain in her achilles overnight after therapy yesterday  Improved on its own and denies any pain this morning  Feels that certain exercises may have exacerbated the pain  Denies any headaches, lightheadedness, dizziness, SOB, or palpitations  Had a BM today  Still has weakness in the LUE but feels that it is gradually getting stronger      Objective:     Vitals:   Temp (24hrs), Av °F (36 7 °C), Min:97 8 °F (36 6 °C), Max:98 2 °F (36 8 °C)    Temp:  [97 8 °F (36 6 °C)-98 2 °F (36 8 °C)] 97 8 °F (36 6 °C)  HR:  [74-87] 74  Resp:  [18] 18  BP: (113-122)/(56-66) 113/56  SpO2:  [95 %-96 %] 96 %  Body mass index is 23 36 kg/m²  Review of Systems   Constitutional: Negative for appetite change, chills, fatigue and fever  HENT: Negative for trouble swallowing  Eyes: Negative for visual disturbance  Respiratory: Negative for cough, shortness of breath, wheezing and stridor  Cardiovascular: Negative for chest pain, palpitations and leg swelling  Gastrointestinal: Negative for abdominal distention, abdominal pain, constipation, diarrhea, nausea and vomiting  LBM 4/5   Genitourinary: Negative for difficulty urinating  Musculoskeletal: Negative for arthralgias, back pain, gait problem and myalgias  Mild pain in the R achilles overnight after therapy session, improved on own  Denies any further pain   Neurological: Positive for weakness (LUE weakness, gradually getting stronger with therapies)  Negative for dizziness, light-headedness, numbness and headaches  Psychiatric/Behavioral: Positive for sleep disturbance (difficulty sleeping due to pain, was able to fall asleep after the pain resolved)  All other systems reviewed and are negative  Input and Output Summary (last 24 hours): Intake/Output Summary (Last 24 hours) at 4/5/2023 0918  Last data filed at 4/5/2023 0830  Gross per 24 hour   Intake 540 ml   Output --   Net 540 ml       Physical Exam:     Physical Exam  Vitals and nursing note reviewed  Constitutional:       General: She is not in acute distress  Appearance: Normal appearance  She is not ill-appearing  HENT:      Head: Normocephalic and atraumatic  Cardiovascular:      Rate and Rhythm: Normal rate and regular rhythm  Pulses: Normal pulses  Heart sounds: Normal heart sounds  No murmur heard  No friction rub     Pulmonary:      Effort: Pulmonary effort is normal  No respiratory distress  Breath sounds: Normal breath sounds  No wheezing or rhonchi  Abdominal:      General: Abdomen is flat  Bowel sounds are normal  There is no distension  Palpations: Abdomen is soft  There is no mass  Tenderness: There is no abdominal tenderness  There is no guarding or rebound  Hernia: No hernia is present  Musculoskeletal:      Cervical back: Normal range of motion and neck supple  No tenderness  Right lower leg: No edema  Left lower leg: No edema  Skin:     General: Skin is warm and dry  Neurological:      Mental Status: She is alert and oriented to person, place, and time  Motor: Weakness (LUE strength 4/5) present     Psychiatric:         Mood and Affect: Mood normal          Behavior: Behavior normal          Additional Data:     Labs:    Results from last 7 days   Lab Units 04/03/23  0503   WBC Thousand/uL 8 48   HEMOGLOBIN g/dL 11 7   HEMATOCRIT % 36 2   PLATELETS Thousands/uL 432*   NEUTROS PCT % 57   LYMPHS PCT % 31   MONOS PCT % 7   EOS PCT % 2     Results from last 7 days   Lab Units 04/03/23  0503   SODIUM mmol/L 136   POTASSIUM mmol/L 4 0   CHLORIDE mmol/L 100   CO2 mmol/L 26   BUN mg/dL 18   CREATININE mg/dL 0 67   ANION GAP mmol/L 10   CALCIUM mg/dL 8 9   ALBUMIN g/dL 3 4*   TOTAL BILIRUBIN mg/dL 0 37   ALK PHOS U/L 62   ALT U/L 46   AST U/L 31   GLUCOSE RANDOM mg/dL 102                       Labs reviewed    Imaging:    Imaging reviewed    Recent Cultures (last 7 days):           Last 24 Hours Medication List:   Current Facility-Administered Medications   Medication Dose Route Frequency Provider Last Rate   • acetaminophen  650 mg Oral Q6H PRN LORETO Clinton     • atorvastatin  10 mg Oral Daily With [address]     • benzonatate  100 mg Oral TID PRN Brian Schofield MD     • bisacodyl  10 mg Rectal Daily PRN Brian Schofield MD     • calcium carbonate-vitamin D  1 tablet Oral Daily With Breakfast LORETO Clinton • cholecalciferol  1,000 Units Oral Daily LORETO Braswell     • Diclofenac Sodium  2 g Topical 4x Daily Brenna Fuller MD     • enoxaparin  40 mg Subcutaneous Daily Brenna Fuller MD     • famotidine  40 mg Oral Daily With Coni Davidson MD     • fluticasone  1 spray Each Nare Daily Brenna Fuller MD     • gabapentin  100 mg Oral BID Brenna Fuller MD     • ondansetron  4 mg Oral Q6H PRN Brenna Fuller MD     • polyethylene glycol  17 g Oral Daily PRN Brenna Fuller MD     • primidone  50 mg Oral HS Krystyna Gilliland MD     • sodium chloride  1 spray Each Nare Q1H PRN LORETO Braswell          M*Modal software was used to dictate this note  It may contain errors with dictating incorrect words or incorrect spelling  Please contact the provider directly with any questions

## 2023-04-05 NOTE — ASSESSMENT & PLAN NOTE
Improved  R knee pain on 3/26 and edematous on 3/27  XR on 3/27 showed large joint effusion  Orthopedics consulted for arthrocentesis on 3/28  Fluid samples negative for bacteria/crystals  Follow-up with Dr Darya Pascal as OP as needed

## 2023-04-05 NOTE — ASSESSMENT & PLAN NOTE
Improved  Presented to [facility] on 3/24 with R foot pain  R foot XR: No acute osseous abnormality  Does also have soft tissue thickening and capsulobursal thickening on XR  Started on Prednisone 40mg daily x5 days on 3/27  Completed on 3/31  Continue stretching with PT/OT  Proper foot wear

## 2023-04-05 NOTE — ASSESSMENT & PLAN NOTE
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

## 2023-04-06 RX ADMIN — FAMOTIDINE 40 MG: 20 TABLET, FILM COATED ORAL at 15:42

## 2023-04-06 RX ADMIN — Medication 1 TABLET: at 09:00

## 2023-04-06 RX ADMIN — FLUTICASONE PROPIONATE 1 SPRAY: 50 SPRAY, METERED NASAL at 09:00

## 2023-04-06 RX ADMIN — PRIMIDONE 50 MG: 50 TABLET ORAL at 21:21

## 2023-04-06 RX ADMIN — GABAPENTIN 100 MG: 100 CAPSULE ORAL at 17:12

## 2023-04-06 RX ADMIN — Medication 2 G: at 15:17

## 2023-04-06 RX ADMIN — GABAPENTIN 100 MG: 100 CAPSULE ORAL at 09:00

## 2023-04-06 RX ADMIN — CHOLECALCIFEROL TAB 25 MCG (1000 UNIT) 1000 UNITS: 25 TAB at 09:00

## 2023-04-06 RX ADMIN — ATORVASTATIN CALCIUM 10 MG: 10 TABLET, FILM COATED ORAL at 15:42

## 2023-04-06 RX ADMIN — ENOXAPARIN SODIUM 40 MG: 40 INJECTION SUBCUTANEOUS at 08:59

## 2023-04-06 NOTE — PLAN OF CARE
Problem: NEUROSENSORY - ADULT  Goal: Achieves stable or improved neurological status  Description: INTERVENTIONS  - Monitor and report changes in neurological status  - Monitor vital signs such as temperature, blood pressure, glucose, and any other labs ordered   - Initiate measures to prevent increased intracranial pressure  - Monitor for seizure activity and implement precautions if appropriate      Outcome: Progressing  Goal: Achieves maximal functionality and self care  Description: INTERVENTIONS  - Monitor swallowing and airway patency with patient fatigue and changes in neurological status  - Encourage and assist patient to increase activity and self care     - Encourage visually impaired, hearing impaired and aphasic patients to use assistive/communication devices  Outcome: Progressing     Problem: GASTROINTESTINAL - ADULT  Goal: Maintains or returns to baseline bowel function  Description: INTERVENTIONS:  - Assess bowel function  - Encourage oral fluids to ensure adequate hydration  - Administer IV fluids if ordered to ensure adequate hydration  - Administer ordered medications as needed  - Encourage mobilization and activity  - Consider nutritional services referral to assist patient with adequate nutrition and appropriate food choices  Outcome: Progressing     Problem: GENITOURINARY - ADULT  Goal: Maintains or returns to baseline urinary function  Description: INTERVENTIONS:  - Assess urinary function  - Encourage oral fluids to ensure adequate hydration if ordered  - Administer IV fluids as ordered to ensure adequate hydration  - Administer ordered medications as needed  - Offer frequent toileting  - Follow urinary retention protocol if ordered  Outcome: Progressing  Goal: Absence of urinary retention  Description: INTERVENTIONS:  - Assess patient’s ability to void and empty bladder  - Monitor I/O  - Bladder scan as needed  - Discuss with physician/AP medications to alleviate retention as needed  - Discuss catheterization for long term situations as appropriate  Outcome: Progressing     Problem: HEMATOLOGIC - ADULT  Goal: Maintains hematologic stability  Description: INTERVENTIONS  - Assess for signs and symptoms of bleeding or hemorrhage  - Monitor labs  - Administer supportive blood products/factors as ordered and appropriate  Outcome: Progressing

## 2023-04-06 NOTE — ASSESSMENT & PLAN NOTE
Improved  R knee pain on 3/26 and edematous on 3/27  XR on 3/27 showed large joint effusion  Orthopedics consulted for arthrocentesis on 3/28  Fluid samples negative for bacteria/crystals  Follow-up with Dr Garth Cano as OP as needed

## 2023-04-06 NOTE — CASE MANAGEMENT
Team Update:  CM met with pt at bedside to review team update and anticipated dc date of Monday 4/10 to pt's sons home  CM and therapy supervisor spoke with son Yaritza Golden who reports he would be able to accommodate pt dc'ing to his home temporarily  CM also spoke with Harry Swift who report pt is active with their service started 3/7/23 and is able to call to schedule transportation to appointments etc  Cm to arrange home therapies for pt in pt's son home

## 2023-04-06 NOTE — PROGRESS NOTES
04/06/23 1400   Pain Assessment   Pain Assessment Tool 0-10   Pain Score No Pain   Restrictions/Precautions   Precautions Cognitive; Fall Risk   Weight Bearing Restrictions No   ROM Restrictions No   Cognition   Overall Cognitive Status Impaired   Arousal/Participation Alert; Cooperative   Attention Attends with cues to redirect   Orientation Level Oriented X4   Memory Decreased short term memory   Following Commands Follows multistep commands with increased time or repetition   Subjective   Subjective Pt states her hands hurt from arthritis and requests Voltaren gel- RN made aware   Sit to Stand   Type of Assistance Needed Supervision   Physical Assistance Level No physical assistance   Comment RW   Sit to Stand CARE Score 4   Bed-Chair Transfer   Type of Assistance Needed Supervision   Physical Assistance Level No physical assistance   Comment RW, improved large steps when turning without cues   Chair/Bed-to-Chair Transfer CARE Score 4   Car Transfer   Type of Assistance Needed Supervision   Physical Assistance Level No physical assistance   Comment cues for sequencing/problem solving   Car Transfer CARE Score 4   Walk 10 Feet   Type of Assistance Needed Supervision   Physical Assistance Level No physical assistance   Comment RW   Walk 10 Feet CARE Score 4   Walk 50 Feet with Two Turns   Type of Assistance Needed Supervision   Physical Assistance Level No physical assistance   Comment RW   Walk 50 Feet with Two Turns CARE Score 4   Walk 150 Feet   Type of Assistance Needed Supervision   Physical Assistance Level No physical assistance   Comment RW   Walk 150 Feet CARE Score 4   Walking 10 Feet on Uneven Surfaces   Type of Assistance Needed Supervision   Physical Assistance Level No physical assistance   Comment RW, indoor 10 ft ramp, pt appropriately cautious with descend   Walking 10 Feet on Uneven Surfaces CARE Score 4   Ambulation   Primary Mode of Locomotion Prior to Admission Walk   Distance Walked (feet) 275 ft  (x1, 165x1, 120x1)   Assist Device Roller Walker   Gait Pattern Antalgic; Slow Camryn;Narrow NIC;Step to; Step through; Decreased L stance; Improper weight shift;Trendelenburg   Limitations Noted In Balance; Endurance;Speed;Strength;Posture   Walk Assist Level Supervision   Findings pt with self correction to take larger steps for step through gait pattern and  speed when felt was going slower   Does the patient walk? 2  Yes   Wheelchair mobility   Does the patient use a wheelchair? 0  No   Stairs   Findings (S)  perform next session, will need to do 5-6 stairs with RHR to DC to son's house   Picking Up Object   Type of Assistance Needed Supervision  (close supervision)   Physical Assistance Level No physical assistance   Comment 2/2 markers from floor with RW no reacher   Picking Up Object CARE Score 4   Assessment   Treatment Assessment Pt participated in brief 30 min skilled PT session with focus on ambulation, functional activities, and discussion regarding DC  Pt needing to be reminded that son is open to her staying with him after DC for as long as needed  Educated pt on role of home health PT and typical frequency of home PT as well as option to have it at son's house or her house if applicable  DC set for Mon 4/10 to son's house  Son works night shift so will talk to him regarding best time for pickup on day of DC  Explained DC is normally after 11am however exceptions can be made regarding son's availability that day  Per CM, pt is set up with Eric [facility] for transport  Overall, pt functioning at S level for most activities  Pt to continue to benefit from skilled PT to work toward Mod I goals as pt's son will not be home all the time  Problem List Decreased strength;Decreased range of motion;Decreased endurance; Impaired balance;Decreased mobility   PT Barriers   Physical Impairment Decreased strength;Decreased range of motion;Decreased endurance; Impaired balance;Decreased mobility   Functional Limitation Car transfers; Ramp negotiation;Stair negotiation;Standing;Transfers; Walking   Plan   Treatment/Interventions Functional transfer training;LE strengthening/ROM; Elevations; Therapeutic exercise; Endurance training;Patient/family training;Equipment eval/education; Bed mobility;Gait training   Progress Progressing toward goals   PT Therapy Minutes   PT Time In 1400   PT Time Out 1430   PT Total Time (minutes) 30   PT Mode of treatment - Individual (minutes) 30   PT Mode of treatment - Concurrent (minutes) 0   PT Mode of treatment - Group (minutes) 0   PT Mode of treatment - Co-treat (minutes) 0   PT Mode of Treatment - Total time(minutes) 30 minutes   PT Cumulative Minutes 975   Therapy Time missed   Time missed?  No

## 2023-04-06 NOTE — TEAM CONFERENCE
Acute RehabilitationTeam Conference Note  Date: 4/6/2023   Time: 11:43 AM       Patient Name:  Almita Davidson       Medical Record Number: [id number]   YOB: 1945  Sex: Female          Room/Bed:  [facility] 265/[facility] 265-01  Payor Info:  Payor: MEDICARE / Plan: MEDICARE A AND B / Product Type: Medicare A & B Fee for Service /      Admitting Diagnosis: Other osteoporosis without current pathological fracture [M81 8]  Difficulty in walking, not elsewhere classified [R26 2]  Essential tremor [G25 0]   Admit Date/Time:  3/24/2023  1:41 PM  Admission Comments: No comment available     Primary Diagnosis:  Ambulatory dysfunction  Principal Problem: Ambulatory dysfunction    Patient Active Problem List    Diagnosis Date Noted   • Pain 03/30/2023   • At risk for venous thromboembolism (VTE) 03/30/2023   • Enlarged and hypertrophic nails 03/30/2023   • Osteopenia 03/27/2023   • Right knee pain 03/27/2023   • Elevated AST (SGOT) 03/24/2023   • Prediabetes 03/24/2023   • Closed left ankle fracture, sequela 03/24/2023   • GERD (gastroesophageal reflux disease) 03/24/2023   • Bilateral pain of leg and foot 03/24/2023   • Diarrhea 03/22/2023   • Macrocytosis 03/21/2023   • Ambulatory dysfunction 03/21/2023   • Rhabdomyolysis 03/21/2023   • SARS-CoV-2 positive 03/21/2023   • Tremor, essential 10/20/2022   • Acute cerebral hemorrhage ([facility] [address] ) 10/11/2022   • History of cerebral hemorrhage 10/10/2022   • 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 ([facility] [address] ) 10/10/2022   • Chronic left-sided low back pain    • Transient cerebral ischemia 09/14/2018   • Premature atrial beats 09/14/2018   • Cataract of right eye 06/21/2016   • Sacroiliitis ([facility] [address] ) 12/04/2015   • Idiopathic osteoporosis 06/09/2015   • Primary osteoarthritis of hand 06/09/2015       Physical Therapy:    Weight Bearing Status: Full Weight Bearing  Transfers: Incidental Touching, Supervision  Bed Mobility: Supervision  Amulation Distance (ft): 150 "feet  Ambulation: Incidental Touching, Supervision  Assistive Device for Ambulation: Roller Walker  Wheelchair Mobility:  (isloated to room, unable to attempt)  Number of Stairs: 8  Assistive Device for Stairs: Bilateral Hand Rails  Stair Assistance: Minimal Assistance  Ramp: Incidental Touching  Assistive Device for Ramp: Roller Walker  Discharge Recommendations: Home with: (or son's house)  [address] with[de-identified] Home Physical Therapy, Family Support, Outpatient Physical Therapy (home vs OP PT pending DC location)    3/30/23  Pt admitted 3/24 for decline in function, balance and gait deficits after fall at home, lying on floor for hours  Positive for Rhabdomyolysis and Covid 19  Pt has been on isolation precautions, confined to her room since admission, therefore limiting some functional mobility  Pt had c/o B foot pain along with R knee pain/instability for first 2-3 days, affecting her ability to stand  Much improved the past two days, and she is now participating well  Still very limited by decreased activity tolerance, balance deficits and left side weakness(hx of CVA)  Pt retropulsive with transfers with narrow NIC and delayed righting reactions  Pt was not using a device for mobility prior, but does have RW at home  She does not have stairs to deal with at home  She does live alone, which is a concern at this time for her safety  Recommend resources for \"life alert\" type system  Good Potential to make gains, but anticipate will require longer recovery time due to deconditioning to achieve Mod I level for safe return home  4/5/23  Pt has improved with overall functional mobility and balance with minimal LOB over past couple sessions and demonstrating good progress towards mod I goals  Challenging pt with activities without use of AD  Continues with narrow NIC when ambulating however step length and step through pattern has improved  Barriers to DC include continued balance deficits and decreased endurance   " Possible option to DC to son's house  Son is willing to assist with IADLs as well  If unable, pt will need to be Mod I with mobility to DC home alone  Pt does not need any DME at this time  Recommend continue skilled PT to maximize functional balance and endurance with anticipated DC mid next week  Recommending OP PT or home PT pending DC location  Occupational Therapy:  Eating: Independent  Grooming: Supervision  Bathing: Supervision  Bathing: Supervision  Upper Body Dressing: Supervision  Lower Body Dressing: Supervision  Toileting: Supervision  Tub/Shower Transfer: Supervision  Toilet Transfer: Supervision  Cognition: Exceptions to WNL  Cognition: Decreased Memory, Decreased Executive Functions  Orientation: Person, Place, Time, Situation  Discharge Recommendations: Home with: (D/C to son's home for 1-2 weeks and assistance with managing meds )  DC Home with[de-identified] Family Support, Home Occupational Therapy       Pt is demonstrating progress towards OT goals of Homer, however, function does fluctuate depending upon level of fatigue  PTA, Pt lives alone in senior apartment, [address]  Pt uses meal on wheels service  Barriers include dec activity tolerance, fatigue, dec fxnl standing balance, dec fxnl reach, LUE weakness, pain, dec cognition, and dec caregiver support  Per OT notes, Pavel Gregory did speak to son for recommendations for assistance with managing medications, grocery shopping, and intermittent assist with IADL's upon D/C  Pt's son agreeable for pt to D/C to his home for 1-2 weeks prior to transitioning back home  Per discussion today, pt is agreeable  DME needs include standard BSC, folding RW tray, and LHR  Pt has RW and SPC  Pt plans to SB upon D/C rather than purchasing tub transfer bench  Pt provided with handouts to purchase Life Alert  Anticipated plan to set D/C date  Pt would benefit from Home OT services              Speech Therapy:           No notes on file    Nursing Notes:  Appetite: Good  Diet Type: Regular/House, Thin Liquids                                                                     Pain Location/Orientation: Orientation: Right, Location: Knee  Pain Score: 0                       Hospital Pain Intervention(s): Repositioned, Rest          Pt is a 78 y o  female with PMH of 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, essential tremor, osteoarthritis, osteoporosis, vitamin D deficiency, impaired memory who presented to the [facility] on 3/21/2023 for R knee and chin  Upon admission was found to have COVID-19 infection, also found to have rhabdomyolysis with CK elevated at 6836 and found to have transaminitis  Lipitor placed on hold  Patient initiated on IV fluids and treatment protocol with mild pathway for COVID-19 infection  Patient did not require supplemental oxygen  Rhabdomyolysis and LFTs improved with hydration  Patient also developed diarrhea  C  difficile toxin negative  Diarrhea improved  After medical stabilization, patient was found to have acute functional deficits in mobility and self-care, admitted to [facility] for acute inpatient rehabilitation  Xray of R knee showed large joint effusion, consulted Ortho arthrocentesis done  R foot pain managed with Voltaren gel QID  On Pepcid 40mg daily for GERD  Lipitor 10 mg daily restarted for hyperlipidemia  Essential tremors managed with Primidone 50 mg @ HS  Pt Covid + on 3/21, contact and airborne precaution d/c'd  Lovenox 40 mg daily for DVT ppx  HLD managed with Lipitor 10mg daily  Tylenol 650mg q6h PRN for mild pain  This week we will monitor pt's vital signs & lab results  Pt will have safe transfers with the use of call bell & hourly rounding to prevent falls  Pt will be educated on importance of T/R & off loading weight when in bed/chair to prevent pressure injury  Pt will have adequate pain relief to fully participate in therapies   Pt will be educated on energy conservation & encouraged independence with ADL's  Case Management:     Discharge Planning  Living Arrangements: Lives Alone  Support Systems: Family members, Friends/neighbors  Assistance Needed: to be determined  Type of Current Residence: Other (Comment) (apartment)  Current Home Care Services: No  4/6- Cm following for dc needs, pt agreeable to dc to pt's sons home for a brief period of time prior to going home for extra support  Is the patient actively participating in therapies? yes  List any modifications to the treatment plan: None    Barriers Interventions   Knee pain/swelling Orthostentesis, med management   Back Pain Pain meds, topicals   Impaired cog Repetition   Left sided weakness Neuro sultana   Retropulsive/balance Education, balance training   Lives alone Resources given, dc to sons home   Poor activity tolerance TE     Is the patient making expected progress toward goals? yes  List any update or changes to goals: None    Medical Goals: Patient will be medically stable for discharge to [facility] upon completion of rehab program and Patient will be able to manage medical conditions and comorbid conditions with medications and follow up upon completion of rehab program    Weekly Team Goals:   Rehab Team Goals  ADL Team Goal: Patient will require supervision with ADLs with least restrictive device upon completion of rehab program (MOD I-SUP for basic ADLs)  Bowel/Bladder Team Goal: Patient will return to premorbid level for bladder/bowel management upon completion of rehab program  Transfer Team Goal: Patient will be independent with transfers with least restrictive device upon completion of rehab program  Locomotion Team Goal: Patient will be independent with locomotion with least restrictive device upon completion of rehab program    Discussion: Pt participating in therapies and making progress in rehab program  Pt functioning at close supervision with UB dressing, cg for LB dressing   Pt cg/supervision with ambulation using r/w  Team recommending dc date of Monday 4/10 w/ home therapies at son's home  Anticipated Discharge Date:  Dc Monday 4/10  [facility] Team Members Present: The following team members are supervising care for this patient and were present during this Weekly Team Conference      Physician: Dr Yunior Tee MD  : YESSICA Lomeli  Registered Nurse: Josh Carias RN  Physical Therapist: Janusz Puga PT  Occupational Therapist: Ellyn Suarez [address], OTR/L  Speech Therapist:

## 2023-04-06 NOTE — ASSESSMENT & PLAN NOTE
Improved  Presented to [facility] on 3/24 with R foot pain  R foot XR: No acute osseous abnormality  Does also have soft tissue thickening and capsulobursal thickening on XR  Started on Prednisone 40mg daily x5 days on 3/27  Completed on 3/31  Continue stretching with PT/OT  Proper foot wear

## 2023-04-06 NOTE — PROGRESS NOTES
"   04/06/23 0700   Pain Assessment   Pain Assessment Tool 0-10   Pain Score 2   Pain Location/Orientation Orientation: Right;Location: Knee   Pain Onset/Description Onset: Gradual   Effect of Pain on Daily Activities Tolerates   Patient's Stated Pain Goal No pain   Hospital Pain Intervention(s) Repositioned; Rest   Multiple Pain Sites No   Restrictions/Precautions   Precautions Cognitive; Fall Risk;Pain   Weight Bearing Restrictions No   ROM Restrictions No   Lifestyle   Autonomy \"I'm okay with going to my son's home from here  \"   Eating   Type of Assistance Needed Set-up / clean-up   Physical Assistance Level No physical assistance   Comment S/U assist to open containers of milk  Eating CARE Score 5   Oral Hygiene   Type of Assistance Needed Supervision   Physical Assistance Level No physical assistance   Comment CS in stance at sink top to perform oral hygiene routine  No LOB  Oral Hygiene CARE Score 4   Grooming   Able To Initiate Tasks;Comb/Brush Hair   Findings CS in stance at sink top to comb hair  No LOB  Shower/Bathe Self   Type of Assistance Needed Supervision   Physical Assistance Level No physical assistance   Comment Performed SB routine seated at sink with pt demonstrating ability to bathe 10/10 parts with increased time due to dec activity tolerance  Xleg technique to bathe LE's to feet  CS while in stance at sink top to bathe rm and buttocks area with no LOB  Plan to SB upon initial D/C  Shower/Bathe Self CARE Score 4   Bathing   Assessed Bath Style Sponge Bath   Anticipated D/C Bath Style Sponge Bath   Able to Gather/Transport No   Able to [employer] Temperature No   Able to Wash/Rinse/Dry (body part) Left Arm;Right Arm;L Upper Leg;R Upper Leg;L Lower Leg/Foot;R Lower Leg/Foot;Chest;Abdomen;Perineal Area; Buttocks   Limitations Noted in Balance; Endurance;Strength   Positioning Seated;Standing   Findings  Plan to SB upon D/C     Tub/Shower Transfer   Reason Not Assessed Sponge Bath   Findings " Plan to [facility] upon initial D/C  Upper Body Dressing   Type of Assistance Needed Supervision   Physical Assistance Level No physical assistance   Comment Seated req increased time to doff/don [place] shirt; Continues to demo dec strength and corrdination in LUE  Upper Body Dressing CARE Score 4   Lower Body Dressing   Type of Assistance Needed Supervision   Physical Assistance Level No physical assistance   Comment Xleg technique to thread LE's into under garment and loose fitting pants  CS in stance at sink top to complete CM up over hips  Posterior LOB during CM, however, pt able to self correct  Lower Body Dressing CARE Score 4   Putting On/Taking Off Footwear   Type of Assistance Needed Supervision   Physical Assistance Level No physical assistance   Comment Xleg technique to don slipper socks and sneakers  Increased time to manage laces  Putting On/Taking Off Footwear CARE Score 4   Sit to Stand   Type of Assistance Needed Supervision; Adaptive equipment   Physical Assistance Level No physical assistance   Comment CS with RW   Sit to Stand CARE Score 4   Bed-Chair Transfer   Type of Assistance Needed Supervision; Adaptive equipment   Physical Assistance Level No physical assistance   Comment CS with RW   Chair/Bed-to-Chair Transfer CARE Score 4   Toileting Hygiene   Type of Assistance Needed Supervision; Adaptive equipment   Physical Assistance Level No physical assistance   Comment CS in stance at RW for rm hygiene and CM task  No LOB observed  Toileting Hygiene CARE Score 4   Toilet Transfer   Type of Assistance Needed Supervision; Adaptive equipment   Physical Assistance Level No physical assistance   Comment CS with RW and use of over the toilet commode   Toilet Transfer CARE Score 4   Cognition   Overall Cognitive Status Impaired   Arousal/Participation Alert; Cooperative   Attention Attends with cues to redirect   Orientation Level Oriented X4   Memory Decreased short term memory   Following Commands Follows multistep commands with increased time or repetition   Additional Activities   Additional Activities Other (Comment)   Additional Activities Comments Pt engaged in laundry folding task in stance unsupported at table top with focus on challenging pt's fxnl standing balance and maximize fxnl use of LUE  Pt able to complete laundry folding task with no LOB, demonstrating slight improvement with fnxl use of LUE to manuever articles of clothing  Toelrated activity in stance x 4-5 mins  Activity Tolerance   Activity Tolerance Patient tolerated treatment well   Assessment   Treatment Assessment Pt engaged in 90 min skilled OT Tx session with focus on ADL performance, fxnl standing balance, and maximizing fxnl use of LUE  See above for further Tx details  Pt agreeable to participate in SB ADL this session, as pt plans to SB upon initial D/C  Pt is performing ADLs of bathing/dressing/toileting at CS level  Pt did experience 1 posterior LOB during ADL routine, however, pt able to self correct w/o assistance from OT  No LOB throughout laundry folding task while in stance unsupported, demonstrating slight improvement with fxnl use of LUE  Pt did communicate being receptive to initially D/Cing to son's home  OT plan to continue focus on achieiving Homer goals for D/C  DME needs include standard BSC, foldign RW tray, and LH reacher  Pt would continue to benefit from skilled OT services to improve fnxl standing balance, improve activity tolerance, implement UE HEP, and maximize IND with ADL/IADL performance in order to progress towards OT goals and dec caregiver burden upon D/C  Prognosis Good   Problem List Decreased strength;Decreased endurance; Impaired balance;Decreased mobility; Decreased cognition;Decreased coordination   Barriers to Discharge Decreased caregiver support   Plan   Treatment/Interventions ADL retraining;Functional transfer training; Therapeutic exercise; Endurance training   Progress Progressing toward goals   Recommendation   OT Discharge Recommendation Home with home health rehabilitation  (d/c to son's home for 1-2 weeks)   Equipment Recommended Bedside commode;Reacher ($)  (folding RW tray)   Commode Type Standard   OT Therapy Minutes   OT Time In 0700   OT Time Out 0830   OT Total Time (minutes) 90   OT Mode of treatment - Individual (minutes) 90   OT Mode of treatment - Concurrent (minutes) 0   OT Mode of treatment - Group (minutes) 0   OT Mode of treatment - Co-treat (minutes) 0   OT Mode of Treatment - Total time(minutes) 90 minutes   OT Cumulative Minutes 1150   Therapy Time missed   Time missed?  No

## 2023-04-06 NOTE — PROGRESS NOTES
Spoke with son at length today regarding d/c plan  Son confirmed that patient's sister will not really be able to help upon d/c  He is agreeable to help however he does live an hour away and works night shifts  Discussed recommendations for patient to have assistance with medication management, laundry and shopping  Son reports he can stop down on the weekends to assist his mom with these things  Son will set up pill box and will remind mom in AM and PM to take medications  Did review with son results of MOCA assessment and areas of impairment in memory, executive functioning and attention  Related these deficits to concern for patient maintaining higher level tasks at home I E  finances, medication management, fall prevention and higher level problem solving  Did discuss with son potential consideration for long term plan of USP  Son states he has had these discussions with mom in the past  He also states he has offered for patient to come live with him but patient wanted to maintain her independence  Son agreeable to have mom d/c home from hospital to his house for a week or two if she is agreeable  Therapist spoke with patient about this option and patient willing to consider but still wants to think about it  Did discuss with patient and son that our goals are still to get patient to a mod I level but that patient may require assist with above mentioned IADLs at time of d/c  Also discussed with son Life Alert, patient does have information in her room and will email son a copy  Patient reports she has already filled out [employer] application and she recently received something in the mail from them  OSCAR Green was present and will follow up with [employer] to see what patient's next steps are

## 2023-04-06 NOTE — ASSESSMENT & PLAN NOTE
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

## 2023-04-06 NOTE — PROGRESS NOTES
[facility] 48  Progress Note  Name: Joy Germain  MRN: [id number]  Unit/Bed#: -01 I Date of Admission: 3/24/2023   Date of Service: 4/6/2023 I Hospital Day: 13    Assessment/Plan   Right knee pain  Assessment & Plan  Improved  R knee pain on 3/26 and edematous on 3/27  XR on 3/27 showed large joint effusion  Orthopedics consulted for arthrocentesis on 3/28  Fluid samples negative for bacteria/crystals  Follow-up with Dr Felix Burroughs as OP as needed  Bilateral pain of leg and foot  Assessment & Plan  Improved  Presented to [facility] on 3/24 with R foot pain  R foot XR: No acute osseous abnormality  Does also have soft tissue thickening and capsulobursal thickening on XR  Started on Prednisone 40mg daily x5 days on 3/27  Completed on 3/31  Continue stretching with PT/OT  Proper foot wear  GERD (gastroesophageal reflux disease)  Assessment & Plan  Stable  Continue home Pepcid 40mg daily  Closed left ankle fracture, sequela  Assessment & Plan  Hx of L ankle fracture in 06/2022  Overdue for Orthopedics follow-up  Follow-up with Orthopedics on discharge  Continue with PT/OT  Considerations during therapy  Prediabetes  Assessment & Plan  A1c 5 9 on 10/11/22  Monitor fasting BG  Follow-up with PCP as outpatient  Elevated AST (SGOT)  Assessment & Plan  Improved  AST 23 from 70  Restarted Lipitor on 3/27  Asymptomatic  Continue to monitor routine CMP  Diarrhea  Assessment & Plan  Resolved  Had been experiencing diarrhea in acute setting  C-diff negative on 3/22  Likely viral in nature due to recent illness  Continue with symptom management  SARS-CoV-2 positive  Assessment & Plan  COVID + on 3/21  Isolation precautions discontinued on 3/30  Symptomatic care  Currently maintaining on RA  Rhabdomyolysis  Assessment & Plan  Improved, CK 94 on 3/27  CK was 6836 on admission  Improved after receiving IV fluids  Encourage PO fluid intake    Restarted statin  Tremor, essential  Assessment & Plan  Continue home primidone 50mg at HS  Follows with Dr Mariah Gabriel (Neurology) as outpatient  History of cerebral hemorrhage  Assessment & Plan  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  MRI in 10/2022 showed small acute hematoma in the R putamen with mild regional mass effect and chronic infarcts in L basal ganglia  Had repeat MRI in 2023  Recommended to continue holding ASA at that time per Neurology  Scheduled to follow-up with Neurology (Dr Mariah Gabriel) in additional 6 months as outpatient  Continue PT/OT  Idiopathic osteoporosis  Assessment & Plan  Last DXA in 2019 showed osteoporosis  Receives Prolia injections as outpatient  Continue Vitamin D and calcium supplementation  Follows with Dr Nathan Rai (Rheumatology) as outpatient  * Ambulatory dysfunction  Assessment & Plan  3/21 - mechanical fall with no injuries; generalized weakness  Had been experiencing URI symptoms - COVID + on 3/21  CK 6838 on admission  Hx of CVA with L sided deficits  Primary team following  Continue PT/OT  VTE Pharmacologic Prophylaxis:   Pharmacologic: Enoxaparin (Lovenox)  Mechanical VTE Prophylaxis in Place: Yes - sequential compression devices  Current Length of Stay: 13 day(s)    Current Patient Status: Inpatient Rehab     Discharge Plan: As per primary team     Code Status: Level 1 - Full Code    Subjective:   Pt examined while pt sitting in recliner in pt room  Currently has no complaints  Slept well last night  Denies any lightheadedness, dizziness, SOB, or palpitations  Had a BM today without any issues  Tolerating therapy well  Objective:     Vitals:   Temp (24hrs), Av 3 °F (36 3 °C), Min:97 °F (36 1 °C), Max:97 6 °F (36 4 °C)    Temp:  [97 °F (36 1 °C)-97 6 °F (36 4 °C)] 97 6 °F (36 4 °C)  HR:  [70-90] 75  Resp:  [18] 18  BP: (113-128)/(56-73) 113/56  SpO2:  [93 %-97 %] 93 %  Body mass index is 23 36 kg/m²  Review of Systems   Constitutional: Negative for appetite change, chills, fatigue and fever  HENT: Negative for trouble swallowing  Eyes: Positive for visual disturbance (states that she has difficulty seeing near/far without her glasses but has been tolerating therapy without issues)  Respiratory: Negative for cough, shortness of breath, wheezing and stridor  Cardiovascular: Negative for chest pain, palpitations and leg swelling  Gastrointestinal: Negative for abdominal distention, abdominal pain, constipation, diarrhea, nausea and vomiting  LBM 4/6   Genitourinary: Negative for difficulty urinating  Musculoskeletal: Negative for arthralgias, back pain and gait problem  Neurological: Negative for dizziness, weakness, light-headedness, numbness and headaches  Psychiatric/Behavioral: Negative for dysphoric mood and sleep disturbance  The patient is not nervous/anxious  All other systems reviewed and are negative  Input and Output Summary (last 24 hours): Intake/Output Summary (Last 24 hours) at 4/6/2023 1000  Last data filed at 4/6/2023 0849  Gross per 24 hour   Intake 230 ml   Output --   Net 230 ml       Physical Exam:     Physical Exam  Vitals and nursing note reviewed  Constitutional:       General: She is not in acute distress  Appearance: Normal appearance  She is not ill-appearing  HENT:      Head: Normocephalic and atraumatic  Cardiovascular:      Rate and Rhythm: Normal rate and regular rhythm  Pulses: Normal pulses  Heart sounds: Normal heart sounds  No murmur heard  No friction rub  Pulmonary:      Effort: Pulmonary effort is normal  No respiratory distress  Breath sounds: Normal breath sounds  No wheezing or rhonchi  Abdominal:      General: Abdomen is flat  Bowel sounds are normal  There is no distension  Palpations: Abdomen is soft  There is no mass  Tenderness: There is no abdominal tenderness   There is no guarding or rebound  Hernia: No hernia is present  Musculoskeletal:      Cervical back: Normal range of motion and neck supple  No tenderness  Right lower leg: No edema  Left lower leg: No edema  Skin:     General: Skin is warm and dry  Neurological:      Mental Status: She is alert and oriented to person, place, and time  Motor: Weakness (LUE strength 4/5) present     Psychiatric:         Mood and Affect: Mood normal          Behavior: Behavior normal          Additional Data:     Labs:    Results from last 7 days   Lab Units 04/03/23  0503   WBC Thousand/uL 8 48   HEMOGLOBIN g/dL 11 7   HEMATOCRIT % 36 2   PLATELETS Thousands/uL 432*   NEUTROS PCT % 57   LYMPHS PCT % 31   MONOS PCT % 7   EOS PCT % 2     Results from last 7 days   Lab Units 04/03/23  0503   SODIUM mmol/L 136   POTASSIUM mmol/L 4 0   CHLORIDE mmol/L 100   CO2 mmol/L 26   BUN mg/dL 18   CREATININE mg/dL 0 67   ANION GAP mmol/L 10   CALCIUM mg/dL 8 9   ALBUMIN g/dL 3 4*   TOTAL BILIRUBIN mg/dL 0 37   ALK PHOS U/L 62   ALT U/L 46   AST U/L 31   GLUCOSE RANDOM mg/dL 102                       Labs reviewed    Imaging:    Imaging reviewed    Recent Cultures (last 7 days):           Last 24 Hours Medication List:   Current Facility-Administered Medications   Medication Dose Route Frequency Provider Last Rate   • acetaminophen  650 mg Oral Q6H PRN LORETO Braswell     • atorvastatin  10 mg Oral Daily With [address]     • benzonatate  100 mg Oral TID PRN Brenna Fuller MD     • bisacodyl  10 mg Rectal Daily PRN Brenna Fuller MD     • calcium carbonate-vitamin D  1 tablet Oral Daily With Breakfast LORETO Braswell     • cholecalciferol  1,000 Units Oral Daily LORETO Braswell     • Diclofenac Sodium  2 g Topical 4x Daily PRN LORETO Braswell     • enoxaparin  40 mg Subcutaneous Daily Brenna Fuller MD     • famotidine  40 mg Oral Daily With Kimberly Salgado MD     • fluticasone  1 spray Each Nare Daily Purvi Hernandez MD     • gabapentin  100 mg Oral BID Purvi Hernandez MD     • ondansetron  4 mg Oral Q6H PRN Purvi Hernandez MD     • polyethylene glycol  17 g Oral Daily PRN Purvi Hernandez MD     • primidone  50 mg Oral HS Krystyna Padilla MD     • sodium chloride  1 spray Each Nare Q1H PRN LORETO Marsh          M*Modal software was used to dictate this note  It may contain errors with dictating incorrect words or incorrect spelling  Please contact the provider directly with any questions

## 2023-04-06 NOTE — PROGRESS NOTES
04/06/23 1030   Pain Assessment   Pain Assessment Tool 0-10   Pain Score 1   Pain Location/Orientation Orientation: Right;Location: Knee   Pain Onset/Description Onset: Ongoing;Frequency: Intermittent   Hospital Pain Intervention(s) Repositioned; Rest   Restrictions/Precautions   Precautions Cognitive; Fall Risk   Weight Bearing Restrictions No   ROM Restrictions No   Cognition   Overall Cognitive Status Impaired   Arousal/Participation Alert; Cooperative   Attention Attends with cues to redirect   Orientation Level Oriented X4   Memory Decreased short term memory   Following Commands Follows multistep commands with increased time or repetition   Subjective   Subjective Pt requesting to use bathroom upon entry, states pain is better today   Roll Left and Right   Type of Assistance Needed Supervision   Physical Assistance Level No physical assistance   Comment HOb flat, no bed rails, cues to bend knees to assist, Roll ot R more difficult than to L   Roll Left and Right CARE Score 4   Sit to Lying   Type of Assistance Needed Supervision   Physical Assistance Level No physical assistance   Comment HOB flat, no bed rails   Sit to Lying CARE Score 4   Lying to Sitting on Side of Bed   Type of Assistance Needed Supervision   Physical Assistance Level No physical assistance   Comment HOB flat, no bed rails   Lying to Sitting on Side of Bed CARE Score 4   Sit to Stand   Type of Assistance Needed Supervision   Physical Assistance Level No physical assistance   Comment RW, when fatigued sometimes LOB posteriorly resulting in sitting again, no A provided and allowed pt multiple attempts with more success when forward in chair   Sit to Stand CARE Score 4   Bed-Chair Transfer   Type of Assistance Needed Supervision   Physical Assistance Level No physical assistance   Comment RW, continued cues for big steps when turning   Chair/Bed-to-Chair Transfer CARE Score 4   Walk 10 Feet   Type of Assistance Needed Supervision   Physical Assistance Level No physical assistance   Comment RW   Walk 10 Feet CARE Score 4   Walk 50 Feet with Two Turns   Type of Assistance Needed Supervision   Physical Assistance Level No physical assistance   Comment RW   Walk 50 Feet with Two Turns CARE Score 4   Walk 150 Feet   Type of Assistance Needed Supervision  (close supervision)   Physical Assistance Level No physical assistance   Comment RW   Walk 150 Feet CARE Score 4   Ambulation   Primary Mode of Locomotion Prior to Admission Walk   Distance Walked (feet) 165 ft  (x1, 120x1)   Assist Device Roller Walker   Gait Pattern Antalgic; Slow Camryn; Step to;Shuffle;Decreased L stance; Improper weight shift;Trendelenburg  (weak L abductors causing drop of R hip during L stance)   Limitations Noted In Balance; Endurance;Speed;Strength;Posture  (fwd posture)   Walk Assist Level Supervision   Findings ambulation not focus of this session, cues to take large steps, trendelenberg as noted above   Does the patient walk? 2  Yes   Wheelchair mobility   Does the patient use a wheelchair? 0   No   Curb or Single Stair   Style negotiated Single stair   Type of Assistance Needed Supervision   Physical Assistance Level No physical assistance   Comment 6 inch  step, BHR   1 Step (Curb) CARE Score 4   Toilet Transfer   Type of Assistance Needed Supervision   Physical Assistance Level No physical assistance   Comment close supervision with RW, I with clothing mgmt and hygiene   Toilet Transfer CARE Score 4   Toilet Transfer   Surface Assessed Raised Toilet   Therapeutic Interventions   Strengthening step ups to 6 inch step up with L leg down with R leg to target LLE 2x10, side stepping 2x12 ft R 2x12 ft L with no AD and hands on hips- 1 LOB with min A for correction, seated TE BLE 3x10 each: LAQ 2#, alternating marches 2#, hip abduction grn tband, hip adduction with ball   Assessment   Treatment Assessment Pt participated in 60 min skilled PT session with focus on bed mobility and LE strengthening as pt feels leg weakness is her biggest barrier  Sit to stands more difficult and requiring multiple attempts after completing LE strengthening however when not ftged, able to complete with 1 attempt  Overall, pt tolerated session well with imporved activity tolerance  Plan to focus on ambulation during PM session  Pt to continue to benefit from skilled PT services in order to improve strength, balance, and transfers to work toward goals  Problem List Decreased strength;Decreased range of motion;Decreased endurance; Impaired balance;Decreased mobility   PT Barriers   Physical Impairment Decreased strength;Decreased range of motion;Decreased endurance; Impaired balance;Decreased mobility   Functional Limitation Car transfers; Ramp negotiation;Standing;Stair negotiation;Transfers; Walking   Plan   Treatment/Interventions Functional transfer training;LE strengthening/ROM; Elevations; Therapeutic exercise; Endurance training;Patient/family training;Equipment eval/education; Bed mobility;Gait training   Progress Progressing toward goals   Recommendation   Discharge Summary son's house with home health PT   PT Therapy Minutes   PT Time In 1030   PT Time Out 1130   PT Total Time (minutes) 60   PT Mode of treatment - Individual (minutes) 60   PT Mode of treatment - Concurrent (minutes) 0   PT Mode of treatment - Group (minutes) 0   PT Mode of treatment - Co-treat (minutes) 0   PT Mode of Treatment - Total time(minutes) 60 minutes   PT Cumulative Minutes 945   Therapy Time missed   Time missed?  No

## 2023-04-07 PROBLEM — G31.84 MCI (MILD COGNITIVE IMPAIRMENT): Status: ACTIVE | Noted: 2023-04-07

## 2023-04-07 RX ORDER — ACETAMINOPHEN 325 MG/1
650 TABLET ORAL EVERY 6 HOURS PRN
Refills: 0
Start: 2023-04-07

## 2023-04-07 RX ORDER — FLUTICASONE PROPIONATE 50 MCG
1 SPRAY, SUSPENSION (ML) NASAL DAILY
Qty: 9.9 ML | Refills: 0 | Status: SHIPPED | OUTPATIENT
Start: 2023-04-08

## 2023-04-07 RX ADMIN — GABAPENTIN 100 MG: 100 CAPSULE ORAL at 08:33

## 2023-04-07 RX ADMIN — Medication 1 TABLET: at 08:33

## 2023-04-07 RX ADMIN — ATORVASTATIN CALCIUM 10 MG: 10 TABLET, FILM COATED ORAL at 16:33

## 2023-04-07 RX ADMIN — FAMOTIDINE 40 MG: 20 TABLET, FILM COATED ORAL at 16:34

## 2023-04-07 RX ADMIN — CHOLECALCIFEROL TAB 25 MCG (1000 UNIT) 1000 UNITS: 25 TAB at 08:33

## 2023-04-07 RX ADMIN — FLUTICASONE PROPIONATE 1 SPRAY: 50 SPRAY, METERED NASAL at 08:33

## 2023-04-07 RX ADMIN — ENOXAPARIN SODIUM 40 MG: 40 INJECTION SUBCUTANEOUS at 08:33

## 2023-04-07 RX ADMIN — PRIMIDONE 50 MG: 50 TABLET ORAL at 21:14

## 2023-04-07 NOTE — PROGRESS NOTES
"   04/07/23 1340   Pain Assessment   Pain Assessment Tool 0-10   Pain Score 2   Pain Location/Orientation Orientation: Left; Location: Groin   Restrictions/Precautions   Precautions Cognitive; Fall Risk;Bed/chair alarms   Cognition   Overall Cognitive Status Impaired   Subjective   Subjective \"i have this groin pain thats new but its ok right now\"   Roll Left and Right   Type of Assistance Needed Set-up / clean-up   Roll Left and Right CARE Score 5   Sit to Lying   Type of Assistance Needed Set-up / clean-up   Sit to Lying CARE Score 5   Lying to Sitting on Side of Bed   Type of Assistance Needed Supervision;Verbal cues   Comment cues to not use bed rails   Lying to Sitting on Side of Bed CARE Score 4   Sit to Stand   Type of Assistance Needed Supervision   Comment cues for ant wt shift; no hands on A needed, CS for safety   Sit to Stand CARE Score 4   Bed-Chair Transfer   Type of Assistance Needed Supervision; Adaptive equipment   Comment CS with RW   Chair/Bed-to-Chair Transfer CARE Score 4   Transfer Bed/Chair/Wheelchair   Limitations Noted In Balance; Endurance;LE Strength   Adaptive Equipment Roller Walker   Walk 10 Feet   Type of Assistance Needed Supervision   Comment CS with RW   Walk 10 Feet CARE Score 4   Walk 50 Feet with Two Turns   Type of Assistance Needed Supervision; Adaptive equipment   Comment CS with RW   Walk 50 Feet with Two Turns CARE Score 4   Walk 150 Feet   Type of Assistance Needed Supervision; Adaptive equipment   Comment Cs with RW   Walk 150 Feet CARE Score 4   Ambulation   Distance Walked (feet) 200 ft  (x2)   Assist Device Roller Walker   Gait Pattern Inconsistant Camryn;Decreased foot clearance;Trendelenburg; Improper weight shift   Limitations Noted In Balance;Device Management; Endurance; Heel Strike; Safety;Speed;Strength   Findings pt demonstrates decrease step length causing pt to list to the L, and patient with increase wt bearing on RW making it difficult to stand up straight and adjust " "RW as needed  pt will at times pick it up to adjust and keep walking   Stairs   Findings (S)  was not focus this session; please perform next session to get into son's house   Toilet Transfer   Type of Assistance Needed Supervision   Comment CS; with RW   Toilet Transfer CARE Score 4   Toilet Transfer   Findings had pt walk in and out of bathroom and work on turning and flushing toilet and going to sink to wash hands   Therapeutic Interventions   Strengthening seated LAQ and hip flex x30 while making phone call with pt's son   Flexibility B heel cord and HS x5mins   Assessment   Treatment Assessment pt working on safety with mobility in room and walking longer distances  called pt's son, Yan Amor, to discuss height of bed pt will be sleeping in   pt did practice getting on and off 15\" couch in day space  pt still requires cueing for safe STS due to decrease ant wt shift   takes pt several tries initially to get up safely from surface  with amb, pt demonstrates impaired righting reactions and balance with use of RW as stated above  discussion with Jose Carlos  , about safety awareness and taking pt to and from bathroom  due to impaired balance and safety with amb, pt to practice SPT to [facility] this evening to assess if safe enough to perform at home  reassess tomorrow if pt able to carryover cues when getting in and out of bed and where to keep the RW, not using bed rails, and technique for safe STS txs  Problem List Decreased strength;Decreased range of motion;Decreased endurance; Impaired balance;Decreased mobility; Decreased coordination;Decreased cognition;Pain   Barriers to Discharge Decreased caregiver support   PT Barriers   Functional Limitation Car transfers; Ramp negotiation;Stair negotiation;Standing;Transfers; Walking   Plan   Progress Progressing toward goals   Recommendation   PT Discharge Recommendation Home with home health rehabilitation   Equipment Recommended Walker   PT Therapy Minutes   PT Time In 1330 " PT Time Out 1500   PT Total Time (minutes) 90   PT Mode of treatment - Individual (minutes) 90   PT Mode of treatment - Concurrent (minutes) 0   PT Mode of treatment - Group (minutes) 0   PT Mode of treatment - Co-treat (minutes) 0   PT Mode of Treatment - Total time(minutes) 90 minutes   PT Cumulative Minutes 1065   Therapy Time missed   Time missed?  No

## 2023-04-07 NOTE — PROGRESS NOTES
Physical Medicine and Rehabilitation Progress Note  Odette Conde 78 y o  female MRN: [id number]  Unit/Bed#: -01 Encounter: [id number]      Assessment & Plan:     Decline in ADLs and mobility: Functional assessment - improving         FIM  Care Score  Admit Score Recent Score    Total assist  1-100% or 2p    Tot  most ADLs    Max assist 2-51-75%    Sub     Mod assist 3- 26-50%  Par  upper body dressing    Min assist 3- 25% or < Par     CG assist 4  TA  LBD,  bathing, to hygiene   Sup/Setup 4-5 Sup  UBD   Mod-I/Indep 6 MI      Transfers   significant assist  Min assist-CGA    Ambulation    total assist  50 ft CGA    Stairs   Total assist/NT      Goal: Largely modified independent with ADLs and mobility  Major barriers: Impaired balance, strength, fatigue, pain  Dispo: Home with estimate length of stay of 2 5-3 weeks from admission      SARS-CoV-2 positive  Assessment & Plan  Steady improvements now  - Continue PT/OT  Presented 3/21/2023 with weakness and function decline  COVID-19 infection identified  Treatment with mild pathway initiated and completed   Patient remained stable from a pulmonary standpoint and remained on room air  Had significant diarrhea now improved   Continue supportive care with Tessalon Perles, nasal spray  Monitor respiratory status  Off isolation 3/30/23 per prior rec    Right knee pain  Assessment & Plan  Remains much improved, continue PT  Right knee pain and swelling 3/27/23  Likely an arthritis flare   · X-ray showing large joint effusion  · Ortho consulted and performed arthrocentesis with 25 cc fluid removed   · Per ortho 3/31 - Right knee osteoarthritis with osteoarthritic flare status post bedside right knee aspiration now with significant subjective and clinical improvement  No concern for septic or crystal arthropathy at this time  She may follow up outpatient with Dr [facility] for her right knee pain as needed     · Compression with ace wrap/ICE    Bilateral pain of leg and foot  Assessment & Plan  Remains improved  On admission had right ankle pain - x-ray completed and showing Minimal soft tissue thickening medial to the first metatarsal head and lateral to the fifth metatarsal head attributed to capsulobursal thickening  · Over the weekend at [facility] also had medial bilateral foot pain along plantar fascia  · Possible plantar fasciitis and also has evidence of bursitis  · Limiting her participation in therapies 3/27/23  · Prednisone 40mg x5 day per IM thru 3/31, gabapentin 100 mg BID, and topical Voltaren gel   · As needed Tylenol    MCI (mild cognitive impairment)  Assessment & Plan  - MOCA 8 2  Pt scored overall 21/ 30  indicating a mild cognitive deficit      - Attention 3/6; delayed recall 1/5  - Increased risk of delirium - monitor   - Further assess iADLs  - Patient/family/CG teaching   - Optimal Sleep/wake cycle management  - Limit sedating/deliriogenic meds when possible  - Optimal medical, mood, and pain management   - Skilled therapies as outlined   - Compensatory strategies   - Fall precautions  - Optimize oversight after discharge  - OP follow-up with PCP and av or neuro      Pain  Assessment & Plan  Remains improved   Prednisone per IM completed 3/31   S/P knee arthrocentesis   APAP PRN  Gabapentin 100mg BID   LD patch    History of cerebral hemorrhage  Assessment & Plan  Neuro exam stable to improving   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   (of note MRI also showed focal R CR acute to subacute infarct as well as moderate chronic microangiopathic changes with dilated periventricular spaces v chronic lacunar infacts within sublentiform regions) - she was seen by neuro OP and recommended to remain off aspirin since   With baseline left hemiparesis and left hemibody impaired sensation  Follows with Dr Nicki Manzano as an outpatient    Rhabdomyolysis  Assessment & Plan  Patient stained a slip onto the floor from her bed and was unable to get up for a prolonged period of time  CK 6836  Treated with IV fluids acute care  Trended down  Continue PT, OT       Diarrhea  Assessment & Plan  Improved possible from Covid infection   C  difficile negative  Monitor stools  Encourage oral hydration    Enlarged and hypertrophic nails  Assessment & Plan  Podiatry consult status postdebridement    At risk for venous thromboembolism (VTE)  Assessment & Plan  SCDs, ambulation, and lovenox       GERD (gastroesophageal reflux disease)  Assessment & Plan  Managed on Pepcid 40 mg daily home dose    Closed left ankle fracture, sequela  Assessment & Plan  Sustained June 2022  Weightbearing as tolerated  Need outpatient orthopedic follow-up    Elevated AST (SGOT)  Assessment & Plan  LFTs now WNL  Lipitor restarted 3/27/23    Tremor, essential  Assessment & Plan  Managed on home dose primidone 50 mg nightly  Adequately controlled  Located in hands bilaterally  Follows with Dr Humberto To as an outpatient    Idiopathic osteoporosis  Assessment & Plan  Managed on Prolia as outpatient  Continue supplementation with vitamin D and calcium      Other Medical Issues:  • None    Follow-up providers and other issues to be followed up after discharge  • PCP  • Neurology  • Orthopedics    CODE: Level 1: Full Code    Restrictions include: Fall precautions    Objective: Allergies per EMR  Diagnostic Studies: Reviewed, no new imaging  XR knee 4+ vw right injury   Final Result by Beatriz Parker MD (03/27 9420)      No acute osseous abnormality  Large joint effusion  Workstation performed: [id number]         XR foot 3+ vw right   Final Result by Roberta Middleton MD (03/27 0809)      No acute osseous abnormality        Workstation performed: [id number]           See above as well    Laboratory: Labs reviewed  Results from last 7 days   Lab Units 04/03/23  0503   HEMOGLOBIN g/dL 11 7   HEMATOCRIT % 36 2   WBC Thousand/uL 8 48     Results from last 7 days   Lab Units 04/03/23  0503   BUN mg/dL 18   POTASSIUM mmol/L 4 0   CHLORIDE mmol/L 100   CREATININE mg/dL 0 67   AST U/L 31   ALT U/L 46              Drug regimen reviewed, all potential adverse effects identified and addressed:    Scheduled Meds:  Current Facility-Administered Medications   Medication Dose Route Frequency Provider Last Rate   • acetaminophen  650 mg Oral Q6H PRN LORETO Mcintosh     • atorvastatin  10 mg Oral Daily With [address]     • benzonatate  100 mg Oral TID PRN Mario José MD     • bisacodyl  10 mg Rectal Daily PRN Mario José MD     • calcium carbonate-vitamin D  1 tablet Oral Daily With Breakfast LORETO Mcintosh     • cholecalciferol  1,000 Units Oral Daily LORETO Mcintosh     • Diclofenac Sodium  2 g Topical 4x Daily PRN LORETO Mcintosh     • enoxaparin  40 mg Subcutaneous Daily Mario José MD     • famotidine  40 mg Oral Daily With Gwen Villagran MD     • fluticasone  1 spray Each Nare Daily Mario José MD     • gabapentin  100 mg Oral BID Mario José MD     • ondansetron  4 mg Oral Q6H PRN Mario José MD     • polyethylene glycol  17 g Oral Daily PRN Mario José MD     • primidone  50 mg Oral HS Krystyna Charlton MD     • sodium chloride  1 spray Each Nare Q1H PRN LORETO Mcintosh         Chief Complaints:  Rehab follow-up     Subjective: On eval, patient reports no new issues  She reports stable left arm weakness  She denies significant knee or foot pain  Denies lightheadedness, constipation or other new complaints  ROS: A 10 point ROS was performed; negative except as noted above         Physical Exam:  Temperature afebrile, pulse 78 blood pressure 113/54  Vitals above reviewed on date of encounter    GEN:  Lying in bed in NAD   HEENT/NECK: MMM  CARDIAC: Regular rate rhythm, no murmers, no rubs, no gallops  LUNGS:  clear to auscultation, no wheezes, rales, or rhonchi  ABDOMEN: Soft, non-tender, non-distended, normal active bowel sounds  EXTREMITIES/SKIN:  no calf edema, no calf tenderness to palpation  NEURO:   MENTAL STATUS:  Appropriate wakefulness and interaction  and Strength/MMT:  Stable  PSYCH:  Affect:  Euthymic      ** Please Note: Fluency Direct voice to text software may have been used in the creation of this document  **    I personally performed the required components and examined the patient myself in person on 4/4/23

## 2023-04-07 NOTE — PROGRESS NOTES
Physical Medicine and Rehabilitation Progress Note  Elder Even 78 y o  female MRN: [id number]  Unit/Bed#: -01 Encounter: [id number]      Assessment & Plan:     Decline in ADLs and mobility: Functional assessment - improving better        FIM  Care Score  Admit Score Recent Score    Total assist  1-100% or 2p    Tot  most ADLs    Max assist 2-51-75%    Sub     Mod assist 3- 26-50%  Par  upper body dressing    Min assist 3- 25% or < Par     CG assist 4  TA     Sup/Setup 4-5 Sup   most ADLs   Mod-I/Indep 6 MI      Transfers   significant assist  supervision    Ambulation    total assist  supervision 150 feet    Stairs   Total assist/NT  4 feet min assist     Goal: Largely modified independent with ADLs and mobility  Major barriers: Impaired balance, strength, fatigue, pain, MCI   Dispo: Home with estimate length of stay now Sunday at patient family request     SARS-CoV-2 positive  Assessment & Plan  Continues to improve well   - Continue PT/OT  - Patient and son would prefer discharge on Sunday  -Patient will still be some fall risk although this has decreased since admission, recommend is much supervision as possible and home health when first discharging, continued therapy today and tomorrow; recommend life alert like device and discharge home with son initially  -Patient will be using bed that is a lower height and then here and will practice at that level in the remaining time we have  Presented 3/21/2023 with weakness and function decline  COVID-19 infection identified  Treatment with mild pathway initiated and completed   Patient remained stable from a pulmonary standpoint and remained on room air  Had significant diarrhea now improved   Continue supportive care with Tessalon Perles, nasal spray  Monitor respiratory status  Off isolation 3/30/23 per prior rec    MCI (mild cognitive impairment)  Assessment & Plan  - MOCA 8 2  Pt scored overall 21/ 30  indicating a mild cognitive deficit      - Attention 3/6; delayed recall 1/5  - Some difficulty with med organization - recommend son assist with this at least initially   - Plan to d/c Sunday with [facility] PT, OT to son's home initially who will help also with iADLS  - Increased risk of delirium - monitor   - Further assess iADLs  - Patient/family/CG teaching   - Optimal Sleep/wake cycle management  - Limit sedating/deliriogenic meds when possible  - Optimal medical, mood, and pain management   - Skilled therapies as outlined   - Compensatory strategies   - Fall precautions  - Optimize oversight after discharge  - OP follow-up with PCP and av or neuro      History of cerebral hemorrhage  Assessment & Plan  Neuro exam stable   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   (of note MRI also showed focal R CR acute to subacute infarct as well as moderate chronic microangiopathic changes with dilated periventricular spaces v chronic lacunar infacts within sublentiform regions) - she was seen by neuro OP and recommended to remain off aspirin since   With baseline left hemiparesis and left hemibody impaired sensation  Follows with Dr Elena Brandon as an outpatient    Pain  Assessment & Plan  Remains improved   Prednisone per IM completed 3/31   S/P knee arthrocentesis   APAP PRN  Gabapentin 100mg BID   LD patch    Right knee pain  Assessment & Plan  Remains much improved, continue PT  Right knee pain and swelling 3/27/23  Likely an arthritis flare   · X-ray showing large joint effusion  · Ortho consulted and performed arthrocentesis with 25 cc fluid removed   · Per ortho 3/31 - Right knee osteoarthritis with osteoarthritic flare status post bedside right knee aspiration now with significant subjective and clinical improvement  No concern for septic or crystal arthropathy at this time  She may follow up outpatient with Dr Landon Santana for her right knee pain as needed     · Compression with ace wrap/ICE    Bilateral pain of leg and foot  Assessment & Plan  Remains improved  On admission had right ankle pain - x-ray completed and showing Minimal soft tissue thickening medial to the first metatarsal head and lateral to the fifth metatarsal head attributed to capsulobursal thickening  · Over the weekend at [facility] also had medial bilateral foot pain along plantar fascia  · Possible plantar fasciitis and also has evidence of bursitis  · Limiting her participation in therapies 3/27/23  · Prednisone 40mg x5 day per IM thru 3/31, gabapentin 100 mg BID, and topical Voltaren gel   · As needed Tylenol    Rhabdomyolysis  Assessment & Plan  Patient stained a slip onto the floor from her bed and was unable to get up for a prolonged period of time  CK 6836  Treated with IV fluids acute care  Trended down  Continue PT, OT       Diarrhea  Assessment & Plan  Improved possible from Covid infection   C  difficile negative  Monitor stools  Encourage oral hydration    Enlarged and hypertrophic nails  Assessment & Plan  Podiatry consult status postdebridement    At risk for venous thromboembolism (VTE)  Assessment & Plan  SCDs, ambulation, and lovenox       GERD (gastroesophageal reflux disease)  Assessment & Plan  Managed on Pepcid 40 mg daily home dose    Closed left ankle fracture, sequela  Assessment & Plan  Sustained June 2022  Weightbearing as tolerated  Need outpatient orthopedic follow-up    Elevated AST (SGOT)  Assessment & Plan  LFTs now WNL  Lipitor restarted 3/27/23    Tremor, essential  Assessment & Plan  Managed on home dose primidone 50 mg nightly  Adequately controlled  Located in hands bilaterally  Follows with Dr Giancarlo Mendoza as an outpatient    Idiopathic osteoporosis  Assessment & Plan  Managed on Prolia as outpatient  Continue supplementation with vitamin D and calcium      Other Medical Issues:  • None    Follow-up providers and other issues to be followed up after discharge  • PCP  • Neurology  • Orthopedics    CODE: Level 1: Full Code    Restrictions include:   Fall precautions    Objective: Allergies per EMR  Diagnostic Studies: Reviewed, no new imaging  XR knee 4+ vw right injury   Final Result by Ekaterina Velazquez MD (03/27 4596)      No acute osseous abnormality  Large joint effusion  Workstation performed: [id number]         XR foot 3+ vw right   Final Result by Delmy Betts MD (03/27 0809)      No acute osseous abnormality        Workstation performed: [id number]           See above as well    Laboratory: Labs reviewed  Results from last 7 days   Lab Units 04/03/23  0503   HEMOGLOBIN g/dL 11 7   HEMATOCRIT % 36 2   WBC Thousand/uL 8 48     Results from last 7 days   Lab Units 04/03/23  0503   BUN mg/dL 18   POTASSIUM mmol/L 4 0   CHLORIDE mmol/L 100   CREATININE mg/dL 0 67   AST U/L 31   ALT U/L 46              Drug regimen reviewed, all potential adverse effects identified and addressed:    Scheduled Meds:  Current Facility-Administered Medications   Medication Dose Route Frequency Provider Last Rate   • acetaminophen  650 mg Oral Q6H PRN LORETO Braswell     • atorvastatin  10 mg Oral Daily With [address]     • benzonatate  100 mg Oral TID PRN Brenna Fuller MD     • bisacodyl  10 mg Rectal Daily PRN Brenna Fuller MD     • calcium carbonate-vitamin D  1 tablet Oral Daily With Breakfast LORETO Braswell     • cholecalciferol  1,000 Units Oral Daily LORETO Braswell     • Diclofenac Sodium  2 g Topical 4x Daily PRN LORETO Braswell     • enoxaparin  40 mg Subcutaneous Daily Brenna Fuller MD     • famotidine  40 mg Oral Daily With Kimberly Salgado MD     • fluticasone  1 spray Each Nare Daily Brenna Fuller MD     • ondansetron  4 mg Oral Q6H PRN Brenna Fuller MD     • polyethylene glycol  17 g Oral Daily PRN Brenna Fuller MD     • primidone  50 mg Oral HS Krystyna Gilliland MD     • sodium chloride  1 spray Each Nare Q1H PRN LORETO Braswell         Chief Complaints:  Rehab follow-up     Subjective: On eval, patient denies worsening pain, strength, lightheadedness, shortness of breath, strength or other complaints  ROS: A 10 point ROS was performed; negative except as noted above  Physical Exam:  Vitals:    04/07/23 0613   BP: 117/51   Pulse: 73   Resp: 18   Temp: 97 8 °F (36 6 °C)   SpO2: 97%     GEN:  Sitting in NAD   HEENT/NECK: MMM  CARDIAC: Regular rate rhythm, no murmers, no rubs, no gallops  LUNGS:  clear to auscultation, no wheezes, rales, or rhonchi  ABDOMEN: Soft, non-tender, non-distended, normal active bowel sounds  EXTREMITIES/SKIN:  no calf edema, no calf tenderness to palpation  NEURO:   MENTAL STATUS:  Appropriate wakefulness and interaction  and Strength/MMT:  Trace LUE weakness otherwise near full; ambulation with walker with fair balance  PSYCH:  Affect:  Euthymic      ** Please Note: Fluency Direct voice to text software may have been used in the creation of this document   **

## 2023-04-07 NOTE — PROGRESS NOTES
"   04/07/23 5792   Pain Assessment   Pain Assessment Tool 0-10   Pain Score 1   Pain Location/Orientation Orientation: Left; Location: Groin   Pain Onset/Description Descriptor: Sore   Restrictions/Precautions   Precautions Cognitive; Fall Risk   Lifestyle   Autonomy \"I'm having a great granddaughter born on Monday, too! \"   Putting On/Taking Off Footwear   Type of Assistance Needed Set-up / clean-up   Physical Assistance Level No physical assistance   Comment X leg to don socks and sneakers   Putting On/Taking Off Footwear CARE Score 5   Sit to Stand   Type of Assistance Needed Independent   Comment IND to RW   Sit to Stand CARE Score 6   Toileting Hygiene   Type of Assistance Needed Supervision   Physical Assistance Level No physical assistance   Comment DS in stance   [address] Score 4   Toilet Transfer   Type of Assistance Needed Supervision   Physical Assistance Level No physical assistance   Comment DS using RW and BSC over toilet   Toilet Transfer CARE Score 4   Kitchen Mobility   Kitchen Mobility Comments (S)  Reassess as able this weekend anticpating Mod I level   [address] community re-entry task at [employer] level pt completes at CS level   Functional Standing Tolerance   Comments Pt in stance for 9min trial, then 6min trial to complete hidden picture puzzle  Pt completes at elevated table w/ RW for balance support, completed prologed stance at overall DS level  Completed to challenge standing tolerance in order to increase her IND at DC  Exercise Tools   Other Exercise Tool 1 Pt engaged in self ROM utilizng RUE as functional A for LUE shoulder flexion to achieve inc range  Pt then progressed to completed chest press and bicep curls completing 2 x 10 each with 1# weight  All exercises complete to inc ROM and strength for inc ADL performance  Cognition   Overall Cognitive Status Impaired   Arousal/Participation Alert; Cooperative   Attention Attends with cues " to redirect   Orientation Level Oriented X4   Following Commands Follows multistep commands with increased time or repetition   Comments pleasant, motivated, and looking forward to DC to her son's house on Monday  Additional Activities   Additional Activities Comments fxnl mobility in hallway at sup level w/ RW   Activity Tolerance   Activity Tolerance Patient tolerated treatment well   Assessment   Treatment Assessment (S)  OT session focusing on fxnl mobility, toileting tasks, DC planning, UE TE  Pt is to DC to son's house  Pt will be SB'ing at DC, using RW for mobility  Plan is to order pt BSC, reacher, folding RW tray  BSC is for use at her apartment when she eventually returns there from her son's house, but following discussion w/ pt, ROSALES Campbell it appears pt would benefit from [facility] at bedside for overnight use (edu provided regarding bags plus lewis litter for [facility]) as pt will be alone overnight (son works night shift ) Over the weekend, please assess for progression to IRP  Problem List Decreased strength;Decreased range of motion;Decreased endurance; Impaired balance;Decreased mobility; Decreased coordination;Decreased cognition;Pain   Plan   Treatment/Interventions ADL retraining;Functional transfer training; Therapeutic exercise; Endurance training;Patient/family training;Equipment eval/education; Compensatory technique education;Continued evaluation;Cognitive reorientation;Spoke to nursing   Progress Progressing toward goals   Recommendation   OT Discharge Recommendation Home with home health rehabilitation   Equipment Recommended Bedside commode;Reacher ($);Other (comment)  (folding RW tray)   Commode Type Standard   Discharge Summary [facility] w/ HHOT   OT Therapy Minutes   OT Time In 0930   OT Time Out 1100   OT Total Time (minutes) 90   OT Mode of treatment - Individual (minutes) 90   OT Mode of treatment - Concurrent (minutes) 0   OT Mode of treatment - Group (minutes) 0   OT Mode of treatment - Co-treat (minutes) 0   OT Mode of Treatment - Total time(minutes) 90 minutes   OT Cumulative Minutes 1240   Therapy Time missed   Time missed?  No

## 2023-04-07 NOTE — PROGRESS NOTES
Physical Medicine and Rehabilitation Progress Note  Suyapa Mayer 78 y o  female MRN: [id number]  Unit/Bed#: -01 Encounter: [id number]      Assessment & Plan:     Decline in ADLs and mobility: Functional assessment - improving better        FIM  Care Score  Admit Score Recent Score    Total assist  1-100% or 2p    Tot  most ADLs    Max assist 2-51-75%    Sub     Mod assist 3- 26-50%  Par  upper body dressing    Min assist 3- 25% or < Par     CG assist 4  TA     Sup/Setup 4-5 Sup   most ADLs   Mod-I/Indep 6 MI      Transfers   significant assist  supervision    Ambulation    total assist  supervision 150 feet    Stairs   Total assist/NT  4 feet min assist     Goal: Largely modified independent with ADLs and mobility  Major barriers: Impaired balance, strength, fatigue, pain, MCI   Dispo: Home with estimate length of stay Monday      SARS-CoV-2 positive  Assessment & Plan  Continues to improve well   - Continue PT/OT  - Plan to d/c Monday with [facility] PT, OT to son's home initially who will help also with iADLS  Presented 2/48/6801 with weakness and function decline  COVID-19 infection identified  Treatment with mild pathway initiated and completed   Patient remained stable from a pulmonary standpoint and remained on room air  Had significant diarrhea now improved   Continue supportive care with Tessalon Perles, nasal spray  Monitor respiratory status  Off isolation 3/30/23 per prior rec    MCI (mild cognitive impairment)  Assessment & Plan  - MOCA 8 2  Pt scored overall 21/ 30  indicating a mild cognitive deficit      - Attention 3/6; delayed recall 1/5  - Some difficulty with med organization - recommend son assist with this at least initially   - Plan to d/c Monday with [facility] PT, OT to son's home initially who will help also with iADLS  - Increased risk of delirium - monitor   - Further assess iADLs  - Patient/family/CG teaching   - Optimal Sleep/wake cycle management  - Limit sedating/deliriogenic meds when possible  - Optimal medical, mood, and pain management   - Skilled therapies as outlined   - Compensatory strategies   - Fall precautions  - Optimize oversight after discharge  - OP follow-up with PCP and av or neuro      History of cerebral hemorrhage  Assessment & Plan  Neuro exam stable   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   (of note MRI also showed focal R CR acute to subacute infarct as well as moderate chronic microangiopathic changes with dilated periventricular spaces v chronic lacunar infacts within sublentiform regions) - she was seen by neuro OP and recommended to remain off aspirin since   With baseline left hemiparesis and left hemibody impaired sensation  Follows with Dr Nicki Manzano as an outpatient    Pain  Assessment & Plan  Remains improved   Prednisone per IM completed 3/31   S/P knee arthrocentesis   APAP PRN  Gabapentin 100mg BID   LD patch    Right knee pain  Assessment & Plan  Remains much improved, continue PT  Right knee pain and swelling 3/27/23  Likely an arthritis flare   · X-ray showing large joint effusion  · Ortho consulted and performed arthrocentesis with 25 cc fluid removed   · Per ortho 3/31 - Right knee osteoarthritis with osteoarthritic flare status post bedside right knee aspiration now with significant subjective and clinical improvement  No concern for septic or crystal arthropathy at this time  She may follow up outpatient with Dr Umair Flores for her right knee pain as needed  · Compression with ace wrap/ICE    Bilateral pain of leg and foot  Assessment & Plan  Remains improved  On admission had right ankle pain - x-ray completed and showing Minimal soft tissue thickening medial to the first metatarsal head and lateral to the fifth metatarsal head attributed to capsulobursal thickening  · Over the weekend at [facility] also had medial bilateral foot pain along plantar fascia  · Possible plantar fasciitis and also has evidence of bursitis    · Limiting her participation in therapies 3/27/23  · Prednisone 40mg x5 day per IM thru 3/31, gabapentin 100 mg BID, and topical Voltaren gel   · As needed Tylenol    Rhabdomyolysis  Assessment & Plan  Patient stained a slip onto the floor from her bed and was unable to get up for a prolonged period of time  CK 6836  Treated with IV fluids acute care  Trended down  Continue PT, OT       Diarrhea  Assessment & Plan  Improved possible from Covid infection   C  difficile negative  Monitor stools  Encourage oral hydration    Enlarged and hypertrophic nails  Assessment & Plan  Podiatry consult status postdebridement    At risk for venous thromboembolism (VTE)  Assessment & Plan  SCDs, ambulation, and lovenox       GERD (gastroesophageal reflux disease)  Assessment & Plan  Managed on Pepcid 40 mg daily home dose    Closed left ankle fracture, sequela  Assessment & Plan  Sustained June 2022  Weightbearing as tolerated  Need outpatient orthopedic follow-up    Elevated AST (SGOT)  Assessment & Plan  LFTs now WNL  Lipitor restarted 3/27/23    Tremor, essential  Assessment & Plan  Managed on home dose primidone 50 mg nightly  Adequately controlled  Located in hands bilaterally  Follows with Dr Antwan Cosby as an outpatient    Idiopathic osteoporosis  Assessment & Plan  Managed on Prolia as outpatient  Continue supplementation with vitamin D and calcium      Other Medical Issues:  • None    Follow-up providers and other issues to be followed up after discharge  • PCP  • Neurology  • Orthopedics    CODE: Level 1: Full Code    Restrictions include: Fall precautions    Objective: Allergies per EMR  Diagnostic Studies: Reviewed, no new imaging  XR knee 4+ vw right injury   Final Result by Josefina Gamboa MD (03/27 9443)      No acute osseous abnormality  Large joint effusion              Workstation performed: [id number]         XR foot 3+ vw right   Final Result by Natalie Vergara MD (03/27 0809)      No acute osseous abnormality  Workstation performed: [id number]           See above as well    Laboratory: Labs reviewed  Results from last 7 days   Lab Units 04/03/23  0503   HEMOGLOBIN g/dL 11 7   HEMATOCRIT % 36 2   WBC Thousand/uL 8 48     Results from last 7 days   Lab Units 04/03/23  0503   BUN mg/dL 18   POTASSIUM mmol/L 4 0   CHLORIDE mmol/L 100   CREATININE mg/dL 0 67   AST U/L 31   ALT U/L 46              Drug regimen reviewed, all potential adverse effects identified and addressed:    Scheduled Meds:  Current Facility-Administered Medications   Medication Dose Route Frequency Provider Last Rate   • acetaminophen  650 mg Oral Q6H PRN LORETO Fairbanks     • atorvastatin  10 mg Oral Daily With [address]     • benzonatate  100 mg Oral TID PRN Elodia Vigil MD     • bisacodyl  10 mg Rectal Daily PRN Elodia Vigil MD     • calcium carbonate-vitamin D  1 tablet Oral Daily With Breakfast LORETO Fairbanks     • cholecalciferol  1,000 Units Oral Daily LORETO Fairbanks     • Diclofenac Sodium  2 g Topical 4x Daily PRN LOREOT [place]     • enoxaparin  40 mg Subcutaneous Daily Elodia Vigil MD     • famotidine  40 mg Oral Daily With Theresa Villalobos MD     • fluticasone  1 spray Each Nare Daily Elodia Vigil MD     • gabapentin  100 mg Oral BID Elodia Vigil MD     • ondansetron  4 mg Oral Q6H PRN Elodia Vigil MD     • polyethylene glycol  17 g Oral Daily PRN Elodia Vigil MD     • primidone  50 mg Oral HS Cynthia Alcala MD     • sodium chloride  1 spray Each Nare Q1H PRN LORETO Fairbanks         Chief Complaints:  Rehab follow-up     Subjective:     Patient denies new pain, worsening strength, sensation, lightheadedness, constipation, or other new complaints  ROS: A 10 point ROS was performed; negative except as noted above         Physical Exam:  Vitals:    04/06/23 1942   BP: 135/60   Pulse: 77   Resp: 16   Temp: 98 1 °F (36 7 °C)   SpO2: 97%     GEN:  Sitting in NAD   HEENT/NECK: MMM  CARDIAC: Regular rate rhythm, no murmers, no rubs, no gallops  LUNGS:  clear to auscultation, no wheezes, rales, or rhonchi  ABDOMEN: Soft, non-tender, non-distended, normal active bowel sounds  EXTREMITIES/SKIN:  no calf edema, no calf tenderness to palpation  NEURO:   MENTAL STATUS:  Appropriate wakefulness and interaction  and Strength/MMT:  Trace LUE weakness  PSYCH:  Affect:  Euthymic      ** Please Note: Fluency Direct voice to text software may have been used in the creation of this document  **    50 minutes or greater spent for this encounter which included a combination of face-to-face time with the patient and non-face-to-face time which in part specifically includes management of functional decline, MCI, dispo  Face-to-face time included extended discussion with patient regarding current condition, medical history, mood, medical/rehabilitation management, and disposition  Non face-to-face time included coordination of care with patient's co-managing AP and/or physician(s) thru communication and review of their recent documentation as well as reviewing vitals, bowel/bladder function, recent labs, and notes from therapy, CM, and nursing  In addition, this patient was discussed by the interdisciplinary team in weekly case conference today  The care of the patient was extensively discussed with all care providers and an appropriate rehabilitation plan was formulated unique for this patient  Barriers were identified preventing progression of therapy and appropriate interventions were discussed with each discipline   Please see the team note for input from all disciplines regarding barriers, intervention, and discharge planning

## 2023-04-07 NOTE — NURSING NOTE
"Pt rang to use bathroom, DS to get to standing position , no verbal cues required  Ambulates to BR with steady gait using RW; clothing management I  Upon completion pt rings again, observed with some difficulty maintaining balance in standing stance, leaning backwards and to L; states \"I`m having trouble getting my legs right\"  Encouraged to focus on steady stance prior to attempting clothing management  Pt completes task with some verbal cues and S for safety    "

## 2023-04-07 NOTE — ASSESSMENT & PLAN NOTE
Improved  R knee pain on 3/26 and edematous on 3/27  XR on 3/27 showed large joint effusion  Orthopedics consulted for arthrocentesis on 3/28  Fluid samples negative for bacteria/crystals  Follow-up with Dr Fredrick Harmon as OP as needed

## 2023-04-07 NOTE — DISCHARGE SUMMARY
This is a non-billable encounter    Discharge Summary - Tanna Gomez 78 y o  female MRN: [id number]  Unit/Bed#: -01 Encounter: [id number]    Admission Date: 3/24/2023     Discharge Date: 4/9/2023    Rehabilitation/Etiologic Diagnosis:   Debility:  12  Debility (Non-cardiac/Non-pulmonary) related to COVID-19 virus    Discharge Diagnoses:      Patient Active Problem List   Diagnosis   • History of cerebral hemorrhage   • 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 ([address] )   • Tremor, essential   • Rhabdomyolysis   • SARS-CoV-2 positive   • Diarrhea   • Elevated AST (SGOT)   • Closed left ankle fracture, sequela   • GERD (gastroesophageal reflux disease)   • Bilateral pain of leg and foot   • Osteopenia   • Right knee pain   • Pain   • Enlarged and hypertrophic nails   • MCI (mild cognitive impairment)   • Impaired mobility and ADLs       Acute Rehabilitation Center Course:      Patient participated in a comprehensive interdisciplinary inpatient rehabilitation program which included involvment of MD, therapies (PT, OT), RN, CM/SW, dietary  She made significant  functional gains and was able to be advanced to a largely mod indep level of assist and is considered adequately safe for discharge home with family  Patient remains some fall/injury risk however this risk has decreased since admission to [facility]  Caregiver training completed and with adequate oversight patient is cleared for discharge home with family with supervision  Medical issues with comanagement from our internal medicine team as outlined below  Please see below for patient's hospital course and day to day management of medical needs with significant findings, complications (if applicable), treatment, and services provided in problem list format        Decline in ADLs and mobility: Functional assessment - improving better                                                    FIM  Care Score   Admit Score Recent Score Total assist  1-100% or 2p    Tot  most ADLs     Max assist 2-51-75%    Sub       Mod assist 3- 26-50%  Par  upper body dressing     Min assist 3- 25% or < Par       CG assist 4  TA       Sup/Setup 4-5 Sup      Mod-I/Indep 6 MI   Most ADLs      Transfers    significant assist Largely mod indep     Ambulation     total assist Largely mod indep 150 ft     Stairs    Total assist/NT 12 steps supervision        Impaired mobility and ADLs  Assessment & Plan  Multifactorial: Recent Covid infection on hx of ICH with mild residual HP, acute pain   - Function improved; medically stabilized  - Patient will still be some fall risk although this has decreased since admission, recommend as much supervision as possible initially   - Will d/c with son initially   - Recommend life alert like device and discharge home with son initially  - Practiced lower height bed which she will be using at son's house and did adequately well   - D/C with PT, OT thru [facility] who will start at home         SARS-CoV-2 positive  Assessment & Plan  COVID-19 infection - resolved, function improved    Treatment with mild pathway initiated and completed   Patient remained stable from a pulmonary standpoint and remained on room air  Had significant diarrhea now improved   Now off iso precautions  Monitor for PASC sequelae  OP PCP follow-up      MCI (mild cognitive impairment)  Assessment & Plan  - MOCA 8 2  Pt scored overall 21/ 30  indicating a mild cognitive deficit      - Attention 3/6; delayed recall 1/5  - Some difficulty with med organization - recommend son assist with this at least initially   - Plan to d/c to Son's who will assist with iADLs - continue PT/OT   - OP follow-up with PCP and neuro (pt declines Keira)      History of cerebral hemorrhage  Assessment & Plan  Neuro exam stable with subtle LUE weakness but improved  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   (of note MRI also showed focal R CR acute to subacute infarct as well as moderate chronic microangiopathic changes with dilated periventricular spaces v chronic lacunar infacts within sublentiform regions) - she was seen by neuro OP and recommended to remain off aspirin since   With baseline left hemiparesis and left hemibody impaired sensation  Follows with Dr Zev Hunter as an outpatient    Pain  Assessment & Plan  Remains improved   Prednisone per IM completed 3/31   S/P knee arthrocentesis   APAP PRN      Right knee pain  Assessment & Plan  Remains much improved, continue PT  Right knee pain and swelling 3/27/23  Likely an arthritis flare   · X-ray showing large joint effusion  · Ortho consulted and performed arthrocentesis with 25 cc fluid removed   · Per ortho 3/31 - Right knee osteoarthritis with osteoarthritic flare status post bedside right knee aspiration now with significant subjective and clinical improvement  No concern for septic or crystal arthropathy at this time  She may follow up outpatient with Dr Shanice Engle for her right knee pain as needed  · Compression with ace wrap/ICE      Bilateral pain of leg and foot  Assessment & Plan  Remains improved  On admission had right ankle pain - x-ray completed and showing Minimal soft tissue thickening medial to the first metatarsal head and lateral to the fifth metatarsal head attributed to capsulobursal thickening  · Over the weekend at [facility] also had medial bilateral foot pain along plantar fascia  · Possible plantar fasciitis and also has evidence of bursitis    · Limiting her participation in therapies 3/27/23  · Prednisone 40mg x5 day per IM thru 3/31 completed,, and topical Voltaren gel helpful   · As needed Tylenol    Rhabdomyolysis  Assessment & Plan  Patient sustained a slip onto the floor from her bed and was unable to get up for a prolonged period of time  CK 6836  Treated with IV fluids acute care  Trended down  Continue PT, OT       Diarrhea  Assessment & Plan  Improved possible from Covid infection   C  difficile negative  Monitor stools  Encourage oral hydration    Enlarged and hypertrophic nails  Assessment & Plan  Podiatry consult status postdebridement    GERD (gastroesophageal reflux disease)  Assessment & Plan  Managed on Pepcid 40 mg daily home dose    Closed left ankle fracture, sequela  Assessment & Plan  Sustained June 2022  Weightbearing as tolerated  Need outpatient orthopedic follow-up    Elevated AST (SGOT)  Assessment & Plan  Improved  LFTs now WNL  Lipitor restarted 3/27/23    Tremor, essential  Assessment & Plan  Managed on home dose primidone 50 mg nightly  Adequately controlled  Located in hands bilaterally  Follows with Dr Nicki Manzano as an outpatient    Idiopathic osteoporosis  Assessment & Plan  Managed on Prolia as outpatient  Continue supplementation with vitamin D and calcium      Follow-up providers (see above for specific issues to follow-up):  • PCP  • Neurology  • Orthopedics  • Rheum     - See AVS (After visit summary) with discharge instructions, discharge medications, and other details  Imaging (See above as well):  XR knee 4+ vw right injury   Final Result by Darien Song MD (03/27 1455)      No acute osseous abnormality  Large joint effusion  Workstation performed: [id number]         XR foot 3+ vw right   Final Result by Joy Damon MD (03/27 0809)      No acute osseous abnormality        Workstation performed: [id number]             Pertinent Recent Labs (See Course above, EPIC EMR, and if needed request additional records):   Latest Reference Range & Units 04/03/23 05:03   Sodium 135 - 147 mmol/L 136   Potassium 3 5 - 5 3 mmol/L 4 0   Chloride 96 - 108 mmol/L 100   CO2 21 - 32 mmol/L 26   Anion Gap 4 - 13 mmol/L 10   BUN 5 - 25 mg/dL 18   Creatinine 0 60 - 1 30 mg/dL 0 67   Glucose, Random 65 - 140 mg/dL 102   GLUCOSE FASTING 65 - 99 mg/dL 102 (H)   Calcium 8 4 - 10 2 mg/dL 8 9   CORRECTED CALCIUM 8 3 - 10 1 mg/dL 9 4   AST 13 - "39 U/L 31   ALT 7 - 52 U/L 46   Alkaline Phosphatase 34 - 104 U/L 62   Total Protein 6 4 - 8 4 g/dL 6 6   Albumin 3 5 - 5 0 g/dL 3 4 (L)   TOTAL BILIRUBIN 0 20 - 1 00 mg/dL 0 37   eGFR ml/min/1 73sq m 86   WBC 4 31 - 10 16 Thousand/uL 8 48   Red Blood Cell Count 3 81 - 5 12 Million/uL 3 52 (L)   Hemoglobin 11 5 - 15 4 g/dL 11 7   HCT 34 8 - 46 1 % 36 2   MCV 82 - 98 fL 103 (H)   MCH 26 8 - 34 3 pg 33 2   MCHC 31 4 - 37 4 g/dL 32 3   RDW 11 6 - 15 1 % 12 2   Platelet Count 600 - 390 Thousands/uL 432 (H)   (H): Data is abnormally high  (L): Data is abnormally low    Procedures Performed During ARC Admission: None    Discharge Vitals:       Vitals:    04/09/23 0555   BP: 127/62   Pulse: 85   Resp: 16   Temp: 97 5 °F (36 4 °C)   SpO2: 96%         HPI:   Per admitting provider Sofi Hamilton is a 78 y o  female with past medical history significant for right hemorrhagic CVA with left hemiparesis and impaired sensation, essential tremor, osteoarthritis, osteoporosis, vitamin D deficiency, impaired memory who presented to the [facility] on 3/21/2023 for generalized weakness  She had a slide from her bed at home and she was unable to get up  He was found to have some abrasions on her right knee and chin  Upon admission was found to have COVID-19 infection  Also found to have rhabdomyolysis with CK elevated at 6836  Additionally patient found to have transaminitis  Lipitor placed on hold  Patient initiated on IV fluids and treatment protocol with mild pathway for COVID-19 infection  Patient did not require supplemental oxygen  Rhabdomyolysis and LFTs improved with hydration  Medically, patient also developed diarrhea  C  difficile toxin negative  Diarrhea improved  After medical stabilization, patient was found to have acute functional deficits in mobility and self-care, for admitted to [facility] for acute inpatient rehabilitation  \"     Condition at Discharge: good     Discharge " instructions/Information to patient and family:   See after visit summary for information provided to patient and family  Provisions for Follow-Up Care:  See after visit summary for information related to follow-up care and any pertinent home health orders  Disposition: Son's home with son    Planned Readmission:  No    Discharge Statement   Extended time spent examining Cecelia Bridegroom, counseling patient (and patient/family) on condition, medication, rehabilitation/medical plan, and coordinating care prior to discharge  Patient not seen in person by MD on day of discharge but no reported concerns by nursing overnight or on day of discharge  Vitals stable  Recent labs stable        Discharge Medications:  See after visit summary for reconciled discharge medications provided to patient and family on day of discharge

## 2023-04-07 NOTE — ASSESSMENT & PLAN NOTE
- MOCA 8 2  Pt scored overall 21/ 30  indicating a mild cognitive deficit      - Attention 3/6; delayed recall 1/5  - Some difficulty with med organization - recommend son assist with this at least initially   - Plan to d/c to Son's who will assist with iADLs - continue PT/OT   - OP follow-up with PCP and neuro (pt declines Keira)

## 2023-04-07 NOTE — ASSESSMENT & PLAN NOTE
Improved  Presented to [facility] on 3/24 with R foot pain  R foot XR: No acute osseous abnormality  Does also have soft tissue thickening and capsulobursal thickening on XR  Started on Prednisone 40mg daily x5 days on 3/27  Completed on 3/31  Continue stretching with PT/OT  Proper foot wear

## 2023-04-07 NOTE — DISCHARGE INSTR - AVS FIRST PAGE
DISCHARGE INSTRUCTIONS: Jared Jason 67 22    Bring these instructions with you to your Outpatient Physician appointments so they can order and follow-up any additional lab work or imaging recommended at time of discharge  Resume follow-up with all your prior providers that you have established care prior to this hospitalization  Discuss with primary care physician (PCP) if you have additional questions  It  is you or your caregivers responsibility to obtain follow-up MEDICATION REFILLS  As indicated through your Primary Care Physician (PCP) and other outpatient specialty provider(s) after discharge  Please follow-up with your PCP as soon as possible after discharge to set-up follow-up management and when appropriate refills  You have been determined to have some cognitive impairments  - It is YOUR CAREGIVER'S RESPONSIBILITY to ensure appropriate follow-up which includes:  - APPOINTMENTS are scheduled and safe transportation is arranged  - LABS and IMAGING are completed  - MEDICATION MANAGEMENT at home is carried out appropriately     You remain a fall and injury risk which could be severe  - Your risk of fall has decreased however since admission to acute rehab  Caregiver training has been completed with our staff  - Appropriate supervision/assistance as instructed during your rehab course is recommended to decrease risk of fall and injury      - Continue skilled therapy as discussed after discharge to further decrease this risk    If you (or your health care proxy) have any questions or concerns regarding your acute rehabilitation stay including issues with medications, rehabilitation, and follow-up plan, please call:          [facility] Acute Rehabilitation Unit at [facility] at [phone]    Should you develop fevers, chills, new weakness, changes in sensation, difficulty speaking, facial weakness, confusion, shortness of breath, chest pain, or other "concerning symptoms please call 911 and/or obtain transportation to nearest ER immediately  PHYSICIANS to see:  Please see your doctors listed in the follow up providers section of your discharge paperwork, and take the discharge paperwork with you to your appointments  Home health has been ordered for you: Your home health agency should reach out to you or your family soon to arrange follow-up  (If [facility] health is your provider their phone number is [phone])    If you are unable to get in touch with home health you may contact your  at [phone]  [facility]    LAB WORK recommended after discharge: Follow-up lab work at discretion of your outpatient physicians to be determined at time of your future appointments  IMAGING, ADDITIONAL FINDINGS and ISSUES to follow-up:  Check under the \"DISCHARGE PROVIDER\" section of these DISCHARGE INSTRUCTIONS for provider contact information and specific issues as well  SKIN CARE INSTRUCTIONS to follow:    Monitor skin for increased redness or breadown and promptly notify your physician should these develop    WEIGHTBEARING/ACTIVITY PRECAUTIONS to follow:    24 hours/7 days per week supervision by caregiver recommended to start    Weightbearing as tolerated  Driving restrictions: You are recommended against driving until cleared by an outpatient physician  Work restrictions: You should NOT return to work (if working) until cleared by an outpatient physician  You should not operate heavy machinery (if applicable) until cleared by an outpatient physician  Alcohol restrictions: You are recommended to not drink alcohol at this time unless cleared by an outpatient physician  Drinking alcohol in your current functional condition can increase your risk of injury which could be severe  Smoking restrictions: You are recommended to not smoke nicotine    Smoking increases your risk of heart attack, stroke, " emphysema/COPD, and lung cancer  MEDICATIONS:  Please see a full list of your medications outlined in the After Visit Summary that is attached to these Discharge Instructions  Please note changes may have been made to your medications please refer to your discharge paperwork for your current medications and take this list with you to all your doctors appointments for your doctors to review  Please do not resume a home medication unless the medication reconciliation sheet indicates to do so, please do not assume that a medication that you were given a prescription for is the same as a medication you have at home based on both medications having the same name as dosages and frequency may have changed  Unless specifically noted in your medication list provided to you in your discharge paper work do not resume prior vitamins, minerals, or supplements you may have been taking prior to your hospitalization unless instructed by an outpatient physician in the future  Discuss with your primary care at next visit if applicable  NSAID Warning:  Please avoid NSAID (including but not limited to advil, aleve, motrin, naproxen, ibuprofen, mobic, meloxicam, diclofenac etc) medications as NSAID medications may increase your risk of bleeding (which can be life-threatening)    Please note a summary of your hospital stay with relevant information for your doctors will try to be sent to them  Please confirm with your doctors at your follow up visits that they have received this summary and have them contact [address] [place] [address] if they have not received them along with any other medical records they may require       [place] Phone Number:  [phone]

## 2023-04-07 NOTE — PROGRESS NOTES
[facility]  Progress Note  Name: Donnetta Apley  MRN: [id number]  Unit/Bed#: -01 I Date of Admission: 3/24/2023   Date of Service: 4/7/2023 I Hospital Day: 14    Assessment/Plan   Right knee pain  Assessment & Plan  Improved  R knee pain on 3/26 and edematous on 3/27  XR on 3/27 showed large joint effusion  Orthopedics consulted for arthrocentesis on 3/28  Fluid samples negative for bacteria/crystals  Follow-up with Dr Severa Maize as OP as needed  Bilateral pain of leg and foot  Assessment & Plan  Improved  Presented to [facility] on 3/24 with R foot pain  R foot XR: No acute osseous abnormality  Does also have soft tissue thickening and capsulobursal thickening on XR  Started on Prednisone 40mg daily x5 days on 3/27  Completed on 3/31  Continue stretching with PT/OT  Proper foot wear  GERD (gastroesophageal reflux disease)  Assessment & Plan  Stable  Continue home Pepcid 40mg daily  Closed left ankle fracture, sequela  Assessment & Plan  Hx of L ankle fracture in 06/2022  Overdue for Orthopedics follow-up  Follow-up with Orthopedics on discharge  Continue with PT/OT  Considerations during therapy  Prediabetes  Assessment & Plan  A1c 5 9 on 10/11/22  Monitor fasting BG  Follow-up with PCP as outpatient  Elevated AST (SGOT)  Assessment & Plan  Improved  AST 23 from 70  Restarted Lipitor on 3/27  Asymptomatic  Continue to monitor routine CMP  Diarrhea  Assessment & Plan  Resolved  Had been experiencing diarrhea in acute setting  C-diff negative on 3/22  Likely viral in nature due to recent illness  Continue with symptom management  SARS-CoV-2 positive  Assessment & Plan  COVID + on 3/21  Isolation precautions discontinued on 3/30  Symptomatic care  Currently maintaining on RA  Rhabdomyolysis  Assessment & Plan  Improved, CK 94 on 3/27  CK was 6836 on admission  Improved after receiving IV fluids  Encourage PO fluid intake    Restarted "statin  Tremor, essential  Assessment & Plan  Continue home primidone 50mg at HS  Follows with Dr Ralph Ivey (Neurology) as outpatient  History of cerebral hemorrhage  Assessment & Plan  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  MRI in 10/2022 showed small acute hematoma in the R putamen with mild regional mass effect and chronic infarcts in L basal ganglia  Had repeat MRI in 2023  Recommended to continue holding ASA at that time per Neurology  Scheduled to follow-up with Neurology (Dr Ralph Ivey) in additional 6 months as outpatient  Continue PT/OT  Idiopathic osteoporosis  Assessment & Plan  Last DXA in 2019 showed osteoporosis  Receives Prolia injections as outpatient  Continue Vitamin D and calcium supplementation  Follows with Dr Aby Gonzalez (Rheumatology) as outpatient  * Ambulatory dysfunction  Assessment & Plan  3/21 - mechanical fall with no injuries; generalized weakness  Had been experiencing URI symptoms - COVID + on 3/21  CK 6838 on admission  Hx of CVA with L sided deficits  Primary team following  Continue PT/OT  VTE Pharmacologic Prophylaxis:   Pharmacologic: Enoxaparin (Lovenox)  Mechanical VTE Prophylaxis in Place: Yes - sequential compression devices  Current Length of Stay: 14 day(s)    Current Patient Status: Inpatient Rehab     Discharge Plan: As per primary team     Code Status: Level 1 - Full Code    Subjective:   Pt seen while sitting in pt chair in pt room  Pt denies any pain at this time  States she feels discomfort in her R knee but feels it is related to her arthritis and this is her baseline  Pt states her ankles and feet feel \"much better\"  Pt feels she is doing well in therapy and is looking forward to going home  States she slept well last night  Denies any SOB, chest pain, palpitations  Denies any indigestion, N/V  Denies any weakness       Objective:     Vitals:   Temp (24hrs), Av 3 °F (36 8 °C), Min:97 8 °F (36 6 °C), " Max:98 9 °F (37 2 °C)    Temp:  [97 8 °F (36 6 °C)-98 9 °F (37 2 °C)] 97 8 °F (36 6 °C)  HR:  [73-80] 73  Resp:  [16-18] 18  BP: (117-135)/(51-72) 117/51  SpO2:  [97 %] 97 %  Body mass index is 23 36 kg/m²  Review of Systems   Constitutional: Negative for activity change, appetite change, fatigue and fever  HENT: Negative for congestion, ear pain, tinnitus and trouble swallowing  Eyes: Negative for pain, redness and visual disturbance  Respiratory: Negative for cough, chest tightness, shortness of breath and wheezing  Cardiovascular: Negative for chest pain and palpitations  Gastrointestinal: Negative for abdominal distention, abdominal pain, constipation, diarrhea, nausea and vomiting  Last BM 04/07/23   Endocrine: Negative for cold intolerance and heat intolerance  Genitourinary: Negative for decreased urine volume, difficulty urinating, frequency and urgency  Musculoskeletal: Positive for arthralgias (mild aching R knee, similar to baseline)  Negative for gait problem and neck pain  Skin: Negative for color change and rash  Neurological: Negative for dizziness, weakness, light-headedness, numbness and headaches  Psychiatric/Behavioral: Negative for agitation, behavioral problems, confusion and sleep disturbance  The patient is not nervous/anxious  Input and Output Summary (last 24 hours): Intake/Output Summary (Last 24 hours) at 4/7/2023 1023  Last data filed at 4/6/2023 1701  Gross per 24 hour   Intake 600 ml   Output 1 ml   Net 599 ml       Physical Exam:     Physical Exam  Vitals and nursing note reviewed  Constitutional:       General: She is not in acute distress  Appearance: Normal appearance  She is not ill-appearing  HENT:      Head: Normocephalic  Nose: Nose normal  No congestion  Mouth/Throat:      Mouth: Mucous membranes are moist       Pharynx: Oropharynx is clear  Eyes:      Pupils: Pupils are equal, round, and reactive to light  Cardiovascular:      Rate and Rhythm: Normal rate and regular rhythm  Pulses:           Radial pulses are 2+ on the right side and 2+ on the left side  Heart sounds: Normal heart sounds, S1 normal and S2 normal  No murmur heard  Pulmonary:      Effort: Pulmonary effort is normal  No respiratory distress  Breath sounds: Normal breath sounds  No wheezing  Chest:      Chest wall: No tenderness  Abdominal:      General: Abdomen is flat  Bowel sounds are normal  There is no distension  Palpations: Abdomen is soft  Tenderness: There is no abdominal tenderness  There is no guarding  Musculoskeletal:         General: No tenderness  Cervical back: Normal range of motion and neck supple  No rigidity or tenderness  Skin:     General: Skin is warm and dry  Capillary Refill: Capillary refill takes less than 2 seconds  Coloration: Skin is not pale  Findings: No rash  Neurological:      Mental Status: She is alert and oriented to person, place, and time  Mental status is at baseline  Motor: Weakness (LUE weakness 4/5) present        Gait: Gait normal    Psychiatric:         Mood and Affect: Mood normal          Behavior: Behavior normal          Additional Data:     Labs:    Results from last 7 days   Lab Units 04/03/23  0503   WBC Thousand/uL 8 48   HEMOGLOBIN g/dL 11 7   HEMATOCRIT % 36 2   PLATELETS Thousands/uL 432*   NEUTROS PCT % 57   LYMPHS PCT % 31   MONOS PCT % 7   EOS PCT % 2     Results from last 7 days   Lab Units 04/03/23  0503   SODIUM mmol/L 136   POTASSIUM mmol/L 4 0   CHLORIDE mmol/L 100   CO2 mmol/L 26   BUN mg/dL 18   CREATININE mg/dL 0 67   ANION GAP mmol/L 10   CALCIUM mg/dL 8 9   ALBUMIN g/dL 3 4*   TOTAL BILIRUBIN mg/dL 0 37   ALK PHOS U/L 62   ALT U/L 46   AST U/L 31   GLUCOSE RANDOM mg/dL 102                       Labs reviewed    Imaging:    Imaging reviewed    Recent Cultures (last 7 days):           Last 24 Hours Medication List:   Current Facility-Administered Medications   Medication Dose Route Frequency Provider Last Rate   • acetaminophen  650 mg Oral Q6H PRN LORETO Rowe     • atorvastatin  10 mg Oral Daily With [address], LORETO     • benzonatate  100 mg Oral TID PRN Magdalene Harrison MD     • bisacodyl  10 mg Rectal Daily PRN Magdalene Harrison MD     • calcium carbonate-vitamin D  1 tablet Oral Daily With Breakfast [facility] LORETO Mcbried     • cholecalciferol  1,000 Units Oral Daily [facility] LORETO Mcbride     • Diclofenac Sodium  2 g Topical 4x Daily PRN LORETO Rowe     • enoxaparin  40 mg Subcutaneous Daily Magdalene Harrison MD     • famotidine  40 mg Oral Daily With Kiel Lawson MD     • fluticasone  1 spray Each Nare Daily Magdalene Harrison MD     • gabapentin  100 mg Oral BID Magdalene Harrison MD     • ondansetron  4 mg Oral Q6H PRN Magdalene Harrison MD     • polyethylene glycol  17 g Oral Daily PRN Magdalene Harrison MD     • primidone  50 mg Oral HS Geeta Candance Herb, MD     • sodium chloride  1 spray Each Nare Q1H PRN LORETO Rowe          M*Modal software was used to dictate this note  It may contain errors with dictating incorrect words or incorrect spelling  Please contact the provider directly with any questions

## 2023-04-07 NOTE — PROGRESS NOTES
Physical Medicine and Rehabilitation Progress Note  Tejas Sy 78 y o  female MRN: [id number]  Unit/Bed#: -01 Encounter: [id number]      Assessment & Plan:     Decline in ADLs and mobility: Functional assessment - improving         FIM  Care Score  Admit Score Recent Score    Total assist  1-100% or 2p    Tot  most ADLs    Max assist 2-51-75%    Sub     Mod assist 3- 26-50%  Par  upper body dressing    Min assist 3- 25% or < Par     CG assist 4  TA  LBD,  bathing, to hygiene   Sup/Setup 4-5 Sup  UBD   Mod-I/Indep 6 MI      Transfers   significant assist  Min assist-CGA    Ambulation    total assist  50 ft CGA    Stairs   Total assist/NT      Goal: Largely modified independent with ADLs and mobility  Major barriers: Impaired balance, strength, fatigue, pain  Dispo: Home with estimate length of stay of 2 5-3 weeks from admission      SARS-CoV-2 positive  Assessment & Plan  Continues to improve adequately  - Continue PT/OT  Presented 3/21/2023 with weakness and function decline  COVID-19 infection identified  Treatment with mild pathway initiated and completed   Patient remained stable from a pulmonary standpoint and remained on room air  Had significant diarrhea now improved   Continue supportive care with Tessalon Perles, nasal spray  Monitor respiratory status  Off isolation 3/30/23 per prior rec    MCI (mild cognitive impairment)  Assessment & Plan  - MOCA 8 2  Pt scored overall 21/ 30  indicating a mild cognitive deficit      - Attention 3/6; delayed recall 1/5  - Some difficulty with med organization - recommend son assist with this at least initially   - Increased risk of delirium - monitor   - Further assess iADLs  - Patient/family/CG teaching   - Optimal Sleep/wake cycle management  - Limit sedating/deliriogenic meds when possible  - Optimal medical, mood, and pain management   - Skilled therapies as outlined   - Compensatory strategies   - Fall precautions  - Optimize oversight after discharge  - OP follow-up with PCP and av or neuro      History of cerebral hemorrhage  Assessment & Plan  Neuro exam stable to improving   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   (of note MRI also showed focal R CR acute to subacute infarct as well as moderate chronic microangiopathic changes with dilated periventricular spaces v chronic lacunar infacts within sublentiform regions) - she was seen by neuro OP and recommended to remain off aspirin since   With baseline left hemiparesis and left hemibody impaired sensation  Follows with Dr Mariah Gabriel as an outpatient    Pain  Assessment & Plan  Remains improved   Prednisone per IM completed 3/31   S/P knee arthrocentesis   APAP PRN  Gabapentin 100mg BID   LD patch    Right knee pain  Assessment & Plan  Remains much improved, continue PT  Right knee pain and swelling 3/27/23  Likely an arthritis flare   · X-ray showing large joint effusion  · Ortho consulted and performed arthrocentesis with 25 cc fluid removed   · Per ortho 3/31 - Right knee osteoarthritis with osteoarthritic flare status post bedside right knee aspiration now with significant subjective and clinical improvement  No concern for septic or crystal arthropathy at this time  She may follow up outpatient with Dr Dahlia Espinoza for her right knee pain as needed  · Compression with ace wrap/ICE    Bilateral pain of leg and foot  Assessment & Plan  Remains improved  On admission had right ankle pain - x-ray completed and showing Minimal soft tissue thickening medial to the first metatarsal head and lateral to the fifth metatarsal head attributed to capsulobursal thickening  · Over the weekend at [facility] also had medial bilateral foot pain along plantar fascia  · Possible plantar fasciitis and also has evidence of bursitis    · Limiting her participation in therapies 3/27/23  · Prednisone 40mg x5 day per IM thru 3/31, gabapentin 100 mg BID, and topical Voltaren gel   · As needed Tylenol    Rhabdomyolysis  Assessment & Plan  Patient stained a slip onto the floor from her bed and was unable to get up for a prolonged period of time  CK 6836  Treated with IV fluids acute care  Trended down  Continue PT, OT       Diarrhea  Assessment & Plan  Improved possible from Covid infection   C  difficile negative  Monitor stools  Encourage oral hydration    Enlarged and hypertrophic nails  Assessment & Plan  Podiatry consult status postdebridement    At risk for venous thromboembolism (VTE)  Assessment & Plan  SCDs, ambulation, and lovenox       GERD (gastroesophageal reflux disease)  Assessment & Plan  Managed on Pepcid 40 mg daily home dose    Closed left ankle fracture, sequela  Assessment & Plan  Sustained June 2022  Weightbearing as tolerated  Need outpatient orthopedic follow-up    Elevated AST (SGOT)  Assessment & Plan  LFTs now WNL  Lipitor restarted 3/27/23    Tremor, essential  Assessment & Plan  Managed on home dose primidone 50 mg nightly  Adequately controlled  Located in hands bilaterally  Follows with Dr Joao Huang as an outpatient    Idiopathic osteoporosis  Assessment & Plan  Managed on Prolia as outpatient  Continue supplementation with vitamin D and calcium      Other Medical Issues:  • None    Follow-up providers and other issues to be followed up after discharge  • PCP  • Neurology  • Orthopedics    CODE: Level 1: Full Code    Restrictions include: Fall precautions    Objective: Allergies per EMR  Diagnostic Studies: Reviewed, no new imaging  XR knee 4+ vw right injury   Final Result by Nadine Ramírez MD (03/27 3166)      No acute osseous abnormality  Large joint effusion  Workstation performed: [id number]         XR foot 3+ vw right   Final Result by Brandon Culp MD (03/27 0809)      No acute osseous abnormality        Workstation performed: [id number]           See above as well    Laboratory: Labs reviewed  Results from last 7 days Lab Units 04/03/23  0503   HEMOGLOBIN g/dL 11 7   HEMATOCRIT % 36 2   WBC Thousand/uL 8 48     Results from last 7 days   Lab Units 04/03/23  0503   BUN mg/dL 18   POTASSIUM mmol/L 4 0   CHLORIDE mmol/L 100   CREATININE mg/dL 0 67   AST U/L 31   ALT U/L 46              Drug regimen reviewed, all potential adverse effects identified and addressed:    Scheduled Meds:  Current Facility-Administered Medications   Medication Dose Route Frequency Provider Last Rate   • acetaminophen  650 mg Oral Q6H PRN LORETO Munguia     • atorvastatin  10 mg Oral Daily With [address]     • benzonatate  100 mg Oral TID PRN Torrie Hernandez MD     • bisacodyl  10 mg Rectal Daily PRN Torrie Hernandez MD     • calcium carbonate-vitamin D  1 tablet Oral Daily With Breakfast LORETO Munguia     • cholecalciferol  1,000 Units Oral Daily LORETO Munguia     • Diclofenac Sodium  2 g Topical 4x Daily PRN LORETO Munguia     • enoxaparin  40 mg Subcutaneous Daily Torrie Hernandez MD     • famotidine  40 mg Oral Daily With Arsenio Gonzalez MD     • fluticasone  1 spray Each Nare Daily Torrie Hernandez MD     • gabapentin  100 mg Oral BID Torrie Hernandez MD     • ondansetron  4 mg Oral Q6H PRN Torrie Hernandez MD     • polyethylene glycol  17 g Oral Daily PRN Torrie Hernandez MD     • primidone  50 mg Oral HS Krystyna Lora MD     • sodium chloride  1 spray Each Nare Q1H PRN LORETO Munguia         Chief Complaints:  Rehab follow-up     Subjective: On eval, patient reports no new events overnight  She denies worsening pain, strength, sensation, lightheadedness, shortness of breath, constipation, or other new complaints  ROS: A 10 point ROS was performed; negative except as noted above         Physical Exam:  04/05/23 0635 97 8 °F (36 6 °C) 74 18 113/56 -- 96 % None (Room air     Vitals above reviewed on date of encounter    GEN:  Sitting in NAD   HEENT/NECK: MMM  CARDIAC: Regular rate rhythm, no murmers, no rubs, no gallops  LUNGS:  clear to auscultation, no wheezes, rales, or rhonchi  ABDOMEN: Soft, non-tender, non-distended, normal active bowel sounds  EXTREMITIES/SKIN:  no calf edema, no calf tenderness to palpation  NEURO:   MENTAL STATUS:  Appropriate wakefulness and interaction  and Strength/MMT:  Stable  PSYCH:  Affect:  Euthymic      ** Please Note: Fluency Direct voice to text software may have been used in the creation of this document  **    I personally performed the required components and examined the patient myself in person on 4/5/2023

## 2023-04-08 RX ADMIN — Medication 1 TABLET: at 08:22

## 2023-04-08 RX ADMIN — FAMOTIDINE 40 MG: 20 TABLET, FILM COATED ORAL at 16:40

## 2023-04-08 RX ADMIN — ATORVASTATIN CALCIUM 10 MG: 10 TABLET, FILM COATED ORAL at 16:40

## 2023-04-08 RX ADMIN — PRIMIDONE 50 MG: 50 TABLET ORAL at 21:22

## 2023-04-08 RX ADMIN — ENOXAPARIN SODIUM 40 MG: 40 INJECTION SUBCUTANEOUS at 08:22

## 2023-04-08 RX ADMIN — FLUTICASONE PROPIONATE 1 SPRAY: 50 SPRAY, METERED NASAL at 08:37

## 2023-04-08 RX ADMIN — ACETAMINOPHEN 650 MG: 325 TABLET ORAL at 08:22

## 2023-04-08 RX ADMIN — CHOLECALCIFEROL TAB 25 MCG (1000 UNIT) 1000 UNITS: 25 TAB at 08:22

## 2023-04-08 RX ADMIN — ACETAMINOPHEN 650 MG: 325 TABLET ORAL at 17:52

## 2023-04-08 NOTE — PROGRESS NOTES
"   04/08/23 0700   Pain Assessment   Pain Assessment Tool 0-10   Pain Score 4   Pain Location/Orientation Orientation: Left; Location: Groin   Pain Onset/Description Descriptor: Sore; Onset: Gradual   Effect of Pain on Daily Activities Tolerates   Patient's Stated Pain Goal No pain   Hospital Pain Intervention(s) Declines   Multiple Pain Sites No   Restrictions/Precautions   Precautions Cognitive; Fall Risk;Pain   Weight Bearing Restrictions No   ROM Restrictions No   Lifestyle   Autonomy \"I think I did good getting up throughout the night  \"   Eating   Type of Assistance Needed Set-up / clean-up   Physical Assistance Level No physical assistance   Comment Assist to open Ensure drink   Eating CARE Score 5   Oral Hygiene   Type of Assistance Needed Independent   Physical Assistance Level No physical assistance   Comment Homer in stance at sink utilizing RW for support as pt performs oral hygiene routine  No LOB  Oral Hygiene CARE Score 6   Grooming   Able To Initiate Tasks; Acquire Items;Comb/Brush Hair   Findings Hmoer in stance at RW to comb hair  No LOB  Shower/Bathe Self   Type of Assistance Needed Independent   Physical Assistance Level No physical assistance   Comment Performed SB seated at sink with pt demonstrating ability to bathe 10/10 parts with no LOB or cues required for safety  Plan to SB upon initial D/C  Shower/Bathe Self CARE Score 6   Bathing   Assessed Bath Style Sponge Bath   Anticipated D/C Bath Style Sponge Bath   Able to Wash/Rinse/Dry (body part) Left Arm;Right Arm;L Upper Leg;R Upper Leg;L Lower Leg/Foot;R Lower Leg/Foot;Chest;Abdomen;Perineal Area; Buttocks   Limitations Noted in Balance; Endurance;Timeliness   Positioning Seated;Standing   Findings  Plan to SB upon D/C  Pt reports plan to completing SB and dressing in bathroom environment  Tub/Shower Transfer   Reason Not Assessed Sponge Bath   Findings Plan to SB upon D/C     Upper Body Dressing   Type of Assistance Needed Independent " Physical Assistance Level No physical assistance   Comment Seated to don/doff [place] shirt   Upper Body Dressing CARE Score 6   Lower Body Dressing   Type of Assistance Needed Independent   Physical Assistance Level No physical assistance   Comment Seated utilizing xleg technique to thread LE's into under garment and loose fitting pants  IND in stance at sink top for CM task  No LOB or vc's for safety  Lower Body Dressing CARE Score 6   Putting On/Taking Off Footwear   Type of Assistance Needed Independent   Physical Assistance Level No physical assistance   Comment Xleg technique to doff/don slipper socks and shoes  Increased time to manage laces on shoes  Putting On/Taking Off Footwear CARE Score 6   Sit to Lying   Type of Assistance Needed Independent   Physical Assistance Level No physical assistance   Comment HOB flat; No use of BR   Sit to Lying CARE Score 6   Lying to Sitting on Side of Bed   Type of Assistance Needed Independent   Physical Assistance Level No physical assistance   Comment HOB flat; No use of BR   Lying to Sitting on Side of Bed CARE Score 6   Sit to Stand   Type of Assistance Needed Independent; Adaptive equipment   Physical Assistance Level No physical assistance   Comment Homer with RW; Pt benefits from increased time to problem solve proper hand placement; No vc's required  Sit to Stand CARE Score 6   Bed-Chair Transfer   Type of Assistance Needed Independent; Adaptive equipment   Physical Assistance Level No physical assistance   Comment Homer with RW   Chair/Bed-to-Chair Transfer CARE Score 6   Toileting Hygiene   Type of Assistance Needed Independent; Adaptive equipment   Physical Assistance Level No physical assistance   Comment Homer in stance at [facility] for CM; Performs rear hygiene seated  No LOB   Toileting Hygiene CARE Score 6   Toilet Transfer   Type of Assistance Needed Independent; Adaptive equipment   Physical Assistance Level No physical assistance   Comment (S)  Assessed for IRP's with RW with pt demonstrating ability to manuever recliner and safely position RW for STS and mobility into BR  Also simulated night time routine by placing BSC at foot of bed  Pt able to complete bed mobility and SPT EOB <> BSC w/o any vc's for hand placement and no LOB  Communicated IRP's to RN with plan to position BSC at foot of bed for night time routine  Folding RW tray placed on RW to allow pt to easily access hygiene items during the night  Pt will have folding RW tray for D/C  Toilet Transfer CARE Score 6   Cognition   Overall Cognitive Status Impaired   Arousal/Participation Alert; Cooperative   Attention Attends with cues to redirect   Orientation Level Oriented X4   Memory Decreased short term memory;Decreased recall of recent events   Following Commands Follows multistep commands with increased time or repetition   Activity Tolerance   Activity Tolerance Patient tolerated treatment well   Assessment   Treatment Assessment Pt participated in 60 min skilled OT Tx session with focus on ADL performance and re-assessing for IRP's  See above for further Tx details  Pt has achieved OT goals of Homer for ADL of bathing and dressing  Plan to perform SB upon initial D/C  Pt re-assessed for IRP's with RW with pt demonstrating improvement with proper hand placement and no LOB  Pt does benefit from increased time to problem solve proper hand placement  Plan to position [facility] at foot of bed for night time toileting with discussion to continuing upon D/C for increased safety, as pt's son works night shift  Pt receptive  Pt was able to verbalize to ring for assistance if RW is positioned out of reach  Pt progressed to IRP's with RW  Communicated to Nursing  Anticipated plan for pt to D/C to son's home on 4/9 for 1-2 weeks  DME includes standard BSC, folding RW tray,a nd a LHR  Therapy to contact [facility] in regards to DME order, as pt was initially anticipated to D/C beginning of next week   OT plan to re-administer 9HPT,  strength testing, and Box and Blocks Assessment prior to D/C tomorrow  Prognosis Good   Problem List Decreased strength;Decreased endurance; Impaired balance;Decreased mobility;Pain   Barriers to Discharge Decreased caregiver support   Recommendation   OT Discharge Recommendation Home with home health rehabilitation   Equipment Recommended Bedside commode; Other (comment)  (Folding RW tray; Handout provided for family to IND'ly purchase LHR)   Commode Type Standard   OT Therapy Minutes   OT Time In 0700   OT Time Out 0800   OT Total Time (minutes) 60   OT Mode of treatment - Individual (minutes) 60   OT Mode of treatment - Concurrent (minutes) 0   OT Mode of treatment - Group (minutes) 0   OT Mode of treatment - Co-treat (minutes) 0   OT Mode of Treatment - Total time(minutes) 60 minutes   OT Cumulative Minutes 1300   Therapy Time missed   Time missed?  No

## 2023-04-08 NOTE — PROGRESS NOTES
"   04/08/23 1045   Pain Assessment   Pain Assessment Tool 0-10   Pain Score 3   Pain Location/Orientation Orientation: Left; Location: Groin   Restrictions/Precautions   Precautions Cognitive; Fall Risk   General   Change In Medical/Functional Status (S)  cleared for IRPs with RW per OT, with BSC next to bed overnight  Cognition   Overall Cognitive Status Impaired   Arousal/Participation Alert; Cooperative   Attention Attends with cues to redirect   Orientation Level Oriented X4   Memory Decreased short term memory;Decreased recall of recent events   Following Commands Follows multistep commands with increased time or repetition   Subjective   Subjective \"I have to tell myslef to slow down and remind myself what to do and I can do it\"   Roll Left and Right   Type of Assistance Needed Independent   Roll Left and Right CARE Score 6   Sit to Lying   Type of Assistance Needed Independent   Sit to Lying CARE Score 6   Lying to Sitting on Side of Bed   Type of Assistance Needed Independent   Lying to Sitting on Side of Bed CARE Score 6   Sit to Stand   Type of Assistance Needed Independent   Physical Assistance Level No physical assistance   Comment from chair with arm and from chair without arm rests  Sit to Stand CARE Score 6   Bed-Chair Transfer   Type of Assistance Needed Independent   Physical Assistance Level No physical assistance   Comment stand pivot with RW   Chair/Bed-to-Chair Transfer CARE Score 6   Car Transfer   Type of Assistance Needed Supervision   Physical Assistance Level No physical assistance   Comment reminder cues for sequencing     Car Transfer CARE Score 4   Walk 10 Feet   Type of Assistance Needed Independent   Physical Assistance Level No physical assistance   Walk 10 Feet CARE Score 6   Walk 50 Feet with Two Turns   Type of Assistance Needed Independent   Physical Assistance Level No physical assistance   Walk 50 Feet with Two Turns CARE Score 6   Walk 150 Feet   Type of Assistance Needed " Independent   Physical Assistance Level No physical assistance   Walk 150 Feet CARE Score 6   Walking 10 Feet on Uneven Surfaces   Type of Assistance Needed Supervision   Physical Assistance Level No physical assistance   Walking 10 Feet on Uneven Surfaces CARE Score 4   Ambulation   Primary Mode of Locomotion Prior to Admission Walk   Distance Walked (feet) 200 ft  (150x2)   Assist Device Roller Walker   Gait Pattern Inconsistant Camryn;Trendelenburg   Limitations Noted In Balance   Walk Assist Level Modified Independent   Findings slowed pace however no LOB or buckling observed  Does the patient walk? 2  Yes   Wheelchair mobility   Does the patient use a wheelchair? 0  No   Curb or Single Stair   Style negotiated Single stair   Type of Assistance Needed Supervision   1 Step (Curb) CARE Score 4   4 Steps   Type of Assistance Needed Supervision   Physical Assistance Level No physical assistance   Comment B UE support R HR, non recip  CS for safety  Cues for sequence  4 Steps CARE Score 4   12 Steps   Comment pt will have 6-8 steps at sons home due to split level house     Reason if not Attempted Activity not applicable   12 Steps CARE Score 9   Stairs   Type Stairs   # of Steps 8   Weight Bearing Precautions Fall Risk   Assist Devices Single Rail   Picking Up Object   Type of Assistance Needed Independent   Physical Assistance Level No physical assistance   Comment marker from floor with reacher; S without reacher for slipper from floor during BBS   Picking Up Object CARE Score 6   Toilet Transfer   Type of Assistance Needed Independent   Toilet Transfer CARE Score 6   Therapeutic Interventions   Balance BBS completed: 39/56   Assessment   Treatment Assessment Pt engaged in 60 min skilled PT intervention, per OT cleared for IRPs despite nursing stating needing to give pt cues to stand, however if pt given enough time pt will self correct and talk self through what she needs to do, demonstrating understanding of safety  Pt amb self to BR upon entry with RW no physical A  Overall pt exhibiting functional I with gait and transfers, also f/u Benz completed today scoring 39/56 demonstrating an 17 point gain from first trial done 3/31/23  Pt able to complete x8 steps with single rail to mimic [facility] home setup  All in agreement pt clear for DC to [facility] home next day, to contact MD and son in PM for confirmation  Plan to provide [facility] and walker tray from consignment  Recommendation   PT Discharge Recommendation Home with home health rehabilitation  (to [facility])   PT Equipment ordered pt has RW, will need BSC and walker tray  PT Therapy Minutes   PT Time In 1045   PT Time Out 1145   PT Total Time (minutes) 60   PT Mode of treatment - Individual (minutes) 60   PT Mode of treatment - Concurrent (minutes) 0   PT Mode of treatment - Group (minutes) 0   PT Mode of treatment - Co-treat (minutes) 0   PT Mode of Treatment - Total time(minutes) 60 minutes   PT Cumulative Minutes 1125   Therapy Time missed   Time missed?  No

## 2023-04-08 NOTE — PROGRESS NOTES
"   04/08/23 1400   Pain Assessment   Pain Assessment Tool 0-10   Pain Score 2   Pain Location/Orientation Orientation: Left; Location: Groin   Restrictions/Precautions   Precautions Cognitive; Fall Risk   Cognition   Overall Cognitive Status Impaired   Arousal/Participation Alert; Cooperative   Attention Attends with cues to redirect   Orientation Level Oriented X4   Memory Decreased short term memory   Following Commands Follows multistep commands with increased time or repetition   Subjective   Subjective \"Im doing ok, I feel ready to leave tomorrow\"   Sit to Stand   Type of Assistance Needed Independent   Sit to Stand CARE Score 6   Bed-Chair Transfer   Type of Assistance Needed Independent   Chair/Bed-to-Chair Transfer CARE Score 6   Walk 10 Feet   Type of Assistance Needed Independent   Comment RW   Walk 10 Feet CARE Score 6   Walk 50 Feet with Two Turns   Type of Assistance Needed Independent   Comment RW   Walk 50 Feet with Two Turns CARE Score 6   Walk 150 Feet   Type of Assistance Needed Independent   Comment RW   Walk 150 Feet CARE Score 6   Ambulation   Primary Mode of Locomotion Prior to Admission Walk   Distance Walked (feet) 150 ft  (x2)   Assist Device Roller Walker   Does the patient walk? 2  Yes   Therapeutic Interventions   Strengthening Seated B LE TE: APs, LAQ, hip flexion, tband abd, ball squeeze x30 each  Assessment   Treatment Assessment Pt engaged in breif 30min skilled PT for seated TE as outlined  Spoke with son \"Max\" on the phone, discussed plan for DC to his home next day, asked about medications mainly  Tiger text to Dr Srikanth Mcfarland, confrimed pt is on nothing new (nursing to review meds with son prior to DC) pt recalled MD telling her this  max reported talking with [employer], stating the [facility] and walker tray will be mailed to his home  Weekend [employer] contacted and shipping order cancelled, pt to be provided with one from consignment to Have BSC immediately  (Son updated)   Son confirm he " has a walker at his home that was his dads and can leave on one of the floors so pt does not need to carry RW on steps  Currently all questions answered and both pt and son feel ready for DC next day to sons home with home PT recommended to follow  All goals met with exception newly added FF, since pt will only need to complete 8 steps at time for DC to sons home  Recommendation   PT Discharge Recommendation Home with home health rehabilitation   PT Therapy Minutes   PT Time In 1400   PT Time Out 1430   PT Total Time (minutes) 30   PT Mode of treatment - Individual (minutes) 30   PT Mode of treatment - Concurrent (minutes) 0   PT Mode of treatment - Group (minutes) 0   PT Mode of treatment - Co-treat (minutes) 0   PT Mode of Treatment - Total time(minutes) 30 minutes   PT Cumulative Minutes 1155   Therapy Time missed   Time missed?  No

## 2023-04-08 NOTE — OCCUPATIONAL THERAPY NOTE
This GUEVARA/L contacted [facility] in regards to pt's DME, as pt will now D/C home tomorrow (4/9)  Spoke to Tu anderson from [facility] who confirmed DME would be initiated on Monday, 4/10, and would be sent to son's home by mid/end of week  DME order cancelled and communicated Therapy would utilize consignment closet to issue standard BSC and folding RW tray so pt will have recommended DME for D/C        Nadya Necessary, GUEVARA/L

## 2023-04-08 NOTE — PLAN OF CARE
Problem: NEUROSENSORY - ADULT  Goal: Achieves stable or improved neurological status  Description: INTERVENTIONS  - Monitor and report changes in neurological status  - Monitor vital signs such as temperature, blood pressure, glucose, and any other labs ordered   - Initiate measures to prevent increased intracranial pressure  - Monitor for seizure activity and implement precautions if appropriate      Outcome: Progressing  Goal: Achieves maximal functionality and self care  Description: INTERVENTIONS  - Monitor swallowing and airway patency with patient fatigue and changes in neurological status  - Encourage and assist patient to increase activity and self care     - Encourage visually impaired, hearing impaired and aphasic patients to use assistive/communication devices  Outcome: Progressing     Problem: CARDIOVASCULAR - ADULT  Goal: Maintains optimal cardiac output and hemodynamic stability  Description: INTERVENTIONS:  - Monitor I/O, vital signs and rhythm  - Monitor for S/S and trends of decreased cardiac output  - Administer and titrate ordered vasoactive medications to optimize hemodynamic stability  - Assess quality of pulses, skin color and temperature  - Assess for signs of decreased coronary artery perfusion  - Instruct patient to report change in severity of symptoms  Outcome: Progressing  Goal: Absence of cardiac dysrhythmias or at baseline rhythm  Description: INTERVENTIONS:  - Continuous cardiac monitoring, vital signs, obtain 12 lead EKG if ordered  - Administer antiarrhythmic and heart rate control medications as ordered  - Monitor electrolytes and administer replacement therapy as ordered  Outcome: Progressing     Problem: RESPIRATORY - ADULT  Goal: Achieves optimal ventilation and oxygenation  Description: INTERVENTIONS:  - Assess for changes in respiratory status  - Assess for changes in mentation and behavior  - Position to facilitate oxygenation and minimize respiratory effort  - Oxygen administered by appropriate delivery if ordered  - Initiate smoking cessation education as indicated  - Encourage broncho-pulmonary hygiene including cough, deep breathe, Incentive Spirometry  - Assess the need for suctioning and aspirate as needed  - Assess and instruct to report SOB or any respiratory difficulty  - Respiratory Therapy support as indicated  Outcome: Progressing     Problem: GASTROINTESTINAL - ADULT  Goal: Minimal or absence of nausea and/or vomiting  Description: INTERVENTIONS:  - Administer IV fluids if ordered to ensure adequate hydration  - Maintain NPO status until nausea and vomiting are resolved  - Nasogastric tube if ordered  - Administer ordered antiemetic medications as needed  - Provide nonpharmacologic comfort measures as appropriate  - Advance diet as tolerated, if ordered  - Consider nutrition services referral to assist patient with adequate nutrition and appropriate food choices  Outcome: Progressing  Goal: Maintains or returns to baseline bowel function  Description: INTERVENTIONS:  - Assess bowel function  - Encourage oral fluids to ensure adequate hydration  - Administer IV fluids if ordered to ensure adequate hydration  - Administer ordered medications as needed  - Encourage mobilization and activity  - Consider nutritional services referral to assist patient with adequate nutrition and appropriate food choices  Outcome: Progressing  Goal: Maintains adequate nutritional intake  Description: INTERVENTIONS:  - Monitor percentage of each meal consumed  - Identify factors contributing to decreased intake, treat as appropriate  - Assist with meals as needed  - Monitor I&O, weight, and lab values if indicated  - Obtain nutrition services referral as needed  Outcome: Progressing  Goal: Oral mucous membranes remain intact  Description: INTERVENTIONS  - Assess oral mucosa and hygiene practices  - Implement preventative oral hygiene regimen  - Implement oral medicated treatments as ordered  - Initiate Nutrition services referral as needed  Outcome: Progressing     Problem: GENITOURINARY - ADULT  Goal: Maintains or returns to baseline urinary function  Description: INTERVENTIONS:  - Assess urinary function  - Encourage oral fluids to ensure adequate hydration if ordered  - Administer IV fluids as ordered to ensure adequate hydration  - Administer ordered medications as needed  - Offer frequent toileting  - Follow urinary retention protocol if ordered  Outcome: Progressing  Goal: Absence of urinary retention  Description: INTERVENTIONS:  - Assess patient’s ability to void and empty bladder  - Monitor I/O  - Bladder scan as needed  - Discuss with physician/AP medications to alleviate retention as needed  - Discuss catheterization for long term situations as appropriate  Outcome: Progressing     Problem: METABOLIC, FLUID AND ELECTROLYTES - ADULT  Goal: Electrolytes maintained within normal limits  Description: INTERVENTIONS:  - Monitor labs and assess patient for signs and symptoms of electrolyte imbalances  - Administer electrolyte replacement as ordered  - Monitor response to electrolyte replacements, including repeat lab results as appropriate  - Instruct patient on fluid and nutrition as appropriate  Outcome: Progressing  Goal: Fluid balance maintained  Description: INTERVENTIONS:  - Monitor labs   - Monitor I/O and WT  - Instruct patient on fluid and nutrition as appropriate  - Assess for signs & symptoms of volume excess or deficit  Outcome: Progressing     Problem: SKIN/TISSUE INTEGRITY - ADULT  Goal: Skin Integrity remains intact(Skin Breakdown Prevention)  Description: Assess:    -Assess extremities for adequate circulation and sensation     Bed Management:  -Have minimal linens on bed & keep smooth, unwrinkled  -Change linens as needed when moist or perspiring      Toileting:  -Offer bedside commode    Activity:    -Encourage activity and walks on unit  -Encourage or provide ROM exercises     -Use appropriate equipment to lift or move patient in bed      Skin Care:  -Avoid use of baby powder, tape, friction and shearing, hot water or constrictive clothing      Outcome: Progressing     Problem: HEMATOLOGIC - ADULT  Goal: Maintains hematologic stability  Description: INTERVENTIONS  - Assess for signs and symptoms of bleeding or hemorrhage  - Monitor labs  - Administer supportive blood products/factors as ordered and appropriate  Outcome: Progressing     Problem: MUSCULOSKELETAL - ADULT  Goal: Maintain or return mobility to safest level of function  Description: INTERVENTIONS:  - Assess patient's ability to carry out ADLs; assess patient's baseline for ADL function and identify physical deficits which impact ability to perform ADLs (bathing, care of mouth/teeth, toileting, grooming, dressing, etc )  - Assess/evaluate cause of self-care deficits   - Assess range of motion  - Assess patient's mobility  - Assess patient's need for assistive devices and provide as appropriate  - Encourage maximum independence but intervene and supervise when necessary  - Involve family in performance of ADLs  - Assess for home care needs following discharge   - Consider OT consult to assist with ADL evaluation and planning for discharge  - Provide patient education as appropriate  Outcome: Progressing  Goal: Maintain proper alignment of affected body part  Description: INTERVENTIONS:  - Support, maintain and protect limb and body alignment  - Provide patient/ family with appropriate education  Outcome: Progressing     Problem: Prexisting or High Potential for Compromised Skin Integrity  Goal: Skin integrity is maintained or improved  Description: INTERVENTIONS:  - Identify patients at risk for skin breakdown  - Assess and monitor skin integrity  - Assess and monitor nutrition and hydration status  - Monitor labs   - Assess for incontinence   - Turn and reposition patient  - Assist with mobility/ambulation  - Relieve pressure over bony prominences  - Avoid friction and shearing  - Provide appropriate hygiene as needed including keeping skin clean and dry  - Evaluate need for skin moisturizer/barrier cream  - Collaborate with interdisciplinary team   - Patient/family teaching  - Consider wound care consult   Outcome: Progressing     Problem: MOBILITY - ADULT  Goal: Maintain or return to baseline ADL function  Description: INTERVENTIONS:  -  Assess patient's ability to carry out ADLs; assess patient's baseline for ADL function and identify physical deficits which impact ability to perform ADLs (bathing, care of mouth/teeth, toileting, grooming, dressing, etc )  - Assess/evaluate cause of self-care deficits   - Assess range of motion  - Assess patient's mobility; develop plan if impaired  - Assess patient's need for assistive devices and provide as appropriate  - Encourage maximum independence but intervene and supervise when necessary  - Involve family in performance of ADLs  - Assess for home care needs following discharge   - Consider OT consult to assist with ADL evaluation and planning for discharge  - Provide patient education as appropriate  Outcome: Progressing  Goal: Maintains/Returns to pre admission functional level  Description: INTERVENTIONS:  - Perform BMAT or MOVE assessment daily    - Set and communicate daily mobility goal to care team and patient/family/caregiver     - Collaborate with rehabilitation services on mobility goals if consulted    - Out of bed for toileting  - Record patient progress and toleration of activity level   Outcome: Progressing     Problem: PAIN - ADULT  Goal: Verbalizes/displays adequate comfort level or baseline comfort level  Description: Interventions:  - Encourage patient to monitor pain and request assistance  - Assess pain using appropriate pain scale  - Administer analgesics based on type and severity of pain and evaluate response  - Implement non-pharmacological measures as appropriate and evaluate response  - Consider cultural and social influences on pain and pain management  - Notify physician/advanced practitioner if interventions unsuccessful or patient reports new pain  Outcome: Progressing     Problem: SAFETY ADULT  Goal: Patient will remain free of falls  Description: INTERVENTIONS:  - Educate patient/family on patient safety including physical limitations  - Instruct patient to call for assistance with activity   - Consult OT/PT to assist with strengthening/mobility   - Keep Call bell within reach  - Keep bed low and locked with side rails adjusted as appropriate  - Keep care items and personal belongings within reach  - Initiate and maintain comfort rounds    - Apply yellow socks and bracelet for high fall risk patients  - Consider moving patient to room near nurses station  Outcome: Progressing  Goal: Maintain or return to baseline ADL function  Description: INTERVENTIONS:  -  Assess patient's ability to carry out ADLs; assess patient's baseline for ADL function and identify physical deficits which impact ability to perform ADLs (bathing, care of mouth/teeth, toileting, grooming, dressing, etc )  - Assess/evaluate cause of self-care deficits   - Assess range of motion  - Assess patient's mobility; develop plan if impaired  - Assess patient's need for assistive devices and provide as appropriate  - Encourage maximum independence but intervene and supervise when necessary  - Involve family in performance of ADLs  - Assess for home care needs following discharge   - Consider OT consult to assist with ADL evaluation and planning for discharge  - Provide patient education as appropriate  Outcome: Progressing  Goal: Maintains/Returns to pre admission functional level  Description: INTERVENTIONS:  - Perform BMAT or MOVE assessment daily    - Set and communicate daily mobility goal to care team and patient/family/caregiver     - Collaborate with rehabilitation services on mobility goals if consulted    - Out of bed for toileting  - Record patient progress and toleration of activity level   Outcome: Progressing     Problem: DISCHARGE PLANNING  Goal: Discharge to home or other facility with appropriate resources  Description: INTERVENTIONS:  - Identify barriers to discharge w/patient and caregiver  - Arrange for needed discharge resources and transportation as appropriate  - Identify discharge learning needs (meds, wound care, etc )  - Arrange for interpretive services to assist at discharge as needed  - Refer to Case Management Department for coordinating discharge planning if the patient needs post-hospital services based on physician/advanced practitioner order or complex needs related to functional status, cognitive ability, or social support system  Outcome: Progressing

## 2023-04-08 NOTE — NURSING NOTE
Pt remains very unsafe with transfers  When getting up from bed always reaches for rolling walker first before trying to stand  When getting up from commode reaches for rolling walker first before pushing up on handles of commode to stand  Had to cue pt for her safety so she wouldn't fall

## 2023-04-09 VITALS
OXYGEN SATURATION: 96 % | BODY MASS INDEX: 23.23 KG/M2 | TEMPERATURE: 97.5 F | HEIGHT: 64 IN | HEART RATE: 85 BPM | DIASTOLIC BLOOD PRESSURE: 62 MMHG | WEIGHT: 136.1 LBS | RESPIRATION RATE: 16 BRPM | SYSTOLIC BLOOD PRESSURE: 127 MMHG

## 2023-04-09 RX ADMIN — CHOLECALCIFEROL TAB 25 MCG (1000 UNIT) 1000 UNITS: 25 TAB at 08:12

## 2023-04-09 RX ADMIN — ACETAMINOPHEN 650 MG: 325 TABLET ORAL at 08:15

## 2023-04-09 RX ADMIN — FLUTICASONE PROPIONATE 1 SPRAY: 50 SPRAY, METERED NASAL at 08:18

## 2023-04-09 RX ADMIN — ENOXAPARIN SODIUM 40 MG: 40 INJECTION SUBCUTANEOUS at 08:12

## 2023-04-09 RX ADMIN — Medication 1 TABLET: at 08:12

## 2023-04-09 NOTE — PLAN OF CARE
Problem: NEUROSENSORY - ADULT  Goal: Achieves stable or improved neurological status  Description: INTERVENTIONS  - Monitor and report changes in neurological status  - Monitor vital signs such as temperature, blood pressure, glucose, and any other labs ordered   - Initiate measures to prevent increased intracranial pressure  - Monitor for seizure activity and implement precautions if appropriate      Outcome: Completed  Goal: Achieves maximal functionality and self care  Description: INTERVENTIONS  - Monitor swallowing and airway patency with patient fatigue and changes in neurological status  - Encourage and assist patient to increase activity and self care     - Encourage visually impaired, hearing impaired and aphasic patients to use assistive/communication devices  Outcome: Completed     Problem: CARDIOVASCULAR - ADULT  Goal: Maintains optimal cardiac output and hemodynamic stability  Description: INTERVENTIONS:  - Monitor I/O, vital signs and rhythm  - Monitor for S/S and trends of decreased cardiac output  - Administer and titrate ordered vasoactive medications to optimize hemodynamic stability  - Assess quality of pulses, skin color and temperature  - Assess for signs of decreased coronary artery perfusion  - Instruct patient to report change in severity of symptoms  Outcome: Completed  Goal: Absence of cardiac dysrhythmias or at baseline rhythm  Description: INTERVENTIONS:  - Continuous cardiac monitoring, vital signs, obtain 12 lead EKG if ordered  - Administer antiarrhythmic and heart rate control medications as ordered  - Monitor electrolytes and administer replacement therapy as ordered  Outcome: Completed     Problem: RESPIRATORY - ADULT  Goal: Achieves optimal ventilation and oxygenation  Description: INTERVENTIONS:  - Assess for changes in respiratory status  - Assess for changes in mentation and behavior  - Position to facilitate oxygenation and minimize respiratory effort  - Oxygen administered by appropriate delivery if ordered  - Initiate smoking cessation education as indicated  - Encourage broncho-pulmonary hygiene including cough, deep breathe, Incentive Spirometry  - Assess the need for suctioning and aspirate as needed  - Assess and instruct to report SOB or any respiratory difficulty  - Respiratory Therapy support as indicated  4/9/2023 0732 by Tori Kelley RN  Outcome: Completed  4/9/2023 0731 by Tori Kelley RN  Outcome: Progressing     Problem: GASTROINTESTINAL - ADULT  Goal: Minimal or absence of nausea and/or vomiting  Description: INTERVENTIONS:  - Administer IV fluids if ordered to ensure adequate hydration  - Maintain NPO status until nausea and vomiting are resolved  - Nasogastric tube if ordered  - Administer ordered antiemetic medications as needed  - Provide nonpharmacologic comfort measures as appropriate  - Advance diet as tolerated, if ordered  - Consider nutrition services referral to assist patient with adequate nutrition and appropriate food choices  Outcome: Completed  Goal: Maintains or returns to baseline bowel function  Description: INTERVENTIONS:  - Assess bowel function  - Encourage oral fluids to ensure adequate hydration  - Administer IV fluids if ordered to ensure adequate hydration  - Administer ordered medications as needed  - Encourage mobilization and activity  - Consider nutritional services referral to assist patient with adequate nutrition and appropriate food choices  Outcome: Completed  Goal: Maintains adequate nutritional intake  Description: INTERVENTIONS:  - Monitor percentage of each meal consumed  - Identify factors contributing to decreased intake, treat as appropriate  - Assist with meals as needed  - Monitor I&O, weight, and lab values if indicated  - Obtain nutrition services referral as needed  Outcome: Completed  Goal: Oral mucous membranes remain intact  Description: INTERVENTIONS  - Assess oral mucosa and hygiene practices  - Implement preventative oral hygiene regimen  - Implement oral medicated treatments as ordered  - Initiate Nutrition services referral as needed  Outcome: Completed     Problem: GENITOURINARY - ADULT  Goal: Maintains or returns to baseline urinary function  Description: INTERVENTIONS:  - Assess urinary function  - Encourage oral fluids to ensure adequate hydration if ordered  - Administer IV fluids as ordered to ensure adequate hydration  - Administer ordered medications as needed  - Offer frequent toileting  - Follow urinary retention protocol if ordered  Outcome: Completed  Goal: Absence of urinary retention  Description: INTERVENTIONS:  - Assess patient’s ability to void and empty bladder  - Monitor I/O  - Bladder scan as needed  - Discuss with physician/AP medications to alleviate retention as needed  - Discuss catheterization for long term situations as appropriate  Outcome: Completed     Problem: METABOLIC, FLUID AND ELECTROLYTES - ADULT  Goal: Electrolytes maintained within normal limits  Description: INTERVENTIONS:  - Monitor labs and assess patient for signs and symptoms of electrolyte imbalances  - Administer electrolyte replacement as ordered  - Monitor response to electrolyte replacements, including repeat lab results as appropriate  - Instruct patient on fluid and nutrition as appropriate  Outcome: Completed  Goal: Fluid balance maintained  Description: INTERVENTIONS:  - Monitor labs   - Monitor I/O and WT  - Instruct patient on fluid and nutrition as appropriate  - Assess for signs & symptoms of volume excess or deficit  Outcome: Completed     Problem: SKIN/TISSUE INTEGRITY - ADULT  Goal: Skin Integrity remains intact(Skin Breakdown Prevention)  Description: Assess:  -Perform Jame assessment every     -Clean and moisturize skin every     -Inspect skin when repositioning, toileting, and assisting with ADLS  -Assess under medical devices such as  every   -Assess extremities for adequate circulation and sensation     Bed Management:  -Have minimal linens on bed & keep smooth, unwrinkled  -Change linens as needed when moist or perspiring  -Avoid sitting or lying in one position for more than  hours while in bed  -Keep HOB at degrees     Toileting:  -Offer bedside commode  -Assess for incontinence every   -Use incontinent care products after each incontinent episode such as     Activity:  -Mobilize patient  times a day  -Encourage activity and walks on unit  -Encourage or provide ROM exercises   -Turn and reposition patient every  Hours  -Use appropriate equipment to lift or move patient in bed  -Instruct/ Assist with weight shifting every  when out of bed in chair  -Consider limitation of chair time  hour intervals    Skin Care:  -Avoid use of baby powder, tape, friction and shearing, hot water or constrictive clothing  -Relieve pressure over bony prominences using   -Do not massage red bony areas    Next Steps:  -Teach patient strategies to minimize risks such as    -Consider consults to  interdisciplinary teams such as   4/9/2023 0732 by Prerna Jewell RN  Outcome: Completed  4/9/2023 0731 by Prerna Jewell RN  Outcome: Progressing     Problem: HEMATOLOGIC - ADULT  Goal: Maintains hematologic stability  Description: INTERVENTIONS  - Assess for signs and symptoms of bleeding or hemorrhage  - Monitor labs  - Administer supportive blood products/factors as ordered and appropriate  Outcome: Completed     Problem: MUSCULOSKELETAL - ADULT  Goal: Maintain or return mobility to safest level of function  Description: INTERVENTIONS:  - Assess patient's ability to carry out ADLs; assess patient's baseline for ADL function and identify physical deficits which impact ability to perform ADLs (bathing, care of mouth/teeth, toileting, grooming, dressing, etc )  - Assess/evaluate cause of self-care deficits   - Assess range of motion  - Assess patient's mobility  - Assess patient's need for assistive devices and provide as appropriate  - Encourage maximum independence but intervene and supervise when necessary  - Involve family in performance of ADLs  - Assess for home care needs following discharge   - Consider OT consult to assist with ADL evaluation and planning for discharge  - Provide patient education as appropriate  Outcome: Completed  Goal: Maintain proper alignment of affected body part  Description: INTERVENTIONS:  - Support, maintain and protect limb and body alignment  - Provide patient/ family with appropriate education  Outcome: Completed     Problem: Prexisting or High Potential for Compromised Skin Integrity  Goal: Skin integrity is maintained or improved  Description: INTERVENTIONS:  - Identify patients at risk for skin breakdown  - Assess and monitor skin integrity  - Assess and monitor nutrition and hydration status  - Monitor labs   - Assess for incontinence   - Turn and reposition patient  - Assist with mobility/ambulation  - Relieve pressure over bony prominences  - Avoid friction and shearing  - Provide appropriate hygiene as needed including keeping skin clean and dry  - Evaluate need for skin moisturizer/barrier cream  - Collaborate with interdisciplinary team   - Patient/family teaching  - Consider wound care consult   Outcome: Completed     Problem: MOBILITY - ADULT  Goal: Maintain or return to baseline ADL function  Description: INTERVENTIONS:  -  Assess patient's ability to carry out ADLs; assess patient's baseline for ADL function and identify physical deficits which impact ability to perform ADLs (bathing, care of mouth/teeth, toileting, grooming, dressing, etc )  - Assess/evaluate cause of self-care deficits   - Assess range of motion  - Assess patient's mobility; develop plan if impaired  - Assess patient's need for assistive devices and provide as appropriate  - Encourage maximum independence but intervene and supervise when necessary  - Involve family in performance of ADLs  - Assess for home care needs following discharge   - Consider OT consult to assist with ADL evaluation and planning for discharge  - Provide patient education as appropriate  Outcome: Completed  Goal: Maintains/Returns to pre admission functional level  Description: INTERVENTIONS:  - Perform BMAT or MOVE assessment daily    - Set and communicate daily mobility goal to care team and patient/family/caregiver  - Collaborate with rehabilitation services on mobility goals if consulted  - Perform Range of Motion  times a day  - Reposition patient every  hours    - Dangle patient  times a day  - Stand patient  times a day  - Ambulate patient  times a day  - Out of bed to chair  times a day   - Out of bed for meals  times a day  - Out of bed for toileting  - Record patient progress and toleration of activity level   Outcome: Completed     Problem: PAIN - ADULT  Goal: Verbalizes/displays adequate comfort level or baseline comfort level  Description: Interventions:  - Encourage patient to monitor pain and request assistance  - Assess pain using appropriate pain scale  - Administer analgesics based on type and severity of pain and evaluate response  - Implement non-pharmacological measures as appropriate and evaluate response  - Consider cultural and social influences on pain and pain management  - Notify physician/advanced practitioner if interventions unsuccessful or patient reports new pain  Outcome: Completed     Problem: SAFETY ADULT  Goal: Patient will remain free of falls  Description: INTERVENTIONS:  - Educate patient/family on patient safety including physical limitations  - Instruct patient to call for assistance with activity   - Consult OT/PT to assist with strengthening/mobility   - Keep Call bell within reach  - Keep bed low and locked with side rails adjusted as appropriate  - Keep care items and personal belongings within reach  - Initiate and maintain comfort rounds  - Make Fall Risk Sign visible to staff  - Offer Toileting every  Hours, in advance of need  - Initiate/Maintain alarm  - Obtain necessary fall risk management equipment:   - Apply yellow socks and bracelet for high fall risk patients  - Consider moving patient to room near nurses station  Outcome: Completed  Goal: Maintain or return to baseline ADL function  Description: INTERVENTIONS:  -  Assess patient's ability to carry out ADLs; assess patient's baseline for ADL function and identify physical deficits which impact ability to perform ADLs (bathing, care of mouth/teeth, toileting, grooming, dressing, etc )  - Assess/evaluate cause of self-care deficits   - Assess range of motion  - Assess patient's mobility; develop plan if impaired  - Assess patient's need for assistive devices and provide as appropriate  - Encourage maximum independence but intervene and supervise when necessary  - Involve family in performance of ADLs  - Assess for home care needs following discharge   - Consider OT consult to assist with ADL evaluation and planning for discharge  - Provide patient education as appropriate  Outcome: Completed  Goal: Maintains/Returns to pre admission functional level  Description: INTERVENTIONS:  - Perform BMAT or MOVE assessment daily    - Set and communicate daily mobility goal to care team and patient/family/caregiver  - Collaborate with rehabilitation services on mobility goals if consulted  - Perform Range of Motion  times a day  - Reposition patient every  hours    - Dangle patient  times a day  - Stand patient  times a day  - Ambulate patient  times a day  - Out of bed to chair  times a day   - Out of bed for meal times a day  - Out of bed for toileting  - Record patient progress and toleration of activity level   Outcome: Completed     Problem: DISCHARGE PLANNING  Goal: Discharge to home or other facility with appropriate resources  Description: INTERVENTIONS:  - Identify barriers to discharge w/patient and caregiver  - Arrange for needed discharge resources and transportation as appropriate  - Identify discharge learning needs (meds, wound care, etc )  - Arrange for interpretive services to assist at discharge as needed  - Refer to Case Management Department for coordinating discharge planning if the patient needs post-hospital services based on physician/advanced practitioner order or complex needs related to functional status, cognitive ability, or social support system  Outcome: Completed

## 2023-04-09 NOTE — PLAN OF CARE
Problem: RESPIRATORY - ADULT  Goal: Achieves optimal ventilation and oxygenation  Description: INTERVENTIONS:  - Assess for changes in respiratory status  - Assess for changes in mentation and behavior  - Position to facilitate oxygenation and minimize respiratory effort  - Oxygen administered by appropriate delivery if ordered  - Initiate smoking cessation education as indicated  - Encourage broncho-pulmonary hygiene including cough, deep breathe, Incentive Spirometry  - Assess the need for suctioning and aspirate as needed  - Assess and instruct to report SOB or any respiratory difficulty  - Respiratory Therapy support as indicated  Outcome: Progressing     Problem: SKIN/TISSUE INTEGRITY - ADULT  Goal: Skin Integrity remains intact(Skin Breakdown Prevention)  Description: Assess:  -Perform Jame assessment every   -Clean and moisturize skin every   -Inspect skin when repositioning, toileting, and assisting with ADLS  -Assess under medical devices such as  every   -Assess extremities for adequate circulation and sensation     Bed Management:  -Have minimal linens on bed & keep smooth, unwrinkled  -Change linens as needed when moist or perspiring  -Avoid sitting or lying in one position for more than  hours while in bed  -Keep HOB at degrees     Toileting:  -Offer bedside commode  -Assess for incontinence every   -Use incontinent care products after each incontinent episode such as     Activity:  -Mobilize patient  times a day  -Encourage activity and walks on unit  -Encourage or provide ROM exercises   -Turn and reposition patient every  Hours  -Use appropriate equipment to lift or move patient in bed  -Instruct/ Assist with weight shifting every  when out of bed in chair  -Consider limitation of chair time  hour intervals    Skin Care:  -Avoid use of baby powder, tape, friction and shearing, hot water or constrictive clothing  -Relieve pressure over bony prominences using   -Do not massage red bony areas    Next Steps:  -Teach patient strategies to minimize risks such as    -Consider consults to  interdisciplinary teams such as  Outcome: Progressing

## 2023-04-09 NOTE — NURSING NOTE
All medications reviewed with Francois Gonzalez and her son, follow up appointments reviewed  Pt has commode, and walker basket that were delivered here to her room  Son will purchase a reacher  Michael Hillman was returned to Francois Gonzalez from the safe in the hospital, she also has her purse-otherwise no other belongings  Son is transporting Francois Gonzalez to his home, she will receive home PT there  This RN did point out the [facility] phone number should any future questions arise

## 2023-04-09 NOTE — PROGRESS NOTES
"   04/09/23 0830   Pain Assessment   Pain Assessment Tool 0-10   Pain Score No Pain   Restrictions/Precautions   Precautions Cognitive; Fall Risk   Weight Bearing Restrictions No   ROM Restrictions No   Lifestyle   Autonomy \"I'm going home today  \"   Putting On/Taking Off Footwear   Type of Assistance Needed Independent   Physical Assistance Level No physical assistance   Comment Xleg technique to don slipper socks and shoes  Putting On/Taking Off Footwear CARE Score 6   Sit to Stand   Type of Assistance Needed Independent; Adaptive equipment   Physical Assistance Level No physical assistance   Comment Homer with RW   Sit to Stand CARE Score 6   Bed-Chair Transfer   Type of Assistance Needed Independent; Adaptive equipment   Physical Assistance Level No physical assistance   Comment Homer with RW   Chair/Bed-to-Chair Transfer CARE Score 6   Cognition   Overall Cognitive Status Impaired   Arousal/Participation Alert; Cooperative   Attention Attends with cues to redirect   Orientation Level Oriented X4   Memory Decreased short term memory   Following Commands Follows multistep commands with increased time or repetition   Additional Activities   Additional Activities Other (Comment)   Additional Activities Comments Re-administered 9HPT, Box and Blocks Testing,a nd  Strength Testing  See below for score details  Activity Tolerance   Activity Tolerance Patient tolerated treatment well   Assessment   Treatment Assessment Pt engaged in brief 30 min skilled OT Tx session with focus on re-administering 9HPT, Box and Blocks assessment and  strength testing  See below for score details  Pt is demonstrating slight improvement with LUE  strength, coordination, and dexterity  EDU provided to continue utilizing LUE throughout fxnl tasks upon D/C in order to maximize LUE function  Pt agreeable  Pt plan to D/C to son's home today  No further questions for OT     Prognosis Good   Problem List Decreased strength;Decreased " endurance   Barriers to Discharge Decreased caregiver support   Recommendation   OT Discharge Recommendation Home with home health rehabilitation   Equipment Recommended Bedside commode  (folding RW tray; LHR)   Commode Type Standard   OT Therapy Minutes   OT Time In 0830   OT Time Out 0900   OT Total Time (minutes) 30   OT Mode of treatment - Individual (minutes) 30   OT Mode of treatment - Concurrent (minutes) 0   OT Mode of treatment - Group (minutes) 0   OT Mode of treatment - Co-treat (minutes) 0   OT Mode of Treatment - Total time(minutes) 30 minutes   OT Cumulative Minutes 1330   Therapy Time missed   Time missed? No     9 HOLE PEG TEST  Right: 25 17 sec  Left: 1 min 35 sec     STRENGTH  Right: 54#, 50#, 45# (Avg  49 6#)  Left:  30#, 26#, 18# (Avg  24 6#)    Box and Block Test  The Box and Block Test (BBT) is a measure of manual dexterity that requires repeatedly moving 1-inch blocks from one side of a box to another in 60 seconds  It is commonly used in the stroke population to determine manual dexterity  It measures unilateral function, not bilateral function  Box & block administered with the following results: R= 48 blocks, L= 23 blocks, demonstrating impairments B/L, L > R  Pt is R hand dominant

## 2023-04-10 PROBLEM — Z91.89 AT RISK FOR VENOUS THROMBOEMBOLISM (VTE): Status: RESOLVED | Noted: 2023-03-30 | Resolved: 2023-04-10

## 2023-04-10 PROBLEM — Z74.09 IMPAIRED MOBILITY AND ADLS: Status: ACTIVE | Noted: 2023-04-10

## 2023-04-10 PROBLEM — Z78.9 IMPAIRED MOBILITY AND ADLS: Status: ACTIVE | Noted: 2023-04-10

## 2023-04-10 NOTE — ASSESSMENT & PLAN NOTE
Multifactorial: Recent Covid infection on hx of ICH with mild residual HP, acute pain   - Function improved; medically stabilized  - Patient will still be some fall risk although this has decreased since admission, recommend as much supervision as possible initially   - Will d/c with son initially   - Recommend life alert like device and discharge home with son initially  - Practiced lower height bed which she will be using at son's house and did adequately well   - D/C with PT, OT thru [facility] who will start at home

## 2023-04-10 NOTE — PROGRESS NOTES
04/10/23 1142   Hello, [Guardian’s Name / Patient’s Sky Adams, this is [Caller Sky Adams from [facility], and our clinical care team wanted to check on you / your child after your recent visit to the hospital  It will only take 3-5 minutes  Is this a good time? Discharge Call Type/ Specific Diagnosis: General Call   General Discharge Phone Call   Were your/your child's discharge instructions clear and understandable? Please tell me in your own words how to care for yourself/your child now that you're home Yes;Patient understood instructions   Have you filled your/your child's new prescriptions yet? Yes   What questions do you have about those medications? No questions   Are you/your child having any unusual symptoms or problems? (Specific to problem- i e , dressing, pain, bruising or swelling, procedure, etc ) No reported symptoms/problems   Is there anything preventing you from keeping that appointment? No;Patient able to keep appointment   Are there any physicians, nurses, or hospital staff you would like us to recognize for doing a very good job? Global Team Acknowledgment   This call resulted in: No interventions needed   Call Complete   Attempted Number of Calls 1   Discharge phone call complete?  Complete

## 2023-04-17 NOTE — PHYSICAL THERAPY NOTE
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, essential tremor, OA, osteoporosis, and vitamin D deficiency  PTA, pt was independent w/ all functional mobility w/ out an AD and lives alone in single level senior apt building  Pt with varied but good progression t/o acute rehab stay once COVID ftg began to dissipate  Balance improved however remains inconsistent  RW recommended for DC  Benz Balance improvement of 19 points to 39/56  Pt ongoing with varied cues for safe STS due to decrease ant wt shift  At times, takes pt several tries initially to get up safely from surface  Plan for DC to [facility] home (works nights), Pt able to complete x8 steps with single rail to mimic [facility] home setup  Walker tray and BSC provided from [facility] (delivery cancelled)  Pt has Rw and son had second RW to leave on upper floors  All goals met with exception newly added FF, since pt will only need to complete 8 steps at time for DC to [facility] home  Home PT recommended to follow

## 2023-04-18 NOTE — PROGRESS NOTES
OT DISCHARGE SUMMARY     Patient presented to [facility] on 3/24/2023 s/p sip off recliner  Pt had progressive weakness prior to this event  During hospital stay pt also tested positive for Covid19  At time of evaluation pt was total A for bathing, min A for UB dressing and total A for LB dressing and toileting Pt made good progess during acute rehab stay and by time of d/c achieved all goals of independence  Pt d/c to her son's home  Pt ordered DOMINGA Holloway  Pt d/c home with home PT       Dianne Kim

## 2023-07-10 ENCOUNTER — HOSPITAL ENCOUNTER (OUTPATIENT)
Dept: RADIOLOGY | Age: 78
Discharge: HOME | End: 2023-07-10
Payer: MEDICARE

## 2023-07-10 DIAGNOSIS — M81.0 AGE-RELATED OSTEOPOROSIS WITHOUT CURRENT PATHOLOGICAL FRACTURE: ICD-10-CM

## 2023-07-10 PROCEDURE — 77080 DXA BONE DENSITY AXIAL: CPT

## 2023-08-23 ENCOUNTER — OFFICE VISIT (OUTPATIENT)
Dept: RHEUMATOLOGY | Facility: CLINIC | Age: 78
End: 2023-08-23
Payer: MEDICARE

## 2023-08-23 DIAGNOSIS — M81.0 AGE-RELATED OSTEOPOROSIS WITHOUT CURRENT PATHOLOGICAL FRACTURE: Primary | ICD-10-CM

## 2023-08-23 DIAGNOSIS — M46.1 SACROILIITIS (HCC): ICD-10-CM

## 2023-08-23 PROCEDURE — 96372 THER/PROPH/DIAG INJ SC/IM: CPT | Performed by: INTERNAL MEDICINE

## 2023-08-23 NOTE — PROGRESS NOTES
Patient received her Prolia injection in her left arm, no complaints of pain or discomfort. No bleeding , bruising or redness at the injection site. Patient will schedule her next appointment for a follow-up and her next injection prior to leaving.

## 2023-08-29 NOTE — PROGRESS NOTES
"   04/05/23 1030   Pain Assessment   Pain Assessment Tool 0-10   Pain Score 2   Pain Location/Orientation   (R achilles)   Restrictions/Precautions   Precautions Cognitive; Fall Risk;Pain   Lifestyle   Autonomy \"I would be open to going to my sons if my friend can continue to watch over my cat  Renaee Majestic Renaee Majestic Renaee Majestic I'm worried about my son's dogs knocking me over\"   Grooming   Findings CS in stance at sink w/ RW to wash hands   Sit to Stand   Type of Assistance Needed Incidental touching   Physical Assistance Level No physical assistance   Comment CGA w/ RW   Sit to Stand CARE Score 4   Toileting Hygiene   Type of Assistance Needed Incidental touching   Physical Assistance Level No physical assistance   Comment CGA in stance w/ RW following voiding   Toileting Hygiene CARE Score 4   Toilet Transfer   Type of Assistance Needed Incidental touching   Physical Assistance Level No physical assistance   Comment CGA w/ RW and BSC over toilet   Toilet Transfer CARE Score 4   Exercise Tools   Other Exercise Tool 1 Pt engaged in self ROM utilizng RUE as functional A for LUE shoulder flexion to achieve inc range  Pt then progressed to completed chest press and bicep curls completing 2 x 10 each with 1# weight  All exercises complete to inc ROM and strength for inc ADL performance  Cognition   Overall Cognitive Status Impaired   Arousal/Participation Alert; Cooperative   Attention Attends with cues to redirect   Orientation Level Oriented X4   Memory Decreased short term memory   Following Commands Follows multistep commands with increased time or repetition   Additional Activities   Additional Activities Comments Engaged in fxnl cog and standing tolerance task  Trialed 60pc jigsaw puzzle, pt requiring ModA for problem solving, but with assist able to complete  Started task in stance, compelted 7min stand, then completed remainder of task while seated     Activity Tolerance   Activity Tolerance Patient tolerated treatment well   Assessment " Treatment Assessment OT session focusing on toileting tasks, UE TE, fxnl cog, and fxnl standing tolerance  Pt tolerated well  Noted that pt's son provided pt w/ the option of [address] to his house temporarily  Expectation remains for pt to be Mod I w/ basic ADLs and fxnl mobility at DC, but anticipate pt will require assist for med management, as well as could benefit from intermittent assist w/ IADLs  Pt somewhat receptive to this option, also dec cog impacting her from complex problem solving  Will benefit from OT continuing to assess w/ problem solving for DC planning  Problem List Decreased strength;Decreased range of motion;Decreased endurance; Impaired balance;Decreased mobility; Decreased cognition; Impaired judgement;Decreased coordination;Pain   Plan   Treatment/Interventions ADL retraining;Functional transfer training; Therapeutic exercise; Endurance training;Cognitive reorientation;Patient/family training;Equipment eval/education; Compensatory technique education;Continued evaluation   Progress Progressing toward goals   OT Therapy Minutes   OT Time In 1040   OT Time Out 1150   OT Total Time (minutes) 70   OT Mode of treatment - Individual (minutes) 70   OT Mode of treatment - Concurrent (minutes) 0   OT Mode of treatment - Group (minutes) 0   OT Mode of treatment - Co-treat (minutes) 0   OT Mode of Treatment - Total time(minutes) 70 minutes   OT Cumulative Minutes 1010   Therapy Time missed   Time missed?  No 159.1

## 2023-09-12 ENCOUNTER — OFFICE VISIT (OUTPATIENT)
Dept: NEUROLOGY | Facility: CLINIC | Age: 78
End: 2023-09-12

## 2023-09-12 VITALS
WEIGHT: 142 LBS | HEIGHT: 64 IN | TEMPERATURE: 97.9 F | SYSTOLIC BLOOD PRESSURE: 122 MMHG | HEART RATE: 85 BPM | OXYGEN SATURATION: 98 % | BODY MASS INDEX: 24.24 KG/M2 | DIASTOLIC BLOOD PRESSURE: 84 MMHG | RESPIRATION RATE: 18 BRPM

## 2023-09-12 DIAGNOSIS — Z86.79 HISTORY OF CEREBRAL HEMORRHAGE: Primary | ICD-10-CM

## 2023-09-12 DIAGNOSIS — G25.0 TREMOR, ESSENTIAL: ICD-10-CM

## 2023-09-12 RX ORDER — PRIMIDONE 50 MG/1
TABLET ORAL
Qty: 90 TABLET | Refills: 3 | Status: SHIPPED | OUTPATIENT
Start: 2023-09-12

## 2023-09-12 NOTE — PATIENT INSTRUCTIONS
History of cerebral hemorrhage  Pt denies any symptoms to suggest new stroke. Pt should continue atorvastatin for secondary stroke prevention. She is not on Aspirin due to the spontaneous cerebral hemorrhage. Continue with good blood pressure control; I would recommend monitoring at home at least 3 times per week; Goal of <130/80; at goal  Continue with good cholesterol control; Goal LDL <70; at goal  Pt was encouraged to get regular exercise and follow a Mediterranean diet. If pt has any new stroke-like symptoms including sudden painless loss of vision or double vision, difficulty speaking or swallowing, vertigo / room spinning that does not quickly resolve, or weakness / numbness affecting 1 side of the face or body he should proceed by ambulance to the nearest emergency room immediately. I have spent a total time of 30 minutes on 09/12/23 in caring for this patient including Diagnostic results, Prognosis, Risks and benefits of tx options, Instructions for management, Patient and family education, Importance of tx compliance, Risk factor reductions, Impressions, Counseling / Coordination of care, Documenting in the medical record, Reviewing / ordering tests, medicine, procedures   and Obtaining or reviewing history  . Pt will follow-up in 12 months. Tremor, essential  No change in tremor. She will continue primidone 50mg at bedtime.

## 2023-09-12 NOTE — ASSESSMENT & PLAN NOTE
• Pt denies any symptoms to suggest new stroke. • Pt should continue atorvastatin for secondary stroke prevention. She is not on Aspirin due to the spontaneous cerebral hemorrhage. • Continue with good blood pressure control; I would recommend monitoring at home at least 3 times per week; Goal of <130/80; at goal  • Continue with good cholesterol control; Goal LDL <70; at goal  • Pt was encouraged to get regular exercise and follow a Mediterranean diet. • If pt has any new stroke-like symptoms including sudden painless loss of vision or double vision, difficulty speaking or swallowing, vertigo / room spinning that does not quickly resolve, or weakness / numbness affecting 1 side of the face or body he should proceed by ambulance to the nearest emergency room immediately. • I have spent a total time of 30 minutes on 09/12/23 in caring for this patient including Diagnostic results, Prognosis, Risks and benefits of tx options, Instructions for management, Patient and family education, Importance of tx compliance, Risk factor reductions, Impressions, Counseling / Coordination of care, Documenting in the medical record, Reviewing / ordering tests, medicine, procedures   and Obtaining or reviewing history  . • Pt will follow-up in 12 months.

## 2023-09-12 NOTE — PROGRESS NOTES
Patient ID: Yeny Hawley is a 70 y.o. female. Assessment/Plan:    History of cerebral hemorrhage  • Pt denies any symptoms to suggest new stroke. • Pt should continue atorvastatin for secondary stroke prevention. She is not on Aspirin due to the spontaneous cerebral hemorrhage. • Continue with good blood pressure control; I would recommend monitoring at home at least 3 times per week; Goal of <130/80; at goal  • Continue with good cholesterol control; Goal LDL <70; at goal  • Pt was encouraged to get regular exercise and follow a Mediterranean diet. • If pt has any new stroke-like symptoms including sudden painless loss of vision or double vision, difficulty speaking or swallowing, vertigo / room spinning that does not quickly resolve, or weakness / numbness affecting 1 side of the face or body he should proceed by ambulance to the nearest emergency room immediately. • I have spent a total time of 30 minutes on 09/12/23 in caring for this patient including Diagnostic results, Prognosis, Risks and benefits of tx options, Instructions for management, Patient and family education, Importance of tx compliance, Risk factor reductions, Impressions, Counseling / Coordination of care, Documenting in the medical record, Reviewing / ordering tests, medicine, procedures   and Obtaining or reviewing history  . • Pt will follow-up in 12 months. Tremor, essential  · No change in tremor. · She will continue primidone 50mg at bedtime. Problem List Items Addressed This Visit        Nervous and Auditory    Tremor, essential     · No change in tremor. · She will continue primidone 50mg at bedtime. Relevant Medications    primidone (MYSOLINE) 50 mg tablet       Other    History of cerebral hemorrhage - Primary     • Pt denies any symptoms to suggest new stroke. • Pt should continue atorvastatin for secondary stroke prevention.  She is not on Aspirin due to the spontaneous cerebral hemorrhage. • Continue with good blood pressure control; I would recommend monitoring at home at least 3 times per week; Goal of <130/80; at goal  • Continue with good cholesterol control; Goal LDL <70; at goal  • Pt was encouraged to get regular exercise and follow a Mediterranean diet. • If pt has any new stroke-like symptoms including sudden painless loss of vision or double vision, difficulty speaking or swallowing, vertigo / room spinning that does not quickly resolve, or weakness / numbness affecting 1 side of the face or body he should proceed by ambulance to the nearest emergency room immediately. • I have spent a total time of 30 minutes on 09/12/23 in caring for this patient including Diagnostic results, Prognosis, Risks and benefits of tx options, Instructions for management, Patient and family education, Importance of tx compliance, Risk factor reductions, Impressions, Counseling / Coordination of care, Documenting in the medical record, Reviewing / ordering tests, medicine, procedures   and Obtaining or reviewing history  . • Pt will follow-up in 12 months. 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 She had COVID-19 in March and was hospitalized due to gait dysfunction. She reported worsening Lt sided weakness at that time as well. She did go to acute rehab after that hospitalization. She states that the Lt sided weakness has returned to baseline since that time. She does monitor her BP at home and the majority of the readings have been in the 120s/70s. She remains on atorvastatin and her total cholesterol is 131 and LDL 41. She gets her meals through [employer]. She does get regular exercise by walking. She is not driving and recently sold her car.  Her sister drives or she uses [employer]. She denies any symptoms to suggest new stroke. She reports the Rt hand tremor has been stable. She is taking Primidone 50mg at bedtime. She is tolerating the medication witout difficulty. The tremor does worsen with stress and anxiety. She has not identified anything that improves the tremor. Her father may have had a tremor. The following portions of the patient's history were reviewed and updated as appropriate: allergies, current medications, past family history, past medical history, past social history, past surgical history and problem list.         Objective:    Blood pressure 122/84, pulse 85, temperature 97.9 °F (36.6 °C), temperature source Temporal, resp. rate 18, height 5' 4" (1.626 m), weight 64.4 kg (142 lb), SpO2 98 %. Physical Exam  Vitals reviewed. Eyes:      General: Lids are normal.      Extraocular Movements: Extraocular movements intact. Pupils: Pupils are equal, round, and reactive to light. Neurological:      Motor: Motor strength is normal.     Deep Tendon Reflexes:      Reflex Scores:       Patellar reflexes are 1+ on the right side and 1+ on the left side. Achilles reflexes are 1+ on the right side and 1+ on the left side. Psychiatric:         Speech: Speech normal.         Neurological Exam  Mental Status   Oriented to person, place, time and situation. Speech is normal. Language is fluent with no aphasia. Able to perform serial calculations. Fund of knowledge is appropriate for level of education. Apraxia absent. Cranial Nerves  CN II: Visual fields full to confrontation. CN III, IV, VI: Extraocular movements intact bilaterally. Normal lids and orbits bilaterally. Pupils equal round and reactive to light bilaterally. CN V: Facial sensation is normal.  CN VII: Full and symmetric facial movement. CN XI: Shoulder shrug strength is normal.  CN XII: Tongue midline without atrophy or fasciculations.     Motor   Strength is 5/5 throughout all four extremities. Reflexes  Deep tendon reflexes are 2+ and symmetric except as noted. Right                      Left  Patellar                                1+                         1+  Achilles                                1+                         1+    Gait  Casual gait is normal including stance, stride, and arm swing. ROS:    Agree with ROS as documented by Iman Mistry LPN. Review of systems personally reviewed.

## 2023-09-12 NOTE — PROGRESS NOTES
Review of Systems   Constitutional: Negative for appetite change, fatigue and fever. HENT: Negative. Negative for hearing loss, tinnitus, trouble swallowing and voice change. Eyes: Negative. Negative for photophobia, pain and visual disturbance. Respiratory: Negative. Negative for shortness of breath. Cardiovascular: Negative. Negative for palpitations. Gastrointestinal: Negative. Negative for nausea and vomiting. Endocrine: Negative. Negative for cold intolerance. Genitourinary: Negative. Negative for dysuria, frequency and urgency. Musculoskeletal: Positive for gait problem (uses cane - feel off balance at times ). Negative for back pain, myalgias and neck pain. Skin: Negative. Negative for rash. Allergic/Immunologic: Negative. Neurological: Positive for tremors (right hand - same ) and weakness (left hand ). Negative for dizziness, seizures, syncope, facial asymmetry, speech difficulty, light-headedness, numbness and headaches. Hematological: Negative. Does not bruise/bleed easily. Psychiatric/Behavioral: Negative. Negative for confusion, hallucinations and sleep disturbance. All other systems reviewed and are negative.

## 2023-09-27 ENCOUNTER — APPOINTMENT (EMERGENCY)
Dept: RADIOLOGY | Facility: HOSPITAL | Age: 78
DRG: 948 | End: 2023-09-27
Payer: MEDICARE

## 2023-09-27 ENCOUNTER — HOSPITAL ENCOUNTER (INPATIENT)
Facility: HOSPITAL | Age: 78
LOS: 5 days | Discharge: NURSING, REHAB OR LONG-TERM CARE | DRG: 948 | End: 2023-10-02
Attending: EMERGENCY MEDICINE | Admitting: INTERNAL MEDICINE
Payer: MEDICARE

## 2023-09-27 DIAGNOSIS — E87.6 HYPOKALEMIA: ICD-10-CM

## 2023-09-27 DIAGNOSIS — R53.1 WEAKNESS: Primary | ICD-10-CM

## 2023-09-27 DIAGNOSIS — E83.39 HYPOPHOSPHATEMIA: ICD-10-CM

## 2023-09-27 DIAGNOSIS — E55.9 VITAMIN D DEFICIENCY: ICD-10-CM

## 2023-09-27 DIAGNOSIS — R53.83 FATIGUE: ICD-10-CM

## 2023-09-27 PROBLEM — R29.898 WEAKNESS OF BOTH LOWER EXTREMITIES: Status: ACTIVE | Noted: 2023-03-21

## 2023-09-27 LAB
2HR DELTA HS TROPONIN: 0 NG/L
4HR DELTA HS TROPONIN: 4 NG/L
ALBUMIN SERPL BCP-MCNC: 4.2 G/DL (ref 3.5–5)
ALP SERPL-CCNC: 53 U/L (ref 34–104)
ALT SERPL W P-5'-P-CCNC: 13 U/L (ref 7–52)
AMORPH URATE CRY URNS QL MICRO: ABNORMAL
ANION GAP SERPL CALCULATED.3IONS-SCNC: 10 MMOL/L
AST SERPL W P-5'-P-CCNC: 28 U/L (ref 13–39)
ATRIAL RATE: 227 BPM
ATRIAL RATE: 81 BPM
BACTERIA UR QL AUTO: ABNORMAL /HPF
BASOPHILS # BLD AUTO: 0.05 THOUSANDS/ÂΜL (ref 0–0.1)
BASOPHILS NFR BLD AUTO: 1 % (ref 0–1)
BILIRUB SERPL-MCNC: 0.58 MG/DL (ref 0.2–1)
BILIRUB UR QL STRIP: NEGATIVE
BNP SERPL-MCNC: 471 PG/ML (ref 0–100)
BUN SERPL-MCNC: 10 MG/DL (ref 5–25)
CALCIUM SERPL-MCNC: 10.2 MG/DL (ref 8.4–10.2)
CARDIAC TROPONIN I PNL SERPL HS: 15 NG/L
CARDIAC TROPONIN I PNL SERPL HS: 15 NG/L
CARDIAC TROPONIN I PNL SERPL HS: 19 NG/L
CHLORIDE SERPL-SCNC: 102 MMOL/L (ref 96–108)
CK SERPL-CCNC: 372 U/L (ref 26–192)
CLARITY UR: ABNORMAL
CO2 SERPL-SCNC: 29 MMOL/L (ref 21–32)
COLOR UR: ABNORMAL
CREAT SERPL-MCNC: 0.71 MG/DL (ref 0.6–1.3)
EOSINOPHIL # BLD AUTO: 0.01 THOUSAND/ÂΜL (ref 0–0.61)
EOSINOPHIL NFR BLD AUTO: 0 % (ref 0–6)
ERYTHROCYTE [DISTWIDTH] IN BLOOD BY AUTOMATED COUNT: 12.1 % (ref 11.6–15.1)
ERYTHROCYTE [SEDIMENTATION RATE] IN BLOOD: 35 MM/HOUR (ref 0–29)
FOLATE SERPL-MCNC: >22.3 NG/ML
GFR SERPL CREATININE-BSD FRML MDRD: 82 ML/MIN/1.73SQ M
GLUCOSE SERPL-MCNC: 98 MG/DL (ref 65–140)
GLUCOSE UR STRIP-MCNC: NEGATIVE MG/DL
HCT VFR BLD AUTO: 35.9 % (ref 34.8–46.1)
HGB BLD-MCNC: 11.9 G/DL (ref 11.5–15.4)
HGB UR QL STRIP.AUTO: NEGATIVE
IMM GRANULOCYTES # BLD AUTO: 0.03 THOUSAND/UL (ref 0–0.2)
IMM GRANULOCYTES NFR BLD AUTO: 0 % (ref 0–2)
KETONES UR STRIP-MCNC: NEGATIVE MG/DL
LACTATE SERPL-SCNC: 0.8 MMOL/L (ref 0.5–2)
LEUKOCYTE ESTERASE UR QL STRIP: NEGATIVE
LYMPHOCYTES # BLD AUTO: 1.5 THOUSANDS/ÂΜL (ref 0.6–4.47)
LYMPHOCYTES NFR BLD AUTO: 17 % (ref 14–44)
MAGNESIUM SERPL-MCNC: 1.6 MG/DL (ref 1.9–2.7)
MCH RBC QN AUTO: 33.8 PG (ref 26.8–34.3)
MCHC RBC AUTO-ENTMCNC: 33.1 G/DL (ref 31.4–37.4)
MCV RBC AUTO: 102 FL (ref 82–98)
MONOCYTES # BLD AUTO: 0.6 THOUSAND/ÂΜL (ref 0.17–1.22)
MONOCYTES NFR BLD AUTO: 7 % (ref 4–12)
MUCOUS THREADS UR QL AUTO: ABNORMAL
NEUTROPHILS # BLD AUTO: 6.62 THOUSANDS/ÂΜL (ref 1.85–7.62)
NEUTS SEG NFR BLD AUTO: 75 % (ref 43–75)
NITRITE UR QL STRIP: NEGATIVE
NON-SQ EPI CELLS URNS QL MICRO: ABNORMAL /HPF
NRBC BLD AUTO-RTO: 0 /100 WBCS
PH UR STRIP.AUTO: 7.5 [PH]
PHOSPHATE SERPL-MCNC: 2.4 MG/DL (ref 2.3–4.1)
PLATELET # BLD AUTO: 225 THOUSANDS/UL (ref 149–390)
PMV BLD AUTO: 10.2 FL (ref 8.9–12.7)
POTASSIUM SERPL-SCNC: 2.9 MMOL/L (ref 3.5–5.3)
PROT SERPL-MCNC: 7.7 G/DL (ref 6.4–8.4)
PROT UR STRIP-MCNC: ABNORMAL MG/DL
QRS AXIS: 59 DEGREES
QRS AXIS: 73 DEGREES
QRSD INTERVAL: 64 MS
QRSD INTERVAL: 74 MS
QT INTERVAL: 410 MS
QT INTERVAL: 416 MS
QTC INTERVAL: 429 MS
QTC INTERVAL: 470 MS
RBC # BLD AUTO: 3.52 MILLION/UL (ref 3.81–5.12)
RBC #/AREA URNS AUTO: ABNORMAL /HPF
SODIUM SERPL-SCNC: 141 MMOL/L (ref 135–147)
SP GR UR STRIP.AUTO: 1.01 (ref 1–1.03)
T WAVE AXIS: 49 DEGREES
T WAVE AXIS: 55 DEGREES
UROBILINOGEN UR STRIP-ACNC: <2 MG/DL
VENTRICULAR RATE: 66 BPM
VENTRICULAR RATE: 77 BPM
VIT B12 SERPL-MCNC: 1275 PG/ML (ref 180–914)
WBC # BLD AUTO: 8.81 THOUSAND/UL (ref 4.31–10.16)
WBC #/AREA URNS AUTO: ABNORMAL /HPF

## 2023-09-27 PROCEDURE — 84484 ASSAY OF TROPONIN QUANT: CPT

## 2023-09-27 PROCEDURE — 99222 1ST HOSP IP/OBS MODERATE 55: CPT | Performed by: INTERNAL MEDICINE

## 2023-09-27 PROCEDURE — 82746 ASSAY OF FOLIC ACID SERUM: CPT

## 2023-09-27 PROCEDURE — 85025 COMPLETE CBC W/AUTO DIFF WBC: CPT

## 2023-09-27 PROCEDURE — 83605 ASSAY OF LACTIC ACID: CPT | Performed by: PSYCHIATRY & NEUROLOGY

## 2023-09-27 PROCEDURE — 93010 ELECTROCARDIOGRAM REPORT: CPT | Performed by: INTERNAL MEDICINE

## 2023-09-27 PROCEDURE — 83735 ASSAY OF MAGNESIUM: CPT

## 2023-09-27 PROCEDURE — 71046 X-RAY EXAM CHEST 2 VIEWS: CPT

## 2023-09-27 PROCEDURE — 70450 CT HEAD/BRAIN W/O DYE: CPT

## 2023-09-27 PROCEDURE — 80053 COMPREHEN METABOLIC PANEL: CPT

## 2023-09-27 PROCEDURE — 82607 VITAMIN B-12: CPT

## 2023-09-27 PROCEDURE — 99285 EMERGENCY DEPT VISIT HI MDM: CPT

## 2023-09-27 PROCEDURE — 36415 COLL VENOUS BLD VENIPUNCTURE: CPT

## 2023-09-27 PROCEDURE — 93005 ELECTROCARDIOGRAM TRACING: CPT

## 2023-09-27 PROCEDURE — 81001 URINALYSIS AUTO W/SCOPE: CPT

## 2023-09-27 PROCEDURE — 85652 RBC SED RATE AUTOMATED: CPT

## 2023-09-27 PROCEDURE — 99285 EMERGENCY DEPT VISIT HI MDM: CPT | Performed by: EMERGENCY MEDICINE

## 2023-09-27 PROCEDURE — G1004 CDSM NDSC: HCPCS

## 2023-09-27 PROCEDURE — 83880 ASSAY OF NATRIURETIC PEPTIDE: CPT

## 2023-09-27 PROCEDURE — 82550 ASSAY OF CK (CPK): CPT

## 2023-09-27 PROCEDURE — 84100 ASSAY OF PHOSPHORUS: CPT

## 2023-09-27 PROCEDURE — 99222 1ST HOSP IP/OBS MODERATE 55: CPT | Performed by: PSYCHIATRY & NEUROLOGY

## 2023-09-27 RX ORDER — ATORVASTATIN CALCIUM 10 MG/1
10 TABLET, FILM COATED ORAL
Status: DISCONTINUED | OUTPATIENT
Start: 2023-09-27 | End: 2023-10-02 | Stop reason: HOSPADM

## 2023-09-27 RX ORDER — POTASSIUM CHLORIDE 20 MEQ/1
40 TABLET, EXTENDED RELEASE ORAL ONCE
Status: COMPLETED | OUTPATIENT
Start: 2023-09-27 | End: 2023-09-27

## 2023-09-27 RX ORDER — SODIUM CHLORIDE, SODIUM GLUCONATE, SODIUM ACETATE, POTASSIUM CHLORIDE, MAGNESIUM CHLORIDE, SODIUM PHOSPHATE, DIBASIC, AND POTASSIUM PHOSPHATE .53; .5; .37; .037; .03; .012; .00082 G/100ML; G/100ML; G/100ML; G/100ML; G/100ML; G/100ML; G/100ML
100 INJECTION, SOLUTION INTRAVENOUS CONTINUOUS
Status: DISPENSED | OUTPATIENT
Start: 2023-09-27 | End: 2023-09-28

## 2023-09-27 RX ORDER — PRIMIDONE 50 MG/1
100 TABLET ORAL
Status: DISCONTINUED | OUTPATIENT
Start: 2023-09-27 | End: 2023-10-02 | Stop reason: HOSPADM

## 2023-09-27 RX ORDER — POTASSIUM CHLORIDE 14.9 MG/ML
20 INJECTION INTRAVENOUS
Status: COMPLETED | OUTPATIENT
Start: 2023-09-27 | End: 2023-09-27

## 2023-09-27 RX ORDER — ENOXAPARIN SODIUM 100 MG/ML
40 INJECTION SUBCUTANEOUS DAILY
Status: DISCONTINUED | OUTPATIENT
Start: 2023-09-28 | End: 2023-10-02 | Stop reason: HOSPADM

## 2023-09-27 RX ADMIN — POTASSIUM CHLORIDE 40 MEQ: 1500 TABLET, EXTENDED RELEASE ORAL at 14:52

## 2023-09-27 RX ADMIN — POTASSIUM CHLORIDE 20 MEQ: 14.9 INJECTION, SOLUTION INTRAVENOUS at 14:52

## 2023-09-27 RX ADMIN — PRIMIDONE 100 MG: 50 TABLET ORAL at 22:42

## 2023-09-27 RX ADMIN — ATORVASTATIN CALCIUM 10 MG: 80 TABLET, FILM COATED ORAL at 18:08

## 2023-09-27 RX ADMIN — POTASSIUM CHLORIDE 20 MEQ: 14.9 INJECTION, SOLUTION INTRAVENOUS at 16:43

## 2023-09-27 RX ADMIN — SODIUM CHLORIDE, SODIUM GLUCONATE, SODIUM ACETATE, POTASSIUM CHLORIDE, MAGNESIUM CHLORIDE, SODIUM PHOSPHATE, DIBASIC, AND POTASSIUM PHOSPHATE 100 ML/HR: .53; .5; .37; .037; .03; .012; .00082 INJECTION, SOLUTION INTRAVENOUS at 14:52

## 2023-09-27 NOTE — ASSESSMENT & PLAN NOTE
History of prediabetes with most recent A1c of 5.9; which may be appropriate for patient's age    Plan  We will continue to monitor blood sugars with BMPs

## 2023-09-27 NOTE — H&P
INTERNAL MEDICINE RESIDENCY ADMISSION H&P     Name: Dirk Contreras   Age & Sex: 70 y.o. female   MRN: [id number]  Unit/Bed#: UB0 865-02   Encounter: [id number]  Primary Care Provider: Mally Ordaz DO    Code Status: Level 1 - Full Code  Admission Status: INPATIENT   Disposition: Patient requires Med/Surg    Admit to team: SOD Team C     ASSESSMENT/PLAN     Principal Problem:    Weakness of both lower extremities  Active Problems:    History of cerebral hemorrhage    Tremor, essential    Macrocytosis    Prediabetes    GERD (gastroesophageal reflux disease)    Hypokalemia      Hypokalemia  Assessment & Plan  Patient presented with a potassium of 2.9 likely secondary to decreased p.o. intake as endorsed by the patient  No EKG changes seen    Plan  We will replete with 80 mEq today and reassess tomorrow with BMP    GERD (gastroesophageal reflux disease)  Assessment & Plan  History of GERD taking intermittent Pepcid    Plan  We will continue to monitor without medication and give Pepcid as needed as needed    Prediabetes  Assessment & Plan  History of prediabetes with most recent A1c of 5.9; which may be appropriate for patient's age    Plan  We will continue to monitor blood sugars with BMPs    Macrocytosis  Assessment & Plan  Macrocytosis with an MCV of 102  Most recent TSH in March within normal limits    Plan  We will check folate and B12 level    Tremor, essential  Assessment & Plan  Patient reports history of essential tremor on the right upper extremity chronic since her strokes in the past  Currently managed on primidone outpatient    Plan  We will continue primidone 50 daily    History of cerebral hemorrhage  Assessment & Plan  History of cerebral hemorrhage with continued deficits with restricted left upper extremity range of motion and continued left upper and lower extremity tingling sensation as described by patient  CT head negative at this admission    Plan  We will continue atorvastatin 10  Continue to monitor neurologic exam  PT/OT    * Weakness of both lower extremities  Assessment & Plan  Patient is presenting with 2-week history of generalized lower extremity weakness  She states she is able to ambulate without difficulty prior to 2 weeks  Recently seen by her PCP for sciatica which is now resolved per patient  She denies any trauma or incontinence  Denies any recent flulike symptoms or bloody diarrhea  CT head negative for acute bleed    Plan: We will consult neurology for further work-up and recommendations on further imaging and diagnostic test  We will check B12 level and CK level  PT/OT      VTE Pharmacologic Prophylaxis: Enoxaparin (Lovenox)  VTE Mechanical Prophylaxis: sequential compression device    CHIEF COMPLAINT     Chief Complaint   Patient presents with   • Weakness - Generalized     Going on for a week or two per pt. Got BW a few days ago no results back. Pt has walker and cane at home uses with balance issues. Denies CP , SOB, and dizziness. HISTORY OF PRESENT ILLNESS     Patient is a 61-year-old female with past medical history of previous hemorrhagic stroke about 5 years ago, GERD, osteopenia presenting for 2-week history of lower extremity weakness. She states before this she was walking without any difficulty but now is very unsteady on her feet. She states normally she does not have any difficulty ambulating and does not endorse any decrease or changes in sensation. She denies any dizziness, nausea, vomiting, headache, shortness of breath. She states she lives in an independent living facility. She was seen in her PCP about 2 days ago for sciatic pain which she states is now resolved after she was given Tylenol. She denies any recent diarrhea, bloody diarrhea, flulike symptoms. She denies any fecal or urinary incontinence. She takes primidone for essential tremor.   She also endorses that she has not been eating much for the past couple days because she has been able to ambulate. She states she does not smoke, drink alcohol, or use any drugs now or in the past.    Upon examination patient was afebrile temperature of 98, pulse of 55, respiratory rate of 20, blood pressure 157/65 on room air. Her only complaint at the time was lower extremity weakness but denies any other symptoms. She was admitted to [address] [place] [address]. Patient is level 1 full code. REVIEW OF SYSTEMS     Review of Systems   Constitutional: Negative for chills, fatigue and fever. Respiratory: Negative for cough, shortness of breath and wheezing. Gastrointestinal: Negative for abdominal distention, abdominal pain, anal bleeding, blood in stool, diarrhea, nausea and vomiting. Genitourinary: Negative for difficulty urinating and flank pain. Musculoskeletal: Negative for arthralgias, back pain and joint swelling. Skin: Negative for rash. Neurological: Positive for tremors. Negative for dizziness, light-headedness and headaches. Leg weakness bilaterally     OBJECTIVE     Vitals:    23 1032 23 1038 23 1130 23 1200   BP: 165/98  164/70 157/65   BP Location: Right arm  Right arm Right arm   Pulse: 76  72 55   Resp: 20  22 20   Temp:  98 °F (36.7 °C)     TempSrc:  Oral     SpO2: 95%  97% 96%      Temperature:   Temp (24hrs), Av °F (36.7 °C), Min:98 °F (36.7 °C), Max:98 °F (36.7 °C)    Temperature: 98 °F (36.7 °C)  Intake & Output:  I/O        07 07 07 07 07 0700    Urine   350    Total Output   350    Net   -350               Weights: There is no height or weight on file to calculate BMI. Weight (last 2 days)     None        Physical Exam  Constitutional:       General: She is not in acute distress. Appearance: Normal appearance. She is normal weight. She is not ill-appearing or toxic-appearing. HENT:      Head: Normocephalic and atraumatic. Mouth/Throat:      Mouth: Mucous membranes are dry. Cardiovascular:      Rate and Rhythm: Normal rate and regular rhythm. Heart sounds: No murmur heard. No gallop. Pulmonary:      Effort: Pulmonary effort is normal. No respiratory distress. Breath sounds: No wheezing or rales. Abdominal:      General: Abdomen is flat. There is no distension. Tenderness: There is no abdominal tenderness. There is no guarding. Musculoskeletal:      Right lower leg: No edema. Left lower leg: No edema. Neurological:      Mental Status: She is alert and oriented to person, place, and time. Comments: 3-5 bilateral lower extremity muscle strength  Sensation intact bilaterally with reported different sensation on the left upper and lower extremity since her last stroke  Reflexes difficult to obtain but present  Cranial nerves II through XII grossly intact   Psychiatric:         Mood and Affect: Mood normal.         Behavior: Behavior normal.       PAST MEDICAL HISTORY     Past Medical History:   Diagnosis Date   • Cataract of right eye    • GERD (gastroesophageal reflux disease)      PAST SURGICAL HISTORY     Past Surgical History:   Procedure Laterality Date   • CATARACT EXTRACTION, BILATERAL Bilateral    • MASTECTOMY Bilateral     for calcium deposits   • DE COLONOSCOPY FLX DX W/COLLJ SPEC WHEN PFRMD N/A 8/25/2017    Procedure: COLONOSCOPY;  Surgeon: Phong Comer MD;  Location: [facility]; Service: Colorectal   • DE XCAPSL CTRC RMVL INSJ IO LENS PROSTH W/O ECP Right 6/21/2016    Procedure: OD EXTRACTION EXTRACAPSULAR CATARACT PHACO INTRAOCULAR LENS (IOL);   Surgeon: Jessica Araya MD;  Location: [facility];  Service: Ophthalmology   • TONSILLECTOMY       SOCIAL & FAMILY HISTORY     Social History     Substance and Sexual Activity   Alcohol Use Yes    Comment: pt states "very occassionally"     Substance and Sexual Activity   Alcohol Use Yes    Comment: pt states "very occassionally"        Substance and Sexual Activity   Drug Use Never Social History     Tobacco Use   Smoking Status Former   Smokeless Tobacco Never     Family History   Problem Relation Age of Onset   • Rectal cancer Brother    • Stroke Mother    • Heart attack Father    • Arrhythmia Father         possible   • Coronary artery disease Father    • Sudden death Father         scd   • Anuerysm Neg Hx    • Clotting disorder Neg Hx    • Hypertension Neg Hx    • Hyperlipidemia Neg Hx    • Heart failure Neg Hx      LABORATORY DATA     Labs: I have personally reviewed pertinent reports. Results from last 7 days   Lab Units 09/27/23  1126   WBC Thousand/uL 8.81   HEMOGLOBIN g/dL 11.9   HEMATOCRIT % 35.9   PLATELETS Thousands/uL 225   NEUTROS PCT % 75   MONOS PCT % 7   EOS PCT % 0      Results from last 7 days   Lab Units 09/27/23  1126   POTASSIUM mmol/L 2.9*   CHLORIDE mmol/L 102   CO2 mmol/L 29   BUN mg/dL 10   CREATININE mg/dL 0.71   CALCIUM mg/dL 10.2   ALK PHOS U/L 53   ALT U/L 13   AST U/L 28                          Micro:  Lab Results   Component Value Date    BLOODCX No Growth After 5 Days. 03/21/2023    BLOODCX No Growth After 5 Days. 03/21/2023     IMAGING & DIAGNOSTIC TESTS     Imaging: I have personally reviewed pertinent reports. CT head wo contrast    Result Date: 9/27/2023  Impression: 1. Stable head CT. No acute intracranial hemorrhage. 2. Moderate, chronic microangiopathy redemonstrated. 3. Linear hyperdensity in the right subinsular white matter likely dystrophic calcification in the region of prior parenchymal hemorrhage given the blooming artifact on prior brain MRI. No acute intracranial hemorrhage. Workstation performed: [id number]     XR chest 2 views    Result Date: 9/27/2023  Impression: No acute cardiopulmonary disease. Workstation performed: [id number]     EKG, Pathology, and Other Studies: I have personally reviewed pertinent reports.      ALLERGIES   No Known Allergies  MEDICATIONS PRIOR TO ARRIVAL     Prior to Admission medications    Medication Sig Start Date End Date Taking? Authorizing Provider   acetaminophen (TYLENOL) 325 mg tablet Take 2 tablets (650 mg total) by mouth every 6 (six) hours as needed for mild pain or fever  Patient not taking: Reported on 9/27/2023 4/7/23   Osvaldo Bates MD   Ascorbic Acid (VITAMIN C PO) Take by mouth    Historical Provider, MD   atorvastatin (LIPITOR) 10 mg tablet Take 1 tablet by mouth Daily    Historical Provider, MD   b complex vitamins tablet Take 1 tablet by mouth daily. Historical Provider, MD   calcium-vitamin D 250-100 MG-UNIT per tablet Take 1 tablet by mouth 2 (two) times a day    Historical Provider, MD   famotidine (PEPCID) 40 MG tablet  3/8/22   Historical Provider, MD   fluticasone (FLONASE) 50 mcg/act nasal spray 1 spray into each nostril daily Do not start before April 8, 2023. 4/8/23   Osvaldo Bates MD   Multiple Vitamins-Minerals (CENTRUM CARDIO PO) Take 1 tablet by mouth daily. Historical Provider, MD   primidone (MYSOLINE) 50 mg tablet Take 1 tab by mouth daily at bedtime 9/12/23   Heather Florence PA-C   Vitamin D, Cholecalciferol, 1000 UNITS TABS Take 1 tablet by mouth daily Indications: with aloe.     Historical Provider, MD     MEDICATIONS ADMINISTERED IN LAST 24 HOURS     Medication Administration - last 24 hours from 09/26/2023 1522 to 09/27/2023 1522       Date/Time Order Dose Route Action Action by     09/27/2023 1452 EDT multi-electrolyte (PLASMALYTE-A/ISOLYTE-S PH 7.4) IV solution 100 mL/hr Intravenous [address] Carlie Dietrich RN     09/27/2023 1452 EDT potassium chloride 20 mEq IVPB (premix) 20 mEq Intravenous [address]  Carlie Dietrich, [address]     09/27/2023 1452 EDT potassium chloride (K-DUR,KLOR-CON) CR tablet 40 mEq 40 mEq Oral Given Carlie Dietrich RN        CURRENT MEDICATIONS     Current Facility-Administered Medications   Medication Dose Route Frequency Provider Last Rate   • atorvastatin  10 mg Oral After Benito Erickson DO     • [START ON 9/28/2023] enoxaparin  40 mg Subcutaneous Daily Het D Erickson, DO     • multi-electrolyte  100 mL/hr Intravenous Continuous Het D Erickson,  mL/hr (09/27/23 1452)   • potassium chloride  20 mEq Intravenous Q2H Het D Erickson, DO 20 mEq (09/27/23 1452)   • primidone  100 mg Oral HS Het D Erickson, DO       multi-electrolyte, 100 mL/hr, Last Rate: 100 mL/hr (09/27/23 1452)           Admission Time  I spent 30 minutes admitting the patient. This involved direct patient contact where I performed a full history and physical, reviewing previous records, and reviewing laboratory and other diagnostic studies. Portions of the record may have been created with voice recognition software. Occasional wrong word or "sound a like" substitutions may have occurred due to the inherent limitations of voice recognition software.   Read the chart carefully and recognize, using context, where substitutions have occurred.    ==  Het D Mehul Abdalla, [address]  Internal Medicine Residency PGY-2

## 2023-09-27 NOTE — ED PROCEDURE NOTE
PROCEDURE  CriticalCare Time    Date/Time: 9/27/2023 4:03 PM    Performed by: Nicole Javed DO  Authorized by:  Nicole Javed DO    Critical care provider statement:     Critical care time (minutes):  35    Critical care time was exclusive of:  Separately billable procedures and treating other patients and teaching time    Critical care was necessary to treat or prevent imminent or life-threatening deterioration of the following conditions:  Metabolic crisis    Critical care was time spent personally by me on the following activities:  Blood draw for specimens, obtaining history from patient or surrogate, development of treatment plan with patient or surrogate, discussions with primary provider, evaluation of patient's response to treatment, examination of patient, review of old charts, re-evaluation of patient's condition, ordering and review of radiographic studies, ordering and review of laboratory studies and ordering and performing treatments and interventions    I assumed direction of critical care for this patient from another provider in my specialty: no           Nicole Javed DO  09/27/23 1606

## 2023-09-27 NOTE — CONSULTS
Consultation - Neurology   Vernon Andrade 70 y.o. female MRN: [id number]  Unit/Bed#: YF8 865-02 Encounter: [id number]      Assessment/Plan   78year old female with history of prior ischemic/hemorrhagic stroke (R hemisphere; has residual L sided hemiparesis/sensory loss), chronic back pain, sciatica (L buttocks). Presents following several weeks of significant gait instability and proximal lower extremity weakness at home. Patient and sister note since [facility] hospitalization in March, she has had deconditioning and poor ambulatory status at home; requiring a walker for assistance. As a result of her sedentary state, has not eaten or drink for past several days. Exam is pain limited, but with maximal alert has decent strength in the lower extremities (distal LE sensory loss, L>R; no clear myelopathic signs on exam). Unclear definitive etiology, differential includes deconditioning, rhabdomyolysis pain limited strength secondary to L buttock sciatica, neuropathy/sensory ataxia, vs spinal/cerebral pathology. Plan:  -Agree with checking serum labs for neuropathy/rhabdo:   -Total CK, B12, folate, TSH, SPEP, UPEP   -IV fluids if indicated  -If no clear explanation with the above lab-work for patient's LE weakness, would  then pursue MRI brain/lumbar spine to look for concerning pathology.  -Other labwork:   -ESR 35   -UA unrevealing   -Metabolic/infectious derangement monitoring and correction per primary team  -Pain control for sciatica per primary team; avoid opiate/CNS depressants  -Supportive care  -Therapy evaluations     Prior neurodiagnostics:  -Last spinal MRI imaging in [facility] appears in September 2020:   -MRI lumbar spine with just minimal degenerative disease at L2-4   -Left lower extremity EMG was normal in June 2021    Discussed plan of care with attending neurologist.     Recommendations for outpatient neurological follow up have yet to be determined.     History of Present Illness     Reason for Consult / Principal Problem: LE weakness, ambulatory dysfunction    HPI: Yamile Alatorre is a 70 y.o. 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 History obtained per chart review and discussion with patient as well as sister at bedside:  Patient and sister state that since her prior strokes several years ago, she has had a degree of mild residual left-sided hemiparesis. However she was able to function at home independently up until early this year when she was hospitalized with COVID in March. Patient and sister states since her [facility] hospitalization in March, she has been with significant proximal lower extremity weakness and as a result has had difficulty ambulating and getting around at home like she used to. She began using primarily a walker for short distances with ambulation, she also previously used to shop and cook; and now has meals delivered to her. Patient states that this lower extremity weakness acutely worsened over the past 2 weeks. Patient states also for at least the past several weeks, if not longer, she has been experiencing frequent bouts of her sciatica involving the left buttocks (without radiation down the leg); for this she has been using Voltaren gel as well as Tylenol. Otherwise she denies any issues with language, vision, upper extremity function, headaches. She denies any recent falls nor head trauma. She denies any issues with bowel or bladder dysfunction of recently. Upon arrival to the ED, /98.   CT head shows stable linear hyperdensity in the right subinsular white matter, likely dystrophic calcification in the region of prior parenchymal hemorrhage given the blooming artifact on prior MRI brain. No acute intracranial hemorrhage. Moderate chronic microangiopathic changes. Labs revealed ESR 35, potassium 2.9, , and RBC 3.52 with , but remainder of CMP, CBC, and UA unremarkable. Neurology was consulted due to lower extremity weakness and inability to ambulate. Prior Neuro History: In 2018, patient had right external capsule hemorrhage. Location was indicative of hypertensive etiology, however no history of hypertension. In 2022, patient had left sided weakness and was found to have R putamen ischemic stroke with hemorrhagic conversion. Etiology was unclear. Possibly related to small vessel disease, but could not fully exclude embolic etiology. Patient has remained off of aspirin due to prior cerebral hemorrhages. She takes Lipitor 10 mg QHS. It was recommended that patient have loop recorder or other prolonged cardiac monitoring outpatient, but it does not appear that this was ever completed. Patient has an essential tremor and has been maintained on primidone 50 mg QHS. She has mild cognitive impairment. MRI brain NeuroQuant w/wo contrast was completed on 5/24/2022 which showed normal hippocampal volume and mildly enlarged ventricular system. Findings did not support hippocampal degeneration. Inpatient consult to Neurology  Consult performed by: Damaso Morales PA-C  Consult ordered by: Lucy Viveros DO          Review of Systems   Constitutional: Negative. HENT: Negative. Eyes: Negative. Respiratory: Negative. Cardiovascular: Negative. Gastrointestinal: Negative. Musculoskeletal: Positive for back pain and gait problem. Negative for neck pain and neck stiffness. Skin: Negative. Neurological: Positive for tremors, weakness and numbness. Negative for dizziness, seizures, syncope, facial asymmetry, speech difficulty, light-headedness and headaches.        Historical Information Past Medical History:   Diagnosis Date   • Cataract of right eye    • GERD (gastroesophageal reflux disease)      Past Surgical History:   Procedure Laterality Date   • CATARACT EXTRACTION, BILATERAL Bilateral    • MASTECTOMY Bilateral     for calcium deposits   • ID COLONOSCOPY FLX DX W/COLLJ SPEC WHEN PFRMD N/A 8/25/2017    Procedure: COLONOSCOPY;  Surgeon: Karuna Watson MD;  Location: [facility]; Service: Colorectal   • ID XCAPSL CTRC RMVL INSJ IO LENS PROSTH W/O ECP Right 6/21/2016    Procedure: OD EXTRACTION EXTRACAPSULAR CATARACT PHACO INTRAOCULAR LENS (IOL); Surgeon: Fatemeh Beck MD;  Location: [facility];  Service: Ophthalmology   • TONSILLECTOMY       Social History   Social History     Substance and Sexual Activity   Alcohol Use Yes    Comment: pt states "very occassionally"     Social History     Substance and Sexual Activity   Drug Use Never     E-Cigarette/Vaping   • E-Cigarette Use Never User      E-Cigarette/Vaping Substances   • Nicotine No    • THC No    • CBD No    • Flavoring No    • Other No    • Unknown No      Social History     Tobacco Use   Smoking Status Former   Smokeless Tobacco Never     Family History:   Family History   Problem Relation Age of Onset   • Rectal cancer Brother    • Stroke Mother    • Heart attack Father    • Arrhythmia Father         possible   • Coronary artery disease Father    • Sudden death Father         scd   • Anuerysm Neg Hx    • Clotting disorder Neg Hx    • Hypertension Neg Hx    • Hyperlipidemia Neg Hx    • Heart failure Neg Hx        Review of previous medical records was completed.     Meds/Allergies   current meds:   Current Facility-Administered Medications   Medication Dose Route Frequency   • atorvastatin (LIPITOR) tablet 10 mg  10 mg Oral After Dinner   • [START ON 9/28/2023] enoxaparin (LOVENOX) subcutaneous injection 40 mg  40 mg Subcutaneous Daily   • multi-electrolyte (PLASMALYTE-A/ISOLYTE-S PH 7.4) IV solution  100 mL/hr Intravenous Continuous   • potassium chloride 20 mEq IVPB (premix)  20 mEq Intravenous Q2H   • primidone (MYSOLINE) tablet 100 mg  100 mg Oral HS    and PTA meds:   Prior to Admission Medications   Prescriptions Last Dose Informant Patient Reported? Taking? Ascorbic Acid (VITAMIN C PO)  Self Yes No   Sig: Take by mouth   Multiple Vitamins-Minerals (CENTRUM CARDIO PO)  Self Yes No   Sig: Take 1 tablet by mouth daily. Vitamin D, Cholecalciferol, 1000 UNITS TABS  Self Yes No   Sig: Take 1 tablet by mouth daily Indications: with aloe. acetaminophen (TYLENOL) 325 mg tablet 2023 Self No Yes   Sig: Take 2 tablets (650 mg total) by mouth every 6 (six) hours as needed for mild pain or fever   atorvastatin (LIPITOR) 10 mg tablet  Self Yes No   Sig: Take 1 tablet by mouth Daily   b complex vitamins tablet  Self Yes No   Sig: Take 1 tablet by mouth daily. calcium-vitamin D 250-100 MG-UNIT per tablet  Self Yes No   Sig: Take 1 tablet by mouth 2 (two) times a day   famotidine (PEPCID) 40 MG tablet  Self Yes No   fluticasone (FLONASE) 50 mcg/act nasal spray  Self No No   Si spray into each nostril daily Do not start before 2023. primidone (MYSOLINE) 50 mg tablet   No No   Sig: Take 1 tab by mouth daily at bedtime      Facility-Administered Medications: None       No Known Allergies    Objective   Vitals:Blood pressure 157/65, pulse 55, temperature 98 °F (36.7 °C), temperature source Oral, resp. rate 20, SpO2 96 %. ,There is no height or weight on file to calculate BMI. Intake/Output Summary (Last 24 hours) at 2023 1510  Last data filed at 2023 1132  Gross per 24 hour   Intake --   Output 350 ml   Net -350 ml       Invasive Devices: Invasive Devices     Peripheral Intravenous Line  Duration           Peripheral IV 23 Left;Ventral (anterior) Forearm <1 day                Physical Exam  HENT:      Head: Normocephalic and atraumatic.    Eyes:      Extraocular Movements: EOM normal.      Pupils: Pupils are equal, round, and reactive to light. Cardiovascular:      Rate and Rhythm: Normal rate. Pulmonary:      Effort: Pulmonary effort is normal.   Abdominal:      General: There is no distension. Musculoskeletal:      Cervical back: Normal range of motion and neck supple. Neurological:      Mental Status: She is alert. Coordination: Finger-Nose-Finger Test normal.       Neurologic Exam     Mental Status   Awake, alert, speech minimally slurred, but fully intelligible, no aphasia, following all commands. Fully oriented, pleasant. Cranial Nerves     CN II   Visual fields full to confrontation. CN III, IV, VI   Pupils are equal, round, and reactive to light. Extraocular motions are normal.     CN V   Left facial sensation deficit: complete    CN VII   Left facial weakness: central    CN VIII   CN VIII normal.     CN IX, X   CN IX normal.   CN X normal.     CN XI   CN XI normal.     CN XII   CN XII normal.   Chronic L facial droop and diminished sensation to light touch on L. Motor Exam   Muscle bulk: normal  Overall muscle tone: normal  Right arm pronator drift: absent  Left arm pronator drift: absent  5/5 UE bilaterally;     4/5 bilaterally with hip flexion/extension (pain limited on the L as patient notes L thigh pain doing so); minimal weakness with L knee flexion compared to R, 5/5 R dorsiflexion/plantarflexion on R, 4-,4 on L     Sensory Exam     Length dependent sensory loss to vibration/temperature in the bilateral LE (moreso L than R); poor L toe proprioceptive sensation; sharp PP throughout UE and LE.       Gait, Coordination, and Reflexes     Coordination   Finger to nose coordination: normal    Tremor   Resting tremor: absent  Intention tremor: present (bilateral with FTN)  Tremor with finger to nose bilaterally; no ataxia; no hyperreflexia; equivocal R toe, upgoing L toe, no sustained ankle clonus, negative Messina's       Lab Results:   CBC:   Results from last 7 days   Lab Units 09/27/23  1126   WBC Thousand/uL 8.81   RBC Million/uL 3.52*   HEMOGLOBIN g/dL 11.9   HEMATOCRIT % 35.9   MCV fL 102*   PLATELETS Thousands/uL 225   , BMP/CMP:   Results from last 7 days   Lab Units 09/27/23  1126   SODIUM mmol/L 141   POTASSIUM mmol/L 2.9*   CHLORIDE mmol/L 102   CO2 mmol/L 29   BUN mg/dL 10   CREATININE mg/dL 0.71   CALCIUM mg/dL 10.2   AST U/L 28   ALT U/L 13   ALK PHOS U/L 53   EGFR ml/min/1.73sq m 82   , Vitamin B12:   , HgBA1C:   Results from last 7 days   Lab Units 09/25/23  0836   HEMOGLOBIN A1C % 5.9*   , TSH:   , Coagulation:   , Lipid Profile:   , Ammonia:     Imaging Studies: I have personally reviewed pertinent films in PACS   CT head wo contrast   Final Result by Eva Malik MD (09/27 1235)         1. Stable head CT. No acute intracranial hemorrhage. 2. Moderate, chronic microangiopathy redemonstrated. 3. Linear hyperdensity in the right subinsular white matter likely dystrophic calcification in the region of prior parenchymal hemorrhage given the blooming artifact on prior brain MRI. No acute intracranial hemorrhage. Workstation performed: [id number]         XR chest 2 views   Final Result by Edward Romero MD (09/27 1232)      No acute cardiopulmonary disease. Workstation performed: [id number]             EKG, Pathology, and Other Studies: I have personally reviewed pertinent reports. VTE Prophylaxis: Sequential compression device (Venodyne)  and Enoxaparin (Lovenox)    Code Status: Level 1 - Full Code    Total time spent today 55 minutes. Discussed plan of care with patient/family and primary team: recommend lab-work into reversible causes (nutritional deficiency, neuropathy, rhabdo; consider MRI's if no clear cause on lab-work. Supportive care/therapy evaluations.

## 2023-09-27 NOTE — ASSESSMENT & PLAN NOTE
Patient presented with a potassium of 2.9 likely secondary to decreased p.o. intake as endorsed by the patient  No EKG changes seen  Receiving potassium and magnesium     Plan  K>4 continue repletion  Check Mg >2  Continue to monitor BMPs and optimize magnesium

## 2023-09-27 NOTE — ASSESSMENT & PLAN NOTE
Patient reports history of essential tremor on the right upper extremity chronic since her strokes in the past  Currently managed on primidone outpatient    Plan  We will continue primidone 50 daily

## 2023-09-27 NOTE — PLAN OF CARE
Problem: PAIN - ADULT  Goal: Verbalizes/displays adequate comfort level or baseline comfort level  Description: Interventions:  - Encourage patient to monitor pain and request assistance  - Assess pain using appropriate pain scale  - Administer analgesics based on type and severity of pain and evaluate response  - Implement non-pharmacological measures as appropriate and evaluate response  - Consider cultural and social influences on pain and pain management  - Notify physician/advanced practitioner if interventions unsuccessful or patient reports new pain  Outcome: Progressing     Problem: INFECTION - ADULT  Goal: Absence or prevention of progression during hospitalization  Description: INTERVENTIONS:  - Assess and monitor for signs and symptoms of infection  - Monitor lab/diagnostic results  - Monitor all insertion sites, i.e. indwelling lines, tubes, and drains  - Monitor endotracheal if appropriate and nasal secretions for changes in amount and color  - [place] appropriate cooling/warming therapies per order  - Administer medications as ordered  - Instruct and encourage patient and family to use good hand hygiene technique  - Identify and instruct in appropriate isolation precautions for identified infection/condition  Outcome: Progressing  Goal: Absence of fever/infection during neutropenic period  Description: INTERVENTIONS:  - Monitor WBC    Outcome: Progressing     Problem: SAFETY ADULT  Goal: Patient will remain free of falls  Description: INTERVENTIONS:  - Educate patient/family on patient safety including physical limitations  - Instruct patient to call for assistance with activity   - Consult OT/PT to assist with strengthening/mobility   - Keep Call bell within reach  - Keep bed low and locked with side rails adjusted as appropriate  - Keep care items and personal belongings within reach  - Initiate and maintain comfort rounds  - Make Fall Risk Sign visible to staff  - Offer Toileting every 4 Hours, in advance of need  - Initiate/Maintain 4alarm  - Obtain necessary fall risk management equipment: 4  - Apply yellow socks and bracelet for high fall risk patients  - Consider moving patient to room near nurses station  Outcome: Progressing  Goal: Maintain or return to baseline ADL function  Description: INTERVENTIONS:  -  Assess patient's ability to carry out ADLs; assess patient's baseline for ADL function and identify physical deficits which impact ability to perform ADLs (bathing, care of mouth/teeth, toileting, grooming, dressing, etc.)  - Assess/evaluate cause of self-care deficits   - Assess range of motion  - Assess patient's mobility; develop plan if impaired  - Assess patient's need for assistive devices and provide as appropriate  - Encourage maximum independence but intervene and supervise when necessary  - Involve family in performance of ADLs  - Assess for home care needs following discharge   - Consider OT consult to assist with ADL evaluation and planning for discharge  - Provide patient education as appropriate  Outcome: Progressing  Goal: Maintains/Returns to pre admission functional level  Description: INTERVENTIONS:  - Perform BMAT or MOVE assessment daily.   - Set and communicate daily mobility goal to care team and patient/family/caregiver. - Collaborate with rehabilitation services on mobility goals if consulted  - Perform Range of Motion 4 times a day. - Reposition patient every 4 hours.   - Dangle patient 4 times a day  - Stand patient 4 times a day  - Ambulate patient 4 times a day  - Out of bed to chair 4 times a day   - Out of bed for meals 4 times a day  - Out of bed for toileting  - Record patient progress and toleration of activity level   Outcome: Progressing     Problem: DISCHARGE PLANNING  Goal: Discharge to home or other facility with appropriate resources  Description: INTERVENTIONS:  - Identify barriers to discharge w/patient and caregiver  - Arrange for needed discharge resources and transportation as appropriate  - Identify discharge learning needs (meds, wound care, etc.)  - Arrange for interpretive services to assist at discharge as needed  - Refer to Case Management Department for coordinating discharge planning if the patient needs post-hospital services based on physician/advanced practitioner order or complex needs related to functional status, cognitive ability, or social support system  Outcome: Progressing     Problem: Knowledge Deficit  Goal: Patient/family/caregiver demonstrates understanding of disease process, treatment plan, medications, and discharge instructions  Description: Complete learning assessment and assess knowledge base.   Interventions:  - Provide teaching at level of understanding  - Provide teaching via preferred learning methods  Outcome: Progressing

## 2023-09-27 NOTE — ED ATTENDING ATTESTATION
9/27/2023  IShyann DO, saw and evaluated the patient. I have discussed the patient with the resident/non-physician practitioner and agree with the resident's/non-physician practitioner's findings, Plan of Care, and MDM as documented in the resident's/non-physician practitioner's note, except where noted. All available labs and Radiology studies were reviewed. I was present for key portions of any procedure(s) performed by the resident/non-physician practitioner and I was immediately available to provide assistance. At this point I agree with the current assessment done in the Emergency Department. I have conducted an independent evaluation of this patient a history and physical is as follows:    Patient is a 57-year-old female with a history of acute spontaneous febrile hemorrhage at least 5 years ago, GERD, osteopenia, says that she has had previous strokes as well which left her with some slight difficulty using her left hand, transferred from assisted-living facility. She says that over the last week she been feeling more fatigued and tired, having a difficult time getting up and getting to her meals. She has not noticed any focal weaknesses. There is been no falls. No fever, no chills, no chest pain or palpitations. No shortness of breath orthopnea, she says that her left hand always feels like it is a little more difficult to do things but she does not have lara weakness there, distal but difficulty with coordination. She said that is unchanged.     General:  Patient is well-appearing  Head:  Atraumatic  Eyes:  Conjunctiva pink, Extraocular muscle intact, PERRL  ENT:  Mucous membranes are moist  Neck:  Supple  Cardiac:  S1-S2, without murmurs  Lungs:  Clear to auscultation bilaterally  Abdomen:  Soft, nontender, normal bowel sounds, no CVA tenderness, no tympany, no rigidity, no guarding  Extremities:  Normal range of motion, she has trace 1+ ankle edema  Neurologic:  Awake, fluent speech, normal comprehension. AAOx3. Cranial nerves 2-12 are intact, strength is 3/5 in the bilateral upper & lower extremities, no slurred speech, no facial droop, no deficit on finger-to-nose testing, no pronator drift. Sensation to light touch is equal and symmetric throughout the whole body  Skin:  Pink warm and dry, no rash  Psychiatric:  Alert, pleasant, cooperative            ED Course  ED Course as of 09/27/23 1105   Wed Sep 27, 2023   1104 ECG interpreted by me, sinus rhythm with a rate of 66, there is some slight baseline artifact, no acute ischemic or infarctive changes, previous ECG from March 21, 2023 is unchanged except that ECG also had some PACs which were not present today.        Labs Reviewed   CBC AND DIFFERENTIAL - Abnormal       Result Value Ref Range Status    WBC 8.81  4.31 - 10.16 Thousand/uL Final    RBC 3.52 (*) 3.81 - 5.12 Million/uL Final    Hemoglobin 11.9  11.5 - 15.4 g/dL Final    Hematocrit 35.9  34.8 - 46.1 % Final     (*) 82 - 98 fL Final    MCH 33.8  26.8 - 34.3 pg Final    MCHC 33.1  31.4 - 37.4 g/dL Final    RDW 12.1  11.6 - 15.1 % Final    MPV 10.2  8.9 - 12.7 fL Final    Platelets 748  421 - 390 Thousands/uL Final    nRBC 0  /100 WBCs Final    Neutrophils Relative 75  43 - 75 % Final    Immat GRANS % 0  0 - 2 % Final    Lymphocytes Relative 17  14 - 44 % Final    Monocytes Relative 7  4 - 12 % Final    Eosinophils Relative 0  0 - 6 % Final    Basophils Relative 1  0 - 1 % Final    Neutrophils Absolute 6.62  1.85 - 7.62 Thousands/µL Final    Immature Grans Absolute 0.03  0.00 - 0.20 Thousand/uL Final    Lymphocytes Absolute 1.50  0.60 - 4.47 Thousands/µL Final    Monocytes Absolute 0.60  0.17 - 1.22 Thousand/µL Final    Eosinophils Absolute 0.01  0.00 - 0.61 Thousand/µL Final    Basophils Absolute 0.05  0.00 - 0.10 Thousands/µL Final   COMPREHENSIVE METABOLIC PANEL - Abnormal    Sodium 141  135 - 147 mmol/L Final    Potassium 2.9 (*) 3.5 - 5.3 mmol/L Final    Chloride 102  96 - 108 mmol/L Final    CO2 29  21 - 32 mmol/L Final    ANION GAP 10  mmol/L Final    BUN 10  5 - 25 mg/dL Final    Creatinine 0.71  0.60 - 1.30 mg/dL Final    Comment: Standardized to IDMS reference method    Glucose 98  65 - 140 mg/dL Final    Comment: If the patient is fasting, the ADA then defines impaired fasting glucose as > 100 mg/dL and diabetes as > or equal to 123 mg/dL. Calcium 10.2  8.4 - 10.2 mg/dL Final    AST 28  13 - 39 U/L Final    ALT 13  7 - 52 U/L Final    Comment: Specimen collection should occur prior to Sulfasalazine administration due to the potential for falsely depressed results. Alkaline Phosphatase 53  34 - 104 U/L Final    Total Protein 7.7  6.4 - 8.4 g/dL Final    Albumin 4.2  3.5 - 5.0 g/dL Final    Total Bilirubin 0.58  0.20 - 1.00 mg/dL Final    Comment: Use of this assay is not recommended for patients undergoing treatment with eltrombopag due to the potential for falsely elevated results. N-acetyl-p-benzoquinone imine (metabolite of Acetaminophen) will generate erroneously low results in samples for patients that have taken an overdose of Acetaminophen.     eGFR 82  ml/min/1.73sq m Final    Narrative:     [place] guidelines for Chronic Kidney Disease (CKD):   •  Stage 1 with normal or high GFR (GFR > 90 mL/min/1.73 square meters)  •  Stage 2 Mild CKD (GFR = 60-89 mL/min/1.73 square meters)  •  Stage 3A Moderate CKD (GFR = 45-59 mL/min/1.73 square meters)  •  Stage 3B Moderate CKD (GFR = 30-44 mL/min/1.73 square meters)  •  Stage 4 Severe CKD (GFR = 15-29 mL/min/1.73 square meters)  •  Stage 5 End Stage CKD (GFR <15 mL/min/1.73 square meters)  Note: GFR calculation is accurate only with a steady state creatinine   UA W REFLEX TO MICROSCOPIC WITH REFLEX TO CULTURE - Abnormal    Color, UA Light Yellow   Final    Clarity, UA Turbid   Final    Specific Gravity, UA 1.013  1.003 - 1.030 Final    pH, UA 7.5  4.5, 5.0, 5.5, 6.0, 6.5, 7.0, 7.5, 8.0 Final    Leukocytes, UA Negative  Negative Final    Nitrite, UA Negative  Negative Final    Protein, UA Trace (*) Negative mg/dl Final    Glucose, UA Negative  Negative mg/dl Final    Ketones, UA Negative  Negative mg/dl Final    Urobilinogen, UA <2.0  <2.0 mg/dl mg/dl Final    Bilirubin, UA Negative  Negative Final    Occult Blood, UA Negative  Negative Final   B-TYPE NATRIURETIC PEPTIDE (BNP) - Abnormal     (*) 0 - 100 pg/mL Final   URINE MICROSCOPIC - Abnormal    RBC, UA 2-4 (*) None Seen, 1-2 /hpf Final    WBC, UA None Seen  None Seen, 1-2 /hpf Final    Epithelial Cells None Seen  None Seen, Occasional /hpf Final    Bacteria, UA None Seen  None Seen, Occasional /hpf Final    MUCUS THREADS Occasional (*) None Seen Final    Amorphous Crystals, UA Occasional   Final   HS TROPONIN I 0HR - Normal    hs TnI 0hr 15  "Refer to ACS Flowchart"- see link ng/L Final    Comment:                                              Initial (time 0) result  If >=50 ng/L, Myocardial injury suggested ;  Type of myocardial injury and treatment strategy  to be determined. If 5-49 ng/L, a delta result at 2 hours and or 4 hours will be needed to further evaluate. If <4 ng/L, and chest pain has been >3 hours since onset, patient may qualify for discharge based on the HEART score in the ED. If <5 ng/L and <3hours since onset of chest pain, a delta result at 2 hours will be needed to further evaluate. HS Troponin 99th Percentile URL of a Health Population=12 ng/L with a 95% Confidence Interval of 8-18 ng/L. Second Troponin (time 2 hours)  If calculated delta >= 20 ng/L,  Myocardial injury suggested ; Type of myocardial injury and treatment strategy to be determined. If 5-49 ng/L and the calculated delta is 5-19 ng/L, consult medical service for evaluation. Continue evaluation for ischemia on ecg and other possible etiology and repeat hs troponin at 4 hours.   If delta is <5 ng/L at 2 hours, consider discharge based on risk stratification via the HEART score (if in ED), or JESUS risk score in IP/Observation. HS Troponin 99th Percentile URL of a Health Population=12 ng/L with a 95% Confidence Interval of 8-18 ng/L.   HS TROPONIN I 4HR     CT head wo contrast   Final Result         1. Stable head CT. No acute intracranial hemorrhage. 2. Moderate, chronic microangiopathy redemonstrated. 3. Linear hyperdensity in the right subinsular white matter likely dystrophic calcification in the region of prior parenchymal hemorrhage given the blooming artifact on prior brain MRI. No acute intracranial hemorrhage. Workstation performed: [id number]         XR chest 2 views   Final Result      No acute cardiopulmonary disease. Workstation performed: [id number]           Medications   multi-electrolyte (PLASMALYTE-A/ISOLYTE-S PH 7.4) IV solution (100 mL/hr Intravenous New Bag 9/27/23 1452)   enoxaparin (LOVENOX) subcutaneous injection 40 mg (has no administration in time range)   atorvastatin (LIPITOR) tablet 10 mg (has no administration in time range)   primidone (MYSOLINE) tablet 100 mg (has no administration in time range)   potassium chloride 20 mEq IVPB (premix) (20 mEq Intravenous New Bag 9/27/23 1452)   potassium chloride (K-DUR,KLOR-CON) CR tablet 40 mEq (40 mEq Oral Given 9/27/23 1452)     Patient has significant hyponatremia, given IV potassium and oral potassium, on reassessment, no change from the above findings. The patient was evaluated for sepsis in the emergency department. After that assessment, at the time of admission, no sepsis, severe sepsis, or septic shock was found.     DIAGNOSIS:  Acute generalized weakness, acute hypokalemia    MEDICAL DECISION MAKING CODING    The differential diagnosis before testing included (but is not limited to) acute generalized weakness, acute mild hyponatremia, acute life-threatening electrolyte abnormality, and acute spontaneous intracranial hemorrhage which is a medical condition that poses a threat to life/function. Patient presents with acute new problem with:  Threat to life or bodily function      Chronic conditions affecting care: As per HPI    COLLECTION AND INTERPRETATION OF DATA  Additional history obtained from: EMS  I reviewed prior external notes, including previous ECG as noted above    I ordered each unique test  Tests reviewed personally by me:  ECG: See my ED course  Labs: See above  Imaging: I independently reviewed the head CT and found no acute intracranial pathology. Discussion with other providers: Admitting medicine physician    RISK    Treatment:  Patient admitted    Surgery  -I considered surgery may be necessary prior to completion of the work up but afterwards there is no indication for immediate surgery          Critical Care Time  Procedures  35 minutes: I consider this patient to be critically ill given a severe hypokalemia requiring IV potassium.

## 2023-09-27 NOTE — ASSESSMENT & PLAN NOTE
History of GERD taking intermittent Pepcid    Plan  We will continue to monitor without medication and give Pepcid as needed as needed

## 2023-09-27 NOTE — ASSESSMENT & PLAN NOTE
Macrocytosis with an MCV of 102  Most recent TSH in March within normal limits, TSH within normal range this admission    Plan:  F/u with B12 and folate--- results back, B12 (1275) folate (>22.3) both adequate

## 2023-09-27 NOTE — ASSESSMENT & PLAN NOTE
History of cerebral hemorrhage with continued deficits with restricted left upper extremity range of motion and continued left upper and lower extremity tingling sensation as described by patient  CT head negative at this admission    Plan  We will continue atorvastatin 10  Continue to monitor neurologic exam  PT/OT

## 2023-09-27 NOTE — ED PROVIDER NOTES
History  Chief Complaint   Patient presents with   • Weakness - Generalized     Going on for a week or two per pt. Got BW a few days ago no results back. Pt has walker and cane at home uses with balance issues. Denies CP , SOB, and dizziness. HPI  See MDM  Prior to Admission Medications   Prescriptions Last Dose Informant Patient Reported? Taking? Ascorbic Acid (VITAMIN C PO)  Self Yes No   Sig: Take by mouth   Multiple Vitamins-Minerals (CENTRUM CARDIO PO)  Self Yes No   Sig: Take 1 tablet by mouth daily. Vitamin D, Cholecalciferol, 1000 UNITS TABS  Self Yes No   Sig: Take 1 tablet by mouth daily Indications: with aloe. acetaminophen (TYLENOL) 325 mg tablet Not Taking Self No No   Sig: Take 2 tablets (650 mg total) by mouth every 6 (six) hours as needed for mild pain or fever   Patient not taking: Reported on 2023   atorvastatin (LIPITOR) 10 mg tablet  Self Yes No   Sig: Take 1 tablet by mouth Daily   b complex vitamins tablet  Self Yes No   Sig: Take 1 tablet by mouth daily. calcium-vitamin D 250-100 MG-UNIT per tablet  Self Yes No   Sig: Take 1 tablet by mouth 2 (two) times a day   famotidine (PEPCID) 40 MG tablet  Self Yes No   fluticasone (FLONASE) 50 mcg/act nasal spray  Self No No   Si spray into each nostril daily Do not start before 2023. primidone (MYSOLINE) 50 mg tablet   No No   Sig: Take 1 tab by mouth daily at bedtime      Facility-Administered Medications: None       Past Medical History:   Diagnosis Date   • Cataract of right eye    • GERD (gastroesophageal reflux disease)        Past Surgical History:   Procedure Laterality Date   • CATARACT EXTRACTION, BILATERAL Bilateral    • MASTECTOMY Bilateral     for calcium deposits   • NC COLONOSCOPY FLX DX W/COLLJ SPEC WHEN PFRMD N/A 2017    Procedure: COLONOSCOPY;  Surgeon: Edilberto Stevenson MD;  Location: [facility];   Service: Colorectal   • NC XCAPSL CTRC RMVL INSJ IO LENS PROSTH W/O ECP Right 2016 Procedure: OD EXTRACTION EXTRACAPSULAR CATARACT PHACO INTRAOCULAR LENS (IOL); Surgeon: Vlad Walsh MD;  Location: [facility];  Service: Ophthalmology   • TONSILLECTOMY         Family History   Problem Relation Age of Onset   • Rectal cancer Brother    • Stroke Mother    • Heart attack Father    • Arrhythmia Father         possible   • Coronary artery disease Father    • Sudden death Father         scd   • Anuerysm Neg Hx    • Clotting disorder Neg Hx    • Hypertension Neg Hx    • Hyperlipidemia Neg Hx    • Heart failure Neg Hx      I have reviewed and agree with the history as documented.     E-Cigarette/Vaping   • E-Cigarette Use Never User      E-Cigarette/Vaping Substances   • Nicotine No    • THC No    • CBD No    • Flavoring No    • Other No    • Unknown No      Social History     Tobacco Use   • Smoking status: Former   • Smokeless tobacco: Never   Vaping Use   • Vaping Use: Never used   Substance Use Topics   • Alcohol use: Yes     Comment: pt states "very occassionally"   • Drug use: Never        Review of Systems    Physical Exam  ED Triage Vitals   Temperature Pulse Respirations Blood Pressure SpO2   09/27/23 1038 09/27/23 1032 09/27/23 1032 09/27/23 1032 09/27/23 1032   98 °F (36.7 °C) 76 20 165/98 95 %      Temp Source Heart Rate Source Patient Position - Orthostatic VS BP Location FiO2 (%)   09/27/23 1038 09/27/23 1032 09/27/23 1032 09/27/23 1032 --   Oral Monitor Sitting Right arm       Pain Score       --                    Orthostatic Vital Signs  Vitals:    09/27/23 1032 09/27/23 1130 09/27/23 1200   BP: 165/98 164/70 157/65   Pulse: 76 72 55   Patient Position - Orthostatic VS: Sitting Sitting Sitting       Physical Exam    ED Medications  Medications   multi-electrolyte (PLASMALYTE-A/ISOLYTE-S PH 7.4) IV solution (100 mL/hr Intravenous New Bag 9/27/23 1452)   enoxaparin (LOVENOX) subcutaneous injection 40 mg (has no administration in time range)   atorvastatin (LIPITOR) tablet 10 mg (has no administration in time range)   primidone (MYSOLINE) tablet 100 mg (has no administration in time range)   potassium chloride 20 mEq IVPB (premix) (20 mEq Intravenous New Bag 9/27/23 1452)   potassium chloride (K-DUR,KLOR-CON) CR tablet 40 mEq (40 mEq Oral Given 9/27/23 1452)       Diagnostic Studies  Results Reviewed     Procedure Component Value Units Date/Time    Sedimentation rate, automated [[id number]] Collected: 09/27/23 1126    Lab Status:  In process Specimen: Blood from Arm, Left Updated: 09/27/23 1452    HS Troponin I 2hr [[id number]]  (Normal) Collected: 09/27/23 1322    Lab Status: Final result Specimen: Blood from Arm, Left Updated: 09/27/23 1410     hs TnI 2hr 15 ng/L      Delta 2hr hsTnI 0 ng/L     Comprehensive metabolic panel [[id number]]  (Abnormal) Collected: 09/27/23 1126    Lab Status: Final result Specimen: Blood from Arm, Left Updated: 09/27/23 1249     Sodium 141 mmol/L      Potassium 2.9 mmol/L      Chloride 102 mmol/L      CO2 29 mmol/L      ANION GAP 10 mmol/L      BUN 10 mg/dL      Creatinine 0.71 mg/dL      Glucose 98 mg/dL      Calcium 10.2 mg/dL      AST 28 U/L      ALT 13 U/L      Alkaline Phosphatase 53 U/L      Total Protein 7.7 g/dL      Albumin 4.2 g/dL      Total Bilirubin 0.58 mg/dL      eGFR 82 ml/min/1.73sq m     Narrative:      [facility] (CKD):   •  Stage 1 with normal or high GFR (GFR > 90 mL/min/1.73 square meters)  •  Stage 2 Mild CKD (GFR = 60-89 mL/min/1.73 square meters)  •  Stage 3A Moderate CKD (GFR = 45-59 mL/min/1.73 square meters)  •  Stage 3B Moderate CKD (GFR = 30-44 mL/min/1.73 square meters)  •  Stage 4 Severe CKD (GFR = 15-29 mL/min/1.73 square meters)  •  Stage 5 End Stage CKD (GFR <15 mL/min/1.73 square meters)  Note: GFR calculation is accurate only with a steady state creatinine    B-Type Natriuretic Peptide(BNP) [[id number]]  (Abnormal) Collected: 09/27/23 1126    Lab Status: Final result Specimen: Blood from Arm, Left Updated: 09/27/23 1231      pg/mL     HS Troponin 0hr (reflex protocol) [[id number]]  (Normal) Collected: 09/27/23 1126    Lab Status: Final result Specimen: Blood from Arm, Left Updated: 09/27/23 1216     hs TnI 0hr 15 ng/L     HS Troponin I 4hr [[id number]]     Lab Status: No result Specimen: Blood     Urine Microscopic [[id number]]  (Abnormal) Collected: 09/27/23 1130    Lab Status: Final result Specimen: Urine, Straight Cath Updated: 09/27/23 1159     RBC, UA 2-4 /hpf      WBC, UA None Seen /hpf      Epithelial Cells None Seen /hpf      Bacteria, UA None Seen /hpf      MUCUS THREADS Occasional     Amorphous Crystals, UA Occasional    UA w Reflex to Microscopic w Reflex to Culture [[id number]]  (Abnormal) Collected: 09/27/23 1130    Lab Status: Final result Specimen: Urine, Straight Cath Updated: 09/27/23 1151     Color, UA Light Yellow     Clarity, UA Turbid     Specific Gravity, UA 1.013     pH, UA 7.5     Leukocytes, UA Negative     Nitrite, UA Negative     Protein, UA Trace mg/dl      Glucose, UA Negative mg/dl      Ketones, UA Negative mg/dl      Urobilinogen, UA <2.0 mg/dl      Bilirubin, UA Negative     Occult Blood, UA Negative    CBC and differential [[id number]]  (Abnormal) Collected: 09/27/23 1126    Lab Status: Final result Specimen: Blood from Arm, Left Updated: 09/27/23 1148     WBC 8.81 Thousand/uL      RBC 3.52 Million/uL      Hemoglobin 11.9 g/dL      Hematocrit 35.9 %       fL      MCH 33.8 pg      MCHC 33.1 g/dL      RDW 12.1 %      MPV 10.2 fL      Platelets 495 Thousands/uL      nRBC 0 /100 WBCs      Neutrophils Relative 75 %      Immat GRANS % 0 %      Lymphocytes Relative 17 %      Monocytes Relative 7 %      Eosinophils Relative 0 %      Basophils Relative 1 %      Neutrophils Absolute 6.62 Thousands/µL      Immature Grans Absolute 0.03 Thousand/uL      Lymphocytes Absolute 1.50 Thousands/µL      Monocytes Absolute 0.60 Thousand/µL      Eosinophils Absolute 0.01 Thousand/µL      Basophils Absolute 0.05 Thousands/µL                  CT head wo contrast   Final Result by Lorenzo Shah MD (09/27 1239)         1. Stable head CT. No acute intracranial hemorrhage. 2. Moderate, chronic microangiopathy redemonstrated. 3. Linear hyperdensity in the right subinsular white matter likely dystrophic calcification in the region of prior parenchymal hemorrhage given the blooming artifact on prior brain MRI. No acute intracranial hemorrhage. Workstation performed: [id number]         XR chest 2 views   Final Result by Jesu Marin MD (09/27 1232)      No acute cardiopulmonary disease. Workstation performed: [id number]               Procedures  Procedures      ED Course  ED Course as of 09/27/23 1453   Wed Sep 27, 2023   1154 Chest x-ray, as per my interpretation, unremarkable. No acute cardiopulmonary disorder. MDM   Pt is a 68-year-old female, from an assisted living facility, history of spontaneous cerebellar bleed, walks with a walker at baseline, presents with worsening generalized weakness for the last 2 weeks. States that she feels so weak that she could not get up and go to the kitchen to eat or drink. Decreased oral intake secondary to weakness. Denies any recent fall, headache, nausea, vomiting, chest pain, shortness of breath, abdominal pain, melena, hematochezia, urinary symptoms. Does have a little bit of bilateral ankle edema which she is unaware of but states that her friend has said that her ankles looks slightly edematous unsure if it is chronic versus acute. On exam   General: VSS, NAD, awake, alert. Talking normally. Appears fatigued. Head: Normocephalic, atraumatic, nontender. Eyes: EOM-No subconjunctival hemorrhages. ENT: Nose atraumatic. MMM  No malocclusion. No stridor. Normal phonation. No drooling. Normal swallowing. Neck: Trachea midline. No JVD. CV: RRR. Lungs: Mild rhonchorous sounds in the bilateral lung bases, no tachypnea. No paradoxical motion. Abd: +BS, soft, NT/ND. No guarding/rigidity. MSK: Full ROM throughout. 1+ pitting edema bilateral ankles. Skin: Dry, intact. Neuro: AAOx3, GCS 15, CN II-XII grossly intact. Motor/sensory grossly intact. Psychiatric/Behavioral: mood/affect normal; behavior normal; thought content normal; judgement normal   Exam: deferred    Ddx: Weakness possibly from multiple causes including deconditioning, electrolyte abnormalities, anemia, underlying cardiac problems, CHF,. Given history of spontaneous cerebellar reading will also get a CT head. Plan: CT head, cardiac work-up, chest x-ray. Labs as interpreted by me hypokalemia 2.9 but otherwise unremarkable mild elevation in BNP. No leukocytosis, hemoglobin stable, and creatinine normal, delta Trope normal, UA unremarkable. EKG as interpreted by me , normal sinus rhythm  Imaging as interpreted by me CT head unremarkable for any acute intracranial abnormality/bleeding. No acute cardiopulmonary abnormality noted in the chest x-ray, no pneumothorax, cardiac silhouette normal.    Final Dispo:  Admitted to medicine for ambulatory dysfunction     Disposition  Final diagnoses:   Weakness   Fatigue     Time reflects when diagnosis was documented in both MDM as applicable and the Disposition within this note     Time User Action Codes Description Comment    9/27/2023  1:27 PM Kristi Corral Add [R53.1] Weakness     9/27/2023  1:27 PM Fabian Sneed Add [R53.83] Fatigue       ED Disposition     ED Disposition   Admit    Condition   Stable    Date/Time   Wed Sep 27, 2023  1:27 PM    Comment              Follow-up Information    None         Current Discharge Medication List      CONTINUE these medications which have NOT CHANGED    Details   acetaminophen (TYLENOL) 325 mg tablet Take 2 tablets (650 mg total) by mouth every 6 (six) hours as needed for mild pain or fever  Refills: 0    Associated Diagnoses: Pain      Ascorbic Acid (VITAMIN C PO) Take by mouth      atorvastatin (LIPITOR) 10 mg tablet Take 1 tablet by mouth Daily      b complex vitamins tablet Take 1 tablet by mouth daily. calcium-vitamin D 250-100 MG-UNIT per tablet Take 1 tablet by mouth 2 (two) times a day      famotidine (PEPCID) 40 MG tablet       fluticasone (FLONASE) 50 mcg/act nasal spray 1 spray into each nostril daily Do not start before April 8, 2023. Qty: 9.9 mL, Refills: 0    Associated Diagnoses: Rhinitis      Multiple Vitamins-Minerals (CENTRUM CARDIO PO) Take 1 tablet by mouth daily. primidone (MYSOLINE) 50 mg tablet Take 1 tab by mouth daily at bedtime  Qty: 90 tablet, Refills: 3    Associated Diagnoses: Tremor, essential      Vitamin D, Cholecalciferol, 1000 UNITS TABS Take 1 tablet by mouth daily Indications: with aloe. No discharge procedures on file. PDMP Review     None           ED Provider  Attending physically available and evaluated Alexandra Gray. I managed the patient along with the ED Attending.     Electronically Signed by         Beth Rogers DO  09/27/23 5210

## 2023-09-27 NOTE — ASSESSMENT & PLAN NOTE
Patient is presenting with 2-week history of generalized lower extremity weakness. She states she was able to ambulate without difficulty prior to 2 weeks. Denies trauma or incontinence. Denies recent illness. Recently seen by her PCP for sciatica which is now resolved per patient. Of note, patient did receive methylprednisone pack in the beginning of August. She also recently received the flu vaccine on 9/21/23. In addition, patient reports a 20+ pack year smoking history and per chart review has been getting CT screenings for lung cancer. Last one was in 4/22, she was due for one this past April. She denies dyspnea or ascending weakness. She has been on statin therapy (lipitor 10 mg) since 2018 per chart review. CT head negative for acute bleed  Previous workup included an EMG 6/21 evaluating left lower extremity, results were normal   TSH/folate/B12 normal     Sed rate 35  Potassium on arrival 2.9    Patient notes some improvement in leg weakness after electrolyte repletion. All other workup thus far has been negative. She is having loose stools with urinary incontinence, which has likely been contributing to her electrolyte deficiencies. She had not been eating well and had first full meal on 9/30. Per patient ate 3 full meals on 10/1. Worked with PT yesterday, recommending postacute rehab after discharge. Differentials include electrolyte abn (?refeeding), polymyalgia rheumatica, polymyositis.         Plan:  F/u SPEP/UPEP and CRP  F/u with repeat CK   Ordered aldolase, anti Ofelia r/o myositis  Replenish electrolytes as needed  Obtain EMG outpatient to rule out neuropathy  Tylenol PRN for pain   Evaluate for other causes of proximal weakness if symptoms persist once electrolytes are optimized whether result of deconditioning/fraility vs intrinsic pathology myositis vs. inflammatory demyelinating disorder in the setting of recent vaccine-- continue to monitor neuro exam and correlate clinical exam findings  Follow-up with specific myositis antibodies if clinically warranted   Neurology consulted, appreciate recs  PT/OT

## 2023-09-28 LAB
ANION GAP SERPL CALCULATED.3IONS-SCNC: 16 MMOL/L
BUN SERPL-MCNC: 7 MG/DL (ref 5–25)
CALCIUM SERPL-MCNC: 8.2 MG/DL (ref 8.4–10.2)
CHLORIDE SERPL-SCNC: 103 MMOL/L (ref 96–108)
CO2 SERPL-SCNC: 22 MMOL/L (ref 21–32)
CREAT SERPL-MCNC: 0.64 MG/DL (ref 0.6–1.3)
ERYTHROCYTE [DISTWIDTH] IN BLOOD BY AUTOMATED COUNT: 12.4 % (ref 11.6–15.1)
GFR SERPL CREATININE-BSD FRML MDRD: 86 ML/MIN/1.73SQ M
GLUCOSE SERPL-MCNC: 79 MG/DL (ref 65–140)
HCT VFR BLD AUTO: 35.4 % (ref 34.8–46.1)
HGB BLD-MCNC: 11.9 G/DL (ref 11.5–15.4)
MCH RBC QN AUTO: 34.3 PG (ref 26.8–34.3)
MCHC RBC AUTO-ENTMCNC: 33.6 G/DL (ref 31.4–37.4)
MCV RBC AUTO: 102 FL (ref 82–98)
PLATELET # BLD AUTO: 204 THOUSANDS/UL (ref 149–390)
PMV BLD AUTO: 10.4 FL (ref 8.9–12.7)
POTASSIUM SERPL-SCNC: 3.2 MMOL/L (ref 3.5–5.3)
RBC # BLD AUTO: 3.47 MILLION/UL (ref 3.81–5.12)
SODIUM SERPL-SCNC: 141 MMOL/L (ref 135–147)
TSH SERPL DL<=0.05 MIU/L-ACNC: 0.94 UIU/ML (ref 0.45–4.5)
WBC # BLD AUTO: 9.11 THOUSAND/UL (ref 4.31–10.16)

## 2023-09-28 PROCEDURE — 97167 OT EVAL HIGH COMPLEX 60 MIN: CPT

## 2023-09-28 PROCEDURE — 85027 COMPLETE CBC AUTOMATED: CPT

## 2023-09-28 PROCEDURE — 86334 IMMUNOFIX E-PHORESIS SERUM: CPT | Performed by: PHYSICIAN ASSISTANT

## 2023-09-28 PROCEDURE — 80048 BASIC METABOLIC PNL TOTAL CA: CPT

## 2023-09-28 PROCEDURE — 99232 SBSQ HOSP IP/OBS MODERATE 35: CPT | Performed by: INTERNAL MEDICINE

## 2023-09-28 PROCEDURE — 97163 PT EVAL HIGH COMPLEX 45 MIN: CPT

## 2023-09-28 PROCEDURE — 84443 ASSAY THYROID STIM HORMONE: CPT | Performed by: PHYSICIAN ASSISTANT

## 2023-09-28 PROCEDURE — 84165 PROTEIN E-PHORESIS SERUM: CPT | Performed by: PHYSICIAN ASSISTANT

## 2023-09-28 RX ORDER — POTASSIUM CHLORIDE 20 MEQ/1
40 TABLET, EXTENDED RELEASE ORAL ONCE
Status: COMPLETED | OUTPATIENT
Start: 2023-09-28 | End: 2023-09-28

## 2023-09-28 RX ORDER — MAGNESIUM SULFATE HEPTAHYDRATE 40 MG/ML
2 INJECTION, SOLUTION INTRAVENOUS ONCE
Status: COMPLETED | OUTPATIENT
Start: 2023-09-28 | End: 2023-09-28

## 2023-09-28 RX ORDER — SODIUM CHLORIDE, SODIUM GLUCONATE, SODIUM ACETATE, POTASSIUM CHLORIDE, MAGNESIUM CHLORIDE, SODIUM PHOSPHATE, DIBASIC, AND POTASSIUM PHOSPHATE .53; .5; .37; .037; .03; .012; .00082 G/100ML; G/100ML; G/100ML; G/100ML; G/100ML; G/100ML; G/100ML
100 INJECTION, SOLUTION INTRAVENOUS CONTINUOUS
Status: DISPENSED | OUTPATIENT
Start: 2023-09-28 | End: 2023-09-28

## 2023-09-28 RX ADMIN — ATORVASTATIN CALCIUM 10 MG: 80 TABLET, FILM COATED ORAL at 18:51

## 2023-09-28 RX ADMIN — PRIMIDONE 100 MG: 50 TABLET ORAL at 21:30

## 2023-09-28 RX ADMIN — SODIUM CHLORIDE, SODIUM GLUCONATE, SODIUM ACETATE, POTASSIUM CHLORIDE, MAGNESIUM CHLORIDE, SODIUM PHOSPHATE, DIBASIC, AND POTASSIUM PHOSPHATE 100 ML/HR: .53; .5; .37; .037; .03; .012; .00082 INJECTION, SOLUTION INTRAVENOUS at 00:17

## 2023-09-28 RX ADMIN — MAGNESIUM SULFATE HEPTAHYDRATE 2 G: 40 INJECTION, SOLUTION INTRAVENOUS at 14:47

## 2023-09-28 RX ADMIN — SODIUM CHLORIDE, SODIUM GLUCONATE, SODIUM ACETATE, POTASSIUM CHLORIDE, MAGNESIUM CHLORIDE, SODIUM PHOSPHATE, DIBASIC, AND POTASSIUM PHOSPHATE 100 ML/HR: .53; .5; .37; .037; .03; .012; .00082 INJECTION, SOLUTION INTRAVENOUS at 10:50

## 2023-09-28 RX ADMIN — ENOXAPARIN SODIUM 40 MG: 40 INJECTION SUBCUTANEOUS at 10:50

## 2023-09-28 RX ADMIN — SODIUM CHLORIDE, SODIUM GLUCONATE, SODIUM ACETATE, POTASSIUM CHLORIDE, MAGNESIUM CHLORIDE, SODIUM PHOSPHATE, DIBASIC, AND POTASSIUM PHOSPHATE 100 ML/HR: .53; .5; .37; .037; .03; .012; .00082 INJECTION, SOLUTION INTRAVENOUS at 22:17

## 2023-09-28 RX ADMIN — SODIUM CHLORIDE, SODIUM GLUCONATE, SODIUM ACETATE, POTASSIUM CHLORIDE, MAGNESIUM CHLORIDE, SODIUM PHOSPHATE, DIBASIC, AND POTASSIUM PHOSPHATE 100 ML/HR: .53; .5; .37; .037; .03; .012; .00082 INJECTION, SOLUTION INTRAVENOUS at 22:08

## 2023-09-28 RX ADMIN — POTASSIUM CHLORIDE 40 MEQ: 1500 TABLET, EXTENDED RELEASE ORAL at 14:47

## 2023-09-28 NOTE — CASE MANAGEMENT
Case Management Assessment & Discharge Planning Note    Patient name Osbaldo Resendiz  Location [id number] MRN [id number]  : 1945 Date 2023       Current Admission Date: 2023  Current Admission Diagnosis:Weakness of both lower extremities   Patient Active Problem List    Diagnosis Date Noted   • Hypokalemia 2023   • Impaired mobility and ADLs 04/10/2023   • MCI (mild cognitive impairment) 2023   • Pain 2023   • Enlarged and hypertrophic nails 2023   • Osteopenia 2023   • Right knee pain 2023   • Elevated AST (SGOT) 2023   • Prediabetes 2023   • Closed left ankle fracture, sequela 2023   • GERD (gastroesophageal reflux disease) 2023   • Bilateral pain of leg and foot 2023   • Diarrhea 2023   • Macrocytosis 2023   • Weakness of both lower extremities 2023   • Rhabdomyolysis 2023   • SARS-CoV-2 positive 2023   • Tremor, essential 10/20/2022   • Acute cerebral hemorrhage ([address]) 10/11/2022   • History of cerebral hemorrhage 10/10/2022   • 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 ([address]) 10/10/2022   • Chronic left-sided low back pain    • Transient cerebral ischemia 2018   • Premature atrial beats 2018   • Cataract of right eye 2016   • Sacroiliitis ([address]) 2015   • Idiopathic osteoporosis 2015   • Primary osteoarthritis of hand 2015      LOS (days): 1  Geometric Mean LOS (GMLOS) (days): 2.60  Days to GMLOS:1.6     OBJECTIVE:    Risk of Unplanned Readmission Score: 16.84         Current admission status: Inpatient       Preferred Pharmacy:   [id number] [address] [place] [address]  Phone: [phone] Fax: [phone]    [facility]acy #[id number]- [place] [address] [place] [address]  Phone: [phone] Fax: [phone]    Primary Care Provider: Linh Hernandez DO    Primary Insurance: MEDICARE  Secondary Insurance: AARP    ASSESSMENT:  Active Health Care Proxies    There are no active Health Care Proxies on file. Readmission Root Cause  30 Day Readmission: No    Patient Information  Admitted from[de-identified] Home  Mental Status: Alert  During Assessment patient was accompanied by: Not accompanied during assessment  Assessment information provided by[de-identified] Patient  Primary Caregiver: Self  Support Systems: Son, Family members  [place] of Residence: [address] [place] [address] do you live in?: [place] entry access options. Select all that apply.: Elevator  Type of Current Residence: Apartment  Floor Level: 1  Upon entering residence, is there a bedroom on the main floor (no further steps)?: Yes  Upon entering residence, is there a bathroom on the main floor (no further steps)?: Yes  In the last 12 months, was there a time when you were not able to pay the mortgage or rent on time?: No  In the last 12 months, how many places have you lived?: 1  In the last 12 months, was there a time when you did not have a steady place to sleep or slept in a shelter (including now)?: No  Homeless/housing insecurity resource given?: N/A  Living Arrangements: Lives Alone  Is patient a ?: No    Activities of Daily Living Prior to Admission  Functional Status: Independent  Completes ADLs independently?: Yes  Ambulates independently?: Yes  Does patient use assisted devices?: Yes  Assisted Devices (DME) used: [employer]  Does patient currently own DME?: Yes  What DME does the patient currently own?: [employer]  Does patient have a history of Outpatient Therapy (PT/OT)?: No  Does the patient have a history of Short-Term Rehab?: Yes (Went to [facility] in 2023.  Does not remember name of facility.)  Does patient have a history of HHC?: Yes  Does patient currently have [facility]?: No         Patient Information Continued  Income Source: SSI/SSD  Does patient have prescription coverage?: Yes  Within the past 12 months, you worried that your food would run out before you got the money to buy more.: Never true  Within the past 12 months, the food you bought just didn't last and you didn't have money to get more.: Never true  Food insecurity resource given?: N/A  Does patient receive dialysis treatments?: No  Does patient have a history of substance abuse?: No  Does patient have a history of Mental Health Diagnosis?: No         Means of Transportation  Means of Transport to Appts[de-identified] [employer]  In the past 12 months, has lack of transportation kept you from medical appointments or from getting medications?: No  In the past 12 months, has lack of transportation kept you from meetings, work, or from getting things needed for daily living?: No  Was application for public transport provided?: N/A        DISCHARGE DETAILS:    Discharge planning discussed with[de-identified] Patient at bedside. Freedom of Choice: Yes  Comments - Freedom of Choice: PT/OT rec of STR. CM to continue discussion with pt about STR on discharge. CM contacted family/caregiver?: Yes  Were Treatment Team discharge recommendations reviewed with patient/caregiver?: Yes  Did patient/caregiver verbalize understanding of patient care needs?: Yes  Were patient/caregiver advised of the risks associated with not following Treatment Team discharge recommendations?: Yes    Contacts  Patient Contacts: Alfredo Hickey  Relationship to Patient[de-identified] Family  Contact Method: Phone  Phone Number: [phone]  Reason/Outcome: Emergency Contact, Discharge [address]         Is the patient interested in [address] [facility] at discharge?: No         Other Referral/Resources/Interventions Provided:  Referral Comments: PT/OT rec of STR. CM to follow up with patient to discuss STR rec. Treatment Team Recommendation: Short Term Rehab               CM met with pt at bedside and discussed STR recommendation.  Pt agreeable to going to rehab. CM sent blanket post-acute referral in [facility].

## 2023-09-28 NOTE — PLAN OF CARE
Problem: PAIN - ADULT  Goal: Verbalizes/displays adequate comfort level or baseline comfort level  Description: Interventions:  - Encourage patient to monitor pain and request assistance  - Assess pain using appropriate pain scale  - Administer analgesics based on type and severity of pain and evaluate response  - Implement non-pharmacological measures as appropriate and evaluate response  - Consider cultural and social influences on pain and pain management  - Notify physician/advanced practitioner if interventions unsuccessful or patient reports new pain  Outcome: Progressing     Problem: INFECTION - ADULT  Goal: Absence or prevention of progression during hospitalization  Description: INTERVENTIONS:  - Assess and monitor for signs and symptoms of infection  - Monitor lab/diagnostic results  - Monitor all insertion sites, i.e. indwelling lines, tubes, and drains  - Monitor endotracheal if appropriate and nasal secretions for changes in amount and color  - [place] appropriate cooling/warming therapies per order  - Administer medications as ordered  - Instruct and encourage patient and family to use good hand hygiene technique  - Identify and instruct in appropriate isolation precautions for identified infection/condition  Outcome: Progressing

## 2023-09-28 NOTE — PHYSICAL THERAPY NOTE
Physical Therapy Evaluation     Patient's Name: Vernon Andrade    Admitting Diagnosis  Fatigue [R53.83]  Weakness [R53.1]    Problem List  Patient Active Problem List   Diagnosis    Cataract of right eye    Idiopathic osteoporosis    Sacroiliitis (HCC)    Primary osteoarthritis of hand    Transient cerebral ischemia    Premature atrial beats    Chronic left-sided low back pain    History of cerebral hemorrhage    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 (HCC)    Acute cerebral hemorrhage (HCC)    Tremor, essential    Macrocytosis    Weakness of both lower extremities    Rhabdomyolysis    SARS-CoV-2 positive    Diarrhea    Elevated AST (SGOT)    Prediabetes    Closed left ankle fracture, sequela    GERD (gastroesophageal reflux disease)    Bilateral pain of leg and foot    Osteopenia    Right knee pain    Pain    Enlarged and hypertrophic nails    MCI (mild cognitive impairment)    Impaired mobility and ADLs    Hypokalemia       Past Medical History  Past Medical History:   Diagnosis Date    Cataract of right eye     GERD (gastroesophageal reflux disease)        Past Surgical History  Past Surgical History:   Procedure Laterality Date    CATARACT EXTRACTION, BILATERAL Bilateral     MASTECTOMY Bilateral     for calcium deposits    NY COLONOSCOPY FLX DX W/COLLJ SPEC WHEN PFRMD N/A 8/25/2017    Procedure: COLONOSCOPY;  Surgeon: Ellie Ramirez MD;  Location: [facility]; Service: Colorectal    NY XCAPSL CTRC RMVL INSJ IO LENS PROSTH W/O ECP Right 6/21/2016    Procedure: OD EXTRACTION EXTRACAPSULAR CATARACT PHACO INTRAOCULAR LENS (IOL);   Surgeon: Tono Carrizales MD;  Location: [facility];  Service: Ophthalmology    TONSILLECTOMY            09/28/23 0934   PT Last Visit   PT Visit Date 09/28/23   Note Type   Note type Evaluation   Pain Assessment   Pain Assessment Tool 0-10   Pain Score No Pain  (at rest)   Restrictions/Precautions   Weight Bearing Precautions Per Order No   Other Precautions Multiple lines; Fall Risk  (h/o stroke with residual L sided weakness)   Home Living   Type of Home Apartment  (?ILF)   Home Layout One level;Stairs to enter with rails  (1level with 2STE)   Bathroom Shower/Tub Tub/shower unit   [employer] Raised   Bathroom Equipment Grab bars in shower;Grab bars around toilet   [address] Walker;Cane  (uses RW at baseline)   Prior Function   Level of [place] Independent with ADLs; Independent with functional mobility; Needs assistance with IADLS   Lives With (S)  Alone   Receives Help From Family  (sister lives local)   IADLs Family/Friend/Other provides transportation; Family/Friend/Other provides meals  (meals on wheels)   Falls in the last 6 months 0   Vocational Retired   General   Family/Caregiver Present No   Cognition   Overall Cognitive Status WFL   Arousal/Participation Alert   Orientation Level Oriented X4   Memory Within functional limits   Following Commands Follows one step commands without difficulty   Comments Patient pleasant and cooperative. Patient notes some fear of mobility/falling   Subjective   Subjective Patient agreeable to PT eval   RUE Assessment   RUE Assessment WFL   LUE Assessment   LUE Assessment   (grossly limited (L sided weakness, hx of stroke))   RLE Assessment   RLE Assessment   (3+/5 grossly)   LLE Assessment   LLE Assessment   (3/5 grossly (L sided weakness ; hx of stroke))   Light Touch   RLE Light Touch Grossly intact   LLE Light Touch Impaired   LLE Light Touch Comments able to feel light touch, but reduces sensation L vs R   Bed Mobility   Supine to Sit 3  Moderate assistance   Additional items Assist x 2; Increased time required;Verbal cues;LE management   Sit to Supine 3  Moderate assistance   Additional items Assist x 2; Increased time required;Verbal cues;LE management   Transfers   Sit to Stand 3  Moderate assistance   Additional items Assist x 2; Increased time required;Verbal cues Stand to Sit 3  Moderate assistance   Additional items Assist x 2; Increased time required;Verbal cues   Additional Comments RW   Ambulation/Elevation   Gait pattern Forward Flexion;Decreased foot clearance;Shuffling; Short stride; Excessively slow   Gait Assistance 3  Moderate assist   Additional items Assist x 2   Assistive Device Rolling walker   Distance 2' up [address] Not tested   Ambulation/Elevation Additional Comments patient with difficulty lifting BLE for ambulation. Distance limtied by fatigue and pain in L low back/hip   Balance   Static Sitting Fair   Dynamic Sitting Fair -   Static Standing Poor +   Dynamic Standing Poor   Ambulatory Poor -   Endurance Deficit   Endurance Deficit Yes   Endurance Deficit Description generalized weakness, fatigue, pain   Activity Tolerance   Activity Tolerance Patient limited by fatigue;Patient limited by pain   Medical Staff Made Aware OT   Nurse Made Aware RN cleared   Assessment   Prognosis Good   Problem List Decreased strength;Decreased endurance;Decreased range of motion; Impaired balance;Decreased mobility; Decreased coordination;Pain   Assessment Pt is a 70 y.o. female seen for a high complexity PT evaluation due to Ongoing medical management for primary dx, Increased reliance on more restrictive AD compared to baseline, Decreased activity tolerance compared to baseline, Fall risk, Increased assistance needed from caregiver at current time. Patient is s/p admit to [address] [place] [address] on 9/27/2023 for Fatigue (R53.83)  Weakness (R53.1). Patient  has a past medical history of Cataract of right eye and GERD (gastroesophageal reflux disease). .     PT now consulted to assess functional mobility and needs for safe d/c planning. Prior to admission, pt was I with functional mobility, ADLs, with assist for IADLs. Personal factors affecting status include home alone in apt with 2STE, limited support.      Currently pt requires Mod Ax2 for bed mobility, Mod Ax2 for functional transfers with RW ; Mod Ax2 for ambulation with RW. Pt presents functioning below baseline and w/ overall mobility deficits 2* to: decreased strength, decreased endurance, decreased mobility, impaired balance. These impairments place pt at risk for falls. Pt will continue to benefit from skilled PT interventions to address stated impairments; to maximize functional potential; for ongoing pt/family education; and DME needs. The patient's AM-PAC Basic Mobility Inpatient Short Form Raw Score Is 7. A Raw score of less than 16 suggests the patient may benefit from discharge to post-acute rehabilitation services. PT is currently recommending rehab on d/c from hospital. Will continue to follow as able. Barriers to Discharge Decreased caregiver support   Goals   Patient Goals to feel stronger   STG Expiration Date 10/12/23   Short Term Goal #1 In 14 days, patient will    1) increase strength in BUE/BLE by 1/2 to 1 full grade for increased strength and stability needed for functional mobility 2) improve bed mobility to MI for improved mobility and decreased need for assist 3) increase functional transfers to MI for improved safety and functional mobility 4) ambulate at least 50ft with MI using RW for increased endurance and safety ambulating home and community environments 5) improve balance by 1 grade for improved safety and stability and decreased risk for falls. 6) ascend/descend at least 2 stairs using HR with MI in order to safely enter home   PT Treatment Day 0   Plan   Treatment/Interventions ADL retraining;Functional transfer training;LE strengthening/ROM; Elevations; Endurance training; Therapeutic exercise;Patient/family training;Equipment eval/education; Bed mobility;Gait training;Spoke to nursing;Spoke to case management;OT   PT Frequency 2-3x/wk   Recommendation   PT Discharge Recommendation Post acute rehabilitation services   Equipment Recommended [facility] Recommended Wheeled walker   AM-PAC Basic Mobility Inpatient   Turning in Flat Bed Without Bedrails 2   Lying on Back to Sitting on Edge of Flat Bed Without Bedrails 1   Moving Bed to Chair 1   Standing Up From Chair Using Arms 1   Walk in Room 1   Climb 3-5 Stairs With Railing 1   Basic Mobility Inpatient Raw Score 7   Turning Head Towards Sound 4   Follow Simple Instructions 3   Low Function Basic Mobility Raw Score  14   Low Function Basic Mobility Standardized Score  22.01   Highest Level Of Mobility   JH-HLM Goal 2: Bed activities/Dependent transfer   JH-HLM Achieved 5: Stand (1 or more minutes)   Modified [place] Scale   Modified [place] Scale 4   End of Consult   Patient Position at End of Consult Supine; All needs within reach;Bed/Chair alarm activated         Mesfin Shepherd, PT, DPT

## 2023-09-28 NOTE — PLAN OF CARE
Problem: PHYSICAL THERAPY ADULT  Goal: Performs mobility at highest level of function for planned discharge setting. See evaluation for individualized goals. Description: Treatment/Interventions: ADL retraining, Functional transfer training, LE strengthening/ROM, Elevations, Endurance training, Therapeutic exercise, Patient/family training, Equipment eval/education, Bed mobility, Gait training, Spoke to nursing, Spoke to case management, OT  Equipment Recommended: Karthik Gulling       See flowsheet documentation for full assessment, interventions and recommendations. Outcome: Progressing  Note: Prognosis: Good  Problem List: Decreased strength, Decreased endurance, Decreased range of motion, Impaired balance, Decreased mobility, Decreased coordination, Pain  Assessment: Pt is a 70 y.o. female seen for a high complexity PT evaluation due to Ongoing medical management for primary dx, Increased reliance on more restrictive AD compared to baseline, Decreased activity tolerance compared to baseline, Fall risk, Increased assistance needed from caregiver at current time. Patient is s/p admit to [facility] on [facility]/27/2023 for Fatigue (R53.83)  Weakness (R53.1). Patient  has a past medical history of Cataract of right eye and GERD (gastroesophageal reflux disease). .     PT now consulted to assess functional mobility and needs for safe d/c planning. Prior to admission, pt was I with functional mobility, ADLs, with assist for IADLs. Personal factors affecting status include home alone in apt with 2STE, limited support. Currently pt requires Mod Ax2 for bed mobility, Mod Ax2 for functional transfers with RW ; Mod Ax2 for ambulation with RW. Pt presents functioning below baseline and w/ overall mobility deficits 2* to: decreased strength, decreased endurance, decreased mobility, impaired balance. These impairments place pt at risk for falls.      Pt will continue to benefit from skilled PT interventions to address stated impairments; to maximize functional potential; for ongoing pt/family education; and DME needs. The patient's AM-PAC Basic Mobility Inpatient Short Form Raw Score Is 7. A Raw score of less than 16 suggests the patient may benefit from discharge to post-acute rehabilitation services. PT is currently recommending rehab on d/c from hospital. Will continue to follow as able. Barriers to Discharge: Decreased caregiver support     PT Discharge Recommendation: Post acute rehabilitation services    See flowsheet documentation for full assessment.

## 2023-09-28 NOTE — PLAN OF CARE
Problem: PAIN - ADULT  Goal: Verbalizes/displays adequate comfort level or baseline comfort level  Description: Interventions:  - Encourage patient to monitor pain and request assistance  - Assess pain using appropriate pain scale  - Administer analgesics based on type and severity of pain and evaluate response  - Implement non-pharmacological measures as appropriate and evaluate response  - Consider cultural and social influences on pain and pain management  - Notify physician/advanced practitioner if interventions unsuccessful or patient reports new pain  Outcome: Progressing     Problem: INFECTION - ADULT  Goal: Absence or prevention of progression during hospitalization  Description: INTERVENTIONS:  - Assess and monitor for signs and symptoms of infection  - Monitor lab/diagnostic results  - Monitor all insertion sites, i.e. indwelling lines, tubes, and drains  - Monitor endotracheal if appropriate and nasal secretions for changes in amount and color  - [place] appropriate cooling/warming therapies per order  - Administer medications as ordered  - Instruct and encourage patient and family to use good hand hygiene technique  - Identify and instruct in appropriate isolation precautions for identified infection/condition  Outcome: Progressing     Problem: INFECTION - ADULT  Goal: Absence of fever/infection during neutropenic period  Description: INTERVENTIONS:  - Monitor WBC    Outcome: Progressing     Problem: SAFETY ADULT  Goal: Patient will remain free of falls  Description: INTERVENTIONS:  - Educate patient/family on patient safety including physical limitations  - Instruct patient to call for assistance with activity   - Consult OT/PT to assist with strengthening/mobility   - Keep Call bell within reach  - Keep bed low and locked with side rails adjusted as appropriate  - Keep care items and personal belongings within reach  - Initiate and maintain comfort rounds  - Make Fall Risk Sign visible to staff  - Offer Toileting every 2 Hours, in advance of need  - Initiate/Maintain BED alarm  - Obtain necessary fall risk management equipment: AT BEDSIDE  - Apply yellow socks and bracelet for high fall risk patients  - Consider moving patient to room near nurses station  Outcome: Progressing

## 2023-09-28 NOTE — PROGRESS NOTES
INTERNAL MEDICINE RESIDENCY PROGRESS NOTE     Name: Emily Kaur   Age & Sex: 70 y.o. female   MRN: [id number]  Unit/Bed#: UW8 865-02   Encounter: [id number]  Team: SOD Team C     PATIENT INFORMATION     Name: Emily Kaur   Age & Sex: 70 y.o. female   MRN: [id number]  Hospital Stay Days: 1    ASSESSMENT/PLAN     Principal Problem:    Weakness of both lower extremities  Active Problems:    Hypokalemia    History of cerebral hemorrhage    Tremor, essential    Macrocytosis    Prediabetes    GERD (gastroesophageal reflux disease)      * Weakness of both lower extremities  Assessment & Plan  Patient is presenting with 2-week history of generalized lower extremity weakness. She states she was able to ambulate without difficulty prior to 2 weeks. Denies trauma or incontinence. Denies recent illness. Recently seen by her PCP for sciatica which is now resolved per patient. Of note, patient did receive methylprednisone pack in the beginning of August. She also recently received the flu vaccine on 9/21/23. In addition, patient reports a 20+ pack year smoking history and per chart review has been getting CT screenings for lung cancer. Last one was in 4/22, she was due for one this past April. She denies dyspnea or ascending weakness.  She has been on statin therapy (lipitor 10 mg) since 2018 per chart review     CT head negative for acute bleed  Previous workup included an EMG 6/21 evaluating left lower extremity, results were normal   TSH/folate/B12 normal     Sed rate 35    Plan:  F/u SPEP/UPEP and CRP   Potentially consider repeating EMG study   Replenish electrolytes as needed, resolve hypokalemia  Evaluate for other causes of proximal weakness if symptoms persist once electrolytes are optimized whether result of deconditioning/fraility vs intrinsic pathology myositis vs. inflammatory demyelinating disorder in the setting of recent vaccine-- continue to monitor neuro exam and correlate clinical exam findings  Follow-up with specific myositis antibodies if clinically warranted   Neurology consulted, appreciate recs  PT/OT    Hypokalemia  Assessment & Plan  Patient presented with a potassium of 2.9 likely secondary to decreased p.o. intake as endorsed by the patient  No EKG changes seen  Received 80 mEq KCl yesterday    Plan  Follow-up with BMP today  Replenish Magnesium which was 1.6     GERD (gastroesophageal reflux disease)  Assessment & Plan  History of GERD taking intermittent Pepcid    Plan  We will continue to monitor without medication and give Pepcid as needed as needed    Prediabetes  Assessment & Plan  History of prediabetes with most recent A1c of 5.9; which may be appropriate for patient's age    Plan  We will continue to monitor blood sugars with BMPs    Macrocytosis  Assessment & Plan  Macrocytosis with an MCV of 102  Most recent TSH in March within normal limits, TSH within normal range this admission    Plan:  F/u with B12 and folate--- results back this morning, B12 (1275) folate (>22.3) both adequate    Tremor, essential  Assessment & Plan  Patient reports history of essential tremor on the right upper extremity chronic since her strokes in the past  Currently managed on primidone outpatient    Plan  We will continue primidone 50 daily    History of cerebral hemorrhage  Assessment & Plan  History of cerebral hemorrhage with continued deficits with restricted left upper extremity range of motion and continued left upper and lower extremity tingling sensation as described by patient  CT head negative at this admission    Plan  We will continue atorvastatin 10  Continue to monitor neurologic exam  PT/OT      Disposition: Inpatient for further workup    SUBJECTIVE     Patient seen and examined. No acute events overnight. Patient was sitting up in bed with breakfast tray. She endorses lower extremity weakness and fatigue mostly. She reports some muscle pain when asked.  She denies shortness of breath, loss of bladder/bowels, new sensory loss, visual changes, and headache. She reports that her weakness acutely worsened recently which caused her to not be able to get up and eat her meals. She reports recent steroid use (methlyprednisone pack for sciatica), recent flu vaccine, and 20+ pack year smoking history. OBJECTIVE     Vitals:    23 1549 23 1550 23 2204 23 0700   BP: 158/74  140/69 144/73   BP Location:   Right arm Right arm   Pulse: 78  80 88   Resp: 18  18 20   Temp: 98.2 °F (36.8 °C)  98.2 °F (36.8 °C) 98.7 °F (37.1 °C)   TempSrc:   Oral Oral   SpO2:    93%   Weight:  64.4 kg (141 lb 15.6 oz)     Height:  5' 5.25" (1.657 m)        Temperature:   Temp (24hrs), Av.3 °F (36.8 °C), Min:98.2 °F (36.8 °C), Max:98.7 °F (37.1 °C)    Temperature: 98.7 °F (37.1 °C)  Intake & Output:  I/O        0701   0700  0701   0700  0701   0700    Urine (mL/kg/hr)  1350     Total Output  1350     Net  -1350            Unmeasured Urine Occurrence  1 x         Weights:   IBW (Ideal Body Weight): 57.58 kg    Body mass index is 23.45 kg/m². Weight (last 2 days)     Date/Time Weight    23 1550 64.4 (141.98)        Physical Exam  Constitutional:       General: She is not in acute distress. Appearance: She is not ill-appearing or diaphoretic. HENT:      Head: Normocephalic. Mouth/Throat:      Pharynx: No oropharyngeal exudate or posterior oropharyngeal erythema. Eyes:      Extraocular Movements: EOM normal.      Conjunctiva/sclera: Conjunctivae normal.      Pupils: Pupils are equal, round, and reactive to light. Cardiovascular:      Rate and Rhythm: Normal rate and regular rhythm. Pulmonary:      Effort: Pulmonary effort is normal. No respiratory distress. Abdominal:      General: There is no distension. Palpations: Abdomen is soft. Tenderness: There is no abdominal tenderness. Musculoskeletal:      Right lower leg: No edema. Left lower leg: No edema. Skin:     General: Skin is warm and dry. Neurological:      Mental Status: She is alert. Neurologic Exam     Mental Status   No aphasia or slurred speech      Cranial Nerves     CN II   Visual fields full to confrontation. CN III, IV, VI   Pupils are equal, round, and reactive to light. Extraocular motions are normal.     CN V   Facial sensation intact. CN VII   Left facial weakness: facial droop present. CN IX, X   CN IX normal.   CN X normal.   Palate: symmetric    CN XI   CN XI normal.     CN XII   CN XII normal.   No fatigable weakness upon EOM testing, no ptosis identified     Motor Exam 4+-5/5 strength throughout both extremities bilaterally with the exception of 3/5 bilateral hip flexors     Sensory Exam   Slightly diminished sensation to light touch on left side. Diminished vibration sense and proprioception in left toe     Gait, Coordination, and Reflexes     Tremor   Resting tremor: absent  Intention tremor: presentTremor noted during finger to nose testing. Biceps reflexes +1  Patellar reflexes +1       LABORATORY DATA     Labs: I have personally reviewed pertinent reports.   Results from last 7 days   Lab Units 09/28/23  0512 09/27/23  1126   WBC Thousand/uL 9.11 8.81   HEMOGLOBIN g/dL 11.9 11.9   HEMATOCRIT % 35.4 35.9   PLATELETS Thousands/uL 204 225   NEUTROS PCT %  --  75   MONOS PCT %  --  7   EOS PCT %  --  0      Results from last 7 days   Lab Units 09/28/23  0512 09/27/23  1126   POTASSIUM mmol/L 3.2* 2.9*   CHLORIDE mmol/L 103 102   CO2 mmol/L 22 29   BUN mg/dL 7 10   CREATININE mg/dL 0.64 0.71   CALCIUM mg/dL 8.2* 10.2   ALK PHOS U/L  --  53   ALT U/L  --  13   AST U/L  --  28     Results from last 7 days   Lab Units 09/27/23  1126   MAGNESIUM mg/dL 1.6*     Results from last 7 days   Lab Units 09/27/23  1126   PHOSPHORUS mg/dL 2.4          Results from last 7 days   Lab Units 09/27/23 2001   LACTIC ACID mmol/L 0.8           IMAGING & DIAGNOSTIC TESTING     Radiology Results: I have personally reviewed pertinent reports. CT head wo contrast    Result Date: 9/27/2023  Impression: 1. Stable head CT. No acute intracranial hemorrhage. 2. Moderate, chronic microangiopathy redemonstrated. 3. Linear hyperdensity in the right subinsular white matter likely dystrophic calcification in the region of prior parenchymal hemorrhage given the blooming artifact on prior brain MRI. No acute intracranial hemorrhage. Workstation performed: [id number]     XR chest 2 views    Result Date: 9/27/2023  Impression: No acute cardiopulmonary disease. Workstation performed: [id number]     Other Diagnostic Testing: I have personally reviewed pertinent reports. ACTIVE MEDICATIONS     Current Facility-Administered Medications   Medication Dose Route Frequency   • atorvastatin (LIPITOR) tablet 10 mg  10 mg Oral After Dinner   • enoxaparin (LOVENOX) subcutaneous injection 40 mg  40 mg Subcutaneous Daily   • multi-electrolyte (PLASMALYTE-A/ISOLYTE-S PH 7.4) IV solution  100 mL/hr Intravenous Continuous   • primidone (MYSOLINE) tablet 100 mg  100 mg Oral HS       VTE Pharmacologic Prophylaxis: Enoxaparin (Lovenox)  VTE Mechanical Prophylaxis: sequential compression device    Portions of the record may have been created with voice recognition software. Occasional wrong word or "sound a like" substitutions may have occurred due to the inherent limitations of voice recognition software.   Read the chart carefully and recognize, using context, where substitutions have occurred.  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

## 2023-09-28 NOTE — PLAN OF CARE
Problem: OCCUPATIONAL THERAPY ADULT  Goal: Performs self-care activities at highest level of function for planned discharge setting. See evaluation for individualized goals. Description: Treatment Interventions: UE strengthening/ROM, Functional transfer training, ADL retraining, Endurance training, Cognitive reorientation, Patient/family training, Compensatory technique education, Activityengagement, Energy conservation          See flowsheet documentation for full assessment, interventions and recommendations. Outcome: Progressing  Note: Limitation: Decreased ADL status, Decreased UE strength, Decreased UE ROM, Decreased Safe judgement during ADL, Decreased endurance, Decreased cognition, Decreased self-care trans, Decreased high-level ADLs  Prognosis: Fair  Assessment: Pt is a 70 y.o. female who presented to [facility] on 9/27/2023 with generalized weakness. Pt with diagnosis of weakness of B/L LE. Pt  has a past medical history of Cataract of right eye and GERD (gastroesophageal reflux disease). Pt greeted bedside for OT evaluation on 9/28/2023. Pt resides with her cat in a one level apartment in a senior living community. PTA, Pt reports being I with ADLs and completes functional mobility with RW. Pt with assist for IADLs, however, I with med management. Pt demonstrating the following occupational deficits: mod A with UB ADLs, max A with LB ADLs, mod Ax2 with bed mobility, mod AX2 with functional transfers, and mod AX2 with functional mobility with RW. Limitations that impact functional performance include decreased ADL status, decreased UE ROM, decreased UE strength, decreased safe judgement during ADLs, decreased cognition, decreased endurance, decreased self care transfers, decreased high level ADLs and pain.  Occupational performance areas to address ADL retraining, functional transfer training, UE strengthening/ROM, endurance training, cognitive reorientation, Pt/caregiver education, equipment evaluation/education, compensatory technique education, energy conservation and activity engagement . Pt would benefit from continued skilled OT services while in hospital to maximize independence with ADLs. Will continue to follow Pt's progress. Pt would benefit from post acute rehabilitation services upon DC to maximize safety and independence with ADLs and functional tasks of choice.      OT Discharge Recommendation: Post acute rehabilitation services

## 2023-09-28 NOTE — OCCUPATIONAL THERAPY NOTE
Occupational Therapy Evaluation     Patient Name: Vernon Andrade  [id number] Date: 9/28/2023  Problem List  Principal Problem:    Weakness of both lower extremities  Active Problems:    History of cerebral hemorrhage    Tremor, essential    Macrocytosis    Prediabetes    GERD (gastroesophageal reflux disease)    Hypokalemia    Past Medical History  Past Medical History:   Diagnosis Date    Cataract of right eye     GERD (gastroesophageal reflux disease)      Past Surgical History  Past Surgical History:   Procedure Laterality Date    CATARACT EXTRACTION, BILATERAL Bilateral     MASTECTOMY Bilateral     for calcium deposits    OR COLONOSCOPY FLX DX W/COLLJ SPEC WHEN PFRMD N/A 8/25/2017    Procedure: COLONOSCOPY;  Surgeon: Ellie Ramirez MD;  Location: [facility]; Service: Colorectal    OR XCAPSL CTRC RMVL INSJ IO LENS PROSTH W/O ECP Right 6/21/2016    Procedure: OD EXTRACTION EXTRACAPSULAR CATARACT PHACO INTRAOCULAR LENS (IOL); Surgeon: Tono Carrizales MD;  Location: [facility];  Service: Ophthalmology    TONSILLECTOMY           09/28/23 0935   OT Last Visit   OT Visit Date 09/28/23   Note Type   Note type Evaluation   Pain Assessment   Pain Assessment Tool 0-10   Pain Score 4   Pain Location/Orientation Orientation: Lower; Location: Back   Hospital Pain Intervention(s) Ambulation/increased activity;Repositioned   Restrictions/Precautions   Weight Bearing Precautions Per Order No   Other Precautions Pain; Fall Risk;Bed Alarm; Chair Alarm  (h/o CVA with L sided weakness)   Home Living   Type of Home Apartment  ([address] [place] [address] at [address])   Home Layout One level   Bathroom Shower/Tub Tub/shower unit   [employer] Raised   [address] bars around toilet; Shower chair;Grab bars in [address] Walker;Cane  (use fo RW at baseline)   Additional Comments Pt resides with her cat in a one level apartment in a senior living community.    Prior Function   Level of [place] Independent with ADLs; Independent with functional mobility; Needs assistance with IADLS   Lives With (S)  Alone   Receives Help From Family  (Pt's sister is local)   IADLs Family/Friend/Other provides transportation; Independent with medication management; Independent with meal prep  (Meals on wheels)   Falls in the last 6 months 0   Vocational Retired   Comments PTA, Pt reports being I with ADLs and completes functional mobility with RW. Pt with assist for IADLs, however, I with med management. Lifestyle   Autonomy I with ADLs and completes functional mobility with RW   Reciprocal Relationships Supportive family   Service to Others Retired   [address] Enjoys spending time with her cat and doing puzzles   ADL   Where Assessed Supine, bed   Eating Assistance 5  Supervision/Setup   Eating Deficit Setup   Grooming Assistance 4  Minimal Assistance   UB Bathing Assistance 3  Moderate Assistance    [address] 2  Maximal [place] 3  Moderate Assistance   LB [address] 2  Maximal Assistance   LB Dressing Deficit Don/doff R sock; Don/doff L sock; Setup   Toileting Assistance  2  Maximal Assistance   Functional Assistance 2  Maximal Assistance   Functional Deficit Increased time to complete;Supervision/safety;Setup   Bed Mobility   Supine to Sit 3  Moderate assistance   Additional items Assist x 2; Increased time required;Verbal cues;LE management   Sit to Supine 3  Moderate assistance   Additional items Assist x 2; Increased time required;LE management;Verbal cues   Additional Comments Pt greeted supine in bed. Transfers   Sit to Stand 3  Moderate assistance   Additional items Assist x 2; Increased time required;Verbal cues   Stand to Sit 3  Moderate assistance   Additional items Assist x 2;Verbal cues; Increased time required   Additional Comments with RW   Functional Mobility   Functional Mobility 3  Moderate assistance   Additional Comments Mod Ax2 with RW- 4 lateral steps towards HOB. Additional items Rolling walker   Balance   Static Sitting Fair   Dynamic Sitting Fair -   Static Standing Poor +   Dynamic Standing Poor   Ambulatory Poor -   Activity Tolerance   Activity Tolerance Patient limited by pain; Patient limited by fatigue   Medical Staff Made Aware Co-eval with Carol Barros DPT 2* to Pt's medical complexity and decreased endurance. Nurse Made Aware RN cleared/updated. RUE Assessment   RUE Assessment WFL  (4/5 grossly)   LUE Assessment   LUE Assessment X  (h/o L sided weakness- 60* shoulder flexion and 2/5 grossly for LUE, however, 3+/5 grasp)   Hand Function   Gross Motor Coordination Impaired  (functional RUE and impaired LUE)   Fine Motor Coordination Impaired  (functional RUE and impaired LUE)   Sensation   Light Touch No apparent deficits   Vision-Basic Assessment   Visual History Cataracts  (R eye)   Vision - Complex Assessment   Acuity Able to read clock/calendar on wall without difficulty   Psychosocial   Psychosocial (WDL) WDL   Cognition   Overall Cognitive Status WFL   Arousal/Participation Alert; Cooperative   Attention Attends with cues to redirect   Orientation Level Oriented X4   Memory Decreased recall of precautions   Following Commands Follows one step commands without difficulty   Comments Pt very pleasant and cooperative. Pt reports fear of falling and requires reassurance/education. Pt benefits from one-step commands and increased time for processing. Assessment   Limitation Decreased ADL status; Decreased UE strength;Decreased UE ROM; Decreased Safe judgement during ADL;Decreased endurance;Decreased cognition;Decreased self-care trans;Decreased high-level ADLs   Prognosis Fair   Assessment Pt is a 70 y.o. female who presented to [facility] on 9/27/2023 with generalized weakness. Pt with diagnosis of weakness of B/L LE. Pt  has a past medical history of Cataract of right eye and GERD (gastroesophageal reflux disease).  Pt greeted bedside for OT evaluation on 9/28/2023. Pt resides with her cat in a one level apartment in a senior living community. PTA, Pt reports being I with ADLs and completes functional mobility with RW. Pt with assist for IADLs, however, I with med management. Pt demonstrating the following occupational deficits: mod A with UB ADLs, max A with LB ADLs, mod Ax2 with bed mobility, mod AX2 with functional transfers, and mod AX2 with functional mobility with RW. Limitations that impact functional performance include decreased ADL status, decreased UE ROM, decreased UE strength, decreased safe judgement during ADLs, decreased cognition, decreased endurance, decreased self care transfers, decreased high level ADLs and pain. Occupational performance areas to address ADL retraining, functional transfer training, UE strengthening/ROM, endurance training, cognitive reorientation, Pt/caregiver education, equipment evaluation/education, compensatory technique education, energy conservation and activity engagement . Pt would benefit from continued skilled OT services while in hospital to maximize independence with ADLs. Will continue to follow Pt's progress. Pt would benefit from post acute rehabilitation services upon DC to maximize safety and independence with ADLs and functional tasks of choice. Goals   Patient Goals To get stronger   LTG Time Frame 10-14   Long Term Goal #1 See goals listed below. Plan   Treatment Interventions UE strengthening/ROM; Functional transfer training;ADL retraining; Endurance training;Cognitive reorientation;Patient/family training; Compensatory technique education; Activityengagement; Energy conservation   Goal Expiration Date 10/12/23   OT Frequency 2-3x/wk   Recommendation   OT Discharge Recommendation Post acute rehabilitation services   Additional Comments  The patient's raw score on the AM-PAC Daily Activity Inpatient Short Form is 14.  A raw score of less than 19 suggests the patient may benefit from discharge to post-acute rehabilitation services. Please refer to the recommendation of the Occupational Therapist for safe discharge planning. AM-PAC Daily Activity Inpatient   Lower Body Dressing 2   Bathing 2   Toileting 2   Upper Body Dressing 2   Grooming 3   Eating 3   Daily Activity Raw Score 14   Daily Activity Standardized Score (Calc for Raw Score >=11) 33.39   AM-PAC Applied Cognition Inpatient   Following a Speech/Presentation 4   Understanding Ordinary Conversation 4   Taking Medications 4   Remembering Where Things Are Placed or Put Away 4   Remembering List of 4-5 Errands 3   Taking Care of Complicated Tasks 3   Applied Cognition Raw Score 22   Applied Cognition Standardized Score 47.83   End of Consult   Education Provided Yes   Patient Position at End of Consult Bedside chair;Bed/Chair alarm activated; All needs within reach   Nurse Communication Nurse aware of consult         GOALS:  Pt will complete UB ADLs with I in order to maximize participation with ADLs. Pt will complete LB ADLs with I in order to maximize safety with ADLs. Pt will complete toileting routine (transfer, hygiene, and clothing management) with I in order to return to prior level of function. Pt will complete bed mobility with I in order to maximize participation with ADLs. Pt will complete functional transfers at I level in order to increase participation with ADLs. Pt will increase dynamic standing balance to F+ in order to increase safety with ADLs. Pt will increase standing tolerance x10 min in order to increase participation with ADLs. Pt will complete functional mobility with AD PRN for item retrieval task at [place] level in order to increase participation with ADLs. Pt will complete IADL tasks/simulation of IADLs tasks with I in order to return to PLOF. Pt will demonstrate G energy conservation techniques with ADLs/IADLs in order to reduce the risk of falls.   Pt will be attentive 100% of the time for ongoing functional/formal cognitive assessment to assist with safe dc planning prn.     Kar Darnell MS, OTR/L

## 2023-09-29 ENCOUNTER — APPOINTMENT (INPATIENT)
Dept: NON INVASIVE DIAGNOSTICS | Facility: HOSPITAL | Age: 78
DRG: 948 | End: 2023-09-29
Payer: MEDICARE

## 2023-09-29 ENCOUNTER — APPOINTMENT (OUTPATIENT)
Dept: RADIOLOGY | Facility: HOSPITAL | Age: 78
DRG: 948 | End: 2023-09-29
Payer: MEDICARE

## 2023-09-29 LAB
ANION GAP SERPL CALCULATED.3IONS-SCNC: 11 MMOL/L
BASOPHILS # BLD AUTO: 0.05 THOUSANDS/ÂΜL (ref 0–0.1)
BASOPHILS NFR BLD AUTO: 1 % (ref 0–1)
BUN SERPL-MCNC: 6 MG/DL (ref 5–25)
CA-I BLD-SCNC: 0.99 MMOL/L (ref 1.12–1.32)
CALCIUM SERPL-MCNC: 7.3 MG/DL (ref 8.4–10.2)
CHLORIDE SERPL-SCNC: 104 MMOL/L (ref 96–108)
CK SERPL-CCNC: 221 U/L (ref 26–192)
CO2 SERPL-SCNC: 23 MMOL/L (ref 21–32)
CREAT SERPL-MCNC: 0.51 MG/DL (ref 0.6–1.3)
CRP SERPL QL: 41.1 MG/L
EOSINOPHIL # BLD AUTO: 0.08 THOUSAND/ÂΜL (ref 0–0.61)
EOSINOPHIL NFR BLD AUTO: 1 % (ref 0–6)
ERYTHROCYTE [DISTWIDTH] IN BLOOD BY AUTOMATED COUNT: 12.3 % (ref 11.6–15.1)
GFR SERPL CREATININE-BSD FRML MDRD: 93 ML/MIN/1.73SQ M
GLUCOSE SERPL-MCNC: 107 MG/DL (ref 65–140)
HCT VFR BLD AUTO: 34.8 % (ref 34.8–46.1)
HGB BLD-MCNC: 11.7 G/DL (ref 11.5–15.4)
IMM GRANULOCYTES # BLD AUTO: 0.03 THOUSAND/UL (ref 0–0.2)
IMM GRANULOCYTES NFR BLD AUTO: 0 % (ref 0–2)
LYMPHOCYTES # BLD AUTO: 1.56 THOUSANDS/ÂΜL (ref 0.6–4.47)
LYMPHOCYTES NFR BLD AUTO: 18 % (ref 14–44)
MAGNESIUM SERPL-MCNC: 2.2 MG/DL (ref 1.9–2.7)
MCH RBC QN AUTO: 33.5 PG (ref 26.8–34.3)
MCHC RBC AUTO-ENTMCNC: 33.6 G/DL (ref 31.4–37.4)
MCV RBC AUTO: 100 FL (ref 82–98)
MONOCYTES # BLD AUTO: 0.67 THOUSAND/ÂΜL (ref 0.17–1.22)
MONOCYTES NFR BLD AUTO: 8 % (ref 4–12)
NEUTROPHILS # BLD AUTO: 6.47 THOUSANDS/ÂΜL (ref 1.85–7.62)
NEUTS SEG NFR BLD AUTO: 72 % (ref 43–75)
NRBC BLD AUTO-RTO: 0 /100 WBCS
PLATELET # BLD AUTO: 203 THOUSANDS/UL (ref 149–390)
PMV BLD AUTO: 10.1 FL (ref 8.9–12.7)
POTASSIUM SERPL-SCNC: 3.3 MMOL/L (ref 3.5–5.3)
RBC # BLD AUTO: 3.49 MILLION/UL (ref 3.81–5.12)
SODIUM SERPL-SCNC: 138 MMOL/L (ref 135–147)
WBC # BLD AUTO: 8.86 THOUSAND/UL (ref 4.31–10.16)

## 2023-09-29 PROCEDURE — 83735 ASSAY OF MAGNESIUM: CPT

## 2023-09-29 PROCEDURE — 82550 ASSAY OF CK (CPK): CPT

## 2023-09-29 PROCEDURE — 85025 COMPLETE CBC W/AUTO DIFF WBC: CPT

## 2023-09-29 PROCEDURE — 80048 BASIC METABOLIC PNL TOTAL CA: CPT

## 2023-09-29 PROCEDURE — 70551 MRI BRAIN STEM W/O DYE: CPT

## 2023-09-29 PROCEDURE — 99232 SBSQ HOSP IP/OBS MODERATE 35: CPT | Performed by: INTERNAL MEDICINE

## 2023-09-29 PROCEDURE — 99232 SBSQ HOSP IP/OBS MODERATE 35: CPT | Performed by: PSYCHIATRY & NEUROLOGY

## 2023-09-29 PROCEDURE — 72148 MRI LUMBAR SPINE W/O DYE: CPT

## 2023-09-29 PROCEDURE — 82330 ASSAY OF CALCIUM: CPT

## 2023-09-29 PROCEDURE — 93971 EXTREMITY STUDY: CPT

## 2023-09-29 PROCEDURE — 93971 EXTREMITY STUDY: CPT | Performed by: SURGERY

## 2023-09-29 PROCEDURE — 86140 C-REACTIVE PROTEIN: CPT

## 2023-09-29 RX ORDER — POTASSIUM CHLORIDE 20 MEQ/1
40 TABLET, EXTENDED RELEASE ORAL
Status: COMPLETED | OUTPATIENT
Start: 2023-09-29 | End: 2023-09-29

## 2023-09-29 RX ORDER — ACETAMINOPHEN 325 MG/1
650 TABLET ORAL EVERY 6 HOURS PRN
Status: CANCELLED | OUTPATIENT
Start: 2023-09-29

## 2023-09-29 RX ORDER — SODIUM CHLORIDE, SODIUM GLUCONATE, SODIUM ACETATE, POTASSIUM CHLORIDE, MAGNESIUM CHLORIDE, SODIUM PHOSPHATE, DIBASIC, AND POTASSIUM PHOSPHATE .53; .5; .37; .037; .03; .012; .00082 G/100ML; G/100ML; G/100ML; G/100ML; G/100ML; G/100ML; G/100ML
100 INJECTION, SOLUTION INTRAVENOUS CONTINUOUS
Status: DISCONTINUED | OUTPATIENT
Start: 2023-09-29 | End: 2023-10-02 | Stop reason: HOSPADM

## 2023-09-29 RX ORDER — POTASSIUM CHLORIDE 20 MEQ/1
40 TABLET, EXTENDED RELEASE ORAL ONCE
Status: CANCELLED | OUTPATIENT
Start: 2023-09-29

## 2023-09-29 RX ORDER — ACETAMINOPHEN 325 MG/1
650 TABLET ORAL EVERY 6 HOURS PRN
Status: DISCONTINUED | OUTPATIENT
Start: 2023-09-29 | End: 2023-10-02 | Stop reason: HOSPADM

## 2023-09-29 RX ORDER — POTASSIUM CHLORIDE 14.9 MG/ML
20 INJECTION INTRAVENOUS ONCE
Status: COMPLETED | OUTPATIENT
Start: 2023-09-29 | End: 2023-09-30

## 2023-09-29 RX ADMIN — ATORVASTATIN CALCIUM 10 MG: 80 TABLET, FILM COATED ORAL at 18:29

## 2023-09-29 RX ADMIN — POTASSIUM CHLORIDE 20 MEQ: 14.9 INJECTION, SOLUTION INTRAVENOUS at 10:03

## 2023-09-29 RX ADMIN — PRIMIDONE 100 MG: 50 TABLET ORAL at 20:55

## 2023-09-29 RX ADMIN — SODIUM CHLORIDE, SODIUM GLUCONATE, SODIUM ACETATE, POTASSIUM CHLORIDE, MAGNESIUM CHLORIDE, SODIUM PHOSPHATE, DIBASIC, AND POTASSIUM PHOSPHATE 100 ML/HR: .53; .5; .37; .037; .03; .012; .00082 INJECTION, SOLUTION INTRAVENOUS at 20:57

## 2023-09-29 RX ADMIN — ACETAMINOPHEN 650 MG: 325 TABLET, FILM COATED ORAL at 10:03

## 2023-09-29 RX ADMIN — ACETAMINOPHEN 650 MG: 325 TABLET, FILM COATED ORAL at 16:22

## 2023-09-29 RX ADMIN — POTASSIUM CHLORIDE 40 MEQ: 1500 TABLET, EXTENDED RELEASE ORAL at 10:03

## 2023-09-29 RX ADMIN — POTASSIUM CHLORIDE 40 MEQ: 1500 TABLET, EXTENDED RELEASE ORAL at 14:10

## 2023-09-29 RX ADMIN — SODIUM CHLORIDE, SODIUM GLUCONATE, SODIUM ACETATE, POTASSIUM CHLORIDE, MAGNESIUM CHLORIDE, SODIUM PHOSPHATE, DIBASIC, AND POTASSIUM PHOSPHATE 100 ML/HR: .53; .5; .37; .037; .03; .012; .00082 INJECTION, SOLUTION INTRAVENOUS at 10:50

## 2023-09-29 RX ADMIN — ENOXAPARIN SODIUM 40 MG: 40 INJECTION SUBCUTANEOUS at 10:04

## 2023-09-29 NOTE — OCCUPATIONAL THERAPY NOTE
Occupational Therapy Cancel Note     09/29/23 1309   OT Last Visit   OT Visit Date 09/29/23   Note Type   Note type Cancelled Session   Cancel Reasons Patient off floor/test         OT orders received and chart reviewed. Pt is currently  off floor at MRI. Will continue to follow and complete OT treatment when appropriate.        Dandy Mae MS, OTR/L

## 2023-09-29 NOTE — ASSESSMENT & PLAN NOTE
78year old female with history of prior ischemic/hemorrhagic stroke (R hemisphere; has residual L sided hemiparesis/sensory loss), chronic back pain, sciatica (L buttocks).     Presents following several weeks of significant gait instability and proximal lower extremity weakness at home. Patient and sister note since [facility] hospitalization in March, she has had deconditioning and poor ambulatory status at home; requiring a walker for assistance.     As a result of her sedentary state, has not eaten or drink for past several days. Exam is pain limited, but with maximal alert has decent strength in the lower extremities (distal LE sensory loss, L>R; no clear myelopathic signs on exam).   Unclear definitive etiology, differential includes deconditioning, rhabdomyolysis pain limited strength secondary to L buttock sciatica, neuropathy/sensory ataxia, vs spinal/cerebral pathology.     Plan:  -MRI brain and lumbar spine pending for this morning, will follow results:  -Checking serum labs for neuropathy/rhabdo:              -Total    -CMP with hypokalemia/hypocalcemia   -B12, folate, TSH all WNL; SPEP pending              -ESR 35   -CRP pending   -Metabolic/infectious derangement monitoring and correction per primary team   -UA unrevealing   -Pain control for sciatica per primary team; avoid opiate/CNS depressants  -Supportive care  -Therapy evaluations      Prior neurodiagnostics/labs:  -Last spinal MRI imaging in [facility] appears in September 2020:              -MRI lumbar spine with just minimal degenerative disease at L2-4              -Left lower extremity EMG was normal in June 2021  -Admission in March 2023 noted rhabdomyolysis with CK >6000 at that time

## 2023-09-29 NOTE — PROGRESS NOTES
INTERNAL MEDICINE RESIDENCY PROGRESS NOTE     Name: Yeny Hawley   Age & Sex: 70 y.o. female   MRN: [id number]  Unit/Bed#: CC0 865-02   Encounter: [id number]  Team: SOD Team C     PATIENT INFORMATION     Name: Yeny Hawley   Age & Sex: 70 y.o. female   MRN: [id number]  Hospital Stay Days: 2    ASSESSMENT/PLAN     Principal Problem:    Weakness of both lower extremities  Active Problems:    Hypokalemia    History of cerebral hemorrhage    Tremor, essential    Macrocytosis    Prediabetes    GERD (gastroesophageal reflux disease)      * Weakness of both lower extremities  Assessment & Plan  Patient is presenting with 2-week history of generalized lower extremity weakness. She states she was able to ambulate without difficulty prior to 2 weeks. Denies trauma or incontinence. Denies recent illness. Recently seen by her PCP for sciatica which is now resolved per patient. Of note, patient did receive methylprednisone pack in the beginning of August. She also recently received the flu vaccine on 9/21/23. In addition, patient reports a 20+ pack year smoking history and per chart review has been getting CT screenings for lung cancer. Last one was in 4/22, she was due for one this past April. She denies dyspnea or ascending weakness. She has been on statin therapy (lipitor 10 mg) since 2018 per chart review     CT head negative for acute bleed  Previous workup included an EMG 6/21 evaluating left lower extremity, results were normal   TSH/folate/B12 normal     Sed rate 35  Potassium on arrival 2.9    Patient notes some improvement in leg weakness 9/29 after electrolyte repletion.  Reported more arthritic symptoms today     Plan:  F/u SPEP/UPEP and CRP  F/u with repeat CK   Replenish electrolytes as needed, resolve hypokalemia and hypocalcemia  Tylenol PRN for pain   Evaluate for other causes of proximal weakness if symptoms persist once electrolytes are optimized whether result of deconditioning/fraility vs intrinsic pathology myositis vs. inflammatory demyelinating disorder in the setting of recent vaccine-- continue to monitor neuro exam and correlate clinical exam findings  Follow-up with specific myositis antibodies if clinically warranted   Neurology consulted, appreciate recs  PT/OT    Hypokalemia  Assessment & Plan  Patient presented with a potassium of 2.9 likely secondary to decreased p.o. intake as endorsed by the patient  No EKG changes seen  Receiving potassium and magnesium     K from BMP 9/29: 3.3     Plan  IV 20 mEq KCl this morning  Continue to monitor BMPs and optimize magnesium       GERD (gastroesophageal reflux disease)  Assessment & Plan  History of GERD taking intermittent Pepcid    Plan  We will continue to monitor without medication and give Pepcid as needed as needed    Prediabetes  Assessment & Plan  History of prediabetes with most recent A1c of 5.9; which may be appropriate for patient's age    Plan  We will continue to monitor blood sugars with BMPs    Macrocytosis  Assessment & Plan  Macrocytosis with an MCV of 102  Most recent TSH in March within normal limits, TSH within normal range this admission    Plan:  F/u with B12 and folate--- results back, B12 (1275) folate (>22.3) both adequate    Tremor, essential  Assessment & Plan  Patient reports history of essential tremor on the right upper extremity chronic since her strokes in the past  Currently managed on primidone outpatient    Plan  We will continue primidone 50 daily    History of cerebral hemorrhage  Assessment & Plan  History of cerebral hemorrhage with continued deficits with restricted left upper extremity range of motion and continued left upper and lower extremity tingling sensation as described by patient  CT head negative at this admission    Plan  We will continue atorvastatin 10  Continue to monitor neurologic exam  PT/OT      Disposition: Inpatient, further optimizing electrolytes and symptom monitoring    SUBJECTIVE Patient seen and examined. No acute events overnight. Patient was lying in bed in no apparent distress. She noted that it takes her longer to pull herself up to a sitting position. Overall, she does feel slightly improved from yesterday. She was able to eat more and can lift her legs up on her own. She still endorses fatigue and muscle pain. She reported that she used to use tylenol in the past to help with her arthritis. She had a small BM this morning. She denies chest pain, shortness of breath, new weakness, new numbness, and visual changes. OBJECTIVE     Vitals:    23 0700 23 1556 23 2241 23 0645   BP: 144/73 (!) 183/80 167/74 153/64   BP Location: Right arm Right arm Right arm Right arm   Pulse: 88 83 79 80   Resp:  18 18 16   Temp: 98.7 °F (37.1 °C) 98.1 °F (36.7 °C) 98.8 °F (37.1 °C) 98.5 °F (36.9 °C)   TempSrc: Oral Oral Oral Oral   SpO2: 93% 96% 98% 95%   Weight:       Height:          Temperature:   Temp (24hrs), Av.5 °F (36.9 °C), Min:98.1 °F (36.7 °C), Max:98.8 °F (37.1 °C)    Temperature: 98.5 °F (36.9 °C)  Intake & Output:  I/O        0701   0700  0701   0700  07 0700    P. O.  120     Total Intake(mL/kg)  120 (1.9)     Urine (mL/kg/hr) 1350 1900 (1.2)     Total Output 1350 1900     Net -1350 -1780            Unmeasured Urine Occurrence 1 x          Weights:   IBW (Ideal Body Weight): 57.58 kg    Body mass index is 23.45 kg/m². Weight (last 2 days)     Date/Time Weight    23 1550 64.4 (141.98)        Physical Exam  Constitutional:       General: She is not in acute distress. Appearance: She is not diaphoretic. HENT:      Head: Normocephalic. Mouth/Throat:      Pharynx: No oropharyngeal exudate. Eyes:      Extraocular Movements: Extraocular movements intact. Cardiovascular:      Rate and Rhythm: Normal rate and regular rhythm. Pulmonary:      Effort: Pulmonary effort is normal. No respiratory distress. Abdominal:      Palpations: Abdomen is soft. Tenderness: There is no abdominal tenderness. There is no guarding. Musculoskeletal:      Right lower leg: No edema. Left lower leg: No edema. Skin:     General: Skin is warm and dry. Neurological:      Mental Status: She is alert. Comments: Speech non-dysarthric   CN II-XII intact with the exception of chronic left facial droop  Motor: Full strength throughout with the exception of left hip flexor 4/5, improved from yesterday  Coordination: Intention tremor present  Sensory: Sensation to light touch slightly diminished on the left side  Hyporeflexic, no clonus appreciated       LABORATORY DATA     Labs: I have personally reviewed pertinent reports. Results from last 7 days   Lab Units 09/29/23 0457 09/28/23  0512 09/27/23  1126   WBC Thousand/uL 8.86 9.11 8.81   HEMOGLOBIN g/dL 11.7 11.9 11.9   HEMATOCRIT % 34.8 35.4 35.9   PLATELETS Thousands/uL 203 204 225   NEUTROS PCT % 72  --  75   MONOS PCT % 8  --  7   EOS PCT % 1  --  0      Results from last 7 days   Lab Units 09/29/23 0457 09/28/23  0512 09/27/23  1126   POTASSIUM mmol/L 3.3* 3.2* 2.9*   CHLORIDE mmol/L 104 103 102   CO2 mmol/L 23 22 29   BUN mg/dL 6 7 10   CREATININE mg/dL 0.51* 0.64 0.71   CALCIUM mg/dL 7.3* 8.2* 10.2   ALK PHOS U/L  --   --  53   ALT U/L  --   --  13   AST U/L  --   --  28     Results from last 7 days   Lab Units 09/29/23 0457 09/27/23  1126   MAGNESIUM mg/dL 2.2 1.6*     Results from last 7 days   Lab Units 09/27/23  1126   PHOSPHORUS mg/dL 2.4          Results from last 7 days   Lab Units 09/27/23 2001   LACTIC ACID mmol/L 0.8           IMAGING & DIAGNOSTIC TESTING     Radiology Results: I have personally reviewed pertinent reports. CT head wo contrast    Result Date: 9/27/2023  Impression: 1. Stable head CT. No acute intracranial hemorrhage. 2. Moderate, chronic microangiopathy redemonstrated.  3. Linear hyperdensity in the right subinsular white matter likely dystrophic calcification in the region of prior parenchymal hemorrhage given the blooming artifact on prior brain MRI. No acute intracranial hemorrhage. Workstation performed: [id number]     XR chest 2 views    Result Date: 9/27/2023  Impression: No acute cardiopulmonary disease. Workstation performed: [id number]     Other Diagnostic Testing: I have personally reviewed pertinent reports. ACTIVE MEDICATIONS     Current Facility-Administered Medications   Medication Dose Route Frequency   • acetaminophen (TYLENOL) tablet 650 mg  650 mg Oral Q6H PRN   • atorvastatin (LIPITOR) tablet 10 mg  10 mg Oral After Dinner   • enoxaparin (LOVENOX) subcutaneous injection 40 mg  40 mg Subcutaneous Daily   • multi-electrolyte (PLASMALYTE-A/ISOLYTE-S PH 7.4) IV solution  100 mL/hr Intravenous Continuous   • potassium chloride (K-DUR,KLOR-CON) CR tablet 40 mEq  40 mEq Oral TID With Meals   • potassium chloride 20 mEq IVPB (premix)  20 mEq Intravenous Once   • primidone (MYSOLINE) tablet 100 mg  100 mg Oral HS       VTE Pharmacologic Prophylaxis: Enoxaparin (Lovenox)  VTE Mechanical Prophylaxis: sequential compression device    Portions of the record may have been created with voice recognition software. Occasional wrong word or "sound a like" substitutions may have occurred due to the inherent limitations of voice recognition software.   Read the chart carefully and recognize, using context, where substitutions have occurred.  ==  Romi Moody Se  Fourth-Year Medical Student

## 2023-09-29 NOTE — PLAN OF CARE
Problem: INFECTION - ADULT  Goal: Absence or prevention of progression during hospitalization  Description: INTERVENTIONS:  - Assess and monitor for signs and symptoms of infection  - Monitor lab/diagnostic results  - Monitor all insertion sites, i.e. indwelling lines, tubes, and drains  - Monitor endotracheal if appropriate and nasal secretions for changes in amount and color  - [place] appropriate cooling/warming therapies per order  - Administer medications as ordered  - Instruct and encourage patient and family to use good hand hygiene technique  - Identify and instruct in appropriate isolation precautions for identified infection/condition  9/29/2023 1946 by Brina Cooper RN  Outcome: Progressing  9/29/2023 1946 by Brina Cooper RN  Outcome: Progressing     Problem: SAFETY ADULT  Goal: Patient will remain free of falls  Description: INTERVENTIONS:  - Educate patient/family on patient safety including physical limitations  - Instruct patient to call for assistance with activity   - Consult OT/PT to assist with strengthening/mobility   - Keep Call bell within reach  - Keep bed low and locked with side rails adjusted as appropriate  - Keep care items and personal belongings within reach  - Initiate and maintain comfort rounds  - Make Fall Risk Sign visible to staff  - Offer Toileting every 2 Hours, in advance of need  - Initiate/Maintain bed alarm  - Obtain necessary fall risk management equipment: rolling walker  Problem: Knowledge Deficit  Goal: Patient/family/caregiver demonstrates understanding of disease process, treatment plan, medications, and discharge instructions  Description: Complete learning assessment and assess knowledge base.   Interventions:  - Provide teaching at level of understanding  - Provide teaching via preferred learning methods  9/29/2023 1946 by Brina Cooper RN  Outcome: Progressing  9/29/2023 1946 by Brina Cooper RN  Outcome: Progressing     - Apply yellow socks and bracelet for high fall risk patients  - Consider moving patient to room near nurses station  9/29/2023 1946 by Byron Hardy, RN  Outcome: Progressing  9/29/2023 1946 by Byron Hardy, RN  Outcome: Progressing

## 2023-09-29 NOTE — PROGRESS NOTES
Progress Note - Neurology   Cheryl Herrmann 70 y.o. female MRN: [id number]  Unit/Bed#: JQ6 865-02 Encounter: [id number]      Assessment/Plan   * Weakness of both lower extremities  Assessment & Plan  78year old female with history of prior ischemic/hemorrhagic stroke (R hemisphere; has residual L sided hemiparesis/sensory loss), chronic back pain, sciatica (L buttocks).     Presents following several weeks of significant gait instability and proximal lower extremity weakness at home. Patient and sister note since [facility] hospitalization in March, she has had deconditioning and poor ambulatory status at home; requiring a walker for assistance.     As a result of her sedentary state, has not eaten or drink for past several days. Exam is pain limited, but with maximal alert has decent strength in the lower extremities (distal LE sensory loss, L>R; no clear myelopathic signs on exam).   Unclear definitive etiology, differential includes deconditioning, rhabdomyolysis pain limited strength secondary to L buttock sciatica, neuropathy/sensory ataxia, vs spinal/cerebral pathology.     Plan:  -MRI brain and lumbar spine pending for this morning, will follow results:  -Checking serum labs for neuropathy/rhabdo:              -Total    -CMP with hypokalemia/hypocalcemia   -B12, folate, TSH all WNL; SPEP pending              -ESR 35, CRP elevated at 41.1   -CRP pending    -patient with L calf tenderness to palpation this afternoon, would check L LE venous duplex  -Metabolic/infectious derangement monitoring and correction per primary team   -UA unrevealing    -would check Flu/COVID/RSV panel  -Pain control for sciatica per primary team; avoid opiate/CNS depressants  -Supportive care  -Therapy evaluations      Prior neurodiagnostics/labs:  -Last spinal MRI imaging in [facility] appears in September 2020:              -MRI lumbar spine with just minimal degenerative disease at L2-4              -Left lower extremity EMG was normal in June 2021  -Admission in March 2023 noted rhabdomyolysis with CK >6000 at that time    No further inpatient neurologic workup. Discussed plan of care with attending neurologist.     Tyler Rincon will need follow up in in 6 weeks with general attending or advance practitioner. She will not require outpatient neurological testing. Subjective:   Patient resting in bed, awaiting MRI's later this morning. In better spirits, notes she feels she has slightly more energy today, able to lift her legs slightly more off the bed than when she originally came in. Does note multifocal pain when trying to push herself up in bed (hips, neck, low back). No new/interim neuro symptoms nor deficits otherwise. Vitals: Blood pressure 153/64, pulse 80, temperature 98.5 °F (36.9 °C), temperature source Oral, resp. rate 16, height 5' 5.25" (1.657 m), weight 64.4 kg (141 lb 15.6 oz), SpO2 95 %. ,Body mass index is 23.45 kg/m². Physical Exam:   Physical Exam  Constitutional:       Appearance: Normal appearance. HENT:      Head: Normocephalic and atraumatic. Eyes:      Extraocular Movements: Extraocular movements intact and EOM normal.      Conjunctiva/sclera: Conjunctivae normal.      Pupils: Pupils are equal, round, and reactive to light. Cardiovascular:      Rate and Rhythm: Normal rate. Pulmonary:      Effort: Pulmonary effort is normal.   Abdominal:      General: There is no distension. Musculoskeletal:         General: Normal range of motion. Cervical back: Normal range of motion and neck supple. Skin:     General: Skin is warm and dry. Neurological:      Mental Status: She is alert. Coordination: Finger-Nose-Finger Test normal.        Neurologic Exam     Mental Status   Awake, alert, fully oriented; speech clear, following all commands. Cranial Nerves     CN II   Visual fields full to confrontation. CN III, IV, VI   Pupils are equal, round, and reactive to light.   Extraocular motions are normal.     CN V   Facial sensation intact. CN VII   Left facial weakness: central (chronic L facial droop from prior CVA)    CN VIII   CN VIII normal.     CN IX, X   CN IX normal.   CN X normal.     CN XI   CN XI normal.     CN XII   CN XII normal.     Motor Exam Full strength throughout UE with exception of L deltoid giveaway weakness; LE with 4/5 strength hip flexion bilaterally, L weaker than R. Minimal L knee ROM/dorsiflex/plantarflex weakness, suspect chronic. Sensory Exam     Mild L sided hemisensory loss, chronic, distal LE sensory loss/length dependent. Gait, Coordination, and Reflexes     Coordination   Finger to nose coordination: normal    Tremor   Resting tremor: absent  Intention tremor: absent  Continued hyporeflexia, no UMN signs besides equivocal to up-going L toe. Lab, Imaging and other studies:   CT head wo contrast   Final Result by Eva Malik MD (09/27 1239)         1. Stable head CT. No acute intracranial hemorrhage. 2. Moderate, chronic microangiopathy redemonstrated. 3. Linear hyperdensity in the right subinsular white matter likely dystrophic calcification in the region of prior parenchymal hemorrhage given the blooming artifact on prior brain MRI. No acute intracranial hemorrhage. Workstation performed: [id number]         XR chest 2 views   Final Result by Edward Romero MD (09/27 1232)      No acute cardiopulmonary disease.                   Workstation performed: [id number]         MRI brain wo contrast    (Results Pending)   MRI lumbar spine wo contrast    (Results Pending)       CBC:   Results from last 7 days   Lab Units 09/29/23  0457 09/28/23  0512 09/27/23  1126   WBC Thousand/uL 8.86 9.11 8.81   RBC Million/uL 3.49* 3.47* 3.52*   HEMOGLOBIN g/dL 11.7 11.9 11.9   HEMATOCRIT % 34.8 35.4 35.9   MCV fL 100* 102* 102*   PLATELETS Thousands/uL 203 204 225   , BMP/CMP:   Results from last 7 days   Lab Units 09/29/23  0457 09/28/23  3658 09/27/23  1126   SODIUM mmol/L 138 141 141   POTASSIUM mmol/L 3.3* 3.2* 2.9*   CHLORIDE mmol/L 104 103 102   CO2 mmol/L 23 22 29   BUN mg/dL 6 7 10   CREATININE mg/dL 0.51* 0.64 0.71   CALCIUM mg/dL 7.3* 8.2* 10.2   AST U/L  --   --  28   ALT U/L  --   --  13   ALK PHOS U/L  --   --  53   EGFR ml/min/1.73sq m 93 86 82   , Vitamin B12:   Results from last 7 days   Lab Units 09/27/23  1126   VITAMIN B 12 pg/mL 1,275*   , HgBA1C:   Results from last 7 days   Lab Units 09/25/23  0836   HEMOGLOBIN A1C % 5.9*   , TSH:   Results from last 7 days   Lab Units 09/28/23  0512   TSH 3RD GENERATON uIU/mL 0.938   , Coagulation:   , Lipid Profile:   , Ammonia:   , Urinalysis:   Results from last 7 days   Lab Units 09/27/23  1130   COLOR UA  Light Yellow   CLARITY UA  Turbid   SPEC GRAV UA  1.013   PH UA  7.5   LEUKOCYTES UA  Negative   NITRITE UA  Negative   GLUCOSE UA mg/dl Negative   KETONES UA mg/dl Negative   BILIRUBIN UA  Negative   BLOOD UA  Negative   , Drug Screen:   , Medication Drug Levels:       Invalid input(s): "CARBAMAZEPINE", "LACOSAMIDE", "OXCARBAZEPINE"  VTE Prophylaxis: Sequential compression device (Venodyne)  and Enoxaparin (Lovenox)    Total time spent today 20 minutes. Discussed plan of care with patient and primary team: Await MRI results, correcting electrolytes, reviewing neuropathy labs; supportive care, therapies following.

## 2023-09-30 PROBLEM — E83.39 HYPOPHOSPHATEMIA: Status: ACTIVE | Noted: 2023-09-30

## 2023-09-30 LAB
ALBUMIN SERPL BCP-MCNC: 3.5 G/DL (ref 3.5–5)
ALBUMIN SERPL ELPH-MCNC: 3.42 G/DL (ref 3.2–5.1)
ALBUMIN SERPL ELPH-MCNC: 52.6 % (ref 48–70)
ALP SERPL-CCNC: 61 U/L (ref 34–104)
ALPHA1 GLOB SERPL ELPH-MCNC: 0.26 G/DL (ref 0.15–0.47)
ALPHA1 GLOB SERPL ELPH-MCNC: 4 % (ref 1.8–7)
ALPHA2 GLOB SERPL ELPH-MCNC: 0.92 G/DL (ref 0.42–1.04)
ALPHA2 GLOB SERPL ELPH-MCNC: 14.1 % (ref 5.9–14.9)
ALT SERPL W P-5'-P-CCNC: 14 U/L (ref 7–52)
ANION GAP SERPL CALCULATED.3IONS-SCNC: 10 MMOL/L
AST SERPL W P-5'-P-CCNC: 25 U/L (ref 13–39)
BASOPHILS # BLD AUTO: 0.05 THOUSANDS/ÂΜL (ref 0–0.1)
BASOPHILS NFR BLD AUTO: 1 % (ref 0–1)
BETA GLOB ABNORMAL SERPL ELPH-MCNC: 0.36 G/DL (ref 0.31–0.57)
BETA1 GLOB SERPL ELPH-MCNC: 5.6 % (ref 4.7–7.7)
BETA2 GLOB SERPL ELPH-MCNC: 7.5 % (ref 3.1–7.9)
BETA2+GAMMA GLOB SERPL ELPH-MCNC: 0.49 G/DL (ref 0.2–0.58)
BILIRUB SERPL-MCNC: 0.36 MG/DL (ref 0.2–1)
BUN SERPL-MCNC: 9 MG/DL (ref 5–25)
CA-I BLD-SCNC: 1.06 MMOL/L (ref 1.12–1.32)
CALCIUM SERPL-MCNC: 7.4 MG/DL (ref 8.4–10.2)
CHLORIDE SERPL-SCNC: 109 MMOL/L (ref 96–108)
CO2 SERPL-SCNC: 20 MMOL/L (ref 21–32)
CREAT SERPL-MCNC: 0.52 MG/DL (ref 0.6–1.3)
EOSINOPHIL # BLD AUTO: 0.18 THOUSAND/ÂΜL (ref 0–0.61)
EOSINOPHIL NFR BLD AUTO: 2 % (ref 0–6)
ERYTHROCYTE [DISTWIDTH] IN BLOOD BY AUTOMATED COUNT: 12.4 % (ref 11.6–15.1)
FLUAV RNA RESP QL NAA+PROBE: NEGATIVE
FLUBV RNA RESP QL NAA+PROBE: NEGATIVE
GAMMA GLOB ABNORMAL SERPL ELPH-MCNC: 1.05 G/DL (ref 0.4–1.66)
GAMMA GLOB SERPL ELPH-MCNC: 16.2 % (ref 6.9–22.3)
GFR SERPL CREATININE-BSD FRML MDRD: 93 ML/MIN/1.73SQ M
GLUCOSE SERPL-MCNC: 99 MG/DL (ref 65–140)
HCT VFR BLD AUTO: 35.7 % (ref 34.8–46.1)
HGB BLD-MCNC: 12.1 G/DL (ref 11.5–15.4)
IGG/ALB SER: 1.11 {RATIO} (ref 1.1–1.8)
IMM GRANULOCYTES # BLD AUTO: 0.03 THOUSAND/UL (ref 0–0.2)
IMM GRANULOCYTES NFR BLD AUTO: 0 % (ref 0–2)
INTERPRETATION UR IFE-IMP: NORMAL
LYMPHOCYTES # BLD AUTO: 1.91 THOUSANDS/ÂΜL (ref 0.6–4.47)
LYMPHOCYTES NFR BLD AUTO: 24 % (ref 14–44)
MAGNESIUM SERPL-MCNC: 2 MG/DL (ref 1.9–2.7)
MAGNESIUM SERPL-MCNC: 2 MG/DL (ref 1.9–2.7)
MCH RBC QN AUTO: 34 PG (ref 26.8–34.3)
MCHC RBC AUTO-ENTMCNC: 33.9 G/DL (ref 31.4–37.4)
MCV RBC AUTO: 100 FL (ref 82–98)
MONOCYTES # BLD AUTO: 0.69 THOUSAND/ÂΜL (ref 0.17–1.22)
MONOCYTES NFR BLD AUTO: 9 % (ref 4–12)
NEUTROPHILS # BLD AUTO: 5.06 THOUSANDS/ÂΜL (ref 1.85–7.62)
NEUTS SEG NFR BLD AUTO: 64 % (ref 43–75)
NRBC BLD AUTO-RTO: 0 /100 WBCS
PHOSPHATE SERPL-MCNC: 1 MG/DL (ref 2.3–4.1)
PHOSPHATE SERPL-MCNC: 1.9 MG/DL (ref 2.3–4.1)
PLATELET # BLD AUTO: 224 THOUSANDS/UL (ref 149–390)
PMV BLD AUTO: 10.1 FL (ref 8.9–12.7)
POTASSIUM SERPL-SCNC: 4.2 MMOL/L (ref 3.5–5.3)
POTASSIUM SERPL-SCNC: 4.3 MMOL/L (ref 3.5–5.3)
PROT PATTERN SERPL ELPH-IMP: NORMAL
PROT SERPL-MCNC: 6.5 G/DL (ref 6.4–8.4)
PROT SERPL-MCNC: 6.9 G/DL (ref 6.4–8.4)
RBC # BLD AUTO: 3.56 MILLION/UL (ref 3.81–5.12)
RSV RNA RESP QL NAA+PROBE: NEGATIVE
SARS-COV-2 RNA RESP QL NAA+PROBE: NEGATIVE
SODIUM SERPL-SCNC: 139 MMOL/L (ref 135–147)
WBC # BLD AUTO: 7.92 THOUSAND/UL (ref 4.31–10.16)

## 2023-09-30 PROCEDURE — 80053 COMPREHEN METABOLIC PANEL: CPT

## 2023-09-30 PROCEDURE — 84132 ASSAY OF SERUM POTASSIUM: CPT

## 2023-09-30 PROCEDURE — 83735 ASSAY OF MAGNESIUM: CPT

## 2023-09-30 PROCEDURE — 86235 NUCLEAR ANTIGEN ANTIBODY: CPT

## 2023-09-30 PROCEDURE — 0241U HB NFCT DS VIR RESP RNA 4 TRGT: CPT

## 2023-09-30 PROCEDURE — 82085 ASSAY OF ALDOLASE: CPT

## 2023-09-30 PROCEDURE — 84100 ASSAY OF PHOSPHORUS: CPT

## 2023-09-30 PROCEDURE — 84165 PROTEIN E-PHORESIS SERUM: CPT | Performed by: STUDENT IN AN ORGANIZED HEALTH CARE EDUCATION/TRAINING PROGRAM

## 2023-09-30 PROCEDURE — 82330 ASSAY OF CALCIUM: CPT

## 2023-09-30 PROCEDURE — 99232 SBSQ HOSP IP/OBS MODERATE 35: CPT | Performed by: INTERNAL MEDICINE

## 2023-09-30 PROCEDURE — 86334 IMMUNOFIX E-PHORESIS SERUM: CPT | Performed by: STUDENT IN AN ORGANIZED HEALTH CARE EDUCATION/TRAINING PROGRAM

## 2023-09-30 PROCEDURE — 85025 COMPLETE CBC W/AUTO DIFF WBC: CPT

## 2023-09-30 RX ORDER — CALCIUM GLUCONATE 20 MG/ML
1 INJECTION, SOLUTION INTRAVENOUS ONCE
Status: COMPLETED | OUTPATIENT
Start: 2023-09-30 | End: 2023-09-30

## 2023-09-30 RX ADMIN — ATORVASTATIN CALCIUM 10 MG: 80 TABLET, FILM COATED ORAL at 17:30

## 2023-09-30 RX ADMIN — POTASSIUM PHOSPHATE, MONOBASIC POTASSIUM PHOSPHATE, DIBASIC 21 MMOL: 224; 236 INJECTION, SOLUTION, CONCENTRATE INTRAVENOUS at 09:14

## 2023-09-30 RX ADMIN — CALCIUM GLUCONATE 1 G: 20 INJECTION, SOLUTION INTRAVENOUS at 09:10

## 2023-09-30 RX ADMIN — SODIUM CHLORIDE, SODIUM GLUCONATE, SODIUM ACETATE, POTASSIUM CHLORIDE, MAGNESIUM CHLORIDE, SODIUM PHOSPHATE, DIBASIC, AND POTASSIUM PHOSPHATE 100 ML/HR: .53; .5; .37; .037; .03; .012; .00082 INJECTION, SOLUTION INTRAVENOUS at 17:06

## 2023-09-30 RX ADMIN — ACETAMINOPHEN 650 MG: 325 TABLET, FILM COATED ORAL at 08:16

## 2023-09-30 RX ADMIN — PRIMIDONE 100 MG: 50 TABLET ORAL at 20:53

## 2023-09-30 RX ADMIN — SODIUM CHLORIDE, SODIUM GLUCONATE, SODIUM ACETATE, POTASSIUM CHLORIDE, MAGNESIUM CHLORIDE, SODIUM PHOSPHATE, DIBASIC, AND POTASSIUM PHOSPHATE 100 ML/HR: .53; .5; .37; .037; .03; .012; .00082 INJECTION, SOLUTION INTRAVENOUS at 06:10

## 2023-09-30 RX ADMIN — ENOXAPARIN SODIUM 40 MG: 40 INJECTION SUBCUTANEOUS at 08:16

## 2023-09-30 NOTE — PROGRESS NOTES
Patient is alert/oriented, afebrile, and denies pain/SOB on room air. Patient continues to have diarrhea.

## 2023-09-30 NOTE — PLAN OF CARE
Problem: PAIN - ADULT  Goal: Verbalizes/displays adequate comfort level or baseline comfort level  Description: Interventions:  - Encourage patient to monitor pain and request assistance  - Assess pain using appropriate pain scale  - Administer analgesics based on type and severity of pain and evaluate response  - Implement non-pharmacological measures as appropriate and evaluate response  - Consider cultural and social influences on pain and pain management  - Notify physician/advanced practitioner if interventions unsuccessful or patient reports new pain  Outcome: Progressing     Problem: INFECTION - ADULT  Goal: Absence or prevention of progression during hospitalization  Description: INTERVENTIONS:  - Assess and monitor for signs and symptoms of infection  - Monitor lab/diagnostic results  - Monitor all insertion sites, i.e. indwelling lines, tubes, and drains  - Monitor endotracheal if appropriate and nasal secretions for changes in amount and color  - [place] appropriate cooling/warming therapies per order  - Administer medications as ordered  - Instruct and encourage patient and family to use good hand hygiene technique  - Identify and instruct in appropriate isolation precautions for identified infection/condition  Outcome: Progressing     Problem: SAFETY ADULT  Goal: Patient will remain free of falls  Description: INTERVENTIONS:  - Educate patient/family on patient safety including physical limitations  - Instruct patient to call for assistance with activity   - Consult OT/PT to assist with strengthening/mobility   - Keep Call bell within reach  - Keep bed low and locked with side rails adjusted as appropriate  - Keep care items and personal belongings within reach  - Initiate and maintain comfort rounds  - Make Fall Risk Sign visible to staff  - Apply yellow socks and bracelet for high fall risk patients  - Consider moving patient to room near nurses station  Outcome: Progressing     Problem: SAFETY ADULT  Goal: Maintain or return to baseline ADL function  Description: INTERVENTIONS:  -  Assess patient's ability to carry out ADLs; assess patient's baseline for ADL function and identify physical deficits which impact ability to perform ADLs (bathing, care of mouth/teeth, toileting, grooming, dressing, etc.)  - Assess/evaluate cause of self-care deficits   - Assess range of motion  - Assess patient's mobility; develop plan if impaired  - Assess patient's need for assistive devices and provide as appropriate  - Encourage maximum independence but intervene and supervise when necessary  - Involve family in performance of ADLs  - Assess for home care needs following discharge   - Consider OT consult to assist with ADL evaluation and planning for discharge  - Provide patient education as appropriate  Outcome: Progressing     Problem: DISCHARGE PLANNING  Goal: Discharge to home or other facility with appropriate resources  Description: INTERVENTIONS:  - Identify barriers to discharge w/patient and caregiver  - Arrange for needed discharge resources and transportation as appropriate  - Identify discharge learning needs (meds, wound care, etc.)  - Arrange for interpretive services to assist at discharge as needed  - Refer to Case Management Department for coordinating discharge planning if the patient needs post-hospital services based on physician/advanced practitioner order or complex needs related to functional status, cognitive ability, or social support system  Outcome: Progressing     Problem: Knowledge Deficit  Goal: Patient/family/caregiver demonstrates understanding of disease process, treatment plan, medications, and discharge instructions  Description: Complete learning assessment and assess knowledge base.   Interventions:  - Provide teaching at level of understanding  - Provide teaching via preferred learning methods  Outcome: Progressing     Problem: Prexisting or High Potential for Compromised Skin Integrity  Goal: Skin integrity is maintained or improved  Description: INTERVENTIONS:  - Identify patients at risk for skin breakdown  - Assess and monitor skin integrity  - Assess and monitor nutrition and hydration status  - Monitor labs   - Assess for incontinence   - Turn and reposition patient  - Assist with mobility/ambulation  - Relieve pressure over bony prominences  - Avoid friction and shearing  - Provide appropriate hygiene as needed including keeping skin clean and dry  - Evaluate need for skin moisturizer/barrier cream  - Collaborate with interdisciplinary team   - Patient/family teaching  - Consider wound care consult   Outcome: Progressing

## 2023-09-30 NOTE — ASSESSMENT & PLAN NOTE
Phosphorus low despite repletion. Phos 1.0 10/1. Patient did present with low electrolytes possibly due to decreased nutrition. Consider refeeding syndrome, vitamin D deficiency? Follows rheumatology outpatient for vitamin D deficiency. Gets by annual Prolia injections, prescribed daily vitamin D and calcium supplements.   Mild improvement 1.4 on 10/1 after 21 mmol K-Phos 9/30    Plan:  Continue to monitor  Replete as needed

## 2023-09-30 NOTE — PROGRESS NOTES
INTERNAL MEDICINE RESIDENCY PROGRESS NOTE     Name: Stefani Gabriel   Age & Sex: 70 y.o. female   MRN: [id number]  Unit/Bed#: AP2 865-02   Encounter: [id number]  Team: SOD Team C     PATIENT INFORMATION     Name: Stefani Gabriel   Age & Sex: 70 y.o. female   MRN: [id number]  Hospital Stay Days: 3    ASSESSMENT/PLAN     Principal Problem:    Weakness of both lower extremities  Active Problems:    History of cerebral hemorrhage    Tremor, essential    Macrocytosis    Prediabetes    GERD (gastroesophageal reflux disease)    Hypokalemia      Hypophosphatemia  Assessment & Plan  Phosphorus low despite repletion. Phos 1.0 today. Patient did present with low electrolytes possibly due to decreased nutrition. Consider refeeding syndrome.      Plan:  Continue repetion 21 mmol Kphos today  Recheck in PM    Hypokalemia  Assessment & Plan  Patient presented with a potassium of 2.9 likely secondary to decreased p.o. intake as endorsed by the patient  No EKG changes seen  Receiving potassium and magnesium     Plan  K>4 continue repletion  Check Mg >2  Continue to monitor BMPs and optimize magnesium       GERD (gastroesophageal reflux disease)  Assessment & Plan  History of GERD taking intermittent Pepcid    Plan  We will continue to monitor without medication and give Pepcid as needed as needed    Prediabetes  Assessment & Plan  History of prediabetes with most recent A1c of 5.9; which may be appropriate for patient's age    Plan  We will continue to monitor blood sugars with BMPs    Macrocytosis  Assessment & Plan  Macrocytosis with an MCV of 102  Most recent TSH in March within normal limits, TSH within normal range this admission    Plan:  F/u with B12 and folate--- results back, B12 (1275) folate (>22.3) both adequate    Tremor, essential  Assessment & Plan  Patient reports history of essential tremor on the right upper extremity chronic since her strokes in the past  Currently managed on primidone outpatient    Plan  We will continue primidone 50 daily    History of cerebral hemorrhage  Assessment & Plan  History of cerebral hemorrhage with continued deficits with restricted left upper extremity range of motion and continued left upper and lower extremity tingling sensation as described by patient  CT head negative at this admission    Plan  We will continue atorvastatin 10  Continue to monitor neurologic exam  PT/OT    * Weakness of both lower extremities  Assessment & Plan  Patient is presenting with 2-week history of generalized lower extremity weakness. She states she was able to ambulate without difficulty prior to 2 weeks. Denies trauma or incontinence. Denies recent illness. Recently seen by her PCP for sciatica which is now resolved per patient. Of note, patient did receive methylprednisone pack in the beginning of August. She also recently received the flu vaccine on 9/21/23. In addition, patient reports a 20+ pack year smoking history and per chart review has been getting CT screenings for lung cancer. Last one was in 4/22, she was due for one this past April. She denies dyspnea or ascending weakness. She has been on statin therapy (lipitor 10 mg) since 2018 per chart review     CT head negative for acute bleed  Previous workup included an EMG 6/21 evaluating left lower extremity, results were normal   TSH/folate/B12 normal     Sed rate 35  Potassium on arrival 2.9    Patient notes some improvement in leg weakness 9/29 after electrolyte repletion. Reported more arthritic symptoms today. Differentials include electrolyte abn (?refeeding), polymyalgia rheumatica, polymyositis.       Plan:  F/u SPEP/UPEP and CRP  F/u with repeat CK   Ordered aldolase, anti Ofelia r/o myositis  Replenish electrolytes as needed, resolve hypokalemia and hypocalcemia  Obtain EMG outpatient to rule out neuropathy  Tylenol PRN for pain   Evaluate for other causes of proximal weakness if symptoms persist once electrolytes are optimized whether result of deconditioning/fraility vs intrinsic pathology myositis vs. inflammatory demyelinating disorder in the setting of recent vaccine-- continue to monitor neuro exam and correlate clinical exam findings  Follow-up with specific myositis antibodies if clinically warranted   Neurology consulted, appreciate recs  PT/OT      Disposition: Start process for placement to rehab, awaiting labs/replete electrolytes, consider discharge within 24-48h. SUBJECTIVE     Patient seen and examined. No acute events overnight. Patient doing well, with some improvement in the weakness of her lower extremities. Denies numbness in LE at this time. OBJECTIVE     Vitals:    23 0645 23 1500 23 2248 23 0700   BP: 153/64 131/67 162/80 144/70   BP Location: Right arm Right arm Right arm Right arm   Pulse: 80 80 81 83   Resp: 16 14 18 15   Temp: 98.5 °F (36.9 °C) 98.4 °F (36.9 °C) 97.9 °F (36.6 °C) 98 °F (36.7 °C)   TempSrc: Oral Oral Oral Oral   SpO2: 95% 96% 96% 98%   Weight:       Height:          Temperature:   Temp (24hrs), Av.1 °F (36.7 °C), Min:97.9 °F (36.6 °C), Max:98.4 °F (36.9 °C)    Temperature: 98 °F (36.7 °C)  Intake & Output:  I/O        0701   0700  0701   0700  0701  10/01 0700    P. O. 120      I.V. (mL/kg)  1000 (15.5)     Total Intake(mL/kg) 120 (1.9) 1000 (15.5)     Urine (mL/kg/hr) 1900 (1.2) 1750 (1.1)     Stool  0     Total Output 1900 1750     Net -1780 -750            Unmeasured Urine Occurrence  3 x     Unmeasured Stool Occurrence  4 x         Weights:   IBW (Ideal Body Weight): 57.58 kg    Body mass index is 23.45 kg/m². Weight (last 2 days)     None        Physical Exam  Vitals and nursing note reviewed. Constitutional:       General: She is not in acute distress. Appearance: She is well-developed. HENT:      Head: Normocephalic and atraumatic.    Eyes:      Conjunctiva/sclera: Conjunctivae normal.   Cardiovascular:      Rate and Rhythm: Normal rate and regular rhythm. Heart sounds: No murmur heard. Pulmonary:      Effort: Pulmonary effort is normal. No respiratory distress. Breath sounds: Normal breath sounds. Abdominal:      Palpations: Abdomen is soft. Tenderness: There is no abdominal tenderness. Musculoskeletal:         General: No swelling. Cervical back: Neck supple. Skin:     General: Skin is warm and dry. Capillary Refill: Capillary refill takes less than 2 seconds. Neurological:      Mental Status: She is alert and oriented to person, place, and time. Comments: 4/5 strength in b/l LE, sensation intact   Psychiatric:         Mood and Affect: Mood normal.       LABORATORY DATA     Labs: I have personally reviewed pertinent reports. Results from last 7 days   Lab Units 09/30/23 0451 09/29/23 0457 09/28/23 0512 09/27/23  1126   WBC Thousand/uL 7.92 8.86 9.11 8.81   HEMOGLOBIN g/dL 12.1 11.7 11.9 11.9   HEMATOCRIT % 35.7 34.8 35.4 35.9   PLATELETS Thousands/uL 224 203 204 225   NEUTROS PCT % 64 72  --  75   MONOS PCT % 9 8  --  7   EOS PCT % 2 1  --  0      Results from last 7 days   Lab Units 09/30/23 0451 09/29/23 0457 09/28/23  0512 09/27/23  1126   POTASSIUM mmol/L 4.3 3.3* 3.2* 2.9*   CHLORIDE mmol/L 109* 104 103 102   CO2 mmol/L 20* 23 22 29   BUN mg/dL 9 6 7 10   CREATININE mg/dL 0.52* 0.51* 0.64 0.71   CALCIUM mg/dL 7.4* 7.3* 8.2* 10.2   ALK PHOS U/L 61  --   --  53   ALT U/L 14  --   --  13   AST U/L 25  --   --  28     Results from last 7 days   Lab Units 09/30/23 0451 09/29/23 0457 09/27/23  1126   MAGNESIUM mg/dL 2.0 2.2 1.6*     Results from last 7 days   Lab Units 09/30/23 0451 09/27/23  1126   PHOSPHORUS mg/dL 1.0* 2.4          Results from last 7 days   Lab Units 09/27/23 2001   LACTIC ACID mmol/L 0.8           IMAGING & DIAGNOSTIC TESTING     Radiology Results: I have personally reviewed pertinent reports.   MRI lumbar spine wo contrast    Result Date: 9/29/2023  Impression: Multilevel degenerative changes of lumbar spine with mild foraminal narrowing at left L3-L4, as detailed above. No significant canal stenosis. Workstation performed: [id number]     MRI brain wo contrast    Result Date: 9/29/2023  Impression: No acute intracranial abnormality. Unchanged chronic slitlike hematoma cavity along right external capsule/claustrum with associated hemosiderin deposition and calcification. Severe chronic microangiopathy. Workstation performed: [id number]     CT head wo contrast    Result Date: 9/27/2023  Impression: 1. Stable head CT. No acute intracranial hemorrhage. 2. Moderate, chronic microangiopathy redemonstrated. 3. Linear hyperdensity in the right subinsular white matter likely dystrophic calcification in the region of prior parenchymal hemorrhage given the blooming artifact on prior brain MRI. No acute intracranial hemorrhage. Workstation performed: [id number]     XR chest 2 views    Result Date: 9/27/2023  Impression: No acute cardiopulmonary disease. Workstation performed: [id number]     Other Diagnostic Testing: I have personally reviewed pertinent reports. ACTIVE MEDICATIONS     Current Facility-Administered Medications   Medication Dose Route Frequency   • acetaminophen (TYLENOL) tablet 650 mg  650 mg Oral Q6H PRN   • atorvastatin (LIPITOR) tablet 10 mg  10 mg Oral After Dinner   • enoxaparin (LOVENOX) subcutaneous injection 40 mg  40 mg Subcutaneous Daily   • multi-electrolyte (PLASMALYTE-A/ISOLYTE-S PH 7.4) IV solution  100 mL/hr Intravenous Continuous   • potassium phosphates 21 mmol in sodium chloride 0.9 % 250 mL infusion  21 mmol Intravenous Once   • primidone (MYSOLINE) tablet 100 mg  100 mg Oral HS       VTE Pharmacologic Prophylaxis: Enoxaparin (Lovenox)  VTE Mechanical Prophylaxis: sequential compression device    Portions of the record may have been created with voice recognition software.   Occasional wrong word or "sound a like" substitutions may have occurred due to the inherent limitations of voice recognition software.   Read the chart carefully and recognize, using context, where substitutions have occurred.  ==  [address], [place]  Internal Medicine Residency PGY-3

## 2023-10-01 LAB
ALBUMIN SERPL BCP-MCNC: 3.5 G/DL (ref 3.5–5)
ALP SERPL-CCNC: 60 U/L (ref 34–104)
ALT SERPL W P-5'-P-CCNC: 12 U/L (ref 7–52)
ANION GAP SERPL CALCULATED.3IONS-SCNC: 6 MMOL/L
AST SERPL W P-5'-P-CCNC: 21 U/L (ref 13–39)
BASOPHILS # BLD AUTO: 0.05 THOUSANDS/ÂΜL (ref 0–0.1)
BASOPHILS NFR BLD AUTO: 1 % (ref 0–1)
BILIRUB SERPL-MCNC: 0.4 MG/DL (ref 0.2–1)
BUN SERPL-MCNC: 7 MG/DL (ref 5–25)
CA-I BLD-SCNC: 1.04 MMOL/L (ref 1.12–1.32)
CALCIUM SERPL-MCNC: 8.1 MG/DL (ref 8.4–10.2)
CHLORIDE SERPL-SCNC: 108 MMOL/L (ref 96–108)
CO2 SERPL-SCNC: 22 MMOL/L (ref 21–32)
CREAT SERPL-MCNC: 0.59 MG/DL (ref 0.6–1.3)
EOSINOPHIL # BLD AUTO: 0.23 THOUSAND/ÂΜL (ref 0–0.61)
EOSINOPHIL NFR BLD AUTO: 3 % (ref 0–6)
ERYTHROCYTE [DISTWIDTH] IN BLOOD BY AUTOMATED COUNT: 12.5 % (ref 11.6–15.1)
FLUAV RNA RESP QL NAA+PROBE: NEGATIVE
FLUBV RNA RESP QL NAA+PROBE: NEGATIVE
GFR SERPL CREATININE-BSD FRML MDRD: 89 ML/MIN/1.73SQ M
GLUCOSE SERPL-MCNC: 107 MG/DL (ref 65–140)
HCT VFR BLD AUTO: 38.4 % (ref 34.8–46.1)
HGB BLD-MCNC: 12.5 G/DL (ref 11.5–15.4)
IMM GRANULOCYTES # BLD AUTO: 0.03 THOUSAND/UL (ref 0–0.2)
IMM GRANULOCYTES NFR BLD AUTO: 0 % (ref 0–2)
LYMPHOCYTES # BLD AUTO: 1.91 THOUSANDS/ÂΜL (ref 0.6–4.47)
LYMPHOCYTES NFR BLD AUTO: 24 % (ref 14–44)
MAGNESIUM SERPL-MCNC: 2 MG/DL (ref 1.9–2.7)
MCH RBC QN AUTO: 33.2 PG (ref 26.8–34.3)
MCHC RBC AUTO-ENTMCNC: 32.6 G/DL (ref 31.4–37.4)
MCV RBC AUTO: 102 FL (ref 82–98)
MONOCYTES # BLD AUTO: 0.65 THOUSAND/ÂΜL (ref 0.17–1.22)
MONOCYTES NFR BLD AUTO: 8 % (ref 4–12)
NEUTROPHILS # BLD AUTO: 5.01 THOUSANDS/ÂΜL (ref 1.85–7.62)
NEUTS SEG NFR BLD AUTO: 64 % (ref 43–75)
NRBC BLD AUTO-RTO: 0 /100 WBCS
PHOSPHATE SERPL-MCNC: 1.4 MG/DL (ref 2.3–4.1)
PHOSPHATE SERPL-MCNC: 2.6 MG/DL (ref 2.3–4.1)
PLATELET # BLD AUTO: 256 THOUSANDS/UL (ref 149–390)
PMV BLD AUTO: 10 FL (ref 8.9–12.7)
POTASSIUM SERPL-SCNC: 4.4 MMOL/L (ref 3.5–5.3)
PROT SERPL-MCNC: 7.3 G/DL (ref 6.4–8.4)
RBC # BLD AUTO: 3.76 MILLION/UL (ref 3.81–5.12)
RSV RNA RESP QL NAA+PROBE: NEGATIVE
SARS-COV-2 RNA RESP QL NAA+PROBE: NEGATIVE
SODIUM SERPL-SCNC: 136 MMOL/L (ref 135–147)
WBC # BLD AUTO: 7.88 THOUSAND/UL (ref 4.31–10.16)

## 2023-10-01 PROCEDURE — 82330 ASSAY OF CALCIUM: CPT

## 2023-10-01 PROCEDURE — 97116 GAIT TRAINING THERAPY: CPT

## 2023-10-01 PROCEDURE — 83735 ASSAY OF MAGNESIUM: CPT

## 2023-10-01 PROCEDURE — 97530 THERAPEUTIC ACTIVITIES: CPT

## 2023-10-01 PROCEDURE — 84100 ASSAY OF PHOSPHORUS: CPT

## 2023-10-01 PROCEDURE — 97110 THERAPEUTIC EXERCISES: CPT

## 2023-10-01 PROCEDURE — 0241U HB NFCT DS VIR RESP RNA 4 TRGT: CPT

## 2023-10-01 PROCEDURE — 85025 COMPLETE CBC W/AUTO DIFF WBC: CPT

## 2023-10-01 PROCEDURE — 99232 SBSQ HOSP IP/OBS MODERATE 35: CPT | Performed by: INTERNAL MEDICINE

## 2023-10-01 PROCEDURE — 80053 COMPREHEN METABOLIC PANEL: CPT

## 2023-10-01 RX ORDER — CALCIUM GLUCONATE 20 MG/ML
1 INJECTION, SOLUTION INTRAVENOUS ONCE
Status: COMPLETED | OUTPATIENT
Start: 2023-10-01 | End: 2023-10-01

## 2023-10-01 RX ADMIN — SODIUM CHLORIDE, SODIUM GLUCONATE, SODIUM ACETATE, POTASSIUM CHLORIDE, MAGNESIUM CHLORIDE, SODIUM PHOSPHATE, DIBASIC, AND POTASSIUM PHOSPHATE 100 ML/HR: .53; .5; .37; .037; .03; .012; .00082 INJECTION, SOLUTION INTRAVENOUS at 12:25

## 2023-10-01 RX ADMIN — SODIUM CHLORIDE, SODIUM GLUCONATE, SODIUM ACETATE, POTASSIUM CHLORIDE, MAGNESIUM CHLORIDE, SODIUM PHOSPHATE, DIBASIC, AND POTASSIUM PHOSPHATE 100 ML/HR: .53; .5; .37; .037; .03; .012; .00082 INJECTION, SOLUTION INTRAVENOUS at 03:16

## 2023-10-01 RX ADMIN — CALCIUM GLUCONATE 1 G: 20 INJECTION, SOLUTION INTRAVENOUS at 11:52

## 2023-10-01 RX ADMIN — ATORVASTATIN CALCIUM 10 MG: 80 TABLET, FILM COATED ORAL at 18:34

## 2023-10-01 RX ADMIN — SODIUM PHOSPHATE, MONOBASIC, MONOHYDRATE AND SODIUM PHOSPHATE, DIBASIC, ANHYDROUS 30 MMOL: 142; 276 INJECTION, SOLUTION INTRAVENOUS at 08:06

## 2023-10-01 RX ADMIN — PRIMIDONE 100 MG: 50 TABLET ORAL at 20:11

## 2023-10-01 RX ADMIN — ENOXAPARIN SODIUM 40 MG: 40 INJECTION SUBCUTANEOUS at 08:06

## 2023-10-01 NOTE — PHYSICAL THERAPY NOTE
PHYSICAL THERAPY NOTE      Patient Name: Arlette Tam Date: 10/1/2023       10/01/23 1155   PT Last Visit   PT Visit Date 10/01/23   Note Type   Note Type Treatment   Pain Assessment   Pain Assessment Tool 0-10   Pain Score No Pain   Restrictions/Precautions   Weight Bearing Precautions Per Order No   Other Precautions Fall Risk;Multiple lines;Pain   General   Chart Reviewed Yes   Response to Previous Treatment Patient with no complaints from previous session. Family/Caregiver Present No   Cognition   Overall Cognitive Status WFL   Arousal/Participation Alert; Cooperative   Attention Attends with cues to redirect   Orientation Level Oriented X4   Memory Decreased recall of precautions   Following Commands Follows one step commands without difficulty   Comments pt pleasant and cooperative t/o PT session   Bed Mobility   Supine to Sit 4  Minimal assistance   Additional items Assist x 1;HOB elevated; Increased time required;Verbal cues;LE management   Sit to Supine 4  Minimal assistance   Additional items Assist x 1;HOB elevated; Bedrails; Increased time required;Verbal cues;LE management   Additional Comments Upon arrival, pt supine in bed. pt returned to bed with call bell and all needs within reach   Transfers   Sit to Stand 3  Moderate assistance   Additional items Assist x 1; Increased time required;Verbal cues   Stand to Sit 3  Moderate assistance   Additional items Assist x 1; Increased time required;Verbal cues   Additional Comments Jewels w/ RW. Pt completed x5 STS consecutivelty and x8 STS t/o PT session   Ambulation/Elevation   Gait pattern Decreased foot clearance;Shuffling;Excessively slow; Short stride; Foward flexed;Decreased heel strike   Gait Assistance 4  Minimal assist   Additional items Assist x 1;Verbal cues   Assistive Device Rolling walker   Distance 15'   Balance   Static Sitting Fair   Dynamic Sitting Fair -   Static Standing Poor +   Dynamic Standing Poor   Ambulatory Poor   Endurance Deficit   Endurance Deficit Yes   Endurance Deficit Description decreased activity tolerance, fatigue   Activity Tolerance   Activity Tolerance Patient limited by fatigue;Patient tolerated treatment well   Nurse Made Aware RN cleared pt for PT   Exercises   Knee AROM Long Arc Quad Sitting;10 reps;AROM; Bilateral  (w/ manual resistance)   Marching Sitting;10 reps;AROM; Bilateral   Balance training  Pt stood x3 min with CGA w/ UE support at RW against perturbations   Assessment   Prognosis Good   Problem List Decreased strength;Decreased endurance;Decreased range of motion; Impaired balance;Decreased mobility; Decreased coordination;Pain   Assessment Pt seen for PT treatment session this date. Therapy session focused on gait training, transfer training, standing tolerance/balance and therex for LE strengthening in order to improve overall mobility and independence. Pt demonstrated improved activity tolerance and LE strength decreasing level of assistance for all functional transfers and increasing ambulation distance to 15'. Pt completed x8 STS t/o PT session to increase LE strength for functional mobility. Standing balance challenged x3 minutes with UE support at RW during hygiene tasks with pt able to maintain upright position with CGA against perturbations. Pt making good progress toward goals. Pt was left supine at the end of PT session with all needs in reach. Pt would benefit from continued PT services while in hospital to address remaining limitations. PT to continue treating pt and recommends post-acute rehab. The patient's AM-PAC Basic Mobility Inpatient Short Form Raw Score is 13. A Raw score of less than or equal to 16 suggests the patient may benefit from discharge to post-acute rehabilitation services. Please also refer to the recommendation of the Physical Therapist for safe discharge planning.    Barriers to Discharge Decreased caregiver support   Goals   Patient Goals to get stronger   STG Expiration Date 10/12/23   Plan   Treatment/Interventions Functional transfer training;LE strengthening/ROM; Elevations; Therapeutic exercise; Endurance training;Bed mobility;Gait training;Spoke to nursing;Spoke to case management;OT   Progress Progressing toward goals   PT Frequency 2-3x/wk   Recommendation   PT Discharge Recommendation Post acute rehabilitation services   Equipment Recommended [facility] Recommended Wheeled walker   AM-PAC Basic Mobility Inpatient   Turning in Flat Bed Without Bedrails 3   Lying on Back to Sitting on Edge of Flat Bed Without Bedrails 3   Moving Bed to Chair 2   Standing Up From Chair Using Arms 2   Walk in Room 2   Climb 3-5 Stairs With Railing 1   Basic Mobility Inpatient Raw Score 13   Basic Mobility Standardized Score 33.99   Highest Level Of Mobility   JH-HLM Goal 4: Move to chair/commode   JH-HLM Achieved 6: Walk 10 steps or more   Education   Education Provided Mobility training   Patient Demonstrates acceptance/verbal understanding   End of Consult   Patient Position at End of Consult Supine; All needs within reach     Shubham Duran PT, DPT

## 2023-10-01 NOTE — PROGRESS NOTES
INTERNAL MEDICINE RESIDENCY PROGRESS NOTE     Name: Sushant De Santiago   Age & Sex: 70 y.o. female   MRN: [id number]  Unit/Bed#: WS2 865-02   Encounter: [id number]  Team: SOD Team C     PATIENT INFORMATION     Name: Sushant De Santiago   Age & Sex: 70 y.o. female   MRN: [id number]  Hospital Stay Days: 4    ASSESSMENT/PLAN     Principal Problem:    Weakness of both lower extremities  Active Problems:    History of cerebral hemorrhage    Tremor, essential    Macrocytosis    Prediabetes    GERD (gastroesophageal reflux disease)    Hypokalemia    Hypophosphatemia      Hypophosphatemia  Assessment & Plan  Phosphorus low despite repletion. Phos 1.0 today. Patient did present with low electrolytes possibly due to decreased nutrition. Consider refeeding syndrome, vitamin D deficiency? Follows rheumatology outpatient for vitamin D deficiency. Gets by annual Prolia injections, prescribed daily vitamin D and calcium supplements.   Mild improvement 1.4 on 10/1 after 21 mmol K-Phos 9/30    Plan:  Repletion with 30 mmol sodium Phos  Recheck in PM    Hypokalemia  Assessment & Plan  Patient presented with a potassium of 2.9 likely secondary to decreased p.o. intake as endorsed by the patient  No EKG changes seen  Receiving potassium and magnesium     Plan  K>4 continue repletion  Check Mg >2  Continue to monitor BMPs and optimize magnesium       GERD (gastroesophageal reflux disease)  Assessment & Plan  History of GERD taking intermittent Pepcid    Plan  We will continue to monitor without medication and give Pepcid as needed as needed    Prediabetes  Assessment & Plan  History of prediabetes with most recent A1c of 5.9; which may be appropriate for patient's age    Plan  We will continue to monitor blood sugars with BMPs    Macrocytosis  Assessment & Plan  Macrocytosis with an MCV of 102  Most recent TSH in March within normal limits, TSH within normal range this admission    Plan:  F/u with B12 and folate--- results back, B12 (1275) folate (>22.3) both adequate    Tremor, essential  Assessment & Plan  Patient reports history of essential tremor on the right upper extremity chronic since her strokes in the past  Currently managed on primidone outpatient    Plan  We will continue primidone 50 daily    History of cerebral hemorrhage  Assessment & Plan  History of cerebral hemorrhage with continued deficits with restricted left upper extremity range of motion and continued left upper and lower extremity tingling sensation as described by patient  CT head negative at this admission    Plan  We will continue atorvastatin 10  Continue to monitor neurologic exam  PT/OT    * Weakness of both lower extremities  Assessment & Plan  Patient is presenting with 2-week history of generalized lower extremity weakness. She states she was able to ambulate without difficulty prior to 2 weeks. Denies trauma or incontinence. Denies recent illness. Recently seen by her PCP for sciatica which is now resolved per patient. Of note, patient did receive methylprednisone pack in the beginning of August. She also recently received the flu vaccine on 9/21/23. In addition, patient reports a 20+ pack year smoking history and per chart review has been getting CT screenings for lung cancer. Last one was in 4/22, she was due for one this past April. She denies dyspnea or ascending weakness. She has been on statin therapy (lipitor 10 mg) since 2018 per chart review     CT head negative for acute bleed  Previous workup included an EMG 6/21 evaluating left lower extremity, results were normal   TSH/folate/B12 normal     Sed rate 35  Potassium on arrival 2.9    Patient notes some improvement in leg weakness 9/29 after electrolyte repletion. Reported more arthritic symptoms today. Differentials include electrolyte abn (?refeeding), polymyalgia rheumatica, polymyositis. Patient reports worsening weakness 10/1.      Plan:  F/u SPEP/UPEP and CRP  F/u with repeat CK Ordered aldolase, anti Ofelia r/o myositis  Replenish electrolytes as needed, resolve hypokalemia and hypocalcemia  Obtain EMG outpatient to rule out neuropathy  Tylenol PRN for pain   Evaluate for other causes of proximal weakness if symptoms persist once electrolytes are optimized whether result of deconditioning/fraility vs intrinsic pathology myositis vs. inflammatory demyelinating disorder in the setting of recent vaccine-- continue to monitor neuro exam and correlate clinical exam findings  Follow-up with specific myositis antibodies if clinically warranted   Neurology consulted, appreciate recs  PT/OT      Disposition: pending work-up and placement    SUBJECTIVE     Patient seen and examined. No acute events overnight. Having 3-4 watery bowel movements per shift per nursing and patient. Patient feels overall all more weak today than has this admission. Denies any shortness of breath, chest pain, nausea, vomiting. Said that she was able to eat finally eat a full meal yesterday. OBJECTIVE     Vitals:    23 0700 23 1500 23 2258 10/01/23 07   BP: 144/70 140/76 148/75 132/75   BP Location: Right arm Right arm Right arm Right arm   Pulse: 83 83 75 84   Resp: 15 16 18 16   Temp: 98 °F (36.7 °C) 98.6 °F (37 °C) 97.5 °F (36.4 °C) 98.5 °F (36.9 °C)   TempSrc: Oral Oral Oral Oral   SpO2: 98% 98% 96% 97%   Weight:       Height:          Temperature:   Temp (24hrs), Av.2 °F (36.8 °C), Min:97.5 °F (36.4 °C), Max:98.6 °F (37 °C)    Temperature: 98.5 °F (36.9 °C)  Intake & Output:  I/O        07 07 0701  10/01 0700 10/01 0701  10/02 07    P. O.       I.V. (mL/kg) 1000 (15.5) 835 (13)     Total Intake(mL/kg) 1000 (15.5) 835 (13)     Urine (mL/kg/hr) 1750 (1.1) 1650 (1.1)     Stool 0      Total Output 1750 1650     Net -750 -815            Unmeasured Urine Occurrence 3 x      Unmeasured Stool Occurrence 4 x          Weights:   IBW (Ideal Body Weight): 57.58 kg    Body mass index is 23.45 kg/m². Weight (last 2 days)     None        Physical Exam:  General: No apparent distress, resting comfortably   Head: Normocephalic, atraumatic, decreased sensation and facial movement left-sided post stroke  Eyes: Anicteric, no conjunctival erythema  ENT: External ear normal, no nasal discharge  Neck: Trachea midline, no visible lymphadenopathy or goiter  Respiratory: Clear to auscultation bilaterally, non-labored respirations, symmetric thorax expansion  Cardiovascular: Regular rate and rhythm, no murmurs appreciated, extremities appear well-perfused  Abdomen: Non-distended  Extremities: Moves extremities spontaneously, chronic left-sided weakness post stroke, no peripheral edema  Skin: No visible rashes, wounds, or jaundice  Neuro: A&O x 3, no gross focal deficits, no aphasia, 4/5 left lower, 5/5 right lower     LABORATORY DATA     Labs: I have personally reviewed pertinent reports. Results from last 7 days   Lab Units 10/01/23  0435 09/30/23  0451 09/29/23  0457   WBC Thousand/uL 7.88 7.92 8.86   HEMOGLOBIN g/dL 12.5 12.1 11.7   HEMATOCRIT % 38.4 35.7 34.8   PLATELETS Thousands/uL 256 224 203   NEUTROS PCT % 64 64 72   MONOS PCT % 8 9 8   EOS PCT % 3 2 1      Results from last 7 days   Lab Units 10/01/23  0435 09/30/23  1200 09/30/23  0451 09/29/23  0457 09/28/23  0512 09/27/23  1126   POTASSIUM mmol/L 4.4 4.2 4.3 3.3*   < > 2.9*   CHLORIDE mmol/L 108  --  109* 104   < > 102   CO2 mmol/L 22  --  20* 23   < > 29   BUN mg/dL 7  --  9 6   < > 10   CREATININE mg/dL 0.59*  --  0.52* 0.51*   < > 0.71   CALCIUM mg/dL 8.1*  --  7.4* 7.3*   < > 10.2   ALK PHOS U/L 60  --  61  --   --  53   ALT U/L 12  --  14  --   --  13   AST U/L 21  --  25  --   --  28    < > = values in this interval not displayed.      Results from last 7 days   Lab Units 10/01/23  0435 09/30/23  1200 09/30/23  0451   MAGNESIUM mg/dL 2.0 2.0 2.0     Results from last 7 days   Lab Units 10/01/23  0435 09/30/23  1200 09/30/23  0451 PHOSPHORUS mg/dL 1.4* 1.9* 1.0*          Results from last 7 days   Lab Units 09/27/23 2001   LACTIC ACID mmol/L 0.8           IMAGING & DIAGNOSTIC TESTING     Radiology Results: I have personally reviewed pertinent reports. MRI lumbar spine wo contrast    Result Date: 9/29/2023  Impression: Multilevel degenerative changes of lumbar spine with mild foraminal narrowing at left L3-L4, as detailed above. No significant canal stenosis. Workstation performed: [id number]     MRI brain wo contrast    Result Date: 9/29/2023  Impression: No acute intracranial abnormality. Unchanged chronic slitlike hematoma cavity along right external capsule/claustrum with associated hemosiderin deposition and calcification. Severe chronic microangiopathy. Workstation performed: [id number]     CT head wo contrast    Result Date: 9/27/2023  Impression: 1. Stable head CT. No acute intracranial hemorrhage. 2. Moderate, chronic microangiopathy redemonstrated. 3. Linear hyperdensity in the right subinsular white matter likely dystrophic calcification in the region of prior parenchymal hemorrhage given the blooming artifact on prior brain MRI. No acute intracranial hemorrhage. Workstation performed: [id number]     XR chest 2 views    Result Date: 9/27/2023  Impression: No acute cardiopulmonary disease. Workstation performed: [id number]     Other Diagnostic Testing: I have personally reviewed pertinent reports.     ACTIVE MEDICATIONS     Current Facility-Administered Medications   Medication Dose Route Frequency   • acetaminophen (TYLENOL) tablet 650 mg  650 mg Oral Q6H PRN   • atorvastatin (LIPITOR) tablet 10 mg  10 mg Oral After Dinner   • calcium gluconate 1 g in sodium chloride 0.9% 50 mL (premix)  1 g Intravenous Once   • enoxaparin (LOVENOX) subcutaneous injection 40 mg  40 mg Subcutaneous Daily   • multi-electrolyte (PLASMALYTE-A/ISOLYTE-S PH 7.4) IV solution  100 mL/hr Intravenous Continuous   • primidone (MYSOLINE) tablet 100 mg  100 mg Oral HS   • sodium phosphate 30 mmol in dextrose 5 % 250 mL Infusion  30 mmol Intravenous Once       VTE Pharmacologic Prophylaxis: Enoxaparin  VTE Mechanical Prophylaxis: sequential compression device    Portions of the record may have been created with voice recognition software. Occasional wrong word or "sound a like" substitutions may have occurred due to the inherent limitations of voice recognition software.   Read the chart carefully and recognize, using context, where substitutions have occurred.  ==  Mary Trevino MD  [facility]  Internal Medicine Residency PGY-1

## 2023-10-01 NOTE — PLAN OF CARE
Problem: PHYSICAL THERAPY ADULT  Goal: Performs mobility at highest level of function for planned discharge setting. See evaluation for individualized goals. Description: Treatment/Interventions: ADL retraining, Functional transfer training, LE strengthening/ROM, Elevations, Endurance training, Therapeutic exercise, Patient/family training, Equipment eval/education, Bed mobility, Gait training, Spoke to nursing, Spoke to case management, OT  Equipment Recommended: Teo Bell       See flowsheet documentation for full assessment, interventions and recommendations. Outcome: Progressing  Note: Prognosis: Good  Problem List: Decreased strength, Decreased endurance, Decreased range of motion, Impaired balance, Decreased mobility, Decreased coordination, Pain  Assessment: Pt seen for PT treatment session this date. Therapy session focused on gait training, transfer training, standing tolerance/balance and therex for LE strengthening in order to improve overall mobility and independence. Pt demonstrated improved activity tolerance and LE strength decreasing level of assistance for all functional transfers and increasing ambulation distance to 15'. Pt completed x8 STS t/o PT session to increase LE strength for functional mobility. Standing balance challenged x3 minutes with UE support at RW during hygiene tasks with pt able to maintain upright position with CGA against perturbations. Pt making good progress toward goals. Pt was left supine at the end of PT session with all needs in reach. Pt would benefit from continued PT services while in hospital to address remaining limitations. PT to continue treating pt and recommends post-acute rehab. The patient's AM-PAC Basic Mobility Inpatient Short Form Raw Score is 13. A Raw score of less than or equal to 16 suggests the patient may benefit from discharge to post-acute rehabilitation services.  Please also refer to the recommendation of the Physical Therapist for safe discharge planning. Barriers to Discharge: Decreased caregiver support     PT Discharge Recommendation: Post acute rehabilitation services    See flowsheet documentation for full assessment.

## 2023-10-01 NOTE — PLAN OF CARE
Patient is alert/oriented, afebrile, continues to have watery bowel movements, denies abdominal pain, NV, and is frustrated about her health situation. On-call Provider was notified about the ongoing diarrhea, and ordered enteric panel. RN will continue to assess/monitor. Problem: PAIN - ADULT  Goal: Verbalizes/displays adequate comfort level or baseline comfort level  Description: Interventions:  - Encourage patient to monitor pain and request assistance  - Assess pain using appropriate pain scale  - Administer analgesics based on type and severity of pain and evaluate response  - Implement non-pharmacological measures as appropriate and evaluate response  - Consider cultural and social influences on pain and pain management  - Notify physician/advanced practitioner if interventions unsuccessful or patient reports new pain  Outcome: Progressing     Problem: SAFETY ADULT  Goal: Patient will remain free of falls  Description: INTERVENTIONS:  - Educate patient/family on patient safety including physical limitations  - Instruct patient to call for assistance with activity   - Consult OT/PT to assist with strengthening/mobility   - Keep Call bell within reach  - Keep bed low and locked with side rails adjusted as appropriate  - Keep care items and personal belongings within reach  - Initiate and maintain comfort rounds  - Make Fall Risk Sign visible to staff  - Offer Toileting 2 hours and as needed  - Initiate/Maintain bed/chair alarm  - Obtain necessary fall risk management equipment.   - Apply yellow socks and bracelet for high fall risk patients  - Consider moving patient to room near nurses station  Outcome: Progressing

## 2023-10-01 NOTE — PLAN OF CARE
Problem: PAIN - ADULT  Goal: Verbalizes/displays adequate comfort level or baseline comfort level  Description: Interventions:  - Encourage patient to monitor pain and request assistance  - Assess pain using appropriate pain scale  - Administer analgesics based on type and severity of pain and evaluate response  - Implement non-pharmacological measures as appropriate and evaluate response  - Consider cultural and social influences on pain and pain management  - Notify physician/advanced practitioner if interventions unsuccessful or patient reports new pain  Outcome: Progressing     Problem: INFECTION - ADULT  Goal: Absence or prevention of progression during hospitalization  Description: INTERVENTIONS:  - Assess and monitor for signs and symptoms of infection  - Monitor lab/diagnostic results  - Monitor all insertion sites, i.e. indwelling lines, tubes, and drains  - Monitor endotracheal if appropriate and nasal secretions for changes in amount and color  - [place] appropriate cooling/warming therapies per order  - Administer medications as ordered  - Instruct and encourage patient and family to use good hand hygiene technique  - Identify and instruct in appropriate isolation precautions for identified infection/condition  Outcome: Progressing     Problem: SAFETY ADULT  Goal: Patient will remain free of falls  Description: INTERVENTIONS:  - Educate patient/family on patient safety including physical limitations  - Instruct patient to call for assistance with activity   - Consult OT/PT to assist with strengthening/mobility   - Keep Call bell within reach  - Keep bed low and locked with side rails adjusted as appropriate  - Keep care items and personal belongings within reach  - Initiate and maintain comfort rounds  - Make Fall Risk Sign visible to staff  - Apply yellow socks and bracelet for high fall risk patients  - Consider moving patient to room near nurses station  Outcome: Progressing

## 2023-10-02 VITALS
HEIGHT: 65 IN | RESPIRATION RATE: 18 BRPM | BODY MASS INDEX: 23.65 KG/M2 | HEART RATE: 77 BPM | OXYGEN SATURATION: 92 % | WEIGHT: 141.98 LBS | TEMPERATURE: 97.4 F | SYSTOLIC BLOOD PRESSURE: 133 MMHG | DIASTOLIC BLOOD PRESSURE: 68 MMHG

## 2023-10-02 LAB
ALDOLASE SERPL-CCNC: 3.4 U/L (ref 3.3–10.3)
ANION GAP SERPL CALCULATED.3IONS-SCNC: 7 MMOL/L
BUN SERPL-MCNC: 8 MG/DL (ref 5–25)
CA-I BLD-SCNC: 1.13 MMOL/L (ref 1.12–1.32)
CALCIUM SERPL-MCNC: 8.5 MG/DL (ref 8.4–10.2)
CHLORIDE SERPL-SCNC: 108 MMOL/L (ref 96–108)
CO2 SERPL-SCNC: 24 MMOL/L (ref 21–32)
CREAT SERPL-MCNC: 0.54 MG/DL (ref 0.6–1.3)
ENA JO1 AB SER-ACNC: <0.2 AI (ref 0–0.9)
GFR SERPL CREATININE-BSD FRML MDRD: 91 ML/MIN/1.73SQ M
GLUCOSE SERPL-MCNC: 102 MG/DL (ref 65–140)
POTASSIUM SERPL-SCNC: 4.1 MMOL/L (ref 3.5–5.3)
SODIUM SERPL-SCNC: 139 MMOL/L (ref 135–147)

## 2023-10-02 PROCEDURE — 87493 C DIFF AMPLIFIED PROBE: CPT

## 2023-10-02 PROCEDURE — 80048 BASIC METABOLIC PNL TOTAL CA: CPT

## 2023-10-02 PROCEDURE — 99232 SBSQ HOSP IP/OBS MODERATE 35: CPT | Performed by: INTERNAL MEDICINE

## 2023-10-02 PROCEDURE — 82330 ASSAY OF CALCIUM: CPT

## 2023-10-02 PROCEDURE — 99238 HOSP IP/OBS DSCHRG MGMT 30/<: CPT | Performed by: INTERNAL MEDICINE

## 2023-10-02 PROCEDURE — 97116 GAIT TRAINING THERAPY: CPT

## 2023-10-02 PROCEDURE — 97530 THERAPEUTIC ACTIVITIES: CPT

## 2023-10-02 PROCEDURE — 87505 NFCT AGENT DETECTION GI: CPT

## 2023-10-02 RX ORDER — ERGOCALCIFEROL 1.25 MG/1
50000 CAPSULE ORAL WEEKLY
Refills: 0
Start: 2023-10-02

## 2023-10-02 RX ORDER — CALCIUM CARBONATE 500(1250)
1 TABLET ORAL 2 TIMES DAILY WITH MEALS
Status: DISCONTINUED | OUTPATIENT
Start: 2023-10-02 | End: 2023-10-02 | Stop reason: HOSPADM

## 2023-10-02 RX ORDER — CALCIUM CARBONATE 500(1250)
1 TABLET ORAL 2 TIMES DAILY WITH MEALS
Refills: 0
Start: 2023-10-02

## 2023-10-02 RX ORDER — ERGOCALCIFEROL 1.25 MG/1
50000 CAPSULE ORAL WEEKLY
Status: DISCONTINUED | OUTPATIENT
Start: 2023-10-02 | End: 2023-10-02 | Stop reason: HOSPADM

## 2023-10-02 RX ADMIN — ACETAMINOPHEN 650 MG: 325 TABLET, FILM COATED ORAL at 08:45

## 2023-10-02 RX ADMIN — SODIUM CHLORIDE, SODIUM GLUCONATE, SODIUM ACETATE, POTASSIUM CHLORIDE, MAGNESIUM CHLORIDE, SODIUM PHOSPHATE, DIBASIC, AND POTASSIUM PHOSPHATE 100 ML/HR: .53; .5; .37; .037; .03; .012; .00082 INJECTION, SOLUTION INTRAVENOUS at 01:01

## 2023-10-02 RX ADMIN — ENOXAPARIN SODIUM 40 MG: 40 INJECTION SUBCUTANEOUS at 08:33

## 2023-10-02 RX ADMIN — ERGOCALCIFEROL 50000 UNITS: 1.25 CAPSULE ORAL at 12:29

## 2023-10-02 RX ADMIN — POTASSIUM PHOSPHATE, MONOBASIC POTASSIUM PHOSPHATE, DIBASIC 12 MMOL: 224; 236 INJECTION, SOLUTION, CONCENTRATE INTRAVENOUS at 08:33

## 2023-10-02 NOTE — CASE MANAGEMENT
Case Management Discharge Planning Note    Patient name Timmy Chang  Location GU9 865/NW8 847-96 MRN [id number]  : 1945 Date 10/2/2023       Current Admission Date: 2023  Current Admission Diagnosis:Weakness of both lower extremities   Patient Active Problem List    Diagnosis Date Noted   • Hypophosphatemia 2023   • Hypokalemia 2023   • Impaired mobility and ADLs 04/10/2023   • MCI (mild cognitive impairment) 2023   • Pain 2023   • Enlarged and hypertrophic nails 2023   • Osteopenia 2023   • Right knee pain 2023   • Elevated AST (SGOT) 2023   • Prediabetes 2023   • Closed left ankle fracture, sequela 2023   • GERD (gastroesophageal reflux disease) 2023   • Bilateral pain of leg and foot 2023   • Diarrhea 2023   • Macrocytosis 2023   • Weakness of both lower extremities 2023   • Rhabdomyolysis 2023   • SARS-CoV-2 positive 2023   • Tremor, essential 10/20/2022   • Acute cerebral hemorrhage ([address]) 10/11/2022   • History of cerebral hemorrhage 10/10/2022   • 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 ([address]) 10/10/2022   • Chronic left-sided low back pain    • Transient cerebral ischemia 2018   • Premature atrial beats 2018   • Cataract of right eye 2016   • Sacroiliitis ([address]) 2015   • Idiopathic osteoporosis 2015   • Primary osteoarthritis of hand 2015      LOS (days): 5  Geometric Mean LOS (GMLOS) (days): 2.50  Days to GMLOS:-2.4     OBJECTIVE:  Risk of Unplanned Readmission Score: 16.04         Current admission status: Inpatient   Preferred Pharmacy:   [address] [place] [address] [facility] [facility]  [address] [address]  Phone: [phone] Fax: [phone]    [id number] [place] [place] - [address]  [address] [facility] [address]  Phone: [phone] Fax: [phone]    Primary Care Provider: Paul Hamilton DO    Primary Insurance: MEDICARE  Secondary Insurance: AARP    DISCHARGE DETAILS:      Other Referral/Resources/Interventions Provided:  Interventions: Short Term Rehab  Referral Comments: Cm met with patient at bedside to discuss patient choice list for STR. Pt reviewed list and chose [facility] at this time. [facility] able to accept today. Treatment Team Recommendation: Short Term Rehab  Discharge Destination Plan[de-identified] Short Term Rehab  Transport at Discharge : Wheelchair van  Dispatcher Contacted: Yes  Number/Name of Dispatcher: Hector Garcia and Unit #): [facility]  ETA of Transport (Date): 10/02/23  ETA of Transport (Time): 1400              IMM Given (Date):: 10/02/23 (IMM reviewed with pt at bedside. Pt reported understanding and declined having any questions at this time. IMM placed in pt's chart.)  IMM Given to[de-identified] Patient  Family notified[de-identified] CM spoke with pt's son, Laurita Razo ([phone]) via phone call and informed him of patient going to [facility] today at 2pm. Pt's son agreeable with discharge plan.        Accepting Facility Name, [address] [place] [address] : [facility]  Receiving Facility/Agency Phone Number: Phone: [phone]  Facility/Agency Fax Number: Fax: [phone]

## 2023-10-02 NOTE — PROGRESS NOTES
INTERNAL MEDICINE RESIDENCY PROGRESS NOTE     Name: Cheryl Herrmann   Age & Sex: 70 y.o. female   MRN: [id number]  Unit/Bed#: FD8 865-02   Encounter: [id number]  Team: SOD Team C     PATIENT INFORMATION     Name: Cheryl Herrmann   Age & Sex: 70 y.o. female   MRN: [id number]  Hospital Stay Days: 5    ASSESSMENT/PLAN     Principal Problem:    Weakness of both lower extremities  Active Problems:    History of cerebral hemorrhage    Tremor, essential    Macrocytosis    Prediabetes    GERD (gastroesophageal reflux disease)    Hypokalemia    Hypophosphatemia      Hypophosphatemia  Assessment & Plan  Phosphorus low despite repletion. Phos 1.0 10/1. Patient did present with low electrolytes possibly due to decreased nutrition. Consider refeeding syndrome, vitamin D deficiency? Follows rheumatology outpatient for vitamin D deficiency. Gets by annual Prolia injections, prescribed daily vitamin D and calcium supplements.   Mild improvement 1.4 on 10/1 after 21 mmol K-Phos 9/30    Plan:  Continue to monitor  Replete as needed    Hypokalemia  Assessment & Plan  Patient presented with a potassium of 2.9 likely secondary to decreased p.o. intake as endorsed by the patient  No EKG changes seen  Receiving potassium and magnesium     Plan  K>4 continue repletion  Check Mg >2  Continue to monitor BMPs and optimize magnesium       GERD (gastroesophageal reflux disease)  Assessment & Plan  History of GERD taking intermittent Pepcid    Plan  We will continue to monitor without medication and give Pepcid as needed as needed    Prediabetes  Assessment & Plan  History of prediabetes with most recent A1c of 5.9; which may be appropriate for patient's age    Plan  We will continue to monitor blood sugars with BMPs    Macrocytosis  Assessment & Plan  Macrocytosis with an MCV of 102  Most recent TSH in March within normal limits, TSH within normal range this admission    Plan:  F/u with B12 and folate--- results back, B12 (1275) folate (>22.3) both adequate    Tremor, essential  Assessment & Plan  Patient reports history of essential tremor on the right upper extremity chronic since her strokes in the past  Currently managed on primidone outpatient    Plan  We will continue primidone 50 daily    History of cerebral hemorrhage  Assessment & Plan  History of cerebral hemorrhage with continued deficits with restricted left upper extremity range of motion and continued left upper and lower extremity tingling sensation as described by patient  CT head negative at this admission    Plan  We will continue atorvastatin 10  Continue to monitor neurologic exam  PT/OT    * Weakness of both lower extremities  Assessment & Plan  Patient is presenting with 2-week history of generalized lower extremity weakness. She states she was able to ambulate without difficulty prior to 2 weeks. Denies trauma or incontinence. Denies recent illness. Recently seen by her PCP for sciatica which is now resolved per patient. Of note, patient did receive methylprednisone pack in the beginning of August. She also recently received the flu vaccine on 9/21/23. In addition, patient reports a 20+ pack year smoking history and per chart review has been getting CT screenings for lung cancer. Last one was in 4/22, she was due for one this past April. She denies dyspnea or ascending weakness. She has been on statin therapy (lipitor 10 mg) since 2018 per chart review. CT head negative for acute bleed  Previous workup included an EMG 6/21 evaluating left lower extremity, results were normal   TSH/folate/B12 normal     Sed rate 35  Potassium on arrival 2.9    Patient notes some improvement in leg weakness after electrolyte repletion. All other workup thus far has been negative. She is having loose stools with urinary incontinence, which has likely been contributing to her electrolyte deficiencies. She had not been eating well and had first full meal on 9/30.  Per patient ate 3 full meals on 10/1. Worked with PT yesterday, recommending postacute rehab after discharge. Differentials include electrolyte abn (?refeeding), polymyalgia rheumatica, polymyositis. Plan:  F/u SPEP/UPEP and CRP  F/u with repeat CK   Ordered aldolase, anti Ofelia r/o myositis  Replenish electrolytes as needed  Obtain EMG outpatient to rule out neuropathy  Tylenol PRN for pain   Evaluate for other causes of proximal weakness if symptoms persist once electrolytes are optimized whether result of deconditioning/fraility vs intrinsic pathology myositis vs. inflammatory demyelinating disorder in the setting of recent vaccine-- continue to monitor neuro exam and correlate clinical exam findings  Follow-up with specific myositis antibodies if clinically warranted   Neurology consulted, appreciate recs  PT/OT      Disposition: pending placement     SUBJECTIVE     Patient seen and examined. No acute events overnight. Feeling better than yesterday, especially after physical therapy worked with her. She is still having loose stools mixed with urine. Unable to collect C. difficile sample. Otherwise denies any acute complaints. Has been eating well since yesterday. All questions answered. OBJECTIVE     Vitals:    10/01/23 0700 10/01/23 1827 10/01/23 2244 10/02/23 0817   BP: 132/75 136/71 138/76 133/68   BP Location: Right arm Right arm Right arm Right arm   Pulse: 84 80 73 77   Resp: 16 16 18    Temp: 98.5 °F (36.9 °C) 98.1 °F (36.7 °C) 97.7 °F (36.5 °C) (!) 97.4 °F (36.3 °C)   TempSrc: Oral Oral Oral Oral   SpO2: 97% 96% 92%    Weight:       Height:          Temperature:   Temp (24hrs), Av.7 °F (36.5 °C), Min:97.4 °F (36.3 °C), Max:98.1 °F (36.7 °C)    Temperature: (!) 97.4 °F (36.3 °C)  Intake & Output:  I/O       09/30 0701  10/01 0700 10/01 0701  10/02 07    P. O.  240    I.V. (mL/kg) 835 (13) 600 (9.3)    Total Intake(mL/kg) 835 (13) 840 (13)    Urine (mL/kg/hr) 1650 (1.1)     Total Output 1650     Net -815 +840 Weights:   IBW (Ideal Body Weight): 57.58 kg    Body mass index is 23.45 kg/m². Weight (last 2 days)     None        Physical Exam:  General: No apparent distress, resting comfortably   Head: Normocephalic, atraumatic  Eyes: Anicteric, no conjunctival erythema  ENT: External ear normal, no nasal discharge  Neck: Trachea midline, no visible lymphadenopathy or goiter  Respiratory: CTA bilaterally, non-labored respirations, symmetric thorax expansion  Cardiovascular: Regular rate and rhythm no murmurs, extremities appear well-perfused  Abdomen: Non-distended  Extremities: Moves extremities spontaneously, no peripheral edema  Skin: No visible rashes, wounds, or jaundice  Neuro: A&O x 3, no gross focal deficits, no aphasia, strength improved 5 out of 5 upper and lower extremities     LABORATORY DATA     Labs: I have personally reviewed pertinent reports. Results from last 7 days   Lab Units 10/01/23  0435 09/30/23  0451 09/29/23  0457   WBC Thousand/uL 7.88 7.92 8.86   HEMOGLOBIN g/dL 12.5 12.1 11.7   HEMATOCRIT % 38.4 35.7 34.8   PLATELETS Thousands/uL 256 224 203   NEUTROS PCT % 64 64 72   MONOS PCT % 8 9 8   EOS PCT % 3 2 1      Results from last 7 days   Lab Units 10/02/23  0609 10/01/23  0435 09/30/23  1200 09/30/23  0451 09/28/23  0512 09/27/23  1126   POTASSIUM mmol/L 4.1 4.4 4.2 4.3   < > 2.9*   CHLORIDE mmol/L 108 108  --  109*   < > 102   CO2 mmol/L 24 22  --  20*   < > 29   BUN mg/dL 8 7  --  9   < > 10   CREATININE mg/dL 0.54* 0.59*  --  0.52*   < > 0.71   CALCIUM mg/dL 8.5 8.1*  --  7.4*   < > 10.2   ALK PHOS U/L  --  60  --  61  --  53   ALT U/L  --  12  --  14  --  13   AST U/L  --  21  --  25  --  28    < > = values in this interval not displayed.      Results from last 7 days   Lab Units 10/01/23  0435 09/30/23  1200 09/30/23  0451   MAGNESIUM mg/dL 2.0 2.0 2.0     Results from last 7 days   Lab Units 10/01/23  1857 10/01/23  0435 09/30/23  1200   PHOSPHORUS mg/dL 2.6 1.4* 1.9* Results from last 7 days   Lab Units 09/27/23 2001   LACTIC ACID mmol/L 0.8           IMAGING & DIAGNOSTIC TESTING     Radiology Results: I have personally reviewed pertinent reports. MRI lumbar spine wo contrast    Result Date: 9/29/2023  Impression: Multilevel degenerative changes of lumbar spine with mild foraminal narrowing at left L3-L4, as detailed above. No significant canal stenosis. Workstation performed: [id number]     MRI brain wo contrast    Result Date: 9/29/2023  Impression: No acute intracranial abnormality. Unchanged chronic slitlike hematoma cavity along right external capsule/claustrum with associated hemosiderin deposition and calcification. Severe chronic microangiopathy. Workstation performed: [id number]     CT head wo contrast    Result Date: 9/27/2023  Impression: 1. Stable head CT. No acute intracranial hemorrhage. 2. Moderate, chronic microangiopathy redemonstrated. 3. Linear hyperdensity in the right subinsular white matter likely dystrophic calcification in the region of prior parenchymal hemorrhage given the blooming artifact on prior brain MRI. No acute intracranial hemorrhage. Workstation performed: [id number]     XR chest 2 views    Result Date: 9/27/2023  Impression: No acute cardiopulmonary disease. Workstation performed: [id number]     Other Diagnostic Testing: I have personally reviewed pertinent reports.     ACTIVE MEDICATIONS     Current Facility-Administered Medications   Medication Dose Route Frequency   • acetaminophen (TYLENOL) tablet 650 mg  650 mg Oral Q6H PRN   • atorvastatin (LIPITOR) tablet 10 mg  10 mg Oral After Dinner   • enoxaparin (LOVENOX) subcutaneous injection 40 mg  40 mg Subcutaneous Daily   • multi-electrolyte (PLASMALYTE-A/ISOLYTE-S PH 7.4) IV solution  100 mL/hr Intravenous Continuous   • potassium phosphates 12 mmol in sodium chloride 0.9 % 250 mL infusion  12 mmol Intravenous Once   • primidone (MYSOLINE) tablet 100 mg  100 mg Oral HS       VTE Pharmacologic Prophylaxis: Enoxaparin  VTE Mechanical Prophylaxis: sequential compression device    Portions of the record may have been created with voice recognition software. Occasional wrong word or "sound a like" substitutions may have occurred due to the inherent limitations of voice recognition software.   Read the chart carefully and recognize, using context, where substitutions have occurred.  ==  Lake [place] MD  [address] [facility]  Internal Medicine Residency PGY-1

## 2023-10-02 NOTE — DISCHARGE INSTR - AVS FIRST PAGE
- take vitamin D 50,000 weekly  - take calcium carbonate 500 mg twice a day  - obtain labwork in 1 week for ESR, CRP, BMP  - follow up PCP in 1 week  - follow up neurology in 2-3 months

## 2023-10-02 NOTE — PHYSICAL THERAPY NOTE
PT Treatment Note    NAME:  Rocio Jones  DATE: 10/02/23    AGE:   70 y.o. Mrn:   [id number]  ADMIT DX:  Fatigue [R53.83]  Weakness [R53.1]  Performed at least 2 patient identifiers during session: Name and Birthday       10/02/23 1050   PT Last Visit   PT Visit Date 10/02/23   Note Type   Note Type Treatment   Pain Assessment   Pain Score No Pain   Restrictions/Precautions   Weight Bearing Precautions Per Order No   Other Precautions Fall Risk;Multiple lines;Contact/isolation   General   Chart Reviewed Yes   Family/Caregiver Present No   Cognition   Overall Cognitive Status WFL   Arousal/Participation Alert   Attention Within functional limits   Orientation Level Oriented X4   Memory Within functional limits   Following Commands Follows all commands and directions without difficulty   Bed Mobility   Supine to Sit 4  Minimal assistance   Additional items Assist x 1;HOB elevated; Increased time required;Verbal cues   Additional Comments pt denied dizziness with transitional movement however requested increased time between positions   Transfers   Sit to Stand 3  Moderate assistance   Additional items Assist x 1;Verbal cues; Increased time required   Stand to Sit 3  Moderate assistance   Additional items Assist x 1;Verbal cues; Increased time required   Stand pivot 3  Moderate assistance   Additional items Assist x 1;Verbal cues; Increased time required  (used RW)   Ambulation/Elevation   Gait pattern Decreased foot clearance; Improper Weight shift; Shuffling;Excessively slow; Short stride   Gait Assistance 3  Moderate assist   Additional items Assist x 1;Verbal cues   Assistive Device Rolling walker   Distance 6 ft x2 side steps   Balance   Static Sitting Good   Dynamic Sitting Fair +   Static Standing Fair -   Dynamic Standing Poor +   Ambulatory Poor +   Endurance Deficit   Endurance Deficit Yes   Endurance Deficit Description reporting weakness   Activity Tolerance   Activity Tolerance Patient limited by fatigue Assessment   Prognosis Good   Assessment Pt seen for PT treatment session this date with interventions consisting of gait training to normalize gait pattern to decrease fall risk and therapeutic activity to improve transfers and increase activity tolerance with functional mobility to decrease fall risk. Pt agreeable to PT treatment session upon arrival, pt found supine in bed, in no apparent distress. Since previous session, pt has made good progress as evidenced by increased distance ambulated  Barriers during this session include fatigue. Pt continues to be functioning below baseline level, and remains limited 2* factors listed above and including decreased strength, impaired activity tolerance, impaired  balance, decreased endurance and decreased mobility. Pt prognosis for achieving goals is fair, pending pt progress with hospitalization/medical status improvements, and indicated by motivated to participate in therapy, ability to follow cues and improvement with mobility status. PT will continue to see pt during current hospitalization in order to address the deficits listed above and provide interventions consistent w/ POC in effort to achieve goals. Current goals and POC remain appropriate, pt continues to have rehab potential  Continue to recommend post acute rehabilitation services at time of d/c in order to maximize pt's functional independence and safety w/ mobility. Upon conclusion pt seated OOB in recliner. The patient's AM-PAC Basic Mobility Inpatient Short Form Raw Score is 13. A Raw score of less than or equal to 16 suggests the patient may benefit from discharge to post-acute rehabilitation services. Please also refer to the recommendation of the Physical Therapist for safe discharge planning. RN verbalized pt appropriate for PT session.    Barriers to Discharge Decreased caregiver support   Goals   Patient Goals to get OOB   STG Expiration Date 10/12/23   PT Treatment Day 1   Plan Treatment/Interventions Functional transfer training;Gait training;Bed mobility; Equipment eval/education   Progress Progressing toward goals   PT Frequency 2-3x/wk   Recommendation   PT Discharge Recommendation Post acute rehabilitation services   Equipment Recommended [address] [facility] Recommended Wheeled walker   Change/add to [place]?  No   AM-PAC Basic Mobility Inpatient   Turning in Flat Bed Without Bedrails 3   Lying on Back to Sitting on Edge of Flat Bed Without Bedrails 3   Moving Bed to Chair 2   Standing Up From Chair Using Arms 2   Walk in Room 2   Climb 3-5 Stairs With Railing 1   Basic Mobility Inpatient Raw Score 13   Basic Mobility Standardized Score 33.99   Turning Head Towards Sound 4   Follow Simple Instructions 3   Low Function Basic Mobility Raw Score  20   Low Function Basic Mobility Standardized Score  32.8   Highest Level Of Mobility   JH-HLM Goal 4: Move to chair/commode   JH-HLM Achieved 6: Walk 10 steps or more       Time In: 1023  Time Out: 1050  Total Treatment Minutes: [address] [place] [address] [address], PT

## 2023-10-02 NOTE — PLAN OF CARE
Problem: PAIN - ADULT  Goal: Verbalizes/displays adequate comfort level or baseline comfort level  Description: Interventions:  - Encourage patient to monitor pain and request assistance  - Assess pain using appropriate pain scale  - Administer analgesics based on type and severity of pain and evaluate response  - Implement non-pharmacological measures as appropriate and evaluate response  - Consider cultural and social influences on pain and pain management  - Notify physician/advanced practitioner if interventions unsuccessful or patient reports new pain  Outcome: Adequate for Discharge     Problem: SAFETY ADULT  Goal: Patient will remain free of falls  Description: INTERVENTIONS:  - Educate patient/family on patient safety including physical limitations  - Instruct patient to call for assistance with activity   - Consult OT/PT to assist with strengthening/mobility   - Keep Call bell within reach  - Keep bed low and locked with side rails adjusted as appropriate  - Keep care items and personal belongings within reach  - Initiate and maintain comfort rounds  - Make Fall Risk Sign visible to staff  - Apply yellow socks and bracelet for high fall risk patients  - Consider moving patient to room near nurses station  Outcome: Adequate for Discharge Xelnathanz Pregnancy And Lactation Text: This medication is Pregnancy Category D and is not considered safe during pregnancy.  The risk during breast feeding is also uncertain.

## 2023-10-02 NOTE — PLAN OF CARE
Patient's diarrhea has slowed down, 1st specimen was collected in the wrong container, will try to get another one. Patient continues on IVF. Problem: PAIN - ADULT  Goal: Verbalizes/displays adequate comfort level or baseline comfort level  Description: Interventions:  - Encourage patient to monitor pain and request assistance  - Assess pain using appropriate pain scale  - Administer analgesics based on type and severity of pain and evaluate response  - Implement non-pharmacological measures as appropriate and evaluate response  - Consider cultural and social influences on pain and pain management  - Notify physician/advanced practitioner if interventions unsuccessful or patient reports new pain  Outcome: Progressing     Problem: SAFETY ADULT  Goal: Patient will remain free of falls  Description: INTERVENTIONS:  - Educate patient/family on patient safety including physical limitations  - Instruct patient to call for assistance with activity   - Consult OT/PT to assist with strengthening/mobility   - Keep Call bell within reach  - Keep bed low and locked with side rails adjusted as appropriate  - Keep care items and personal belongings within reach  - Initiate and maintain comfort rounds  - Make Fall Risk Sign visible to staff  - Offer Toileting every 2 Hours, in advance of need  - Initiate/Maintain bed/chair alarm  - Obtain necessary fall risk management equipment.   - Apply yellow socks and bracelet for high fall risk patients  - Consider moving patient to room near nurses station  Outcome: Progressing

## 2023-10-03 LAB
C DIFF TOX GENS STL QL NAA+PROBE: NEGATIVE
CAMPYLOBACTER DNA SPEC NAA+PROBE: NORMAL
SALMONELLA DNA SPEC QL NAA+PROBE: NORMAL
SHIGA TOXIN STX GENE SPEC NAA+PROBE: NORMAL
SHIGELLA DNA SPEC QL NAA+PROBE: NORMAL

## 2023-10-06 NOTE — DISCHARGE SUMMARY
INTERNAL MEDICINE RESIDENCY DISCHARGE SUMMARY     Cristin Hightower   70 y.o. female  MRN: [id number]  Room/Bed: [place] [address] [phone] [facility]    Encounter: [id number]    Principal Problem:    Weakness of both lower extremities  Active Problems:    History of cerebral hemorrhage    Tremor, essential    Macrocytosis    Prediabetes    GERD (gastroesophageal reflux disease)    Hypokalemia    Hypophosphatemia      Hypophosphatemia  Assessment & Plan  Phosphorus low despite repletion. Phos 1.0 10/1. Patient did present with low electrolytes possibly due to decreased nutrition. Consider refeeding syndrome, vitamin D deficiency? Follows rheumatology outpatient for vitamin D deficiency. Gets by annual Prolia injections, prescribed daily vitamin D and calcium supplements.   Mild improvement 1.4 on 10/1 after 21 mmol K-Phos 9/30    Plan:  Continue to monitor  Replete as needed    Hypokalemia  Assessment & Plan  Patient presented with a potassium of 2.9 likely secondary to decreased p.o. intake as endorsed by the patient  No EKG changes seen  Receiving potassium and magnesium     Plan  K>4 continue repletion  Check Mg >2  Continue to monitor BMPs and optimize magnesium       GERD (gastroesophageal reflux disease)  Assessment & Plan  History of GERD taking intermittent Pepcid    Plan  We will continue to monitor without medication and give Pepcid as needed as needed    Prediabetes  Assessment & Plan  History of prediabetes with most recent A1c of 5.9; which may be appropriate for patient's age    Plan  We will continue to monitor blood sugars with BMPs    Macrocytosis  Assessment & Plan  Macrocytosis with an MCV of 102  Most recent TSH in March within normal limits, TSH within normal range this admission    Plan:  F/u with B12 and folate--- results back, B12 (1275) folate (>22.3) both adequate    Tremor, essential  Assessment & Plan  Patient reports history of essential tremor on the right upper extremity chronic since her strokes in the past  Currently managed on primidone outpatient    Plan  We will continue primidone 50 daily    History of cerebral hemorrhage  Assessment & Plan  History of cerebral hemorrhage with continued deficits with restricted left upper extremity range of motion and continued left upper and lower extremity tingling sensation as described by patient  CT head negative at this admission    Plan  We will continue atorvastatin 10  Continue to monitor neurologic exam  PT/OT    * Weakness of both lower extremities  Assessment & Plan  Patient is presenting with 2-week history of generalized lower extremity weakness. She states she was able to ambulate without difficulty prior to 2 weeks. Denies trauma or incontinence. Denies recent illness. Recently seen by her PCP for sciatica which is now resolved per patient. Of note, patient did receive methylprednisone pack in the beginning of August. She also recently received the flu vaccine on 9/21/23. In addition, patient reports a 20+ pack year smoking history and per chart review has been getting CT screenings for lung cancer. Last one was in 4/22, she was due for one this past April. She denies dyspnea or ascending weakness. She has been on statin therapy (lipitor 10 mg) since 2018 per chart review. CT head negative for acute bleed  Previous workup included an EMG 6/21 evaluating left lower extremity, results were normal   TSH/folate/B12 normal     Sed rate 35  Potassium on arrival 2.9    Patient notes some improvement in leg weakness after electrolyte repletion. All other workup thus far has been negative. She is having loose stools with urinary incontinence, which has likely been contributing to her electrolyte deficiencies. She had not been eating well and had first full meal on 9/30. Per patient ate 3 full meals on 10/1. Worked with PT yesterday, recommending postacute rehab after discharge.   Differentials include electrolyte abn (?refeeding), polymyalgia rheumatica, polymyositis. Plan:  F/u SPEP/UPEP and CRP  F/u with repeat CK   Ordered aldolase, anti Ofelia r/o myositis  Replenish electrolytes as needed  Obtain EMG outpatient to rule out neuropathy  Tylenol PRN for pain   Evaluate for other causes of proximal weakness if symptoms persist once electrolytes are optimized whether result of deconditioning/fraility vs intrinsic pathology myositis vs. inflammatory demyelinating disorder in the setting of recent vaccine-- continue to monitor neuro exam and correlate clinical exam findings  Follow-up with specific myositis antibodies if clinically warranted   Neurology consulted, appreciate recs  PT/OT        [facility]       HPI per Dr. Key Cervantes: "Patient is a 70-year-old female with past medical history of previous hemorrhagic stroke about 5 years ago, GERD, osteopenia presenting for 2-week history of lower extremity weakness. She states before this she was walking without any difficulty but now is very unsteady on her feet. She states normally she does not have any difficulty ambulating and does not endorse any decrease or changes in sensation. She denies any dizziness, nausea, vomiting, headache, shortness of breath. She states she lives in an independent living facility. She was seen in her PCP about 2 days ago for sciatic pain which she states is now resolved after she was given Tylenol. She denies any recent diarrhea, bloody diarrhea, flulike symptoms. She denies any fecal or urinary incontinence. She takes primidone for essential tremor. She also endorses that she has not been eating much for the past couple days because she has been able to ambulate. She states she does not smoke, drink alcohol, or use any drugs now or in the past."    Patient underwent extensive work up of her lower extremity weakness. CTH negative for acute bleed  Or intrcranial changes.  Her prior EMG from 6/21 was reviewed with normal results. TSH, folate, and B12 levels were normal. Patient had slightly increased CK level of 373, ESR of 35, however CRP elevated to 40s. SPEP was negative for monoclonal gammopathy. Aldolase and anti Kendy Imer labs were obtained and pending to rule out myositis. She did have electrolyte abnomalities with hypophosphatemia and hypokalemia which were repleted. Neurology was consulted and they believed LE weakness to be metabolic in nature due to electrolyte depletion. In setting of her proximal muscle findings of 3-4/5 strength in lower extremities, working diagnosis of polymyalgia rheumatica vs due to frailty. However due to her weakness resolving -- no further steroid was initiated. Patient should follow up outpatient:  - PCP for TCM in 1 -2 weeks and review aldolase/ anti Kendy Imer labs. Pending further workup of PMR or myositis. Consider repeating EMG. - Follow up neurology in 1 month  - Pursue rehab at [facility] in 1 week to follow up    DISCHARGE INFORMATION     PCP at [address], DO      Admitting Provider: Adam Griffith MD  Admission Date: 9/27/2023    Discharge Provider: No att. providers found  Discharge Date: 10/2/23    Discharge Disposition: Non [facility] SNF/TCU/SNU  Discharge Condition: fair  Discharge with Lines: no    Discharge Diet: regular diet  Activity Restrictions: none  Test Results Pending at Discharge: Aldolase / antiDeanna Imer    Discharge Diagnoses:  Principal Problem:    Weakness of both lower extremities  Active Problems:    History of cerebral hemorrhage    Tremor, essential    Macrocytosis    Prediabetes    GERD (gastroesophageal reflux disease)    Hypokalemia    Hypophosphatemia  Resolved Problems:    * No resolved hospital problems.  *      Consulting Providers:      Diagnostic & Therapeutic Procedures Performed:  MRI lumbar spine wo contrast    Result Date: 9/29/2023  Impression: Multilevel degenerative changes of lumbar spine with mild foraminal narrowing at left L3-L4, as detailed above. No significant canal stenosis. Workstation performed: [id number]     MRI brain wo contrast    Result Date: 9/29/2023  Impression: No acute intracranial abnormality. Unchanged chronic slitlike hematoma cavity along right external capsule/claustrum with associated hemosiderin deposition and calcification. Severe chronic microangiopathy. Workstation performed: [id number]     CT head wo contrast    Result Date: 9/27/2023  Impression: 1. Stable head CT. No acute intracranial hemorrhage. 2. Moderate, chronic microangiopathy redemonstrated. 3. Linear hyperdensity in the right subinsular white matter likely dystrophic calcification in the region of prior parenchymal hemorrhage given the blooming artifact on prior brain MRI. No acute intracranial hemorrhage. Workstation performed: [id number]     XR chest 2 views    Result Date: 9/27/2023  Impression: No acute cardiopulmonary disease.  Workstation performed: [id number]       Code Status: Prior  Advance Directive & Living Will: Received  Power of :    POLST:      Medications:  Discharge Medication List as of 10/2/2023  1:55 PM      STOP taking these medications       calcium-vitamin D 250-100 MG-UNIT per tablet Comments:   Reason for Stopping:         Vitamin D, Cholecalciferol, 1000 UNITS TABS Comments:   Reason for Stopping:             Discharge Medication List as of 10/2/2023  1:55 PM      START taking these medications    Details   calcium carbonate (OYSTER SHELL,OSCAL) 500 mg Take 1 tablet by mouth 2 (two) times a day with meals, Starting Mon 10/2/2023, No Print      ergocalciferol (VITAMIN D2) 50,000 units Take 1 capsule (50,000 Units total) by mouth once a week, Starting Mon 10/2/2023, No Print           Discharge Medication List as of 10/2/2023  1:55 PM      CONTINUE these medications which have NOT CHANGED    Details   acetaminophen (TYLENOL) 325 mg tablet Take 2 tablets (650 mg total) by mouth every 6 (six) hours as needed for mild pain or fever, Starting Fri 4/7/2023, No Print      Ascorbic Acid (VITAMIN C PO) Take by mouth, Historical Med      atorvastatin (LIPITOR) 10 mg tablet Take 1 tablet by mouth Daily, Historical Med      b complex vitamins tablet Take 1 tablet by mouth daily. , Historical Med      famotidine (PEPCID) 40 MG tablet Starting Tue 3/8/2022, Historical Med      fluticasone (FLONASE) 50 mcg/act nasal spray 1 spray into each nostril daily Do not start before April 8, 2023., Starting Sat 4/8/2023, Normal      Multiple Vitamins-Minerals (CENTRUM CARDIO PO) Take 1 tablet by mouth daily. , Historical Med      primidone (MYSOLINE) 50 mg tablet Take 1 tab by mouth daily at bedtime, Normal             Allergies:  No Known Allergies    FOLLOW-UP     PCP Outpatient Follow-up:  Yes    Consulting Providers Follow-up:  Neurology     Active Issues Requiring Follow-up:   Follow up autoimmune labs, CRP, BMP. Monitor sxs. Discharge Statement:   I spent 30 minutes minutes discharging the patient. This time was spent on the day of discharge. I had direct contact with the patient on the day of discharge. Additional documentation is required if more than 30 minutes were spent on discharge. Portions of the record may have been created with voice recognition software. Occasional wrong word or "sound a like" substitutions may have occurred due to the inherent limitations of voice recognition software.   Read the chart carefully and recognize, using context, where substitutions have occurred.    ==  [address] [place], [place] [address] SANDY [facility]  Internal Medicine Resident PGY-3

## 2023-10-30 ENCOUNTER — TELEPHONE (OUTPATIENT)
Dept: NEUROLOGY | Facility: CLINIC | Age: 78
End: 2023-10-30

## 2023-10-30 NOTE — TELEPHONE ENCOUNTER
Received VM transcription from 10/27/23, 11:12 AM:    My name is Addie Bustamante. I'm one of the registered nurses that work for [facility]. This call is related to patient Bethany Iniguez. YOB: 1945. This is a medication clarification. She is receiving [facility] services. I just wanted to verify that the patient is not to be taking 81 mg of Aspirin daily as the patient is not taking this. And it is on her prescribed medication list. The patient states that she was told by neurology not to take this medication and I just wanted to clarify that that is correct. You can call me back directly or you can reach out to the patient directly. Thanks so much bye.

## 2023-10-30 NOTE — TELEPHONE ENCOUNTER
Spoke with Susanna Pollack and made her aware that the ASA is not a medication that is listed on pt's med list. Also made her aware that per OV note of 9/12/23:  " She is not on Aspirin due to the spontaneous cerebral hemorrhage"    Susanna Pollack thanked this nurse for the follow up call.

## 2023-11-10 ENCOUNTER — TELEPHONE (OUTPATIENT)
Dept: NEUROLOGY | Facility: CLINIC | Age: 78
End: 2023-11-10

## 2023-11-10 NOTE — TELEPHONE ENCOUNTER
Hello Good afternoon,     Spoke to Patient and she is asking to please be scheduled with Serjio Gurrola, She stated she has not seem in over a year and would  prefer to be seen by him and only in the [facility] office due to her transportation. Can you please advise if there's a slot we can offer her an HFU with Serjio Gurrola. Thank you in advance,     Rickey Whitfield       HFU/ [place]/ Weakness of both lower extremities     DC- 10/02/2023- FACILITY , Patient is now home. [address] Name and Phone Number  [address] Name, [address]  Receiving Facility/Agency Phone  Number  Phone: [phone]       Tyler Rincon will need follow up in in 6 weeks with general attending or advance practitioner.  She will not require outpatient neurological testing

## 2023-12-29 NOTE — PCC CARE MANAGEMENT
"[facility]        NAME: Brittney Miner is a 68 y.o. female  : 1955    MRN: [id number]  DATE: 2023  TIME: 10:44 AM    /88   Pulse 80   Temp 98.9 °F (37.2 °C)   Resp 18   Ht 5' 2\" (1.575 m)   Wt 76.2 kg (168 lb)   SpO2 97%   BMI 30.73 kg/m²     Assessment and Plan   Lightheadedness [R42]  1. Lightheadedness  Covid/Flu- Lab Collect    Poct Covid 19 Rapid Antigen Test      2. Dizziness        3. Weakness        4. Encounter for laboratory testing for COVID-19 virus              Patient Instructions       Follow up with PCP in 3-5 days.  Proceed to  ER if symptoms worsen.    Chief Complaint     Chief Complaint   Patient presents with    Shortness of Breath     Cough, chest congestion, fever associated with shortness of breath with exertion; concerned for pneumonia; has been using tessalon pearls with no relief     Flu Symptoms     Nausea, vomiting, diarrhea, fatigue ongoing x5 days          History of Present Illness       Pt with 6 days of feeling sick fever congestion sob, intermitt\ent dizzy and lightheadedness     Shortness of Breath  Associated symptoms include coughing, dizziness and fatigue.   Flu Symptoms  Associated symptoms include congestion, fatigue, a fever, coughing and shortness of breath.       Review of Systems   Review of Systems   Constitutional:  Positive for fatigue and fever.   HENT:  Positive for congestion.    Eyes: Negative.    Respiratory:  Positive for cough and shortness of breath.    Cardiovascular: Negative.    Gastrointestinal: Negative.    Endocrine: Negative.    Genitourinary: Negative.    Musculoskeletal: Negative.    Skin: Negative.    Allergic/Immunologic: Negative.    Neurological:  Positive for dizziness and light-headedness.   Hematological: Negative.    Psychiatric/Behavioral: Negative.     All other systems reviewed and are negative.        Current Medications       Current Outpatient Medications:     benzonatate (TESSALON) 200 MG " 4/6- Cm following for dc needs, pt agreeable to dc to pt's sons home for a brief period of time prior to going home for extra support  "capsule, Take 1 capsule (200 mg total) by mouth 3 (three) times a day as needed for cough, Disp: 20 capsule, Rfl: 0    fluticasone (FLONASE) 50 mcg/act nasal spray, SPRAY 2 SPRAYS INTO EACH NOSTRIL EVERY DAY, Disp: 48 mL, Rfl: 0    ibuprofen (MOTRIN) 600 mg tablet, TAKE 1 TABLET BY MOUTH THREE TIMES A DAY WITH FOOD OR MILK AS NEEDED FOR PAIN, Disp: , Rfl:     methylPREDNISolone 4 MG tablet therapy pack, Use as directed on package, Disp: 21 each, Rfl: 0    Current Allergies     Allergies as of 12/29/2023    (No Known Allergies)            The following portions of the patient's history were reviewed and updated as appropriate: allergies, current medications, past family history, past medical history, past social history, past surgical history and problem list.     Past Medical History:   Diagnosis Date    Allergic        History reviewed. No pertinent surgical history.    History reviewed. No pertinent family history.      Medications have been verified.        Objective   /88   Pulse 80   Temp 98.9 °F (37.2 °C)   Resp 18   Ht 5' 2\" (1.575 m)   Wt 76.2 kg (168 lb)   SpO2 97%   BMI 30.73 kg/m²        Physical Exam     Physical Exam  Vitals and nursing note reviewed.   Constitutional:       Appearance: Normal appearance. She is well-developed and normal weight.      Comments: Pt with dizziness and lightheadedness  needing help from chair to bed , pt has been in bed most of this week     Finger glucose 85    Extensive discussion with pt regarding er/hospital evaluation for lightheadedness and dizziness   pt declines, pt declines calling family member to come pick her up       Pt will sign ama form   HENT:      Head: Normocephalic and atraumatic.      Right Ear: Tympanic membrane, ear canal and external ear normal.      Left Ear: Tympanic membrane, ear canal and external ear normal.      Nose: Nose normal.      Mouth/Throat:      Mouth: Mucous membranes are moist.      Pharynx: Oropharynx is clear.   Eyes: "      Extraocular Movements: Extraocular movements intact.      Conjunctiva/sclera: Conjunctivae normal.      Pupils: Pupils are equal, round, and reactive to light.   Cardiovascular:      Rate and Rhythm: Normal rate and regular rhythm.      Pulses: Normal pulses.      Heart sounds: Normal heart sounds.   Pulmonary:      Effort: Pulmonary effort is normal.      Breath sounds: Normal breath sounds.   Abdominal:      General: Abdomen is flat. Bowel sounds are normal.      Palpations: Abdomen is soft.   Musculoskeletal:         General: Normal range of motion.      Cervical back: Normal range of motion and neck supple.   Skin:     Capillary Refill: Capillary refill takes less than 2 seconds.   Neurological:      Mental Status: She is alert and oriented to person, place, and time.   Psychiatric:         Mood and Affect: Mood normal.

## 2024-01-19 ENCOUNTER — HOSPITAL ENCOUNTER (INPATIENT)
Facility: HOSPITAL | Age: 79
LOS: 4 days | Discharge: NURSING, REHAB OR LONG-TERM CARE | DRG: 641 | End: 2024-01-23
Attending: EMERGENCY MEDICINE | Admitting: INTERNAL MEDICINE
Payer: MEDICARE

## 2024-01-19 ENCOUNTER — APPOINTMENT (EMERGENCY)
Dept: RADIOLOGY | Facility: HOSPITAL | Age: 79
DRG: 641 | End: 2024-01-19
Payer: MEDICARE

## 2024-01-19 DIAGNOSIS — E87.6 HYPOKALEMIA: ICD-10-CM

## 2024-01-19 DIAGNOSIS — M62.82 RHABDOMYOLYSIS: ICD-10-CM
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
## 2024-01-19 PROBLEM — R26.2 AMBULATORY DYSFUNCTION: Status: ACTIVE | Noted: 2024-01-19

## 2024-01-19 PROBLEM — R55 SYNCOPE AND COLLAPSE: Status: ACTIVE | Noted: 2024-01-19

## 2024-01-19 PROBLEM — D72.829 LEUKOCYTOSIS: Status: ACTIVE | Noted: 2024-01-19

## 2024-01-19 LAB
2HR DELTA HS TROPONIN: -2 NG/L
4HR DELTA HS TROPONIN: 1 NG/L
ABO GROUP BLD: NORMAL
ALBUMIN SERPL BCG-MCNC: 4.1 G/DL (ref 3.5–5)
ALP SERPL-CCNC: 70 U/L (ref 34–104)
ALT SERPL W P-5'-P-CCNC: 14 U/L (ref 7–52)
ANION GAP SERPL CALCULATED.3IONS-SCNC: 14 MMOL/L
APTT PPP: 26 SECONDS (ref 23–37)
AST SERPL W P-5'-P-CCNC: 38 U/L (ref 13–39)
ATRIAL RATE: 55 BPM
BACTERIA UR QL AUTO: ABNORMAL /HPF
BASOPHILS # BLD AUTO: 0.04 THOUSANDS/ÂΜL (ref 0–0.1)
BASOPHILS NFR BLD AUTO: 0 % (ref 0–1)
BILIRUB SERPL-MCNC: 0.62 MG/DL (ref 0.2–1)
BILIRUB UR QL STRIP: NEGATIVE
BLD GP AB SCN SERPL QL: NEGATIVE
BUN SERPL-MCNC: 15 MG/DL (ref 5–25)
CALCIUM SERPL-MCNC: 10.5 MG/DL (ref 8.4–10.2)
CARDIAC TROPONIN I PNL SERPL HS: 41 NG/L
CARDIAC TROPONIN I PNL SERPL HS: 43 NG/L
CARDIAC TROPONIN I PNL SERPL HS: 44 NG/L
CHLORIDE SERPL-SCNC: 100 MMOL/L (ref 96–108)
CK SERPL-CCNC: 1084 U/L (ref 26–192)
CLARITY UR: CLEAR
CO2 SERPL-SCNC: 30 MMOL/L (ref 21–32)
COLOR UR: ABNORMAL
CREAT SERPL-MCNC: 0.83 MG/DL (ref 0.6–1.3)
EOSINOPHIL # BLD AUTO: 0 THOUSAND/ÂΜL (ref 0–0.61)
EOSINOPHIL NFR BLD AUTO: 0 % (ref 0–6)
ERYTHROCYTE [DISTWIDTH] IN BLOOD BY AUTOMATED COUNT: 11.9 % (ref 11.6–15.1)
GFR SERPL CREATININE-BSD FRML MDRD: 68 ML/MIN/1.73SQ M
GLUCOSE SERPL-MCNC: 129 MG/DL (ref 65–140)
GLUCOSE UR STRIP-MCNC: NEGATIVE MG/DL
HBV SURFACE AG SER QL: NORMAL
HCT VFR BLD AUTO: 39.6 % (ref 34.8–46.1)
HCV AB SER QL: NORMAL
HGB BLD-MCNC: 13.6 G/DL (ref 11.5–15.4)
HGB UR QL STRIP.AUTO: NEGATIVE
HIV 1+2 AB+HIV1 P24 AG SERPL QL IA: NORMAL
HIV1 P24 AG SER QL: NORMAL
IMM GRANULOCYTES # BLD AUTO: 0.13 THOUSAND/UL (ref 0–0.2)
IMM GRANULOCYTES NFR BLD AUTO: 1 % (ref 0–2)
INR PPP: 1.07 (ref 0.84–1.19)
KETONES UR STRIP-MCNC: ABNORMAL MG/DL
LEUKOCYTE ESTERASE UR QL STRIP: NEGATIVE
LYMPHOCYTES # BLD AUTO: 1.44 THOUSANDS/ÂΜL (ref 0.6–4.47)
LYMPHOCYTES NFR BLD AUTO: 9 % (ref 14–44)
MCH RBC QN AUTO: 33.9 PG (ref 26.8–34.3)
MCHC RBC AUTO-ENTMCNC: 34.3 G/DL (ref 31.4–37.4)
MCV RBC AUTO: 99 FL (ref 82–98)
MONOCYTES # BLD AUTO: 1.05 THOUSAND/ÂΜL (ref 0.17–1.22)
MONOCYTES NFR BLD AUTO: 7 % (ref 4–12)
NEUTROPHILS # BLD AUTO: 13.44 THOUSANDS/ÂΜL (ref 1.85–7.62)
NEUTS SEG NFR BLD AUTO: 83 % (ref 43–75)
NITRITE UR QL STRIP: NEGATIVE
NON-SQ EPI CELLS URNS QL MICRO: ABNORMAL /HPF
NRBC BLD AUTO-RTO: 0 /100 WBCS
PH UR STRIP.AUTO: 7 [PH]
PLATELET # BLD AUTO: 308 THOUSANDS/UL (ref 149–390)
PMV BLD AUTO: 9.8 FL (ref 8.9–12.7)
POTASSIUM SERPL-SCNC: 3.1 MMOL/L (ref 3.5–5.3)
PROT SERPL-MCNC: 8.2 G/DL (ref 6.4–8.4)
PROT UR STRIP-MCNC: ABNORMAL MG/DL
PROTHROMBIN TIME: 13.8 SECONDS (ref 11.6–14.5)
QRS AXIS: 75 DEGREES
QRSD INTERVAL: 60 MS
QT INTERVAL: 394 MS
QTC INTERVAL: 437 MS
RBC # BLD AUTO: 4.01 MILLION/UL (ref 3.81–5.12)
RBC #/AREA URNS AUTO: ABNORMAL /HPF
RH BLD: POSITIVE
SODIUM SERPL-SCNC: 144 MMOL/L (ref 135–147)
SP GR UR STRIP.AUTO: 1.02 (ref 1–1.03)
SPECIMEN EXPIRATION DATE: NORMAL
T WAVE AXIS: 80 DEGREES
UROBILINOGEN UR STRIP-ACNC: <2 MG/DL
VENTRICULAR RATE: 74 BPM
WBC # BLD AUTO: 16.1 THOUSAND/UL (ref 4.31–10.16)
WBC #/AREA URNS AUTO: ABNORMAL /HPF

## 2024-01-19 PROCEDURE — 72125 CT NECK SPINE W/O DYE: CPT

## 2024-01-19 PROCEDURE — 80053 COMPREHEN METABOLIC PANEL: CPT

## 2024-01-19 PROCEDURE — 87806 HIV AG W/HIV1&2 ANTB W/OPTIC: CPT

## 2024-01-19 PROCEDURE — 93010 ELECTROCARDIOGRAM REPORT: CPT | Performed by: INTERNAL MEDICINE

## 2024-01-19 PROCEDURE — 86850 RBC ANTIBODY SCREEN: CPT

## 2024-01-19 PROCEDURE — 96360 HYDRATION IV INFUSION INIT: CPT

## 2024-01-19 PROCEDURE — 87340 HEPATITIS B SURFACE AG IA: CPT

## 2024-01-19 PROCEDURE — 74177 CT ABD & PELVIS W/CONTRAST: CPT

## 2024-01-19 PROCEDURE — 82550 ASSAY OF CK (CPK): CPT

## 2024-01-19 PROCEDURE — 99284 EMERGENCY DEPT VISIT MOD MDM: CPT

## 2024-01-19 PROCEDURE — 84484 ASSAY OF TROPONIN QUANT: CPT | Performed by: STUDENT IN AN ORGANIZED HEALTH CARE EDUCATION/TRAINING PROGRAM

## 2024-01-19 PROCEDURE — 81001 URINALYSIS AUTO W/SCOPE: CPT

## 2024-01-19 PROCEDURE — 93005 ELECTROCARDIOGRAM TRACING: CPT

## 2024-01-19 PROCEDURE — 70450 CT HEAD/BRAIN W/O DYE: CPT

## 2024-01-19 PROCEDURE — 85730 THROMBOPLASTIN TIME PARTIAL: CPT

## 2024-01-19 PROCEDURE — 85025 COMPLETE CBC W/AUTO DIFF WBC: CPT

## 2024-01-19 PROCEDURE — 99285 EMERGENCY DEPT VISIT HI MDM: CPT | Performed by: EMERGENCY MEDICINE

## 2024-01-19 PROCEDURE — 86803 HEPATITIS C AB TEST: CPT

## 2024-01-19 PROCEDURE — 71260 CT THORAX DX C+: CPT

## 2024-01-19 PROCEDURE — 84484 ASSAY OF TROPONIN QUANT: CPT

## 2024-01-19 PROCEDURE — 85610 PROTHROMBIN TIME: CPT

## 2024-01-19 PROCEDURE — 36415 COLL VENOUS BLD VENIPUNCTURE: CPT

## 2024-01-19 PROCEDURE — 86901 BLOOD TYPING SEROLOGIC RH(D): CPT

## 2024-01-19 PROCEDURE — 86900 BLOOD TYPING SEROLOGIC ABO: CPT

## 2024-01-19 RX ORDER — SODIUM CHLORIDE, SODIUM GLUCONATE, SODIUM ACETATE, POTASSIUM CHLORIDE, MAGNESIUM CHLORIDE, SODIUM PHOSPHATE, DIBASIC, AND POTASSIUM PHOSPHATE .53; .5; .37; .037; .03; .012; .00082 G/100ML; G/100ML; G/100ML; G/100ML; G/100ML; G/100ML; G/100ML
1000 INJECTION, SOLUTION INTRAVENOUS ONCE
Status: COMPLETED | OUTPATIENT
Start: 2024-01-19 | End: 2024-01-20

## 2024-01-19 RX ORDER — ENOXAPARIN SODIUM 100 MG/ML
40 INJECTION SUBCUTANEOUS DAILY
Status: DISCONTINUED | OUTPATIENT
Start: 2024-01-19 | End: 2024-01-23 | Stop reason: HOSPADM

## 2024-01-19 RX ORDER — SODIUM CHLORIDE, SODIUM GLUCONATE, SODIUM ACETATE, POTASSIUM CHLORIDE, MAGNESIUM CHLORIDE, SODIUM PHOSPHATE, DIBASIC, AND POTASSIUM PHOSPHATE .53; .5; .37; .037; .03; .012; .00082 G/100ML; G/100ML; G/100ML; G/100ML; G/100ML; G/100ML; G/100ML
100 INJECTION, SOLUTION INTRAVENOUS CONTINUOUS
Status: DISCONTINUED | OUTPATIENT
Start: 2024-01-19 | End: 2024-01-20

## 2024-01-19 RX ORDER — ATORVASTATIN CALCIUM 10 MG/1
10 TABLET, FILM COATED ORAL
Status: DISCONTINUED | OUTPATIENT
Start: 2024-01-19 | End: 2024-01-23 | Stop reason: HOSPADM

## 2024-01-19 RX ORDER — ACETAMINOPHEN 325 MG/1
650 TABLET ORAL EVERY 6 HOURS PRN
Status: DISCONTINUED | OUTPATIENT
Start: 2024-01-19 | End: 2024-01-23 | Stop reason: HOSPADM

## 2024-01-19 RX ORDER — SODIUM CHLORIDE, SODIUM GLUCONATE, SODIUM ACETATE, POTASSIUM CHLORIDE, MAGNESIUM CHLORIDE, SODIUM PHOSPHATE, DIBASIC, AND POTASSIUM PHOSPHATE .53; .5; .37; .037; .03; .012; .00082 G/100ML; G/100ML; G/100ML; G/100ML; G/100ML; G/100ML; G/100ML
100 INJECTION, SOLUTION INTRAVENOUS CONTINUOUS
Status: DISCONTINUED | OUTPATIENT
Start: 2024-01-19 | End: 2024-01-19

## 2024-01-19 RX ORDER — PRIMIDONE 50 MG/1
50 TABLET ORAL
Status: DISCONTINUED | OUTPATIENT
Start: 2024-01-19 | End: 2024-01-23 | Stop reason: HOSPADM

## 2024-01-19 RX ORDER — POTASSIUM CHLORIDE 1500 MG/1
40 TABLET, EXTENDED RELEASE ORAL ONCE
Status: DISCONTINUED | OUTPATIENT
Start: 2024-01-19 | End: 2024-01-20

## 2024-01-19 RX ORDER — FAMOTIDINE 20 MG/1
40 TABLET, FILM COATED ORAL DAILY
Status: DISCONTINUED | OUTPATIENT
Start: 2024-01-19 | End: 2024-01-23 | Stop reason: HOSPADM

## 2024-01-19 RX ADMIN — PRIMIDONE 50 MG: 50 TABLET ORAL at 23:51

## 2024-01-19 RX ADMIN — SODIUM CHLORIDE, SODIUM GLUCONATE, SODIUM ACETATE, POTASSIUM CHLORIDE, MAGNESIUM CHLORIDE, SODIUM PHOSPHATE, DIBASIC, AND POTASSIUM PHOSPHATE 100 ML/HR: .53; .5; .37; .037; .03; .012; .00082 INJECTION, SOLUTION INTRAVENOUS at 18:14

## 2024-01-19 RX ADMIN — ENOXAPARIN SODIUM 40 MG: 40 INJECTION SUBCUTANEOUS at 16:21

## 2024-01-19 RX ADMIN — ATORVASTATIN CALCIUM 10 MG: 10 TABLET, FILM COATED ORAL at 16:21

## 2024-01-19 RX ADMIN — SODIUM CHLORIDE, SODIUM GLUCONATE, SODIUM ACETATE, POTASSIUM CHLORIDE, MAGNESIUM CHLORIDE, SODIUM PHOSPHATE, DIBASIC, AND POTASSIUM PHOSPHATE 1000 ML: .53; .5; .37; .037; .03; .012; .00082 INJECTION, SOLUTION INTRAVENOUS at 15:11

## 2024-01-19 RX ADMIN — FAMOTIDINE 40 MG: 20 TABLET, FILM COATED ORAL at 16:21

## 2024-01-19 RX ADMIN — IOHEXOL 80 ML: 350 INJECTION, SOLUTION INTRAVENOUS at 12:08

## 2024-01-19 RX ADMIN — SODIUM CHLORIDE 500 ML: 0.9 INJECTION, SOLUTION INTRAVENOUS at 13:55

## 2024-01-19 NOTE — ASSESSMENT & PLAN NOTE
Patient had previous admission in September or October with same complaint of generalized lower extremity weakness occultly ambulating  Previous workup with aldolase, anti-Ofelia, SPEP, UPEP were unremarkable    -PT OT in STR

## 2024-01-19 NOTE — ASSESSMENT & PLAN NOTE
Noted previously on last admission. EKG with normal sinus rhythm. Potassium dropping as low as 2.4. Urine studies indicating inappropriate wasting of K. Pt not on any diuretics that may cause this. Nephrology consulted to work up patient. Will need outpatient follow up care.     Plan  Magnesium repleted  Cr wnl  Urine studies: Cr 70, Cl 97, Osm 516, Na 130, K 30  Of note, this study was done before K was given  Appears to be inappropriately losing K in the urine  Nephrology consulted  Noted urine potassium 30, urine potassium/creatinine ratio 43.5 mEq/g suggestive of renal loss of potassium (evaluated during hypokalemic state)  Also noted mild hypomagnesemia upon admission which can cause renal loss of potassium  Noted patient has intermittent/occasional hypokalemia issues in the past although not on any regular potassium supplements at home.  She denies any obvious history of hypertension although noted blood pressure slightly above goal this admission  No obvious alkalosis, she denies taking any diuretics at home.  No obvious GI loss based on clinical history.  F/userum aldosterone, PRA  CT scan with contrast shows unremarkable adrenal glands.  Will dc patient on KDur 20meq daily and amiloride 5mg daily  Repeat BMP in 1 week and follow up PCP  Follow up with nephrology in 2-3 weeks

## 2024-01-19 NOTE — ED ATTENDING ATTESTATION
1/19/2024  I, Mary Friend MD, saw and evaluated the patient. I have discussed the patient with the resident/non-physician practitioner and agree with the resident's/non-physician practitioner's findings, Plan of Care, and MDM as documented in the resident's/non-physician practitioner's note, except where noted. All available labs and Radiology studies were reviewed.  I was present for key portions of any procedure(s) performed by the resident/non-physician practitioner and I was immediately available to provide assistance.       At this point I agree with the current assessment done in the Emergency Department.  I have conducted an independent evaluation of this patient a history and physical is as follows:    OA: 79 y/o f with cognitive impairment, previous TIAs who was found on the ground by maintenance workers at her facility covered in urine. Pt herself is unable to give any useful history as to how or why she fell and is unsure for how long she has been on the ground. Pt is on ASA and made a level C trauma.  Pt otherwise denies fevers/chills/cp/sob, n/v/d. No recent report of illnesses. PE, unwell appearing f, NC/AT, + dry MM, extremely dry oropharynx, neck supple/FROM, nonttp, RR, lungs decreased at bases but without w/r, abd soft, + mild ttp, -r/g, - edema, MOLINA, intact pulses, capillary refill < 2 sec, oriented and answers questions. A/p 79 y/o f found down on the ground, unknown period of time, eval with labs including CK, imaging for traumatic injury, gentle IVF hydration, re-eval and treat accordingly. Likely admission.     ED Course         Critical Care Time  Procedures

## 2024-01-19 NOTE — H&P
INTERNAL MEDICINE RESIDENCY ADMISSION H&P     Name: Eav Lacey   Age & Sex: 78 y.o. female   MRN: [id number]  Unit/Bed#: [facility]   Encounter: [id number]  Primary Care Provider: Janet Ordaz DO    Code Status: Level 3 - DNAR and DNI  Admission Status: INPATIENT   Disposition: Patient requires Med/Surg    Admit to team: SOD Team C     ASSESSMENT/PLAN     Principal Problem:    Ambulatory dysfunction  Active Problems:    History of cerebral hemorrhage    Tremor, essential    Weakness of both lower extremities    GERD (gastroesophageal reflux disease)    Hypokalemia    Syncope and collapse    Leukocytosis      Leukocytosis  Assessment & Plan  WBC 16    Patient is afebrile  Low suspicion for infection at this time  Likely reactive in the setting of recent fall and stress    -Monitor and recheck    Syncope and collapse  Assessment & Plan  Based on history, unclear etiology of fall as patient is not sure  Attempted to call son with no response, will need to call facility for details    Will keep on telemetry for now  Ordered echo    Hypokalemia  Assessment & Plan  Noted previously on last admission  Today potassium was 3.1  EKG with normal sinus rhythm      Replete  Will check mag and Phos    GERD (gastroesophageal reflux disease)  Assessment & Plan  CT- Small sliding-type hiatal hernia. Thickening of the wall of the gastric fundus and body suggestive of gastritis but similar when compared to previous examinations.     Continue home Pepcid 40 mg daily    Weakness of both lower extremities  Assessment & Plan  Patient had previous admission in September or October with same complaint of generalized lower extremity weakness occultly ambulating  Previous workup with aldolase, anti-Ofelia, SPEP, UPEP were unremarkable    -PT OT    Tremor, essential  Assessment & Plan  Patient has baseline bilateral tremors, chronic    Continue primidone    History of cerebral hemorrhage  Assessment & Plan  History of cerebral  hemorrhage with continued deficits with restricted left upper extremity range of motion and continued left upper and lower extremity tingling sensation as described by patient  CT head negative at this admission    Patient has bilateral lower extremity weakness which is chronic     Plan  We will continue atorvastatin 10  Continue to monitor neurologic exam  PT/OT        * Ambulatory dysfunction  Assessment & Plan  Eva is a 78-year-old female with past medical history of previous stroke, GERD, emphysema , gait disturbance with lower extremity weakness chronically, on aspirin who presents as a level C trauma on aspirin.   Patient lives at [facility] and had fallen but patient is not aware how long she was down or how she fell.  Imaging was negative for traumatic injury and trauma workup was negative  CK was found to be 1084, UA was unremarkable  K was 3.1  Given 1 L fluids    Patient is AOx 3, but unsure of her episode.  Bilateral lower extremity weakness is chronic    PLAN:  -PT/OT to assess for rehab needs  -Will check for myoglobin in urine  -Check mag and Phos  -Monitor electrolytes, replete  -Given 1 L fluids, will give another liter maintenance          VTE Pharmacologic Prophylaxis: Enoxaparin (Lovenox)  VTE Mechanical Prophylaxis: sequential compression device    CHIEF COMPLAINT     Chief Complaint   Patient presents with    Fall     Level c       HISTORY OF PRESENT ILLNESS     Eva is a 78-year-old female with past medical history of previous stroke, GERD, emphysema , gait disturbance with lower extremity weakness chronically, on aspirin who presents as a level C trauma on aspirin.   Patient lives at [facility] and had fallen but patient is not aware how long she was down or how she fell.  Imaging was negative for traumatic injury and trauma workup was negative.  CK was found to be 1084, UA was unremarkable.  K was 3.1.  Troponin was 43, 41, 44.  Given 1 L fluids.  WBC at 16.1    Patient is AOx  3, but unsure of her episode, poor historian.  Patient however denies chest pain, shortness of breath, nausea or vomiting or abdominal pain, fever or chills.  Bilateral lower extremity weakness is chronic..    Patient will be admitted to [facility] for ambulatory dysfunction, Level 3 code.        REVIEW OF SYSTEMS     Review of Systems   All other systems reviewed and are negative.    OBJECTIVE     Vitals:    24 1230 24 1514 24 1515 24 1600   BP: 164/75 164/94 164/94 (!) 178/77   BP Location:  Right arm Right arm    Pulse: 82 88 86 78   Resp:    Temp:       TempSrc:       SpO2: 95% 94% 95% 93%      Temperature:   Temp (24hrs), Av °F (36.7 °C), Min:98 °F (36.7 °C), Max:98 °F (36.7 °C)    Temperature: 98 °F (36.7 °C)  Intake & Output:  I/O          0701   0700  0701   0700  0701   0700    IV Piggyback   500    Total Intake   500    Net   +500                 Weights:        There is no height or weight on file to calculate BMI.  Weight (last 2 days)       None          Physical Exam  Vitals reviewed.   Constitutional:       General: She is not in acute distress.  HENT:      Head: Normocephalic and atraumatic.   Eyes:      Conjunctiva/sclera: Conjunctivae normal.   Cardiovascular:      Rate and Rhythm: Normal rate and regular rhythm.      Pulses: Normal pulses.      Heart sounds: Normal heart sounds.   Pulmonary:      Effort: Pulmonary effort is normal.      Breath sounds: Normal breath sounds.   Abdominal:      Palpations: Abdomen is soft.      Tenderness: There is no abdominal tenderness.   Musculoskeletal:      Right lower leg: No edema.      Left lower leg: No edema.      Comments: Lower extremity strength 3-4 out of 5   Neurological:      Mental Status: She is alert and oriented to person, place, and time.       PAST MEDICAL HISTORY     Past Medical History:   Diagnosis Date    Cataract of right eye     GERD (gastroesophageal reflux disease)      PAST  "SURGICAL HISTORY     Past Surgical History:   Procedure Laterality Date    CATARACT EXTRACTION, BILATERAL Bilateral     MASTECTOMY Bilateral     for calcium deposits    WA COLONOSCOPY FLX DX W/COLLJ SPEC WHEN PFRMD N/A 8/25/2017    Procedure: COLONOSCOPY;  Surgeon: Harika Aguilar MD;  Location: [facility];  Service: Colorectal    WA XCAPSL CTRC RMVL INSJ IO LENS PROSTH W/O ECP Right 6/21/2016    Procedure: OD EXTRACTION EXTRACAPSULAR CATARACT PHACO INTRAOCULAR LENS (IOL);  Surgeon: Lida Dewitt MD;  Location: [facility];  Service: Ophthalmology    TONSILLECTOMY       SOCIAL & FAMILY HISTORY     Social History     Substance and Sexual Activity   Alcohol Use Yes    Comment: pt states \"very occassionally\"       Social History     Substance and Sexual Activity   Drug Use Never     Social History     Tobacco Use   Smoking Status Former   Smokeless Tobacco Never     Family History   Problem Relation Age of Onset    Rectal cancer Brother     Stroke Mother     Heart attack Father     Arrhythmia Father         possible    Coronary artery disease Father     Sudden death Father         scd    Anuerysm Neg Hx     Clotting disorder Neg Hx     Hypertension Neg Hx     Hyperlipidemia Neg Hx     Heart failure Neg Hx      LABORATORY DATA     Labs: I have personally reviewed pertinent reports.    Results from last 7 days   Lab Units 01/19/24  1125   WBC Thousand/uL 16.10*   HEMOGLOBIN g/dL 13.6   HEMATOCRIT % 39.6   PLATELETS Thousands/uL 308   NEUTROS PCT % 83*   MONOS PCT % 7   EOS PCT % 0      Results from last 7 days   Lab Units 01/19/24  1125   POTASSIUM mmol/L 3.1*   CHLORIDE mmol/L 100   CO2 mmol/L 30   BUN mg/dL 15   CREATININE mg/dL 0.83   CALCIUM mg/dL 10.5*   ALK PHOS U/L 70   ALT U/L 14   AST U/L 38              Results from last 7 days   Lab Units 01/19/24  1125   INR  1.07   PTT seconds 26             Micro:  Lab Results   Component Value Date    BLOODCX No Growth After 5 Days. 03/21/2023    BLOODCX No Growth " "After 5 Days. 03/21/2023     IMAGING & DIAGNOSTIC TESTS     Imaging: I have personally reviewed pertinent reports.    TRAUMA - CT spine cervical wo contrast    Result Date: 1/19/2024  Impression: No cervical spine fracture or traumatic malalignment. Workstation performed: [id number]     TRAUMA - CT chest abdomen pelvis w contrast    Result Date: 1/19/2024  Impression: No acute traumatic parenchymal injury in chest, abdomen or pelvis. No acute fracture. Reidentified pulmonary emphysematous changes. No new or enlarging solid pulmonary nodule. Small sliding-type hiatal hernia. Thickening of the wall of the gastric fundus and body suggestive of gastritis but similar when compared to previous examinations. IV contrast extravasation into the upper extremity as described above. This examination was marked \"immediate notification\" in Epic in order to begin the standard process by which the radiology reading room liaison alerts the referring practitioner. Workstation performed: [id number]     TRAUMA - CT head wo contrast    Result Date: 1/19/2024  Impression: No acute intracranial abnormality. Workstation performed: [id number]     EKG, Pathology, and Other Studies: I have personally reviewed pertinent reports.     ALLERGIES   No Known Allergies  MEDICATIONS PRIOR TO ARRIVAL     Prior to Admission medications    Medication Sig Start Date End Date Taking? Authorizing Provider   acetaminophen (TYLENOL) 325 mg tablet Take 2 tablets (650 mg total) by mouth every 6 (six) hours as needed for mild pain or fever 4/7/23   Uriel Andres MD   Ascorbic Acid (VITAMIN C PO) Take by mouth    Historical Provider, MD   atorvastatin (LIPITOR) 10 mg tablet Take 1 tablet by mouth Daily    Historical Provider, MD   b complex vitamins tablet Take 1 tablet by mouth daily.    Historical Provider, MD   calcium carbonate (OYSTER SHELL,OSCAL) 500 mg Take 1 tablet by mouth 2 (two) times a day with meals 10/2/23   Emilie Soyeon Kim, MD "   ergocalciferol (VITAMIN D2) 50,000 units Take 1 capsule (50,000 Units total) by mouth once a week 10/2/23   Emilie Soyeon Kim, MD   famotidine (PEPCID) 40 MG tablet  3/8/22   Historical Provider, MD   fluticasone (FLONASE) 50 mcg/act nasal spray 1 spray into each nostril daily Do not start before April 8, 2023. 4/8/23   Uriel Andres MD   Multiple Vitamins-Minerals (CENTRUM CARDIO PO) Take 1 tablet by mouth daily.    Historical Provider, MD   primidone (MYSOLINE) 50 mg tablet Take 1 tab by mouth daily at bedtime 9/12/23   Heather Florence PA-C     MEDICATIONS ADMINISTERED IN LAST 24 HOURS     Medication Administration - last 24 hours from 01/18/2024 1713 to 01/19/2024 1713         Date/Time Order Dose Route Action Action by     01/19/2024 1208 EST iohexol (OMNIPAQUE) 350 MG/ML injection (MULTI-DOSE) 100 mL 80 mL Intravenous Given Patric Patric Hubbard     01/19/2024 1510 EST sodium chloride 0.9 % bolus 500 mL 0 mL Intravenous Stopped Daiana Loving RN     01/19/2024 1355 EST sodium chloride 0.9 % bolus 500 mL 500 mL Intravenous New Bag Daiana Loving RN     01/19/2024 1511 EST multi-electrolyte (ISOLYTE-S PH 7.4) bolus 1,000 mL 1,000 mL Intravenous New Bag Daiana Loving RN     01/19/2024 1621 EST potassium chloride (K-DUR,KLOR-CON) CR tablet 40 mEq 40 mEq Oral Not Given Daiana Loving RN     01/19/2024 1621 EST atorvastatin (LIPITOR) tablet 10 mg 10 mg Oral Given Daiana Loving RN     01/19/2024 1621 EST famotidine (PEPCID) tablet 40 mg 40 mg Oral Given Daiana Loving RN     01/19/2024 1621 EST multivitamin-minerals (CENTRUM) tablet 1 tablet 1 tablet Oral Not Given Daiana Loving RN     01/19/2024 1621 EST enoxaparin (LOVENOX) subcutaneous injection 40 mg 40 mg Subcutaneous Given Daiana Loving RN          CURRENT MEDICATIONS     Current Facility-Administered Medications   Medication Dose Route Frequency Provider Last Rate    acetaminophen  650 mg Oral Q6H PRN Huong Alcala MD       "atorvastatin  10 mg Oral Daily With Dinner Huong Alcala MD      enoxaparin  40 mg Subcutaneous Daily Huong Alcala MD      famotidine  40 mg Oral Daily Huong Alcala MD      multi-electrolyte  100 mL/hr Intravenous Continuous Huong Alcala MD      multivitamin-minerals  1 tablet Oral Daily Huong Alcala MD      potassium chloride  40 mEq Oral Once Huong Alcala MD      primidone  50 mg Oral HS Huong Alcala MD       multi-electrolyte, 100 mL/hr      acetaminophen, 650 mg, Q6H PRN        Admission Time  I spent 45 minutes admitting the patient.  This involved direct patient contact where I performed a full history and physical, reviewing previous records, and reviewing laboratory and other diagnostic studies.    Portions of the record may have been created with voice recognition software.  Occasional wrong word or \"sound a like\" substitutions may have occurred due to the inherent limitations of voice recognition software.  Read the chart carefully and recognize, using context, where substitutions have occurred.    ==  Huong Alcala MD  [facility]  Internal Medicine Residency PGY-2   "

## 2024-01-19 NOTE — ED PROVIDER NOTES
Emergency Department Trauma Note  Eva Lacey 78 y.o. female MRN: [id number]  Unit/Bed#: ED 24/ED 24 Encounter: [id number]      Trauma Alert: Trauma Acuity: C  Model of Arrival: Mode of Arrival: ALS via    Trauma Team: Current Providers  Attending Provider: Mary Friend MD  Attending Provider: Shona Fierro MD  Registered Nurse: Daiana Loving RN  Resident: Faisal Zeng MD  Resident: Kim Walker MD  Consultants:     None      History of Present Illness     Chief Complaint:   Chief Complaint   Patient presents with    Fall     Level c      HPI:  Eva Lacey is a 78 y.o. female who presents with fall.  Patient states that she has no idea how she fell or how she ended up on the ground.  She does not know how long she was down.  She does not know anything else at this time.  She states that she has no pain.  She states she lives by herself.  She denies any medical complaints including fever, chills, blurry vision, dizziness, chest pain, shortness of breath, N/V/D, abdominal pain, motor or sensory deficits.  Per EMS  found her after she was yelling for some time.  Mechanism:Details of Incident: fall, +ASA, unknown downtime, unknown HS Injury Date: 01/19/24        HPI  Review of Systems    Historical Information     Immunizations:   Immunization History   Administered Date(s) Administered    COVID-19 PFIZER VACCINE 0.3 ML IM 02/25/2021, 03/25/2021, 10/18/2021    COVID-19 Pfizer Vac BIVALENT Saul-sucrose 12 Yr+ IM 10/07/2022, 07/08/2023    COVID-19 Pfizer vac (Saul-sucrose, gray cap) 12 yr+ IM 05/01/2022    Tdap 02/23/2018       Past Medical History:   Diagnosis Date    Cataract of right eye     GERD (gastroesophageal reflux disease)        Family History   Problem Relation Age of Onset    Rectal cancer Brother     Stroke Mother     Heart attack Father     Arrhythmia Father         possible    Coronary artery disease Father     Sudden death Father         scd    Anuerysm Neg Hx      "Clotting disorder Neg Hx     Hypertension Neg Hx     Hyperlipidemia Neg Hx     Heart failure Neg Hx      Past Surgical History:   Procedure Laterality Date    CATARACT EXTRACTION, BILATERAL Bilateral     MASTECTOMY Bilateral     for calcium deposits    CO COLONOSCOPY FLX DX W/COLLJ SPEC WHEN PFRMD N/A 8/25/2017    Procedure: COLONOSCOPY;  Surgeon: Harika Aguilar MD;  Location: [facility];  Service: Colorectal    CO XCAPSL CTRC RMVL INSJ IO LENS PROSTH W/O ECP Right 6/21/2016    Procedure: OD EXTRACTION EXTRACAPSULAR CATARACT PHACO INTRAOCULAR LENS (IOL);  Surgeon: Lida Dewitt MD;  Location: [facility];  Service: Ophthalmology    TONSILLECTOMY       Social History     Tobacco Use    Smoking status: Former    Smokeless tobacco: Never   Vaping Use    Vaping status: Never Used   Substance Use Topics    Alcohol use: Yes     Comment: pt states \"very occassionally\"    Drug use: Never     E-Cigarette/Vaping    E-Cigarette Use Never User      E-Cigarette/Vaping Substances    Nicotine No     THC No     CBD No     Flavoring No     Other No     Unknown No        Family History: non-contributory    Meds/Allergies   Prior to Admission Medications   Prescriptions Last Dose Informant Patient Reported? Taking?   Ascorbic Acid (VITAMIN C PO)  Self Yes No   Sig: Take by mouth   Multiple Vitamins-Minerals (CENTRUM CARDIO PO)  Self Yes No   Sig: Take 1 tablet by mouth daily.   acetaminophen (TYLENOL) 325 mg tablet  Self No No   Sig: Take 2 tablets (650 mg total) by mouth every 6 (six) hours as needed for mild pain or fever   atorvastatin (LIPITOR) 10 mg tablet  Self Yes No   Sig: Take 1 tablet by mouth Daily   b complex vitamins tablet  Self Yes No   Sig: Take 1 tablet by mouth daily.   calcium carbonate (OYSTER SHELL,OSCAL) 500 mg   No No   Sig: Take 1 tablet by mouth 2 (two) times a day with meals   ergocalciferol (VITAMIN D2) 50,000 units   No No   Sig: Take 1 capsule (50,000 Units total) by mouth once a week   famotidine " (PEPCID) 40 MG tablet  Self Yes No   fluticasone (FLONASE) 50 mcg/act nasal spray  Self No No   Si spray into each nostril daily Do not start before 2023.   primidone (MYSOLINE) 50 mg tablet   No No   Sig: Take 1 tab by mouth daily at bedtime      Facility-Administered Medications: None       No Known Allergies    PHYSICAL EXAM    PE limited by: None    Objective   Vitals:   First set: Temperature: 98 °F (36.7 °C) (24 1105)  Pulse: 86 (24 1112)  Respirations: 18 (24 1112)  Blood Pressure: 153/94 (24 1112)  SpO2: 96 % (24 1112)    Primary Survey:   (A) Airway: Intact  (B) Breathing: Bilateral  (C) Circulation: Pulses:   normal  (D) Disabliity:  GCS Total:  15  (E) Expose:  Completed    Secondary Survey: (Click on Physical Exam tab above)  Physical Exam  Vitals and nursing note reviewed.   Constitutional:       Appearance: She is well-developed. She is ill-appearing.   HENT:      Head: Normocephalic and atraumatic.      Nose: Nose normal. No congestion or rhinorrhea.      Mouth/Throat:      Mouth: Mucous membranes are dry.      Pharynx: No oropharyngeal exudate or posterior oropharyngeal erythema.   Eyes:      Extraocular Movements: Extraocular movements intact.      Conjunctiva/sclera: Conjunctivae normal.      Pupils: Pupils are equal, round, and reactive to light.   Cardiovascular:      Rate and Rhythm: Normal rate and regular rhythm.      Pulses: Normal pulses.      Heart sounds: Normal heart sounds.   Pulmonary:      Effort: Pulmonary effort is normal.      Breath sounds: Normal breath sounds.   Abdominal:      General: Bowel sounds are normal. There is no distension.      Palpations: Abdomen is soft.      Tenderness: There is no abdominal tenderness. There is no guarding or rebound.   Genitourinary:     Rectum: Normal.   Musculoskeletal:         General: No swelling or deformity. Normal range of motion.      Cervical back: Normal range of motion and neck supple. No  rigidity or tenderness.      Right lower leg: No edema.      Left lower leg: No edema.   Skin:     General: Skin is warm and dry.      Capillary Refill: Capillary refill takes less than 2 seconds.      Findings: No bruising or erythema.   Neurological:      General: No focal deficit present.      Mental Status: She is alert and oriented to person, place, and time.      Cranial Nerves: No cranial nerve deficit.      Motor: Weakness present.      Coordination: Coordination normal.   Psychiatric:         Mood and Affect: Mood normal.         Behavior: Behavior normal.         Cervical spine cleared by clinical criteria? Yes     Invasive Devices       Peripheral Intravenous Line  Duration             Peripheral IV 01/19/24 Left;Upper;Ventral (anterior) Arm <1 day    Peripheral IV 01/19/24 Left;Ventral (anterior) Wrist <1 day                    Lab Results:   Results Reviewed       Procedure Component Value Units Date/Time    Urine Microscopic [[id number]]  (Abnormal) Collected: 01/19/24 1511    Lab Status: Final result Specimen: Urine, Other Updated: 01/19/24 1525     RBC, UA 1-2 /hpf      WBC, UA 2-4 /hpf      Epithelial Cells Occasional /hpf      Bacteria, UA None Seen /hpf     UA w Reflex to Microscopic w Reflex to Culture [[id number]]  (Abnormal) Collected: 01/19/24 1511    Lab Status: Final result Specimen: Urine, Other Updated: 01/19/24 1525     Color, UA Light Yellow     Clarity, UA Clear     Specific Gravity, UA 1.020     pH, UA 7.0     Leukocytes, UA Negative     Nitrite, UA Negative     Protein, UA Trace mg/dl      Glucose, UA Negative mg/dl      Ketones, UA 20 (1+) mg/dl      Urobilinogen, UA <2.0 mg/dl      Bilirubin, UA Negative     Occult Blood, UA Negative    Rapid HIV 1/2 AB-AG Combo for 12 yr old and above [[id number]]  (Normal) Collected: 01/19/24 1355    Lab Status: Final result Specimen: Blood from Arm, Left Updated: 01/19/24 1448     Rapid HIV 1 AND 2 Non-Reactive     HIV-1 P24 Ag Screen Non-Reactive     Narrative:      Negative for HIV-1 p24 Antigen.  Negative for HIV-1 and/or HIV-2 Antibody.    HS Troponin I 2hr [[id number]]  (Normal) Collected: 01/19/24 1355    Lab Status: Final result Specimen: Blood from Arm, Left Updated: 01/19/24 1442     hs TnI 2hr 41 ng/L      Delta 2hr hsTnI -2 ng/L     Hepatitis B surface antigen [[id number]] Collected: 01/19/24 1355    Lab Status: In process Specimen: Blood from Arm, Left Updated: 01/19/24 1402    Hepatitis C antibody [[id number]] Collected: 01/19/24 1355    Lab Status: In process Specimen: Blood from Arm, Left Updated: 01/19/24 1402    HS Troponin 0hr (reflex protocol) [[id number]]  (Normal) Collected: 01/19/24 1125    Lab Status: Final result Specimen: Blood from Arm, Right Updated: 01/19/24 1314     hs TnI 0hr 43 ng/L     HS Troponin I 4hr [[id number]]     Lab Status: No result Specimen: Blood     Comprehensive metabolic panel [[id number]]  (Abnormal) Collected: 01/19/24 1125    Lab Status: Final result Specimen: Blood from Arm, Right Updated: 01/19/24 1302     Sodium 144 mmol/L      Potassium 3.1 mmol/L      Chloride 100 mmol/L      CO2 30 mmol/L      ANION GAP 14 mmol/L      BUN 15 mg/dL      Creatinine 0.83 mg/dL      Glucose 129 mg/dL      Calcium 10.5 mg/dL      AST 38 U/L      ALT 14 U/L      Alkaline Phosphatase 70 U/L      Total Protein 8.2 g/dL      Albumin 4.1 g/dL      Total Bilirubin 0.62 mg/dL      eGFR 68 ml/min/1.73sq m     Narrative:      National Kid[employer] Foundation guidelines for Chronic Kidney Disease (CKD):     Stage 1 with normal or high GFR (GFR > 90 mL/min/1.73 square meters)    Stage 2 Mild CKD (GFR = 60-89 mL/min/1.73 square meters)    Stage 3A Moderate CKD (GFR = 45-59 mL/min/1.73 square meters)    Stage 3B Moderate CKD (GFR = 30-44 mL/min/1.73 square meters)    Stage 4 Severe CKD (GFR = 15-29 mL/min/1.73 square meters)    Stage 5 End Stage CKD (GFR <15 mL/min/1.73 square meters)  Note: GFR calculation is accurate only with a steady state  creatinine    CK [[id number]]  (Abnormal) Collected: 01/19/24 1125    Lab Status: Final result Specimen: Blood from Arm, Right Updated: 01/19/24 1302     Total CK 1,084 U/L     APTT [[id number]]  (Normal) Collected: 01/19/24 1125    Lab Status: Final result Specimen: Blood from Arm, Right Updated: 01/19/24 1157     PTT 26 seconds     Protime-INR [[id number]]  (Normal) Collected: 01/19/24 1125    Lab Status: Final result Specimen: Blood from Arm, Right Updated: 01/19/24 1157     Protime 13.8 seconds      INR 1.07    CBC and differential [[id number]]  (Abnormal) Collected: 01/19/24 1125    Lab Status: Final result Specimen: Blood from Arm, Right Updated: 01/19/24 1136     WBC 16.10 Thousand/uL      RBC 4.01 Million/uL      Hemoglobin 13.6 g/dL      Hematocrit 39.6 %      MCV 99 fL      MCH 33.9 pg      MCHC 34.3 g/dL      RDW 11.9 %      MPV 9.8 fL      Platelets 308 Thousands/uL      nRBC 0 /100 WBCs      Neutrophils Relative 83 %      Immat GRANS % 1 %      Lymphocytes Relative 9 %      Monocytes Relative 7 %      Eosinophils Relative 0 %      Basophils Relative 0 %      Neutrophils Absolute 13.44 Thousands/µL      Immature Grans Absolute 0.13 Thousand/uL      Lymphocytes Absolute 1.44 Thousands/µL      Monocytes Absolute 1.05 Thousand/µL      Eosinophils Absolute 0.00 Thousand/µL      Basophils Absolute 0.04 Thousands/µL                    Imaging Studies:   Direct to CT: Yes  TRAUMA - CT head wo contrast   Final Result by Kofi Granger MD (01/19 5216)      No acute intracranial abnormality.                  Workstation performed: [id number]         TRAUMA - CT spine cervical wo contrast   Final Result by Kofi Granger MD (01/19 2927)      No cervical spine fracture or traumatic malalignment.                  Workstation performed: [id number]         TRAUMA - CT chest abdomen pelvis w contrast   Final Result by Kofi Granger MD (01/19 1907)      No acute traumatic parenchymal injury in chest, abdomen or  "pelvis. No acute fracture.      Reidentified pulmonary emphysematous changes. No new or enlarging solid pulmonary nodule.      Small sliding-type hiatal hernia. Thickening of the wall of the gastric fundus and body suggestive of gastritis but similar when compared to previous examinations.      IV contrast extravasation into the upper extremity as described above.      This examination was marked \"immediate notification\" in Epic in order to begin the standard process by which the radiology reading room liaison alerts the referring practitioner.         Workstation performed: [id number]               Procedures  Procedures         ED Course  ED Course as of 01/19/24 1546   Fri Jan 19, 2024   1147 WBC(!): 16.10   1318 Total CK(!): 1,084   1319 hs TnI 0hr: 43   1442 Delta 2hr hsTnI: -2     Patient admitted for rhabdo and dehydration.  As well as ambulatory dysfunction.      Medical Decision Making  Amount and/or Complexity of Data Reviewed  Labs: ordered. Decision-making details documented in ED Course.  Radiology: ordered.    Risk  Prescription drug management.  Decision regarding hospitalization.                Disposition  [facility] Transfer: No  Final diagnoses:   Fall, initial encounter   Rhabdomyolysis     Time reflects when diagnosis was documented in both MDM as applicable and the Disposition within this note       Time User Action Codes Description Comment    1/19/2024  3:03 PM Faisal Zeng [W19.XXXA] Fall, initial encounter     1/19/2024  3:03 PM Faisal Zeng [M62.82] Rhabdomyolysis           ED Disposition       ED Disposition   Admit    Condition   Stable    Date/Time   Fri Jan 19, 2024  3:03 PM    Comment   Case was discussed with SOD and the patient's admission status was agreed to be Admission Status: inpatient status to the service of Dr. MONTENEGRO .               Follow-up Information    None       Patient's Medications   Discharge Prescriptions    No medications on file     No discharge " procedures on file.    PDMP Review       None            ED Provider  Electronically Signed by           Faisal Zeng MD  01/19/24 1950

## 2024-01-19 NOTE — ASSESSMENT & PLAN NOTE
Based on history, unclear etiology of fall as patient is not sure  Attempted to call son with no response, will need to call facility for details  Most likely from electrolyte abnormality    Discontinue telemetry  ECHO wnl

## 2024-01-19 NOTE — ASSESSMENT & PLAN NOTE
WBC 16    Patient is afebrile  Low suspicion for infection at this time  Likely reactive in the setting of recent fall and stress    -Monitor and recheck

## 2024-01-19 NOTE — ASSESSMENT & PLAN NOTE
CT- Small sliding-type hiatal hernia. Thickening of the wall of the gastric fundus and body suggestive of gastritis but similar when compared to previous examinations.     Continue home Pepcid 40 mg daily

## 2024-01-19 NOTE — ASSESSMENT & PLAN NOTE
History of cerebral hemorrhage with continued deficits with restricted left upper extremity range of motion and continued left upper and lower extremity tingling sensation as described by patient. CT head negative at this admission. Patient has bilateral lower extremity weakness which is chronic     Plan  We will continue atorvastatin 10  Continue to monitor neurologic exam  PT/OT recommending post acute rehab

## 2024-01-19 NOTE — ASSESSMENT & PLAN NOTE
Eva is a 78-year-old female with past medical history of previous stroke, GERD, emphysema , gait disturbance with lower extremity weakness chronically, on aspirin who presents as a level C trauma on aspirin.   Patient lives at [facility] and had fallen but patient is not aware how long she was down or how she fell.  Imaging was negative for traumatic injury and trauma workup was negative  CK was found to be 1084, UA was unremarkable  K was 3.1  Given 1 L fluids    Patient is AOx 3, but unsure of her episode.  Bilateral lower extremity weakness is chronic    PLAN:  -PT/OT recommend post acute rehab; [facility] will [facility] going to [facility]  -Cardiac work up negative: ECHO wnl

## 2024-01-20 LAB
ALBUMIN SERPL BCG-MCNC: 3.5 G/DL (ref 3.5–5)
ALP SERPL-CCNC: 58 U/L (ref 34–104)
ALT SERPL W P-5'-P-CCNC: 14 U/L (ref 7–52)
ANION GAP SERPL CALCULATED.3IONS-SCNC: 13 MMOL/L
AST SERPL W P-5'-P-CCNC: 39 U/L (ref 13–39)
BASOPHILS # BLD AUTO: 0.03 THOUSANDS/ÂΜL (ref 0–0.1)
BASOPHILS NFR BLD AUTO: 0 % (ref 0–1)
BILIRUB SERPL-MCNC: 0.44 MG/DL (ref 0.2–1)
BUN SERPL-MCNC: 13 MG/DL (ref 5–25)
CALCIUM SERPL-MCNC: 8.5 MG/DL (ref 8.4–10.2)
CHLORIDE SERPL-SCNC: 103 MMOL/L (ref 96–108)
CHLORIDE UR-SCNC: 97 MMOL/L
CO2 SERPL-SCNC: 29 MMOL/L (ref 21–32)
CREAT SERPL-MCNC: 0.68 MG/DL (ref 0.6–1.3)
CREAT UR-MCNC: 70.3 MG/DL
EOSINOPHIL # BLD AUTO: 0.01 THOUSAND/ÂΜL (ref 0–0.61)
EOSINOPHIL NFR BLD AUTO: 0 % (ref 0–6)
ERYTHROCYTE [DISTWIDTH] IN BLOOD BY AUTOMATED COUNT: 12.1 % (ref 11.6–15.1)
GFR SERPL CREATININE-BSD FRML MDRD: 85 ML/MIN/1.73SQ M
GLUCOSE SERPL-MCNC: 100 MG/DL (ref 65–140)
HCT VFR BLD AUTO: 33.9 % (ref 34.8–46.1)
HGB BLD-MCNC: 11.4 G/DL (ref 11.5–15.4)
IMM GRANULOCYTES # BLD AUTO: 0.11 THOUSAND/UL (ref 0–0.2)
IMM GRANULOCYTES NFR BLD AUTO: 1 % (ref 0–2)
LYMPHOCYTES # BLD AUTO: 1.75 THOUSANDS/ÂΜL (ref 0.6–4.47)
LYMPHOCYTES NFR BLD AUTO: 12 % (ref 14–44)
MAGNESIUM SERPL-MCNC: 1.6 MG/DL (ref 1.9–2.7)
MCH RBC QN AUTO: 33.6 PG (ref 26.8–34.3)
MCHC RBC AUTO-ENTMCNC: 33.6 G/DL (ref 31.4–37.4)
MCV RBC AUTO: 100 FL (ref 82–98)
MONOCYTES # BLD AUTO: 1.1 THOUSAND/ÂΜL (ref 0.17–1.22)
MONOCYTES NFR BLD AUTO: 8 % (ref 4–12)
NEUTROPHILS # BLD AUTO: 11.37 THOUSANDS/ÂΜL (ref 1.85–7.62)
NEUTS SEG NFR BLD AUTO: 79 % (ref 43–75)
NRBC BLD AUTO-RTO: 0 /100 WBCS
OSMOLALITY UR: 516 MMOL/KG
PHOSPHATE SERPL-MCNC: 2.3 MG/DL (ref 2.3–4.1)
PLATELET # BLD AUTO: 240 THOUSANDS/UL (ref 149–390)
PMV BLD AUTO: 10.4 FL (ref 8.9–12.7)
POTASSIUM SERPL-SCNC: 2.4 MMOL/L (ref 3.5–5.3)
POTASSIUM SERPL-SCNC: 2.9 MMOL/L (ref 3.5–5.3)
POTASSIUM UR-SCNC: 30.6 MMOL/L
PROT SERPL-MCNC: 6.7 G/DL (ref 6.4–8.4)
RBC # BLD AUTO: 3.39 MILLION/UL (ref 3.81–5.12)
SODIUM 24H UR-SCNC: 130 MOL/L
SODIUM SERPL-SCNC: 145 MMOL/L (ref 135–147)
WBC # BLD AUTO: 14.37 THOUSAND/UL (ref 4.31–10.16)

## 2024-01-20 PROCEDURE — 82570 ASSAY OF URINE CREATININE: CPT | Performed by: STUDENT IN AN ORGANIZED HEALTH CARE EDUCATION/TRAINING PROGRAM

## 2024-01-20 PROCEDURE — 83735 ASSAY OF MAGNESIUM: CPT | Performed by: STUDENT IN AN ORGANIZED HEALTH CARE EDUCATION/TRAINING PROGRAM

## 2024-01-20 PROCEDURE — 97167 OT EVAL HIGH COMPLEX 60 MIN: CPT

## 2024-01-20 PROCEDURE — 84300 ASSAY OF URINE SODIUM: CPT

## 2024-01-20 PROCEDURE — 84100 ASSAY OF PHOSPHORUS: CPT | Performed by: STUDENT IN AN ORGANIZED HEALTH CARE EDUCATION/TRAINING PROGRAM

## 2024-01-20 PROCEDURE — 80048 BASIC METABOLIC PNL TOTAL CA: CPT | Performed by: STUDENT IN AN ORGANIZED HEALTH CARE EDUCATION/TRAINING PROGRAM

## 2024-01-20 PROCEDURE — 83935 ASSAY OF URINE OSMOLALITY: CPT

## 2024-01-20 PROCEDURE — NC001 PR NO CHARGE: Performed by: INTERNAL MEDICINE

## 2024-01-20 PROCEDURE — 80053 COMPREHEN METABOLIC PANEL: CPT | Performed by: STUDENT IN AN ORGANIZED HEALTH CARE EDUCATION/TRAINING PROGRAM

## 2024-01-20 PROCEDURE — 92610 EVALUATE SWALLOWING FUNCTION: CPT

## 2024-01-20 PROCEDURE — 84133 ASSAY OF URINE POTASSIUM: CPT

## 2024-01-20 PROCEDURE — 84132 ASSAY OF SERUM POTASSIUM: CPT

## 2024-01-20 PROCEDURE — 97163 PT EVAL HIGH COMPLEX 45 MIN: CPT

## 2024-01-20 PROCEDURE — 99223 1ST HOSP IP/OBS HIGH 75: CPT | Performed by: INTERNAL MEDICINE

## 2024-01-20 PROCEDURE — 85025 COMPLETE CBC W/AUTO DIFF WBC: CPT | Performed by: STUDENT IN AN ORGANIZED HEALTH CARE EDUCATION/TRAINING PROGRAM

## 2024-01-20 PROCEDURE — 82436 ASSAY OF URINE CHLORIDE: CPT | Performed by: STUDENT IN AN ORGANIZED HEALTH CARE EDUCATION/TRAINING PROGRAM

## 2024-01-20 RX ORDER — POTASSIUM CHLORIDE 20MEQ/15ML
40 LIQUID (ML) ORAL ONCE
Status: COMPLETED | OUTPATIENT
Start: 2024-01-20 | End: 2024-01-20

## 2024-01-20 RX ORDER — POTASSIUM CHLORIDE 1500 MG/1
40 TABLET, EXTENDED RELEASE ORAL ONCE
Status: COMPLETED | OUTPATIENT
Start: 2024-01-20 | End: 2024-01-20

## 2024-01-20 RX ORDER — SODIUM CHLORIDE, SODIUM GLUCONATE, SODIUM ACETATE, POTASSIUM CHLORIDE, MAGNESIUM CHLORIDE, SODIUM PHOSPHATE, DIBASIC, AND POTASSIUM PHOSPHATE .53; .5; .37; .037; .03; .012; .00082 G/100ML; G/100ML; G/100ML; G/100ML; G/100ML; G/100ML; G/100ML
250 INJECTION, SOLUTION INTRAVENOUS ONCE
Status: COMPLETED | OUTPATIENT
Start: 2024-01-20 | End: 2024-01-20

## 2024-01-20 RX ORDER — POTASSIUM CHLORIDE 1500 MG/1
20 TABLET, EXTENDED RELEASE ORAL ONCE
Status: COMPLETED | OUTPATIENT
Start: 2024-01-20 | End: 2024-01-20

## 2024-01-20 RX ORDER — MAGNESIUM SULFATE HEPTAHYDRATE 40 MG/ML
2 INJECTION, SOLUTION INTRAVENOUS ONCE
Status: COMPLETED | OUTPATIENT
Start: 2024-01-20 | End: 2024-01-20

## 2024-01-20 RX ORDER — POTASSIUM CHLORIDE 14.9 MG/ML
20 INJECTION INTRAVENOUS
Qty: 400 ML | Refills: 0 | Status: DISPENSED | OUTPATIENT
Start: 2024-01-20 | End: 2024-01-20

## 2024-01-20 RX ORDER — POTASSIUM CHLORIDE 14.9 MG/ML
20 INJECTION INTRAVENOUS
Qty: 200 ML | Refills: 0 | Status: COMPLETED | OUTPATIENT
Start: 2024-01-20 | End: 2024-01-20

## 2024-01-20 RX ORDER — MAGNESIUM SULFATE HEPTAHYDRATE 40 MG/ML
4 INJECTION, SOLUTION INTRAVENOUS ONCE
Status: DISCONTINUED | OUTPATIENT
Start: 2024-01-20 | End: 2024-01-20

## 2024-01-20 RX ORDER — ECHINACEA PURPUREA EXTRACT 125 MG
1 TABLET ORAL
Status: DISCONTINUED | OUTPATIENT
Start: 2024-01-20 | End: 2024-01-23 | Stop reason: HOSPADM

## 2024-01-20 RX ORDER — MAGNESIUM SULFATE 1 G/100ML
1 INJECTION INTRAVENOUS ONCE
Status: COMPLETED | OUTPATIENT
Start: 2024-01-20 | End: 2024-01-20

## 2024-01-20 RX ORDER — POTASSIUM CHLORIDE 29.8 MG/ML
40 INJECTION INTRAVENOUS 2 TIMES DAILY
Status: DISCONTINUED | OUTPATIENT
Start: 2024-01-20 | End: 2024-01-20

## 2024-01-20 RX ORDER — SODIUM CHLORIDE, SODIUM GLUCONATE, SODIUM ACETATE, POTASSIUM CHLORIDE, MAGNESIUM CHLORIDE, SODIUM PHOSPHATE, DIBASIC, AND POTASSIUM PHOSPHATE .53; .5; .37; .037; .03; .012; .00082 G/100ML; G/100ML; G/100ML; G/100ML; G/100ML; G/100ML; G/100ML
500 INJECTION, SOLUTION INTRAVENOUS ONCE
Status: COMPLETED | OUTPATIENT
Start: 2024-01-20 | End: 2024-01-20

## 2024-01-20 RX ADMIN — POTASSIUM CHLORIDE 20 MEQ: 14.9 INJECTION, SOLUTION INTRAVENOUS at 20:23

## 2024-01-20 RX ADMIN — POTASSIUM CHLORIDE 40 MEQ: 1.5 SOLUTION ORAL at 17:42

## 2024-01-20 RX ADMIN — PRIMIDONE 50 MG: 50 TABLET ORAL at 21:17

## 2024-01-20 RX ADMIN — SODIUM CHLORIDE, SODIUM GLUCONATE, SODIUM ACETATE, POTASSIUM CHLORIDE, MAGNESIUM CHLORIDE, SODIUM PHOSPHATE, DIBASIC, AND POTASSIUM PHOSPHATE 500 ML: .53; .5; .37; .037; .03; .012; .00082 INJECTION, SOLUTION INTRAVENOUS at 16:13

## 2024-01-20 RX ADMIN — MAGNESIUM SULFATE HEPTAHYDRATE 2 G: 40 INJECTION, SOLUTION INTRAVENOUS at 07:50

## 2024-01-20 RX ADMIN — POTASSIUM CHLORIDE 20 MEQ: 1500 TABLET, EXTENDED RELEASE ORAL at 07:55

## 2024-01-20 RX ADMIN — POTASSIUM CHLORIDE 40 MEQ: 1500 TABLET, EXTENDED RELEASE ORAL at 20:24

## 2024-01-20 RX ADMIN — SODIUM CHLORIDE, SODIUM GLUCONATE, SODIUM ACETATE, POTASSIUM CHLORIDE, MAGNESIUM CHLORIDE, SODIUM PHOSPHATE, DIBASIC, AND POTASSIUM PHOSPHATE 250 ML: .53; .5; .37; .037; .03; .012; .00082 INJECTION, SOLUTION INTRAVENOUS at 10:55

## 2024-01-20 RX ADMIN — POTASSIUM CHLORIDE 20 MEQ: 14.9 INJECTION, SOLUTION INTRAVENOUS at 16:01

## 2024-01-20 RX ADMIN — ENOXAPARIN SODIUM 40 MG: 40 INJECTION SUBCUTANEOUS at 08:13

## 2024-01-20 RX ADMIN — MAGNESIUM SULFATE HEPTAHYDRATE 1 G: 1 INJECTION, SOLUTION INTRAVENOUS at 16:14

## 2024-01-20 RX ADMIN — ACETAMINOPHEN 650 MG: 325 TABLET, FILM COATED ORAL at 15:15

## 2024-01-20 RX ADMIN — FAMOTIDINE 40 MG: 20 TABLET, FILM COATED ORAL at 08:03

## 2024-01-20 RX ADMIN — ATORVASTATIN CALCIUM 10 MG: 10 TABLET, FILM COATED ORAL at 17:42

## 2024-01-20 RX ADMIN — POTASSIUM CHLORIDE 20 MEQ: 14.9 INJECTION, SOLUTION INTRAVENOUS at 11:01

## 2024-01-20 RX ADMIN — POTASSIUM CHLORIDE 20 MEQ: 14.9 INJECTION, SOLUTION INTRAVENOUS at 07:44

## 2024-01-20 RX ADMIN — Medication 1 TABLET: at 08:13

## 2024-01-20 RX ADMIN — POTASSIUM CHLORIDE 20 MEQ: 14.9 INJECTION, SOLUTION INTRAVENOUS at 18:05

## 2024-01-20 RX ADMIN — POTASSIUM CHLORIDE 40 MEQ: 1500 TABLET, EXTENDED RELEASE ORAL at 12:15

## 2024-01-20 NOTE — PLAN OF CARE
Problem: OCCUPATIONAL THERAPY ADULT  Goal: Performs self-care activities at highest level of function for planned discharge setting.  See evaluation for individualized goals.  Description: Treatment Interventions: ADL retraining, Functional transfer training, UE strengthening/ROM, Endurance training, Cognitive reorientation, Patient/family training, Equipment evaluation/education, Fine motor coordination activities, Continued evaluation, Energy conservation, Activityengagement          See flowsheet documentation for full assessment, interventions and recommendations.   Outcome: Progressing  Note: Limitation: Decreased ADL status, Decreased UE ROM, Decreased UE strength, Decreased Safe judgement during ADL, Decreased cognition, Decreased endurance, Decreased fine motor control, Decreased self-care trans, Decreased high-level ADLs  Prognosis: Fair  Assessment: Pt is a 78 y.o. female who was admitted to [facility] on 1/19/2024 with Ambulatory dysfunction and s/p fall. Pt seen for an OT evaluation per active OT orders.  Pt  has a past medical history of Cataract of right eye and GERD (gastroesophageal reflux disease). Pt lives alone in a 1 level apt at a senior living facility, uses a tub shower with GB and shower chair, raised toilet with GB. Pta, pt was independent w/ ADL and functional mobility, was having assist for IADL, and using a RW. Currently, pt is Mod Ax1 for UB ADL, Max Ax1 for LB ADL, and completed transfers/FM w Mod Ax2 with RW. Pt currently presents with impairments in the following categories -limited home support, difficulty performing ADLS, and limited insight into deficits activity tolerance, endurance, standing balance/tolerance, sitting balance/tolerance, UE strength, UE ROM, FMC, GMC, memory, insight, safety , judgement , attention , and sequencing . These impairments, as well as pt's fatigue, CAGLE, decreased caregiver support, and risk for falls  limit pt's ability to safely engage in  all baseline areas of occupation, includinggrooming, bathing, dressing, toileting, and functional mobility/transfers.    The patient's raw score on the AM-PAC Daily Activity Inpatient Short Form is 12. A raw score of less than 19 suggests the patient may benefit from discharge to post-acute rehabilitation services. Please refer to the recommendation of the Occupational Therapist for safe discharge planning. Pt would benefit from continued acute OT services throughout hospital course and following D/C. Plan for OT interventions 2-3x per week. From OT standpoint, recommend post acute rehab services upon D/C. Pt was left seated in bedside chair with chair alarm on and all needs within reach.     Rehab Resource Intensity Level, OT: II (Moderate Resource Intensity)

## 2024-01-20 NOTE — PHYSICAL THERAPY NOTE
Physical Therapy Evaluation     Patient's Name: Eva Lacey    Admitting Diagnosis  Rhabdomyolysis [M62.82]  Fall, initial encounter [W19.XXXA]  Unspecified multiple injuries, initial encounter [T07.XXXA]    Problem List  Patient Active Problem List   Diagnosis    Cataract of right eye    Idiopathic osteoporosis    Sacroiliitis (HCC)    Primary osteoarthritis of hand    Transient cerebral ischemia    Premature atrial beats    Chronic left-sided low back pain    History of cerebral hemorrhage    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 (HCC)    Acute cerebral hemorrhage (HCC)    Tremor, essential    Macrocytosis    Weakness of both lower extremities    Rhabdomyolysis    SARS-CoV-2 positive    Diarrhea    Elevated AST (SGOT)    Prediabetes    Closed left ankle fracture, sequela    GERD (gastroesophageal reflux disease)    Bilateral pain of leg and foot    Osteopenia    Right knee pain    Pain    Enlarged and hypertrophic nails    MCI (mild cognitive impairment)    Impaired mobility and ADLs    Hypokalemia    Hypophosphatemia    Ambulatory dysfunction    Syncope and collapse    Leukocytosis       Past Medical History  Past Medical History:   Diagnosis Date    Cataract of right eye     GERD (gastroesophageal reflux disease)        Past Surgical History  Past Surgical History:   Procedure Laterality Date    CATARACT EXTRACTION, BILATERAL Bilateral     MASTECTOMY Bilateral     for calcium deposits    UT COLONOSCOPY FLX DX W/COLLJ SPEC WHEN PFRMD N/A 8/25/2017    Procedure: COLONOSCOPY;  Surgeon: Harika Aguilar MD;  [facility]: [facility];  Service: Colorectal    UT XCAPSL CTRC RMVL INSJ IO LENS PROSTH W/O ECP Right 6/21/2016    Procedure: OD EXTRACTION EXTRACAPSULAR CATARACT PHACO INTRAOCULAR LENS (IOL);  Surgeon: Lida Dewitt MD;  [facility];  Service: Ophthalmology    TONSILLECTOMY            01/20/24 1143   PT Last Visit   PT Visit Date 01/20/24   Note Type   Note type  Evaluation   Pain Assessment   Pain Assessment Tool 0-10   Pain Score No Pain   Restrictions/Precautions   Weight Bearing Precautions Per Order No   Other Precautions Fall Risk;Multiple lines;Chair Alarm;Bed Alarm;Cognitive  (hx CVA with residual L sided weakness - baseline tremors)   Home Living   Type of Home Apartment  (senior living apt)   Home Layout One level;Elevator   Bathroom Shower/Tub Tub/shower unit   Bathroom Toilet Raised   Bathroom Equipment Grab bars in shower;Grab bars around toilet   Bathroom Accessibility Accessible   Home Equipment Walker   Prior Function   Level of [place] Independent with ADLs;Independent with functional mobility;Needs assistance with IADLS   Lives With (S)  Alone   Receives Help From Family  (local sister)   IADLs Family/Friend/Other provides transportation;Family/Friend/Other provides meals;Family/Friend/Other provides medication management  ([place] van for transportation - Meals on wheels for meals)   Falls in the last 6 months (S)    (denies - but per EMR, patient admitted s/p fall)   Vocational Retired   General   Family/Caregiver Present No   Cognition   Overall Cognitive Status Impaired   Arousal/Participation Alert   Orientation Level Oriented to person;Oriented to place;Disoriented to time;Disoriented to situation  (able to orient to time with cues)   Memory Decreased recall of recent events;Decreased recall of precautions;Decreased short term memory   Following Commands Follows one step commands with increased time or repetition   Comments Patient pleasant and cooperative. Poor safety awareness and insight into deficits. Patient declining assist for eating, but appears to have difficulties. PCA made aware.   Subjective   Subjective Patient agreeable to PT eval   RUE Assessment   RUE Assessment X  (please see OT assessment)   LUE Assessment   LUE Assessment X  (please see OT assessment)   RLE Assessment   RLE Assessment X   Strength RLE   RLE Overall Strength 3/5    LLE Assessment   LLE Assessment X   Strength LLE   LLE Overall Strength 3/5   Bed Mobility   Supine to Sit 3  Moderate assistance   Additional items Assist x 2;Increased time required;Verbal cues;LE management;HOB elevated   Transfers   Sit to Stand 3  Moderate assistance   Additional items Assist x 2;Increased time required;Verbal cues   Stand to Sit 3  Moderate assistance   Additional items Assist x 2;Increased time required;Verbal cues   Stand pivot 3  Moderate assistance   Additional items Assist x 2;Increased time required;Verbal cues   Additional Comments RW   Ambulation/Elevation   Gait pattern Improper Weight shift;Shuffling;Decreased foot clearance;Forward Flexion;Short stride;Excessively slow   Gait Assistance 3  Moderate assist   Additional items Assist x 2;Verbal cues   Assistive Device Rolling walker   Distance 2'   Balance   Static Sitting Fair -   Dynamic Sitting Poor +   Static Standing Poor   Dynamic Standing Poor -   Ambulatory Poor -   Endurance Deficit   Endurance Deficit Yes   Endurance Deficit Description fatigue, weakness   Activity Tolerance   Activity Tolerance Patient limited by fatigue  (+cog)   Medical Staff Made Aware OT   Nurse Made Aware RN cleared   Assessment   Prognosis Fair   Problem List Decreased strength;Decreased endurance;Impaired balance;Decreased mobility;Pain;Decreased cognition;Decreased safety awareness;Impaired judgement   Assessment Pt is a 78 y.o. female seen for a high complexity PT evaluation due to Ongoing medical management for primary dx, Decreased activity tolerance compared to baseline, Fall risk, Increased assistance needed from caregiver at current time, Cog status. Patient is s/p admit to [facility] on 1/19/2024 for Rhabdomyolysis (M62.82)  Fall, initial encounter (W19.XXXA)  Unspecified multiple injuries, initial encounter (T07.XXXA). Patient  has a past medical history of Cataract of right eye and GERD (gastroesophageal reflux disease)..     PT  now consulted to assess functional mobility and needs for safe d/c planning. Prior to admission, pt independent with functional mobility, independent ADLs, and needs assistance IADLs. Personal factors affecting status include hx of falls, fall risk, limited caregiver/social support, lives alone, assist for IADLs, decreased insight / safety awareness, decreased recall of precautions, cognitive deficits, and medical status     Currently pt requires moderate assistance x2 for bed mobility, moderate assistance x2 for functional transfers with rolling walker ; moderate assistance x2 for ambulation with rolling walker. Pt presents functioning below baseline and w/ overall mobility deficits 2* to: decreased strength, decreased endurance, decreased mobility, impaired balance. These impairments place pt at risk for falls.     Pt will continue to benefit from skilled PT interventions to address stated impairments; to maximize functional potential; for ongoing pt/family education; and DME needs. The patient's AM-PAC Basic Mobility Inpatient Short Form Raw Score Is 7. PT is currently recommending Level 2 - Moderate Resource Intensity on d/c from hospital. Will continue to follow as able.   Barriers to Discharge Decreased caregiver support   Goals   Patient Goals to go home   STG Expiration Date 02/03/24   Short Term Goal #1 In 14 days, patient will 1) increase strength in BUE/BLE by 1/2 to 1 full grade for increased strength and stability needed for functional mobility 2) improve bed mobility to MI for improved mobility and decreased need for assist 3) sit EOB x30' with MI to facilitate trunk stability and safety for completion of ADL tasks 4) increase functional transfers to MI for improved safety and functional mobility 5) ambulate at least 50ft with MI using rolling walker for increased endurance and safety ambulating home and community environments 6) improve balance by 1 grade for improved safety and stability and  decreased risk for falls.   PT Treatment Day 0   Plan   Treatment/Interventions ADL retraining;Functional transfer training;LE strengthening/ROM;Therapeutic exercise;Endurance training;Patient/family training;Equipment eval/education;Bed mobility;Gait training;Spoke to nursing;Spoke to case management;OT;Elevations;Cognitive reorientation   PT Frequency 2-3x/wk   Discharge Recommendation   Rehab Resource Intensity Level, PT II (Moderate Resource Intensity)   Equipment Recommended Walker  (owns)   AM-PAC Basic Mobility Inpatient   Turning in Flat Bed Without Bedrails 2   Lying on Back to Sitting on Edge of Flat Bed Without Bedrails 1   Moving Bed to Chair 1   Standing Up From Chair Using Arms 1   Walk in Room 1   Climb 3-5 Stairs With Railing 1   Basic Mobility Inpatient Raw Score 7   Turning Head Towards Sound 3   Follow Simple Instructions 3   Low Function Basic Mobility Raw Score  13   Low Function Basic Mobility Standardized Score  20.14   Highest Level Of Mobility   JH-HLM Goal 2: Bed activities/Dependent transfer   JH-HLM Achieved 4: Move to chair/commode   Modified [place] Scale   Modified [place] Scale 4   End of Consult   Patient Position at End of Consult All needs within reach;Bedside chair;Bed/Chair alarm activated         Mylene Andres, PT, DPT

## 2024-01-20 NOTE — OCCUPATIONAL THERAPY NOTE
Occupational Therapy Evaluation     Patient Name: Eva Lacey  Today's Date: 1/20/2024  Problem List  Principal Problem:    Ambulatory dysfunction  Active Problems:    History of cerebral hemorrhage    Tremor, essential    Weakness of both lower extremities    GERD (gastroesophageal reflux disease)    Hypokalemia    Syncope and collapse    Leukocytosis    Past Medical History  Past Medical History:   Diagnosis Date    Cataract of right eye     GERD (gastroesophageal reflux disease)      Past Surgical History  Past Surgical History:   Procedure Laterality Date    CATARACT EXTRACTION, BILATERAL Bilateral     MASTECTOMY Bilateral     for calcium deposits    SD COLONOSCOPY FLX DX W/COLLJ SPEC WHEN PFRMD N/A 8/25/2017    Procedure: COLONOSCOPY;  Surgeon: Harika Aguilar MD;  [facility];  Service: Colorectal    SD XCAPSL CTRC RMVL INSJ IO LENS PROSTH W/O ECP Right 6/21/2016    Procedure: OD EXTRACTION EXTRACAPSULAR CATARACT PHACO INTRAOCULAR LENS (IOL);  Surgeon: Lida Dewitt MD;  [facility];  Service: Ophthalmology    TONSILLECTOMY             01/20/24 1138   OT Last Visit   OT Visit Date 01/20/24   Note Type   Note type Evaluation   Pain Assessment   Pain Assessment Tool 0-10   Pain Score No Pain   Restrictions/Precautions   Weight Bearing Precautions Per Order No   Other Precautions Cognitive;Chair Alarm;Bed Alarm;Multiple lines;Telemetry;Fall Risk  (baseline tremors, L sided weakness from prev. CVA)   Home Living   Type of Home Apartment  (senior living)   Home Layout One level;Elevator   Bathroom Shower/Tub Tub/shower unit   Bathroom Toilet Raised   Bathroom Equipment Grab bars in shower;Grab bars around toilet;Shower chair   Bathroom Accessibility Accessible   Home Equipment Walker   Additional Comments Pt lives in a 1 level apt at a senior living facility (unclear whether [facility] or [facility]), uses a tub shower with GB and shower chair, raised toilet with GB   Prior Function   Level of  "[place] Independent with ADLs;Independent with functional mobility;Needs assistance with IADLS   Lives With (S)  Alone   Receives Help From Family  (local sister)   IADLs Family/Friend/Other provides transportation;Family/Friend/Other provides meals;Family/Friend/Other provides medication management   Falls in the last 6 months 1 to 4  (per EMR pt with 1 fall leading to admission)   Vocational Retired   Lifestyle   Autonomy Pta pt I with ADL and fxnal mobility, assist for IADL   Reciprocal Relationships local sister   Service to Others retired   Intrinsic Gratification enjoys watching TV   Subjective   Subjective \"I don't need help\"- when struggles to take a sip of soda   ADL   Where Assessed Chair   Eating Assistance 3  Moderate Assistance   Eating Deficit Setup;Bringing food to mouth assist;Beverage management  (Pt requiring assist to bring soda straw to mouth to sip due to tremors, reports she doesn't need assist to bring food to mouth although pt is limited by tremors)   Grooming Assistance 3  Moderate Assistance   UB Bathing Assistance 3  Moderate Assistance   LB Bathing Assistance 2  Maximal Assistance   UB Dressing Assistance 3  Moderate Assistance   LB Dressing Assistance 2  Maximal Assistance   Toileting Assistance  2  Maximal Assistance   Functional Assistance 3  Moderate Assistance   Bed Mobility   Supine to Sit 3  Moderate assistance   Additional items Assist x 2;Increased time required;Verbal cues;LE management   Additional Comments Pt greeted in bed, left in chair with alarm on and all needs within reach, noted to have L-sided lean requiring MIN A at EOB, pillows to position in chair   Transfers   Sit to Stand 3  Moderate assistance   Additional items Assist x 2;Increased time required;Verbal cues   Stand to Sit 3  Moderate assistance   Additional items Assist x 2;Increased time required;Verbal cues   Stand pivot 3  Moderate assistance   Additional items Assist x 2;Increased time required;Verbal " cues   Additional Comments with RW   Functional Mobility   Functional Mobility 3  Moderate assistance   Additional Comments Pt takes very small steps during SPT bed>chair with MOD A x 2 with RW   Additional items Rolling walker   Balance   Static Sitting Fair -   Dynamic Sitting Poor +   Static Standing Poor   Dynamic Standing Poor -   Ambulatory Poor -   Activity Tolerance   Activity Tolerance Patient limited by fatigue   Medical Staff Made Aware Co-eval with DPT due to high medical complexity   Nurse Made Aware RN cleared for therapy   RUE Assessment   RUE Assessment WFL   LUE Assessment   LUE Assessment X  (With decreased AROM/strength in LUE)   Hand Function   Gross Motor Coordination Impaired  (due to tremors)   Fine Motor Coordination Impaired  (Pt with decreased  strength, able to grasp cup with cylindrical grasp although with difficulty managing utensils and managing cellphone.)   Sensation   Light Touch No apparent deficits   Additional Comments reports baseline tingling in hands   Psychosocial   Psychosocial (WDL) WDL   Cognition   Overall Cognitive Status Impaired   Arousal/Participation Cooperative   Attention Attends with cues to redirect   Orientation Level Oriented to person;Oriented to place;Disoriented to time;Disoriented to situation   Memory Decreased recall of recent events;Decreased recall of biographical information;Decreased short term memory;Decreased recall of precautions   Following Commands Follows one step commands with increased time or repetition   Comments Pt cooperative to therapy although requiring frequent verbal cues for direction throughout ADL and transfers, with decreased insight reporting she is able to feed herself although shows great difficulty bringing cup to mouth to sip soda from straw, will continue to assess.   Assessment   Limitation Decreased ADL status;Decreased UE ROM;Decreased UE strength;Decreased Safe judgement during ADL;Decreased cognition;Decreased  endurance;Decreased fine motor control;Decreased self-care trans;Decreased high-level ADLs   Prognosis Fair   Assessment Pt is a 78 y.o. female who was admitted to [facility] on 1/19/2024 with Ambulatory dysfunction and s/p fall. Pt seen for an OT evaluation per active OT orders.  Pt  has a past medical history of Cataract of right eye and GERD (gastroesophageal reflux disease). Pt lives alone in a 1 level apt at a senior living facility, uses a tub shower with GB and shower chair, raised toilet with GB. Pta, pt was independent w/ ADL and functional mobility, was having assist for IADL, and using a RW. Currently, pt is Mod Ax1 for UB ADL, Max Ax1 for LB ADL, and completed transfers/FM w Mod Ax2 with RW. Pt currently presents with impairments in the following categories -limited home support, difficulty performing ADLS, and limited insight into deficits activity tolerance, endurance, standing balance/tolerance, sitting balance/tolerance, UE strength, UE ROM, FMC, GMC, memory, insight, safety , judgement , attention , and sequencing . These impairments, as well as pt's fatigue, CAGLE, decreased caregiver support, and risk for falls  limit pt's ability to safely engage in all baseline areas of occupation, includinggrooming, bathing, dressing, toileting, and functional mobility/transfers.    The patient's raw score on the AM-PAC Daily Activity Inpatient Short Form is 12. A raw score of less than 19 suggests the patient may benefit from discharge to post-acute rehabilitation services. Please refer to the recommendation of the Occupational Therapist for safe discharge planning. Pt would benefit from continued acute OT services throughout hospital course and following D/C. Plan for OT interventions 2-3x per week. From OT standpoint, recommend post acute rehab services upon D/C. Pt was left seated in bedside chair with chair alarm on and all needs within reach.   Goals   Patient Goals to go home   Plan   Treatment  Interventions ADL retraining;Functional transfer training;UE strengthening/ROM;Endurance training;Cognitive reorientation;Patient/family training;Equipment evaluation/education;Fine motor coordination activities;Continued evaluation;Energy conservation;Activityengagement   Goal Expiration Date 02/03/24   OT Frequency 2-3x/wk   Discharge Recommendation   Rehab Resource Intensity Level, OT II (Moderate Resource Intensity)   AM-PAC Daily Activity Inpatient   Lower Body Dressing 2   Bathing 2   Toileting 2   Upper Body Dressing 2   Grooming 2   Eating 2   Daily Activity Raw Score 12   Daily Activity Standardized Score (Calc for Raw Score >=11) 30.6   AM-PAC Applied Cognition Inpatient   Following a Speech/Presentation 3   Understanding Ordinary Conversation 3   Taking Medications 2   Remembering Where Things Are Placed or Put Away 2   Remembering List of 4-5 Errands 2   Taking Care of Complicated Tasks 2   Applied Cognition Raw Score 14   Applied Cognition Standardized Score 32.02   Modified [place] Scale   Modified [place] Scale 4   End of Consult   Education Provided Yes   Patient Position at End of Consult Bedside chair;Bed/Chair alarm activated;All needs within reach   Nurse Communication Nurse aware of consult       OT Goals    - Pt will be Min A  with LB ADL by end of hospital course.    - Pt will be Supervision with UB ADL by end of hospital course.    - Pt will be Min A x 1 with all functional transfers required for patient safety by end of hospital course.    - Pt will be Min A x 1 with functional mobility to/from bathroom for increased independence with toileting tasks.    - Pt will recall and implement safety precautions during OT sessions with min vc.     - Pt activity tolerance will increase to 30 minutes in order to safely engage in ADL and transfers.     - Pt standing tolerance will increase to 3 minutes  in order to promote sink side ADLs and IADL activities.    - Pt will consistently follow one-step  directions with min to no vc or prompting.     - Pt will attend to functional tasks for 10 minutes with min to no vc for attention/redirection.    - Pt will attend to and complete continuous cognitive assessment throughout hospital course in order to inform safe d/c planning.    - Pt UE strength will improve 1 MM grade by end of hospital course in order to promote UB ADL and safe transfers.        Ellyn Bennett, VIKRAM, OTR/L

## 2024-01-20 NOTE — SPEECH THERAPY NOTE
Speech Language/Pathology  Speech/Language Pathology  Assessment    Patient Name: Eva Lacey  Today's Date: 1/20/2024     Problem List  Principal Problem:    Ambulatory dysfunction  Active Problems:    History of cerebral hemorrhage    Tremor, essential    Weakness of both lower extremities    GERD (gastroesophageal reflux disease)    Hypokalemia    Syncope and collapse    Leukocytosis    Past Medical History  Past Medical History:   Diagnosis Date    Cataract of right eye     GERD (gastroesophageal reflux disease)      Past Surgical History  Past Surgical History:   Procedure Laterality Date    CATARACT EXTRACTION, BILATERAL Bilateral     MASTECTOMY Bilateral     for calcium deposits    IL COLONOSCOPY FLX DX W/COLLJ SPEC WHEN PFRMD N/A 8/25/2017    Procedure: COLONOSCOPY;  Surgeon: Harika Aguilar MD;  Location: [facility];  Service: Colorectal    IL XCAPSL CTRC RMVL INSJ IO LENS PROSTH W/O ECP Right 6/21/2016    Procedure: OD EXTRACTION EXTRACAPSULAR CATARACT PHACO INTRAOCULAR LENS (IOL);  Surgeon: Lida Dewitt MD;  Location: [facility];  Service: Ophthalmology    TONSILLECTOMY     Speech-Language Pathology Bedside Swallow Evaluation        Patient Name: Eva Lacey    Today's Date: 1/20/2024     Problem List  Principal Problem:    Ambulatory dysfunction  Active Problems:    History of cerebral hemorrhage    Tremor, essential    Weakness of both lower extremities    GERD (gastroesophageal reflux disease)    Hypokalemia    Syncope and collapse    Leukocytosis         Past Medical History  Past Medical History:   Diagnosis Date    Cataract of right eye     GERD (gastroesophageal reflux disease)        Past Surgical History  Past Surgical History:   Procedure Laterality Date    CATARACT EXTRACTION, BILATERAL Bilateral     MASTECTOMY Bilateral     for calcium deposits    IL COLONOSCOPY FLX DX W/COLLJ SPEC WHEN PFRMD N/A 8/25/2017    Procedure: COLONOSCOPY;  Surgeon: Harika Aguilar MD;  [facility]: [facility];  Service: Colorectal    OK XCAPSL CTRC RMVL INSJ IO LENS PROSTH W/O ECP Right 6/21/2016    Procedure: OD EXTRACTION EXTRACAPSULAR CATARACT PHACO INTRAOCULAR LENS (IOL);  Surgeon: Lida Dewitt MD;  Location: [facility];  Service: Ophthalmology    TONSILLECTOMY         Summary    Pt presents with at least mild-mod oral and suspected at least mild pharyngeal dysphagia. S/s of concern include prolonged and inefficient mastication, reduced bolus cohesion, ?reduced hyolaryngeal elevation and reduced airway protection. There was intermittent cough with larger, consecutive sips of thin liquid via straw. When cued to take smaller sips, there were no further s/s of aspiration. Pt has L side facial/oral weakness due to previous CVA's. At this time recommendation is for a softer, moist diet. ST to follow up for tx.      Recommendations:   Diet: soft/level 3 diet and thin liquids   Meds: whole with liquid and whole with puree   Frequent Oral care  Aspiration precautions and compensatory swallowing strategies: upright posture, slow rate of feeding, small bites/sips, and alternating bites and sips  Other Recommendations/ considerations: ST to see for dysphagia tx. Consider MBS to further assess swallow if indicated.        Current Medical Status  Copied from admission/physician notes:    Eva is a 78-year-old female with past medical history of previous stroke, GERD, emphysema , gait disturbance with lower extremity weakness chronically, on aspirin who presents as a level C trauma on aspirin.   Patient lives at [facility] and had fallen but patient is not aware how long she was down or how she fell.  Imaging was negative for traumatic injury and trauma workup was negative.  CK was found to be 1084, UA was unremarkable.  K was 3.1.  Troponin was 43, 41, 44.  Given 1 L fluids.  WBC at 16.1     Patient is AOx 3, but unsure of her episode, poor historian.  Patient however denies chest pain, shortness of breath,  nausea or vomiting or abdominal pain, fever or chills.  Bilateral lower extremity weakness is chronic..     Patient will be admitted to [facility] for ambulatory dysfunction, Level 3 code.      Order received and chart reviewed. Discussed with RN. Pt tolerated meds given whole in applesauce this morning. Pt has a hx of CVA x2. She denies any difficulty swallowing. She was last seen by ST at [facility] in October 2022; at that time a regular diet and thin liquids were recommended.       Past medical history:   Please see H&P for details    Special Studies:  CT head-  No acute intracranial abnormality.    CT spine-  No cervical spine fracture or traumatic malalignment.    CT chest-  IMPRESSION:     No acute traumatic parenchymal injury in chest, abdomen or pelvis. No acute fracture.     Reidentified pulmonary emphysematous changes. No new or enlarging solid pulmonary nodule.     Small sliding-type hiatal hernia. Thickening of the wall of the gastric fundus and body suggestive of gastritis but similar when compared to previous examinations.     IV contrast extravasation into the upper extremity as described above.       Social/Education/Vocational Hx:  Pt lives alone    Swallow Information   Current Risks for Dysphagia & Aspiration:  hx of CVA x2  Current Symptoms/Concerns: change in respiratory status and pt with nasal congestion, mouth breathing, sats in low 90s  Current Diet: regular diet and thin liquids   Baseline Diet: regular diet and thin liquids    Baseline Assessment   Behavior/Cognition: alert and decreased attention  Speech/Language Status: able to participate in basic conversation and able to follow commands  Patient Positioning: upright in bed and slightly reclined due to sciatic pain     Swallow Mechanism Exam   Facial: left facial droop  Labial: decreased ROM left side  Lingual: decreased ROM and bilateral decreased strength  Velum: unable to visualize  Mandible:  decreased ROM  Dentition: adequate  Vocal  quality:clear/adequate   Volitional Cough: strong/productive   Respiratory: nasal congestion, mouth breathing, sats in low 90s      Consistencies Assessed and Performance   Consistencies Administered: thin liquids, puree, and hard solids    Oral Stage: Adequate bolus retrieval and draw from straw. Prolonged and inefficient mastication with hard, dry solid. Mastication was more munching like pattern vs normal rotary motion. Bolus formation and cohesion appeared reduced. Lingual residue cleared with liquid wash. No pocketing observed. Adequate lip seal.     Pharyngeal Stage: Hyolaryngeal elevation observed and palpated. ?reduced elevation. There was inconsistent cough with larger, consecutive sips of thin liquid via cup and straw. Pt cued to take small, single sips, and there were no further s/s of aspiration.       Esophageal Concerns:  hx of GERD      Results Reviewed with: patient, RN, and MD   Dysphagia Goals:  1. Pt will tolerate dysphagia 3 diet and thin liquids with prompt oropharyngeal swallows and no overt s/s of aspiration. 3. Pt will tolerate LRD without s/s of aspiration.   Discharge recommendation:  unsure    Speech Therapy Prognosis   Prognosis: fair    Prognosis Considerations: age, medical status, prior medical history, and therapeutic potential

## 2024-01-20 NOTE — PLAN OF CARE
Problem: PHYSICAL THERAPY ADULT  Goal: Performs mobility at highest level of function for planned discharge setting.  See evaluation for individualized goals.  Description: Treatment/Interventions: ADL retraining, Functional transfer training, LE strengthening/ROM, Therapeutic exercise, Endurance training, Patient/family training, Equipment eval/education, Bed mobility, Gait training, Spoke to nursing, Spoke to case management, OT, Elevations, Cognitive reorientation  Equipment Recommended: Walker (owns)       See flowsheet documentation for full assessment, interventions and recommendations.  1/20/2024 1345 by Mylene Andres PT  Outcome: Progressing  Note: Prognosis: Fair  Problem List: Decreased strength, Decreased endurance, Impaired balance, Decreased mobility, Pain, Decreased cognition, Decreased safety awareness, Impaired judgement  Assessment: Pt is a 78 y.o. female seen for a high complexity PT evaluation due to Ongoing medical management for primary dx, Decreased activity tolerance compared to baseline, Fall risk, Increased assistance needed from caregiver at current time, Cog status. Patient is s/p admit to [facility] on 1/19/2024 for Rhabdomyolysis (M62.82)  Fall, initial encounter (W19.XXXA)  Unspecified multiple injuries, initial encounter (T07.XXXA). Patient  has a past medical history of Cataract of right eye and GERD (gastroesophageal reflux disease)..     PT now consulted to assess functional mobility and needs for safe d/c planning. Prior to admission, pt independent with functional mobility, independent ADLs, and needs assistance IADLs. Personal factors affecting status include hx of falls, fall risk, limited caregiver/social support, lives alone, assist for IADLs, decreased insight / safety awareness, decreased recall of precautions, cognitive deficits, and medical status     Currently pt requires moderate assistance x2 for bed mobility, moderate assistance x2 for functional transfers  with rolling walker ; moderate assistance x2 for ambulation with rolling walker. Pt presents functioning below baseline and w/ overall mobility deficits 2* to: decreased strength, decreased endurance, decreased mobility, impaired balance. These impairments place pt at risk for falls.     Pt will continue to benefit from skilled PT interventions to address stated impairments; to maximize functional potential; for ongoing pt/family education; and DME needs. The patient's AM-PAC Basic Mobility Inpatient Short Form Raw Score Is 7. PT is currently recommending Level 2 - Moderate Resource Intensity on d/c from hospital. Will continue to follow as able.  Barriers to Discharge: Decreased caregiver support     Rehab Resource Intensity Level, PT: II (Moderate Resource Intensity)    See flowsheet documentation for full assessment.

## 2024-01-20 NOTE — PROGRESS NOTES
INTERNAL MEDICINE RESIDENCY PROGRESS NOTE     Name: Eva Lacey   Age & Sex: 78 y.o. female   MRN: [id number]  Unit/Bed#: [facility] 501-01   Encounter: [id number]  Team: SOD Team C     PATIENT INFORMATION     Name: Eva Lacey   Age & Sex: 78 y.o. female   MRN: [id number]  Hospital Stay Days: 1    ASSESSMENT/PLAN     Principal Problem:    Ambulatory dysfunction  Active Problems:    History of cerebral hemorrhage    Tremor, essential    Weakness of both lower extremities    GERD (gastroesophageal reflux disease)    Hypokalemia    Syncope and collapse    Leukocytosis      Leukocytosis  Assessment & Plan  WBC 16    Patient is afebrile  Low suspicion for infection at this time  Likely reactive in the setting of recent fall and stress    -Monitor and recheck    Syncope and collapse  Assessment & Plan  Based on history, unclear etiology of fall as patient is not sure  Attempted to call son with no response, will need to call facility for details    Will keep on telemetry for now  Ordered echo  Orthostatics    Hypokalemia  Assessment & Plan  Noted previously on last admission  EKG with normal sinus rhythm    3.1 -> 2.4 -> 2.9      Inappropriate recheck based off of replenishment, urine studies pending to evaluate for RTA  Magnesium repleted    GERD (gastroesophageal reflux disease)  Assessment & Plan  CT- Small sliding-type hiatal hernia. Thickening of the wall of the gastric fundus and body suggestive of gastritis but similar when compared to previous examinations.     Continue home Pepcid 40 mg daily    Weakness of both lower extremities  Assessment & Plan  Patient had previous admission in September or October with same complaint of generalized lower extremity weakness occultly ambulating  Previous workup with aldolase, anti-Ofelia, SPEP, UPEP were unremarkable    -PT OT    Tremor, essential  Assessment & Plan  Patient has baseline bilateral tremors, chronic    Continue primidone    History of cerebral  hemorrhage  Assessment & Plan  History of cerebral hemorrhage with continued deficits with restricted left upper extremity range of motion and continued left upper and lower extremity tingling sensation as described by patient  CT head negative at this admission    Patient has bilateral lower extremity weakness which is chronic     Plan  We will continue atorvastatin 10  Continue to monitor neurologic exam  PT/OT        * Ambulatory dysfunction  Assessment & Plan  Eva is a 78-year-old female with past medical history of previous stroke, GERD, emphysema , gait disturbance with lower extremity weakness chronically, on aspirin who presents as a level C trauma on aspirin.   Patient lives at [facility] and had fallen but patient is not aware how long she was down or how she fell.  Imaging was negative for traumatic injury and trauma workup was negative  CK was found to be 1084, UA was unremarkable  K was 3.1  Given 1 L fluids    Patient is AOx 3, but unsure of her episode.  Bilateral lower extremity weakness is chronic    PLAN:  -PT/OT to assess for rehab needs  -Will check for myoglobin in urine  -Check mag and Phos  -Monitor electrolytes, replete  -Given 1 L fluids, will give another liter maintenance          Disposition: Continue inpatient management     SUBJECTIVE     Patient seen and examined. No acute events overnight. Patient does not recall episode.  Patient states she remembers sitting in her recliner and then being found on the floor by her neighbor.  Does not remember any symptoms prior to losing consciousness and does not appear to have had a postictal state however patient does not have good recollection of what happened.  Supposedly lives alone.  Denies any recent infections, traveling, changes in medication.  Does endorse some lightheadedness but denies any headaches, dizziness, chest pain, shortness of breath, nausea, vomiting, abdominal pain.  Does endorse sciatica which is chronic.    OBJECTIVE      Vitals:    24 1507 24 1509 24 1515 24 1526   BP: 164/88      BP Location: Left arm      Pulse: 95   90   Resp: 18      Temp: 97.9 °F (36.6 °C)  97.8 °F (36.6 °C) 97.8 °F (36.6 °C)   TempSrc: Oral Oral Oral    SpO2: (!) 89%   92%      Temperature:   Temp (24hrs), Av.9 °F (36.6 °C), Min:97.8 °F (36.6 °C), Max:98.3 °F (36.8 °C)    Temperature: 97.8 °F (36.6 °C)  Intake & Output:  I/O          0701   0700  0701   0700  07 0700    P.O.   480    I.V.  971.7     IV Piggyback  500     Total Intake  1471.7 480    Urine  1800 520    Stool   0    Total Output  1800 520    Net  -328.3 -40           Unmeasured Stool Occurrence   1 x          Weights:        There is no height or weight on file to calculate BMI.  Weight (last 2 days)       None          Physical Exam  Vitals reviewed.   Constitutional:       General: She is not in acute distress.     Appearance: Normal appearance. She is not ill-appearing.   HENT:      Head: Normocephalic and atraumatic.      Mouth/Throat:      Mouth: Mucous membranes are dry.      Dentition: Abnormal dentition.   Eyes:      Conjunctiva/sclera: Conjunctivae normal.   Cardiovascular:      Rate and Rhythm: Normal rate and regular rhythm.      Pulses: Normal pulses.   Abdominal:      General: Abdomen is flat. There is no distension.      Palpations: Abdomen is soft.      Tenderness: There is no abdominal tenderness. There is no guarding.   Skin:     General: Skin is dry.      Capillary Refill: Capillary refill takes less than 2 seconds.   Neurological:      Mental Status: She is alert. Mental status is at baseline.   Psychiatric:         Mood and Affect: Mood normal.         Behavior: Behavior normal.       LABORATORY DATA     Labs: I have personally reviewed pertinent reports.  Results from last 7 days   Lab Units 24  0433 24  1125   WBC Thousand/uL 14.37* 16.10*   HEMOGLOBIN g/dL 11.4* 13.6   HEMATOCRIT % 33.9* 39.6  "  PLATELETS Thousands/uL 240 308   NEUTROS PCT % 79* 83*   MONOS PCT % 8 7   EOS PCT % 0 0      Results from last 7 days   Lab Units 01/20/24  1343 01/20/24  0433 01/19/24  1125   POTASSIUM mmol/L 2.9* 2.4* 3.1*   CHLORIDE mmol/L  --  103 100   CO2 mmol/L  --  29 30   BUN mg/dL  --  13 15   CREATININE mg/dL  --  0.68 0.83   CALCIUM mg/dL  --  8.5 10.5*   ALK PHOS U/L  --  58 70   ALT U/L  --  14 14   AST U/L  --  39 38     Results from last 7 days   Lab Units 01/20/24  0433   MAGNESIUM mg/dL 1.6*     Results from last 7 days   Lab Units 01/20/24  0433   PHOSPHORUS mg/dL 2.3      Results from last 7 days   Lab Units 01/19/24  1125   INR  1.07   PTT seconds 26               IMAGING & DIAGNOSTIC TESTING     Radiology Results: I have personally reviewed pertinent reports.  TRAUMA - CT spine cervical wo contrast    Result Date: 1/19/2024  Impression: No cervical spine fracture or traumatic malalignment. Workstation performed: [id number]     TRAUMA - CT chest abdomen pelvis w contrast    Result Date: 1/19/2024  Impression: No acute traumatic parenchymal injury in chest, abdomen or pelvis. No acute fracture. Reidentified pulmonary emphysematous changes. No new or enlarging solid pulmonary nodule. Small sliding-type hiatal hernia. Thickening of the wall of the gastric fundus and body suggestive of gastritis but similar when compared to previous examinations. IV contrast extravasation into the upper extremity as described above. This examination was marked \"immediate notification\" in Epic in order to begin the standard process by which the radiology reading room liaison alerts the referring practitioner. Workstation performed: [id number]     TRAUMA - CT head wo contrast    Result Date: 1/19/2024  Impression: No acute intracranial abnormality. Workstation performed: [id number]     Other Diagnostic Testing: I have personally reviewed pertinent reports.    ACTIVE MEDICATIONS     Current Facility-Administered Medications " "  Medication Dose Route Frequency    acetaminophen (TYLENOL) tablet 650 mg  650 mg Oral Q6H PRN    atorvastatin (LIPITOR) tablet 10 mg  10 mg Oral Daily With Dinner    enoxaparin (LOVENOX) subcutaneous injection 40 mg  40 mg Subcutaneous Daily    famotidine (PEPCID) tablet 40 mg  40 mg Oral Daily    multivitamin-minerals (CENTRUM) tablet 1 tablet  1 tablet Oral Daily    potassium chloride 40 mEq IVPB (premix)  40 mEq Intravenous Once    potassium chloride oral solution 40 mEq  40 mEq Oral Once    primidone (MYSOLINE) tablet 50 mg  50 mg Oral HS    sodium chloride (OCEAN) 0.65 % nasal spray 1 spray  1 spray Each Nare Q1H PRN       VTE Pharmacologic Prophylaxis: Enoxaparin (Lovenox)  VTE Mechanical Prophylaxis: sequential compression device    Portions of the record may have been created with voice recognition software.  Occasional wrong word or \"sound a like\" substitutions may have occurred due to the inherent limitations of voice recognition software.  Read the chart carefully and recognize, using context, where substitutions have occurred.  ==  Spencer Marroquin DO  [facility]  Internal Medicine Residency PGY-3       "

## 2024-01-21 ENCOUNTER — APPOINTMENT (INPATIENT)
Dept: NON INVASIVE DIAGNOSTICS | Facility: HOSPITAL | Age: 79
DRG: 641 | End: 2024-01-21
Payer: MEDICARE

## 2024-01-21 LAB
ANION GAP SERPL CALCULATED.3IONS-SCNC: 5 MMOL/L
ANION GAP SERPL CALCULATED.3IONS-SCNC: 9 MMOL/L
AORTIC ROOT: 2.1 CM
ASCENDING AORTA: 2.5 CM
BSA FOR ECHO PROCEDURE: 1.71 M2
BUN SERPL-MCNC: 13 MG/DL (ref 5–25)
BUN SERPL-MCNC: 13 MG/DL (ref 5–25)
CALCIUM SERPL-MCNC: 8 MG/DL (ref 8.4–10.2)
CALCIUM SERPL-MCNC: 8.4 MG/DL (ref 8.4–10.2)
CHLORIDE SERPL-SCNC: 109 MMOL/L (ref 96–108)
CHLORIDE SERPL-SCNC: 111 MMOL/L (ref 96–108)
CO2 SERPL-SCNC: 26 MMOL/L (ref 21–32)
CO2 SERPL-SCNC: 28 MMOL/L (ref 21–32)
CREAT SERPL-MCNC: 0.67 MG/DL (ref 0.6–1.3)
CREAT SERPL-MCNC: 0.69 MG/DL (ref 0.6–1.3)
E WAVE DECELERATION TIME: 173 MS
E/A RATIO: 0.93
FRACTIONAL SHORTENING: 30 (ref 28–44)
GFR SERPL CREATININE-BSD FRML MDRD: 84 ML/MIN/1.73SQ M
GFR SERPL CREATININE-BSD FRML MDRD: 85 ML/MIN/1.73SQ M
GLUCOSE SERPL-MCNC: 107 MG/DL (ref 65–140)
GLUCOSE SERPL-MCNC: 135 MG/DL (ref 65–140)
INTERVENTRICULAR SEPTUM IN DIASTOLE (PARASTERNAL SHORT AXIS VIEW): 0.9 CM
INTERVENTRICULAR SEPTUM: 0.9 CM (ref 0.6–1.1)
LAAS-AP2: 12.4 CM2
LAAS-AP4: 15.5 CM2
LEFT ATRIUM SIZE: 3.8 CM
LEFT ATRIUM VOLUME (MOD BIPLANE): 39 ML
LEFT ATRIUM VOLUME INDEX (MOD BIPLANE): 22.8 ML/M2
LEFT INTERNAL DIMENSION IN SYSTOLE: 2.6 CM (ref 2.1–4)
LEFT VENTRICLE DIASTOLIC VOLUME (MOD BIPLANE): 31 ML
LEFT VENTRICLE DIASTOLIC VOLUME INDEX (MOD BIPLANE): 18.1 ML/M2
LEFT VENTRICLE SYSTOLIC VOLUME (MOD BIPLANE): 10 ML
LEFT VENTRICLE SYSTOLIC VOLUME INDEX (MOD BIPLANE): 5.8 ML/M2
LEFT VENTRICULAR INTERNAL DIMENSION IN DIASTOLE: 3.7 CM (ref 3.5–6)
LEFT VENTRICULAR POSTERIOR WALL IN END DIASTOLE: 0.9 CM
LEFT VENTRICULAR STROKE VOLUME: 32 ML
LV EF BIPLANE MOD: 67 %
LV EF US.2D.A4C+ESTIMATED: 71 %
LVSV (TEICH): 32 ML
MAGNESIUM SERPL-MCNC: 2.2 MG/DL (ref 1.9–2.7)
MV E'TISSUE VEL-SEP: 8 CM/S
MV PEAK A VEL: 0.75 M/S
MV PEAK E VEL: 70 CM/S
MV STENOSIS PRESSURE HALF TIME: 50 MS
MV VALVE AREA P 1/2 METHOD: 4.4
POTASSIUM SERPL-SCNC: 3.8 MMOL/L (ref 3.5–5.3)
POTASSIUM SERPL-SCNC: 4.1 MMOL/L (ref 3.5–5.3)
RIGHT ATRIUM AREA SYSTOLE A4C: 10.8 CM2
RIGHT VENTRICLE ID DIMENSION: 3 CM
SL CV LEFT ATRIUM LENGTH A2C: 3.8 CM
SL CV LV EF: 65
SL CV PED ECHO LEFT VENTRICLE DIASTOLIC VOLUME (MOD BIPLANE) 2D: 58 ML
SL CV PED ECHO LEFT VENTRICLE SYSTOLIC VOLUME (MOD BIPLANE) 2D: 25 ML
SODIUM SERPL-SCNC: 142 MMOL/L (ref 135–147)
SODIUM SERPL-SCNC: 146 MMOL/L (ref 135–147)
TRICUSPID ANNULAR PLANE SYSTOLIC EXCURSION: 2 CM

## 2024-01-21 PROCEDURE — 99232 SBSQ HOSP IP/OBS MODERATE 35: CPT | Performed by: INTERNAL MEDICINE

## 2024-01-21 PROCEDURE — 93306 TTE W/DOPPLER COMPLETE: CPT | Performed by: INTERNAL MEDICINE

## 2024-01-21 PROCEDURE — 93306 TTE W/DOPPLER COMPLETE: CPT

## 2024-01-21 PROCEDURE — 80048 BASIC METABOLIC PNL TOTAL CA: CPT | Performed by: STUDENT IN AN ORGANIZED HEALTH CARE EDUCATION/TRAINING PROGRAM

## 2024-01-21 RX ORDER — POTASSIUM CHLORIDE 14.9 MG/ML
20 INJECTION INTRAVENOUS ONCE
Status: COMPLETED | OUTPATIENT
Start: 2024-01-21 | End: 2024-01-21

## 2024-01-21 RX ORDER — ALBUMIN (HUMAN) 12.5 G/50ML
25 SOLUTION INTRAVENOUS ONCE
Status: COMPLETED | OUTPATIENT
Start: 2024-01-21 | End: 2024-01-21

## 2024-01-21 RX ADMIN — ACETAMINOPHEN 650 MG: 325 TABLET, FILM COATED ORAL at 15:10

## 2024-01-21 RX ADMIN — Medication 1 SPRAY: at 05:23

## 2024-01-21 RX ADMIN — ENOXAPARIN SODIUM 40 MG: 40 INJECTION SUBCUTANEOUS at 08:58

## 2024-01-21 RX ADMIN — Medication 1 TABLET: at 08:58

## 2024-01-21 RX ADMIN — ALBUMIN (HUMAN) 25 G: 0.25 INJECTION, SOLUTION INTRAVENOUS at 15:08

## 2024-01-21 RX ADMIN — PRIMIDONE 50 MG: 50 TABLET ORAL at 22:44

## 2024-01-21 RX ADMIN — FAMOTIDINE 40 MG: 20 TABLET, FILM COATED ORAL at 08:58

## 2024-01-21 RX ADMIN — POTASSIUM CHLORIDE 20 MEQ: 14.9 INJECTION, SOLUTION INTRAVENOUS at 02:23

## 2024-01-21 RX ADMIN — ATORVASTATIN CALCIUM 10 MG: 10 TABLET, FILM COATED ORAL at 16:04

## 2024-01-21 NOTE — RESTORATIVE TECHNICIAN NOTE
Restorative Technician Note      Patient Name: Eva Lacey     Note Type: Mobility  Patient Position Upon Consult: Supine  Activity Performed: Range of motion; Repositioned  Patient Position at End of Consult: All needs within reach; Bed/Chair alarm activated; Other (comment) (Sitting up in bed)

## 2024-01-21 NOTE — QUICK NOTE
Attending Attestation      I was the supervising physician and personally saw/examined the patient on 1/21/2024.  I agree with the Resident/Fellow's note with the following additions/exceptions:     Assessment:  79 yo lady with hx of CVA emphysema, Hypercholesterolemia, GERD and gait disturbance  Admitted with Hx of fall , unsure how long she was down.  AXOX3 ,weak and frail  Able to answer questions appropriately.  Generalized weakness  Severe tremor of right upper extremity, chronic     Now with elevated CPK  Afebrile, UA negative,   Hypotension, dehydration, hypokalemia, hypomagnesemia improving with treatment        Plan:  Replete electrolyes  Hydration  Recommend eval by PM&R     Will check for possible Diabetes Insipidus

## 2024-01-21 NOTE — CASE MANAGEMENT
Case Management Assessment & Discharge Planning Note    Patient name Eva Lacey  Location [facility] 501/[facility] 501-01 MRN [id number]  : 1945 Date 2024       Current Admission Date: 2024  Current Admission Diagnosis:Ambulatory dysfunction   Patient Active Problem List    Diagnosis Date Noted    Ambulatory dysfunction 2024    Syncope and collapse 2024    Leukocytosis 2024    Hypophosphatemia 2023    Hypokalemia 2023    Impaired mobility and ADLs 04/10/2023    MCI (mild cognitive impairment) 2023    Pain 2023    Enlarged and hypertrophic nails 2023    Osteopenia 2023    Right knee pain 2023    Elevated AST (SGOT) 2023    Prediabetes 2023    Closed left ankle fracture, sequela 2023    GERD (gastroesophageal reflux disease) 2023    Bilateral pain of leg and foot 2023    Diarrhea 2023    Macrocytosis 2023    Weakness of both lower extremities 2023    Rhabdomyolysis 2023    SARS-CoV-2 positive 2023    Tremor, essential 10/20/2022    Acute cerebral hemorrhage (HCC) 10/11/2022    History of cerebral hemorrhage 10/10/2022    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 (HCC) 10/10/2022    Chronic left-sided low back pain     Transient cerebral ischemia 2018    Premature atrial beats 2018    Cataract of right eye 2016    Sacroiliitis (HCC) 2015    Idiopathic osteoporosis 2015    Primary osteoarthritis of hand 2015      LOS (days): 2  Geometric Mean LOS (GMLOS) (days): 2.2  Days to GMLOS:0.3     OBJECTIVE:    Risk of Unplanned Readmission Score: 19.6         Current admission status: Inpatient       Preferred Pharmacy:   [facility] - [place], [place] - [address]  [address] [place] [address]  Phone: [phone] Fax: [phone]    [facility]/pharmacy #[id number] - [place], [place] - [address] [address] [address]  [facility] [address]  Phone: [phone] Fax: [phone]    Primary Care Provider: Janet Ordaz DO    Primary Insurance: MEDICARE  Secondary Insurance: AARP    ASSESSMENT:  Active Health Care Proxies    There are no active Health Care Proxies on file.                      Patient Information  Admitted from:: Home  Mental Status: Alert  During Assessment patient was accompanied by: Not accompanied during assessment  Assessment information provided by:: Patient  Primary Caregiver: Self  Support Systems: Self  County of Residence: [place]  What city do you live in?: [place]  Home entry access options. Select all that apply.: No steps to enter home  Type of Current Residence: Apartment  Floor Level: 1  Living Arrangements: Lives Alone    Activities of Daily Living Prior to Admission  Functional Status: Independent  Completes ADLs independently?: Yes  Ambulates independently?: Yes  Does patient use assisted devices?: Yes  Assisted Devices (DME) used: Walker, Shower Chair  Does patient currently own DME?: No  Does patient have a history of Outpatient Therapy (PT/OT)?: No  Does the patient have a history of Short-Term Rehab?: Yes  Does patient have a history of HHC?: Yes  Does patient currently have HHC?: No         Patient Information Continued  Income Source: Pension/correction         Means of Transportation  Means of Transport to Appts:: Family transport      Housing Stability: Low Risk  (9/28/2023)    Housing Stability Vital Sign     Unable to Pay for Housing in the Last Year: No     Number of Places Lived in the Last Year: 1     Unstable Housing in the Last Year: No   Food Insecurity: No Food Insecurity (9/28/2023)    Hunger Vital Sign     Worried About Running Out of Food in the Last Year: Never true     Ran Out of Food in the Last Year: Never true   Transportation Needs: No Transportation Needs (9/28/2023)    PRAPARE - Transportation     Lack of Transportation (Medical): No     Lack of  Transportation (Non-Medical): No   Utilities: Not on file       DISCHARGE DETAILS:    Discharge planning discussed with:: Chart reviewed and discussed with medical team. Cm met with pt introduced self, role and discharged process. pt confirmed baseline information, review PT/OT recommendation and freedom of choice. Pt admantly declined rehab rather home with HHA services. Also, reported plan to discharged to son's home however, Dirk works at night. after further discussion, pt agreed to placed blanket referrals to rehab and HHA services pending discussion with son. Referral placed. Cm TC pt's son, no response, left VM with call back num. Cm will continue to follow.

## 2024-01-21 NOTE — PROGRESS NOTES
INTERNAL MEDICINE RESIDENCY PROGRESS NOTE     Name: Eva Lacey   Age & Sex: 78 y.o. female   MRN: [id number]  Unit/Bed#: [facility] 501-01   Encounter: [id number]  Team: SOD Team C     PATIENT INFORMATION     Name: Eva Lacey   Age & Sex: 78 y.o. female   MRN: [id number]  Hospital Stay Days: 2    ASSESSMENT/PLAN     Principal Problem:    Ambulatory dysfunction  Active Problems:    History of cerebral hemorrhage    Tremor, essential    Weakness of both lower extremities    GERD (gastroesophageal reflux disease)    Hypokalemia    Syncope and collapse    Leukocytosis      Leukocytosis  Assessment & Plan  WBC 16    Patient is afebrile  Low suspicion for infection at this time  Likely reactive in the setting of recent fall and stress    -Monitor and recheck    Syncope and collapse  Assessment & Plan  Based on history, unclear etiology of fall as patient is not sure  Attempted to call son with no response, will need to call facility for details  Most likely from electrolyte abnormality    Discontinue telemetry  Ordered echo  Orthostatics    Hypokalemia  Assessment & Plan  Noted previously on last admission  EKG with normal sinus rhythm    3.1 -> 2.4 -> 2.9    Magnesium repleted  K today improved to 4.1  Q8 BMP checks changed to daily checks  Urine studies: Cr 70, Cl 97, Osm 516, Na 130, K 30  Of note, this study was done before K was given  Appears to be inappropriately losing K in the urine  Plan to consult Endocrine on monday    GERD (gastroesophageal reflux disease)  Assessment & Plan  CT- Small sliding-type hiatal hernia. Thickening of the wall of the gastric fundus and body suggestive of gastritis but similar when compared to previous examinations.     Continue home Pepcid 40 mg daily    Weakness of both lower extremities  Assessment & Plan  Patient had previous admission in September or October with same complaint of generalized lower extremity weakness occultly ambulating  Previous workup with aldolase,  anti-Ofelia, SPEP, UPEP were unremarkable    -PT OT    Tremor, essential  Assessment & Plan  Patient has baseline bilateral tremors, chronic    Continue primidone    History of cerebral hemorrhage  Assessment & Plan  History of cerebral hemorrhage with continued deficits with restricted left upper extremity range of motion and continued left upper and lower extremity tingling sensation as described by patient  CT head negative at this admission    Patient has bilateral lower extremity weakness which is chronic     Plan  We will continue atorvastatin 10  Continue to monitor neurologic exam  PT/OT recommending post acute rehab        * Ambulatory dysfunction  Assessment & Plan  Eva is a 78-year-old female with past medical history of previous stroke, GERD, emphysema , gait disturbance with lower extremity weakness chronically, on aspirin who presents as a level C trauma on aspirin.   Patient lives at [facility] and had fallen but patient is not aware how long she was down or how she fell.  Imaging was negative for traumatic injury and trauma workup was negative  CK was found to be 1084, UA was unremarkable  K was 3.1  Given 1 L fluids    Patient is AOx 3, but unsure of her episode.  Bilateral lower extremity weakness is chronic    PLAN:  -PT/OT recommend post acute rehab; CM finding placement  -Will check for myoglobin in urine  -Check mag and Phos  -Monitor electrolytes, replete          Disposition: pending placement. Also working up hypokalemia     SUBJECTIVE     Patient seen and examined. No acute events overnight. Some concerns overnight over orodysphagia. RN reporting pt coughing on fluids. She does cough on fluids on my assessment, but especially when the head of the bed is not adequately elevated. Otherwise, pt states she is doing ok. She feels weak but initially refuses to go to rehab. After a longer conversation, she is more amenable especially since she has little help at home. Otherwise, no CP, SOB,  "lightheadedness or confusion.     OBJECTIVE     Vitals:    24 0750 24 0751 24 1024 24 1040   BP: 148/70 148/70 148/70 158/64   BP Location:       Pulse: 77 78 78 88   Resp:       Temp: 99.4 °F (37.4 °C) 99.4 °F (37.4 °C)  99.2 °F (37.3 °C)   TempSrc:       SpO2: 93% 94%  92%   Weight:   64 kg (141 lb)    Height:   5' 5\" (1.651 m)       Temperature:   Temp (24hrs), Av.9 °F (37.2 °C), Min:97.8 °F (36.6 °C), Max:99.7 °F (37.6 °C)    Temperature: 99.2 °F (37.3 °C)  Intake & Output:  I/O          0701   0700  0701   0700  0701   0700    P.O.  540     I.V. (mL/kg) 971.7      IV Piggyback 500      Total Intake(mL/kg) 1471.7 540     Urine (mL/kg/hr) 1800 835     Stool  0     Total Output 1800 835     Net -328.3 -295            Unmeasured Urine Occurrence  2 x     Unmeasured Stool Occurrence  3 x           Weights:   IBW (Ideal Body Weight): 57 kg    Body mass index is 23.46 kg/m².  Weight (last 2 days)       Date/Time Weight    24 1024 64 (141)          Physical Exam  Vitals reviewed.   Constitutional:       General: She is not in acute distress.     Appearance: Normal appearance. She is not ill-appearing.   HENT:      Head: Normocephalic and atraumatic.      Mouth/Throat:      Mouth: Mucous membranes are dry.      Dentition: Abnormal dentition.   Eyes:      Conjunctiva/sclera: Conjunctivae normal.   Cardiovascular:      Rate and Rhythm: Normal rate and regular rhythm.      Pulses: Normal pulses.   Pulmonary:      Effort: Pulmonary effort is normal. No respiratory distress.   Abdominal:      General: Abdomen is flat. There is no distension.      Palpations: Abdomen is soft.      Tenderness: There is no abdominal tenderness. There is no guarding.   Skin:     General: Skin is dry.      Capillary Refill: Capillary refill takes less than 2 seconds.   Neurological:      Mental Status: She is alert and oriented to person, place, and time. Mental status is at " "baseline.   Psychiatric:         Mood and Affect: Mood normal.         Behavior: Behavior normal.       LABORATORY DATA     Labs: I have personally reviewed pertinent reports.  Results from last 7 days   Lab Units 01/20/24  0433 01/19/24  1125   WBC Thousand/uL 14.37* 16.10*   HEMOGLOBIN g/dL 11.4* 13.6   HEMATOCRIT % 33.9* 39.6   PLATELETS Thousands/uL 240 308   NEUTROS PCT % 79* 83*   MONOS PCT % 8 7   EOS PCT % 0 0      Results from last 7 days   Lab Units 01/21/24  0518 01/20/24  2228 01/20/24  1343 01/20/24  0433 01/19/24  1125   POTASSIUM mmol/L 4.1 3.8 2.9* 2.4* 3.1*   CHLORIDE mmol/L 111* 109*  --  103 100   CO2 mmol/L 26 28  --  29 30   BUN mg/dL 13 13  --  13 15   CREATININE mg/dL 0.69 0.67  --  0.68 0.83   CALCIUM mg/dL 8.4 8.0*  --  8.5 10.5*   ALK PHOS U/L  --   --   --  58 70   ALT U/L  --   --   --  14 14   AST U/L  --   --   --  39 38     Results from last 7 days   Lab Units 01/20/24 2228 01/20/24  0433   MAGNESIUM mg/dL 2.2 1.6*     Results from last 7 days   Lab Units 01/20/24  0433   PHOSPHORUS mg/dL 2.3      Results from last 7 days   Lab Units 01/19/24  1125   INR  1.07   PTT seconds 26               IMAGING & DIAGNOSTIC TESTING     Radiology Results: I have personally reviewed pertinent reports.  TRAUMA - CT spine cervical wo contrast    Result Date: 1/19/2024  Impression: No cervical spine fracture or traumatic malalignment. Workstation performed: [id number]     TRAUMA - CT chest abdomen pelvis w contrast    Result Date: 1/19/2024  Impression: No acute traumatic parenchymal injury in chest, abdomen or pelvis. No acute fracture. Reidentified pulmonary emphysematous changes. No new or enlarging solid pulmonary nodule. Small sliding-type hiatal hernia. Thickening of the wall of the gastric fundus and body suggestive of gastritis but similar when compared to previous examinations. IV contrast extravasation into the upper extremity as described above. This examination was marked \"immediate " "notification\" in Epic in order to begin the standard process by which the radiology reading room liaison alerts the referring practitioner. Workstation performed: [id number]     TRAUMA - CT head wo contrast    Result Date: 1/19/2024  Impression: No acute intracranial abnormality. Workstation performed: [id number]     Other Diagnostic Testing: I have personally reviewed pertinent reports.    ACTIVE MEDICATIONS     Current Facility-Administered Medications   Medication Dose Route Frequency    acetaminophen (TYLENOL) tablet 650 mg  650 mg Oral Q6H PRN    atorvastatin (LIPITOR) tablet 10 mg  10 mg Oral Daily With Dinner    enoxaparin (LOVENOX) subcutaneous injection 40 mg  40 mg Subcutaneous Daily    famotidine (PEPCID) tablet 40 mg  40 mg Oral Daily    multivitamin-minerals (CENTRUM) tablet 1 tablet  1 tablet Oral Daily    primidone (MYSOLINE) tablet 50 mg  50 mg Oral HS    sodium chloride (OCEAN) 0.65 % nasal spray 1 spray  1 spray Each Nare Q1H PRN       VTE Pharmacologic Prophylaxis: Enoxaparin (Lovenox)  VTE Mechanical Prophylaxis: sequential compression device    Portions of the record may have been created with voice recognition software.  Occasional wrong word or \"sound a like\" substitutions may have occurred due to the inherent limitations of voice recognition software.  Read the chart carefully and recognize, using context, where substitutions have occurred.  ==  Ciro Syed MD  [facility]  Internal Medicine Residency PGY-1      "

## 2024-01-22 ENCOUNTER — HOME HEALTH ADMISSION (OUTPATIENT)
Dept: HOME HEALTH SERVICES | Facility: HOME HEALTHCARE | Age: 79
End: 2024-01-22

## 2024-01-22 PROBLEM — D64.9 ANEMIA: Status: ACTIVE | Noted: 2024-01-22

## 2024-01-22 PROBLEM — D72.829 LEUKOCYTOSIS: Status: RESOLVED | Noted: 2024-01-19 | Resolved: 2024-01-22

## 2024-01-22 LAB
ALBUMIN SERPL BCG-MCNC: 3.4 G/DL (ref 3.5–5)
ANION GAP SERPL CALCULATED.3IONS-SCNC: 9 MMOL/L
BASOPHILS # BLD AUTO: 0.03 THOUSANDS/ÂΜL (ref 0–0.1)
BASOPHILS NFR BLD AUTO: 0 % (ref 0–1)
BUN SERPL-MCNC: 12 MG/DL (ref 5–25)
CALCIUM SERPL-MCNC: 8.3 MG/DL (ref 8.4–10.2)
CHLORIDE SERPL-SCNC: 109 MMOL/L (ref 96–108)
CK SERPL-CCNC: 149 U/L (ref 26–192)
CO2 SERPL-SCNC: 27 MMOL/L (ref 21–32)
CREAT SERPL-MCNC: 0.62 MG/DL (ref 0.6–1.3)
EOSINOPHIL # BLD AUTO: 0.12 THOUSAND/ÂΜL (ref 0–0.61)
EOSINOPHIL NFR BLD AUTO: 2 % (ref 0–6)
ERYTHROCYTE [DISTWIDTH] IN BLOOD BY AUTOMATED COUNT: 12.1 % (ref 11.6–15.1)
GFR SERPL CREATININE-BSD FRML MDRD: 87 ML/MIN/1.73SQ M
GLUCOSE SERPL-MCNC: 105 MG/DL (ref 65–140)
HCT VFR BLD AUTO: 30.6 % (ref 34.8–46.1)
HGB BLD-MCNC: 9.9 G/DL (ref 11.5–15.4)
IMM GRANULOCYTES # BLD AUTO: 0.06 THOUSAND/UL (ref 0–0.2)
IMM GRANULOCYTES NFR BLD AUTO: 1 % (ref 0–2)
LYMPHOCYTES # BLD AUTO: 1.8 THOUSANDS/ÂΜL (ref 0.6–4.47)
LYMPHOCYTES NFR BLD AUTO: 24 % (ref 14–44)
MAGNESIUM SERPL-MCNC: 2 MG/DL (ref 1.9–2.7)
MCH RBC QN AUTO: 33.7 PG (ref 26.8–34.3)
MCHC RBC AUTO-ENTMCNC: 32.4 G/DL (ref 31.4–37.4)
MCV RBC AUTO: 104 FL (ref 82–98)
MONOCYTES # BLD AUTO: 0.53 THOUSAND/ÂΜL (ref 0.17–1.22)
MONOCYTES NFR BLD AUTO: 7 % (ref 4–12)
NEUTROPHILS # BLD AUTO: 4.89 THOUSANDS/ÂΜL (ref 1.85–7.62)
NEUTS SEG NFR BLD AUTO: 66 % (ref 43–75)
NRBC BLD AUTO-RTO: 0 /100 WBCS
PLATELET # BLD AUTO: 208 THOUSANDS/UL (ref 149–390)
PMV BLD AUTO: 10.5 FL (ref 8.9–12.7)
POTASSIUM SERPL-SCNC: 3.2 MMOL/L (ref 3.5–5.3)
RBC # BLD AUTO: 2.94 MILLION/UL (ref 3.81–5.12)
SODIUM SERPL-SCNC: 145 MMOL/L (ref 135–147)
WBC # BLD AUTO: 7.43 THOUSAND/UL (ref 4.31–10.16)

## 2024-01-22 PROCEDURE — 99232 SBSQ HOSP IP/OBS MODERATE 35: CPT | Performed by: INTERNAL MEDICINE

## 2024-01-22 PROCEDURE — 85025 COMPLETE CBC W/AUTO DIFF WBC: CPT

## 2024-01-22 PROCEDURE — 92526 ORAL FUNCTION THERAPY: CPT

## 2024-01-22 PROCEDURE — 83874 ASSAY OF MYOGLOBIN: CPT | Performed by: STUDENT IN AN ORGANIZED HEALTH CARE EDUCATION/TRAINING PROGRAM

## 2024-01-22 PROCEDURE — 83735 ASSAY OF MAGNESIUM: CPT | Performed by: STUDENT IN AN ORGANIZED HEALTH CARE EDUCATION/TRAINING PROGRAM

## 2024-01-22 PROCEDURE — 80048 BASIC METABOLIC PNL TOTAL CA: CPT

## 2024-01-22 PROCEDURE — 99223 1ST HOSP IP/OBS HIGH 75: CPT | Performed by: INTERNAL MEDICINE

## 2024-01-22 PROCEDURE — 82550 ASSAY OF CK (CPK): CPT | Performed by: STUDENT IN AN ORGANIZED HEALTH CARE EDUCATION/TRAINING PROGRAM

## 2024-01-22 PROCEDURE — 82040 ASSAY OF SERUM ALBUMIN: CPT | Performed by: STUDENT IN AN ORGANIZED HEALTH CARE EDUCATION/TRAINING PROGRAM

## 2024-01-22 RX ORDER — POTASSIUM CHLORIDE 1500 MG/1
40 TABLET, EXTENDED RELEASE ORAL ONCE
Status: COMPLETED | OUTPATIENT
Start: 2024-01-22 | End: 2024-01-22

## 2024-01-22 RX ORDER — POTASSIUM CHLORIDE 1500 MG/1
20 TABLET, EXTENDED RELEASE ORAL ONCE
Status: COMPLETED | OUTPATIENT
Start: 2024-01-22 | End: 2024-01-22

## 2024-01-22 RX ADMIN — POTASSIUM CHLORIDE 40 MEQ: 1500 TABLET, EXTENDED RELEASE ORAL at 08:43

## 2024-01-22 RX ADMIN — ENOXAPARIN SODIUM 40 MG: 40 INJECTION SUBCUTANEOUS at 08:42

## 2024-01-22 RX ADMIN — ATORVASTATIN CALCIUM 10 MG: 10 TABLET, FILM COATED ORAL at 16:35

## 2024-01-22 RX ADMIN — Medication 1 TABLET: at 08:43

## 2024-01-22 RX ADMIN — PRIMIDONE 50 MG: 50 TABLET ORAL at 21:00

## 2024-01-22 RX ADMIN — POTASSIUM CHLORIDE 20 MEQ: 1500 TABLET, EXTENDED RELEASE ORAL at 16:35

## 2024-01-22 RX ADMIN — POTASSIUM CHLORIDE 40 MEQ: 1500 TABLET, EXTENDED RELEASE ORAL at 13:28

## 2024-01-22 RX ADMIN — FAMOTIDINE 40 MG: 20 TABLET, FILM COATED ORAL at 08:43

## 2024-01-22 NOTE — RESTORATIVE TECHNICIAN NOTE
Restorative Technician Note      Patient Name: Eva Lacey     Note Type: Mobility  Patient Position Upon Consult: Supine  Activity Performed: Transferred; Dangled; Stood  Assistive Device: Other (Comment) (x2)  Patient Position at End of Consult: Bedside chair; All needs within reach; Bed/Chair alarm activated

## 2024-01-22 NOTE — SPEECH THERAPY NOTE
"Speech Language/Pathology    Speech/Language Pathology Progress Note    Patient Name: Eva Lacey  Today's Date: 1/22/2024     Problem List  Principal Problem:    Ambulatory dysfunction  Active Problems:    History of cerebral hemorrhage    Tremor, essential    Weakness of both lower extremities    GERD (gastroesophageal reflux disease)    Hypokalemia    Syncope and collapse    Anemia       Past Medical History  Past Medical History:   Diagnosis Date    Cataract of right eye     GERD (gastroesophageal reflux disease)         Past Surgical History  Past Surgical History:   Procedure Laterality Date    CATARACT EXTRACTION, BILATERAL Bilateral     MASTECTOMY Bilateral     for calcium deposits    CT COLONOSCOPY FLX DX W/COLLJ SPEC WHEN PFRMD N/A 8/25/2017    Procedure: COLONOSCOPY;  Surgeon: Harika Aguilar MD;  Location: [facility];  Service: Colorectal    CT XCAPSL CTRC RMVL INSJ IO LENS PROSTH W/O ECP Right 6/21/2016    Procedure: OD EXTRACTION EXTRACAPSULAR CATARACT PHACO INTRAOCULAR LENS (IOL);  Surgeon: Lida Dewitt MD;  Location: [facility];  Service: Ophthalmology    TONSILLECTOMY           Subjective:  Pt seen for dysphagia tx. Pt was sitting upright in chair. Pt alert, pleasant, and cooperative.     Objective:  Pt fed herself lunch during session. This included part of a blueberry muffin, lemon meringue pie, and sips of thin liquids via straw. Pt fed herself at an appropriate rate, taking small bites/sips. Mastication, bolus formation and transfer appeared prompt. There was mild L side oral/buccal pocketing; pt is aware and removes easily with lingual or finger sweep. Pharyngeal swallows appeared prompt. There were no overt s/s of aspiration. Discussed current diet with pt, and asked if she would like to try regular consistency solids for potential diet advancement. Pt states she would like to continue with current, soft diet for now, and \"work my way back up\" to regular foods.     Assessment:  Good " toleration of dysphagia 3 diet and thin liquids. No overt s/s of aspiration. Pt would like to continue on this diet for now.     Plan/Recommendations:  Continue dysphagia 3 diet and thin liquids for now. Check L cheek for pocketing. Plan to check with pt in the next few days to see if she is interested in advancing diet.

## 2024-01-22 NOTE — ASSESSMENT & PLAN NOTE
Baseline hemoglobin 11-13. Today, Hgb dropped to 9.9. Some dilutional aspect possible as there was a small drop in all cell lines. No overt bleeding appreciated. Anemia may be contributing to patient's fatigue today.     Per chart review, last colonoscopy done in 2020 with benign polyp. No GIB appreciated here. No hematuria. Will continue to trend.    Plan  Patient now back at baseline without any blood product  No obvious bleed

## 2024-01-22 NOTE — PROGRESS NOTES
INTERNAL MEDICINE RESIDENCY PROGRESS NOTE     Name: Eva Lacey   Age & Sex: 78 y.o. female   MRN: [id number]  Unit/Bed#: [facility] 501-01   Encounter: [id number]  Team: SOD Team C     PATIENT INFORMATION     Name: Eva Lacey   Age & Sex: 78 y.o. female   MRN: [id number]  Hospital Stay Days: 3    ASSESSMENT/PLAN     Principal Problem:    Ambulatory dysfunction  Active Problems:    History of cerebral hemorrhage    Tremor, essential    Weakness of both lower extremities    GERD (gastroesophageal reflux disease)    Hypokalemia    Syncope and collapse    Anemia      Anemia  Assessment & Plan  Baseline hemoglobin 11-13. Today, Hgb dropped to 9.9. Some dilutional aspect possible as there was a small drop in all cell lines. No overt bleeding appreciated. Anemia may be contributing to patient's fatigue today.     Per chart review, last colonoscopy done in 2020 with benign polyp. No GIB appreciated here. No hematuria. Will continue to trend.    Plan  Trend CBC    Syncope and collapse  Assessment & Plan  Based on history, unclear etiology of fall as patient is not sure  Attempted to call son with no response, will need to call facility for details  Most likely from electrolyte abnormality    Discontinue telemetry  ECHO wnl    Hypokalemia  Assessment & Plan  Noted previously on last admission. EKG with normal sinus rhythm. Potassium dropping as low as 2.4. Urine studies indicating inappropriate wasting of K. Pt not on any diuretics that may cause this. Pt also oliguric, but most likely in setting of decrease fluid intake.     Plan  Magnesium repleted  Daily BMP; Cr wnl  Urine studies: Cr 70, Cl 97, Osm 516, Na 130, K 30  Of note, this study was done before K was given  Appears to be inappropriately losing K in the urine  Nephrology consulted    GERD (gastroesophageal reflux disease)  Assessment & Plan  CT- Small sliding-type hiatal hernia. Thickening of the wall of the gastric fundus and body suggestive of gastritis but  similar when compared to previous examinations.     Continue home Pepcid 40 mg daily    Weakness of both lower extremities  Assessment & Plan  Patient had previous admission in September or October with same complaint of generalized lower extremity weakness occultly ambulating  Previous workup with aldolase, anti-Ofelia, SPEP, UPEP were unremarkable    -PT OT    Tremor, essential  Assessment & Plan  Patient has baseline bilateral tremors, chronic    Continue primidone    History of cerebral hemorrhage  Assessment & Plan  History of cerebral hemorrhage with continued deficits with restricted left upper extremity range of motion and continued left upper and lower extremity tingling sensation as described by patient. CT head negative at this admission. Patient has bilateral lower extremity weakness which is chronic     Plan  We will continue atorvastatin 10  Continue to monitor neurologic exam  PT/OT recommending post acute rehab        * Ambulatory dysfunction  Assessment & Plan  Eva is a 78-year-old female with past medical history of previous stroke, GERD, emphysema , gait disturbance with lower extremity weakness chronically, on aspirin who presents as a level C trauma on aspirin.   Patient lives at [facility] and had fallen but patient is not aware how long she was down or how she fell.  Imaging was negative for traumatic injury and trauma workup was negative  CK was found to be 1084, UA was unremarkable  K was 3.1  Given 1 L fluids    Patient is AOx 3, but unsure of her episode.  Bilateral lower extremity weakness is chronic    PLAN:  -PT/OT recommend post acute rehab; CM finding placement  -Cardiac work up negative: ECHO wnl      Leukocytosis-resolved as of 1/22/2024  Assessment & Plan  WBC 16    Patient is afebrile  Low suspicion for infection at this time  Likely reactive in the setting of recent fall and stress    -Monitor and recheck        Disposition: ongoing inpatient care for hypokalemia. CM finding  placement     SUBJECTIVE     Patient seen and examined. No acute events overnight. Pt feeling more fatigued today than yesterday. Otherwise no new complaints. No CP, fever, SOB, abdominal pain, n/v/d. Poor PO intake due to tremors.     OBJECTIVE     Vitals:    24 1914 24 2218 24 0731 24 0732   BP: 125/58 136/73 140/70    BP Location: Left arm Right arm     Pulse: 84 72 78    Resp: 15 17     Temp: 99.1 °F (37.3 °C) 98.6 °F (37 °C) 97.9 °F (36.6 °C)    TempSrc: Axillary Oral  Axillary   SpO2: 90% 95% 92%    Weight:       Height:          Temperature:   Temp (24hrs), Av.9 °F (37.2 °C), Min:97.9 °F (36.6 °C), Max:99.8 °F (37.7 °C)    Temperature: 97.9 °F (36.6 °C)  Intake & Output:  I/O          0701   0700  0701   0700  0701   0700    P.O. 540 340 120    I.V. (mL/kg)       IV Piggyback       Total Intake(mL/kg) 540 340 (5.3) 120 (1.9)    Urine (mL/kg/hr) 835 1050 (0.7)     Stool 0      Total Output 835 1050     Net -295 -710 +120           Unmeasured Urine Occurrence 2 x      Unmeasured Stool Occurrence 3 x            Weights:   IBW (Ideal Body Weight): 57 kg    Body mass index is 23.46 kg/m².  Weight (last 2 days)       Date/Time Weight    24 1024 64 (141)          Physical Exam  Vitals reviewed.   Constitutional:       General: She is not in acute distress.     Appearance: Normal appearance. She is ill-appearing.      Comments: Tired appears on exam   HENT:      Head: Normocephalic and atraumatic.      Mouth/Throat:      Mouth: Mucous membranes are dry.      Dentition: Abnormal dentition.   Eyes:      Conjunctiva/sclera: Conjunctivae normal.   Cardiovascular:      Rate and Rhythm: Normal rate and regular rhythm.      Pulses: Normal pulses.   Pulmonary:      Effort: Pulmonary effort is normal. No respiratory distress.   Abdominal:      General: Abdomen is flat.      Palpations: Abdomen is soft.      Tenderness: There is no abdominal tenderness.   Skin:      General: Skin is dry.      Capillary Refill: Capillary refill takes less than 2 seconds.   Neurological:      Mental Status: She is alert and oriented to person, place, and time. Mental status is at baseline.   Psychiatric:         Mood and Affect: Mood normal.         Behavior: Behavior normal.       LABORATORY DATA     Labs: I have personally reviewed pertinent reports.  Results from last 7 days   Lab Units 01/22/24  0602 01/20/24  0433 01/19/24  1125   WBC Thousand/uL 7.43 14.37* 16.10*   HEMOGLOBIN g/dL 9.9* 11.4* 13.6   HEMATOCRIT % 30.6* 33.9* 39.6   PLATELETS Thousands/uL 208 240 308   NEUTROS PCT % 66 79* 83*   MONOS PCT % 7 8 7   EOS PCT % 2 0 0      Results from last 7 days   Lab Units 01/22/24  0452 01/21/24  0518 01/20/24 2228 01/20/24  1343 01/20/24  0433 01/19/24  1125   POTASSIUM mmol/L 3.2* 4.1 3.8   < > 2.4* 3.1*   CHLORIDE mmol/L 109* 111* 109*  --  103 100   CO2 mmol/L 27 26 28  --  29 30   BUN mg/dL 12 13 13  --  13 15   CREATININE mg/dL 0.62 0.69 0.67  --  0.68 0.83   CALCIUM mg/dL 8.3* 8.4 8.0*  --  8.5 10.5*   ALK PHOS U/L  --   --   --   --  58 70   ALT U/L  --   --   --   --  14 14   AST U/L  --   --   --   --  39 38    < > = values in this interval not displayed.     Results from last 7 days   Lab Units 01/22/24  0452 01/20/24 2228 01/20/24  0433   MAGNESIUM mg/dL 2.0 2.2 1.6*     Results from last 7 days   Lab Units 01/20/24  0433   PHOSPHORUS mg/dL 2.3      Results from last 7 days   Lab Units 01/19/24  1125   INR  1.07   PTT seconds 26               IMAGING & DIAGNOSTIC TESTING     Radiology Results: I have personally reviewed pertinent reports.  TRAUMA - CT spine cervical wo contrast    Result Date: 1/19/2024  Impression: No cervical spine fracture or traumatic malalignment. Workstation performed: [id number]     TRAUMA - CT chest abdomen pelvis w contrast    Result Date: 1/19/2024  Impression: No acute traumatic parenchymal injury in chest, abdomen or pelvis. No acute fracture.  "Reidentified pulmonary emphysematous changes. No new or enlarging solid pulmonary nodule. Small sliding-type hiatal hernia. Thickening of the wall of the gastric fundus and body suggestive of gastritis but similar when compared to previous examinations. IV contrast extravasation into the upper extremity as described above. This examination was marked \"immediate notification\" in Epic in order to begin the standard process by which the radiology reading room liaison alerts the referring practitioner. Workstation performed: [id number]     TRAUMA - CT head wo contrast    Result Date: 1/19/2024  Impression: No acute intracranial abnormality. Workstation performed: [id number]     Other Diagnostic Testing: I have personally reviewed pertinent reports.    ACTIVE MEDICATIONS     Current Facility-Administered Medications   Medication Dose Route Frequency    acetaminophen (TYLENOL) tablet 650 mg  650 mg Oral Q6H PRN    atorvastatin (LIPITOR) tablet 10 mg  10 mg Oral Daily With Dinner    enoxaparin (LOVENOX) subcutaneous injection 40 mg  40 mg Subcutaneous Daily    famotidine (PEPCID) tablet 40 mg  40 mg Oral Daily    multivitamin-minerals (CENTRUM) tablet 1 tablet  1 tablet Oral Daily    potassium chloride (K-DUR,KLOR-CON) CR tablet 40 mEq  40 mEq Oral Once    primidone (MYSOLINE) tablet 50 mg  50 mg Oral HS    sodium chloride (OCEAN) 0.65 % nasal spray 1 spray  1 spray Each Nare Q1H PRN       VTE Pharmacologic Prophylaxis: Enoxaparin (Lovenox)  VTE Mechanical Prophylaxis: sequential compression device    Portions of the record may have been created with voice recognition software.  Occasional wrong word or \"sound a like\" substitutions may have occurred due to the inherent limitations of voice recognition software.  Read the chart carefully and recognize, using context, where substitutions have occurred.  ==  Ciro Syed MD  [facility]  Internal Medicine Residency PGY-1      "

## 2024-01-22 NOTE — CONSULTS
Consultation - Nephrology   Eva Lacey 78 y.o. female MRN: [id number]  Unit/Bed#: [facility] 501-01 Encounter: [id number]    ASSESSMENT AND PLAN:  Patient is 78-year-old female with significant medical issues of essential tremors, COPD, history of CVA, hyperlipidemia, GERD, presented with ambulatory dysfunction, lower extremity weakness.  We are consulted for hypokalemia management.    Severe hypokalemia  -Potassium 2.4 this admission now improving with aggressive replacement.  -Last serum potassium 3.2, status post total 80 meq potassium chloride once today.  Will give additional 20 meq this evening.  -Noted urine potassium 30, urine potassium/creatinine ratio 43.5 mEq/g suggestive of renal loss of potassium (evaluated during hypokalemic state)  -Also noted mild hypomagnesemia upon admission which can cause renal loss of potassium  -Noted patient has intermittent/occasional hypokalemia issues in the past although not on any regular potassium supplements at home.  -She denies any obvious history of hypertension although noted blood pressure slightly above goal this admission  -No obvious alkalosis, she denies taking any diuretics at home.  No obvious GI loss based on clinical history.  -Given slightly elevated BP along with intermittent hypokalemia in the past, check serum aldosterone, PRA  -CT scan with contrast shows unremarkable adrenal glands.  -May eventually consider potassium sparing diuretics depending on further workup.    Hypomagnesemia, resolved with replacement.  Most recent serum magnesium 2.0.    Renal function overall stable with serum creatinine 0.6  Ambulatory dysfunction, further management as per primary team.    Discussed above plan in detail with primary team regarding aggressive potassium replacement, and they agree with above recommendations.      HISTORY OF PRESENT ILLNESS:  Requesting Physician: Shona Fierro MD  Reason for Consult: Hypokalemia    Eva Lacey is a 78 y.o. year old female  "who was admitted to [facility] after presenting with ambulatory dysfunction, generalized weakness. A renal consultation is requested today for assistance in the management of hypokalemia.  Old medical records including prior levels were reviewed from [facility] records.  Patient does have occasional and intermittent hypokalemia issues in the past.  She is not aware of having any hypokalemia issues in the past.  She does not take any potassium supplements on regular basis.  Denies taking any diuretics.  She presented with generalized weakness, ambulatory dysfunction and currently being evaluated for rehab placement.  She denies any nausea, vomiting or diarrhea.  Denies any urinary complaint.    No obvious family history of hypokalemia.  Denies any obvious history of hypertension or not taking any blood pressure medications.  She does check BP on regular basis at home and her SBP generally 120s to 130s.    PAST MEDICAL HISTORY:  Past Medical History:   Diagnosis Date    Cataract of right eye     GERD (gastroesophageal reflux disease)        PAST SURGICAL HISTORY:  Past Surgical History:   Procedure Laterality Date    CATARACT EXTRACTION, BILATERAL Bilateral     MASTECTOMY Bilateral     for calcium deposits    WV COLONOSCOPY FLX DX W/COLLJ SPEC WHEN PFRMD N/A 8/25/2017    Procedure: COLONOSCOPY;  Surgeon: Harika Aguilar MD;  Location: [facility];  Service: Colorectal    WV XCAPSL CTRC RMVL INSJ IO LENS PROSTH W/O ECP Right 6/21/2016    Procedure: OD EXTRACTION EXTRACAPSULAR CATARACT PHACO INTRAOCULAR LENS (IOL);  Surgeon: Lida Dewitt MD;  Location: [facility];  Service: Ophthalmology    TONSILLECTOMY         ALLERGIES:  No Known Allergies    SOCIAL HISTORY:  Social History     Substance and Sexual Activity   Alcohol Use Yes    Comment: pt states \"very occassionally\"     Social History     Substance and Sexual Activity   Drug Use Never     Social History     Tobacco Use   Smoking Status Former   Smokeless " Tobacco Never       FAMILY HISTORY:  Family History   Problem Relation Age of Onset    Rectal cancer Brother     Stroke Mother     Heart attack Father     Arrhythmia Father         possible    Coronary artery disease Father     Sudden death Father         scd    Anuerysm Neg Hx     Clotting disorder Neg Hx     Hypertension Neg Hx     Hyperlipidemia Neg Hx     Heart failure Neg Hx        MEDICATIONS:    Current Facility-Administered Medications:     acetaminophen (TYLENOL) tablet 650 mg, 650 mg, Oral, Q6H PRN, Huong Alcala MD, 650 mg at 01/21/24 1510    atorvastatin (LIPITOR) tablet 10 mg, 10 mg, Oral, Daily With Dinner, Huong Alcala MD, 10 mg at 01/21/24 1604    enoxaparin (LOVENOX) subcutaneous injection 40 mg, 40 mg, Subcutaneous, Daily, Huong Alcala MD, 40 mg at 01/22/24 0842    famotidine (PEPCID) tablet 40 mg, 40 mg, Oral, Daily, Huong Alcala MD, 40 mg at 01/22/24 0843    multivitamin-minerals (CENTRUM) tablet 1 tablet, 1 tablet, Oral, Daily, Huong Alcala MD, 1 tablet at 01/22/24 0843    primidone (MYSOLINE) tablet 50 mg, 50 mg, Oral, HS, Huong Alcala MD, 50 mg at 01/21/24 2244    sodium chloride (OCEAN) 0.65 % nasal spray 1 spray, 1 spray, Each Nare, Q1H PRN, Spencer Marroquin DO, 1 spray at 01/21/24 0523    REVIEW OF SYSTEMS:  More than 10 point review of systems were obtained and discussed in length with the patient. Review of systems were negative / unremarkable except mentioned in the HPI section.     PHYSICAL EXAM:  Current Weight: Weight - Scale: 64 kg (141 lb)  First Weight: Weight - Scale: 64 kg (141 lb)  Vitals:    01/22/24 0731   BP: 140/70   Pulse: 78   Resp:    Temp: 97.9 °F (36.6 °C)   SpO2: 92%       Intake/Output Summary (Last 24 hours) at 1/22/2024 1540  Last data filed at 1/22/2024 1401  Gross per 24 hour   Intake 840 ml   Output 550 ml   Net 290 ml     Wt Readings from Last 3 Encounters:   01/21/24 64 kg (141 lb)   09/27/23 64.4 kg (141 lb 15.6 oz)   09/12/23 64.4 kg (142 lb)      Temp Readings from Last 3 Encounters:   01/22/24 97.9 °F (36.6 °C)   10/02/23 (!) 97.4 °F (36.3 °C) (Oral)   09/12/23 97.9 °F (36.6 °C) (Temporal)     BP Readings from Last 3 Encounters:   01/22/24 140/70   10/02/23 133/68   09/12/23 122/84     Pulse Readings from Last 3 Encounters:   01/22/24 78   10/02/23 77   09/12/23 85        Physical Examination:  Eyes: Mild conjunctival pallor present  ENT: External examination of ear and nose unremarkable  Neck: No obvious lymphadenopathy appreciated  Respiratory: Bilateral air entry present  CVS: No significant edema in legs  GI: Soft, nondistended  CNS: Active, alert, follows commands  Skin: No new rash  Musculoskeletal: No obvious new gross deformity noted    Invasive Devices:      Lab Results:   Results from last 7 days   Lab Units 01/22/24  0602 01/22/24  0452 01/21/24  0518 01/20/24  2228 01/20/24  1343 01/20/24  0433 01/19/24  1125   WBC Thousand/uL 7.43  --   --   --   --  14.37* 16.10*   HEMOGLOBIN g/dL 9.9*  --   --   --   --  11.4* 13.6   HEMATOCRIT % 30.6*  --   --   --   --  33.9* 39.6   PLATELETS Thousands/uL 208  --   --   --   --  240 308   POTASSIUM mmol/L  --  3.2* 4.1 3.8 2.9* 2.4* 3.1*   CHLORIDE mmol/L  --  109* 111* 109*  --  103 100   CO2 mmol/L  --  27 26 28  --  29 30   BUN mg/dL  --  12 13 13  --  13 15   CREATININE mg/dL  --  0.62 0.69 0.67  --  0.68 0.83   CALCIUM mg/dL  --  8.3* 8.4 8.0*  --  8.5 10.5*   MAGNESIUM mg/dL  --  2.0  --  2.2  --  1.6*  --    PHOSPHORUS mg/dL  --   --   --   --   --  2.3  --        Other Studies:   TRAUMA - CT head wo contrast   Final Result by Kofi Granger MD (01/19 6083)      No acute intracranial abnormality.                  Workstation performed: [id number]         TRAUMA - CT spine cervical wo contrast   Final Result by Kofi Granger MD (01/19 2155)      No cervical spine fracture or traumatic malalignment.                  Workstation performed: [id number]         TRAUMA - CT chest abdomen pelvis  "w contrast   Final Result by Kofi Granger MD (01/19 3044)      No acute traumatic parenchymal injury in chest, abdomen or pelvis. No acute fracture.      Reidentified pulmonary emphysematous changes. No new or enlarging solid pulmonary nodule.      Small sliding-type hiatal hernia. Thickening of the wall of the gastric fundus and body suggestive of gastritis but similar when compared to previous examinations.      IV contrast extravasation into the upper extremity as described above.      This examination was marked \"immediate notification\" in Epic in order to begin the standard process by which the radiology reading room liaison alerts the referring practitioner.         Workstation performed: [id number]             Portions of the record may have been created with voice recognition software. Occasional wrong word or \"sound a like\" substitutions may have occurred due to the inherent limitations of voice recognition software. Read the chart carefully and recognize, using context, where substitutions have occurred.    "

## 2024-01-23 VITALS
HEIGHT: 65 IN | OXYGEN SATURATION: 94 % | RESPIRATION RATE: 16 BRPM | BODY MASS INDEX: 23.49 KG/M2 | HEART RATE: 91 BPM | WEIGHT: 141 LBS | DIASTOLIC BLOOD PRESSURE: 69 MMHG | SYSTOLIC BLOOD PRESSURE: 130 MMHG | TEMPERATURE: 97.4 F

## 2024-01-23 LAB
ANION GAP SERPL CALCULATED.3IONS-SCNC: 12 MMOL/L
BUN SERPL-MCNC: 11 MG/DL (ref 5–25)
CALCIUM SERPL-MCNC: 8.6 MG/DL (ref 8.4–10.2)
CHLORIDE SERPL-SCNC: 106 MMOL/L (ref 96–108)
CO2 SERPL-SCNC: 22 MMOL/L (ref 21–32)
CORTIS AM PEAK SERPL-MCNC: 21 UG/DL (ref 6.7–22.6)
CREAT SERPL-MCNC: 0.6 MG/DL (ref 0.6–1.3)
ERYTHROCYTE [DISTWIDTH] IN BLOOD BY AUTOMATED COUNT: 11.9 % (ref 11.6–15.1)
GFR SERPL CREATININE-BSD FRML MDRD: 88 ML/MIN/1.73SQ M
GLUCOSE SERPL-MCNC: 153 MG/DL (ref 65–140)
HCT VFR BLD AUTO: 35.4 % (ref 34.8–46.1)
HGB BLD-MCNC: 11.9 G/DL (ref 11.5–15.4)
MCH RBC QN AUTO: 33.9 PG (ref 26.8–34.3)
MCHC RBC AUTO-ENTMCNC: 33.6 G/DL (ref 31.4–37.4)
MCV RBC AUTO: 101 FL (ref 82–98)
PLATELET # BLD AUTO: 260 THOUSANDS/UL (ref 149–390)
PMV BLD AUTO: 10.4 FL (ref 8.9–12.7)
POTASSIUM SERPL-SCNC: 3.6 MMOL/L (ref 3.5–5.3)
RBC # BLD AUTO: 3.51 MILLION/UL (ref 3.81–5.12)
SARS-COV-2 RNA RESP QL NAA+PROBE: NEGATIVE
SODIUM SERPL-SCNC: 140 MMOL/L (ref 135–147)
WBC # BLD AUTO: 8.25 THOUSAND/UL (ref 4.31–10.16)

## 2024-01-23 PROCEDURE — 99232 SBSQ HOSP IP/OBS MODERATE 35: CPT | Performed by: INTERNAL MEDICINE

## 2024-01-23 PROCEDURE — 82088 ASSAY OF ALDOSTERONE: CPT | Performed by: INTERNAL MEDICINE

## 2024-01-23 PROCEDURE — 92526 ORAL FUNCTION THERAPY: CPT

## 2024-01-23 PROCEDURE — 99238 HOSP IP/OBS DSCHRG MGMT 30/<: CPT | Performed by: INTERNAL MEDICINE

## 2024-01-23 PROCEDURE — 82533 TOTAL CORTISOL: CPT | Performed by: INTERNAL MEDICINE

## 2024-01-23 PROCEDURE — 97110 THERAPEUTIC EXERCISES: CPT

## 2024-01-23 PROCEDURE — 87635 SARS-COV-2 COVID-19 AMP PRB: CPT

## 2024-01-23 PROCEDURE — 97112 NEUROMUSCULAR REEDUCATION: CPT

## 2024-01-23 PROCEDURE — 80048 BASIC METABOLIC PNL TOTAL CA: CPT | Performed by: STUDENT IN AN ORGANIZED HEALTH CARE EDUCATION/TRAINING PROGRAM

## 2024-01-23 PROCEDURE — 84244 ASSAY OF RENIN: CPT | Performed by: INTERNAL MEDICINE

## 2024-01-23 PROCEDURE — 85027 COMPLETE CBC AUTOMATED: CPT | Performed by: STUDENT IN AN ORGANIZED HEALTH CARE EDUCATION/TRAINING PROGRAM

## 2024-01-23 RX ORDER — AMILORIDE HYDROCHLORIDE 5 MG/1
5 TABLET ORAL DAILY
Qty: 14 TABLET
Start: 2024-01-23 | End: 2024-02-06

## 2024-01-23 RX ORDER — POTASSIUM CHLORIDE 1500 MG/1
40 TABLET, EXTENDED RELEASE ORAL ONCE
Status: COMPLETED | OUTPATIENT
Start: 2024-01-23 | End: 2024-01-23

## 2024-01-23 RX ORDER — POTASSIUM CHLORIDE 1500 MG/1
20 TABLET, EXTENDED RELEASE ORAL DAILY
Qty: 14 TABLET
Start: 2024-01-23 | End: 2024-02-02

## 2024-01-23 RX ADMIN — POTASSIUM CHLORIDE 40 MEQ: 1500 TABLET, EXTENDED RELEASE ORAL at 10:47

## 2024-01-23 RX ADMIN — ENOXAPARIN SODIUM 40 MG: 40 INJECTION SUBCUTANEOUS at 08:28

## 2024-01-23 RX ADMIN — FAMOTIDINE 40 MG: 20 TABLET, FILM COATED ORAL at 08:27

## 2024-01-23 RX ADMIN — ACETAMINOPHEN 650 MG: 325 TABLET, FILM COATED ORAL at 12:55

## 2024-01-23 RX ADMIN — Medication 1 TABLET: at 08:27

## 2024-01-23 NOTE — PLAN OF CARE
Problem: PHYSICAL THERAPY ADULT  Goal: Performs mobility at highest level of function for planned discharge setting.  See evaluation for individualized goals.  Description: Treatment/Interventions: ADL retraining, Functional transfer training, LE strengthening/ROM, Therapeutic exercise, Endurance training, Patient/family training, Equipment eval/education, Bed mobility, Gait training, Spoke to nursing, Spoke to case management, OT, Elevations, Cognitive reorientation  Equipment Recommended: Walker (owns)       See flowsheet documentation for full assessment, interventions and recommendations.  Outcome: Progressing  Note: Prognosis: Fair  Problem List: Decreased strength, Decreased endurance, Impaired balance, Decreased mobility, Decreased safety awareness  Assessment: Patient lying supine in bed following sitting out in the chair all morning. She states she is exhausted but would like to participate in therapy. She was able to navigate bed with use of bed rails and mod A. She requires mod A to stand with use of Rw and cued for weight shifting and short range marches to assist with ambulation. She did take a step forward and step backwards but is retropulsive despite cues. She was able to take 4 lateral steps without LOB. She was able to perform TE sitting EOB. She would continue to benefit from skilled PT to maximize functional independence.  Barriers to Discharge: Inaccessible home environment, Decreased caregiver support     Rehab Resource Intensity Level, PT: II (Moderate Resource Intensity)    See flowsheet documentation for full assessment.

## 2024-01-23 NOTE — DISCHARGE INSTR - AVS FIRST PAGE
You were admitted because you were found on the ground. It is likely you had muscle weakness from low potassium. Your potassium in the hospital was very low. We were able to bring it up after aggressive repletion, but your urine shows that you are peeing them out. Nephrology had seen you here. They have some labwork still pending. You should follow up with nephrologist outpatient to have this worked up.     For your low potassium  Please take your potassium tablets 20mg a day  Also start taking amiloride 5mg once a day. This will help prevent spilling potassium in your urine  Please have your blood work repeated in 1 week and then go see your primary care doctor afterwards, but no longer than 2 weeks from today.  Also try to follow up with a nephrologist soon. I put in a referral for that    For your leg weakness  Please continue to work with physical therapy in short term rehab to regain your strength.

## 2024-01-23 NOTE — CASE MANAGEMENT
Case Management Discharge Planning Note    Patient name Eva Lacey  Location [facility]01 MRN [id number]  : 1945 Date 2024       Current Admission Date: 2024  Current Admission Diagnosis:Ambulatory dysfunction   Patient Active Problem List    Diagnosis Date Noted    Anemia 2024    Ambulatory dysfunction 2024    Syncope and collapse 2024    Hypophosphatemia 2023    Hypokalemia 2023    Impaired mobility and ADLs 04/10/2023    MCI (mild cognitive impairment) 2023    Pain 2023    Enlarged and hypertrophic nails 2023    Osteopenia 2023    Right knee pain 2023    Elevated AST (SGOT) 2023    Prediabetes 2023    Closed left ankle fracture, sequela 2023    GERD (gastroesophageal reflux disease) 2023    Bilateral pain of leg and foot 2023    Diarrhea 2023    Macrocytosis 2023    Weakness of both lower extremities 2023    Rhabdomyolysis 2023    SARS-CoV-2 positive 2023    Tremor, essential 10/20/2022    Acute cerebral hemorrhage (HCC) 10/11/2022    History of cerebral hemorrhage 10/10/2022    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 (HCC) 10/10/2022    Chronic left-sided low back pain     Transient cerebral ischemia 2018    Premature atrial beats 2018    Cataract of right eye 2016    Sacroiliitis (HCC) 2015    Idiopathic osteoporosis 2015    Primary osteoarthritis of hand 2015      LOS (days): 4  Geometric Mean LOS (GMLOS) (days): 2.6  Days to GMLOS:-1.3     OBJECTIVE:  Risk of Unplanned Readmission Score: 20.96         Current admission status: Inpatient   Preferred Pharmacy:   [facility] - [place], [place] - [address]  [address] [facility] [place] [address]  Phone: [phone] Fax: [phone]    [id number] - [place], [place] - [id number] [facility] [place]  [address]  Phone: [phone] Fax: [phone]    Primary Care Provider: Janet Ordaz,     Primary Insurance: MEDICARE  Secondary Insurance: AARP    DISCHARGE DETAILS:    Discharge planning discussed with:: Pt and her son Dirk  Freedom of Choice: Yes     CM contacted family/caregiver?: Yes (Pts son, Dirk)  Were Treatment Team discharge recommendations reviewed with patient/caregiver?: Yes  Did patient/caregiver verbalize understanding of patient care needs?: N/A- going to facility  Were patient/caregiver advised of the risks associated with not following Treatment Team discharge recommendations?: Yes    Contacts  Patient Contacts: SonLigia  Relationship to Patient:: Family  Contact Method: Phone  Phone Number: [phone]  Reason/Outcome: Discharge Planning    Transported by (Company and Unit #): [facility]    IMM Given (Date):: 01/23/24  IMM Given to:: Patient          Accepting Facility Name, City & State : [facility]  Receiving Facility/Agency Phone Number: [phone] or [phone]  Facility/Agency Fax Number: [phone]       CM met w/ Pt to discuss accepting rehab facilities. Pt has decided on [facility].  TC to Pts son explaining that the Pt has been accepted to [facility] for STR. Pts son had many questions about transitioning from STR to LTC at [facility]. Pts son understands that there is a wait list for LTC and will contact [facility] directly to inquire about LTC for the Pt.      Pt and her son Dirk are aware and in agreement w/ the Pts DC to [facility] today. Pt is leaving via BLS w/ [facility] @ 4:00pm.     RN and SOD-C aware.

## 2024-01-23 NOTE — DISCHARGE SUMMARY
INTERNAL MEDICINE RESIDENCY DISCHARGE SUMMARY     Eva Lacey   78 y.o. female  MRN: [id number]  Room/Bed: [facility]-01     [facility] 5 MED SURG/SD   Encounter: [id number]    Principal Problem:    Ambulatory dysfunction  Active Problems:    History of cerebral hemorrhage    Tremor, essential    Weakness of both lower extremities    GERD (gastroesophageal reflux disease)    Hypokalemia    Syncope and collapse    Anemia      Anemia  Assessment & Plan  Baseline hemoglobin 11-13. Today, Hgb dropped to 9.9. Some dilutional aspect possible as there was a small drop in all cell lines. No overt bleeding appreciated. Anemia may be contributing to patient's fatigue today.     Per chart review, last colonoscopy done in 2020 with benign polyp. No GIB appreciated here. No hematuria. Will continue to trend.    Plan  Patient now back at baseline without any blood product  No obvious bleed    Syncope and collapse  Assessment & Plan  Based on history, unclear etiology of fall as patient is not sure  Attempted to call son with no response, will need to call facility for details  Most likely from electrolyte abnormality    Discontinue telemetry  ECHO wnl    Hypokalemia  Assessment & Plan  Noted previously on last admission. EKG with normal sinus rhythm. Potassium dropping as low as 2.4. Urine studies indicating inappropriate wasting of K. Pt not on any diuretics that may cause this. Nephrology consulted to work up patient. Will need outpatient follow up care.     Plan  Magnesium repleted  Cr wnl  Urine studies: Cr 70, Cl 97, Osm 516, Na 130, K 30  Of note, this study was done before K was given  Appears to be inappropriately losing K in the urine  Nephrology consulted  Noted urine potassium 30, urine potassium/creatinine ratio 43.5 mEq/g suggestive of renal loss of potassium (evaluated during hypokalemic state)  Also noted mild hypomagnesemia upon admission which can  cause renal loss of potassium  Noted patient has intermittent/occasional hypokalemia issues in the past although not on any regular potassium supplements at home.  She denies any obvious history of hypertension although noted blood pressure slightly above goal this admission  No obvious alkalosis, she denies taking any diuretics at home.  No obvious GI loss based on clinical history.  F/userum aldosterone, PRA  CT scan with contrast shows unremarkable adrenal glands.  Will dc patient on KDur 20meq daily and amiloride 5mg daily  Repeat BMP in 1 week and follow up PCP  Follow up with nephrology in 2-3 weeks      GERD (gastroesophageal reflux disease)  Assessment & Plan  CT- Small sliding-type hiatal hernia. Thickening of the wall of the gastric fundus and body suggestive of gastritis but similar when compared to previous examinations.     Continue home Pepcid 40 mg daily    Weakness of both lower extremities  Assessment & Plan  Patient had previous admission in September or October with same complaint of generalized lower extremity weakness occultly ambulating  Previous workup with aldolase, anti-Ofelia, SPEP, UPEP were unremarkable    -PT OT in STR    Tremor, essential  Assessment & Plan  Patient has baseline bilateral tremors, chronic    Continue primidone    History of cerebral hemorrhage  Assessment & Plan  History of cerebral hemorrhage with continued deficits with restricted left upper extremity range of motion and continued left upper and lower extremity tingling sensation as described by patient. CT head negative at this admission. Patient has bilateral lower extremity weakness which is chronic     Plan  We will continue atorvastatin 10  Continue to monitor neurologic exam  PT/OT recommending post acute rehab        * Ambulatory dysfunction  Assessment & Plan  Eva is a 78-year-old female with past medical history of previous stroke, GERD, emphysema , gait disturbance with lower extremity weakness chronically,  on aspirin who presents as a level C trauma on aspirin.   Patient lives at [facility] and had fallen but patient is not aware how long she was down or how she fell.  Imaging was negative for traumatic injury and trauma workup was negative  CK was found to be 1084, UA was unremarkable  K was 3.1  Given 1 L fluids    Patient is AOx 3, but unsure of her episode.  Bilateral lower extremity weakness is chronic    PLAN:  -PT/OT recommend post acute rehab; Pt will be going to STR  -Cardiac work up negative: ECHO wnl      Leukocytosis-resolved as of 1/22/2024  Assessment & Plan  WBC 16    Patient is afebrile  Low suspicion for infection at this time  Likely reactive in the setting of recent fall and stress    -Monitor and recheck        DETAILS OF HOSPITAL COURSE     78-year-old female with pmh of previous stroke, GERD, emphysema , gait disturbance with lower extremity weakness chronically, on aspirin who presents as a level C trauma on aspirin. Patient lives at [facility] and had fallen but patient is not aware how long she was down or how she fell. Imaging was negative for traumatic injury and trauma workup was negative.  CK was found to be 1084, UA was unremarkable. K was 3.1.  Troponin negative.  Given 1 L fluids in the ED. WBC at 16.1. Etiology for the fall unknown, but unlikely infectious process. Leukocytosis most likely reactive as the white count improved without abx. From patient's baseline Hemoglobin of 11-13, there was a brief drop to 9.9, but repeat lab shows return to baseline at 12. Most likely etiology for the fall is electrolyte abnormalities, namely, hypokalemia and hypomagnesemia. 1.6 Mg improved with repletion, but hypokalemia persisted, dropped as low as 2.4 despite attempts to replete. Pt required aggressive supplementation to potassium at borderline low levels. Urine studies revealing inappropriate potassium wasting. Nephrology was consulted with some lab work still pending. Otherwise,  clinically, patient's symptom improving and cleared for dc to STR with outpatient nephrology follow up. Will dc on Kdur tablets, and amiloride 5mg daily. Repeat BMP scheduled in 1 week and follow up with PCP afterwards.     On day of dc, pt reporting feeling stronger. Denies nausea, fever, vomiting, lightheadedness. No increase weakness. No CP or SOB. Able to tolerate PO intake with dysphagia diet. Needs bed elevated to prevent choking of food.     Physical Exam  Vitals reviewed.   Constitutional:       General: She is not in acute distress.     Appearance: Normal appearance.      Comments: Sitting on chair with food. Comfortable appearing. NAD  HENT:      Head: Normocephalic and atraumatic.      Mouth/Throat:      Mouth: Mucous membranes are moist     Dentition: Abnormal dentition.   Eyes:      Conjunctiva/sclera: Conjunctivae normal.   Cardiovascular:      Rate and Rhythm: Normal rate and regular rhythm.      Pulses: Normal pulses.   Pulmonary:      Effort: Pulmonary effort is normal. No respiratory distress.   Abdominal:      General: Abdomen is flat.      Palpations: Abdomen is soft.      Tenderness: There is no abdominal tenderness.   Skin:     General: Skin is dry.      Capillary Refill: Capillary refill takes less than 2 seconds.   Neurological:      Mental Status: She is alert and oriented to person, place, and time. Mental status is at baseline.   Psychiatric:         Mood and Affect: Mood normal.         Behavior: Behavior normal.       DISCHARGE INFORMATION     PCP at Discharge:  Janet Ordaz DO    Admitting Provider: Shona Fierro MD  Admission Date: 1/19/2024    Discharge Provider:   Discharge Date: BLAKE FATIMA MD      Discharge Disposition: Non [facility] SNF/TCU/SNU  Discharge Condition: good  Discharge with Lines: no    Discharge Diet:  dysphagia level 3 diet with thin liquids  Activity Restrictions: none  Test Results Pending at Discharge: PRA and aldosterone levels    Discharge  "Diagnoses:  Principal Problem:    Ambulatory dysfunction  Active Problems:    History of cerebral hemorrhage    Tremor, essential    Weakness of both lower extremities    GERD (gastroesophageal reflux disease)    Hypokalemia    Syncope and collapse    Anemia  Resolved Problems:    Leukocytosis      Consulting Providers:      Diagnostic & Therapeutic Procedures Performed:  TRAUMA - CT spine cervical wo contrast    Result Date: 1/19/2024  Impression: No cervical spine fracture or traumatic malalignment. Workstation performed: [id number]     TRAUMA - CT chest abdomen pelvis w contrast    Result Date: 1/19/2024  Impression: No acute traumatic parenchymal injury in chest, abdomen or pelvis. No acute fracture. Reidentified pulmonary emphysematous changes. No new or enlarging solid pulmonary nodule. Small sliding-type hiatal hernia. Thickening of the wall of the gastric fundus and body suggestive of gastritis but similar when compared to previous examinations. IV contrast extravasation into the upper extremity as described above. This examination was marked \"immediate notification\" in Epic in order to begin the standard process by which the radiology reading room liaison alerts the referring practitioner. Workstation performed: [id number]     TRAUMA - CT head wo contrast    Result Date: 1/19/2024  Impression: No acute intracranial abnormality. Workstation performed: [id number]       Code Status: Level 3 - DNAR and DNI  Advance Directive & Living Will: Received  Power of :    POLST:      Medications:  Current Discharge Medication List        Current Discharge Medication List        START taking these medications    Details   AMILoride 5 mg tablet Take 1 tablet (5 mg total) by mouth daily for 14 days  Qty: 14 tablet    Associated Diagnoses: Hypokalemia      potassium chloride (K-DUR,KLOR-CON) 20 mEq tablet Take 1 tablet (20 mEq total) by mouth daily for 14 days  Qty: 14 tablet    Associated Diagnoses: Hypokalemia    "        Current Discharge Medication List        CONTINUE these medications which have NOT CHANGED    Details   acetaminophen (TYLENOL) 325 mg tablet Take 2 tablets (650 mg total) by mouth every 6 (six) hours as needed for mild pain or fever  Refills: 0    Associated Diagnoses: Pain      Ascorbic Acid (VITAMIN C PO) Take by mouth      atorvastatin (LIPITOR) 10 mg tablet Take 1 tablet by mouth Daily      b complex vitamins tablet Take 1 tablet by mouth daily.      calcium carbonate (OYSTER SHELL,OSCAL) 500 mg Take 1 tablet by mouth 2 (two) times a day with meals  Refills: 0    Associated Diagnoses: Weakness; Vitamin D deficiency      ergocalciferol (VITAMIN D2) 50,000 units Take 1 capsule (50,000 Units total) by mouth once a week  Refills: 0    Associated Diagnoses: Weakness; Vitamin D deficiency      famotidine (PEPCID) 40 MG tablet       fluticasone (FLONASE) 50 mcg/act nasal spray 1 spray into each nostril daily Do not start before April 8, 2023.  Qty: 9.9 mL, Refills: 0    Associated Diagnoses: Rhinitis      Multiple Vitamins-Minerals (CENTRUM CARDIO PO) Take 1 tablet by mouth daily.      primidone (MYSOLINE) 50 mg tablet Take 1 tab by mouth daily at bedtime  Qty: 90 tablet, Refills: 3    Associated Diagnoses: Tremor, essential             Allergies:  No Known Allergies    FOLLOW-UP     PCP Outpatient Follow-up:  yes      Follow up: with PCP for hypokalemia  Follow up within next: 1 week after blood work    Consulting Providers Follow-up:    Specialty: nephrology  Follow up within next: 2-3 weeks     Active Issues Requiring Follow-up:   yes     Issue: ambulatory dysfunction  Responsible Individual: STR  What is Needed: na  Follow-up Appointments Arranged: No        Issue: hypokalemia  Responsible Individual: nephrology  What is Needed: BMP  Follow-up Appointments Arranged: No     Discharge Statement:   I spent 45 minutes minutes discharging the patient. This time was spent on the day of discharge. I had direct  "contact with the patient on the day of discharge. Additional documentation is required if more than 30 minutes were spent on discharge.    Portions of the record may have been created with voice recognition software.  Occasional wrong word or \"sound a like\" substitutions may have occurred due to the inherent limitations of voice recognition software.  Read the chart carefully and recognize, using context, where substitutions have occurred.    ==  Ciro Syed MD  [facility]  Internal Medicine Resident PGY-1   "

## 2024-01-23 NOTE — RESTORATIVE TECHNICIAN NOTE
Restorative Technician Note      Patient Name: Eva Lacey     Note Type: Mobility  Patient Position Upon Consult: Seated edge of bed  Activity Performed: Dangled; Stood; Repositioned  Assistive Device: Roller walker  Patient Position at End of Consult: Bedside chair; All needs within reach; Bed/Chair alarm activated

## 2024-01-23 NOTE — PROGRESS NOTES
Patient:  SHAWN HOLLEY    MRN:  [id number]    Aidin Request ID:  [id number]    Level of care reserved:  Skilled Nursing Facility    [facility] [place] [address] [phone]    Clinical needs requested:    Geography searched:  10 miles around [id number]    Start of Service:    Request sent:  1:49pm EST on 1/21/2024 by Pamela Guzmán    Partner reserved:  11:37am EST on 1/23/2024 by Meliza Oconnor    Choice list shared:

## 2024-01-23 NOTE — SPEECH THERAPY NOTE
"Speech Language/Pathology  Speech-Language Pathology Progress Note      Patient Name: Eva Lacey    Today's Date: 1/23/2024      Subjective:  Pt was awake, alert, and pleasant/cooperative. She was sitting up in chair at bedside. Patient stated \"I want a place that has everything right there\" re: discharge planning. Pt was very aware of her needs.     Objective:  Pt was seen today for dysphagia therapy. Current diet is dysphagia 3 dental soft with thin liquids. Pt was on room air. Oral care was completed with use of oral care kits, initiated by ST but pt asked to and was able to complete independently. Focus of today's session was to maximize PO intake safety and determine potential for diet texture advancement. Textures offered today included puree, soft solid, hard solid, and thin liquid via straw and self administered.  Swallow function:   Bolus retrieval and control was adequate.  Mastication and Formation were prompt. There was mild residue was removed during oral care.  Pharyngeal swallow initiation appeared prompt. Hyolaryngeal excursion was adequate. No s/s aspiration occurred during session today.  SLP discussed potential diet advancement. Pt would like to continue current diet.     Assessment:  Swallow function is improving with current diet. Diet texture and liquid consistency remains appropriate at this time per pt preference. Diet may be advanced to regular diet when pt feels comfortable.       Plan:  Continue dysphagia 3 dental soft and thin liquids. Continue ST follow up briefly to determine if/when pt is ready for advancement. Subsequent sessions to focus on determining potential for diet texture advancement.  "

## 2024-01-23 NOTE — PHYSICAL THERAPY NOTE
Physical Therapy Progress Note      01/23/24 1330   PT Last Visit   PT Visit Date 01/23/24   Note Type   Note Type Treatment   Pain Assessment   Pain Assessment Tool 0-10   Pain Score 4   Pain Location/Orientation Location: Back   Hospital Pain Intervention(s) Ambulation/increased activity;Repositioned  (increased activtiy)   Restrictions/Precautions   Weight Bearing Precautions Per Order No   Other Precautions Fall Risk;Multiple lines;Bed Alarm   General   Chart Reviewed Yes   Response to Previous Treatment Patient with no complaints from previous session.   Family/Caregiver Present No   Cognition   Arousal/Participation Cooperative   Attention Attends with cues to redirect   Comments Patient is pleasant and cooperative throughout session   Bed Mobility   Rolling R 3  Moderate assistance   Additional items Assist x 1;Increased time required;Bedrails   Supine to Sit 3  Moderate assistance   Additional items Assist x 1;Increased time required;LE management   Sit to Supine 4  Minimal assistance   Additional items Assist x 1;LE management;Increased time required   Transfers   Sit to Stand 3  Moderate assistance   Additional items Assist x 1;Increased time required;Verbal cues   Stand to Sit 3  Moderate assistance   Additional items Assist x 1;Increased time required;Verbal cues   Stand pivot 3  Moderate assistance   Additional items Assist x 1;Increased time required;Verbal cues   Ambulation/Elevation   Gait pattern Improper Weight shift;Retropulsion;Short stride;Inconsistent marianela   Gait Assistance 3  Moderate assist   Additional items Assist x 1;Verbal cues;Tactile cues   Assistive Device Rolling walker   Distance 2 feet   Balance   Static Sitting Fair -   Dynamic Sitting Poor +   Static Standing Poor   Dynamic Standing Poor -   Ambulatory Poor -   Endurance Deficit   Endurance Deficit Yes   Endurance Deficit Description Fatigue, weakness   Activity Tolerance   Activity Tolerance Patient limited by fatigue   Nurse  Made Aware Appropriate to see per RN   Exercises   Hip Abduction Sitting;10 reps;AROM;Bilateral   Knee AROM Long Arc Quad Sitting;10 reps;AROM;Bilateral   Neuro re-ed Weight shifting with use of RW, tiny marches to assist with LE advancement   Assessment   Prognosis Fair   Problem List Decreased strength;Decreased endurance;Impaired balance;Decreased mobility;Decreased safety awareness   Assessment Patient lying supine in bed following sitting out in the chair all morning. She states she is exhausted but would like to participate in therapy. She was able to navigate bed with use of bed rails and mod A. She requires mod A to stand with use of Rw and cued for weight shifting and short range marches to assist with ambulation. She did take a step forward and step backwards but is retropulsive despite cues. She was able to take 4 lateral steps without LOB. She was able to perform TE sitting EOB. She would continue to benefit from skilled PT to maximize functional independence.   Barriers to Discharge Inaccessible home environment;Decreased caregiver support   Goals   Patient Goals To get better   STG Expiration Date 02/03/23   PT Treatment Day 1   Plan   Treatment/Interventions Functional transfer training;LE strengthening/ROM;Therapeutic exercise;Endurance training;Bed mobility;Spoke to nursing   PT Frequency 2-3x/wk   Discharge Recommendation   Rehab Resource Intensity Level, PT II (Moderate Resource Intensity)   Equipment Recommended Walker   AM-PAC Basic Mobility Inpatient   Turning in Flat Bed Without Bedrails 2   Lying on Back to Sitting on Edge of Flat Bed Without Bedrails 2   Moving Bed to Chair 2   Standing Up From Chair Using Arms 2   Walk in Room 2   Climb 3-5 Stairs With Railing 2   Basic Mobility Inpatient Raw Score 12   Basic Mobility Standardized Score 32.23   Turning Head Towards Sound 3   Follow Simple Instructions 3   Low Function Basic Mobility Raw Score  18   Low Function Basic Mobility Standardized  Score  29.25   Highest Level Of Mobility   -HLM Goal 4: Move to chair/commode   JH-HLM Achieved 4: Move to chair/commode       Mikayla Chen, PTA

## 2024-01-24 ENCOUNTER — TELEPHONE (OUTPATIENT)
Dept: NEPHROLOGY | Facility: CLINIC | Age: 79
End: 2024-01-24

## 2024-01-24 LAB — MYOGLOBIN UR-MCNC: 38 NG/ML (ref 0–13)

## 2024-01-24 NOTE — TELEPHONE ENCOUNTER
----- Message from Darien Pappas MD sent at 1/23/2024  5:54 PM EST -----  She got [facility] from [facility] today.  She needs repeat BMP in 1 week and office follow-up with AP in 8-10 weeks.

## 2024-01-25 ENCOUNTER — TELEPHONE (OUTPATIENT)
Dept: RADIOLOGY | Facility: HOSPITAL | Age: 79
End: 2024-01-25

## 2024-01-25 NOTE — TELEPHONE ENCOUNTER
Second attempt to call pt to schedule. No answer, and VM is full so I am unable to leave a message.

## 2024-01-26 LAB
ALDOST SERPL-MCNC: <1 NG/DL (ref 0–30)
RENIN PLAS-CCNC: 0.37 NG/ML/HR (ref 0.17–5.38)

## 2024-01-29 ENCOUNTER — TELEPHONE (OUTPATIENT)
Dept: RADIOLOGY | Facility: HOSPITAL | Age: 79
End: 2024-01-29

## 2024-01-31 ENCOUNTER — APPOINTMENT (EMERGENCY)
Dept: CT IMAGING | Facility: HOSPITAL | Age: 79
DRG: 641 | End: 2024-01-31
Payer: MEDICARE

## 2024-01-31 ENCOUNTER — HOSPITAL ENCOUNTER (INPATIENT)
Facility: HOSPITAL | Age: 79
LOS: 1 days | Discharge: NURSING, REHAB OR LONG-TERM CARE | DRG: 641 | End: 2024-02-02
Attending: EMERGENCY MEDICINE | Admitting: INTERNAL MEDICINE
Payer: MEDICARE

## 2024-01-31 DIAGNOSIS — E87.5 HYPERKALEMIA: ICD-10-CM

## 2024-01-31 DIAGNOSIS — R79.89 ELEVATED TROPONIN: ICD-10-CM

## 2024-01-31 DIAGNOSIS — R53.1 GENERALIZED WEAKNESS: Primary | ICD-10-CM

## 2024-01-31 DIAGNOSIS — R79.89 D-DIMER, ELEVATED: ICD-10-CM

## 2024-01-31 LAB
2HR DELTA HS TROPONIN: 23 NG/L
ALBUMIN SERPL BCP-MCNC: 3.8 G/DL (ref 3.5–5)
ALP SERPL-CCNC: 153 U/L (ref 34–104)
ALT SERPL W P-5'-P-CCNC: 38 U/L (ref 7–52)
ANION GAP SERPL CALCULATED.3IONS-SCNC: 10 MMOL/L
APTT PPP: 24 SECONDS (ref 23–37)
AST SERPL W P-5'-P-CCNC: 47 U/L (ref 13–39)
BASOPHILS # BLD AUTO: 0.06 THOUSANDS/ÂΜL (ref 0–0.1)
BASOPHILS NFR BLD AUTO: 0 % (ref 0–1)
BILIRUB SERPL-MCNC: 0.4 MG/DL (ref 0.2–1)
BUN SERPL-MCNC: 23 MG/DL (ref 5–25)
CALCIUM SERPL-MCNC: 10 MG/DL (ref 8.4–10.2)
CARDIAC TROPONIN I PNL SERPL HS: 305 NG/L
CARDIAC TROPONIN I PNL SERPL HS: 328 NG/L
CHLORIDE SERPL-SCNC: 98 MMOL/L (ref 96–108)
CO2 SERPL-SCNC: 24 MMOL/L (ref 21–32)
CREAT SERPL-MCNC: 0.9 MG/DL (ref 0.6–1.3)
EOSINOPHIL # BLD AUTO: 0.02 THOUSAND/ÂΜL (ref 0–0.61)
EOSINOPHIL NFR BLD AUTO: 0 % (ref 0–6)
ERYTHROCYTE [DISTWIDTH] IN BLOOD BY AUTOMATED COUNT: 11.9 % (ref 11.6–15.1)
FLUAV RNA RESP QL NAA+PROBE: NEGATIVE
FLUBV RNA RESP QL NAA+PROBE: NEGATIVE
GFR SERPL CREATININE-BSD FRML MDRD: 62 ML/MIN/1.73SQ M
GLUCOSE SERPL-MCNC: 133 MG/DL (ref 65–140)
GLUCOSE SERPL-MCNC: 240 MG/DL (ref 65–140)
HCT VFR BLD AUTO: 37.7 % (ref 34.8–46.1)
HGB BLD-MCNC: 12.5 G/DL (ref 11.5–15.4)
IMM GRANULOCYTES # BLD AUTO: 0.15 THOUSAND/UL (ref 0–0.2)
IMM GRANULOCYTES NFR BLD AUTO: 1 % (ref 0–2)
INR PPP: 1.56 (ref 0.84–1.19)
LYMPHOCYTES # BLD AUTO: 2.8 THOUSANDS/ÂΜL (ref 0.6–4.47)
LYMPHOCYTES NFR BLD AUTO: 18 % (ref 14–44)
MCH RBC QN AUTO: 33.6 PG (ref 26.8–34.3)
MCHC RBC AUTO-ENTMCNC: 33.2 G/DL (ref 31.4–37.4)
MCV RBC AUTO: 101 FL (ref 82–98)
MONOCYTES # BLD AUTO: 0.99 THOUSAND/ÂΜL (ref 0.17–1.22)
MONOCYTES NFR BLD AUTO: 6 % (ref 4–12)
NEUTROPHILS # BLD AUTO: 11.68 THOUSANDS/ÂΜL (ref 1.85–7.62)
NEUTS SEG NFR BLD AUTO: 75 % (ref 43–75)
NRBC BLD AUTO-RTO: 0 /100 WBCS
PLATELET # BLD AUTO: 194 THOUSANDS/UL (ref 149–390)
PMV BLD AUTO: 10.7 FL (ref 8.9–12.7)
POTASSIUM SERPL-SCNC: 5.5 MMOL/L (ref 3.5–5.3)
PROT SERPL-MCNC: 8.2 G/DL (ref 6.4–8.4)
PROTHROMBIN TIME: 19.4 SECONDS (ref 11.6–14.5)
RBC # BLD AUTO: 3.72 MILLION/UL (ref 3.81–5.12)
RSV RNA RESP QL NAA+PROBE: NEGATIVE
SARS-COV-2 RNA RESP QL NAA+PROBE: NEGATIVE
SODIUM SERPL-SCNC: 132 MMOL/L (ref 135–147)
WBC # BLD AUTO: 15.7 THOUSAND/UL (ref 4.31–10.16)

## 2024-01-31 PROCEDURE — 99285 EMERGENCY DEPT VISIT HI MDM: CPT | Performed by: EMERGENCY MEDICINE

## 2024-01-31 PROCEDURE — 74177 CT ABD & PELVIS W/CONTRAST: CPT

## 2024-01-31 PROCEDURE — G1004 CDSM NDSC: HCPCS

## 2024-01-31 PROCEDURE — 99284 EMERGENCY DEPT VISIT MOD MDM: CPT

## 2024-01-31 PROCEDURE — 85610 PROTHROMBIN TIME: CPT

## 2024-01-31 PROCEDURE — 93005 ELECTROCARDIOGRAM TRACING: CPT

## 2024-01-31 PROCEDURE — 80053 COMPREHEN METABOLIC PANEL: CPT

## 2024-01-31 PROCEDURE — 0241U HB NFCT DS VIR RESP RNA 4 TRGT: CPT | Performed by: EMERGENCY MEDICINE

## 2024-01-31 PROCEDURE — 36415 COLL VENOUS BLD VENIPUNCTURE: CPT

## 2024-01-31 PROCEDURE — 84484 ASSAY OF TROPONIN QUANT: CPT

## 2024-01-31 PROCEDURE — 82948 REAGENT STRIP/BLOOD GLUCOSE: CPT

## 2024-01-31 PROCEDURE — 85730 THROMBOPLASTIN TIME PARTIAL: CPT

## 2024-01-31 PROCEDURE — 85025 COMPLETE CBC W/AUTO DIFF WBC: CPT

## 2024-01-31 RX ORDER — ERGOCALCIFEROL 1.25 MG/1
50000 CAPSULE ORAL WEEKLY
Status: DISCONTINUED | OUTPATIENT
Start: 2024-01-31 | End: 2024-02-02 | Stop reason: HOSPADM

## 2024-01-31 RX ORDER — ACETAMINOPHEN 325 MG/1
650 TABLET ORAL EVERY 6 HOURS PRN
Status: DISCONTINUED | OUTPATIENT
Start: 2024-01-31 | End: 2024-02-02 | Stop reason: HOSPADM

## 2024-01-31 RX ORDER — ATORVASTATIN CALCIUM 10 MG/1
10 TABLET, FILM COATED ORAL
Status: DISCONTINUED | OUTPATIENT
Start: 2024-02-01 | End: 2024-02-02 | Stop reason: HOSPADM

## 2024-01-31 RX ORDER — ENOXAPARIN SODIUM 100 MG/ML
40 INJECTION SUBCUTANEOUS DAILY
Status: DISCONTINUED | OUTPATIENT
Start: 2024-02-01 | End: 2024-02-01

## 2024-01-31 RX ORDER — CALCIUM CARBONATE 500(1250)
1 TABLET ORAL 2 TIMES DAILY WITH MEALS
Status: DISCONTINUED | OUTPATIENT
Start: 2024-02-01 | End: 2024-02-02 | Stop reason: HOSPADM

## 2024-01-31 RX ORDER — FLUTICASONE PROPIONATE 50 MCG
1 SPRAY, SUSPENSION (ML) NASAL DAILY
Status: DISCONTINUED | OUTPATIENT
Start: 2024-02-01 | End: 2024-02-02 | Stop reason: HOSPADM

## 2024-01-31 RX ORDER — FAMOTIDINE 20 MG/1
40 TABLET, FILM COATED ORAL DAILY
Status: DISCONTINUED | OUTPATIENT
Start: 2024-02-01 | End: 2024-02-02 | Stop reason: HOSPADM

## 2024-01-31 RX ORDER — DEXTROSE MONOHYDRATE 25 G/50ML
25 INJECTION, SOLUTION INTRAVENOUS ONCE
Status: COMPLETED | OUTPATIENT
Start: 2024-01-31 | End: 2024-01-31

## 2024-01-31 RX ORDER — PRIMIDONE 50 MG/1
50 TABLET ORAL
Status: DISCONTINUED | OUTPATIENT
Start: 2024-01-31 | End: 2024-02-02 | Stop reason: HOSPADM

## 2024-01-31 RX ADMIN — PRIMIDONE 50 MG: 50 TABLET ORAL at 23:55

## 2024-01-31 RX ADMIN — INSULIN HUMAN 6 UNITS: 100 INJECTION, SOLUTION PARENTERAL at 21:53

## 2024-01-31 RX ADMIN — SODIUM BICARBONATE 50 MEQ: 84 INJECTION INTRAVENOUS at 21:55

## 2024-01-31 RX ADMIN — IOHEXOL 80 ML: 350 INJECTION, SOLUTION INTRAVENOUS at 20:55

## 2024-01-31 RX ADMIN — SODIUM CHLORIDE 50 ML/HR: 4 INJECTION, SOLUTION, CONCENTRATE INTRAVENOUS at 22:01

## 2024-01-31 RX ADMIN — DEXTROSE MONOHYDRATE 25 ML: 25 INJECTION, SOLUTION INTRAVENOUS at 21:50

## 2024-01-31 RX ADMIN — ERGOCALCIFEROL 50000 UNITS: 1.25 CAPSULE, LIQUID FILLED ORAL at 23:55

## 2024-01-31 NOTE — LETTER
FAX      To:     [employer]   Company:  Phone:       Fax:        Email:        From:   Edda Arguelles  Phone:          Fax:    Email:      ________________________________________________    AVS for Eva Lacey      NOTICE:  This communication, including attachments, may contain information that is confidential and protected by the -client or other privilege(s).

## 2024-02-01 ENCOUNTER — APPOINTMENT (OUTPATIENT)
Dept: VASCULAR ULTRASOUND | Facility: HOSPITAL | Age: 79
DRG: 641 | End: 2024-02-01
Payer: MEDICARE

## 2024-02-01 PROBLEM — Z87.898 HISTORY OF DYSPHAGIA: Status: ACTIVE | Noted: 2024-02-01

## 2024-02-01 PROBLEM — R79.89 D-DIMER, ELEVATED: Status: ACTIVE | Noted: 2024-02-01

## 2024-02-01 PROBLEM — R10.819 SUPRAPUBIC TENDERNESS: Status: ACTIVE | Noted: 2024-02-01

## 2024-02-01 LAB
4HR DELTA HS TROPONIN: -25 NG/L
ANION GAP SERPL CALCULATED.3IONS-SCNC: 10 MMOL/L
ANION GAP SERPL CALCULATED.3IONS-SCNC: 9 MMOL/L
ATRIAL RATE: 100 BPM
BACTERIA UR QL AUTO: ABNORMAL /HPF
BILIRUB UR QL STRIP: NEGATIVE
BUN SERPL-MCNC: 21 MG/DL (ref 5–25)
BUN SERPL-MCNC: 22 MG/DL (ref 5–25)
CALCIUM SERPL-MCNC: 9.4 MG/DL (ref 8.4–10.2)
CALCIUM SERPL-MCNC: 9.7 MG/DL (ref 8.4–10.2)
CARDIAC TROPONIN I PNL SERPL HS: 280 NG/L
CHLORIDE SERPL-SCNC: 98 MMOL/L (ref 96–108)
CHLORIDE SERPL-SCNC: 99 MMOL/L (ref 96–108)
CLARITY UR: CLEAR
CO2 SERPL-SCNC: 26 MMOL/L (ref 21–32)
CO2 SERPL-SCNC: 28 MMOL/L (ref 21–32)
COLOR UR: ABNORMAL
CREAT SERPL-MCNC: 0.8 MG/DL (ref 0.6–1.3)
CREAT SERPL-MCNC: 0.91 MG/DL (ref 0.6–1.3)
D DIMER PPP FEU-MCNC: >20 UG/ML FEU
ERYTHROCYTE [DISTWIDTH] IN BLOOD BY AUTOMATED COUNT: 12 % (ref 11.6–15.1)
GFR SERPL CREATININE-BSD FRML MDRD: 61 ML/MIN/1.73SQ M
GFR SERPL CREATININE-BSD FRML MDRD: 71 ML/MIN/1.73SQ M
GLUCOSE P FAST SERPL-MCNC: 91 MG/DL (ref 65–99)
GLUCOSE SERPL-MCNC: 129 MG/DL (ref 65–140)
GLUCOSE SERPL-MCNC: 91 MG/DL (ref 65–140)
GLUCOSE UR STRIP-MCNC: NEGATIVE MG/DL
HCT VFR BLD AUTO: 37 % (ref 34.8–46.1)
HGB BLD-MCNC: 12.3 G/DL (ref 11.5–15.4)
HGB UR QL STRIP.AUTO: NEGATIVE
KETONES UR STRIP-MCNC: NEGATIVE MG/DL
LEUKOCYTE ESTERASE UR QL STRIP: NEGATIVE
MAGNESIUM SERPL-MCNC: 1.9 MG/DL (ref 1.9–2.7)
MCH RBC QN AUTO: 33.6 PG (ref 26.8–34.3)
MCHC RBC AUTO-ENTMCNC: 33.2 G/DL (ref 31.4–37.4)
MCV RBC AUTO: 101 FL (ref 82–98)
NITRITE UR QL STRIP: NEGATIVE
NON-SQ EPI CELLS URNS QL MICRO: ABNORMAL /HPF
P AXIS: 66 DEGREES
PH UR STRIP.AUTO: 6.5 [PH]
PLATELET # BLD AUTO: 201 THOUSANDS/UL (ref 149–390)
PMV BLD AUTO: 9.9 FL (ref 8.9–12.7)
POTASSIUM SERPL-SCNC: 4.1 MMOL/L (ref 3.5–5.3)
POTASSIUM SERPL-SCNC: 4.5 MMOL/L (ref 3.5–5.3)
PR INTERVAL: 126 MS
PROT UR STRIP-MCNC: ABNORMAL MG/DL
QRS AXIS: 83 DEGREES
QRSD INTERVAL: 72 MS
QT INTERVAL: 348 MS
QTC INTERVAL: 448 MS
RBC # BLD AUTO: 3.66 MILLION/UL (ref 3.81–5.12)
RBC #/AREA URNS AUTO: ABNORMAL /HPF
SODIUM SERPL-SCNC: 135 MMOL/L (ref 135–147)
SODIUM SERPL-SCNC: 135 MMOL/L (ref 135–147)
SP GR UR STRIP.AUTO: 1.04 (ref 1–1.03)
T WAVE AXIS: 82 DEGREES
UROBILINOGEN UR STRIP-ACNC: <2 MG/DL
VENTRICULAR RATE: 100 BPM
WBC # BLD AUTO: 12.23 THOUSAND/UL (ref 4.31–10.16)
WBC #/AREA URNS AUTO: ABNORMAL /HPF

## 2024-02-01 PROCEDURE — 93970 EXTREMITY STUDY: CPT

## 2024-02-01 PROCEDURE — 84484 ASSAY OF TROPONIN QUANT: CPT

## 2024-02-01 PROCEDURE — 81001 URINALYSIS AUTO W/SCOPE: CPT

## 2024-02-01 PROCEDURE — 83735 ASSAY OF MAGNESIUM: CPT

## 2024-02-01 PROCEDURE — 99223 1ST HOSP IP/OBS HIGH 75: CPT | Performed by: INTERNAL MEDICINE

## 2024-02-01 PROCEDURE — 85027 COMPLETE CBC AUTOMATED: CPT

## 2024-02-01 PROCEDURE — 36415 COLL VENOUS BLD VENIPUNCTURE: CPT

## 2024-02-01 PROCEDURE — 85379 FIBRIN DEGRADATION QUANT: CPT

## 2024-02-01 PROCEDURE — 93970 EXTREMITY STUDY: CPT | Performed by: SURGERY

## 2024-02-01 PROCEDURE — RECHECK: Performed by: INTERNAL MEDICINE

## 2024-02-01 PROCEDURE — 97163 PT EVAL HIGH COMPLEX 45 MIN: CPT

## 2024-02-01 PROCEDURE — 80048 BASIC METABOLIC PNL TOTAL CA: CPT

## 2024-02-01 RX ADMIN — ATORVASTATIN CALCIUM 10 MG: 10 TABLET, FILM COATED ORAL at 16:37

## 2024-02-01 RX ADMIN — Medication 1 TABLET: at 16:37

## 2024-02-01 RX ADMIN — Medication 1 TABLET: at 08:39

## 2024-02-01 RX ADMIN — APIXABAN 10 MG: 5 TABLET, FILM COATED ORAL at 17:00

## 2024-02-01 RX ADMIN — PRIMIDONE 50 MG: 50 TABLET ORAL at 20:58

## 2024-02-01 RX ADMIN — ACETAMINOPHEN 650 MG: 325 TABLET, FILM COATED ORAL at 08:39

## 2024-02-01 RX ADMIN — ENOXAPARIN SODIUM 40 MG: 40 INJECTION SUBCUTANEOUS at 08:39

## 2024-02-01 RX ADMIN — FAMOTIDINE 40 MG: 20 TABLET, FILM COATED ORAL at 08:39

## 2024-02-01 NOTE — PROGRESS NOTES
[facility]  Progress Eva Lacey 1945, 78 y.o. female MRN: [id number]  Unit/Bed#: -Jensen Encounter: [id number]  Primary Care Provider: Janet Ordaz DO   Date and time admitted to hospital: 1/31/2024  6:59 PM    * D-dimer, elevated  Assessment & Plan  Ddimer >20  Tachycardic to 108 on admission  Tachycardia now resolved, no tachypnea or SOB, no extremity swelling on exam    Plan:  LE duplex  If negative, upper extremity duplex and CTA    History of dysphagia  Assessment & Plan  At prior admission at [facility] patient was seen by speech therapy and recommended to have dysphagia 3 dental soft and thin liquids. Patient states she feels like she no longer needs this and can tolerate a normal diet again.     Plan  Speech eval consulted    Suprapubic tenderness  Assessment & Plan  Suprapubic tenderness reported by ED for which CT was ordered. During my visit patient reports that there is no pain, just discomfort because she needs to urinate.  CT A/P (1/31/2024) - Focus of nondependent air within the bladder which may be secondary to recent instrumentation. Otherwise no evidence of acute intra-abdominal or pelvic pathology     Generalized weakness  Assessment & Plan  Patient reports residual left upper extremity weakness and right fourth and fifth digit numbness since her prior stroke.  She states that she has weakness in bilateral lower extremities (L>R) and agrees that she would benefit from rehab  Uses a walker when ambulating  On exam strength testing: LUE 4/5, RUE 5/5, LLE 2/5, RLE 4/5  Suspect mixed picture of residual deficits from prior hemorrhagic stroke in 2022 and deconditioning    Plan  PTOT eval prior to discharge, patient agreeable to rehab     Hyperkalemia  Assessment & Plan  Recent Labs     01/30/24  0454 01/31/24 2022 02/01/24  0038 02/01/24  0930   K 4.7 5.5* 4.1 4.5     Patient presented for reported anemia at [facility] and generalized weakness. In the ED patient  was found to be hyperkalemic at 5.5 with normal hemoglobin of 12.5.  Recently admitted at [facility] for fall and was discharged on 1/23/2024 to short-term rehab. During that hospitalization potassium was found to be 2.4 at it's lowest. Seen by Nephrology at that time. Workup revealed urine potassium 30, urine potassium/creatinine ratio 43.5 mEq/g suggestive of renal loss of potassium. She was started on K-Dur 20 mEq once daily and amiloride 5 mg on discharge  In ED received insulin 6 units, sodium bicarb 50 mEq, D50 25 mL to treat hyperkalemia  Was started on D10 with normal saline infusion at 50 mL/hr    Plan  Repeat BMP shows resolved hyperkalemia at 4.1. Discontinued D10 with NS   Suspect over-treatment of hypokalemia at prior discharge, consider reduced potassium supplementation dosing on discharge    Elevated troponin  Assessment & Plan  Patient denies chest pain, chest tightness, shortness of breath, and palpitations  Echo (1/21/2024) - LVEF 65%, unremarkable study  ECG shows sinus tachycardia with PACs at 100 bpm without signs of ischemia  Troponin 0/2/4hr trended 305, 328, 280  Initially tachycardic possibly due to hyperkalemia  Suspect non-MI related elevation in troponin    GERD (gastroesophageal reflux disease)  Assessment & Plan  Plan  Continue home famotidine 40 mg once daily    Essential tremor  Assessment & Plan  Plan  Continue primidone 50 mg once daily at bedtime    History of cerebral hemorrhage  Assessment & Plan  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  Unchanged chronic slitlike hematoma cavity along right external capsule/claustrum with associated hemosiderin deposition and calcification.  Severe chronic microangiopathy.  CTA Brain (10/10/2022)  No contrast extravasation or underlying vascular malformation associated with the known right putaminal  hemorrhage.  No cervical or intracranial large vessel occlusion.  Centrilobular emphysema with right greater than left biapical pleural-parenchymal scarring.    Plan  Continue atorvastatin  Not on aspirin due to prior hemorrhagic stroke        VTE Pharmacologic Prophylaxis: VTE Score: 3 Moderate Risk (Score 3-4) - Pharmacological DVT Prophylaxis Ordered: enoxaparin (Lovenox).    Mobility:   Basic Mobility Inpatient Raw Score: 11  JH-HLM Goal: 4: Move to chair/commode  JH-HLM Achieved: 4: Move to chair/commode  HLM Goal achieved. Continue to encourage appropriate mobility.    Patient Centered Rounds: I performed bedside rounds with nursing staff today.  Discussions with Specialists or Other Care Team Provider: None    Education and Discussions with Family / Patient: Attempted to update  (son) via phone. Left voicemail.     Current Length of Stay: 0 day(s)  Current Patient Status: Observation   Discharge Plan: Anticipate discharge in 24-48 hrs to prior assisted or independent living facility.    Code Status: Level 3 - DNAR and DNI    Subjective:   The patient was seen and examined laying in her stretcher this morning. She report that she is feeling fine this morning. She says she has been feeling more weak lately. She does not remember if she has been eating or drinking well. She denies any lightheadedness or dizziness. She denies recent illness. She denies any pain this morning. All questions answered.     Objective:     Vitals:   Temp (24hrs), Av °F (36.7 °C), Min:97.8 °F (36.6 °C), Max:98.2 °F (36.8 °C)    Temp:  [97.8 °F (36.6 °C)-98.2 °F (36.8 °C)] 97.8 °F (36.6 °C)  HR:  [] 96  Resp:  [18] 18  BP: ()/(50-81) 129/62  SpO2:  [94 %-97 %] 97 %  Body mass index is 20.03 kg/m².     Input and Output Summary (last 24 hours):     Intake/Output Summary (Last 24 hours) at 2024 1506  Last data filed at 2024 1300  Gross per 24 hour   Intake 698.33 ml   Output --   Net 698.33 ml        Physical Exam:   Physical Exam  Vitals and nursing note reviewed.   Constitutional:       Appearance: She is normal weight.   HENT:      Head: Normocephalic and atraumatic.      Right Ear: External ear normal.      Left Ear: External ear normal.      Nose: Nose normal.      Mouth/Throat:      Pharynx: Oropharynx is clear.   Eyes:      Conjunctiva/sclera: Conjunctivae normal.   Cardiovascular:      Rate and Rhythm: Normal rate and regular rhythm.      Pulses: Normal pulses.      Heart sounds: Normal heart sounds.   Pulmonary:      Effort: Pulmonary effort is normal.      Breath sounds: Normal breath sounds.   Abdominal:      General: Bowel sounds are normal.      Palpations: There is no mass.      Tenderness: There is no abdominal tenderness. There is no right CVA tenderness, left CVA tenderness or guarding.   Musculoskeletal:         General: No swelling.      Cervical back: Neck supple.      Right lower leg: No edema.      Left lower leg: No edema.   Skin:     General: Skin is warm and dry.   Neurological:      General: No focal deficit present.      Mental Status: She is oriented to person, place, and time.      Comments: Oriented to self, hospital, and month   Psychiatric:         Mood and Affect: Mood normal.          Additional Data:     Labs:  Results from last 7 days   Lab Units 02/01/24  1026 01/31/24  1947   WBC Thousand/uL 12.23* 15.70*   HEMOGLOBIN g/dL 12.3 12.5   HEMATOCRIT % 37.0 37.7   PLATELETS Thousands/uL 201 194   NEUTROS PCT %  --  75   LYMPHS PCT %  --  18   MONOS PCT %  --  6   EOS PCT %  --  0     Results from last 7 days   Lab Units 02/01/24  0930 02/01/24  0038 01/31/24 2022   SODIUM mmol/L 135   < > 132*   POTASSIUM mmol/L 4.5   < > 5.5*   CHLORIDE mmol/L 99   < > 98   CO2 mmol/L 26   < > 24   BUN mg/dL 22   < > 23   CREATININE mg/dL 0.91   < > 0.90   ANION GAP mmol/L 10   < > 10   CALCIUM mg/dL 9.7   < > 10.0   ALBUMIN g/dL  --   --  3.8   TOTAL BILIRUBIN mg/dL  --   --  0.40   ALK  PHOS U/L  --   --  153*   ALT U/L  --   --  38   AST U/L  --   --  47*   GLUCOSE RANDOM mg/dL 129   < > 133    < > = values in this interval not displayed.     Results from last 7 days   Lab Units 01/31/24  1947   INR  1.56*     Results from last 7 days   Lab Units 01/31/24  2111   POC GLUCOSE mg/dl 240*               Lines/Drains:  Invasive Devices       Peripheral Intravenous Line  Duration             Peripheral IV 01/31/24 Distal;Right;Upper;Ventral (anterior) Arm <1 day                          Imaging: Reviewed radiology reports from this admission including: chest CT scan  CT abdomen pelvis with contrast    Result Date: 1/31/2024  Impression: Focus of nondependent air within the bladder which may be secondary to recent instrumentation. Otherwise no evidence of acute intra-abdominal or pelvic pathology Workstation performed: [id number]       No Chest XR results available for this patient.    Recent Cultures (last 7 days):         Last 24 Hours Medication List:   Current Facility-Administered Medications   Medication Dose Route Frequency Provider Last Rate    acetaminophen  650 mg Oral Q6H PRN Wally Riverado, DO      atorvastatin  10 mg Oral Daily With Dinner Wally Michael, DO      calcium carbonate  1 tablet Oral BID With Meals Wally Michael, DO      enoxaparin  40 mg Subcutaneous Daily Wally Rodriguez, DO      ergocalciferol  50,000 Units Oral Weekly Wallyharvey Rodriguez, DO      famotidine  40 mg Oral Daily Wally Rodriguez, DO      fluticasone  1 spray Nasal Daily Wally Michael, DO      primidone  50 mg Oral HS Wallyharvey Rodriguez DO          Today, Patient Was Seen By: Sarah Grey MD    **Please Note: This note may have been constructed using a voice recognition system.**

## 2024-02-01 NOTE — H&P
[facility]  H&P  Name: Eva Lacey 78 y.o. female I MRN: [id number]  Unit/Bed#: ED-07 I Date of Admission: 1/31/2024   Date of Service: 2/1/2024 I Hospital Day: 0      Assessment/Plan   Hyperkalemia  Assessment & Plan  Recent Labs     01/30/24  0454 01/31/24 2022 02/01/24  0038   K 4.7 5.5* 4.1       Patient presented for reported anemia at [facility] and generalized weakness. In the ED patient was found to be hyperkalemic at 5.5 with normal hemoglobin of 12.5.  Recently admitted at [facility] for fall and was discharged on 1/23/2024 to short-term rehab. During that hospitalization potassium was found to be 2.4 at it's lowest. Seen by Nephrology at that time. Workup revealed urine potassium 30, urine potassium/creatinine ratio 43.5 mEq/g suggestive of renal loss of potassium. She was started on K-Dur 20 mEq once daily and amiloride 5 mg on discharge  In ED received insulin 6 units, sodium bicarb 50 mEq, D50 25 mL to treat hyperkalemia  Was started on D10 with normal saline infusion at 50 mL/hr    Plan  Repeat BMP shows resolved hyperkalemia at 4.1. Discontinued D10 with NS   Suspect over-treatment of hypokalemia at prior discharge, consider reduced potassium supplementation dosing on discharge  BMP rechecks every 8 hours, next at 1000    Elevated troponin  Assessment & Plan  Patient denies chest pain, chest tightness, shortness of breath, and palpitations  Echo (1/21/2024) - LVEF 65%, unremarkable study  ECG shows sinus tachycardia with PACs at 100 bpm without signs of ischemia  Troponin 0/2/4hr trended 305, 328, 280  Initially tachycardic possibly due to hyperkalemia  Suspect non-MI related elevation in troponin    Generalized weakness  Assessment & Plan  Patient reports residual left upper extremity weakness and right fourth and fifth digit numbness since her prior stroke.  She states that she has weakness in bilateral lower extremities (L>R) and agrees that she would benefit from rehab  Uses  a walker when ambulating  On exam strength testing: LUE 4/5, RUE 5/5, LLE 2/5, RLE 4/5  Suspect mixed picture of residual deficits from prior hemorrhagic stroke in 2022 and deconditioning    Plan  PTOT eval prior to discharge, patient agreeable to rehab     History of dysphagia  Assessment & Plan  At prior admission at [facility]"" patient was seen by speech therapy and recommended to have dysphagia 3 dental soft and thin liquids. Patient states she feels like she no longer needs this and can tolerate a normal diet again.     Plan  Speech eval consulted    Suprapubic tenderness  Assessment & Plan  Suprapubic tenderness reported by ED for which CT was ordered. During my visit patient reports that there is no pain, just discomfort because she needs to urinate.  CT A/P (1/31/2024) - Focus of nondependent air within the bladder which may be secondary to recent instrumentation. 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  Unchanged chronic slitlike hematoma cavity along right external capsule/claustrum with associated hemosiderin deposition and calcification.  Severe chronic microangiopathy.  CTA Brain (10/10/2022)  No contrast extravasation or underlying vascular malformation associated with the known right putaminal hemorrhage.  No cervical or intracranial large vessel occlusion.  Centrilobular emphysema with right greater than left biapical pleural-parenchymal scarring.    Plan  Continue atorvastatin  Not on aspirin due to prior hemorrhagic stroke         VTE  Pharmacologic Prophylaxis: VTE Score: 3 Moderate Risk (Score 3-4) - Pharmacological DVT Prophylaxis Ordered: enoxaparin (Lovenox).  Code Status: Level 3 - DNAR and DNI   Discussion with family: Patient declined call to .     Anticipated Length of Stay: Patient will be admitted on an observation basis with an anticipated length of stay of less than 2 midnights secondary to hyperkalemia.    Chief Complaint: Reported anemia by [facility] and generalized weakness    History of Present Illness:  Eva Lacey is a 78 y.o. female with a PMH of hemorrhagic CVA (residual LUE weakness), GERD, emphysema, ambulatory dysfunction, and essential tremor who presents from [facility] due to reported hemoglobin of 4.2 and generalized weakness.  Patient reports that other than her weak lower extremities she feels fine.  CBC in the emergency department revealed a normal hemoglobin level.  Patient notes that she does not get enough physical activity at [facility] and would like more. She adds that she was previously put on a dysphagia 3 dental soft diet with thin liquids and questions if she still needs this.  Patient states that she uses a walker when ambulating.  She denies chest pain, lightheadedness, dizziness, shortness of breath, palpitations, abdominal pain    Patient admitted to [facility] from 1/19-/123/2024 due to fall and was found to be hypokalemic as low was 2.4 during that hospitalization.  She was seen by nephrology. On discharge to [facility] to rehab she was started on potassium supplementation and amiloride.    Review of Systems:  Review of Systems   Constitutional:  Positive for fatigue. Negative for chills and fever.   HENT:  Negative for congestion, hearing loss and sore throat.    Eyes:  Negative for visual disturbance.   Respiratory:  Negative for cough, choking, chest tightness, shortness of breath and wheezing.    Cardiovascular:  Negative for chest pain, palpitations and leg swelling.    Gastrointestinal:  Negative for abdominal pain, blood in stool, constipation, diarrhea, nausea and vomiting.   Genitourinary:  Negative for dysuria and hematuria.   Musculoskeletal:  Negative for arthralgias and back pain.   Neurological:  Positive for tremors (essential tremor), weakness and numbness (right 4th and 5th digit). Negative for dizziness, seizures, syncope, facial asymmetry, speech difficulty, light-headedness and headaches.       Past Medical and Surgical History:   Past Medical History:   Diagnosis Date    Cataract of right eye     GERD (gastroesophageal reflux disease)        Past Surgical History:   Procedure Laterality Date    CATARACT EXTRACTION, BILATERAL Bilateral     MASTECTOMY Bilateral     for calcium deposits    AL COLONOSCOPY FLX DX W/COLLJ SPEC WHEN PFRMD N/A 8/25/2017    Procedure: COLONOSCOPY;  Surgeon: Harika Aguilar MD;  Location: [facility];  Service: Colorectal    AL XCAPSL CTRC RMVL INSJ IO LENS PROSTH W/O ECP Right 6/21/2016    Procedure: OD EXTRACTION EXTRACAPSULAR CATARACT PHACO INTRAOCULAR LENS (IOL);  Surgeon: Lida Dewitt MD;  Location: [facility];  Service: Ophthalmology    TONSILLECTOMY         Meds/Allergies:  Prior to Admission medications    Medication Sig Start Date End Date Taking? Authorizing Provider   acetaminophen (TYLENOL) 325 mg tablet Take 2 tablets (650 mg total) by mouth every 6 (six) hours as needed for mild pain or fever 4/7/23   Uriel Andres MD   AMILoride 5 mg tablet Take 1 tablet (5 mg total) by mouth daily for 14 days 1/23/24 2/6/24  Ciro Syed MD   Ascorbic Acid (VITAMIN C PO) Take by mouth    Historical Provider, MD   atorvastatin (LIPITOR) 10 mg tablet Take 1 tablet by mouth Daily    Historical Provider, MD   b complex vitamins tablet Take 1 tablet by mouth daily.    Historical Provider, MD   calcium carbonate (OYSTER SHELL,OSCAL) 500 mg Take 1 tablet by mouth 2 (two) times a day with meals 10/2/23   Emilie Soyeon Kim, MD  "  ergocalciferol (VITAMIN D2) 50,000 units Take 1 capsule (50,000 Units total) by mouth once a week 10/2/23   Emilie Soyeon Kim, MD   famotidine (PEPCID) 40 MG tablet  3/8/22   Historical Provider, MD   fluticasone (FLONASE) 50 mcg/act nasal spray 1 spray into each nostril daily Do not start before April 8, 2023. 4/8/23   Uriel Andres MD   Multiple Vitamins-Minerals (CENTRUM CARDIO PO) Take 1 tablet by mouth daily.    Historical Provider, MD   potassium chloride (K-DUR,KLOR-CON) 20 mEq tablet Take 1 tablet (20 mEq total) by mouth daily for 14 days 1/23/24 2/6/24  Ciro Syed MD   primidone (MYSOLINE) 50 mg tablet Take 1 tab by mouth daily at bedtime 9/12/23   Heather Florence PA-C     I have reviewed home medications with patient personally.    Allergies: No Known Allergies    Social History:  Marital Status:    Occupation: Retired  Patient Pre-hospital Living Situation: Skilled Nursing Facility: [facility]  Patient Pre-hospital Level of Mobility: walks with walker  Patient Pre-hospital Diet Restrictions: None  Substance Use History:   Social History     Substance and Sexual Activity   Alcohol Use Yes    Comment: pt states \"very occassionally\"     Social History     Tobacco Use   Smoking Status Former   Smokeless Tobacco Never     Social History     Substance and Sexual Activity   Drug Use Never       Family History:  Family History   Problem Relation Age of Onset    Rectal cancer Brother     Stroke Mother     Heart attack Father     Arrhythmia Father         possible    Coronary artery disease Father     Sudden death Father         scd    Anuerysm Neg Hx     Clotting disorder Neg Hx     Hypertension Neg Hx     Hyperlipidemia Neg Hx     Heart failure Neg Hx        Physical Exam:     Vitals:   Blood Pressure: 123/59 (02/01/24 0130)  Pulse: 94 (02/01/24 0130)  Temperature: 98.2 °F (36.8 °C) (01/31/24 1905)  Temp Source: Oral (01/31/24 1905)  Respirations: 18 (02/01/24 0030)  Weight - Scale: 54.6 kg " (120 lb 5.9 oz) (01/31/24 1905)  SpO2: 96 % (02/01/24 0130)    Physical Exam  Vitals and nursing note reviewed.   Constitutional:       General: She is not in acute distress.     Appearance: She is well-developed. She is not ill-appearing, toxic-appearing or diaphoretic.   HENT:      Head: Normocephalic and atraumatic.      Right Ear: External ear normal.      Left Ear: External ear normal.      Nose: No congestion.      Mouth/Throat:      Mouth: Mucous membranes are dry.      Pharynx: No oropharyngeal exudate or posterior oropharyngeal erythema.   Eyes:      Conjunctiva/sclera: Conjunctivae normal.   Cardiovascular:      Rate and Rhythm: Regular rhythm. Tachycardia present.      Heart sounds: No murmur heard.  Pulmonary:      Effort: Pulmonary effort is normal. No respiratory distress.      Breath sounds: Normal breath sounds. No wheezing, rhonchi or rales.   Abdominal:      General: Bowel sounds are normal. There is no distension.      Palpations: Abdomen is soft.      Tenderness: There is no abdominal tenderness. There is no guarding or rebound.   Musculoskeletal:         General: No swelling.      Cervical back: Neck supple.      Right lower leg: No edema.      Left lower leg: No edema.   Skin:     General: Skin is warm and dry.      Capillary Refill: Capillary refill takes less than 2 seconds.   Neurological:      Mental Status: She is alert and oriented to person, place, and time.      Cranial Nerves: No dysarthria or facial asymmetry.      Sensory: Sensory deficit (Decreased general sensation at right 4th and 5th digit) present.      Motor: Weakness present.      Deep Tendon Reflexes:      Reflex Scores:       Tricep reflexes are 2+ on the right side and 2+ on the left side.       Bicep reflexes are 2+ on the right side and 2+ on the left side.       Brachioradialis reflexes are 2+ on the right side and 2+ on the left side.       Patellar reflexes are 1+ on the right side and 1+ on the left side.        Achilles reflexes are 1+ on the right side and 1+ on the left side.     Comments:   RUE 5/5, LUE 4/5  RLE 4/5, LLE 2/5   Psychiatric:         Mood and Affect: Mood normal.          Additional Data:     Lab Results:  Results from last 7 days   Lab Units 01/1945   WBC Thousand/uL 15.70*   HEMOGLOBIN g/dL 12.5   HEMATOCRIT % 37.7   PLATELETS Thousands/uL 194   NEUTROS PCT % 75   LYMPHS PCT % 18   MONOS PCT % 6   EOS PCT % 0     Results from last 7 days   Lab Units 02/01/24  0038 01/31/24 2022   SODIUM mmol/L 135 132*   POTASSIUM mmol/L 4.1 5.5*   CHLORIDE mmol/L 98 98   CO2 mmol/L 28 24   BUN mg/dL 21 23   CREATININE mg/dL 0.80 0.90   ANION GAP mmol/L 9 10   CALCIUM mg/dL 9.4 10.0   ALBUMIN g/dL  --  3.8   TOTAL BILIRUBIN mg/dL  --  0.40   ALK PHOS U/L  --  153*   ALT U/L  --  38   AST U/L  --  47*   GLUCOSE RANDOM mg/dL 91 133     Results from last 7 days   Lab Units 01/1945   INR  1.56*     Results from last 7 days   Lab Units 01/31/24  2111   POC GLUCOSE mg/dl 240*               Lines/Drains:  Invasive Devices       Peripheral Intravenous Line  Duration             Peripheral IV 01/31/24 Distal;Right;Upper;Ventral (anterior) Arm <1 day                        Imaging: Reviewed radiology reports from this admission including: abdominal/pelvic CT  CT abdomen pelvis with contrast   Final Result by Aurora Middleton MD (01/31 2207)      Focus of nondependent air within the bladder which may be secondary to recent instrumentation. Otherwise no evidence of acute intra-abdominal or pelvic pathology         Workstation performed: [id number]             EKG and Other Studies Reviewed on Admission:   EKG: Sinus Tachycardia. . with PACs    ** Please Note: This note has been constructed using a voice recognition system. **

## 2024-02-01 NOTE — ASSESSMENT & PLAN NOTE
Ddimer >20  Tachycardic to 108 on admission  Tachycardia now resolved, no tachypnea or SOB, no extremity swelling on exam    Plan:  LE duplex  If negative, upper extremity duplex and CTA

## 2024-02-01 NOTE — CASE MANAGEMENT
Case Management Discharge Planning Note    Patient name Eva Lacey  Location /-01 MRN [id number]  : 1945 Date 2024       Current Admission Date: 2024  Current Admission Diagnosis:Hyperkalemia   Patient Active Problem List    Diagnosis Date Noted    Suprapubic tenderness 2024    History of dysphagia 2024    Elevated troponin 2024    Hyperkalemia 2024    Generalized weakness 2024    Anemia 2024    Ambulatory dysfunction 2024    Syncope and collapse 2024    Hypophosphatemia 2023    Hypokalemia 2023    Impaired mobility and ADLs 04/10/2023    MCI (mild cognitive impairment) 2023    Pain 2023    Enlarged and hypertrophic nails 2023    Osteopenia 2023    Right knee pain 2023    Elevated AST (SGOT) 2023    Prediabetes 2023    Closed left ankle fracture, sequela 2023    GERD (gastroesophageal reflux disease) 2023    Bilateral pain of leg and foot 2023    Diarrhea 2023    Macrocytosis 2023    Weakness of both lower extremities 2023    Rhabdomyolysis 2023    SARS-CoV-2 positive 2023    Essential tremor 10/20/2022    Acute cerebral hemorrhage (HCC) 10/11/2022    History of cerebral hemorrhage 10/10/2022    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 (HCC) 10/10/2022    Chronic left-sided low back pain     Transient cerebral ischemia 2018    Premature atrial beats 2018    Cataract of right eye 2016    Sacroiliitis (HCC) 2015    Idiopathic osteoporosis 2015    Primary osteoarthritis of hand 2015      LOS (days): 0  Geometric Mean LOS (GMLOS) (days):   Days to GMLOS:     OBJECTIVE:            Current admission status: Observation   Preferred Pharmacy:   [facility] - [place], [place] - [address]  [address] [place] [address]  Phone: [phone]  Fax: [phone]    [id number] - [place], [place] - [address]  [address] [facility] [place] [address]  Phone: [phone] Fax: [phone]    Primary Care Provider: Janet Ordaz DO    Primary Insurance: MEDICARE  Secondary Insurance: AARP    DISCHARGE DETAILS:    Discharge planning discussed with:: patient  Freedom of Choice: Yes  Comments - Freedom of Choice: Met with pt who would like to return back to [facility] for STR.  CM contacted family/caregiver?: Yes (voicemail left for son regarding discharge)  Were Treatment Team discharge recommendations reviewed with patient/caregiver?: Yes  Did patient/caregiver verbalize understanding of patient care needs?: Yes  Were patient/caregiver advised of the risks associated with not following Treatment Team discharge recommendations?: Yes    Contacts  Patient Contacts: Son-Dirk  Relationship to Patient:: Family  Contact Method: Phone  Phone Number: [phone] Voicemail left  Reason/Outcome: Discharge Planning, Referral    Requested Home Health Care         Is the patient interested in C at discharge?: No    DME Referral Provided  Referral made for DME?: No    Other Referral/Resources/Interventions Provided:  Referral Comments: Pt admitted under observation for Hyperkalemia. Met with pt at bedside. Pt has been at [facility] for STR since 1/23 and would like to return back to the facility for STR. Referral placed and pt is accepted back to the facility. [facility] transport setup for 4pm with [facility]. Attempted to call son but he did not pick-up. Voicemail left for son informing of discharge and pick-up time with transport.         Treatment Team Recommendation: Facility Return  Discharge Destination Plan:: Facility Return  Transport at Discharge : [facility]  Dispatcher Contacted: Yes  Number/Name of Dispatcher: [phone]  Transported by (Company and Unit #): [facility]  ETA of Transport (Date): 02/01/24  ETA of Transport (Time): 1600     Transfer Mode:  Stretcher  Accompanied by: EMS personnel  Transfer Equipment: BLS devices                Accepting Facility Name, City & State : [facility]  Receiving Facility/Agency Phone Number: [phone]  Facility/Agency Fax Number: [phone].

## 2024-02-01 NOTE — ED PROVIDER NOTES
"History  Chief Complaint   Patient presents with    Abnormal Lab     Patient SRIKANTH from [facility], called for low hgb \"4 something\" per facility. Hx anemia. Denies dizziness/HA/bloody stools. Patient denies hx of blood transfusions.      The pt is a 78 year old female who presents to ED via EMS from [facility] for evaluation of low hemoglobin. Per nursing home packet, the pt had labs this morning with a resultant hemoglobin of 4.2. Pt states she has been very fatigued and generally weak for the past 2 days and spent most of the past 2 days in bed. She denies abdominal pain, nausea, vomiting, hematochezia, melana, chest pain, SOB, cough, headache, difficulty urinating        Prior to Admission Medications   Prescriptions Last Dose Informant Patient Reported? Taking?   AMILoride 5 mg tablet   No No   Sig: Take 1 tablet (5 mg total) by mouth daily for 14 days   Ascorbic Acid (VITAMIN C PO)  Self Yes No   Sig: Take by mouth   Multiple Vitamins-Minerals (CENTRUM CARDIO PO)  Self Yes No   Sig: Take 1 tablet by mouth daily.   acetaminophen (TYLENOL) 325 mg tablet  Self No No   Sig: Take 2 tablets (650 mg total) by mouth every 6 (six) hours as needed for mild pain or fever   atorvastatin (LIPITOR) 10 mg tablet  Self Yes No   Sig: Take 1 tablet by mouth Daily   b complex vitamins tablet  Self Yes No   Sig: Take 1 tablet by mouth daily.   calcium carbonate (OYSTER SHELL,OSCAL) 500 mg   No No   Sig: Take 1 tablet by mouth 2 (two) times a day with meals   ergocalciferol (VITAMIN D2) 50,000 units   No No   Sig: Take 1 capsule (50,000 Units total) by mouth once a week   famotidine (PEPCID) 40 MG tablet  Self Yes No   fluticasone (FLONASE) 50 mcg/act nasal spray  Self No No   Si spray into each nostril daily Do not start before 2023.   potassium chloride (K-DUR,KLOR-CON) 20 mEq tablet   No No   Sig: Take 1 tablet (20 mEq total) by mouth daily for 14 days   primidone (MYSOLINE) 50 mg tablet   No No   Sig: Take 1 tab by " "mouth daily at bedtime      Facility-Administered Medications: None       Past Medical History:   Diagnosis Date    Cataract of right eye     GERD (gastroesophageal reflux disease)        Past Surgical History:   Procedure Laterality Date    CATARACT EXTRACTION, BILATERAL Bilateral     MASTECTOMY Bilateral     for calcium deposits    AZ COLONOSCOPY FLX DX W/COLLJ SPEC WHEN PFRMD N/A 8/25/2017    Procedure: COLONOSCOPY;  Surgeon: Harika Aguilar MD;  Location: [facility];  Service: Colorectal    AZ XCAPSL CTRC RMVL INSJ IO LENS PROSTH W/O ECP Right 6/21/2016    Procedure: OD EXTRACTION EXTRACAPSULAR CATARACT PHACO INTRAOCULAR LENS (IOL);  Surgeon: Lida Dewitt MD;  Location: [facility];  Service: Ophthalmology    TONSILLECTOMY         Family History   Problem Relation Age of Onset    Rectal cancer Brother     Stroke Mother     Heart attack Father     Arrhythmia Father         possible    Coronary artery disease Father     Sudden death Father         scd    Anuerysm Neg Hx     Clotting disorder Neg Hx     Hypertension Neg Hx     Hyperlipidemia Neg Hx     Heart failure Neg Hx      I have reviewed and agree with the history as documented.    E-Cigarette/Vaping    E-Cigarette Use Never User      E-Cigarette/Vaping Substances    Nicotine No     THC No     CBD No     Flavoring No     Other No     Unknown No      Social History     Tobacco Use    Smoking status: Former    Smokeless tobacco: Never   Vaping Use    Vaping status: Never Used   Substance Use Topics    Alcohol use: Yes     Comment: pt states \"very occassionally\"    Drug use: Never        Review of Systems   Constitutional:  Positive for activity change and fatigue.   HENT:  Negative for congestion and sore throat.    Respiratory:  Negative for cough, choking and shortness of breath.    Cardiovascular:  Negative for chest pain.   Gastrointestinal:  Negative for abdominal pain, anal bleeding, blood in stool, nausea and vomiting.   Genitourinary:  Negative " for difficulty urinating and dysuria.   Musculoskeletal:  Negative for back pain and myalgias.   Skin:  Negative for rash and wound.   Neurological:  Positive for weakness.   Hematological:  Bruises/bleeds easily.   All other systems reviewed and are negative.      Physical Exam  ED Triage Vitals [01/31/24 1905]   Temperature Pulse Respirations Blood Pressure SpO2   98.2 °F (36.8 °C) (!) 108 18 138/81 96 %      Temp Source Heart Rate Source Patient Position - Orthostatic VS BP Location FiO2 (%)   Oral Monitor Sitting Right arm --      Pain Score       No Pain             Orthostatic Vital Signs  Vitals:    01/31/24 1905 01/31/24 2109   BP: 138/81 126/66   Pulse: (!) 108 100   Patient Position - Orthostatic VS: Sitting Sitting       Physical Exam  Vitals and nursing note reviewed. Exam conducted with a chaperone present.   Constitutional:       Appearance: Normal appearance.   HENT:      Head: Normocephalic and atraumatic.      Nose: Nose normal.      Mouth/Throat:      Mouth: Mucous membranes are dry.      Pharynx: Oropharynx is clear.   Eyes:      Extraocular Movements: Extraocular movements intact.      Conjunctiva/sclera: Conjunctivae normal.      Pupils: Pupils are equal, round, and reactive to light.   Cardiovascular:      Rate and Rhythm: Normal rate and regular rhythm.      Pulses: Normal pulses.      Heart sounds: Normal heart sounds.   Pulmonary:      Effort: Pulmonary effort is normal. No respiratory distress.      Breath sounds: Normal breath sounds. No stridor. No wheezing, rhonchi or rales.   Abdominal:      General: Abdomen is flat. Bowel sounds are normal.      Palpations: Abdomen is soft.      Tenderness: There is no abdominal tenderness. There is no guarding or rebound.   Genitourinary:     Rectum: Guaiac result negative. No tenderness, anal fissure or external hemorrhoid. Normal anal tone.      Comments: Guaiac negative with brown stool on digital rectal exam.  Musculoskeletal:         General: No  swelling, tenderness, deformity or signs of injury.      Cervical back: Normal range of motion and neck supple.      Right lower leg: No edema.      Left lower leg: No edema.   Skin:     General: Skin is warm and dry.      Coloration: Skin is pale.      Findings: No erythema.   Neurological:      General: No focal deficit present.      Mental Status: She is alert and oriented to person, place, and time.   Psychiatric:         Mood and Affect: Mood normal.         Behavior: Behavior normal.         Thought Content: Thought content normal.         Judgment: Judgment normal.         ED Medications  Medications   insulin regular (HumuLIN R,NovoLIN R) injection 6 Units (has no administration in time range)   dextrose 50 % IV solution 25 mL (has no administration in time range)   sodium bicarbonate 8.4 % injection 50 mEq (has no administration in time range)   dextrose 10 % and normal saline infusion (has no administration in time range)   iohexol (OMNIPAQUE) 350 MG/ML injection (MULTI-DOSE) 100 mL (80 mL Intravenous Given 1/31/24 2055)       Diagnostic Studies  Results Reviewed       Procedure Component Value Units Date/Time    FLU/RSV/COVID - if FLU/RSV clinically relevant [[id number]]  (Normal) Collected: 01/31/24 2048    Lab Status: Final result Specimen: Nares from Nose Updated: 01/31/24 2146     SARS-CoV-2 Negative     INFLUENZA A PCR Negative     INFLUENZA B PCR Negative     RSV PCR Negative    Narrative:      FOR PEDIATRIC PATIENTS - copy/paste COVID Guidelines URL to browser: [email or web address]    SARS-CoV-2 assay is a Nucleic Acid Amplification assay intended for the  qualitative detection of nucleic acid from SARS-CoV-2 in nasopharyngeal  swabs. Results are for the presumptive identification of SARS-CoV-2 RNA.    Positive results are indicative of infection with SARS-CoV-2, the virus  causing COVID-19, but do not rule out bacterial infection or  co-infection  with other viruses. Laboratories within the [place] and its  territories are required to report all positive results to the appropriate  public health authorities. Negative results do not preclude SARS-CoV-2  infection and should not be used as the sole basis for treatment or other  patient management decisions. Negative results must be combined with  clinical observations, patient history, and epidemiological information.  This test has not been FDA cleared or approved.    This test has been authorized by FDA under an Emergency Use Authorization  (EUA). This test is only authorized for the duration of time the  declaration that circumstances exist justifying the authorization of the  emergency use of an in vitro diagnostic tests for detection of SARS-CoV-2  virus and/or diagnosis of COVID-19 infection under section 564(b)(1) of  the Act, 21 U.S.C. 360bbb-3(b)(1), unless the authorization is terminated  or revoked sooner. The test has been validated but independent review by FDA  and CLIA is pending.    Test performed using [employer] GeneXpert: This RT-PCR assay targets N2,  a region unique to SARS-CoV-2. A conserved region in the E-gene was chosen  for pan-Sarbecovirus detection which includes SARS-CoV-2.    According to CMS-2020-01-R, this platform meets the definition of high-throughput technology.    HS Troponin I 2hr [[id number]]     Lab Status: No result Specimen: Blood     HS Troponin 0hr (reflex protocol) [[id number]]  (Abnormal) Collected: 01/31/24 2045    Lab Status: Final result Specimen: Blood from Arm, Right Updated: 01/31/24 2113     hs TnI 0hr 305 ng/L     Fingerstick Glucose (POCT) [[id number]]  (Abnormal) Collected: 01/31/24 2111    Lab Status: Final result Updated: 01/31/24 2112     POC Glucose 240 mg/dl     Comprehensive metabolic panel [[id number]]  (Abnormal) Collected: 01/31/24 2022    Lab Status: Final result Specimen: Blood from Arm, Left Updated: 01/31/24 2045     Sodium 132  mmol/L      Potassium 5.5 mmol/L      Chloride 98 mmol/L      CO2 24 mmol/L      ANION GAP 10 mmol/L      BUN 23 mg/dL      Creatinine 0.90 mg/dL      Glucose 133 mg/dL      Calcium 10.0 mg/dL      AST 47 U/L      ALT 38 U/L      Alkaline Phosphatase 153 U/L      Total Protein 8.2 g/dL      Albumin 3.8 g/dL      Total Bilirubin 0.40 mg/dL      eGFR 62 ml/min/1.73sq m     Narrative:      National Kidney Disease Foundation guidelines for Chronic Kidney Disease (CKD):     Stage 1 with normal or high GFR (GFR > 90 mL/min/1.73 square meters)    Stage 2 Mild CKD (GFR = 60-89 mL/min/1.73 square meters)    Stage 3A Moderate CKD (GFR = 45-59 mL/min/1.73 square meters)    Stage 3B Moderate CKD (GFR = 30-44 mL/min/1.73 square meters)    Stage 4 Severe CKD (GFR = 15-29 mL/min/1.73 square meters)    Stage 5 End Stage CKD (GFR <15 mL/min/1.73 square meters)  Note: GFR calculation is accurate only with a steady state creatinine    UA (URINE) with reflex to Scope [[id number]]     Lab Status: No result Specimen: Urine     Protime-INR [[id number]]  (Abnormal) Collected: 01/1945    Lab Status: Final result Specimen: Blood from Arm, Left Updated: 01/31/24 2013     Protime 19.4 seconds      INR 1.56    APTT [[id number]]  (Normal) Collected: 01/1945    Lab Status: Final result Specimen: Blood from Arm, Left Updated: 01/31/24 2013     PTT 24 seconds     CBC and differential [[id number]]  (Abnormal) Collected: 01/1945    Lab Status: Final result Specimen: Blood from Arm, Left Updated: 01/31/24 1955     WBC 15.70 Thousand/uL      RBC 3.72 Million/uL      Hemoglobin 12.5 g/dL      Hematocrit 37.7 %       fL      MCH 33.6 pg      MCHC 33.2 g/dL      RDW 11.9 %      MPV 10.7 fL      Platelets 194 Thousands/uL      nRBC 0 /100 WBCs      Neutrophils Relative 75 %      Immat GRANS % 1 %      Lymphocytes Relative 18 %      Monocytes Relative 6 %      Eosinophils Relative 0 %      Basophils Relative 0 %      Neutrophils  Absolute 11.68 Thousands/µL      Immature Grans Absolute 0.15 Thousand/uL      Lymphocytes Absolute 2.80 Thousands/µL      Monocytes Absolute 0.99 Thousand/µL      Eosinophils Absolute 0.02 Thousand/µL      Basophils Absolute 0.06 Thousands/µL                    CT abdomen pelvis with contrast    (Results Pending)         Procedures  ECG 12 Lead Documentation Only    Date/Time: 1/31/2024 9:19 PM    Performed by: Cyn Rubio MD  Authorized by: Cyn Rubio MD    Patient location:  ED  Previous ECG:     Previous ECG:  Compared to current  Rate:     ECG rate:  100    ECG rate assessment: tachycardic    Rhythm:     Rhythm: sinus tachycardia    Ectopy:     Ectopy: PAC    QRS:     QRS axis:  Normal    QRS intervals:  Normal  Conduction:     Conduction: normal    ST segments:     ST segments:  Normal  T waves:     T waves: flattening      Flattening:  AVL and V2        ED Course  ED Course as of 01/31/24 2151 Wed Jan 31, 2024 1943 I spoke with son Dirk and updated him that his mom is here. Discussed possible need of pt to have blood transfusion. Discussed risks and benefits with him and he agrees to blood transfusion if needed and states the pt is competent to consent to transfusion.   2019 PTT: 24   2019 Hemoglobin: 12.5  HGB returns as normal   2117 hs TnI 0hr(!): 305  Elevated from prior 12 days ago                             SBIRT 20yo+      Flowsheet Row Most Recent Value   Initial Alcohol Screen: US AUDIT-C     1. How often do you have a drink containing alcohol? 0 Filed at: 01/31/2024 1908   2. How many drinks containing alcohol do you have on a typical day you are drinking?  0 Filed at: 01/31/2024 1908   3b. FEMALE Any Age, or MALE 65+: How often do you have 4 or more drinks on one occassion? 0 Filed at: 01/31/2024 1908   Audit-C Score 0 Filed at: 01/31/2024 1908   ESSENCE: How many times in the past year have you...    Used an illegal drug or used a prescription medication for non-medical  reasons? Never Filed at: 01/31/2024 1908                  Medical Decision Making  Ddx: Anemia, GI bleed, cardiac etiology, electrolyte Imbalance    No gross blood and guaiac negative on DELBERT.   HGB from nursing home record was 4.2. Pt found to have normal HGB in ED.  Hypoerkalemia noted. Insulin, IVF, D50 given in ED.   Troponin elevated.  Negative viral swab  Pt admitted to [facility] service.     Amount and/or Complexity of Data Reviewed  Independent Historian: guardian     Details: Son via phone  Labs: ordered. Decision-making details documented in ED Course.  Radiology: ordered.    Risk  OTC drugs.  Prescription drug management.  Decision regarding hospitalization.          Disposition  Final diagnoses:   Hyperkalemia   Elevated troponin   Generalized weakness     Time reflects when diagnosis was documented in both MDM as applicable and the Disposition within this note       Time User Action Codes Description Comment    1/31/2024  9:00 PM Cyn Rubio Add [E87.5] Hyperkalemia     1/31/2024  9:39 PM Cyn Rubio Add [R79.89] Elevated troponin     1/31/2024  9:39 PM Cyn Rubio Add [R53.1] Generalized weakness     1/31/2024  9:39 PM Cyn Rubio Modify [E87.5] Hyperkalemia     1/31/2024  9:39 PM Cyn Rubio Modify [R53.1] Generalized weakness           ED Disposition       ED Disposition   Admit    Condition   Stable    Date/Time   Wed Jan 31, 2024 9760    Comment   Case was discussed with [facility] Dr Higginbotham and the patient's admission status was agreed to be Admission Status: observation status to the service of Dr. Higginbotham .               Follow-up Information    None         Patient's Medications   Discharge Prescriptions    No medications on file     No discharge procedures on file.    PDMP Review       None             ED Provider  Attending physically available and evaluated Eva Lacey. I managed the patient along with the ED Attending.    Electronically Signed by           Cny  Kiah Rubio MD  02/03/24 0737       [facility] Kiah Rubio MD  02/03/24 0741

## 2024-02-01 NOTE — ASSESSMENT & PLAN NOTE
Suprapubic tenderness reported by ED for which CT was ordered. During my visit patient reports that there is no pain, just discomfort because she needs to urinate.  CT A/P (1/31/2024) - Focus of nondependent air within the bladder which may be secondary to recent instrumentation. Otherwise no evidence of acute intra-abdominal or pelvic pathology

## 2024-02-01 NOTE — PROGRESS NOTES
[facility]  Progress Note  Name: Eva Lacey I  MRN: [id number]  Unit/Bed#: S -Jensen I Date of Admission: 1/31/2024   Date of Service: 2/2/2024 I Hospital Day: 1    Assessment/Plan   * D-dimer, elevated  Assessment & Plan  Ddimer >20  Tachycardic to 108 on admission  Tachycardia now resolved, no tachypnea or SOB, no extremity swelling on exam    Plan:  LE duplex  If negative, upper extremity duplex and CTA    History of dysphagia  Assessment & Plan  At prior admission at [facility] patient was seen by speech therapy and recommended to have dysphagia 3 dental soft and thin liquids. Patient states she feels like she no longer needs this and can tolerate a normal diet again.     Plan  Speech eval consulted    Suprapubic tenderness  Assessment & Plan  Suprapubic tenderness reported by ED for which CT was ordered. During my visit patient reports that there is no pain, just discomfort because she needs to urinate.  CT A/P (1/31/2024) - Focus of nondependent air within the bladder which may be secondary to recent instrumentation. Otherwise no evidence of acute intra-abdominal or pelvic pathology     Generalized weakness  Assessment & Plan  Patient reports residual left upper extremity weakness and right fourth and fifth digit numbness since her prior stroke.  She states that she has weakness in bilateral lower extremities (L>R) and agrees that she would benefit from rehab  Uses a walker when ambulating  On exam strength testing: LUE 4/5, RUE 5/5, LLE 2/5, RLE 4/5  Suspect mixed picture of residual deficits from prior hemorrhagic stroke in 2022 and deconditioning    Plan  PTOT eval prior to discharge, patient agreeable to rehab     Hyperkalemia  Assessment & Plan  Recent Labs     01/31/24 2022 02/01/24  0038 02/01/24  0930 02/02/24  0438   K 5.5* 4.1 4.5 4.2       Patient presented for reported anemia at [facility] and generalized weakness. In the ED patient was found to be hyperkalemic at 5.5 with  normal hemoglobin of 12.5.  Recently admitted at [facility] for fall and was discharged on 1/23/2024 to short-term rehab. During that hospitalization potassium was found to be 2.4 at it's lowest. Seen by Nephrology at that time. Workup revealed urine potassium 30, urine potassium/creatinine ratio 43.5 mEq/g suggestive of renal loss of potassium. She was started on K-Dur 20 mEq once daily and amiloride 5 mg on discharge  In ED received insulin 6 units, sodium bicarb 50 mEq, D50 25 mL to treat hyperkalemia  Was started on D10 with normal saline infusion at 50 mL/hr    Plan  Repeat BMP shows resolved hyperkalemia at 4.1. Discontinued D10 with NS   Suspect over-treatment of hypokalemia at prior discharge, consider reduced potassium supplementation dosing on discharge    Elevated troponin  Assessment & Plan  Patient denies chest pain, chest tightness, shortness of breath, and palpitations  Echo (1/21/2024) - LVEF 65%, unremarkable study  ECG shows sinus tachycardia with PACs at 100 bpm without signs of ischemia  Troponin 0/2/4hr trended 305, 328, 280  Initially tachycardic possibly due to hyperkalemia  Suspect non-MI related elevation in troponin    GERD (gastroesophageal reflux disease)  Assessment & Plan  Plan  Continue home famotidine 40 mg once daily    Essential tremor  Assessment & Plan  Plan  Continue primidone 50 mg once daily at bedtime    History of cerebral hemorrhage  Assessment & Plan  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  Unchanged chronic slitlike hematoma cavity along right external capsule/claustrum with associated hemosiderin deposition and calcification.  Severe chronic microangiopathy.  CTA Brain (10/10/2022)  No contrast extravasation or underlying vascular malformation associated with the known right putaminal hemorrhage.  No cervical or intracranial large  vessel occlusion.  Centrilobular emphysema with right greater than left biapical pleural-parenchymal scarring.    Plan  Continue atorvastatin  Not on aspirin due to prior hemorrhagic stroke           VTE Pharmacologic Prophylaxis: VTE Score: 3 {VTE Risk:79077}    Mobility:   Basic Mobility Inpatient Raw Score: 11  -HL Goal: 4: Move to chair/commode  -HLM Achieved: 4: Move to chair/commode  {Mobility:58051}    Patient Centered Rounds: {Pt Centered Care Rounds:25761}  Discussions with Specialists or Other Care Team Provider: ***    Education and Discussions with Family / Patient: {Family Communication:49617}    Current Length of Stay: 1 day(s)  Current Patient Status: Inpatient   Discharge Plan: {Discharge Plan:56202}    Code Status: Level 3 - DNAR and DNI    Subjective:   The patient was seen and examined lying in bed this morning.  She says that she slept well last night.  She denies any lightheadedness, dizziness, fever, chills, chest pain, palpitations, shortness of breath, abdominal pain, nausea, vomiting, diarrhea, constipation, dysuria, extremity pain or swelling.  She denies any pain this morning. All questions answered.     Objective:     Vitals:   Temp (24hrs), Av.2 °F (36.8 °C), Min:97.8 °F (36.6 °C), Max:98.5 °F (36.9 °C)    Temp:  [97.8 °F (36.6 °C)-98.5 °F (36.9 °C)] 98.5 °F (36.9 °C)  HR:  [87-96] 87  Resp:  [18] 18  BP: (116-129)/(61-71) 123/65  SpO2:  [96 %-97 %] 96 %  Body mass index is 23.44 kg/m².     Input and Output Summary (last 24 hours):     Intake/Output Summary (Last 24 hours) at 2024 0629  Last data filed at 2024 0609  Gross per 24 hour   Intake 850 ml   Output --   Net 850 ml       Physical Exam:   Physical Exam ***    Additional Data:     Labs:  Results from last 7 days   Lab Units 24  0438 24  1026 24  1947   WBC Thousand/uL 10.36*   < > 15.70*   HEMOGLOBIN g/dL 10.9*   < > 12.5   HEMATOCRIT % 33.6*   < > 37.7   PLATELETS Thousands/uL 199   < > 194    NEUTROS PCT %  --   --  75   LYMPHS PCT %  --   --  18   MONOS PCT %  --   --  6   EOS PCT %  --   --  0    < > = values in this interval not displayed.     Results from last 7 days   Lab Units 02/02/24  0438 02/01/24  0038 01/31/24 2022   SODIUM mmol/L 135   < > 132*   POTASSIUM mmol/L 4.2   < > 5.5*   CHLORIDE mmol/L 101   < > 98   CO2 mmol/L 23   < > 24   BUN mg/dL 22   < > 23   CREATININE mg/dL 0.73   < > 0.90   ANION GAP mmol/L 11   < > 10   CALCIUM mg/dL 9.4   < > 10.0   ALBUMIN g/dL  --   --  3.8   TOTAL BILIRUBIN mg/dL  --   --  0.40   ALK PHOS U/L  --   --  153*   ALT U/L  --   --  38   AST U/L  --   --  47*   GLUCOSE RANDOM mg/dL 96   < > 133    < > = values in this interval not displayed.     Results from last 7 days   Lab Units 01/31/24  1945   INR  1.56*     Results from last 7 days   Lab Units 01/31/24  2111   POC GLUCOSE mg/dl 240*               Lines/Drains:  Invasive Devices       Peripheral Intravenous Line  Duration             Peripheral IV 01/31/24 Distal;Right;Upper;Ventral (anterior) Arm 1 day                      Telemetry:  Telemetry Orders (From admission, onward)               24 Hour Telemetry Monitoring  Continuous x 24 Hours (Telem)        Question:  Reason for 24 Hour Telemetry  Answer:  Arrhythmias requiring acute medical intervention / PPM or ICD malfunction                     Telemetry Reviewed: {JMD Tele Review:63019}  Indication for Continued Telemetry Use: {Tele Indications:07933}             Imaging: {Radiology Review:45971}    Recent Cultures (last 7 days):         Last 24 Hours Medication List:   Current Facility-Administered Medications   Medication Dose Route Frequency Provider Last Rate    acetaminophen  650 mg Oral Q6H PRN Wally Rodriguez, DO      apixaban  10 mg Oral BID Gal Bryant MD      atorvastatin  10 mg Oral Daily With Dinner Wallyharvey Rodriguez DO      calcium carbonate  1 tablet Oral BID With Meals Wally Rodriguez,       ergocalciferol  50,000 Units Oral Weekly  Wally Rodriguez DO      famotidine  40 mg Oral Daily Wally Rodriguez DO      fluticasone  1 spray Nasal Daily Wally Rodriguez DO      primidone  50 mg Oral HS Wally Rodriguez DO          Today, Patient Was Seen By: Sarah Grey MD    **Please Note: This note may have been constructed using a voice recognition system.**

## 2024-02-01 NOTE — DISCHARGE INSTR - AVS FIRST PAGE
Dear Eva Lacey,     [facility]  It is our hope that we were always able to exceed the expected standards for your care during your stay.  You were hospitalized due to elevated potassium levels..  You were cared for on the MS 1 floor by Gal Bryant MD under the service of Finn Miguel MD with the [facility] who covers for your primary care physician (PCP), Janet Ordaz DO, while you were hospitalized.  If you have any questions or concerns related to this hospitalization, you may contact us at .  For follow up as well as any medication refills, we recommend that you follow up with your primary care physician.  A registered nurse will reach out to you by phone within a few days after your discharge to answer any additional questions that you may have after going home.  However, at this time we provide for you here, the most important instructions / recommendations at discharge:     Notable Medication Adjustments -   Please start taking Apixaban (Eliquis) 5mg tablets. Take 2 tablets (10 mg total) by mouth 2 (two) times a day for 6 days, THEN 1 tablet (5 mg total) 2 (two) times a day. Take your first dose on the evening of 2/2/2024. Do not stop taking this medication until instructed by your doctor.   Please stop taking potassium chloride (Kdur)  Please continue taking the rest of your medications as prescribed.  Testing Required after Discharge -   Repeat basic metabolic panel in 1 week  ** Please contact your PCP to request testing orders for any of the testing recommended here **  Important follow up information -   Please follow up with your PCP within a week of discharge. Please ask your PCP to order the blood work listed above.   Other Instructions -   Please contact your primary care provider, call 911 or go to the nearest emergency department for worsening  symptoms  Practice Social distancing  Please practice adequate hand washing techinque  Wear a mask in public at all times   Please review this entire after visit summary as additional general instructions including medication list, appointments, activity, diet, any pertinent wound care, and other additional recommendations from your care team that may be provided for you.      Sincerely,     Gal Bryant MD

## 2024-02-01 NOTE — ASSESSMENT & PLAN NOTE
Patient reports residual left upper extremity weakness and right fourth and fifth digit numbness since her prior stroke.  She states that she has weakness in bilateral lower extremities (L>R) and agrees that she would benefit from rehab  Uses a walker when ambulating  On exam strength testing: LUE 4/5, RUE 5/5, LLE 2/5, RLE 4/5  Suspect mixed picture of residual deficits from prior hemorrhagic stroke in 2022 and deconditioning    Plan  PTOT eval prior to discharge, patient agreeable to rehab

## 2024-02-01 NOTE — CASE MANAGEMENT
Case Management Progress Note    Patient name Eva Lacey  Location /-01 MRN [id number]  : 1945 Date 2024       LOS (days): 0  Geometric Mean LOS (GMLOS) (days):   Days to GMLOS:        OBJECTIVE:        Current admission status: Observation  Preferred Pharmacy:   [facility] - [place], [place] - [address]  [address] [place] [address]  Phone: [phone] Fax: [phone]    [id number] - [place], [place] - [address]  [address] [place] [address]  Phone: [phone] Fax: [phone]    Primary Care Provider: Janet Ordaz DO    Primary Insurance: MEDICARE  Secondary Insurance: [facility]    PROGRESS NOTE:  Discharge cancelled today due to elevated D-dimer. Transport cancelled, [facility] informed, and voicemail left for son.

## 2024-02-01 NOTE — PLAN OF CARE
"  Problem: PHYSICAL THERAPY ADULT  Goal: Performs mobility at highest level of function for planned discharge setting.  See evaluation for individualized goals.  Description: Treatment/Interventions: Functional transfer training, LE strengthening/ROM, Therapeutic exercise, Endurance training, Patient/family training, Equipment eval/education, Bed mobility, Gait training, Continued evaluation, Spoke to nursing  Equipment Recommended: Wheelchair, Walker       See flowsheet documentation for full assessment, interventions and recommendations.  Note: Prognosis: Fair  Problem List: Decreased strength, Decreased endurance, Impaired balance, Decreased mobility, Decreased cognition, Impaired judgement, Decreased safety awareness, Impaired hearing, Decreased skin integrity, Pain  Assessment: Pt is a 77 yo female admitted to [facility] 2* abnormal lab values,low HgB,generalized weakness and very fatigued x2 days PTA. Pt reports receiving care at [facility], pt reports no recent falls and reports \"walking a little\" with use of walker PTA. pt currently is not at functional mobility baseline, needs significant A to perform bed mobility, TT and gait with use of RW, dec cognition, pt reports fear of falling,pain with reports of \"sciatica issues\" and multiple lines. Pt demonstrates moderate deficits during functional mobility and gait including dec endurance, dec balance, dec BLE strength, ataxic and unsteady gait and movement patterns,fear of falling and needs maxAx1 for bed mobility, modAX1 for TT and GT with use of RW. Pt would cont to benefit from skilled inpt PT services to maximize functional independence and to dec caregiver burden upon being DC from the hospital.     Barriers to Discharge Comments: unclear level of A needed at [facility]  Rehab Resource Intensity Level, PT:  (return to facility upon DC)    See flowsheet documentation for full assessment.        "

## 2024-02-01 NOTE — ASSESSMENT & PLAN NOTE
Patient denies chest pain, chest tightness, shortness of breath, and palpitations  Echo (1/21/2024) - LVEF 65%, unremarkable study  ECG shows sinus tachycardia with PACs at 100 bpm without signs of ischemia  Troponin 0/2/4hr trended 305, 328, 280  Initially tachycardic possibly due to hyperkalemia  Suspect non-MI related elevation in troponin

## 2024-02-01 NOTE — ASSESSMENT & PLAN NOTE
At prior admission at [facility] patient was seen by speech therapy and recommended to have dysphagia 3 dental soft and thin liquids. Patient states she feels like she no longer needs this and can tolerate a normal diet again.     Plan  Speech eval consulted

## 2024-02-01 NOTE — DISCHARGE SUMMARY
[facility]  Discharge- Eva Lacey 1945, 78 y.o. female MRN: [id number]  Unit/Bed#: -Jensen Encounter: [id number]  Primary Care Provider: Janet Ordaz DO   Date and time admitted to hospital: 1/31/2024  6:59 PM  ***change date  History of dysphagia  Assessment & Plan  At prior admission at [facility] patient was seen by speech therapy and recommended to have dysphagia 3 dental soft and thin liquids. Patient states she feels like she no longer needs this and can tolerate a normal diet again.     Plan  Speech eval consulted    Suprapubic tenderness  Assessment & Plan  Suprapubic tenderness reported by ED for which CT was ordered. During my visit patient reports that there is no pain, just discomfort because she needs to urinate.  CT A/P (1/31/2024) - Focus of nondependent air within the bladder which may be secondary to recent instrumentation. Otherwise no evidence of acute intra-abdominal or pelvic pathology     Generalized weakness  Assessment & Plan  Patient reports residual left upper extremity weakness and right fourth and fifth digit numbness since her prior stroke.  She states that she has weakness in bilateral lower extremities (L>R) and agrees that she would benefit from rehab  Uses a walker when ambulating  On exam strength testing: LUE 4/5, RUE 5/5, LLE 2/5, RLE 4/5  Suspect mixed picture of residual deficits from prior hemorrhagic stroke in 2022 and deconditioning    Plan  PTOT eval prior to discharge, patient agreeable to rehab     Elevated troponin  Assessment & Plan  Patient denies chest pain, chest tightness, shortness of breath, and palpitations  Echo (1/21/2024) - LVEF 65%, unremarkable study  ECG shows sinus tachycardia with PACs at 100 bpm without signs of ischemia  Troponin 0/2/4hr trended 305, 328, 280  Initially tachycardic possibly due to hyperkalemia  Suspect non-MI related elevation in troponin    GERD (gastroesophageal reflux disease)  Assessment &  Plan  Plan  Continue home famotidine 40 mg once daily    Essential tremor  Assessment & Plan  Plan  Continue primidone 50 mg once daily at bedtime    History of cerebral hemorrhage  Assessment & Plan  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  Unchanged chronic slitlike hematoma cavity along right external capsule/claustrum with associated hemosiderin deposition and calcification.  Severe chronic microangiopathy.  CTA Brain (10/10/2022)  No contrast extravasation or underlying vascular malformation associated with the known right putaminal hemorrhage.  No cervical or intracranial large vessel occlusion.  Centrilobular emphysema with right greater than left biapical pleural-parenchymal scarring.    Plan  Continue atorvastatin  Not on aspirin due to prior hemorrhagic stroke    * Hyperkalemia  Assessment & Plan  Recent Labs     01/30/24  0454 01/31/24 2022 02/01/24  0038   K 4.7 5.5* 4.1       Patient presented for reported anemia at [facility] and generalized weakness. In the ED patient was found to be hyperkalemic at 5.5 with normal hemoglobin of 12.5.  Recently admitted at [facility] for fall and was discharged on 1/23/2024 to short-term rehab. During that hospitalization potassium was found to be 2.4 at it's lowest. Seen by Nephrology at that time. Workup revealed urine potassium 30, urine potassium/creatinine ratio 43.5 mEq/g suggestive of renal loss of potassium. She was started on K-Dur 20 mEq once daily and amiloride 5 mg on discharge  In ED received insulin 6 units, sodium bicarb 50 mEq, D50 25 mL to treat hyperkalemia  Was started on D10 with normal saline infusion at 50 mL/hr    Plan  Repeat BMP shows resolved hyperkalemia at 4.1. Discontinued D10 with NS   Suspect over-treatment of hypokalemia at prior discharge, consider reduced potassium supplementation dosing on  discharge  BMP rechecks every 8 hours, next at 1000        Medical Problems       Resolved Problems  Date Reviewed: 2/1/2024   None       Discharging Resident: Sarah Grey MD  Discharging Attending: Finn Miguel MD  PCP: Janet Ordaz DO  Admission Date:   Admission Orders (From admission, onward)       Ordered        01/31/24 2141  Place in Observation  Once                          Discharge Date: 02/01/24    Consultations During Hospital Stay:  None    Procedures Performed:   CT abdomen pelvis with contrast    Significant Findings / Test Results:   CT abdomen pelvis with contrast    Result Date: 1/31/2024  Impression: Focus of nondependent air within the bladder which may be secondary to recent instrumentation. Otherwise no evidence of acute intra-abdominal or pelvic pathology Workstation performed: [id number]       No Chest XR results available for this patient.    Incidental Findings:   None    Test Results Pending at Discharge (will require follow up):  None     Outpatient Tests Requested:  ***    Complications:  ***    Reason for Admission: ***    Hospital Course:   Eva Lacey is a 78 y.o. female patient who originally presented to the hospital on 1/31/2024 due to ***    {Complete this smartphrase if the patient is an inpatient and being discharged earlier than 2 midnights. If this does not apply, please delete this line:03827}    Please see above list of diagnoses and related plan for additional information.     Condition at Discharge: stable    Discharge Day Visit / Exam:   Subjective:    Vitals: Blood Pressure: 129/62 (02/01/24 0807)  Pulse: 96 (02/01/24 0807)  Temperature: 97.8 °F (36.6 °C) (02/01/24 0807)  Temp Source: Oral (02/01/24 0807)  Respirations: 18 (02/01/24 0807)  Weight - Scale: 54.6 kg (120 lb 5.9 oz) (01/31/24 1905)  SpO2: 97 % (02/01/24 0807)  Exam:   Physical Exam  Vitals and nursing note reviewed.   Constitutional:       General: She is not in acute distress.      Appearance: She is normal weight. She is not ill-appearing.   HENT:      Head: Normocephalic and atraumatic.      ***    Discussion with Family: Attempted to update  (son) via phone. Left voicemail.     Discharge instructions/Information to patient and family:   See after visit summary for information provided to patient and family.      Provisions for Follow-Up Care:  See after visit summary for information related to follow-up care and any pertinent home health orders.      Mobility at time of Discharge:   Basic Mobility Inpatient Raw Score: 11  JH-HLM Goal: 4: Move to chair/commode  JH-HLM Achieved: 4: Move to chair/commode  HLM Goal achieved. Continue to encourage appropriate mobility.     Disposition:   Other: [facility]    Planned Readmission: No    Discharge Medications:  See after visit summary for reconciled discharge medications provided to patient and/or family.      **Please Note: This note may have been constructed using a voice recognition system**

## 2024-02-01 NOTE — PHYSICAL THERAPY NOTE
Physical Therapy Evaluation:    2 forms of pt ID verified:name,birthdate and pt ID yoana    Patient's Name: Eva Lacey    Admitting Diagnosis  Hyperkalemia [E87.5]  Elevated troponin [R79.89]  Abnormal laboratory test result [R89.9]  Generalized weakness [R53.1]    Problem List  Patient Active Problem List   Diagnosis    Cataract of right eye    Idiopathic osteoporosis    Sacroiliitis (HCC)    Primary osteoarthritis of hand    Transient cerebral ischemia    Premature atrial beats    Chronic left-sided low back pain    History of cerebral hemorrhage    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 (HCC)    Acute cerebral hemorrhage (HCC)    Essential tremor    Macrocytosis    Weakness of both lower extremities    Rhabdomyolysis    SARS-CoV-2 positive    Diarrhea    Elevated AST (SGOT)    Prediabetes    Closed left ankle fracture, sequela    GERD (gastroesophageal reflux disease)    Bilateral pain of leg and foot    Osteopenia    Right knee pain    Pain    Enlarged and hypertrophic nails    MCI (mild cognitive impairment)    Impaired mobility and ADLs    Hypokalemia    Hypophosphatemia    Ambulatory dysfunction    Syncope and collapse    Anemia    Elevated troponin    Hyperkalemia    Generalized weakness    Suprapubic tenderness    History of dysphagia       Past Medical History  Past Medical History:   Diagnosis Date    Cataract of right eye     GERD (gastroesophageal reflux disease)        Past Surgical History  Past Surgical History:   Procedure Laterality Date    CATARACT EXTRACTION, BILATERAL Bilateral     MASTECTOMY Bilateral     for calcium deposits    VT COLONOSCOPY FLX DX W/COLLJ SPEC WHEN PFRMD N/A 8/25/2017    Procedure: COLONOSCOPY;  Surgeon: Harika Aguilar MD;  [facility]: [facility];  Service: Colorectal    VT XCAPSL CTRC RMVL INSJ IO LENS PROSTH W/O ECP Right 6/21/2016    Procedure: OD EXTRACTION EXTRACAPSULAR CATARACT PHACO INTRAOCULAR LENS (IOL);  Surgeon: Lida  "MD Renate;  Location: [facility];  Service: Ophthalmology    TONSILLECTOMY          02/01/24 1035   PT Last Visit   PT Visit Date 02/01/24   Note Type   Note type Evaluation   Pain Assessment   Pain Assessment Tool 0-10   Pain Score 6   Pain Location/Orientation Orientation: Left;Location: Back;Location: Leg  (pt reports sciatica)   Hospital Pain Intervention(s) Repositioned;Ambulation/increased activity;Emotional support;Rest   Restrictions/Precautions   Other Precautions Cognitive;Chair Alarm;Bed Alarm;Multiple lines;Fall Risk;Pain;Hard of hearing  (resistant to movement due to fear of falling)   Home Living   Type of Home Other (Comment)  (was receiving care at [facility])   Home Layout One level   Home Equipment Walker  (pt reports \"I walk only a little bit\".)   Prior Function   Level of [place] Needs assistance with ADLs;Needs assistance with functional mobility;Needs assistance with IADLS   Lives With Facility staff   Receives Help From Personal care attendant   IADLs Family/Friend/Other provides medication management;Family/Friend/Other provides meals;Family/Friend/Other provides transportation   Falls in the last 6 months 0  (per pt no recent falls)   General   Additional Pertinent History generalized weakness, very fatigue x2 days, low HgB, h/o stroke and BLE weakness   Family/Caregiver Present No   Cognition   Overall Cognitive Status Impaired   Arousal/Participation Cooperative   Orientation Level Oriented to person;Oriented to place;Oriented to time;Disoriented to situation   Following Commands Follows one step commands with increased time or repetition   Comments 2* fear of falling, pain and [place], dec cognition   Subjective   Subjective Pt supine in bed resting comfortably; pt willing and agreeable to work with PT and to participate in therapy intervention; \"I havent walked in awhile\".   RLE Assessment   RLE Assessment   (at least 3/5 grossly throughout)   LLE Assessment   LLE Assessment   (at " least 3/5 grossly throughout)   Vision-Basic Assessment   Current Vision Wears glasses all the time   Coordination   Movements are Fluid and Coordinated 0   Coordination and Movement Description forward flexed posture, dec BLE step length, dec safety awareness, dec cognition   Sensation WFL   Light Touch   RLE Light Touch Grossly intact   LLE Light Touch Grossly intact   Bed Mobility   Supine to Sit 2  Maximal assistance   Additional items Assist x 1;HOB elevated;Bedrails;Increased time required;Verbal cues;LE management   Transfers   Sit to Stand 3  Moderate assistance   Additional items Assist x 1;Bedrails;Increased time required;Verbal cues   Stand to Sit 3  Moderate assistance   Additional items Assist x 1;Armrests;Increased time required;Verbal cues   Stand pivot 3  Moderate assistance   Additional items Assist x 1;Increased time required;Verbal cues   Ambulation/Elevation   Gait pattern Poor UE support;Improper Weight shift;Antalgic;Narrow NIC;Forward Flexion;Shuffling;Inconsistent marianela;Foward flexed;Short stride;Ataxia;Step to;Excessively slow   Gait Assistance 3  Moderate assist   Additional items Assist x 1;Verbal cues;Tactile cues   Assistive Device Rolling walker   Distance 4 steps bed->chair with use of RW on tile surface; retropulsion and dec weight through handles of RW   Balance   Static Sitting Fair  (chair alarm intact prior to leaving pts room)   Dynamic Sitting Poor   Static Standing Poor   Dynamic Standing Poor   Ambulatory Poor   Endurance Deficit   Endurance Deficit Yes   Endurance Deficit Description dec activity tolerance, fear of falling, weakness BLE   Activity Tolerance   Activity Tolerance Patient limited by fatigue;Patient limited by pain  (poor->fair)   Nurse Made Aware yes   Assessment   Prognosis Fair   Problem List Decreased strength;Decreased endurance;Impaired balance;Decreased mobility;Decreased cognition;Impaired judgement;Decreased safety awareness;Impaired hearing;Decreased  "skin integrity;Pain   Assessment Pt is a 77 yo female admitted to [facility] 2* abnormal lab values,low HgB,generalized weakness and very fatigued x2 days PTA. Pt reports receiving care at [facility], pt reports no recent falls and reports \"walking a little\" with use of walker PTA. pt currently is not at functional mobility baseline, needs significant A to perform bed mobility, TT and gait with use of RW, dec cognition, pt reports fear of falling,pain with reports of \"sciatica issues\" and multiple lines. Pt demonstrates moderate deficits during functional mobility and gait including dec endurance, dec balance, dec BLE strength, ataxic and unsteady gait and movement patterns,fear of falling and needs maxAx1 for bed mobility, modAX1 for TT and GT with use of RW. Pt would cont to benefit from skilled inpt PT services to maximize functional independence and to dec caregiver burden upon being DC from the hospital.   Barriers to Discharge Comments unclear level of A needed at [facility]   Goals   Patient Goals to not fall   STG Expiration Date 02/10/24   Short Term Goal #1 in 7-10 days:  (1) Pt will be able to ambulate greater than 30 feet with use of RW and wc follow as needed on various surfaces needing min level of A in order to A pt to return to PLOF, (2) activity tolerance:45 mins/45mins, (3) pt will be able to perform sit to stand transfers needing min level of A to and from various surfaces consistently in order to return to PLOF, (4) pt will be able to perform BM needing mod level of A to A pt to return to PLOF, (5) min Ax1 with BLE therapeutic ex HEP in various positions to A pt to inc balance,strength,mobility,endurance and to A to dec pain, (6) inc balance 1/2 grade in order to dec fall risk,  (7) cont to provide pt and pt family education for safe D/C planning, (8) inc BLE strength 1/2 to 1 full grade in order to A pt to inc balance,strength,mobility,endurance and to A to dec pain   PT Treatment Day 0   Plan "   Treatment/Interventions Functional transfer training;LE strengthening/ROM;Therapeutic exercise;Endurance training;Patient/family training;Equipment eval/education;Bed mobility;Gait training;Continued evaluation;Spoke to nursing   PT Frequency 2-3x/wk   Discharge Recommendation   Rehab Resource Intensity Level, PT   (return to facility upon DC)   Equipment Recommended Wheelchair;Walker   AM-PAC Basic Mobility Inpatient   Turning in Flat Bed Without Bedrails 2   Lying on Back to Sitting on Edge of Flat Bed Without Bedrails 2   Moving Bed to Chair 2   Standing Up From Chair Using Arms 2   Walk in Room 2   Climb 3-5 Stairs With Railing 1   Basic Mobility Inpatient Raw Score 11   Basic Mobility Standardized Score 30.25   Highest Level Of Mobility   JH-HLM Goal 4: Move to chair/commode   JH-HLM Achieved 4: Move to chair/commode           @Evelin Vann, PT, DPT@

## 2024-02-01 NOTE — ASSESSMENT & PLAN NOTE
Recent Labs     01/30/24  0454 01/31/24 2022   K 4.7 5.5*     Patient presented for reported anemia at [facility] and generalized weakness. In the ED patient was found to be hyperkalemic at 5.5 with normal hemoglobin of 12.5.  Recently admitted at [facility] for fall and was discharged on 1/23/2024 to short-term rehab. During that hospitalization potassium was found to be 2.4 at it's lowest. Seen by Nephrology at that time. Workup revealed urine potassium 30, urine potassium/creatinine ratio 43.5 mEq/g suggestive of renal loss of potassium. She was started on K-Dur 20 mEq once daily and amiloride 5 mg on discharge  In ED received insulin 6 units, sodium bicarb 50 mEq, D50 25 mL to treat hyperkalemia    Plan  Suspect over-treatment of hypokalemia at prior discharge, consider reduced potassium supplementation dosing on discharge  Started on D10 with normal saline infusion at 50 mL/hr for 12 hours  BMP rechecks every 8 hours

## 2024-02-01 NOTE — ASSESSMENT & PLAN NOTE
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  Unchanged chronic slitlike hematoma cavity along right external capsule/claustrum with associated hemosiderin deposition and calcification.  Severe chronic microangiopathy.  CTA Brain (10/10/2022)  No contrast extravasation or underlying vascular malformation associated with the known right putaminal hemorrhage.  No cervical or intracranial large vessel occlusion.  Centrilobular emphysema with right greater than left biapical pleural-parenchymal scarring.    Plan  Continue atorvastatin  Not on aspirin due to prior hemorrhagic stroke

## 2024-02-01 NOTE — ASSESSMENT & PLAN NOTE
Patient denies chest pain, chest tightness, shortness of breath, and palpitations  Echo (1/21/2024) - LVEF 65%, unremarkable study  ECG shows sinus tachycardia with PACs at 100 bpm without signs of ischemia  Troponin increase from 305 to 328  Tachycardic possibly due to hyperkalemia    Plan  Follow 4 hour troponin

## 2024-02-01 NOTE — ASSESSMENT & PLAN NOTE
Recent Labs     01/30/24  0454 01/31/24 2022 02/01/24  0038 02/01/24  0930   K 4.7 5.5* 4.1 4.5     Patient presented for reported anemia at [facility] and generalized weakness. In the ED patient was found to be hyperkalemic at 5.5 with normal hemoglobin of 12.5.  Recently admitted at [facility] for fall and was discharged on 1/23/2024 to short-term rehab. During that hospitalization potassium was found to be 2.4 at it's lowest. Seen by Nephrology at that time. Workup revealed urine potassium 30, urine potassium/creatinine ratio 43.5 mEq/g suggestive of renal loss of potassium. She was started on K-Dur 20 mEq once daily and amiloride 5 mg on discharge  In ED received insulin 6 units, sodium bicarb 50 mEq, D50 25 mL to treat hyperkalemia  Was started on D10 with normal saline infusion at 50 mL/hr    Plan  Repeat BMP shows resolved hyperkalemia at 4.1. Discontinued D10 with NS   Suspect over-treatment of hypokalemia at prior discharge, consider reduced potassium supplementation dosing on discharge

## 2024-02-02 VITALS
SYSTOLIC BLOOD PRESSURE: 104 MMHG | TEMPERATURE: 98.3 F | WEIGHT: 120 LBS | RESPIRATION RATE: 18 BRPM | DIASTOLIC BLOOD PRESSURE: 71 MMHG | BODY MASS INDEX: 23.56 KG/M2 | HEIGHT: 60 IN | OXYGEN SATURATION: 95 % | HEART RATE: 80 BPM

## 2024-02-02 PROBLEM — I82.403 ACUTE DEEP VEIN THROMBOSIS (DVT) OF BOTH LOWER EXTREMITIES (HCC): Status: ACTIVE | Noted: 2024-02-01

## 2024-02-02 LAB
ANION GAP SERPL CALCULATED.3IONS-SCNC: 11 MMOL/L
BUN SERPL-MCNC: 22 MG/DL (ref 5–25)
CALCIUM SERPL-MCNC: 9.4 MG/DL (ref 8.4–10.2)
CHLORIDE SERPL-SCNC: 101 MMOL/L (ref 96–108)
CO2 SERPL-SCNC: 23 MMOL/L (ref 21–32)
CREAT SERPL-MCNC: 0.73 MG/DL (ref 0.6–1.3)
ERYTHROCYTE [DISTWIDTH] IN BLOOD BY AUTOMATED COUNT: 11.9 % (ref 11.6–15.1)
FLUAV RNA RESP QL NAA+PROBE: NEGATIVE
FLUBV RNA RESP QL NAA+PROBE: NEGATIVE
GFR SERPL CREATININE-BSD FRML MDRD: 80 ML/MIN/1.73SQ M
GLUCOSE SERPL-MCNC: 96 MG/DL (ref 65–140)
HCT VFR BLD AUTO: 33.6 % (ref 34.8–46.1)
HGB BLD-MCNC: 10.9 G/DL (ref 11.5–15.4)
MCH RBC QN AUTO: 33.2 PG (ref 26.8–34.3)
MCHC RBC AUTO-ENTMCNC: 32.4 G/DL (ref 31.4–37.4)
MCV RBC AUTO: 102 FL (ref 82–98)
PLATELET # BLD AUTO: 199 THOUSANDS/UL (ref 149–390)
PMV BLD AUTO: 10.1 FL (ref 8.9–12.7)
POTASSIUM SERPL-SCNC: 4.2 MMOL/L (ref 3.5–5.3)
RBC # BLD AUTO: 3.28 MILLION/UL (ref 3.81–5.12)
RSV RNA RESP QL NAA+PROBE: NEGATIVE
SARS-COV-2 RNA RESP QL NAA+PROBE: NEGATIVE
SODIUM SERPL-SCNC: 135 MMOL/L (ref 135–147)
WBC # BLD AUTO: 10.36 THOUSAND/UL (ref 4.31–10.16)

## 2024-02-02 PROCEDURE — 85027 COMPLETE CBC AUTOMATED: CPT

## 2024-02-02 PROCEDURE — 99239 HOSP IP/OBS DSCHRG MGMT >30: CPT | Performed by: INTERNAL MEDICINE

## 2024-02-02 PROCEDURE — 80048 BASIC METABOLIC PNL TOTAL CA: CPT

## 2024-02-02 PROCEDURE — 92610 EVALUATE SWALLOWING FUNCTION: CPT

## 2024-02-02 PROCEDURE — 0241U HB NFCT DS VIR RESP RNA 4 TRGT: CPT

## 2024-02-02 RX ORDER — POLYETHYLENE GLYCOL 3350 17 G/17G
17 POWDER, FOR SOLUTION ORAL ONCE
Status: COMPLETED | OUTPATIENT
Start: 2024-02-02 | End: 2024-02-02

## 2024-02-02 RX ADMIN — FLUTICASONE PROPIONATE 1 SPRAY: 50 SPRAY, METERED NASAL at 10:10

## 2024-02-02 RX ADMIN — POLYETHYLENE GLYCOL 3350 17 G: 17 POWDER, FOR SOLUTION ORAL at 08:15

## 2024-02-02 RX ADMIN — FAMOTIDINE 40 MG: 20 TABLET, FILM COATED ORAL at 08:15

## 2024-02-02 RX ADMIN — Medication 1 TABLET: at 08:15

## 2024-02-02 RX ADMIN — APIXABAN 10 MG: 5 TABLET, FILM COATED ORAL at 08:15

## 2024-02-02 NOTE — ASSESSMENT & PLAN NOTE
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    MRI brain (9/29/2023)  No acute intracranial abnormality.  Unchanged chronic slitlike hematoma cavity along right external capsule/claustrum with associated hemosiderin deposition and calcification.  Severe chronic microangiopathy.  CTA Brain (10/10/2022)  No contrast extravasation or underlying vascular malformation associated with the known right putaminal hemorrhage.  No cervical or intracranial large vessel occlusion.  Centrilobular emphysema with right greater than left biapical pleural-parenchymal scarring.    Plan  Continue atorvastatin  Not on aspirin due to prior hemorrhagic stroke  Follow-up with PCP

## 2024-02-02 NOTE — ASSESSMENT & PLAN NOTE
Recent Labs     01/31/24 2022 02/01/24  0038 02/01/24  0930 02/02/24  0438   K 5.5* 4.1 4.5 4.2       Patient presented for reported anemia at [facility] and generalized weakness. In the ED patient was found to be hyperkalemic at 5.5 with normal hemoglobin of 12.5.  Recently admitted at [facility] for fall and was discharged on 1/23/2024 to short-term rehab. During that hospitalization potassium was found to be 2.4 at it's lowest. Seen by Nephrology at that time. Workup revealed urine potassium 30, urine potassium/creatinine ratio 43.5 mEq/g suggestive of renal loss of potassium. She was started on K-Dur 20 mEq once daily and amiloride 5 mg on discharge  In ED received insulin 6 units, sodium bicarb 50 mEq, D50 25 mL to treat hyperkalemia  Was started on D10 with normal saline infusion at 50 mL/hr    Suspect over-treatment of hypokalemia at prior discharge last admission  Now resolved    Plan  BMP outpatient in 1 week  Stop potassium supplementation on discharge  Outpatient follow-up with PCP

## 2024-02-02 NOTE — CASE MANAGEMENT
Case Management Discharge Planning Note    Patient name Eva Lacey  Location S /S -01 MRN [id number]  : 1945 Date 2024       Current Admission Date: 2024  Current Admission Diagnosis:D-dimer, elevated   Patient Active Problem List    Diagnosis Date Noted    Suprapubic tenderness 2024    History of dysphagia 2024    D-dimer, elevated 2024    Elevated troponin 2024    Hyperkalemia 2024    Generalized weakness 2024    Anemia 2024    Ambulatory dysfunction 2024    Syncope and collapse 2024    Hypophosphatemia 2023    Hypokalemia 2023    Impaired mobility and ADLs 04/10/2023    MCI (mild cognitive impairment) 2023    Pain 2023    Enlarged and hypertrophic nails 2023    Osteopenia 2023    Right knee pain 2023    Elevated AST (SGOT) 2023    Prediabetes 2023    Closed left ankle fracture, sequela 2023    GERD (gastroesophageal reflux disease) 2023    Bilateral pain of leg and foot 2023    Diarrhea 2023    Macrocytosis 2023    Weakness of both lower extremities 2023    Rhabdomyolysis 2023    SARS-CoV-2 positive 2023    Essential tremor 10/20/2022    Acute cerebral hemorrhage (HCC) 10/11/2022    History of cerebral hemorrhage 10/10/2022    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 (HCC) 10/10/2022    Chronic left-sided low back pain     Transient cerebral ischemia 2018    Premature atrial beats 2018    Cataract of right eye 2016    Sacroiliitis (HCC) 2015    Idiopathic osteoporosis 2015    Primary osteoarthritis of hand 2015      LOS (days): 1  Geometric Mean LOS (GMLOS) (days): 2.6  Days to GMLOS:1.7     OBJECTIVE:  Risk of Unplanned Readmission Score: 22.95         Current admission status: Inpatient   Preferred Pharmacy:   [facility] - [place], [place] - [address]  [address]  [place] [address]  Phone: [phone] Fax: [phone]    [id number] - [place], [place] - [address]  [address]  [place] [address]  Phone: [phone] Fax: [phone]    Primary Care Provider: Janet Ordaz DO    Primary Insurance: MEDICARE  Secondary Insurance: AARP    DISCHARGE DETAILS:    Discharge planning discussed with:: patient at bedside; call made to  Bianca, and voicemail left  Freedom of Choice: Yes (re: facility return)  Comments - Freedom of Choice: confirmed plan to return to [facility]  CM contacted family/caregiver?: Yes (offer made to contact family, and patient requesting Debbie jones, to be called)  Were Treatment Team discharge recommendations reviewed with patient/caregiver?: Yes  Did patient/caregiver verbalize understanding of patient care needs?: Yes  Were patient/caregiver advised of the risks associated with not following Treatment Team discharge recommendations?: Yes    Contacts  Patient Contacts: sister Mcduffie  Relationship to Patient:: Family  Contact Method: Phone  Phone Number: [phone]  Reason/Outcome: Emergency Contact, Discharge Planning    Requested Home Health Care         Is the patient interested in HHC at discharge?: No    DME Referral Provided  Referral made for DME?: No    Other Referral/Resources/Interventions Provided:  Interventions: SNF, Short Term Rehab  Referral Comments: Per SLIM, patient medically stable for d/c back to [facility] today. Facility requesting COVID test prior to return; MD aware and ordered - results forwarded to [facility] for their records. Transport requested in [facility] for 4:30pm; awaiting confirmation of same. Patient informed of plan for discharge today and confirmed agreement with returning to [facility]. Offer made to contact family, and she reports that her son, Dirk, works during the evening, so is asleep at night. Requesting for her sister,  Bianca, to be contacted. Contact information for Bianca pulled from [facility] - patient requesting for her to be added as secondary contact to chart. Call made to Bianca from room phone but no answer; VM left providing callback number for patient's room as well as this writer. Awaiting confirmation of pick-up time.    Would you like to participate in our [facility] service program?  : No - Declined    Treatment Team Recommendation: Short Term Rehab, Facility Return  Discharge Destination Plan:: Facility Return, Short Term Rehab ([place])  Transport at Discharge : [facility]  Dispatcher Contacted: Yes  Number/Name of Dispatcher: [facility]     ETA of Transport (Date): 02/02/24  ETA of Transport (Time): 1630 (requested; awaiting confirmation)              IMM Given (Date):: 02/02/24  IMM reviewed with patient, patient agrees with discharge determination.

## 2024-02-02 NOTE — ASSESSMENT & PLAN NOTE
Suprapubic tenderness reported by ED for which CT was ordered.  CT A/P (1/31/2024) - Focus of nondependent air within the bladder which may be secondary to recent instrumentation. Otherwise no evidence of acute intra-abdominal or pelvic pathology   Resolved.  No dysuria or urinary changes.

## 2024-02-02 NOTE — CASE MANAGEMENT
Case Management Discharge Planning Note    Patient name Eva Lacey  Location S /S -01 MRN [id number]  : 1945 Date 2024       Current Admission Date: 2024  Current Admission Diagnosis:Acute deep vein thrombosis (DVT) of both lower extremities (HCC)   Patient Active Problem List    Diagnosis Date Noted    Suprapubic tenderness 2024    History of dysphagia 2024    Acute deep vein thrombosis (DVT) of both lower extremities (HCC) 2024    Elevated troponin 2024    Hyperkalemia 2024    Generalized weakness 2024    Anemia 2024    Ambulatory dysfunction 2024    Syncope and collapse 2024    Hypophosphatemia 2023    Hypokalemia 2023    Impaired mobility and ADLs 04/10/2023    MCI (mild cognitive impairment) 2023    Pain 2023    Enlarged and hypertrophic nails 2023    Osteopenia 2023    Right knee pain 2023    Elevated AST (SGOT) 2023    Prediabetes 2023    Closed left ankle fracture, sequela 2023    GERD (gastroesophageal reflux disease) 2023    Bilateral pain of leg and foot 2023    Diarrhea 2023    Macrocytosis 2023    Weakness of both lower extremities 2023    Rhabdomyolysis 2023    SARS-CoV-2 positive 2023    Essential tremor 10/20/2022    Acute cerebral hemorrhage (HCC) 10/11/2022    History of cerebral hemorrhage 10/10/2022    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 (HCC) 10/10/2022    Chronic left-sided low back pain     Transient cerebral ischemia 2018    Premature atrial beats 2018    Cataract of right eye 2016    Sacroiliitis (HCC) 2015    Idiopathic osteoporosis 2015    Primary osteoarthritis of hand 2015      LOS (days): 1  Geometric Mean LOS (GMLOS) (days): 2.6  Days to GMLOS:1.6     OBJECTIVE:  Risk of Unplanned Readmission Score: 23.24         Current admission  status: Inpatient   Preferred Pharmacy:   [facility] - [place], [place] - [address]  [address]  [place] [address]  Phone: [phone] Fax: [phone]    [id number] - [place], [place] - [address]  [address] [facility] [place] [address]  Phone: [phone] Fax: [phone]    Primary Care Provider: Jante Ordaz DO    Primary Insurance: MEDICARE  Secondary Insurance: AARP    DISCHARGE DETAILS:    Discharge planning discussed with:: patient at bedside; sister, Bianca and son, Dirk by phone  Freedom of Choice: Yes (re: facility return)  Comments - [place] of Choice: [facility] contacted family/caregiver?: Yes  Were Treatment Team discharge recommendations reviewed with patient/caregiver?: Yes  Did patient/caregiver verbalize understanding of patient care needs?: N/A- going to facility  Were patient/caregiver advised of the risks associated with not following Treatment Team discharge recommendations?: Yes    Contacts  Patient Contacts: Dirk, regan and Bianca, sister  Relationship to Patient:: Family  Contact Method: Phone  Reason/Outcome: Continuity of Care, Emergency Contact, Referral, Discharge Planning    Requested Home Health Care         Is the patient interested in HHC at discharge?: No    DME Referral Provided  Referral made for DME?: No    Other Referral/Resources/Interventions Provided:  Interventions: SNF  Referral Comments: Confirmation received that [facility] can transport patient at 4:30pm. Same relayed to [facility] via [facility] and AVS sent to them for their records. Call made to patient's son, Dirk, and reviewed above. Son confirmed plan for return to [facility] and in agreement with d/c for today. Reports that plan is for patient to transition to long-term care at [facility] and he just left the facility to go to her apartment to begin going through her things. Asking for patient to be notified that he will be down to visit her  tomorrow, 2/3 at [facility]. Met with patient at bedside and informed of transport for 4:30pm - also relayed message from son re: visiting at [facility]. Patient also confirmed agreement with same. Patient did ask for sister to be contacted, so call made to sister, Bianca, and also reviewed d/c and return to [facility]. Bianca thankful for call and aware that her information has been added to patient's chart as secondary contact; in agreement with same.    Would you like to participate in our [facility] service program?  : No - Declined    Treatment Team Recommendation: Facility Return, SNF  Discharge Destination Plan:: SNF, Facility Return ([facility])

## 2024-02-02 NOTE — PLAN OF CARE
Problem: Potential for Falls  Goal: Patient will remain free of falls  Description: INTERVENTIONS:  - Educate patient/family on patient safety including physical limitations  - Instruct patient to call for assistance with activity   - Consult OT/PT to assist with strengthening/mobility   - Keep Call bell within reach  - Keep bed low and locked with side rails adjusted as appropriate  - Keep care items and personal belongings within reach  - Initiate and maintain comfort rounds  - Make Fall Risk Sign visible to staff  - Offer Toileting every 2 Hours, in advance of need  - Initiate/Maintain bed alarm  - Apply yellow socks and bracelet for high fall risk patients  - Consider moving patient to room near nurses station  Outcome: Progressing     Problem: Prexisting or High Potential for Compromised Skin Integrity  Goal: Skin integrity is maintained or improved  Description: INTERVENTIONS:  - Identify patients at risk for skin breakdown  - Assess and monitor skin integrity  - Assess and monitor nutrition and hydration status  - Monitor labs   - Assess for incontinence   - Turn and reposition patient  - Assist with mobility/ambulation  - Relieve pressure over bony prominences  - Avoid friction and shearing  - Provide appropriate hygiene as needed including keeping skin clean and dry  - Evaluate need for skin moisturizer/barrier cream  - Collaborate with interdisciplinary team   - Patient/family teaching  - Consider wound care consult   Outcome: Progressing     Problem: Nutrition/Hydration-ADULT  Goal: Nutrient/Hydration intake appropriate for improving, restoring or maintaining nutritional needs  Description: Monitor and assess patient's nutrition/hydration status for malnutrition. Collaborate with interdisciplinary team and initiate plan and interventions as ordered.  Monitor patient's weight and dietary intake as ordered or per policy. Utilize nutrition screening tool and intervene as necessary. Determine patient's food  preferences and provide high-protein, high-caloric foods as appropriate.     INTERVENTIONS:  - Monitor oral intake, urinary output, labs, and treatment plans  - Assess nutrition and hydration status and recommend course of action  - Evaluate amount of meals eaten  - Assist patient with eating if necessary   - Allow adequate time for meals  - Recommend/ encourage appropriate diets, oral nutritional supplements, and vitamin/mineral supplements  - Order, calculate, and assess calorie counts as needed  - Assess need for intravenous fluids  - Provide specific nutrition/hydration education as appropriate  - Include patient/family/caregiver in decisions related to nutrition  Outcome: Progressing     Problem: PAIN - ADULT  Goal: Verbalizes/displays adequate comfort level or baseline comfort level  Description: Interventions:  - Encourage patient to monitor pain and request assistance  - Assess pain using appropriate pain scale  - Administer analgesics based on type and severity of pain and evaluate response  - Implement non-pharmacological measures as appropriate and evaluate response  - Consider cultural and social influences on pain and pain management  - Notify physician/advanced practitioner if interventions unsuccessful or patient reports new pain  Outcome: Progressing     Problem: INFECTION - ADULT  Goal: Absence or prevention of progression during hospitalization  Description: INTERVENTIONS:  - Assess and monitor for signs and symptoms of infection  - Monitor lab/diagnostic results  - Monitor all insertion sites, i.e. indwelling lines, tubes, and drains  - Monitor endotracheal if appropriate and nasal secretions for changes in amount and color  - Calcium appropriate cooling/warming therapies per order  - Administer medications as ordered  - Instruct and encourage patient and family to use good hand hygiene technique  - Identify and instruct in appropriate isolation precautions for identified  infection/condition  Outcome: Progressing  Goal: Absence of fever/infection during neutropenic period  Description: INTERVENTIONS:  - Monitor WBC    Outcome: Progressing

## 2024-02-02 NOTE — ASSESSMENT & PLAN NOTE
Patient reports residual left upper extremity weakness and right fourth and fifth digit numbness since her prior stroke.  She states that she has weakness in bilateral lower extremities (L>R) and agrees that she would benefit from rehab  Uses a walker when ambulating  On exam strength testing: LUE 4/5, RUE 5/5, LLE 2/5, RLE 4/5  Suspect mixed picture of residual deficits from prior hemorrhagic stroke in 2022 and deconditioning    Plan  PTOT eval prior to discharge, patient agreeable to rehab at her facility

## 2024-02-02 NOTE — DISCHARGE SUMMARY
formerly [facility]  Discharge- Eva Lacey 1945, 78 y.o. female MRN: [id number]  Unit/Bed#: S -[facility] Encounter: [id number]  Primary Care Provider: Janet Ordaz DO   Date and time admitted to hospital: 1/31/2024  6:59 PM    * Acute deep vein thrombosis (DVT) of both lower extremities (HCC)  Assessment & Plan  Ddimer >20  Tachycardic to 108 on admission  Tachycardia now resolved (HR 80s-90s), no tachypnea or SOB, no extremity swelling on exam  Dopplers showed bilateral acute and chronic DVTs, including acute occlusive deep vein thrombosis in the left deep femoral vein and one of the paired peroneal veins  Suspect secondary to immobility at facility, patient reports that she has not been getting therapy at her facility recently and spends much of her time immobile    Plan:  Eliquis 10 mg twice daily for 7 days, then 5 mg twice daily  Stable for discharge  Follow-up with PCP    History of dysphagia  Assessment & Plan  At prior admission at [facility] patient was seen by speech therapy and recommended to have dysphagia 3 dental soft and thin liquids. Patient states she feels like she no longer needs this and can tolerate a normal diet again.     Plan  Speech eval can be completed at [facility]    Suprapubic tenderness  Assessment & Plan  Suprapubic tenderness reported by ED for which CT was ordered.  CT A/P (1/31/2024) - Focus of nondependent air within the bladder which may be secondary to recent instrumentation. Otherwise no evidence of acute intra-abdominal or pelvic pathology   Resolved.  No dysuria or urinary changes.    Generalized weakness  Assessment & Plan  Patient reports residual left upper extremity weakness and right fourth and fifth digit numbness since her prior stroke.  She states that she has weakness in bilateral lower extremities (L>R) and agrees that she would benefit from rehab  Uses a walker when ambulating  On exam strength testing: LUE 4/5, RUE 5/5, LLE 2/5, RLE  4/5  Suspect mixed picture of residual deficits from prior hemorrhagic stroke in 2022 and deconditioning    Plan  PTOT eval prior to discharge, patient agreeable to rehab at her facility    Hyperkalemia  Assessment & Plan  Recent Labs     01/31/24 2022 02/01/24  0038 02/01/24  0930 02/02/24  0438   K 5.5* 4.1 4.5 4.2       Patient presented for reported anemia at [facility] and generalized weakness. In the ED patient was found to be hyperkalemic at 5.5 with normal hemoglobin of 12.5.  Recently admitted at [facility] for fall and was discharged on 1/23/2024 to short-term rehab. During that hospitalization potassium was found to be 2.4 at it's lowest. Seen by Nephrology at that time. Workup revealed urine potassium 30, urine potassium/creatinine ratio 43.5 mEq/g suggestive of renal loss of potassium. She was started on K-Dur 20 mEq once daily and amiloride 5 mg on discharge  In ED received insulin 6 units, sodium bicarb 50 mEq, D50 25 mL to treat hyperkalemia  Was started on D10 with normal saline infusion at 50 mL/hr    Suspect over-treatment of hypokalemia at prior discharge last admission  Now resolved    Plan  BMP outpatient in 1 week  Stop potassium supplementation on discharge  Outpatient follow-up with PCP    Elevated troponin  Assessment & Plan  Patient denies chest pain, chest tightness, shortness of breath, and palpitations  Echo (1/21/2024) - LVEF 65%, unremarkable study  ECG shows sinus tachycardia with PACs at 100 bpm without signs of ischemia  Troponin 0/2/4hr trended 305, 328, 280  Initially tachycardic possibly due to hyperkalemia  Suspect non-MI related elevation in troponin    GERD (gastroesophageal reflux disease)  Assessment & Plan  Plan  Continue home famotidine 40 mg once daily    Essential tremor  Assessment & Plan  Follow-up with PCP    History of cerebral hemorrhage  Assessment & Plan  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    MRI brain (9/29/2023)  No acute intracranial abnormality.  Unchanged chronic slitlike hematoma cavity along right external capsule/claustrum with associated hemosiderin deposition and calcification.  Severe chronic microangiopathy.  CTA Brain (10/10/2022)  No contrast extravasation or underlying vascular malformation associated with the known right putaminal hemorrhage.  No cervical or intracranial large vessel occlusion.  Centrilobular emphysema with right greater than left biapical pleural-parenchymal scarring.    Plan  Continue atorvastatin  Not on aspirin due to prior hemorrhagic stroke  Follow-up with PCP      Medical Problems       Resolved Problems  Date Reviewed: 2/1/2024   None       Discharging Resident: Sarah Grey MD  Discharging Attending: No att. providers found  PCP: Janet Ordaz DO  Admission Date:   Admission Orders (From admission, onward)       Ordered        02/01/24 1632  Inpatient Admission  Once            01/31/24 2141  Place in Observation  Once                          Discharge Date: 02/02/24    Consultations During Hospital Stay:  None    Procedures Performed:   CT AP    Significant Findings / Test Results:   CT abdomen pelvis with contrast  Result Date: 1/31/2024  Impression: Focus of nondependent air within the bladder which may be secondary to recent instrumentation. Otherwise no evidence of acute intra-abdominal or pelvic pathology Workstation performed: [id number]   No Chest XR results available for this patient.     Incidental Findings:   None    Test Results Pending at Discharge (will require follow up):  None     Outpatient Tests Requested:  Basic metabolic panel in 1 week    Complications: None    Reason for Admission: Acute DVTs    Hospital Course:   Eva Lacey is a  78 y.o. female patient who originally presented to the hospital on 1/31/2024 from [facility] due to generalized weakness and a reported hemoglobin of 4.2.  On presentation to the ED, patient reported that she was feeling fine other than bilateral lower extremity weakness.  She reported that she has not been getting up and about her facility as much as she would like, and feels deconditioned.  Hemoglobin in the ED was 12.5.  She was tachycardic to 108 in the ED.  D-dimer was elevated at greater than 20.  Lower extremity Dopplers showed bilateral acute and chronic DVTs, including acute occlusive deep vein thrombosis in the left deep femoral vein and one of the paired peroneal veins.  Patient was started on Eliquis 10 mg twice daily.  Tachycardia resolved, she was saturating well on room air, and she denied any chest tightness.  CTA was not ordered, as finding of a PE would not  and patient was stable.  The patient is stable for discharge to facility on Eliquis.  Suspect DVTs secondary to immobility at facility.  The patient's son notes that her condition has been declining over the past several months and has she has not been as mobile.  At baseline she walks with a walker.  She was also found to be mildly hyperkalemic to 5.5 during admission which normalized after discontinuing potassium supplementation.  She will be discharged off potassium supplements, and should have an outpatient BMP in 1 week, with follow-up with PCP.  The patient is agreeable with the plan.    Please see above list of diagnoses and related plan for additional information.     Condition at Discharge: good    Discharge Day Visit / Exam:   Subjective:  The patient was seen and examined lying in bed this morning.  She says that she slept well last night.  She denies any lightheadedness, dizziness, fever, chills, chest pain, palpitations, shortness of breath, abdominal pain, nausea, vomiting, diarrhea, constipation, dysuria,  extremity pain or swelling.  She denies any pain this morning. All questions answered.   Vitals: Blood Pressure: 104/71 (02/02/24 1543)  Pulse: 80 (02/02/24 1543)  Temperature: 98.3 °F (36.8 °C) (02/02/24 1543)  Temp Source: Oral (02/02/24 1543)  Respirations: 18 (02/02/24 1543)  Height: 5' (152.4 cm) (per patient report recent admission) (02/01/24 1908)  Weight - Scale: 54.4 kg (120 lb) (02/01/24 1908)  SpO2: 95 % (02/02/24 1543)  Exam:   Physical Exam  Vitals and nursing note reviewed.   Constitutional:       General: She is not in acute distress.  HENT:      Head: Normocephalic and atraumatic.      Right Ear: External ear normal.      Left Ear: External ear normal.      Nose: Nose normal.      Mouth/Throat:      Mouth: Mucous membranes are moist.      Pharynx: Oropharynx is clear.   Eyes:      Conjunctiva/sclera: Conjunctivae normal.   Cardiovascular:      Rate and Rhythm: Normal rate and regular rhythm.      Pulses: Normal pulses.      Heart sounds: Normal heart sounds.   Pulmonary:      Effort: Pulmonary effort is normal.      Breath sounds: Normal breath sounds.   Abdominal:      General: Abdomen is flat. Bowel sounds are normal. There is no distension.      Palpations: Abdomen is soft. There is no mass.      Tenderness: There is no abdominal tenderness. There is no guarding.   Musculoskeletal:         General: No swelling.      Cervical back: Neck supple.      Right lower leg: No edema.      Left lower leg: No edema.      Comments: Tender to squeezing of left calf. No tenderness to squeezing of right.    Skin:     General: Skin is warm and dry.   Neurological:      Mental Status: She is alert. Mental status is at baseline.   Psychiatric:         Mood and Affect: Mood normal.         Behavior: Behavior normal.         Thought Content: Thought content normal.         Judgment: Judgment normal.        Discussion with Family: Updated  (son) via phone.    Discharge instructions/Information to patient  and family:   See after visit summary for information provided to patient and family.      Provisions for Follow-Up Care:  See after visit summary for information related to follow-up care and any pertinent home health orders.      Mobility at time of Discharge:   Basic Mobility Inpatient Raw Score: 11  JH-HLM Goal: 4: Move to chair/commode  JH-HLM Achieved: 4: Move to chair/commode  HLM Goal achieved. Continue to encourage appropriate mobility.     Disposition:   Other: [place]    Planned Readmission: No    Discharge Medications:  See after visit summary for reconciled discharge medications provided to patient and/or family.      **Please Note: This note may have been constructed using a voice recognition system**

## 2024-02-02 NOTE — SPEECH THERAPY NOTE
Speech Language/Pathology  Today's Date: 2/2/2024     Problem List  Patient Active Problem List   Diagnosis    Cataract of right eye    Idiopathic osteoporosis    Sacroiliitis (HCC)    Primary osteoarthritis of hand    Transient cerebral ischemia    Premature atrial beats    Chronic left-sided low back pain    History of cerebral hemorrhage    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 (HCC)    Acute cerebral hemorrhage (HCC)    Essential tremor    Macrocytosis    Weakness of both lower extremities    Rhabdomyolysis    SARS-CoV-2 positive    Diarrhea    Elevated AST (SGOT)    Prediabetes    Closed left ankle fracture, sequela    GERD (gastroesophageal reflux disease)    Bilateral pain of leg and foot    Osteopenia    Right knee pain    Pain    Enlarged and hypertrophic nails    MCI (mild cognitive impairment)    Impaired mobility and ADLs    Hypokalemia    Hypophosphatemia    Ambulatory dysfunction    Syncope and collapse    Anemia    Elevated troponin    Hyperkalemia    Generalized weakness    Suprapubic tenderness    History of dysphagia    D-dimer, elevated       Past Medical History  Past Medical History:   Diagnosis Date    Cataract of right eye     GERD (gastroesophageal reflux disease)        Past Surgical History  Past Surgical History:   Procedure Laterality Date    CATARACT EXTRACTION, BILATERAL Bilateral     MASTECTOMY Bilateral     for calcium deposits    NJ COLONOSCOPY FLX DX W/COLLJ SPEC WHEN PFRMD N/A 8/25/2017    Procedure: COLONOSCOPY;  Surgeon: Harika Aguilar MD;  Location: [facility];  Service: Colorectal    NJ XCAPSL CTRC RMVL INSJ IO LENS PROSTH W/O ECP Right 6/21/2016    Procedure: OD EXTRACTION EXTRACAPSULAR CATARACT PHACO INTRAOCULAR LENS (IOL);  Surgeon: Lida Dewitt MD;  Location: [facility];  Service: Ophthalmology    TONSILLECTOMY         Summary    Pt presents with concern for at least mild oropharyngeal dysphagia. Prolonged mastication noted with hard  solids with pt consistently asking to dip solids into puree and for liquid wash despite SLP education re assessment for possible diet advancement. Prolonged manipulation of solids noted intermittently. Pharyngeal swallow palpated, throat clear noted x1 delayed after solids. Rx continue level 3 dental soft diet with thin liquids. Education provided and pt in agreement.      Recommendations:   Diet: soft/level 3 diet and thin liquids   Meds:  as tolerated in puree or with liquid    Frequent Oral care: 2-3x/day  Aspiration precautions and compensatory swallowing strategies: upright posture, only feed when fully alert, slow rate of feeding, small bites/sips, and alternating bites and sips    Upon d/c can consider re-eval by SLP at facility if diets do not correlate with facility options. Can consider regular/thin with chopped meats and veggies as alternative.    Current Medical Status  Pt is a 78 y.o. female who presented to [facility]  1/31/24 with low hemoglobin and generalized weakness.PMH significant for hemorrhagic CVA (residual LUE weakness), GERD, emphysema, ambulatory dysfunction, and essential tremor per H&P. Pt known to SLP dept and on level 3 dental soft diet prior, per communication with medical team pt interested in diet upgrade.     Special Studies:  CT chest/abdomen 1/19/24:  LUNGS: Dependent bibasilar atelectasis. Emphysematous changes are identified. A groundglass nodule in the right lower lobe on image 71 of series 16 is similar over multiple prior examinations at least as far back as 2009, approximately 11 mm. Biapical   subpleural scarring with a nodular component in the right lung apex that is similar at least as far back as 2018 and can be considered benign. No new or enlarging pulmonary nodule. No pulmonary contusion or laceration.    Social/Education/Vocational Hx:  Pt from  [employer]     Swallow Information   Current Risks for Dysphagia & Aspiration: known history of dysphagia  Current  Symptoms/Concerns:  n/a- consulted for diet upgrade   Current Diet: soft/level 3 diet and thin liquids   Baseline Diet: soft/level 3 diet and thin liquids (per prior notes and pt report)    Baseline Assessment   Behavior/Cognition: alert  Speech/Language Status: able to participate in basic conversation, able to follow commands, and limited verbal output  Patient Positioning: upright in bed     Swallow Mechanism Exam   Facial: symmetrical  Labial: WFL  Lingual:  minimal L lingual deviation  Velum: unable to visualize  Mandible: adequate ROM  Dentition: adequate  Vocal quality:clear/adequate   Volitional Cough: weak   Respiratory: RA    Consistencies Assessed and Performance   Consistencies Administered: thin liquids, puree, and hard solids    Oral Stage: Pt self fed throughout with slow rate of intake and small bites/sips. With solid trials pt immediately attempting to soften solids with applesauce and consistently asking for thin liquid washes. Mastication was mild-mod prolonged with slow manipulation. Minimal oral residue once swallow initiated. L3 dental soft appears to be least restrictive with pt's appropriate use of compensatory strategies.     Pharyngeal Stage: Swallow palpated, single throat clear noted delayed after hard solids otherwise no overt s/s of aspiration.       Esophageal Concerns:  hx of hiatal hernia       Results Reviewed with: patient and RN   Dysphagia Goals: none at this time  Discharge recommendation: no follow up needed for swallowing   Please re-consult for any increasing concerns.    Radha Wilson M.S. PAULINE-SLP  Speech-Language Pathologist  Available via [employer]

## 2024-02-02 NOTE — DISCHARGE INSTR - DIET
Recommendations:   Diet: soft/level 3 diet and thin liquids   Meds: as tolerated in puree or with liquid   Frequent Oral care: 2-3x/day  Aspiration precautions and compensatory swallowing strategies: upright posture, only feed when fully alert, slow rate of feeding, small bites/sips, and alternating bites and sips    Upon d/c can consider re-eval by SLP at facility if [facility] diets do not correlate with facility options. Can consider regular/thin with chopped meats and veggies as alternative.

## 2024-02-02 NOTE — PLAN OF CARE
Problem: Nutrition/Hydration-ADULT  Goal: Nutrient/Hydration intake appropriate for improving, restoring or maintaining nutritional needs  Description: Monitor and assess patient's nutrition/hydration status for malnutrition. Collaborate with interdisciplinary team and initiate plan and interventions as ordered.  Monitor patient's weight and dietary intake as ordered or per policy. Utilize nutrition screening tool and intervene as necessary. Determine patient's food preferences and provide high-protein, high-caloric foods as appropriate.     INTERVENTIONS:  - Monitor oral intake, urinary output, labs, and treatment plans  - Assess nutrition and hydration status and recommend course of action  - Evaluate amount of meals eaten  - Assist patient with eating if necessary   - Allow adequate time for meals  - Recommend/ encourage appropriate diets, oral nutritional supplements, and vitamin/mineral supplements  - Order, calculate, and assess calorie counts as needed  - Recommend, monitor, and adjust tube feedings and TPN/PPN based on assessed needs  - Assess need for intravenous fluids  - Provide specific nutrition/hydration education as appropriate  - Include patient/family/caregiver in decisions related to nutrition  Outcome: Progressing     Problem: PAIN - ADULT  Goal: Verbalizes/displays adequate comfort level or baseline comfort level  Description: Interventions:  - Encourage patient to monitor pain and request assistance  - Assess pain using appropriate pain scale  - Administer analgesics based on type and severity of pain and evaluate response  - Implement non-pharmacological measures as appropriate and evaluate response  - Consider cultural and social influences on pain and pain management  - Notify physician/advanced practitioner if interventions unsuccessful or patient reports new pain    Problem: Knowledge Deficit  Goal: Patient/family/caregiver demonstrates understanding of disease process, treatment plan,  medications, and discharge instructions  Description: Complete learning assessment and assess knowledge base.  Interventions:  - Provide teaching at level of understanding  - Provide teaching via preferred learning methods  Outcome: Progressing

## 2024-02-02 NOTE — ASSESSMENT & PLAN NOTE
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

## 2024-02-02 NOTE — ASSESSMENT & PLAN NOTE
At prior admission at [facility] patient was seen by speech therapy and recommended to have dysphagia 3 dental soft and thin liquids. Patient states she feels like she no longer needs this and can tolerate a normal diet again.     Plan  Speech eval can be completed at [facility]

## 2024-02-04 NOTE — ED ATTENDING ATTESTATION
1/31/2024  I, Tasha Roblero MD, saw and evaluated the patient. I have discussed the patient with the resident/non-physician practitioner and agree with the resident's/non-physician practitioner's findings, Plan of Care, and MDM as documented in the resident's/non-physician practitioner's note, except where noted. All available labs and Radiology studies were reviewed.  I was present for key portions of any procedure(s) performed by the resident/non-physician practitioner and I was immediately available to provide assistance.       At this point I agree with the current assessment done in the Emergency Department.  I have conducted an independent evaluation of this patient a history and physical is as follows:    Patient is a 78-year-old female presents to the emergency department for evaluation having felt weak over the last 2 days.  Blood work was performed at her residential facility and hemoglobin identified today to be 4.2 prompting transfer.  Patient denies having appreciated any focal symptoms-only other generalized weakness.  Specifically she has not appreciated any chest discomfort, dyspnea, dizziness, fever, change in appetite or any identified bleeding including but not limited to epistaxis, gingival, unusual bruising hematuria or dark or grossly bloody stools.  She was recently diagnosed with anemia and relates that she was told he does not consume enough iron.    On exam she is fully alert and with clear speech.  Coloring is good.  No conjunctival pallor.  Heart sounds regular and lungs clear to auscultation bilaterally.  No abdominal tenderness.  Stool Hemoccult negative.  Extremities without edema or tenderness.  No large areas of ecchymosis.    Differential diagnosis includes but is not limited to worsening anemia secondary to acute blood loss, hemolysis or dyscrasia/marrow suppression, laboratory error.  Must consider possible ACS and/or alternate metabolic or infectious processes  "contributing to generalized weakness.    Based on recent trending down of hemoglobin and today's value being markedly lower patient consented for transfusion.  On initial assessment, her coloring is not suggestive of severe anemia.  Await repeat CBC.    ED Course  ED Course as of 02/03/24 2306 Wed Jan 31, 2024 2132 Updated patient on study results.  Hemoglobin today on repeat is within normal limits.  White blood cell count was similarly much higher than on outpatient testing.  Outpatient testing had not been completed.  Multiple results were still listed as \"pending.\"  Suspect outpatient test may have had some laboratory error.    We did discuss elevated troponin and potassium and plans to admit her concern for further evaluation.  She continues to deny having had any chest pain though does still feel very tired.    She endorses some ongoing neck discomfort-partially relieved with position changes.         Critical Care Time  Procedures      "

## 2024-02-21 ENCOUNTER — TELEPHONE (OUTPATIENT)
Dept: RHEUMATOLOGY | Facility: CLINIC | Age: 79
End: 2024-02-21

## 2024-02-28 ENCOUNTER — TELEPHONE (OUTPATIENT)
Age: 79
End: 2024-02-28

## 2024-02-28 NOTE — TELEPHONE ENCOUNTER
Pt called to let us know that she is now living at [facility] and she wanted us to know. I advised her that she doesn't have an upcoming appts w [facility] / but with other drs.     She said that's ok I'm fine.     She did not know the address to update it/ and when I asked for her phone number to update it / there was no response.

## 2024-03-22 ENCOUNTER — OFFICE VISIT (OUTPATIENT)
Dept: NEPHROLOGY | Facility: CLINIC | Age: 79
End: 2024-03-22
Payer: MEDICARE

## 2024-03-22 VITALS
WEIGHT: 121.2 LBS | DIASTOLIC BLOOD PRESSURE: 62 MMHG | HEART RATE: 52 BPM | HEIGHT: 60 IN | BODY MASS INDEX: 23.8 KG/M2 | SYSTOLIC BLOOD PRESSURE: 106 MMHG

## 2024-03-22 DIAGNOSIS — E83.39 HYPOPHOSPHATEMIA: ICD-10-CM

## 2024-03-22 DIAGNOSIS — E87.6 HYPOKALEMIA: Primary | ICD-10-CM

## 2024-03-22 DIAGNOSIS — Z09 HOSPITAL DISCHARGE FOLLOW-UP: ICD-10-CM

## 2024-03-22 PROCEDURE — 99214 OFFICE O/P EST MOD 30 MIN: CPT | Performed by: INTERNAL MEDICINE

## 2024-03-22 RX ORDER — SENNOSIDES A AND B 8.6 MG/1
2 TABLET, FILM COATED ORAL DAILY PRN
COMMUNITY

## 2024-03-22 RX ORDER — LIDOCAINE 4 G/G
PATCH TOPICAL DAILY
COMMUNITY

## 2024-03-22 NOTE — PROGRESS NOTES
NEPHROLOGY OFFICE VISIT   Eva Lacey 78 y.o. female MRN: [id number]  3/22/2024    Reason for Visit: Hospital follow-up for hypokalemia    History of Present Illness (HPI):    I had the pleasure of seeing Eva today in the renal clinic for the continued management of hospital follow-up for hypokalemia.     This is my first encounter with her.  She was seen by my colleague Dr. Pappas back in January where she was hospitalized and we were consulted for hypokalemia and hypomagnesemia.  She was found to have a high fractional secretion of potassium in the urine in the setting of hypokalemia suggesting renal losses suspected to be related to hypomagnesemia.    Aldosterone and renin activity were checked.  The renin activity was normal and the aldosterone was less than 1.  This is not supportive of primary aldosteronism.  Not aware if she had been on amiloride received fluids when this aldosterone/renin ratio was checked.    Her last potassium was normal at 3.5.  Her renal function was stable and normal.    She is asymptomatic and reports no muscle cramps or aches.    Her blood pressure is normal.    Potassium did have a potassium of 5.5 on January 31 at which point the amiloride was discontinued along with the potassium chloride supplement.    ASSESSMENT and PLAN:      Hypokalemia--resolved  --Potassium currently normal not on any potassium supplements.  --urine potassium 30, urine potassium/creatinine ratio 43.5 mEq/g suggestive of renal loss of potassium (evaluated during hypokalemic state)   --Renin 0.372 which is normal, aldosterone less than 1.  Not consistent with primary aldosteronism  -- Magnesium had been low as well during that time  -- Asymptomatic  -- On any potassium wasting diuretics  -- Not actively on amiloride  -- Check repeat BMP magnesium at the next visit  -- Can monitor off potassium chloride and amiloride at this time.  -- If the repeat potassium elevated in the future can restart potassium  supplementation    Blood pressure/volume status  -- Euvolemic and normotensive    Hypomagnesemia--improved  -- Check magnesium level at the next visit    Acute DVT  -- Currently on Eliquis    Hyponatremia--resolved          PATIENT INSTRUCTIONS:    Patient Instructions   1.)  Low 2 g sodium diet    2.)  Monitor weights at home    3.)  Avoid NSAIDs (ibuprofen, Motrin, Advil, Aleve, naproxen)    4.)  Monitor blood pressure at home, call if blood pressure greater than 150/90 persistently    5.) I will plan to discuss all results including blood work, and/or imaging at our next visit, unless there is an urgent indication, in which case I will call you earlier. If you have any questions or concerns about your results, please feel free to call our office.            Orders Placed This Encounter   Procedures    Basic metabolic panel     This is a patient instruction: Patient fasting for 8 hours or longer recommended.     Standing Status:   Future     Standing Expiration Date:   3/22/2025    Phosphorus     Standing Status:   Future     Standing Expiration Date:   3/22/2025    Magnesium     Standing Status:   Future     Standing Expiration Date:   3/22/2025       OBJECTIVE:  Current Weight: Weight - Scale: 55 kg (121 lb 3.2 oz)  Vitals:    03/22/24 1005   Weight: 55 kg (121 lb 3.2 oz)   Height: 5' (1.524 m)    Body mass index is 23.67 kg/m².      REVIEW OF SYSTEMS:    Review of Systems   Constitutional:  Negative for activity change and fever.   Respiratory:  Negative for cough, chest tightness, shortness of breath and wheezing.    Cardiovascular:  Negative for chest pain and leg swelling.   Gastrointestinal:  Negative for abdominal pain, diarrhea, nausea and vomiting.   Endocrine: Negative for polyuria.   Genitourinary:  Negative for difficulty urinating, dysuria, flank pain, frequency and urgency.   Skin:  Negative for rash.   Neurological:  Negative for dizziness, syncope, light-headedness and headaches.       PHYSICAL  EXAM:      Physical Exam  Vitals and nursing note reviewed.   Constitutional:       General: She is not in acute distress.     Appearance: She is well-developed.   HENT:      Head: Normocephalic and atraumatic.   Eyes:      General: No scleral icterus.     Conjunctiva/sclera: Conjunctivae normal.      Pupils: Pupils are equal, round, and reactive to light.   Cardiovascular:      Rate and Rhythm: Normal rate and regular rhythm.      Heart sounds: S1 normal and S2 normal. No murmur heard.     No friction rub. No gallop.   Pulmonary:      Effort: Pulmonary effort is normal. No respiratory distress.      Breath sounds: Normal breath sounds. No wheezing or rales.   Abdominal:      General: Bowel sounds are normal.      Palpations: Abdomen is soft.      Tenderness: There is no abdominal tenderness. There is no rebound.   Musculoskeletal:         General: Normal range of motion.      Cervical back: Normal range of motion and neck supple.   Skin:     Findings: No rash.   Neurological:      Mental Status: She is alert and oriented to person, place, and time.   Psychiatric:         Behavior: Behavior normal.         Medications:    Current Outpatient Medications:     acetaminophen (TYLENOL) 325 mg tablet, Take 2 tablets (650 mg total) by mouth every 6 (six) hours as needed for mild pain or fever, Disp: , Rfl: 0    Ascorbic Acid (VITAMIN C PO), Take 500 mg by mouth in the morning, Disp: , Rfl:     atorvastatin (LIPITOR) 10 mg tablet, Take 1 tablet by mouth Daily, Disp: , Rfl:     b complex vitamins tablet, Take 1 tablet by mouth daily., Disp: , Rfl:     calcium carbonate (OYSTER SHELL,OSCAL) 500 mg, Take 1 tablet by mouth 2 (two) times a day with meals, Disp: , Rfl: 0    ergocalciferol (VITAMIN D2) 50,000 units, Take 1 capsule (50,000 Units total) by mouth once a week, Disp: , Rfl: 0    famotidine (PEPCID) 40 MG tablet, Take 40 mg by mouth in the morning, Disp: , Rfl:     fluticasone (FLONASE) 50 mcg/act nasal spray, 1 spray  "into each nostril daily Do not start before April 8, 2023., Disp: 9.9 mL, Rfl: 0    Lidocaine (HM Lidocaine Patch) 4 % PTCH, Apply topically in the morning, Disp: , Rfl:     Multiple Vitamins-Minerals (CENTRUM CARDIO PO), Take 1 tablet by mouth daily., Disp: , Rfl:     primidone (MYSOLINE) 50 mg tablet, Take 1 tab by mouth daily at bedtime, Disp: 90 tablet, Rfl: 3    senna (SENOKOT) 8.6 MG tablet, Take 2 tablets by mouth daily as needed for constipation, Disp: , Rfl:     AMILoride 5 mg tablet, Take 1 tablet (5 mg total) by mouth daily for 14 days (Patient not taking: Reported on 3/22/2024), Disp: 14 tablet, Rfl:     apixaban (ELIQUIS) 5 mg, Take 2 tablets (10 mg total) by mouth 2 (two) times a day for 6 days, THEN 1 tablet (5 mg total) 2 (two) times a day for 28 days. (Patient not taking: Reported on 3/22/2024), Disp: , Rfl:     Laboratory Results:        Invalid input(s): \"ALBUMIN\"    Results for orders placed or performed during the hospital encounter of 01/31/24   FLU/RSV/COVID - if FLU/RSV clinically relevant    Specimen: Nose; Nares   Result Value Ref Range    SARS-CoV-2 Negative Negative    INFLUENZA A PCR Negative Negative    INFLUENZA B PCR Negative Negative    RSV PCR Negative Negative   COVID/FLU/RSV    Specimen: Nose; Nares   Result Value Ref Range    SARS-CoV-2 Negative Negative    INFLUENZA A PCR Negative Negative    INFLUENZA B PCR Negative Negative    RSV PCR Negative Negative   Comprehensive metabolic panel   Result Value Ref Range    Sodium 132 (L) 135 - 147 mmol/L    Potassium 5.5 (H) 3.5 - 5.3 mmol/L    Chloride 98 96 - 108 mmol/L    CO2 24 21 - 32 mmol/L    ANION GAP 10 mmol/L    BUN 23 5 - 25 mg/dL    Creatinine 0.90 0.60 - 1.30 mg/dL    Glucose 133 65 - 140 mg/dL    Calcium 10.0 8.4 - 10.2 mg/dL    AST 47 (H) 13 - 39 U/L    ALT 38 7 - 52 U/L    Alkaline Phosphatase 153 (H) 34 - 104 U/L    Total Protein 8.2 6.4 - 8.4 g/dL    Albumin 3.8 3.5 - 5.0 g/dL    Total Bilirubin 0.40 0.20 - 1.00 mg/dL    " "eGFR 62 ml/min/1.73sq m   CBC and differential   Result Value Ref Range    WBC 15.70 (H) 4.31 - 10.16 Thousand/uL    RBC 3.72 (L) 3.81 - 5.12 Million/uL    Hemoglobin 12.5 11.5 - 15.4 g/dL    Hematocrit 37.7 34.8 - 46.1 %     (H) 82 - 98 fL    MCH 33.6 26.8 - 34.3 pg    MCHC 33.2 31.4 - 37.4 g/dL    RDW 11.9 11.6 - 15.1 %    MPV 10.7 8.9 - 12.7 fL    Platelets 194 149 - 390 Thousands/uL    nRBC 0 /100 WBCs    Neutrophils Relative 75 43 - 75 %    Immature Grans % 1 0 - 2 %    Lymphocytes Relative 18 14 - 44 %    Monocytes Relative 6 4 - 12 %    Eosinophils Relative 0 0 - 6 %    Basophils Relative 0 0 - 1 %    Neutrophils Absolute 11.68 (H) 1.85 - 7.62 Thousands/µL    Absolute Immature Grans 0.15 0.00 - 0.20 Thousand/uL    Absolute Lymphocytes 2.80 0.60 - 4.47 Thousands/µL    Absolute Monocytes 0.99 0.17 - 1.22 Thousand/µL    Eosinophils Absolute 0.02 0.00 - 0.61 Thousand/µL    Basophils Absolute 0.06 0.00 - 0.10 Thousands/µL   Protime-INR   Result Value Ref Range    Protime 19.4 (H) 11.6 - 14.5 seconds    INR 1.56 (H) 0.84 - 1.19   APTT   Result Value Ref Range    PTT 24 23 - 37 seconds   UA (URINE) with reflex to Scope   Result Value Ref Range    Color, UA Light Yellow     Clarity, UA Clear     Specific Gravity, UA 1.044 (H) 1.003 - 1.030    pH, UA 6.5 4.5, 5.0, 5.5, 6.0, 6.5, 7.0, 7.5, 8.0    Leukocytes, UA Negative Negative    Nitrite, UA Negative Negative    Protein, UA Trace (A) Negative mg/dl    Glucose, UA Negative Negative mg/dl    Ketones, UA Negative Negative mg/dl    Urobilinogen, UA <2.0 <2.0 mg/dl mg/dl    Bilirubin, UA Negative Negative    Occult Blood, UA Negative Negative   HS Troponin 0hr (reflex protocol)   Result Value Ref Range    hs TnI 0hr 305 (H) \"Refer to ACS Flowchart\"- see link ng/L   HS Troponin I 2hr   Result Value Ref Range    hs TnI 2hr 328 (H) \"Refer to ACS Flowchart\"- see link ng/L    Delta 2hr hsTnI 23 (H) <20 ng/L   HS Troponin I 4hr   Result Value Ref Range    hs TnI 4hr 280 " "(H) \"Refer to ACS Flowchart\"- see link ng/L    Delta 4hr hsTnI -25 <20 ng/L   Basic metabolic panel   Result Value Ref Range    Sodium 135 135 - 147 mmol/L    Potassium 4.1 3.5 - 5.3 mmol/L    Chloride 98 96 - 108 mmol/L    CO2 28 21 - 32 mmol/L    ANION GAP 9 mmol/L    BUN 21 5 - 25 mg/dL    Creatinine 0.80 0.60 - 1.30 mg/dL    Glucose 91 65 - 140 mg/dL    Glucose, Fasting 91 65 - 99 mg/dL    Calcium 9.4 8.4 - 10.2 mg/dL    eGFR 71 ml/min/1.73sq m   Magnesium   Result Value Ref Range    Magnesium 1.9 1.9 - 2.7 mg/dL   Urine Microscopic   Result Value Ref Range    RBC, UA 2-4 (A) None Seen, 1-2 /hpf    WBC, UA 4-10 (A) None Seen, 1-2 /hpf    Epithelial Cells Occasional None Seen, Occasional /hpf    Bacteria, UA None Seen None Seen, Occasional /hpf   D-dimer, quantitative   Result Value Ref Range    D-Dimer, Quant >20.00 (H) <0.50 ug/ml FEU   Basic metabolic panel   Result Value Ref Range    Sodium 135 135 - 147 mmol/L    Potassium 4.5 3.5 - 5.3 mmol/L    Chloride 99 96 - 108 mmol/L    CO2 26 21 - 32 mmol/L    ANION GAP 10 mmol/L    BUN 22 5 - 25 mg/dL    Creatinine 0.91 0.60 - 1.30 mg/dL    Glucose 129 65 - 140 mg/dL    Calcium 9.7 8.4 - 10.2 mg/dL    eGFR 61 ml/min/1.73sq m   CBC (With Platelets)   Result Value Ref Range    WBC 12.23 (H) 4.31 - 10.16 Thousand/uL    RBC 3.66 (L) 3.81 - 5.12 Million/uL    Hemoglobin 12.3 11.5 - 15.4 g/dL    Hematocrit 37.0 34.8 - 46.1 %     (H) 82 - 98 fL    MCH 33.6 26.8 - 34.3 pg    MCHC 33.2 31.4 - 37.4 g/dL    RDW 12.0 11.6 - 15.1 %    Platelets 201 149 - 390 Thousands/uL    MPV 9.9 8.9 - 12.7 fL   CBC and Platelet   Result Value Ref Range    WBC 10.36 (H) 4.31 - 10.16 Thousand/uL    RBC 3.28 (L) 3.81 - 5.12 Million/uL    Hemoglobin 10.9 (L) 11.5 - 15.4 g/dL    Hematocrit 33.6 (L) 34.8 - 46.1 %     (H) 82 - 98 fL    MCH 33.2 26.8 - 34.3 pg    MCHC 32.4 31.4 - 37.4 g/dL    RDW 11.9 11.6 - 15.1 %    Platelets 199 149 - 390 Thousands/uL    MPV 10.1 8.9 - 12.7 fL "   Basic metabolic panel   Result Value Ref Range    Sodium 135 135 - 147 mmol/L    Potassium 4.2 3.5 - 5.3 mmol/L    Chloride 101 96 - 108 mmol/L    CO2 23 21 - 32 mmol/L    ANION GAP 11 mmol/L    BUN 22 5 - 25 mg/dL    Creatinine 0.73 0.60 - 1.30 mg/dL    Glucose 96 65 - 140 mg/dL    Calcium 9.4 8.4 - 10.2 mg/dL    eGFR 80 ml/min/1.73sq m   ECG 12 lead   Result Value Ref Range    Ventricular Rate 100 BPM    Atrial Rate 100 BPM    TN Interval 126 ms    QRSD Interval 72 ms    QT Interval 348 ms    QTC Interval 448 ms    P Axis 66 degrees    QRS Axis 83 degrees    T Wave Axis 82 degrees   Fingerstick Glucose (POCT)   Result Value Ref Range    POC Glucose 240 (H) 65 - 140 mg/dl

## 2024-07-29 ENCOUNTER — NURSING HOME VISIT (OUTPATIENT)
Dept: SURGERY | Facility: CLINIC | Age: 79
End: 2024-07-29
Payer: MEDICARE

## 2024-07-29 DIAGNOSIS — D49.2 NEOPLASM OF SKIN OF FACE: Primary | ICD-10-CM

## 2024-07-29 PROCEDURE — 99304 1ST NF CARE SF/LOW MDM 25: CPT | Performed by: SURGERY

## 2024-07-30 VITALS
BODY MASS INDEX: 23.95 KG/M2 | DIASTOLIC BLOOD PRESSURE: 82 MMHG | HEART RATE: 66 BPM | SYSTOLIC BLOOD PRESSURE: 124 MMHG | TEMPERATURE: 98.4 F | WEIGHT: 122 LBS | HEIGHT: 60 IN

## 2024-07-30 NOTE — PROGRESS NOTES
Ambulatory Visit  Name: Eva Lacey      : 1945      MRN: [id number]  Encounter Provider:  Tobi Chávez MD  Encounter Date: 2024   Encounter department: [facility] GENERAL SURGERY [place]    Assessment & Plan   1. Neoplasm of skin of face  Left zygomatic area of face    Will do excisional biopsy in [facility] under local anesthesia    History of Present Illness   Resident of [facility]    Eva Lacey is a 79 y.o. female who presents nonhealing skin lesion on the left side of face.    Review of Systems  Patient is not on Eliquis anymore    Objective     /82   Pulse 66   Temp 98.4 °F (36.9 °C)   Ht 5' (1.524 m)   Wt 55.3 kg (122 lb)   LMP  (LMP Unknown)   BMI 23.83 kg/m²     Physical Exam  There is a skin neoplasm on the left zygomatic area of the face measuring 1.2 cm.  This needs an excision biopsy.

## 2024-08-12 ENCOUNTER — NURSING HOME VISIT (OUTPATIENT)
Dept: SURGERY | Facility: CLINIC | Age: 79
End: 2024-08-12
Payer: MEDICARE

## 2024-08-12 DIAGNOSIS — D49.2 NEOPLASM OF SKIN OF FACE: Primary | ICD-10-CM

## 2024-08-12 PROCEDURE — 11642 EXC F/E/E/N/L MAL+MRG 1.1-2: CPT | Performed by: SURGERY

## 2024-08-12 PROCEDURE — 12051 INTMD RPR FACE/MM 2.5 CM/<: CPT | Performed by: SURGERY

## 2024-08-13 PROCEDURE — 88341 IMHCHEM/IMCYTCHM EA ADD ANTB: CPT | Performed by: STUDENT IN AN ORGANIZED HEALTH CARE EDUCATION/TRAINING PROGRAM

## 2024-08-13 PROCEDURE — 88342 IMHCHEM/IMCYTCHM 1ST ANTB: CPT | Performed by: STUDENT IN AN ORGANIZED HEALTH CARE EDUCATION/TRAINING PROGRAM

## 2024-08-13 PROCEDURE — 88305 TISSUE EXAM BY PATHOLOGIST: CPT | Performed by: STUDENT IN AN ORGANIZED HEALTH CARE EDUCATION/TRAINING PROGRAM

## 2024-08-13 NOTE — PATIENT INSTRUCTIONS
Remove Band-Aid dressing after 48 hours  Apply antibiotic ointment to incision after that  May wash face with soap and water    Return to the surgical clinic in 2 weeks

## 2024-08-13 NOTE — PROGRESS NOTES
"Skin excision    Date/Time: 8/12/2024 2:23 PM    Performed by: Tobi Chávez MD  Authorized by: Tobi Chávez MD  Universal Protocol:  procedure performed by consultantConsent: Written consent obtained.  Risks and benefits: risks, benefits and alternatives were discussed  Consent given by: patient  Time out: Immediately prior to procedure a \"time out\" was called to verify the correct patient, procedure, equipment, support staff and site/side marked as required.  Timeout called at: 8/12/2024 2:30 PM.  Patient understanding: patient states understanding of the procedure being performed  Site marked: the operative site was marked    Procedure Details - Skin Excision:     Number of Lesions:  1  Lesion 1:     Body area:  Head/neck    Head/neck location:  L cheek    Malignancy: malignancy unknown            Final defect size (mm):  12    Repair type:  Linear closure    Closure complexity: intermediate      Repair size (cm):  2.1     Repair Comments: Local anesthesia used is 12 mL of 1% lidocaine    Specimen excised and sent to pathology    Subcutaneous tissues closed with interrupted 4-0 Vicryl  Skin incision closed with continuous 5-0 nylon  Antibiotic ointment and Band-Aid dressing applied  Lesion 6:      Patient tolerated procedure well     "

## 2024-08-15 ENCOUNTER — LAB REQUISITION (OUTPATIENT)
Dept: LAB | Facility: HOSPITAL | Age: 79
End: 2024-08-15
Payer: MEDICARE

## 2024-08-15 DIAGNOSIS — D49.2 NEOPLASM OF UNSPECIFIED BEHAVIOR OF BONE, SOFT TISSUE, AND SKIN: ICD-10-CM

## 2024-08-20 PROCEDURE — 88341 IMHCHEM/IMCYTCHM EA ADD ANTB: CPT | Performed by: STUDENT IN AN ORGANIZED HEALTH CARE EDUCATION/TRAINING PROGRAM

## 2024-08-20 PROCEDURE — 88342 IMHCHEM/IMCYTCHM 1ST ANTB: CPT | Performed by: STUDENT IN AN ORGANIZED HEALTH CARE EDUCATION/TRAINING PROGRAM

## 2024-08-20 PROCEDURE — 88305 TISSUE EXAM BY PATHOLOGIST: CPT | Performed by: STUDENT IN AN ORGANIZED HEALTH CARE EDUCATION/TRAINING PROGRAM

## 2024-08-26 ENCOUNTER — NURSING HOME VISIT (OUTPATIENT)
Dept: SURGERY | Facility: CLINIC | Age: 79
End: 2024-08-26

## 2024-08-26 DIAGNOSIS — C44.300 MALIGNANT NEOPLASM OF SKIN OF FACE: Primary | ICD-10-CM

## 2024-08-26 PROCEDURE — 99024 POSTOP FOLLOW-UP VISIT: CPT | Performed by: SURGERY

## 2024-08-27 VITALS
DIASTOLIC BLOOD PRESSURE: 64 MMHG | SYSTOLIC BLOOD PRESSURE: 134 MMHG | WEIGHT: 122 LBS | HEIGHT: 60 IN | HEART RATE: 70 BPM | BODY MASS INDEX: 23.95 KG/M2 | TEMPERATURE: 98.7 F

## 2024-08-27 NOTE — PROGRESS NOTES
Ambulatory Visit  Name: Eva Lacey      : 1945      MRN: [id number]  Encounter Provider:  Tobi Chávez MD  Encounter Date: 2024   Encounter department: [facility] SURGERY [place]    Assessment & Plan   1. Malignant neoplasm of skin of face    Pathology report checked.  Patient has squamous cell carcinoma in situ.  Margins are clear.    Discharge and see as needed    History of Present Illness     Eva Lacey is a 79 y.o. adult who presents for suture removal.    Patient is 2 weeks status post excision of malignant skin neoplasm on the left malar area.  Patient has no complaints.  Pathology report shows squamous cell carcinoma in situ with clear margins.    Review of Systems    Objective     /64   Pulse 70   Temp 98.7 °F (37.1 °C)   Ht 5' (1.524 m)   Wt 55.3 kg (122 lb)   LMP  (LMP Unknown)   BMI 23.83 kg/m²     Physical Exam  Incision on the left malar area well-healed.  All sutures removed.  Patient has a rash from the antibiotic ointment which is discontinued.

## 2024-09-04 ENCOUNTER — TELEPHONE (OUTPATIENT)
Dept: NEUROLOGY | Facility: CLINIC | Age: 79
End: 2024-09-04

## 2024-09-04 NOTE — TELEPHONE ENCOUNTER
"Called patient to confirm appointment. Patient reported she lives in a nursing home and does not wish to keep her appointment. Patient stated her \"tremors\" were not bad and did not need the appointment.   "

## 2024-09-07 ENCOUNTER — TELEPHONE (OUTPATIENT)
Dept: OTHER | Facility: OTHER | Age: 79
End: 2024-09-07

## 2024-09-07 NOTE — TELEPHONE ENCOUNTER
PT. Called in to notify she is canceling her follow up and does not wish to reschedule. PT is now at nursing home, where the issue is being taken care of.

## 2024-12-03 NOTE — PLAN OF CARE
Problem: PAIN - ADULT  Goal: Verbalizes/displays adequate comfort level or baseline comfort level  Description: Interventions:  - Encourage patient to monitor pain and request assistance  - Assess pain using appropriate pain scale  - Administer analgesics based on type and severity of pain and evaluate response  - Implement non-pharmacological measures as appropriate and evaluate response  - Consider cultural and social influences on pain and pain management  - Notify physician/advanced practitioner if interventions unsuccessful or patient reports new pain  Outcome: Progressing     Problem: INFECTION - ADULT  Goal: Absence or prevention of progression during hospitalization  Description: INTERVENTIONS:  - Assess and monitor for signs and symptoms of infection  - Monitor lab/diagnostic results  - Monitor all insertion sites, i e  indwelling lines, tubes, and drains  - Monitor endotracheal if appropriate and nasal secretions for changes in amount and color  - [place] appropriate cooling/warming therapies per order  - Administer medications as ordered  - Instruct and encourage patient and family to use good hand hygiene technique  - Identify and instruct in appropriate isolation precautions for identified infection/condition  Outcome: Progressing  Goal: Absence of fever/infection during neutropenic period  Description: INTERVENTIONS:  - Monitor WBC    Outcome: Progressing     Problem: SAFETY ADULT  Goal: Patient will remain free of falls  Description: INTERVENTIONS:  - Educate patient/family on patient safety including physical limitations  - Instruct patient to call for assistance with activity   - Consult OT/PT to assist with strengthening/mobility   - Keep Call bell within reach  - Keep bed low and locked with side rails adjusted as appropriate  - Keep care items and personal belongings within reach  - Initiate and maintain comfort rounds  - Make Fall Risk Sign visible to staff  - Offer Toileting every    Hours, Support given to wife of patient.  Assisted her in locating patient room.  Discussed husbands medical condition.  Support given.  Will follow as needed.  [facility]ANO [facility] / / Bereavement Coordinator  Spiritual Care Department   c: [phone]/ [phone] / [email or web address] [address]  [email or web address]       in advance of need  - Initiate/Maintain     alarm  - Obtain necessary fall risk management equipment:     - Apply yellow socks and bracelet for high fall risk patients  - Consider moving patient to room near nurses station  Outcome: Progressing  Goal: Maintain or return to baseline ADL function  Description: INTERVENTIONS:  -  Assess patient's ability to carry out ADLs; assess patient's baseline for ADL function and identify physical deficits which impact ability to perform ADLs (bathing, care of mouth/teeth, toileting, grooming, dressing, etc )  - Assess/evaluate cause of self-care deficits   - Assess range of motion  - Assess patient's mobility; develop plan if impaired  - Assess patient's need for assistive devices and provide as appropriate  - Encourage maximum independence but intervene and supervise when necessary  - Involve family in performance of ADLs  - Assess for home care needs following discharge   - Consider OT consult to assist with ADL evaluation and planning for discharge  - Provide patient education as appropriate  Outcome: Progressing  Goal: Maintains/Returns to pre admission functional level  Description: INTERVENTIONS:  - Perform BMAT or MOVE assessment daily    - Set and communicate daily mobility goal to care team and patient/family/caregiver  - Collaborate with rehabilitation services on mobility goals if consulted  - Perform Range of Motion    times a day  - Reposition patient every    hours    - Dangle patient      times a day  - Stand patient      times a day  - Ambulate patient      times a day  - Out of bed to chair    times a day   - Out of bed for meals    times a day  - Out of bed for toileting  - Record patient progress and toleration of activity level   Outcome: Progressing     Problem: DISCHARGE PLANNING  Goal: Discharge to home or other facility with appropriate resources  Description: INTERVENTIONS:  - Identify barriers to discharge w/patient and caregiver  - Arrange for needed discharge resources and transportation as appropriate  - Identify discharge learning needs (meds, wound care, etc )  - Arrange for interpretive services to assist at discharge as needed  - Refer to Case Management Department for coordinating discharge planning if the patient needs post-hospital services based on physician/advanced practitioner order or complex needs related to functional status, cognitive ability, or social support system  Outcome: Progressing     Problem: Knowledge Deficit  Goal: Patient/family/caregiver demonstrates understanding of disease process, treatment plan, medications, and discharge instructions  Description: Complete learning assessment and assess knowledge base    Interventions:  - Provide teaching at level of understanding  - Provide teaching via preferred learning methods  Outcome: Progressing     Problem: Prexisting or High Potential for Compromised Skin Integrity  Goal: Skin integrity is maintained or improved  Description: INTERVENTIONS:  - Identify patients at risk for skin breakdown  - Assess and monitor skin integrity  - Assess and monitor nutrition and hydration status  - Monitor labs   - Assess for incontinence   - Turn and reposition patient  - Assist with mobility/ambulation  - Relieve pressure over bony prominences  - Avoid friction and shearing  - Provide appropriate hygiene as needed including keeping skin clean and dry  - Evaluate need for skin moisturizer/barrier cream  - Collaborate with interdisciplinary team   - Patient/family teaching  - Consider wound care consult   Outcome: Progressing     Problem: MOBILITY - ADULT  Goal: Maintain or return to baseline ADL function  Description: INTERVENTIONS:  -  Assess patient's ability to carry out ADLs; assess patient's baseline for ADL function and identify physical deficits which impact ability to perform ADLs (bathing, care of mouth/teeth, toileting, grooming, dressing, etc )  - Assess/evaluate cause of self-care deficits - Assess range of motion  - Assess patient's mobility; develop plan if impaired  - Assess patient's need for assistive devices and provide as appropriate  - Encourage maximum independence but intervene and supervise when necessary  - Involve family in performance of ADLs  - Assess for home care needs following discharge   - Consider OT consult to assist with ADL evaluation and planning for discharge  - Provide patient education as appropriate  Outcome: Progressing  Goal: Maintains/Returns to pre admission functional level  Description: INTERVENTIONS:  - Perform BMAT or MOVE assessment daily    - Set and communicate daily mobility goal to care team and patient/family/caregiver  - Collaborate with rehabilitation services on mobility goals if consulted  - Perform Range of Motion    times a day  - Reposition patient every      hours    - Dangle patient    times a day  - Stand patient      times a day  - Ambulate patient times a day  - Out of bed to chair    times a day   - Out of bed for meals      times a day  - Out of bed for toileting  - Record patient progress and toleration of activity level   Outcome: Progressing

## 2025-07-10 ENCOUNTER — TELEPHONE (OUTPATIENT)
Age: 80
End: 2025-07-10

## 2025-07-10 NOTE — TELEPHONE ENCOUNTER
Caller: Yolette      Reason for call: Yolette called to schedule patient with ortho // Yolette was actually on the phone with patient coordinator at the same time scheduling    Call back#: [phone]

## 2025-07-17 ENCOUNTER — OFFICE VISIT (OUTPATIENT)
Dept: OBGYN CLINIC | Facility: CLINIC | Age: 80
End: 2025-07-17
Payer: MEDICARE

## 2025-07-17 ENCOUNTER — HOSPITAL ENCOUNTER (OUTPATIENT)
Dept: RADIOLOGY | Facility: HOSPITAL | Age: 80
End: 2025-07-17
Attending: ORTHOPAEDIC SURGERY
Payer: MEDICARE

## 2025-07-17 VITALS — WEIGHT: 128.6 LBS | BODY MASS INDEX: 25.12 KG/M2

## 2025-07-17 DIAGNOSIS — M25.511 RIGHT SHOULDER PAIN, UNSPECIFIED CHRONICITY: ICD-10-CM

## 2025-07-17 DIAGNOSIS — Z87.81 HISTORY OF HUMERUS FRACTURE: Primary | ICD-10-CM

## 2025-07-17 DIAGNOSIS — S42.251A CLOSED DISPLACED FRACTURE OF GREATER TUBEROSITY OF RIGHT HUMERUS, INITIAL ENCOUNTER: ICD-10-CM

## 2025-07-17 DIAGNOSIS — M79.601 RIGHT ARM PAIN: ICD-10-CM

## 2025-07-17 PROCEDURE — 23620 CLTX GR HMRL TBRS FX WO MNPJ: CPT | Performed by: ORTHOPAEDIC SURGERY

## 2025-07-17 PROCEDURE — 73030 X-RAY EXAM OF SHOULDER: CPT

## 2025-07-17 PROCEDURE — 99214 OFFICE O/P EST MOD 30 MIN: CPT | Performed by: ORTHOPAEDIC SURGERY
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
## 2025-07-28 ENCOUNTER — HOSPITAL ENCOUNTER (OUTPATIENT)
Dept: CT IMAGING | Facility: HOSPITAL | Age: 80
Discharge: HOME | End: 2025-07-28
Attending: PHYSICIAN ASSISTANT
Payer: MEDICARE

## 2025-07-28 DIAGNOSIS — M79.601 RIGHT ARM PAIN: ICD-10-CM

## 2025-07-28 PROCEDURE — 73200 CT UPPER EXTREMITY W/O DYE: CPT

## 2025-08-07 ENCOUNTER — HOSPITAL ENCOUNTER (OUTPATIENT)
Dept: RADIOLOGY | Facility: HOSPITAL | Age: 80
End: 2025-08-07
Attending: ORTHOPAEDIC SURGERY
Payer: MEDICARE

## 2025-08-07 ENCOUNTER — OFFICE VISIT (OUTPATIENT)
Dept: OBGYN CLINIC | Facility: CLINIC | Age: 80
End: 2025-08-07

## 2025-08-07 VITALS — BODY MASS INDEX: 25.25 KG/M2 | HEIGHT: 60 IN | WEIGHT: 128.6 LBS

## 2025-08-07 DIAGNOSIS — S42.251A CLOSED DISPLACED FRACTURE OF GREATER TUBEROSITY OF RIGHT HUMERUS, INITIAL ENCOUNTER: Primary | ICD-10-CM

## 2025-08-07 PROCEDURE — 99024 POSTOP FOLLOW-UP VISIT: CPT | Performed by: ORTHOPAEDIC SURGERY
